# Patient Record
Sex: FEMALE | Race: WHITE | NOT HISPANIC OR LATINO | Employment: OTHER | URBAN - METROPOLITAN AREA
[De-identification: names, ages, dates, MRNs, and addresses within clinical notes are randomized per-mention and may not be internally consistent; named-entity substitution may affect disease eponyms.]

---

## 2023-06-27 ENCOUNTER — TRANSFERRED RECORDS (OUTPATIENT)
Dept: HEALTH INFORMATION MANAGEMENT | Facility: CLINIC | Age: 63
End: 2023-06-27
Payer: COMMERCIAL

## 2023-06-28 ENCOUNTER — TRANSFERRED RECORDS (OUTPATIENT)
Dept: HEALTH INFORMATION MANAGEMENT | Facility: CLINIC | Age: 63
End: 2023-06-28
Payer: COMMERCIAL

## 2023-06-28 ENCOUNTER — TRANSCRIBE ORDERS (OUTPATIENT)
Dept: OTHER | Age: 63
End: 2023-06-28

## 2023-06-28 DIAGNOSIS — C04.9 SQUAMOUS CELL CARCINOMA OF FLOOR OF MOUTH (H): Primary | ICD-10-CM

## 2023-06-29 DIAGNOSIS — C04.9 SCC (SQUAMOUS CELL CARCINOMA OF FLOOR OF MOUTH) (H): Primary | ICD-10-CM

## 2023-06-29 NOTE — TELEPHONE ENCOUNTER
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] referral from Ananda Jackson DDS recs in epic    RECORDS REQUESTED FROM:       Clinic name Comments Records Status Imaging Status   [facility]  Phone: [phone]   Fax: [phone]    6/27/23 dental chart note req 6/29/23    RECEIVED send to scan 6/30/23    Oral Pathology Lab  [facility]    [address] [place] [address]  Ph: [phone]  Fax: [phone] 6/2/2016 case: [id number] Sent to scan 6/30/23 6/29/23 7PM sent a fax for records - Amay   June 30, 2023 at 10:21 AM - received records from [facility] and send to scanning. E-mail copy to RN and placed copy in provider's rightfax folder. Maria Luisa

## 2023-06-30 ENCOUNTER — MEDICAL CORRESPONDENCE (OUTPATIENT)
Dept: HEALTH INFORMATION MANAGEMENT | Facility: CLINIC | Age: 63
End: 2023-06-30
Payer: COMMERCIAL

## 2023-07-03 ENCOUNTER — PRE VISIT (OUTPATIENT)
Dept: OTOLARYNGOLOGY | Facility: CLINIC | Age: 63
End: 2023-07-03

## 2023-07-03 ENCOUNTER — OFFICE VISIT (OUTPATIENT)
Dept: OTOLARYNGOLOGY | Facility: CLINIC | Age: 63
End: 2023-07-03
Payer: COMMERCIAL

## 2023-07-03 VITALS — BODY MASS INDEX: 19.97 KG/M2 | WEIGHT: 117 LBS | OXYGEN SATURATION: 100 % | HEART RATE: 115 BPM | HEIGHT: 64 IN

## 2023-07-03 DIAGNOSIS — C04.9 SCC (SQUAMOUS CELL CARCINOMA OF FLOOR OF MOUTH) (H): Primary | ICD-10-CM

## 2023-07-03 PROCEDURE — 40808 BIOPSY OF MOUTH LESION: CPT | Performed by: STUDENT IN AN ORGANIZED HEALTH CARE EDUCATION/TRAINING PROGRAM

## 2023-07-03 PROCEDURE — 31575 DIAGNOSTIC LARYNGOSCOPY: CPT | Mod: 51 | Performed by: STUDENT IN AN ORGANIZED HEALTH CARE EDUCATION/TRAINING PROGRAM

## 2023-07-03 PROCEDURE — 88305 TISSUE EXAM BY PATHOLOGIST: CPT | Mod: TC | Performed by: STUDENT IN AN ORGANIZED HEALTH CARE EDUCATION/TRAINING PROGRAM

## 2023-07-03 PROCEDURE — 88305 TISSUE EXAM BY PATHOLOGIST: CPT | Mod: 26 | Performed by: STUDENT IN AN ORGANIZED HEALTH CARE EDUCATION/TRAINING PROGRAM

## 2023-07-03 PROCEDURE — 99205 OFFICE O/P NEW HI 60 MIN: CPT | Mod: 25 | Performed by: STUDENT IN AN ORGANIZED HEALTH CARE EDUCATION/TRAINING PROGRAM

## 2023-07-03 RX ORDER — AMOXICILLIN 500 MG/1
CAPSULE ORAL
COMMUNITY
Start: 2023-06-26 | End: 2023-08-02

## 2023-07-03 ASSESSMENT — PATIENT HEALTH QUESTIONNAIRE - PHQ9: SUM OF ALL RESPONSES TO PHQ QUESTIONS 1-9: 6

## 2023-07-03 ASSESSMENT — PAIN SCALES - GENERAL: PAINLEVEL: WORST PAIN (10)

## 2023-07-03 NOTE — LETTER
7/3/2023       RE: Adrianna Pereira  [address] [address] [place] [address]     Dear Colleague,    Thank you for referring your patient, Adrianna Pereira, to the [facility] EAR NOSE AND THROAT CLINIC [place] at [facility]. Please see a copy of my visit note below.    July 3, 2023      Ananda Jackson MD, DDS  [facility]  [address], [address]  [place] [address]      Dear Dr. Jackson,    I had the pleasure of meeting Ms. Pereira today in clinic.    HISTORY OF PRESENT ILLNESS:  As you know, she is a pleasant 62-year-old woman referred for evaluation of a likely oral cancer.  She has previously undergone CO2 laser ablation for premalignant lesions in the oral cavity.  She more recently has noticed some firmness and swelling of the lower lip and chin.  She was seen in your office and she you had an appropriate concern for oral malignancy.  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    PAST MEDICAL HISTORY:  None.    PAST SURGICAL HISTORY:  None.    MEDICATIONS:  Amoxicillin.    ALLERGIES:  No known drug allergies.    SOCIAL HISTORY:  She is a current smoker and smokes approximately 15 cigarettes per day.  She does not drink alcohol now, but in the past was a heavy alcohol user and considers herself an alcoholic.  No drug use.    FAMILY HISTORY:  None.    REVIEW OF SYSTEMS:  A 10-point review of systems was performed and is negative except as noted in the HPI.    PHYSICAL EXAMINATION:  She has no palpable cervical lymphadenopathy.  She has firmness and a mass palpable in the chin that appears to be immediately deep to the skin.  The overlying skin is not involved over the mass.  On intraoral examination, she has an obvious ulcerative lesion involving the mandibular alveolus, floor of mouth and ventral tongue.  She has soft tissue fullness over it presumably related to the mental foramen and likely  perineural invasion of the tumor.  She has thin legs and a normal Alfredo's test on the left forearm.     PROCEDURE#1:  Fiberoptic laryngoscopy was performed.  The scope was advanced in the nasal cavity.  The nasopharynx was clear.  The base of tongue and vallecula was clear.  The vocal cords are mobile bilaterally without lesions.    PROCEDURE #2: Biopsy was performed of the oral lesion. 1% lidocaine with 1:100,000 epinephrine was used for local anesthesia.  A 4 mm punch was used to obtain two specimens, one from the mandibular alveolus and one from the ventral tongue from the contiguous mass.      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  This appears to involve her mandible, floor of mouth, ventral tongue and chin.  I anticipate that the resection will be segmental mandibulectomy, floor of mouth resection, anterior glossectomy and I feel that she will likely need a total lower lip/chin resection.    In terms of reconstruction, this could be reconstructed with a single osteocutaneous flap, potentially scapula, versus a double flap where we use the fibula to reconstruct the bone and some of the floor of mouth defect and a folded radial forearm flap to reconstruct the lower lip.  She does not have a palmaris tendon present so I would likely harvest fascia jenny or plantaris tendon to create a sling.  She understands that adjuvant radiation therapy will be needed.  She did not want to discuss the details of surgery today and she is quite overwhelmed.   We will follow up on her biopsy results.  She has a PET scan scheduled for next Thursday.  We will review the case at our Multidisciplinary Head and Neck Conference and I will plan to see her back on Monday July 17, 2023 for a more thorough discussion of the plan.  All of her questions were answered today.    Thank you for allowing me to participate in the care of this patient. If you have any further questions, please  do not hesitate to contact me.      Sincerely,      Darien Thorne M.D.      Head and Neck Surgical Oncology and Microvascular Reconstruction  Department of Otolaryngology - Head and Neck Surgery  [facility]       60 minutes spent on the date of the encounter in chart review, patient visit, review of tests, documentation and/or discussion with other providers about the issues documented above asides from time spent doing flexible laryngoscopy.

## 2023-07-03 NOTE — NURSING NOTE
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    Manjeet Salamanca LPN    "

## 2023-07-03 NOTE — PATIENT INSTRUCTIONS
You were seen in the ENT Clinic today by Dr. Thorne. If you have any questions or concerns after your appointment, please contact us (see below)      2.   Please return to clinic         How to Contact Us:  Send a [employer] message to your provider. Our team will respond to you via [employer]. Occasionally, we will need to call you to get further information.  For urgent matters (Monday-Friday), call the ENT Clinic: [phone] and speak with a call center team member - they will route your call appropriately.   If you'd like to speak directly with a nurse, please find our contact information below. We do our best to check voicemail frequently throughout the day, and will work to call you back within 1-2 days. For urgent matters, please use the general clinic phone numbers listed above.      La PRETTY RN  ENT RN Care Coordinator  Direct: [phone]

## 2023-07-03 NOTE — PROGRESS NOTES
July 3, 2023      Ananda Jackson MD, DDS  [facility]  [address], [address] [place], [place] [id number]      Dear Dr. Jackson,    I had the pleasure of meeting Ms. Pereira today in clinic.    HISTORY OF PRESENT ILLNESS:  As you know, she is a pleasant 62-year-old woman referred for evaluation of a likely oral cancer.  She has previously undergone CO2 laser ablation for premalignant lesions in the oral cavity.  She more recently has noticed some firmness and swelling of the lower lip and chin.  She was seen in your office and she you had an appropriate concern for oral malignancy.  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    PAST MEDICAL HISTORY:  None.    PAST SURGICAL HISTORY:  None.    MEDICATIONS:  Amoxicillin.    ALLERGIES:  No known drug allergies.    SOCIAL HISTORY:  She is a current smoker and smokes approximately 15 cigarettes per day.  She does not drink alcohol now, but in the past was a heavy alcohol user and considers herself an alcoholic.  No drug use.    FAMILY HISTORY:  None.    REVIEW OF SYSTEMS:  A 10-point review of systems was performed and is negative except as noted in the HPI.    PHYSICAL EXAMINATION:  She has no palpable cervical lymphadenopathy.  She has firmness and a mass palpable in the chin that appears to be immediately deep to the skin.  The overlying skin is not involved over the mass.  On intraoral examination, she has an obvious ulcerative lesion involving the mandibular alveolus, floor of mouth and ventral tongue.  She has soft tissue fullness over it presumably related to the mental foramen and likely perineural invasion of the tumor.  She has thin legs and a normal Alfredo's test on the left forearm.     PROCEDURE#1:  Fiberoptic laryngoscopy was performed.  The scope was advanced in the nasal cavity.  The nasopharynx was clear.  The base of tongue and vallecula was clear.  The vocal cords are mobile bilaterally  without lesions.    PROCEDURE #2: Biopsy was performed of the oral lesion. 1% lidocaine with 1:100,000 epinephrine was used for local anesthesia.  A 4 mm punch was used to obtain two specimens, one from the mandibular alveolus and one from the ventral tongue from the contiguous mass.      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  This appears to involve her mandible, floor of mouth, ventral tongue and chin.  I anticipate that the resection will be segmental mandibulectomy, floor of mouth resection, anterior glossectomy and I feel that she will likely need a total lower lip/chin resection.    In terms of reconstruction, this could be reconstructed with a single osteocutaneous flap, potentially scapula, versus a double flap where we use the fibula to reconstruct the bone and some of the floor of mouth defect and a folded radial forearm flap to reconstruct the lower lip.  She does not have a palmaris tendon present so I would likely harvest fascia jenny or plantaris tendon to create a sling.  She understands that adjuvant radiation therapy will be needed.  She did not want to discuss the details of surgery today and she is quite overwhelmed.   We will follow up on her biopsy results.  She has a PET scan scheduled for next Thursday.  We will review the case at our Multidisciplinary Head and Neck Conference and I will plan to see her back on Monday July 17, 2023 for a more thorough discussion of the plan.  All of her questions were answered today.    Thank you for allowing me to participate in the care of this patient. If you have any further questions, please do not hesitate to contact me.      Sincerely,      Darien Thorne M.D.      Head and Neck Surgical Oncology and Microvascular Reconstruction  Department of Otolaryngology - Head and Neck Surgery  [facility]       60 minutes spent on the date of the encounter in chart review, patient  visit, review of tests, documentation and/or discussion with other providers about the issues documented above asides from time spent doing flexible laryngoscopy.

## 2023-07-05 LAB
PATH REPORT.COMMENTS IMP SPEC: ABNORMAL
PATH REPORT.COMMENTS IMP SPEC: ABNORMAL
PATH REPORT.COMMENTS IMP SPEC: YES
PATH REPORT.FINAL DX SPEC: ABNORMAL
PATH REPORT.GROSS SPEC: ABNORMAL
PATH REPORT.MICROSCOPIC SPEC OTHER STN: ABNORMAL
PATH REPORT.RELEVANT HX SPEC: ABNORMAL
PHOTO IMAGE: ABNORMAL

## 2023-07-07 DIAGNOSIS — C04.9 SCC (SQUAMOUS CELL CARCINOMA OF FLOOR OF MOUTH) (H): Primary | ICD-10-CM

## 2023-07-13 ENCOUNTER — ANCILLARY ORDERS (OUTPATIENT)
Dept: OTOLARYNGOLOGY | Facility: CLINIC | Age: 63
End: 2023-07-13

## 2023-07-13 ENCOUNTER — HOSPITAL ENCOUNTER (OUTPATIENT)
Dept: PET IMAGING | Facility: CLINIC | Age: 63
Discharge: HOME | End: 2023-07-13
Attending: STUDENT IN AN ORGANIZED HEALTH CARE EDUCATION/TRAINING PROGRAM
Payer: COMMERCIAL

## 2023-07-13 DIAGNOSIS — C04.9 SCC (SQUAMOUS CELL CARCINOMA OF FLOOR OF MOUTH) (H): ICD-10-CM

## 2023-07-13 LAB
CREAT BLD-MCNC: 0.6 MG/DL (ref 0.5–1)
GFR SERPL CREATININE-BSD FRML MDRD: >60 ML/MIN/1.73M2

## 2023-07-13 PROCEDURE — 78815 PET IMAGE W/CT SKULL-THIGH: CPT | Mod: 26 | Performed by: RADIOLOGY

## 2023-07-13 PROCEDURE — 343N000001 HC RX 343: Performed by: STUDENT IN AN ORGANIZED HEALTH CARE EDUCATION/TRAINING PROGRAM

## 2023-07-13 PROCEDURE — A9552 F18 FDG: HCPCS | Performed by: STUDENT IN AN ORGANIZED HEALTH CARE EDUCATION/TRAINING PROGRAM

## 2023-07-13 PROCEDURE — 74177 CT ABD & PELVIS W/CONTRAST: CPT | Mod: 26 | Performed by: RADIOLOGY

## 2023-07-13 PROCEDURE — 78815 PET IMAGE W/CT SKULL-THIGH: CPT | Mod: PI

## 2023-07-13 PROCEDURE — 999N000248 HC STATISTIC IV INSERT WITH US BY RN

## 2023-07-13 PROCEDURE — 82565 ASSAY OF CREATININE: CPT

## 2023-07-13 PROCEDURE — 71260 CT THORAX DX C+: CPT | Mod: 26 | Performed by: RADIOLOGY

## 2023-07-13 PROCEDURE — 71260 CT THORAX DX C+: CPT

## 2023-07-13 PROCEDURE — 70491 CT SOFT TISSUE NECK W/DYE: CPT | Mod: 26 | Performed by: RADIOLOGY

## 2023-07-13 PROCEDURE — 250N000011 HC RX IP 250 OP 636: Performed by: STUDENT IN AN ORGANIZED HEALTH CARE EDUCATION/TRAINING PROGRAM

## 2023-07-13 PROCEDURE — 70491 CT SOFT TISSUE NECK W/DYE: CPT

## 2023-07-13 RX ORDER — IOPAMIDOL 755 MG/ML
10-135 INJECTION, SOLUTION INTRAVASCULAR ONCE
Status: COMPLETED | OUTPATIENT
Start: 2023-07-13 | End: 2023-07-13

## 2023-07-13 RX ADMIN — IOPAMIDOL 64 ML: 755 INJECTION, SOLUTION INTRAVENOUS at 14:20

## 2023-07-13 RX ADMIN — FLUDEOXYGLUCOSE F-18 10.01 MILLICURIE: 500 INJECTION, SOLUTION INTRAVENOUS at 12:51

## 2023-07-14 DIAGNOSIS — C04.9 SCC (SQUAMOUS CELL CARCINOMA OF FLOOR OF MOUTH) (H): Primary | ICD-10-CM

## 2023-07-17 ENCOUNTER — ANCILLARY PROCEDURE (OUTPATIENT)
Dept: CT IMAGING | Facility: CLINIC | Age: 63
End: 2023-07-17
Attending: STUDENT IN AN ORGANIZED HEALTH CARE EDUCATION/TRAINING PROGRAM
Payer: COMMERCIAL

## 2023-07-17 ENCOUNTER — PREP FOR PROCEDURE (OUTPATIENT)
Dept: OTOLARYNGOLOGY | Facility: CLINIC | Age: 63
End: 2023-07-17

## 2023-07-17 ENCOUNTER — OFFICE VISIT (OUTPATIENT)
Dept: OTOLARYNGOLOGY | Facility: CLINIC | Age: 63
End: 2023-07-17
Payer: COMMERCIAL

## 2023-07-17 ENCOUNTER — THERAPY VISIT (OUTPATIENT)
Dept: SPEECH THERAPY | Facility: CLINIC | Age: 63
End: 2023-07-17
Payer: COMMERCIAL

## 2023-07-17 ENCOUNTER — ANCILLARY ORDERS (OUTPATIENT)
Dept: OTOLARYNGOLOGY | Facility: CLINIC | Age: 63
End: 2023-07-17

## 2023-07-17 VITALS — WEIGHT: 117 LBS | BODY MASS INDEX: 19.97 KG/M2 | HEIGHT: 64 IN

## 2023-07-17 DIAGNOSIS — C04.9 SCC (SQUAMOUS CELL CARCINOMA OF FLOOR OF MOUTH) (H): Primary | ICD-10-CM

## 2023-07-17 DIAGNOSIS — C04.9 SQUAMOUS CELL CARCINOMA OF FLOOR OF MOUTH (H): Primary | ICD-10-CM

## 2023-07-17 DIAGNOSIS — C77.9 METASTATIC SQUAMOUS CELL CARCINOMA TO LYMPH NODE (H): ICD-10-CM

## 2023-07-17 DIAGNOSIS — R13.12 OROPHARYNGEAL DYSPHAGIA: ICD-10-CM

## 2023-07-17 DIAGNOSIS — C04.9 SCC (SQUAMOUS CELL CARCINOMA OF FLOOR OF MOUTH) (H): ICD-10-CM

## 2023-07-17 PROCEDURE — 70491 CT SOFT TISSUE NECK W/DYE: CPT | Mod: GC | Performed by: RADIOLOGY

## 2023-07-17 PROCEDURE — 99207 PR NO CHARGE LOS: CPT | Performed by: SPEECH-LANGUAGE PATHOLOGIST

## 2023-07-17 PROCEDURE — 73706 CT ANGIO LWR EXTR W/O&W/DYE: CPT | Mod: LT | Performed by: RADIOLOGY

## 2023-07-17 PROCEDURE — 99215 OFFICE O/P EST HI 40 MIN: CPT | Performed by: STUDENT IN AN ORGANIZED HEALTH CARE EDUCATION/TRAINING PROGRAM

## 2023-07-17 RX ORDER — IOPAMIDOL 755 MG/ML
135 INJECTION, SOLUTION INTRAVASCULAR ONCE
Status: COMPLETED | OUTPATIENT
Start: 2023-07-17 | End: 2023-07-17

## 2023-07-17 RX ORDER — DIPHENHYDRAMINE HCL 12.5 MG/5ML
12.5 LIQUID ORAL
COMMUNITY
Start: 2023-07-10 | End: 2023-08-02

## 2023-07-17 RX ADMIN — IOPAMIDOL 135 ML: 755 INJECTION, SOLUTION INTRAVASCULAR at 12:28

## 2023-07-17 ASSESSMENT — PAIN SCALES - GENERAL: PAINLEVEL: SEVERE PAIN (6)

## 2023-07-17 NOTE — DISCHARGE INSTRUCTIONS

## 2023-07-17 NOTE — LETTER
7/17/2023       RE: Adrianna Pereira  [address] [address] [place] [address]     Dear Colleague,    Thank you for referring your patient, Adrianna Pereira, to the [facility] EAR NOSE AND THROAT CLINIC [place] at [facility]. Please see a copy of my visit note below.    HISTORY OF PRESENT ILLNESS:  I met Ms. Pereira a couple of weeks ago.  She presented with a presumed oral cavity squamous cell carcinoma.  Her previous biopsies did not confirm this.  I redid her biopsies that confirmed squamous cell carcinoma.  We subsequently arranged for her to have a PET scan for staging.  She presents today for additional discussions regarding treatment and to review her imaging.    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  We previously discussed the anticipated surgical resection, which would be a segmental mandibulectomy, floor of mouth and anterior tongue resection and total lower lip and chin resection.  She was quite overwhelmed by this initially.  We also discussed reconstructive options.  Her PET scan shows no evidence of distant disease.    She is here today with her .  She understands the extent of the operation as well as her disease.    PHYSICAL EXAMINATION:  On exam, she has subcutaneous fullness of the chin and lower lip consistent with tumor infiltration.  There is tumor involving the mandibular alveolus with obvious exposed bone erosion.  There is tumor along the ventral tongue.  Both forearms are suitable for free tissue transfer.  She has thin thighs and legs bilaterally.  She has a tattoo on her right lateral calf.    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  I again discussed the recommended treatment, which would be upfront surgery and adjuvant radiation and potentially chemotherapy pending  pathology results.  We reviewed the planned operation, which would be a segmental mandibulectomy with resection of the floor of mouth, anteroventral tongue and lower lip and chin as well as bilateral neck dissection, tracheostomy and nasogastric feeding tube placement.  I anticipate she will need a gastrostomy tube and we can arrange this while she is inpatient.  We discussed the need for postoperative adjuvant radiation therapy and possible chemotherapy.      We spent some time discussing the reconstructive options.  I think that given the extent of the defect and complex structures involved, I think she would be best served with two free flap reconstruction.  I recommended proceeding with a fibula to reconstruct the bone defect and tongue/floor of mouth.  She has a tattoo on her right calf and therefore prefers the left fibula.  We will obtain a CT angiogram to assess the fibula as donor a site.  In regards to the lower lip and chin, I think that a forearm flap with palmaris tendon could be a nice option versus anterolateral thigh with fascia jenny or plantaris tendon if the defect is too big for a forearm.  She does not have a palmaris tendon on the left side and therefore, I recommended proceeding with a right forearm flap versus left ALT to reconstruct the lower lip and chin.  She understands the need for skin grafts at the fibula and forearm site as well as need for a volar splint on the forearm.      We reviewed the risks of the operation including but not limited to infection, bleeding, return trips to the operating room.  We discussed the cranial nerves that are at risk of injury.  We discussed the risks of free tissue transfer including 2-5% risk of flap loss.  She understands the anticipated postoperative course with a 3-day stay in the ICU and a 7 to 10-day total hospital stay.  She understands the period of n.p.o. postoperatively and the need for the tracheostomy postoperatively.  I have messaged our  colleague at [facility] Manjeet to assist in virtual surgical planning.  We will follow up on a CT angiogram of her lower extremities to make sure that she is a candidate for a fibula free flap.  We will begin the process of scheduling her for surgery.  She will be seen by preoperative anesthesia clinic and we will review her case at our Multidisciplinary Head and Neck Conference.      Darien Thorne M.D.      Head and Neck Surgical Oncology and Microvascular Reconstruction  Department of Otolaryngology - Head and Neck Surgery  [facility]          45 minutes spent on the date of the encounter in chart review, patient visit, review of tests, documentation and/or discussion with other providers about the issues documented above.           Again, thank you for allowing me to participate in the care of your patient.      Sincerely,    Darien Thorne MD

## 2023-07-17 NOTE — NURSING NOTE
Arm alert bracelet given to patient to wear on RIGHT arm now through surgery. Advised patient to not allow for any IV placement, needle sticks or lab draws from RIGHT arm in anticipation of RIGHT forearm free flap.       La Flores, RN, BSN

## 2023-07-17 NOTE — PATIENT INSTRUCTIONS
1. Please proceed with CT scan today.   2. Please call the ENT clinic with any questions,concerns, new or worsening symptoms.    -Clinic number is [phone]   - La's direct line (Dr. Thorne's nurse) [phone]  3. Please return on 8/2 for other scheduled appointments.   4. Surgery likely to be on 8/17

## 2023-07-17 NOTE — PROGRESS NOTES
HISTORY OF PRESENT ILLNESS:  I met Ms. Pereira a couple of weeks ago.  She presented with a presumed oral cavity squamous cell carcinoma.  Her previous biopsies did not confirm this.  I redid her biopsies that confirmed squamous cell carcinoma.  We subsequently arranged for her to have a PET scan for staging.  She presents today for additional discussions regarding treatment and to review her imaging.    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  We previously discussed the anticipated surgical resection, which would be a segmental mandibulectomy, floor of mouth and anterior tongue resection and total lower lip and chin resection.  She was quite overwhelmed by this initially.  We also discussed reconstructive options.  Her PET scan shows no evidence of distant disease.    She is here today with her .  She understands the extent of the operation as well as her disease.    PHYSICAL EXAMINATION:  On exam, she has subcutaneous fullness of the chin and lower lip consistent with tumor infiltration.  There is tumor involving the mandibular alveolus with obvious exposed bone erosion.  There is tumor along the ventral tongue.  Both forearms are suitable for free tissue transfer.  She has thin thighs and legs bilaterally.  She has a tattoo on her right lateral calf.    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  I again discussed the recommended treatment, which would be upfront surgery and adjuvant radiation and potentially chemotherapy pending pathology results.  We reviewed the planned operation, which would be a segmental mandibulectomy with resection of the floor of mouth, anteroventral tongue and lower lip and chin as well as bilateral neck dissection, tracheostomy and nasogastric feeding tube placement.  I anticipate she will need a gastrostomy  tube and we can arrange this while she is inpatient.  We discussed the need for postoperative adjuvant radiation therapy and possible chemotherapy.      We spent some time discussing the reconstructive options.  I think that given the extent of the defect and complex structures involved, I think she would be best served with two free flap reconstruction.  I recommended proceeding with a fibula to reconstruct the bone defect and tongue/floor of mouth.  She has a tattoo on her right calf and therefore prefers the left fibula.  We will obtain a CT angiogram to assess the fibula as donor a site.  In regards to the lower lip and chin, I think that a forearm flap with palmaris tendon could be a nice option versus anterolateral thigh with fascia jenny or plantaris tendon if the defect is too big for a forearm.  She does not have a palmaris tendon on the left side and therefore, I recommended proceeding with a right forearm flap versus left ALT to reconstruct the lower lip and chin.  She understands the need for skin grafts at the fibula and forearm site as well as need for a volar splint on the forearm.      We reviewed the risks of the operation including but not limited to infection, bleeding, return trips to the operating room.  We discussed the cranial nerves that are at risk of injury.  We discussed the risks of free tissue transfer including 2-5% risk of flap loss.  She understands the anticipated postoperative course with a 3-day stay in the ICU and a 7 to 10-day total hospital stay.  She understands the period of n.p.o. postoperatively and the need for the tracheostomy postoperatively.  I have messaged our colleague at [facility] to assist in virtual surgical planning.  We will follow up on a CT angiogram of her lower extremities to make sure that she is a candidate for a fibula free flap.  We will begin the process of scheduling her for surgery.  She will be seen by preoperative anesthesia clinic and we will review  her case at our Multidisciplinary Head and Neck Conference.      Darien Thorne M.D.      Head and Neck Surgical Oncology and Microvascular Reconstruction  Department of Otolaryngology - Head and Neck Surgery  [facility]          45 minutes spent on the date of the encounter in chart review, patient visit, review of tests, documentation and/or discussion with other providers about the issues documented above.

## 2023-07-17 NOTE — DISCHARGE INSTRUCTIONS

## 2023-07-17 NOTE — PROGRESS NOTES
"Adrianna Pereira was seen for allied health care provider visit for introduction of self and SLP services. Pt with new very involved oral cavity mass. Per MD, pt would likely need \"segmental mandibulectomy, floor of mouth resection, anterior glossectomy and I feel that she will likely need a total lower lip/chin resection.\" Per MD, pt would likely need post op XRT. Provided information on trajectory of care and benefits of SLP services after surgery. Speaking and swallowing are impaired d/t pain. Provided ideas of ways to increase caloric intake prior to surgery with liquid nutritional supplements and timing pain medication prior to PO intake. Formal swallow evaluation indicated after surgery once medically cleared for PO intake. All questions answered within scope of practice for pt and her spouse. Encouraged pt to reach out with any questions or concerns. Time spent with patient 10 minutes.       MAIDA Bailey MA (music), CCC-SLP   Speech Language Pathologist  [place] Trained Vocologist   [facility] [place]  Dept. of Otolaryngology  Department of Rehabilitation Services  [address] [place] [address]  Email: [email or web address]   Phone: [phone]  Pronouns: she/her/hers      "

## 2023-07-18 NOTE — TELEPHONE ENCOUNTER
FUTURE VISIT INFORMATION      SURGERY INFORMATION:    Date: 8/17/23    Location: [facility] or    Surgeon:  Tyshawn Dao MD Jethwa, Ashok Ratilal, MD    Anesthesia Type:  General    Procedure: lower lip resection segmental mandibulectomy GLOSSECTOMY, PARTIAL Bilateral modified radical neck dissection Tracheostomy placement, nasogastric tube placement Left fibula free flap Right forearm free flap, possible left patricia lateral thigh free flap Split thickness skin graft from left thigh  RECORDS REQUESTED FROM:       Pertinent Medical History: None

## 2023-07-19 ENCOUNTER — TELEPHONE (OUTPATIENT)
Dept: OTOLARYNGOLOGY | Facility: CLINIC | Age: 63
End: 2023-07-19
Payer: COMMERCIAL

## 2023-07-19 NOTE — TELEPHONE ENCOUNTER
Patient is scheduled for surgery with Dr. Dao.     Spoke with: Patient    Date of Surgery: 8/17/23    Location: [facility] OR     Pre op with Provider: BRENNAN     H&P: Scheduled with PAC on 8/17/23 at 1:30.     Additional imaging/appointments: BRENNAN    Surgery packet: Will mail surgery packet to patient. Did confirm with patient address on file works best.      Additional comments: Will schedule patient for a 2 week post-op appointment with Dr. Thorne.         Jazmyn Koch on 7/19/2023 at 11:32 AM

## 2023-07-19 NOTE — TUMOR CONFERENCE
Head & Neck Tumor Conference Note   7/21/2023    Status: New  Staff: Dr. Thorne     Tumor Site: Oral cavity  Tumor Pathology: SCC  Tumor Stage: T4aN2c  Tumor Treatment:   7/3/23 - clinic biopsy     Reason for Review: Review imaging, path, and POC     Brief History: This is a 62 year old female with a history of prior CO2 laser ablation for premalignant lesions in the oral cavity. She was referred for evaluation of a likely oral cancer. She recently noticed some firmness and swelling of the lower lip and chin. 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 Resection including segmental mandibulectomy, floor of mouth resection, anterior glossectomy and likely total lower lip/chin resection with free flap reconstruction was recommended.    Pertinent PMH: No past medical history on file.     Smoking Hx:   Social History     Tobacco Use     Smoking status: Every Day     Years: 40.00     Types: Cigarettes     Smokeless tobacco: Never     Tobacco comments:     5-7 cigarettes per day     Imaging:   CT-Neck 7/17  Impression:   1. Redemonstration of oral cavity mass centered at the central  mandible with involvement of the ventral tongue and chin subcutaneous  tissue.  2. Bilateral level 1B, 2, and 3 metastatic lymph nodes..   3. Limited imaging performed primarily for the purposes of  stereotactic localization.     CT-Neck 7/13  Impression: F18 FDG PET CT of the neck demonstrates:     4.4 x 3.7 x 3.2 cm anterior oral cavity mass invading into the  mandible representing primary squamous cell cancer. A separate FDG  avid nodule along the right mandibular buccal mucosa.     Bilateral level 1B, level 2 and L3 metastatic lymphadenopathy.     Please refer to the whole body PET CT performed as a separate report,  for the findings of the remainder of the body.    Pathology:   A. MANDIBLE, LEFT MANDIBULAR  ALVEOLUS, BIOPSY:  - INVASIVE KERATINIZING SQUAMOUS CELL CARCINOMA, MODERATELY DIFFERENTIATED    Tumor Board Recommendation:   Discussion: PET scan was discussed. There is tumor involving the mandibular alveolus, floor of mouth, with subcutaneous changes in the chin and neck, as well as bilateral kelin disease including perifacial nodes. There is erosion of a large area of the mandible. Surgical resection was reommended with segmental mandibulectomy, bilateral necks, total total lower lip and chin reconstruction involving a two flap approach.    Plan:   - surgical resection as described above    Yolette Crouch MD   Otolaryngology-Head & Neck Surgery PGY3  Please contact ENT on call with questions    Documentation / Disclaimer Cancer Tumor Board Note  Cancer tumor board recommendations do not override what is determined to be reasonable care and treatment, which is dependent on the circumstances of a patient's case; the patient's medical, social, and personal concerns; and the clinical judgment of the oncologist [physician].

## 2023-07-21 ENCOUNTER — APPOINTMENT (OUTPATIENT)
Dept: ONCOLOGY | Facility: CLINIC | Age: 63
End: 2023-07-21
Payer: COMMERCIAL

## 2023-07-21 NOTE — TUMOR CONFERENCE
Called patient to review recommendations from tumor conference. Plan for surgery on 8/17. Patient denied further questions or concerns regarding surgery. Patient will follow up as scheduled.

## 2023-08-01 LAB
ABO/RH(D): NORMAL
ANTIBODY SCREEN: NEGATIVE
SPECIMEN EXPIRATION DATE: NORMAL

## 2023-08-02 ENCOUNTER — PRE VISIT (OUTPATIENT)
Dept: SURGERY | Facility: CLINIC | Age: 63
End: 2023-08-02

## 2023-08-02 ENCOUNTER — LAB (OUTPATIENT)
Dept: LAB | Facility: CLINIC | Age: 63
End: 2023-08-02
Payer: COMMERCIAL

## 2023-08-02 ENCOUNTER — ANESTHESIA EVENT (OUTPATIENT)
Dept: SURGERY | Facility: CLINIC | Age: 63
End: 2023-08-02
Payer: COMMERCIAL

## 2023-08-02 ENCOUNTER — OFFICE VISIT (OUTPATIENT)
Dept: SURGERY | Facility: CLINIC | Age: 63
End: 2023-08-02
Payer: COMMERCIAL

## 2023-08-02 ENCOUNTER — OFFICE VISIT (OUTPATIENT)
Dept: OTOLARYNGOLOGY | Facility: CLINIC | Age: 63
End: 2023-08-02
Payer: COMMERCIAL

## 2023-08-02 VITALS
BODY MASS INDEX: 18.61 KG/M2 | OXYGEN SATURATION: 98 % | WEIGHT: 109 LBS | HEART RATE: 104 BPM | DIASTOLIC BLOOD PRESSURE: 87 MMHG | HEIGHT: 64 IN | TEMPERATURE: 98.2 F | SYSTOLIC BLOOD PRESSURE: 148 MMHG

## 2023-08-02 VITALS
SYSTOLIC BLOOD PRESSURE: 122 MMHG | WEIGHT: 109 LBS | HEIGHT: 64 IN | TEMPERATURE: 97.5 F | HEART RATE: 99 BPM | OXYGEN SATURATION: 98 % | DIASTOLIC BLOOD PRESSURE: 83 MMHG | BODY MASS INDEX: 18.61 KG/M2

## 2023-08-02 DIAGNOSIS — Z01.818 PRE-OP EVALUATION: Primary | ICD-10-CM

## 2023-08-02 DIAGNOSIS — C04.9 SCC (SQUAMOUS CELL CARCINOMA OF FLOOR OF MOUTH) (H): Primary | ICD-10-CM

## 2023-08-02 DIAGNOSIS — C04.9 SCC (SQUAMOUS CELL CARCINOMA OF FLOOR OF MOUTH) (H): ICD-10-CM

## 2023-08-02 DIAGNOSIS — C77.9 METASTATIC SQUAMOUS CELL CARCINOMA TO LYMPH NODE (H): ICD-10-CM

## 2023-08-02 DIAGNOSIS — R63.4 WEIGHT LOSS, UNINTENTIONAL: ICD-10-CM

## 2023-08-02 LAB
ALBUMIN SERPL BCG-MCNC: 3.7 G/DL (ref 3.5–5.2)
ALP SERPL-CCNC: 87 U/L (ref 35–104)
ALT SERPL W P-5'-P-CCNC: 10 U/L (ref 0–50)
ANION GAP SERPL CALCULATED.3IONS-SCNC: 16 MMOL/L (ref 7–15)
AST SERPL W P-5'-P-CCNC: 16 U/L (ref 0–45)
BILIRUB SERPL-MCNC: 0.5 MG/DL
BUN SERPL-MCNC: 7.2 MG/DL (ref 8–23)
CALCIUM SERPL-MCNC: 10.1 MG/DL (ref 8.8–10.2)
CHLORIDE SERPL-SCNC: 88 MMOL/L (ref 98–107)
CREAT SERPL-MCNC: 0.72 MG/DL (ref 0.51–0.95)
DEPRECATED HCO3 PLAS-SCNC: 27 MMOL/L (ref 22–29)
ERYTHROCYTE [DISTWIDTH] IN BLOOD BY AUTOMATED COUNT: 12.1 % (ref 10–15)
GFR SERPL CREATININE-BSD FRML MDRD: >90 ML/MIN/1.73M2
GLUCOSE SERPL-MCNC: 102 MG/DL (ref 70–99)
HCT VFR BLD AUTO: 37.3 % (ref 35–47)
HGB BLD-MCNC: 13.1 G/DL (ref 11.7–15.7)
MCH RBC QN AUTO: 32.2 PG (ref 26.5–33)
MCHC RBC AUTO-ENTMCNC: 35.1 G/DL (ref 31.5–36.5)
MCV RBC AUTO: 92 FL (ref 78–100)
PLATELET # BLD AUTO: 503 10E3/UL (ref 150–450)
POTASSIUM SERPL-SCNC: 3.2 MMOL/L (ref 3.4–5.3)
PROT SERPL-MCNC: 7.2 G/DL (ref 6.4–8.3)
RBC # BLD AUTO: 4.07 10E6/UL (ref 3.8–5.2)
SODIUM SERPL-SCNC: 131 MMOL/L (ref 136–145)
WBC # BLD AUTO: 12.1 10E3/UL (ref 4–11)

## 2023-08-02 PROCEDURE — 86901 BLOOD TYPING SEROLOGIC RH(D): CPT | Performed by: NURSE PRACTITIONER

## 2023-08-02 PROCEDURE — 36415 COLL VENOUS BLD VENIPUNCTURE: CPT | Performed by: PATHOLOGY

## 2023-08-02 PROCEDURE — 99204 OFFICE O/P NEW MOD 45 MIN: CPT | Performed by: NURSE PRACTITIONER

## 2023-08-02 PROCEDURE — 85027 COMPLETE CBC AUTOMATED: CPT | Performed by: PATHOLOGY

## 2023-08-02 PROCEDURE — 99213 OFFICE O/P EST LOW 20 MIN: CPT | Performed by: OTOLARYNGOLOGY

## 2023-08-02 PROCEDURE — 86850 RBC ANTIBODY SCREEN: CPT | Performed by: NURSE PRACTITIONER

## 2023-08-02 PROCEDURE — 80053 COMPREHEN METABOLIC PANEL: CPT | Performed by: PATHOLOGY

## 2023-08-02 PROCEDURE — 84134 ASSAY OF PREALBUMIN: CPT | Performed by: NURSE PRACTITIONER

## 2023-08-02 PROCEDURE — 99000 SPECIMEN HANDLING OFFICE-LAB: CPT | Performed by: PATHOLOGY

## 2023-08-02 RX ORDER — OXYCODONE HCL 5 MG/5 ML
5 SOLUTION, ORAL ORAL EVERY 6 HOURS PRN
Qty: 60 ML | Refills: 0 | Status: SHIPPED | OUTPATIENT
Start: 2023-08-02 | End: 2023-08-05

## 2023-08-02 RX ORDER — LIDOCAINE HYDROCHLORIDE 20 MG/ML
15 SOLUTION OROPHARYNGEAL PRN
Status: ON HOLD | COMMUNITY
Start: 2023-07-26 | End: 2023-09-01

## 2023-08-02 RX ORDER — IBUPROFEN 200 MG
200 TABLET ORAL EVERY 4 HOURS PRN
Status: ON HOLD | COMMUNITY
End: 2023-09-01

## 2023-08-02 ASSESSMENT — LIFESTYLE VARIABLES: TOBACCO_USE: 1

## 2023-08-02 ASSESSMENT — PAIN SCALES - GENERAL
PAINLEVEL: WORST PAIN (10)
PAINLEVEL: WORST PAIN (10)

## 2023-08-02 NOTE — H&P
Pre-Operative H & P     CC:  Preoperative exam to assess for increased cardiopulmonary risk while undergoing surgery and anesthesia.    Date of Encounter: 8/2/2023  Primary Care Physician:  No Ref-Primary, Physician     Reason for visit:   Encounter Diagnoses   Name Primary?    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) (H)        ELSA Pereira is a 62 year old female who presents for pre-operative H & P in preparation for  Procedure Information       Case: [id number] Date/Time: 08/17/23 0730    Procedures:       lower lip resection (Face)      segmental mandibulectomy (Jaw)      GLOSSECTOMY, PARTIAL (Mouth)      Bilateral modified radical neck dissection (Bilateral: Neck)      Tracheostomy placement, nasogastric tube placement (Neck)      Left fibula free flap (Left: Leg)      Right forearm free flap, possible left patricia lateral thigh free flap (Update)      Split thickness skin graft from left thigh (Left: Update)    Anesthesia type: General    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 The patient has elected to proceed with above surgical intervention.      The patient presents to the PAC today with her , Duane, in preparation for the above scheduled procedure with comorbid conditions including nicotine dependence and h/o alcohol use disorder.       History is obtained from the patient and chart review    Hx of abnormal bleeding or anti-platelet use: denies    Menstrual history: No LMP  recorded. Patient is postmenopausal.: post menopausal     Past Medical History  No past medical history on file.    Past Surgical History  No past surgical history on file.    Prior to Admission Medications  Current Outpatient Medications   Medication Sig Dispense Refill    ibuprofen (ADVIL/MOTRIN) 200 MG tablet Take 200 mg by mouth every 4 hours as needed for pain      lidocaine, viscous, (XYLOCAINE) 2 % solution Swish and spit 15 mLs in mouth as needed      amoxicillin (AMOXIL) 500 MG capsule take 1 capsule by mouth 3 times a day until gone (Patient not taking: Reported on 7/17/2023)      LIQUID ALLERGY RELIEF 12.5 MG/5ML liquid Take 12.5 mg by mouth (Patient not taking: Reported on 8/2/2023)         Allergies  No Known Allergies    Social History  Social History     Socioeconomic History    Marital status:      Spouse name: Not on file    Number of children: Not on file    Years of education: Not on file    Highest education level: Not on file   Occupational History    Not on file   Tobacco Use    Smoking status: Some Days     Years: 40.00     Types: Cigarettes     Passive exposure: Current    Smokeless tobacco: Never    Tobacco comments:     5-7 cigarettes per day   Substance and Sexual Activity    Alcohol use: Yes     Comment: 1 drink a day    Drug use: Not Currently     Types: Marijuana    Sexual activity: Not on file   Other Topics Concern    Not on file   Social History Narrative    Not on file     Social Determinants of Health     Financial Resource Strain: Not on file   Food Insecurity: Not on file   Transportation Needs: Not on file   Physical Activity: Not on file   Stress: Not on file   Social Connections: Not on file   Intimate Partner Violence: Not on file   Housing Stability: Not on file       Family History  No family history on file.    Review of Systems  The complete review of systems is negative other than noted in the HPI or here.   Anesthesia Evaluation   Pt has had prior anesthetic.  "Type: General.    No history of anesthetic complications       ROS/MED HX  ENT/Pulmonary:     (+)                tobacco use (Trying to quit smoking.), Current use, 1 packs/day, 40  Pack-Year Hx,                     Neurologic:  - neg neurologic ROS     Cardiovascular: Comment: Denies cardiac symptoms including chest pain, SOB, palpitations, syncope, HERCULES, orthopnea, or PND.      (-) taking anticoagulants/antiplatelets   METS/Exercise Tolerance: 4 - Raking leaves, gardening Comment: Able to up a flight of stairs many times throughout the day without symptoms.   Hematologic:  - neg hematologic  ROS     Musculoskeletal:       GI/Hepatic:       Renal/Genitourinary:  - neg Renal ROS     Endo: Comment: Reports ~28 pound weight loss in past 8 weeks.       Psychiatric/Substance Use:     (+)   alcohol abuse (Alcohol use disorder)   (-) psychiatric history and chronic opioid use history   Infectious Disease:  - neg infectious disease ROS     Malignancy:   (+) Malignancy, History of Other.Other CA SSC of oral cavity. Active status post.    Other: Comment: S/p tubal ligation 1982     - neg other ROS    (+)  , H/O Chronic Pain (oral pain),         /83 (BP Location: Left arm, Patient Position: Sitting, Cuff Size: Adult Regular)   Pulse 99   Temp 97.5  F (36.4  C) (Axillary)   Ht 1.626 m (5' 4\")   Wt 49.4 kg (109 lb)   SpO2 98%   Breastfeeding No   BMI 18.71 kg/m      Physical Exam   Constitutional: Awake, alert, cooperative, no apparent distress, and appears stated age.  Eyes: Pupils equal, round and reactive to light, extra ocular muscles intact, right eye with rightward drift, sclera clear, conjunctiva normal.  HENT: Normocephalic, oral pharynx moist mucus membranes with bottom central incisors missing, significant halitosis.  Limited mouth exam with limited mouth opeing 2/2 pain.    Respiratory: Clear to auscultation bilaterally, no crackles or wheezing.  Cardiovascular: Regular rate and rhythm, normal S1 and S2, " and no murmur noted.  Carotids +2, no bruits. No edema. Palpable pulses to radial  DP and PT arteries.   GI: Normal bowel sounds, soft, non-distended, non-tender, no masses palpated, no hepatosplenomegaly.    Lymph/Hematologic: No cervical lymphadenopathy and no supraclavicular lymphadenopathy.  Genitourinary:  deferred  Skin: Warm and dry.    Musculoskeletal: Full ROM of neck. There is no redness, warmth, or swelling of the joints. Gross motor strength is normal.    Neurologic: Awake, alert, oriented to name, place and time. Cranial nerves II-XII are grossly intact. Gait is normal.   Neuropsychiatric: Calm, cooperative. Normal affect.     Prior Labs/Diagnostic Studies   All labs and imaging personally reviewed    Latest Reference Range & Units 07/13/23 12:54   Creatinine POCT 0.5 - 1.0 mg/dL 0.6   GFR, ESTIMATED POCT >60 mL/min/1.73m2 >60       PATHOLOGY 7/3/2023  Final Diagnosis   A. MANDIBLE, LEFT MANDIBULAR ALVEOLUS, BIOPSY:  - INVASIVE KERATINIZING SQUAMOUS CELL CARCINOMA, MODERATELY DIFFERENTIATED          PROCEDURES  PET and CT on 7/13/2023 2:17 PM:                                                             IMPRESSION:      Please refer to neck PET CT for new diagnosis of squamous cell cancer  in the anterior oral cavity and bilateral cervical lymphadenopathy.     Regarding the rest of the body, there are no suspicious findings to  suggest distal metastasis.      Punctate few pulmonary nodules in the right middle lobe. Continued  attention on follow-up is recommended.        LUISANA LÓPEZ MD   The patient's records and results personally reviewed by this provider.     Outside records reviewed from: Care Everywhere    LAB/DIAGNOSTIC STUDIES TODAY:     Latest Reference Range & Units 08/02/23 14:54   Sodium 136 - 145 mmol/L 131 (L)   Potassium 3.4 - 5.3 mmol/L 3.2 (L)   Chloride 98 - 107 mmol/L 88 (L)   Carbon Dioxide (CO2) 22 - 29 mmol/L 27   Urea Nitrogen 8.0 - 23.0 mg/dL 7.2 (L)   Creatinine 0.51 - 0.95 mg/dL  0.72   GFR Estimate >60 mL/min/1.73m2 >90   Calcium 8.8 - 10.2 mg/dL 10.1   Anion Gap 7 - 15 mmol/L 16 (H)   Albumin 3.5 - 5.2 g/dL 3.7   Protein Total 6.4 - 8.3 g/dL 7.2   Alkaline Phosphatase 35 - 104 U/L 87   ALT 0 - 50 U/L 10   AST 0 - 45 U/L 16   Bilirubin Total <=1.2 mg/dL 0.5   Glucose 70 - 99 mg/dL 102 (H)   WBC 4.0 - 11.0 10e3/uL 12.1 (H)   Hemoglobin 11.7 - 15.7 g/dL 13.1   Hematocrit 35.0 - 47.0 % 37.3   Platelet Count 150 - 450 10e3/uL 503 (H)   RBC Count 3.80 - 5.20 10e6/uL 4.07   MCV 78 - 100 fL 92   MCH 26.5 - 33.0 pg 32.2   MCHC 31.5 - 36.5 g/dL 35.1   RDW 10.0 - 15.0 % 12.1   (L): Data is abnormally low  (H): Data is abnormally high    Assessment    Adrianna Pereira is a 62 year old female seen as a PAC referral for risk assessment and optimization for anesthesia.    Plan/Recommendations  Pt will be optimized for the proposed procedure.  See below for details on the assessment, risk, and preoperative recommendations    NEUROLOGY  - No history of TIA, CVA or seizure    -Post Op delirium risk factors:  No risk identified    ENT  - Squamous cell carcinoma of floor of mouth   ~ painful mouth with limited mouth opening   ~ reports bottom central teeth are starting to fall out    - Physical exam is concerning for complicated airway.  Mallampati: Unable to assess  TM: > 3    CARDIAC  - No history of CAD, Hypertension, and Afib    - METS (Metabolic Equivalents)  Patient performs 4 or more METS exercise without symptoms            Total Score: 0      - RCRI-Very low risk: Class 1 0.4% complication rate            Total Score: 0        PULMONARY  - JOANNE Low Risk            Total Score: 1    JOANNE: Over 50 ys old      - Denies asthma or inhaler use    - Tobacco History   ~ Cutting back on smoking ~3 cigarettes per day  ~ Encouraged smoking cessation      History   Smoking Status    Some Days    Years: 40.00    Types: Cigarettes   Smokeless Tobacco    Never       GI  - PONV Medium Risk  Total Score: 2           1 AN  "PONV: Pt is Female    1 AN PONV: Intended Post Op Opioids      - denies GERD    /RENAL  - Na+ 131, K+ 3.2 and Chloride 88   ~ Encouraged electrolyte replacement drink like Gatorade   ~ recheck DOS    - Baseline Creatinine  see above     ENDOCRINE    - BMI: Estimated body mass index is 18.71 kg/m  as calculated from the following:    Height as of this encounter: 1.626 m (5' 4\").    Weight as of this encounter: 49.4 kg (109 lb).  Healthy Weight (BMI 18.5-24.9)    - No history of Diabetes Mellitus    HEME  VTE Medium Risk 1.8%            Total Score: 6    VTE: Greater than 59 yrs old    VTE: Current cancer      - No history of abnormal bleeding or antiplatelet use.    PSYCH  - Likely alcohol use disorder   ~ cut back to one drink per day.     OTHER  - Difficult stick    Different anesthesia methods/types have been discussed with the patient, but they are aware that the final plan will be decided by the assigned anesthesia provider on the date of service.  The patient is optimized for their procedure. AVS with information on surgery time/arrival time, meds and NPO status given by nursing staff. No further diagnostic testing indicated.      On the day of service:     Prep time: 16 minutes  Visit time: 19 minutes  Documentation time: 17 minutes  ------------------------------------------  Total time: 52 minutes      ELISABETH Carbone CNP  Preoperative Assessment Center  [facility]  Clinic and Surgery Center  Phone: [phone]  Fax: [phone]    "

## 2023-08-02 NOTE — PATIENT INSTRUCTIONS
1. Continue with plan as scheduled for surgery.   2. Please call the ENT clinic with any questions,concerns, new or worsening symptoms.    -Clinic number is [phone]   - La's direct line (Dr. Dao's nurse) [phone]  3. A prescription was sent to pharmacy for oxycodone was sent to your pharmacy.

## 2023-08-02 NOTE — LETTER
8/2/2023       RE: Adrianna Pereira  [address] [address] [place] [id number]     Dear Colleague,    Thank you for referring your patient, Adrianna Pereira, to the [facility] [place] [facility]. Please see a copy of my visit note below.    I was asked to see Ms. Pereira by Dr. Thorne. she is a patient with what appears to be a primary tumor of the floor of mouth that has eroded through the anterior aspect of the mandible and into the subcutaneous tissue of the chin.  The plan is for her to receive a floor mouth resection, partial glossectomy, segmental Bakari like to me and resection of chin skin with bilateral neck dissections.  Dr. Harrington is planning to flap reconstruction with the fibula and a forearm flap.        I examined the patient as gingerly as possible as she is in significant pain, she is currently taking viscous lidocaine and ibuprofen as she would like to avoid narcotics.  The tumor is quite erosive into the mandible but the posterior half of the tongue appears to be uninvolved by palpation.  The neck suggest bilateral level 1B lymph nodes on examination.  There may also be obstructive enlargement of the submandibular glands.    I discussed the nature of the procedure, the risks, which include, but are not limited to, bleeding, infection, return to the operating room and possible flap loss.  She understands and she and her  are ready to proceed.  She would like to try an oxycodone prescription which I will prescribe for her.    Tyshawn Dao M.D.      Again, thank you for allowing me to participate in the care of your patient.      Sincerely,    Tyshawn Dao MD

## 2023-08-02 NOTE — NURSING NOTE
"Chief Complaint   Patient presents with    Consult     Surgical consult    .  Blood pressure (!) 148/87, pulse 104, temperature 98.2  F (36.8  C), height 1.626 m (5' 4\"), weight 49.4 kg (109 lb), SpO2 98 %, not currently breastfeeding.    Manjeet Salamanca LPN    "

## 2023-08-02 NOTE — PATIENT INSTRUCTIONS
Preparing for Your Surgery      Name:  Adrianna Pereira   MRN:  [id number]   :  1960   Today's Date:  2023       Arriving for surgery:  Surgery date:  2023  Arrival time:  5:30 am    Please come to:     Please come to:      [facility] [address]  [address] [place]  [address]      The [facility] Patient /Visitor Ramp is located at [address]. Patients and visitors who self-park will receive the reduced hospital parking rate. If the Patient /Visitor Ramp is full, please follow the signs to the  parking located at the main hospital entrance.     parking is available ( 24 hours/ 7 days a week)    Discounted parking pass options are available for patients and visitors. They can be purchased at the [employer] desk at the [facility] hospital entrance.    -    Stop at the security desk and they will direct surgery patients to the 3rd floor Surgery Waiting Room. [phone]     -  If you are in need of directions, wheelchair or escort please stop at the Information/security desk in the lobby.       What can I eat or drink?  -  You may eat and drink normally up to 8 hours prior to arrival time. (Until 23 at 11 pm)  -  You may have clear liquids until 2 hours prior to arrival time. (Until 3:30 am)    Examples of clear liquids:  Water  Clear broth  Juices (apple, white grape, white cranberry  and cider) without pulp  Noncarbonated, powder based beverages  (lemonade and Gus-Aid)  Sodas (Sprite, 7-Up, ginger ale and seltzer)  Coffee or tea (without milk or cream)  Gatorade    -  No Alcohol or cannabis products for at least 24 hours before surgery.     Which medicines can I take?    Hold Aspirin for 7 days before surgery.   Hold Multivitamins for 7 days before surgery.  Hold Supplements for 7 days before surgery.  Hold Ibuprofen (Advil, Motrin) for 1  day(s) before surgery--unless otherwise directed by  surgeon.  Hold Naproxen (Aleve) for 4 days before surgery.      -  PLEASE TAKE these medications the day of surgery:  Lidocaine swish and spit if needed       How do I prepare myself?  - Please take 2 showers (one the night prior to surgery and one the morning of surgery) using Scrubcare or Hibiclens soap.    Use this soap only from the neck to your toes.     Leave the soap on your skin for one minute--then rinse thoroughly.      You may use your own shampoo and conditioner. No other hair products.   - Please remove all jewelry and body piercings.  - No lotions, deodorants or fragrance.  - No makeup or fingernail polish.   - Bring your ID and insurance card.    -If you have a Deep Brain Stimulator, Spinal Cord Stimulator, or any Neuro Stimulator device---you must bring the remote control to the hospital.        Covid testing policy as of 12/06/2022  Your surgeon will notify and schedule you for a COVID test if one is needed before surgery--please direct any questions or COVID symptoms to your surgeon      Questions or Concerns:    - For any questions regarding the day of surgery or your hospital stay, please contact the Pre Admission Nursing Office at [phone].       - If you have health changes between today and your surgery, please call your surgeon.       - For questions after surgery, please call your surgeons office.           Current Visitor Guidelines    You may have 2 visitors in the pre op area.    Visiting hours: 8 a.m. to 8:30 p.m.    You may have four visitors during your inpatient hospital stay.    Patients confirmed or suspected to have symptoms of COVID 19 or flu:     No visitors allowed for adult patients.   Children (under age 18) can have 1 named visitor.     People who are sick or showing symptoms of COVID 19 or flu:    Are not allowed to visit patients--we can only make exceptions in special situations.       Please follow these guidelines for your visit:          Please maintain social  distance          Masking is optional--however at times you may be asked to wear a mask for the safety of yourself and others     Clean your hands with alcohol hand . Do this when you arrive at and leave the building and patient room,    And again after you touch your mask or anything in the room.     Go directly to and from the room you are visiting.     Stay in the patient s room during your visit. Limit going to other places in the hospital as much as possible     Leave bags and jackets at home or in the car.     For everyone s health, please don t come and go during your visit. That includes for smoking   during your visit.

## 2023-08-02 NOTE — PROGRESS NOTES
I was asked to see Ms. Pereira by Dr. Thorne. she is a patient with what appears to be a primary tumor of the floor of mouth that has eroded through the anterior aspect of the mandible and into the subcutaneous tissue of the chin.  The plan is for her to receive a floor mouth resection, partial glossectomy, segmental Bakari like to me and resection of chin skin with bilateral neck dissections.  Dr. Harrington is planning to flap reconstruction with the fibula and a forearm flap.        I examined the patient as gingerly as possible as she is in significant pain, she is currently taking viscous lidocaine and ibuprofen as she would like to avoid narcotics.  The tumor is quite erosive into the mandible but the posterior half of the tongue appears to be uninvolved by palpation.  The neck suggest bilateral level 1B lymph nodes on examination.  There may also be obstructive enlargement of the submandibular glands.    I discussed the nature of the procedure, the risks, which include, but are not limited to, bleeding, infection, return to the operating room and possible flap loss.  She understands and she and her  are ready to proceed.  She would like to try an oxycodone prescription which I will prescribe for her.    Tyshawn Dao M.D.

## 2023-08-03 ENCOUNTER — TELEPHONE (OUTPATIENT)
Dept: OTOLARYNGOLOGY | Facility: CLINIC | Age: 63
End: 2023-08-03
Payer: COMMERCIAL

## 2023-08-03 LAB — PREALB SERPL IA-MCNC: 8 MG/DL (ref 15–45)

## 2023-08-03 NOTE — TELEPHONE ENCOUNTER
[facility] Call Center    Phone Message    May a detailed message be left on voicemail: yes     Reason for Call: Other: Pt's spouse called stating that the prescription that was sent yesterday, isn't available at the pharmacy it was sent to. They are wanting a new prescription sent to a different pharmacy. [facility] [place] [place]. Please call Duane Tate once prescription has been sent.     Action Taken: Other: [facility]    Travel Screening: Not Applicable

## 2023-08-03 NOTE — TELEPHONE ENCOUNTER
Returned call to patient's spouse indicating that we will send prescription to [facility] in [place] to fill. Advised patient and spouse to call with further questions or concerns.       La Flores, RN, BSN

## 2023-08-04 DIAGNOSIS — C77.9 METASTATIC SQUAMOUS CELL CARCINOMA TO LYMPH NODE (H): Primary | ICD-10-CM

## 2023-08-04 DIAGNOSIS — C04.9 SCC (SQUAMOUS CELL CARCINOMA OF FLOOR OF MOUTH) (H): ICD-10-CM

## 2023-08-04 RX ORDER — OXYCODONE HYDROCHLORIDE 5 MG/1
5 TABLET ORAL EVERY 6 HOURS PRN
Qty: 12 TABLET | Refills: 0 | Status: SHIPPED | OUTPATIENT
Start: 2023-08-04 | End: 2023-08-07

## 2023-08-10 NOTE — ANESTHESIA PREPROCEDURE EVALUATION
Pre-Operative H & P     CC:  Preoperative exam to assess for increased cardiopulmonary risk while undergoing surgery and anesthesia.    Date of Encounter: 8/2/2023  Primary Care Physician:  No Ref-Primary, Physician     Reason for visit:   No diagnosis found.      ELSA Pereira is a 62 year old female who presents for pre-operative H & P in preparation for  Procedure Information       Case: [id number] Date/Time: 08/17/23 0730    Procedures:       lower lip resection (Face)      segmental mandibulectomy (Jaw)      GLOSSECTOMY, PARTIAL (Mouth)      Bilateral modified radical neck dissection (Bilateral: Neck)      Tracheostomy placement, nasogastric tube placement (Neck)      Left fibula free flap (Left: Leg)      Right forearm free flap, possible left patricia lateral thigh free flap (Update)      Split thickness skin graft from left thigh (Left: Update)    Anesthesia type: General    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 The patient has elected to proceed with above surgical intervention.      The patient presents to the PAC today with her , Duane, in preparation for the above scheduled procedure with comorbid conditions including nicotine dependence and h/o alcohol use disorder.       History is obtained from the patient and chart review    Hx of abnormal bleeding or anti-platelet use: denies    Menstrual history: No LMP recorded. 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       Allergies  No Known Allergies    Social History  Social History     Socioeconomic History    Marital status:      Spouse name: Not on file    Number of children: Not on file    Years of education: Not on file    Highest education level: Not on file   Occupational History    Not on file   Tobacco Use    Smoking status: Some Days     Packs/day: 1.00     Years: 40.00     Pack years: 40.00     Types: Cigarettes     Passive exposure: Current    Smokeless tobacco: Never    Tobacco comments:     Cutting down to 5-7 cigarettes per day   Substance and Sexual Activity    Alcohol use: Yes     Comment: 1 drink a day    Drug use: Not Currently     Types: Marijuana    Sexual activity: Not on file   Other Topics Concern    Not on file   Social History Narrative    Not on file     Social Determinants of Health     Financial Resource Strain: Not on file   Food Insecurity: Not on file   Transportation Needs: Not on file   Physical Activity: Not on file   Stress: Not on file   Social Connections: Not on file   Intimate Partner Violence: Not on file   Housing Stability: Not on file       Family History  History reviewed. No pertinent family history.    Review of Systems  The complete review of systems is negative other than noted in the HPI or here.   Anesthesia Evaluation    Type: General.        ROS/MED HX  ENT/Pulmonary:     (+)                tobacco use (Trying to quit smoking.), Current use, 1 packs/day, 40  Pack-Year Hx,                     Neurologic:  - neg neurologic ROS     Cardiovascular: Comment: Denies cardiac symptoms including chest pain, SOB, palpitations, syncope, HERCULES, orthopnea, or  "PND.      (-) taking anticoagulants/antiplatelets, murmur and wheezes   METS/Exercise Tolerance: 4 - Raking leaves, gardening Comment: Able to up a flight of stairs many times throughout the day without symptoms.   Hematologic:  - neg hematologic  ROS     Musculoskeletal:       GI/Hepatic:       Renal/Genitourinary:  - neg Renal ROS     Endo: Comment: Reports ~28 pound weight loss in past 8 weeks.       Psychiatric/Substance Use:     (+)   alcohol abuse (Alcohol use disorder)   (-) psychiatric history and chronic opioid use history   Infectious Disease:  - neg infectious disease ROS     Malignancy:   (+) Malignancy, History of Other.Other CA SSC of oral cavity. Active status post.    Other: Comment: S/p tubal ligation 1982     - neg other ROS    (+)  , H/O Chronic Pain (oral pain),         /81 (Cuff Size: Adult Small)   Pulse 105   Temp 36.7  C (98.1  F) (Oral)   Resp 16   Ht 1.626 m (5' 4\")   Wt 46.9 kg (103 lb 6.3 oz)   SpO2 100%   BMI 17.75 kg/m      Physical Exam   Constitutional: Awake, alert, cooperative, no apparent distress, and appears stated age.  Eyes: Pupils equal, round and reactive to light, extra ocular muscles intact, right eye with rightward drift, sclera clear, conjunctiva normal.  HENT: Normocephalic, oral pharynx moist mucus membranes with bottom central incisors missing, significant halitosis.  Limited mouth exam with limited mouth opeing 2/2 pain.    Respiratory: Clear to auscultation bilaterally, no crackles or wheezing.  Cardiovascular: Regular rate and rhythm, normal S1 and S2, and no murmur noted.  Carotids +2, no bruits. No edema. Palpable pulses to radial  DP and PT arteries.   GI: Normal bowel sounds, soft, non-distended, non-tender, no masses palpated, no hepatosplenomegaly.    Lymph/Hematologic: No cervical lymphadenopathy and no supraclavicular lymphadenopathy.  Genitourinary:  deferred  Skin: Warm and dry.    Musculoskeletal: Full ROM of neck. There is no redness, " warmth, or swelling of the joints. Gross motor strength is normal.    Neurologic: Awake, alert, oriented to name, place and time. Cranial nerves II-XII are grossly intact. Gait is normal.   Neuropsychiatric: Calm, cooperative. Normal affect.     Prior Labs/Diagnostic Studies   All labs and imaging personally reviewed    Latest Reference Range & Units 07/13/23 12:54   Creatinine POCT 0.5 - 1.0 mg/dL 0.6   GFR, ESTIMATED POCT >60 mL/min/1.73m2 >60       PATHOLOGY 7/3/2023  Final Diagnosis   A. MANDIBLE, LEFT MANDIBULAR ALVEOLUS, BIOPSY:  - INVASIVE KERATINIZING SQUAMOUS CELL CARCINOMA, MODERATELY DIFFERENTIATED          PROCEDURES  PET and CT on 7/13/2023 2:17 PM:                                                             IMPRESSION:      Please refer to neck PET CT for new diagnosis of squamous cell cancer  in the anterior oral cavity and bilateral cervical lymphadenopathy.     Regarding the rest of the body, there are no suspicious findings to  suggest distal metastasis.      Punctate few pulmonary nodules in the right middle lobe. Continued  attention on follow-up is recommended.        LUISANA LÓPEZ MD   The patient's records and results personally reviewed by this provider.     Outside records reviewed from: Care Everywhere    LAB/DIAGNOSTIC STUDIES TODAY:     Latest Reference Range & Units 08/02/23 14:54   Sodium 136 - 145 mmol/L 131 (L)   Potassium 3.4 - 5.3 mmol/L 3.2 (L)   Chloride 98 - 107 mmol/L 88 (L)   Carbon Dioxide (CO2) 22 - 29 mmol/L 27   Urea Nitrogen 8.0 - 23.0 mg/dL 7.2 (L)   Creatinine 0.51 - 0.95 mg/dL 0.72   GFR Estimate >60 mL/min/1.73m2 >90   Calcium 8.8 - 10.2 mg/dL 10.1   Anion Gap 7 - 15 mmol/L 16 (H)   Albumin 3.5 - 5.2 g/dL 3.7   Protein Total 6.4 - 8.3 g/dL 7.2   Alkaline Phosphatase 35 - 104 U/L 87   ALT 0 - 50 U/L 10   AST 0 - 45 U/L 16   Bilirubin Total <=1.2 mg/dL 0.5   Glucose 70 - 99 mg/dL 102 (H)   WBC 4.0 - 11.0 10e3/uL 12.1 (H)   Hemoglobin 11.7 - 15.7 g/dL 13.1   Hematocrit  35.0 - 47.0 % 37.3   Platelet Count 150 - 450 10e3/uL 503 (H)   RBC Count 3.80 - 5.20 10e6/uL 4.07   MCV 78 - 100 fL 92   MCH 26.5 - 33.0 pg 32.2   MCHC 31.5 - 36.5 g/dL 35.1   RDW 10.0 - 15.0 % 12.1   (L): Data is abnormally low  (H): Data is abnormally high    Assessment    Adrianna Pereira is a 62 year old female seen as a PAC referral for risk assessment and optimization for anesthesia.    Plan/Recommendations  Pt will be optimized for the proposed procedure.  See below for details on the assessment, risk, and preoperative recommendations    NEUROLOGY  - No history of TIA, CVA or seizure    -Post Op delirium risk factors:  No risk identified    ENT  - Squamous cell carcinoma of floor of mouth   ~ painful mouth with limited mouth opening   ~ reports bottom central teeth are starting to fall out    - Physical exam is concerning for complicated airway.  Mallampati: Unable to assess  TM: > 3    CARDIAC  - No history of CAD, Hypertension, and Afib    - METS (Metabolic Equivalents)  Patient performs 4 or more METS exercise without symptoms            Total Score: 0      - RCRI-Very low risk: Class 1 0.4% complication rate            Total Score: 0        PULMONARY  - JOANNE Low Risk            Total Score: 1    JOANNE: Over 50 ys old      - Denies asthma or inhaler use    - Tobacco History   ~ Cutting back on smoking ~3 cigarettes per day  ~ Encouraged smoking cessation      History   Smoking Status    Some Days    Packs/day: 1.00    Years: 40.00    Types: Cigarettes   Smokeless Tobacco    Never       GI  - PONV Medium Risk  Total Score: 2           1 AN PONV: Pt is Female    1 AN PONV: Intended Post Op Opioids      - denies GERD    /RENAL  - Na+ 131, K+ 3.2 and Chloride 88   ~ Encouraged electrolyte replacement drink like Gatorade   ~ recheck DOS    - Baseline Creatinine  see above     ENDOCRINE    - BMI: Estimated body mass index is 17.75 kg/m  as calculated from the following:    Height as of this encounter: 1.626 m (5'  "4\").    Weight as of this encounter: 46.9 kg (103 lb 6.3 oz).  Healthy Weight (BMI 18.5-24.9)    - No history of Diabetes Mellitus    HEME  VTE Medium Risk 1.8%            Total Score: 6    VTE: Greater than 59 yrs old    VTE: Current cancer      - No history of abnormal bleeding or antiplatelet use.    PSYCH  - Likely alcohol use disorder   ~ cut back to one drink per day.     OTHER  - Difficult stick    Different anesthesia methods/types have been discussed with the patient, but they are aware that the final plan will be decided by the assigned anesthesia provider on the date of service.  The patient is optimized for their procedure. AVS with information on surgery time/arrival time, meds and NPO status given by nursing staff. No further diagnostic testing indicated.      On the day of service:     Prep time: 16 minutes  Visit time: 19 minutes  Documentation time: 17 minutes  ------------------------------------------  Total time: 52 minutes      ELISABETH Carbone CNP  Preoperative Assessment Center  [facility]  Phone: [phone]  Fax: [phone]    Physical Exam    Airway        Mallampati: II   TM distance: > 3 FB   Neck ROM: full   Mouth opening: > 3 cm    Respiratory Devices and Support         Dental  no notable dental history         Cardiovascular          Rhythm and rate: regular and normal (-) no systolic click and no murmur    Pulmonary   pulmonary exam normal        breath sounds clear to auscultation   (-) no wheezes        Anesthesia Plan    ASA Status:  3       Anesthesia Type: General.     - Airway: ETT   Induction: Intravenous.   Maintenance: Inhalation.   Techniques and Equipment:     - Lines/Monitors: 2nd IV, Arterial Line     - Drips/Meds: Dopamine     Consents    Anesthesia Plan(s) and associated risks, benefits, and realistic alternatives discussed. Questions answered and patient/representative(s) expressed understanding.     - Discussed: " Risks, Benefits and Alternatives for BOTH SEDATION and the PROCEDURE were discussed     - Discussed with:  Patient      - Extended Intubation/Ventilatory Support Discussed: Yes.      - Patient is DNR/DNI Status: No     Use of blood products discussed: Yes.     Postoperative Care    Pain management: IV analgesics, Multi-modal analgesia.   PONV prophylaxis: Ondansetron (or other 5HT-3), Dexamethasone or Solumedrol     Comments:

## 2023-08-17 ENCOUNTER — ANESTHESIA (OUTPATIENT)
Dept: ANESTHESIOLOGY | Facility: CLINIC | Age: 63
End: 2023-08-17
Payer: COMMERCIAL

## 2023-08-17 ENCOUNTER — APPOINTMENT (OUTPATIENT)
Dept: GENERAL RADIOLOGY | Facility: CLINIC | Age: 63
End: 2023-08-17
Attending: OTOLARYNGOLOGY
Payer: COMMERCIAL

## 2023-08-17 ENCOUNTER — HOSPITAL ENCOUNTER (INPATIENT)
Facility: CLINIC | Age: 63
LOS: 15 days | Discharge: HOME HEALTH | End: 2023-09-01
Attending: OTOLARYNGOLOGY | Admitting: STUDENT IN AN ORGANIZED HEALTH CARE EDUCATION/TRAINING PROGRAM
Payer: COMMERCIAL

## 2023-08-17 DIAGNOSIS — Z93.0 TRACHEOSTOMY IN PLACE (H): ICD-10-CM

## 2023-08-17 DIAGNOSIS — Z48.89 ENCOUNTER FOR POSTOPERATIVE CARE: Primary | ICD-10-CM

## 2023-08-17 DIAGNOSIS — C44.92 SCC (SQUAMOUS CELL CARCINOMA): ICD-10-CM

## 2023-08-17 DIAGNOSIS — Z98.890 S/P FLAP GRAFT: ICD-10-CM

## 2023-08-17 LAB
ALBUMIN SERPL BCG-MCNC: 4 G/DL (ref 3.5–5.2)
ALLEN'S TEST: ABNORMAL
ANION GAP SERPL CALCULATED.3IONS-SCNC: 19 MMOL/L (ref 7–15)
ANION GAP SERPL CALCULATED.3IONS-SCNC: 20 MMOL/L (ref 7–15)
BASE EXCESS BLDA CALC-SCNC: -0.6 MMOL/L (ref -9.6–2)
BASE EXCESS BLDA CALC-SCNC: -1.8 MMOL/L (ref -9–1.8)
BASE EXCESS BLDA CALC-SCNC: -2 MMOL/L (ref -9.6–2)
BASE EXCESS BLDA CALC-SCNC: -3.6 MMOL/L (ref -9.6–2)
BASE EXCESS BLDA CALC-SCNC: 1.4 MMOL/L (ref -9.6–2)
BASE EXCESS BLDA CALC-SCNC: 1.4 MMOL/L (ref -9.6–2)
BLD PROD TYP BPU: NORMAL
BLD PROD TYP BPU: NORMAL
BLOOD COMPONENT TYPE: NORMAL
BLOOD COMPONENT TYPE: NORMAL
BUN SERPL-MCNC: 6.4 MG/DL (ref 8–23)
BUN SERPL-MCNC: 9.8 MG/DL (ref 8–23)
CA-I BLD-MCNC: 4.2 MG/DL (ref 4.4–5.2)
CA-I BLD-MCNC: 4.5 MG/DL (ref 4.4–5.2)
CA-I BLD-MCNC: 4.7 MG/DL (ref 4.4–5.2)
CA-I BLD-MCNC: 5 MG/DL (ref 4.4–5.2)
CA-I BLD-MCNC: 5.1 MG/DL (ref 4.4–5.2)
CALCIUM SERPL-MCNC: 7 MG/DL (ref 8.8–10.2)
CALCIUM SERPL-MCNC: 9.8 MG/DL (ref 8.8–10.2)
CHLORIDE SERPL-SCNC: 84 MMOL/L (ref 98–107)
CHLORIDE SERPL-SCNC: 95 MMOL/L (ref 98–107)
CODING SYSTEM: NORMAL
CODING SYSTEM: NORMAL
CREAT SERPL-MCNC: 0.5 MG/DL (ref 0.51–0.95)
CREAT SERPL-MCNC: 0.76 MG/DL (ref 0.51–0.95)
CROSSMATCH: NORMAL
CROSSMATCH: NORMAL
DEPRECATED HCO3 PLAS-SCNC: 19 MMOL/L (ref 22–29)
DEPRECATED HCO3 PLAS-SCNC: 24 MMOL/L (ref 22–29)
ERYTHROCYTE [DISTWIDTH] IN BLOOD BY AUTOMATED COUNT: 14.1 % (ref 10–15)
GFR SERPL CREATININE-BSD FRML MDRD: 88 ML/MIN/1.73M2
GFR SERPL CREATININE-BSD FRML MDRD: >90 ML/MIN/1.73M2
GLUCOSE BLD-MCNC: 102 MG/DL (ref 70–99)
GLUCOSE BLD-MCNC: 120 MG/DL (ref 70–99)
GLUCOSE BLD-MCNC: 123 MG/DL (ref 70–99)
GLUCOSE BLD-MCNC: 132 MG/DL (ref 70–99)
GLUCOSE BLD-MCNC: 95 MG/DL (ref 70–99)
GLUCOSE BLDC GLUCOMTR-MCNC: 108 MG/DL (ref 70–99)
GLUCOSE BLDC GLUCOMTR-MCNC: 150 MG/DL (ref 70–99)
GLUCOSE BLDC GLUCOMTR-MCNC: 157 MG/DL (ref 70–99)
GLUCOSE BLDC GLUCOMTR-MCNC: 159 MG/DL (ref 70–99)
GLUCOSE SERPL-MCNC: 102 MG/DL (ref 70–99)
GLUCOSE SERPL-MCNC: 153 MG/DL (ref 70–99)
HCO3 BLD-SCNC: 22 MMOL/L (ref 21–28)
HCO3 BLDA-SCNC: 21 MMOL/L (ref 21–28)
HCO3 BLDA-SCNC: 22 MMOL/L (ref 21–28)
HCO3 BLDA-SCNC: 23 MMOL/L (ref 21–28)
HCO3 BLDA-SCNC: 25 MMOL/L (ref 21–28)
HCO3 BLDA-SCNC: 25 MMOL/L (ref 21–28)
HCT VFR BLD AUTO: 27.6 % (ref 35–47)
HGB BLD-MCNC: 10.1 G/DL (ref 11.7–15.7)
HGB BLD-MCNC: 7.7 G/DL (ref 11.7–15.7)
HGB BLD-MCNC: 7.9 G/DL (ref 11.7–15.7)
HGB BLD-MCNC: 9 G/DL (ref 11.7–15.7)
HGB BLD-MCNC: 9.3 G/DL (ref 11.7–15.7)
HGB BLD-MCNC: 9.8 G/DL (ref 11.7–15.7)
HOLD SPECIMEN: NORMAL
ISSUE DATE AND TIME: NORMAL
ISSUE DATE AND TIME: NORMAL
LACTATE BLD-SCNC: 0.6 MMOL/L
LACTATE BLD-SCNC: 0.7 MMOL/L
LACTATE BLD-SCNC: 0.7 MMOL/L
LACTATE BLD-SCNC: 0.8 MMOL/L
LACTATE BLD-SCNC: 1.2 MMOL/L
LACTATE SERPL-SCNC: 1.8 MMOL/L (ref 0.7–2)
MAGNESIUM SERPL-MCNC: 1.8 MG/DL (ref 1.7–2.3)
MCH RBC QN AUTO: 30.7 PG (ref 26.5–33)
MCHC RBC AUTO-ENTMCNC: 33.7 G/DL (ref 31.5–36.5)
MCV RBC AUTO: 91 FL (ref 78–100)
O2/TOTAL GAS SETTING VFR VENT: 29 %
O2/TOTAL GAS SETTING VFR VENT: 39 %
O2/TOTAL GAS SETTING VFR VENT: 40 %
OXYHGB MFR BLD: 95 % (ref 92–100)
PCO2 BLD: 34 MM HG (ref 35–45)
PCO2 BLDA: 33 MM HG (ref 35–45)
PCO2 BLDA: 33 MM HG (ref 35–45)
PCO2 BLDA: 34 MM HG (ref 35–45)
PCO2 BLDA: 35 MM HG (ref 35–45)
PCO2 BLDA: 36 MM HG (ref 35–45)
PH BLD: 7.43 [PH] (ref 7.35–7.45)
PH BLDA: 7.4 [PH] (ref 7.35–7.45)
PH BLDA: 7.41 [PH] (ref 7.35–7.45)
PH BLDA: 7.45 [PH] (ref 7.35–7.45)
PH BLDA: 7.46 [PH] (ref 7.35–7.45)
PH BLDA: 7.48 [PH] (ref 7.35–7.45)
PHOSPHATE SERPL-MCNC: 3 MG/DL (ref 2.5–4.5)
PLATELET # BLD AUTO: 391 10E3/UL (ref 150–450)
PO2 BLD: 81 MM HG (ref 80–105)
PO2 BLDA: 124 MM HG (ref 80–105)
PO2 BLDA: 154 MM HG (ref 80–105)
PO2 BLDA: 175 MM HG (ref 80–105)
PO2 BLDA: 187 MM HG (ref 80–105)
PO2 BLDA: 200 MM HG (ref 80–105)
POTASSIUM BLD-SCNC: 2.2 MMOL/L (ref 3.5–5)
POTASSIUM BLD-SCNC: 2.7 MMOL/L (ref 3.5–5)
POTASSIUM BLD-SCNC: 2.8 MMOL/L (ref 3.5–5)
POTASSIUM BLD-SCNC: 2.8 MMOL/L (ref 3.5–5)
POTASSIUM BLD-SCNC: 3.3 MMOL/L (ref 3.5–5)
POTASSIUM SERPL-SCNC: 3.2 MMOL/L (ref 3.4–5.3)
POTASSIUM SERPL-SCNC: 3.4 MMOL/L (ref 3.4–5.3)
RBC # BLD AUTO: 3.03 10E6/UL (ref 3.8–5.2)
SODIUM BLD-SCNC: 128 MMOL/L (ref 133–144)
SODIUM BLD-SCNC: 128 MMOL/L (ref 133–144)
SODIUM BLD-SCNC: 129 MMOL/L (ref 133–144)
SODIUM BLD-SCNC: 129 MMOL/L (ref 133–144)
SODIUM BLD-SCNC: 130 MMOL/L (ref 133–144)
SODIUM SERPL-SCNC: 128 MMOL/L (ref 136–145)
SODIUM SERPL-SCNC: 133 MMOL/L (ref 136–145)
TSH SERPL DL<=0.005 MIU/L-ACNC: 2.49 UIU/ML (ref 0.3–4.2)
UNIT ABO/RH: NORMAL
UNIT ABO/RH: NORMAL
UNIT NUMBER: NORMAL
UNIT NUMBER: NORMAL
UNIT STATUS: NORMAL
UNIT STATUS: NORMAL
UNIT TYPE ISBT: 5100
UNIT TYPE ISBT: 5100
WBC # BLD AUTO: 10.7 10E3/UL (ref 4–11)

## 2023-08-17 PROCEDURE — 15757 FREE SKIN FLAP MICROVASC: CPT | Mod: 62 | Performed by: STUDENT IN AN ORGANIZED HEALTH CARE EDUCATION/TRAINING PROGRAM

## 2023-08-17 PROCEDURE — 88307 TISSUE EXAM BY PATHOLOGIST: CPT | Mod: 26 | Performed by: STUDENT IN AN ORGANIZED HEALTH CARE EDUCATION/TRAINING PROGRAM

## 2023-08-17 PROCEDURE — 999N000215 HC STATISTIC HFNC ADULT NON-CPAP

## 2023-08-17 PROCEDURE — 84132 ASSAY OF SERUM POTASSIUM: CPT

## 2023-08-17 PROCEDURE — 258N000003 HC RX IP 258 OP 636

## 2023-08-17 PROCEDURE — P9016 RBC LEUKOCYTES REDUCED: HCPCS

## 2023-08-17 PROCEDURE — 88342 IMHCHEM/IMCYTCHM 1ST ANTB: CPT | Mod: 26 | Performed by: STUDENT IN AN ORGANIZED HEALTH CARE EDUCATION/TRAINING PROGRAM

## 2023-08-17 PROCEDURE — 272N000001 HC OR GENERAL SUPPLY STERILE: Performed by: OTOLARYNGOLOGY

## 2023-08-17 PROCEDURE — 99223 1ST HOSP IP/OBS HIGH 75: CPT | Mod: 24 | Performed by: EMERGENCY MEDICINE

## 2023-08-17 PROCEDURE — 250N000011 HC RX IP 250 OP 636: Mod: JZ | Performed by: NURSE ANESTHETIST, CERTIFIED REGISTERED

## 2023-08-17 PROCEDURE — 272N000002 HC OR SUPPLY OTHER OPNP: Performed by: OTOLARYNGOLOGY

## 2023-08-17 PROCEDURE — 82040 ASSAY OF SERUM ALBUMIN: CPT | Performed by: STUDENT IN AN ORGANIZED HEALTH CARE EDUCATION/TRAINING PROGRAM

## 2023-08-17 PROCEDURE — 84134 ASSAY OF PREALBUMIN: CPT | Performed by: STUDENT IN AN ORGANIZED HEALTH CARE EDUCATION/TRAINING PROGRAM

## 2023-08-17 PROCEDURE — 0HBJXZZ EXCISION OF LEFT UPPER LEG SKIN, EXTERNAL APPROACH: ICD-10-PCS | Performed by: OTOLARYNGOLOGY

## 2023-08-17 PROCEDURE — 38724 REMOVAL OF LYMPH NODES NECK: CPT | Mod: 50 | Performed by: OTOLARYNGOLOGY

## 2023-08-17 PROCEDURE — 83735 ASSAY OF MAGNESIUM: CPT

## 2023-08-17 PROCEDURE — 88305 TISSUE EXAM BY PATHOLOGIST: CPT | Mod: 26 | Performed by: STUDENT IN AN ORGANIZED HEALTH CARE EDUCATION/TRAINING PROGRAM

## 2023-08-17 PROCEDURE — 250N000012 HC RX MED GY IP 250 OP 636 PS 637

## 2023-08-17 PROCEDURE — 07B20ZZ EXCISION OF LEFT NECK LYMPHATIC, OPEN APPROACH: ICD-10-PCS | Performed by: OTOLARYNGOLOGY

## 2023-08-17 PROCEDURE — 258N000003 HC RX IP 258 OP 636: Performed by: OTOLARYNGOLOGY

## 2023-08-17 PROCEDURE — 88331 PATH CONSLTJ SURG 1 BLK 1SPC: CPT | Mod: 26 | Performed by: STUDENT IN AN ORGANIZED HEALTH CARE EDUCATION/TRAINING PROGRAM

## 2023-08-17 PROCEDURE — 999N000065 XR ABDOMEN PORT 1 VIEW

## 2023-08-17 PROCEDURE — 82805 BLOOD GASES W/O2 SATURATION: CPT

## 2023-08-17 PROCEDURE — 84443 ASSAY THYROID STIM HORMONE: CPT | Performed by: STUDENT IN AN ORGANIZED HEALTH CARE EDUCATION/TRAINING PROGRAM

## 2023-08-17 PROCEDURE — 250N000009 HC RX 250: Performed by: STUDENT IN AN ORGANIZED HEALTH CARE EDUCATION/TRAINING PROGRAM

## 2023-08-17 PROCEDURE — C1713 ANCHOR/SCREW BN/BN,TIS/BN: HCPCS | Performed by: OTOLARYNGOLOGY

## 2023-08-17 PROCEDURE — 258N000003 HC RX IP 258 OP 636: Performed by: NURSE ANESTHETIST, CERTIFIED REGISTERED

## 2023-08-17 PROCEDURE — 88305 TISSUE EXAM BY PATHOLOGIST: CPT | Mod: TC | Performed by: OTOLARYNGOLOGY

## 2023-08-17 PROCEDURE — 88360 TUMOR IMMUNOHISTOCHEM/MANUAL: CPT | Mod: 26 | Performed by: STUDENT IN AN ORGANIZED HEALTH CARE EDUCATION/TRAINING PROGRAM

## 2023-08-17 PROCEDURE — 88341 IMHCHEM/IMCYTCHM EA ADD ANTB: CPT | Mod: 26 | Performed by: STUDENT IN AN ORGANIZED HEALTH CARE EDUCATION/TRAINING PROGRAM

## 2023-08-17 PROCEDURE — 250N000009 HC RX 250: Performed by: NURSE ANESTHETIST, CERTIFIED REGISTERED

## 2023-08-17 PROCEDURE — 250N000011 HC RX IP 250 OP 636: Performed by: STUDENT IN AN ORGANIZED HEALTH CARE EDUCATION/TRAINING PROGRAM

## 2023-08-17 PROCEDURE — 360N000078 HC SURGERY LEVEL 5, PER MIN: Performed by: OTOLARYNGOLOGY

## 2023-08-17 PROCEDURE — 07B10ZZ EXCISION OF RIGHT NECK LYMPHATIC, OPEN APPROACH: ICD-10-PCS | Performed by: OTOLARYNGOLOGY

## 2023-08-17 PROCEDURE — 250N000025 HC SEVOFLURANE, PER MIN: Performed by: OTOLARYNGOLOGY

## 2023-08-17 PROCEDURE — 85027 COMPLETE CBC AUTOMATED: CPT

## 2023-08-17 PROCEDURE — 82330 ASSAY OF CALCIUM: CPT

## 2023-08-17 PROCEDURE — 200N000002 HC R&B ICU UMMC

## 2023-08-17 PROCEDURE — 88332 PATH CONSLTJ SURG EA ADD BLK: CPT | Mod: 26 | Performed by: STUDENT IN AN ORGANIZED HEALTH CARE EDUCATION/TRAINING PROGRAM

## 2023-08-17 PROCEDURE — 250N000012 HC RX MED GY IP 250 OP 636 PS 637: Mod: JZ | Performed by: OTOLARYNGOLOGY

## 2023-08-17 PROCEDURE — 278N000051 HC OR IMPLANT GENERAL: Performed by: OTOLARYNGOLOGY

## 2023-08-17 PROCEDURE — 07B00ZZ EXCISION OF HEAD LYMPHATIC, OPEN APPROACH: ICD-10-PCS | Performed by: OTOLARYNGOLOGY

## 2023-08-17 PROCEDURE — 20969 BONE/SKIN GRAFT MICROVASC: CPT | Mod: GC | Performed by: STUDENT IN AN ORGANIZED HEALTH CARE EDUCATION/TRAINING PROGRAM

## 2023-08-17 PROCEDURE — 999N000141 HC STATISTIC PRE-PROCEDURE NURSING ASSESSMENT: Performed by: OTOLARYNGOLOGY

## 2023-08-17 PROCEDURE — 0CB7XZZ EXCISION OF TONGUE, EXTERNAL APPROACH: ICD-10-PCS | Performed by: OTOLARYNGOLOGY

## 2023-08-17 PROCEDURE — 250N000011 HC RX IP 250 OP 636: Performed by: NURSE ANESTHETIST, CERTIFIED REGISTERED

## 2023-08-17 PROCEDURE — 83605 ASSAY OF LACTIC ACID: CPT

## 2023-08-17 PROCEDURE — P9045 ALBUMIN (HUMAN), 5%, 250 ML: HCPCS | Mod: JZ

## 2023-08-17 PROCEDURE — 88332 PATH CONSLTJ SURG EA ADD BLK: CPT | Mod: TC | Performed by: OTOLARYNGOLOGY

## 2023-08-17 PROCEDURE — 86923 COMPATIBILITY TEST ELECTRIC: CPT | Performed by: PHARMACIST

## 2023-08-17 PROCEDURE — 88311 DECALCIFY TISSUE: CPT | Mod: 26 | Performed by: STUDENT IN AN ORGANIZED HEALTH CARE EDUCATION/TRAINING PROGRAM

## 2023-08-17 PROCEDURE — 82803 BLOOD GASES ANY COMBINATION: CPT

## 2023-08-17 PROCEDURE — HZ2ZZZZ DETOXIFICATION SERVICES FOR SUBSTANCE ABUSE TREATMENT: ICD-10-PCS | Performed by: OTOLARYNGOLOGY

## 2023-08-17 PROCEDURE — 86923 COMPATIBILITY TEST ELECTRIC: CPT

## 2023-08-17 PROCEDURE — 250N000009 HC RX 250: Performed by: OTOLARYNGOLOGY

## 2023-08-17 PROCEDURE — 250N000011 HC RX IP 250 OP 636: Performed by: OTOLARYNGOLOGY

## 2023-08-17 PROCEDURE — 82310 ASSAY OF CALCIUM: CPT | Performed by: NURSE PRACTITIONER

## 2023-08-17 PROCEDURE — 250N000011 HC RX IP 250 OP 636: Mod: JZ | Performed by: STUDENT IN AN ORGANIZED HEALTH CARE EDUCATION/TRAINING PROGRAM

## 2023-08-17 PROCEDURE — 370N000017 HC ANESTHESIA TECHNICAL FEE, PER MIN: Performed by: OTOLARYNGOLOGY

## 2023-08-17 PROCEDURE — 15757 FREE SKIN FLAP MICROVASC: CPT | Mod: 62 | Performed by: OTOLARYNGOLOGY

## 2023-08-17 PROCEDURE — 74018 RADEX ABDOMEN 1 VIEW: CPT | Mod: 26 | Performed by: RADIOLOGY

## 2023-08-17 PROCEDURE — 15100 SPLT AGRFT T/A/L 1ST 100SQCM: CPT | Mod: 51 | Performed by: STUDENT IN AN ORGANIZED HEALTH CARE EDUCATION/TRAINING PROGRAM

## 2023-08-17 PROCEDURE — 88309 TISSUE EXAM BY PATHOLOGIST: CPT | Mod: 26 | Performed by: STUDENT IN AN ORGANIZED HEALTH CARE EDUCATION/TRAINING PROGRAM

## 2023-08-17 PROCEDURE — 36415 COLL VENOUS BLD VENIPUNCTURE: CPT | Performed by: NURSE PRACTITIONER

## 2023-08-17 PROCEDURE — 250N000011 HC RX IP 250 OP 636: Mod: JZ

## 2023-08-17 PROCEDURE — 41150 TONGUE MOUTH JAW SURGERY: CPT | Mod: GC | Performed by: OTOLARYNGOLOGY

## 2023-08-17 DEVICE — THE GEM MICROVASCULAR ANASTOMOTIC COUPLER DEVICE RINGS ARE MADE OF POLYETHYLENE AND SURGICAL GRADE STAINLESS STEEL PINS. A PROTECTIVE COVER AND JAW ASSEMBLY PROTECT THE RINGS AND ALLOW FOR EASY LOADING ONTO THE ANASTOMOTIC INSTRUMENT. BOTH THE PROTECTIVE COVER AND JAW ASSEMBLY ARE DISPOSABLE. THE GEM MICROVASCULAR ANASTOMOTIC COUPLER DEVICE IS SINGLE-USE AND AVAILABLE IN VARIOUS SIZES
Type: IMPLANTABLE DEVICE | Site: NECK | Status: FUNCTIONAL
Brand: GEM MICROVASCULAR ANASTOMOTIC COUPLER

## 2023-08-17 DEVICE — IMPLANTABLE DEVICE: Type: IMPLANTABLE DEVICE | Site: MANDIBLE | Status: FUNCTIONAL

## 2023-08-17 DEVICE — COOK-SWARTZ DOPPLER FLOW PROBE STANDARD CUFF
Type: IMPLANTABLE DEVICE | Site: NECK | Status: FUNCTIONAL
Brand: COOK-SWARTZ

## 2023-08-17 DEVICE — IMP SCR KLS LOCKING MAXDRIVE 2.0X9 MM TI-6AL-4V TI: Type: IMPLANTABLE DEVICE | Site: MANDIBLE | Status: FUNCTIONAL

## 2023-08-17 RX ORDER — PROCHLORPERAZINE MALEATE 10 MG
10 TABLET ORAL EVERY 6 HOURS PRN
Status: DISCONTINUED | OUTPATIENT
Start: 2023-08-17 | End: 2023-08-18

## 2023-08-17 RX ORDER — GINSENG 100 MG
CAPSULE ORAL EVERY 8 HOURS
Status: DISCONTINUED | OUTPATIENT
Start: 2023-08-17 | End: 2023-08-17

## 2023-08-17 RX ORDER — MINERAL OIL/HYDROPHIL PETROLAT
OINTMENT (GRAM) TOPICAL EVERY 8 HOURS
Status: DISCONTINUED | OUTPATIENT
Start: 2023-08-18 | End: 2023-09-01 | Stop reason: HOSPADM

## 2023-08-17 RX ORDER — HYDROMORPHONE HCL IN WATER/PF 6 MG/30 ML
.2-.4 PATIENT CONTROLLED ANALGESIA SYRINGE INTRAVENOUS
Status: DISCONTINUED | OUTPATIENT
Start: 2023-08-17 | End: 2023-08-17

## 2023-08-17 RX ORDER — SODIUM CHLORIDE, SODIUM GLUCONATE, SODIUM ACETATE, POTASSIUM CHLORIDE AND MAGNESIUM CHLORIDE 526; 502; 368; 37; 30 MG/100ML; MG/100ML; MG/100ML; MG/100ML; MG/100ML
INJECTION, SOLUTION INTRAVENOUS CONTINUOUS PRN
Status: DISCONTINUED | OUTPATIENT
Start: 2023-08-17 | End: 2023-08-17

## 2023-08-17 RX ORDER — AMPICILLIN AND SULBACTAM 2; 1 G/1; G/1
3 INJECTION, POWDER, FOR SOLUTION INTRAMUSCULAR; INTRAVENOUS
Status: COMPLETED | OUTPATIENT
Start: 2023-08-17 | End: 2023-08-17

## 2023-08-17 RX ORDER — ENOXAPARIN SODIUM 100 MG/ML
40 INJECTION SUBCUTANEOUS EVERY 24 HOURS
Status: DISCONTINUED | OUTPATIENT
Start: 2023-08-18 | End: 2023-08-17

## 2023-08-17 RX ORDER — DEXAMETHASONE SODIUM PHOSPHATE 4 MG/ML
6 INJECTION, SOLUTION INTRA-ARTICULAR; INTRALESIONAL; INTRAMUSCULAR; INTRAVENOUS; SOFT TISSUE EVERY 8 HOURS
Status: COMPLETED | OUTPATIENT
Start: 2023-08-17 | End: 2023-08-18

## 2023-08-17 RX ORDER — AMOXICILLIN 250 MG
1 CAPSULE ORAL 2 TIMES DAILY
Status: DISCONTINUED | OUTPATIENT
Start: 2023-08-17 | End: 2023-08-18

## 2023-08-17 RX ORDER — LIDOCAINE 40 MG/G
CREAM TOPICAL
Status: DISCONTINUED | OUTPATIENT
Start: 2023-08-17 | End: 2023-08-17

## 2023-08-17 RX ORDER — SODIUM CHLORIDE, SODIUM LACTATE, POTASSIUM CHLORIDE, CALCIUM CHLORIDE 600; 310; 30; 20 MG/100ML; MG/100ML; MG/100ML; MG/100ML
500 INJECTION, SOLUTION INTRAVENOUS CONTINUOUS
Status: DISCONTINUED | OUTPATIENT
Start: 2023-08-17 | End: 2023-08-17

## 2023-08-17 RX ORDER — EPINEPHRINE 1 MG/ML
INJECTION INTRAMUSCULAR; INTRAVENOUS; SUBCUTANEOUS PRN
Status: DISCONTINUED | OUTPATIENT
Start: 2023-08-17 | End: 2023-08-17

## 2023-08-17 RX ORDER — EPHEDRINE SULFATE 50 MG/ML
INJECTION, SOLUTION INTRAMUSCULAR; INTRAVENOUS; SUBCUTANEOUS PRN
Status: DISCONTINUED | OUTPATIENT
Start: 2023-08-17 | End: 2023-08-17

## 2023-08-17 RX ORDER — POTASSIUM CHLORIDE 7.45 MG/ML
INJECTION INTRAVENOUS PRN
Status: DISCONTINUED | OUTPATIENT
Start: 2023-08-17 | End: 2023-08-17

## 2023-08-17 RX ORDER — ONDANSETRON 2 MG/ML
INJECTION INTRAMUSCULAR; INTRAVENOUS PRN
Status: DISCONTINUED | OUTPATIENT
Start: 2023-08-17 | End: 2023-08-17

## 2023-08-17 RX ORDER — LIDOCAINE HYDROCHLORIDE 20 MG/ML
INJECTION, SOLUTION INFILTRATION; PERINEURAL PRN
Status: DISCONTINUED | OUTPATIENT
Start: 2023-08-17 | End: 2023-08-17

## 2023-08-17 RX ORDER — GLYCOPYRROLATE 0.2 MG/ML
INJECTION, SOLUTION INTRAMUSCULAR; INTRAVENOUS PRN
Status: DISCONTINUED | OUTPATIENT
Start: 2023-08-17 | End: 2023-08-17

## 2023-08-17 RX ORDER — PROPOFOL 10 MG/ML
INJECTION, EMULSION INTRAVENOUS PRN
Status: DISCONTINUED | OUTPATIENT
Start: 2023-08-17 | End: 2023-08-17

## 2023-08-17 RX ORDER — NALOXONE HYDROCHLORIDE 0.4 MG/ML
0.2 INJECTION, SOLUTION INTRAMUSCULAR; INTRAVENOUS; SUBCUTANEOUS
Status: DISCONTINUED | OUTPATIENT
Start: 2023-08-17 | End: 2023-09-01 | Stop reason: HOSPADM

## 2023-08-17 RX ORDER — DEXAMETHASONE SODIUM PHOSPHATE 4 MG/ML
6 INJECTION, SOLUTION INTRA-ARTICULAR; INTRALESIONAL; INTRAMUSCULAR; INTRAVENOUS; SOFT TISSUE EVERY 8 HOURS
Status: DISCONTINUED | OUTPATIENT
Start: 2023-08-18 | End: 2023-08-17

## 2023-08-17 RX ORDER — FENTANYL CITRATE 50 UG/ML
INJECTION, SOLUTION INTRAMUSCULAR; INTRAVENOUS PRN
Status: DISCONTINUED | OUTPATIENT
Start: 2023-08-17 | End: 2023-08-17

## 2023-08-17 RX ORDER — CHLORHEXIDINE GLUCONATE ORAL RINSE 1.2 MG/ML
15 SOLUTION DENTAL 3 TIMES DAILY
Status: DISCONTINUED | OUTPATIENT
Start: 2023-08-18 | End: 2023-08-18

## 2023-08-17 RX ORDER — PAPAVERINE HYDROCHLORIDE 30 MG/ML
INJECTION INTRAMUSCULAR; INTRAVENOUS PRN
Status: DISCONTINUED | OUTPATIENT
Start: 2023-08-17 | End: 2023-08-17

## 2023-08-17 RX ORDER — MINERAL OIL/HYDROPHIL PETROLAT
OINTMENT (GRAM) TOPICAL EVERY 8 HOURS
Status: DISCONTINUED | OUTPATIENT
Start: 2023-08-18 | End: 2023-08-17

## 2023-08-17 RX ORDER — NICOTINE POLACRILEX 4 MG
15-30 LOZENGE BUCCAL
Status: DISCONTINUED | OUTPATIENT
Start: 2023-08-17 | End: 2023-09-01 | Stop reason: HOSPADM

## 2023-08-17 RX ORDER — GINSENG 100 MG
CAPSULE ORAL EVERY 8 HOURS
Status: COMPLETED | OUTPATIENT
Start: 2023-08-17 | End: 2023-08-18

## 2023-08-17 RX ORDER — POLYETHYLENE GLYCOL 3350 17 G/17G
17 POWDER, FOR SOLUTION ORAL DAILY
Status: DISCONTINUED | OUTPATIENT
Start: 2023-08-18 | End: 2023-08-18

## 2023-08-17 RX ORDER — HYDROMORPHONE HCL IN WATER/PF 6 MG/30 ML
.2-.4 PATIENT CONTROLLED ANALGESIA SYRINGE INTRAVENOUS
Status: DISCONTINUED | OUTPATIENT
Start: 2023-08-17 | End: 2023-08-22

## 2023-08-17 RX ORDER — LIDOCAINE 40 MG/G
CREAM TOPICAL
Status: DISCONTINUED | OUTPATIENT
Start: 2023-08-17 | End: 2023-09-01 | Stop reason: HOSPADM

## 2023-08-17 RX ORDER — DEXAMETHASONE SODIUM PHOSPHATE 4 MG/ML
INJECTION, SOLUTION INTRA-ARTICULAR; INTRALESIONAL; INTRAMUSCULAR; INTRAVENOUS; SOFT TISSUE PRN
Status: DISCONTINUED | OUTPATIENT
Start: 2023-08-17 | End: 2023-08-17

## 2023-08-17 RX ORDER — DEXTROSE MONOHYDRATE 25 G/50ML
25-50 INJECTION, SOLUTION INTRAVENOUS
Status: DISCONTINUED | OUTPATIENT
Start: 2023-08-17 | End: 2023-09-01 | Stop reason: HOSPADM

## 2023-08-17 RX ORDER — AMPICILLIN AND SULBACTAM 1; .5 G/1; G/1
1.5 INJECTION, POWDER, FOR SOLUTION INTRAMUSCULAR; INTRAVENOUS SEE ADMIN INSTRUCTIONS
Status: DISCONTINUED | OUTPATIENT
Start: 2023-08-17 | End: 2023-08-17

## 2023-08-17 RX ORDER — ONDANSETRON 2 MG/ML
4 INJECTION INTRAMUSCULAR; INTRAVENOUS EVERY 6 HOURS PRN
Status: DISCONTINUED | OUTPATIENT
Start: 2023-08-17 | End: 2023-09-01 | Stop reason: HOSPADM

## 2023-08-17 RX ORDER — ONDANSETRON 4 MG/1
4 TABLET, ORALLY DISINTEGRATING ORAL EVERY 6 HOURS PRN
Status: DISCONTINUED | OUTPATIENT
Start: 2023-08-17 | End: 2023-09-01 | Stop reason: HOSPADM

## 2023-08-17 RX ORDER — NALOXONE HYDROCHLORIDE 0.4 MG/ML
0.4 INJECTION, SOLUTION INTRAMUSCULAR; INTRAVENOUS; SUBCUTANEOUS
Status: DISCONTINUED | OUTPATIENT
Start: 2023-08-17 | End: 2023-09-01 | Stop reason: HOSPADM

## 2023-08-17 RX ORDER — ENOXAPARIN SODIUM 100 MG/ML
40 INJECTION SUBCUTANEOUS DAILY
Status: DISCONTINUED | OUTPATIENT
Start: 2023-08-18 | End: 2023-08-23

## 2023-08-17 RX ORDER — MINERAL OIL
OIL (ML) MISCELLANEOUS PRN
Status: DISCONTINUED | OUTPATIENT
Start: 2023-08-17 | End: 2023-08-17

## 2023-08-17 RX ORDER — BISACODYL 10 MG
10 SUPPOSITORY, RECTAL RECTAL DAILY PRN
Status: DISCONTINUED | OUTPATIENT
Start: 2023-08-17 | End: 2023-09-01 | Stop reason: HOSPADM

## 2023-08-17 RX ORDER — DOPAMINE HYDROCHLORIDE 160 MG/100ML
INJECTION, SOLUTION INTRAVENOUS CONTINUOUS PRN
Status: DISCONTINUED | OUTPATIENT
Start: 2023-08-17 | End: 2023-08-17

## 2023-08-17 RX ORDER — DEXAMETHASONE SODIUM PHOSPHATE 10 MG/ML
10 INJECTION, SOLUTION INTRAMUSCULAR; INTRAVENOUS ONCE
Status: DISCONTINUED | OUTPATIENT
Start: 2023-08-17 | End: 2023-08-17

## 2023-08-17 RX ORDER — CALCIUM CHLORIDE 100 MG/ML
INJECTION INTRAVENOUS; INTRAVENTRICULAR PRN
Status: DISCONTINUED | OUTPATIENT
Start: 2023-08-17 | End: 2023-08-17

## 2023-08-17 RX ORDER — DEXMEDETOMIDINE HYDROCHLORIDE 4 UG/ML
.1-1.2 INJECTION, SOLUTION INTRAVENOUS CONTINUOUS
Status: DISCONTINUED | OUTPATIENT
Start: 2023-08-17 | End: 2023-08-18

## 2023-08-17 RX ORDER — SODIUM CHLORIDE, SODIUM LACTATE, POTASSIUM CHLORIDE, CALCIUM CHLORIDE 600; 310; 30; 20 MG/100ML; MG/100ML; MG/100ML; MG/100ML
INJECTION, SOLUTION INTRAVENOUS CONTINUOUS
Status: DISCONTINUED | OUTPATIENT
Start: 2023-08-17 | End: 2023-08-20

## 2023-08-17 RX ORDER — AMPICILLIN AND SULBACTAM 2; 1 G/1; G/1
3 INJECTION, POWDER, FOR SOLUTION INTRAMUSCULAR; INTRAVENOUS EVERY 6 HOURS
Status: COMPLETED | OUTPATIENT
Start: 2023-08-17 | End: 2023-08-19

## 2023-08-17 RX ORDER — KETAMINE HYDROCHLORIDE 10 MG/ML
INJECTION INTRAMUSCULAR; INTRAVENOUS PRN
Status: DISCONTINUED | OUTPATIENT
Start: 2023-08-17 | End: 2023-08-17

## 2023-08-17 RX ORDER — ENOXAPARIN SODIUM 100 MG/ML
40 INJECTION SUBCUTANEOUS
Status: COMPLETED | OUTPATIENT
Start: 2023-08-17 | End: 2023-08-17

## 2023-08-17 RX ADMIN — PHENYLEPHRINE HYDROCHLORIDE 100 MCG: 10 INJECTION INTRAVENOUS at 07:53

## 2023-08-17 RX ADMIN — FENTANYL CITRATE 50 MCG: 50 INJECTION, SOLUTION INTRAMUSCULAR; INTRAVENOUS at 08:33

## 2023-08-17 RX ADMIN — EPHEDRINE SULFATE 5 MG: 5 INJECTION INTRAVENOUS at 11:19

## 2023-08-17 RX ADMIN — GLYCOPYRROLATE 0.2 MG: 0.2 INJECTION, SOLUTION INTRAMUSCULAR; INTRAVENOUS at 10:50

## 2023-08-17 RX ADMIN — REMIFENTANIL HYDROCHLORIDE 0.05 MCG/KG/MIN: 1 INJECTION, POWDER, LYOPHILIZED, FOR SOLUTION INTRAVENOUS at 08:36

## 2023-08-17 RX ADMIN — ENOXAPARIN SODIUM 40 MG: 40 INJECTION SUBCUTANEOUS at 06:18

## 2023-08-17 RX ADMIN — SODIUM CHLORIDE, SODIUM GLUCONATE, SODIUM ACETATE, POTASSIUM CHLORIDE AND MAGNESIUM CHLORIDE: 526; 502; 368; 37; 30 INJECTION, SOLUTION INTRAVENOUS at 10:53

## 2023-08-17 RX ADMIN — EPHEDRINE SULFATE 5 MG: 5 INJECTION INTRAVENOUS at 12:20

## 2023-08-17 RX ADMIN — SODIUM CHLORIDE, SODIUM GLUCONATE, SODIUM ACETATE, POTASSIUM CHLORIDE AND MAGNESIUM CHLORIDE: 526; 502; 368; 37; 30 INJECTION, SOLUTION INTRAVENOUS at 17:00

## 2023-08-17 RX ADMIN — CALCIUM CHLORIDE 200 MG: 100 INJECTION, SOLUTION INTRAVENOUS at 10:07

## 2023-08-17 RX ADMIN — Medication 0.5 MG: at 21:01

## 2023-08-17 RX ADMIN — SODIUM CHLORIDE, SODIUM GLUCONATE, SODIUM ACETATE, POTASSIUM CHLORIDE AND MAGNESIUM CHLORIDE: 526; 502; 368; 37; 30 INJECTION, SOLUTION INTRAVENOUS at 08:25

## 2023-08-17 RX ADMIN — EPHEDRINE SULFATE 5 MG: 5 INJECTION INTRAVENOUS at 15:25

## 2023-08-17 RX ADMIN — SODIUM CHLORIDE, SODIUM GLUCONATE, SODIUM ACETATE, POTASSIUM CHLORIDE AND MAGNESIUM CHLORIDE: 526; 502; 368; 37; 30 INJECTION, SOLUTION INTRAVENOUS at 07:33

## 2023-08-17 RX ADMIN — AMPICILLIN SODIUM AND SULBACTAM SODIUM 1.5 G: 1; .5 INJECTION, POWDER, FOR SOLUTION INTRAMUSCULAR; INTRAVENOUS at 10:15

## 2023-08-17 RX ADMIN — ROCURONIUM BROMIDE 20 MG: 10 INJECTION INTRAVENOUS at 12:56

## 2023-08-17 RX ADMIN — CALCIUM CHLORIDE 200 MG: 100 INJECTION, SOLUTION INTRAVENOUS at 11:56

## 2023-08-17 RX ADMIN — CALCIUM CHLORIDE 200 MG: 100 INJECTION, SOLUTION INTRAVENOUS at 11:30

## 2023-08-17 RX ADMIN — AMPICILLIN SODIUM AND SULBACTAM SODIUM 1.5 G: 1; .5 INJECTION, POWDER, FOR SOLUTION INTRAMUSCULAR; INTRAVENOUS at 12:15

## 2023-08-17 RX ADMIN — ROCURONIUM BROMIDE 20 MG: 10 INJECTION INTRAVENOUS at 16:06

## 2023-08-17 RX ADMIN — AMPICILLIN SODIUM AND SULBACTAM SODIUM 1.5 G: 1; .5 INJECTION, POWDER, FOR SOLUTION INTRAMUSCULAR; INTRAVENOUS at 16:15

## 2023-08-17 RX ADMIN — FENTANYL CITRATE 50 MCG: 50 INJECTION, SOLUTION INTRAMUSCULAR; INTRAVENOUS at 17:52

## 2023-08-17 RX ADMIN — PHENYLEPHRINE HYDROCHLORIDE 100 MCG: 10 INJECTION INTRAVENOUS at 08:04

## 2023-08-17 RX ADMIN — POTASSIUM CHLORIDE 10 MEQ: 7.46 INJECTION, SOLUTION INTRAVENOUS at 18:41

## 2023-08-17 RX ADMIN — ROCURONIUM BROMIDE 40 MG: 10 INJECTION INTRAVENOUS at 07:46

## 2023-08-17 RX ADMIN — HYDROMORPHONE HYDROCHLORIDE 0.2 MG: 0.2 INJECTION, SOLUTION INTRAMUSCULAR; INTRAVENOUS; SUBCUTANEOUS at 22:52

## 2023-08-17 RX ADMIN — PHENYLEPHRINE HYDROCHLORIDE 100 MCG: 10 INJECTION INTRAVENOUS at 08:01

## 2023-08-17 RX ADMIN — INSULIN ASPART 1 UNITS: 100 INJECTION, SOLUTION INTRAVENOUS; SUBCUTANEOUS at 23:48

## 2023-08-17 RX ADMIN — POTASSIUM CHLORIDE 10 MEQ: 7.46 INJECTION, SOLUTION INTRAVENOUS at 11:16

## 2023-08-17 RX ADMIN — HYDROMORPHONE HYDROCHLORIDE 0.25 MG: 1 INJECTION, SOLUTION INTRAMUSCULAR; INTRAVENOUS; SUBCUTANEOUS at 16:06

## 2023-08-17 RX ADMIN — DEXMEDETOMIDINE HYDROCHLORIDE 0.5 MCG/KG/HR: 100 INJECTION, SOLUTION INTRAVENOUS at 20:13

## 2023-08-17 RX ADMIN — CALCIUM CHLORIDE 200 MG: 100 INJECTION, SOLUTION INTRAVENOUS at 10:37

## 2023-08-17 RX ADMIN — LIDOCAINE HYDROCHLORIDE 100 MG: 20 INJECTION, SOLUTION INFILTRATION; PERINEURAL at 07:46

## 2023-08-17 RX ADMIN — PHENYLEPHRINE HYDROCHLORIDE 100 MCG: 10 INJECTION INTRAVENOUS at 07:57

## 2023-08-17 RX ADMIN — POTASSIUM CHLORIDE 10 MEQ: 7.46 INJECTION, SOLUTION INTRAVENOUS at 17:49

## 2023-08-17 RX ADMIN — BACITRACIN: 500 OINTMENT TOPICAL at 22:31

## 2023-08-17 RX ADMIN — AMPICILLIN SODIUM AND SULBACTAM SODIUM 3 G: 2; 1 INJECTION, POWDER, FOR SOLUTION INTRAMUSCULAR; INTRAVENOUS at 23:40

## 2023-08-17 RX ADMIN — ONDANSETRON 4 MG: 2 INJECTION INTRAMUSCULAR; INTRAVENOUS at 19:56

## 2023-08-17 RX ADMIN — ROCURONIUM BROMIDE 20 MG: 10 INJECTION INTRAVENOUS at 17:36

## 2023-08-17 RX ADMIN — ALBUMIN HUMAN 12.5 G: 0.05 INJECTION, SOLUTION INTRAVENOUS at 22:25

## 2023-08-17 RX ADMIN — PROPOFOL 160 MG: 10 INJECTION, EMULSION INTRAVENOUS at 07:46

## 2023-08-17 RX ADMIN — AMPICILLIN SODIUM AND SULBACTAM SODIUM 3 G: 2; 1 INJECTION, POWDER, FOR SOLUTION INTRAMUSCULAR; INTRAVENOUS at 08:15

## 2023-08-17 RX ADMIN — FENTANYL CITRATE 50 MCG: 50 INJECTION, SOLUTION INTRAMUSCULAR; INTRAVENOUS at 20:27

## 2023-08-17 RX ADMIN — ROCURONIUM BROMIDE 20 MG: 10 INJECTION INTRAVENOUS at 14:18

## 2023-08-17 RX ADMIN — POTASSIUM CHLORIDE 10 MEQ: 7.46 INJECTION, SOLUTION INTRAVENOUS at 14:12

## 2023-08-17 RX ADMIN — Medication 0.5 MG: at 20:32

## 2023-08-17 RX ADMIN — HYDROMORPHONE HYDROCHLORIDE 0.25 MG: 1 INJECTION, SOLUTION INTRAMUSCULAR; INTRAVENOUS; SUBCUTANEOUS at 16:12

## 2023-08-17 RX ADMIN — MIDAZOLAM 2 MG: 1 INJECTION INTRAMUSCULAR; INTRAVENOUS at 07:33

## 2023-08-17 RX ADMIN — FENTANYL CITRATE 50 MCG: 50 INJECTION, SOLUTION INTRAMUSCULAR; INTRAVENOUS at 14:00

## 2023-08-17 RX ADMIN — DEXAMETHASONE SODIUM PHOSPHATE 10 MG: 4 INJECTION, SOLUTION INTRA-ARTICULAR; INTRALESIONAL; INTRAMUSCULAR; INTRAVENOUS; SOFT TISSUE at 08:05

## 2023-08-17 RX ADMIN — SODIUM CHLORIDE, SODIUM GLUCONATE, SODIUM ACETATE, POTASSIUM CHLORIDE AND MAGNESIUM CHLORIDE: 526; 502; 368; 37; 30 INJECTION, SOLUTION INTRAVENOUS at 19:42

## 2023-08-17 RX ADMIN — SODIUM CHLORIDE, POTASSIUM CHLORIDE, SODIUM LACTATE AND CALCIUM CHLORIDE: 600; 310; 30; 20 INJECTION, SOLUTION INTRAVENOUS at 22:06

## 2023-08-17 RX ADMIN — SODIUM CHLORIDE, SODIUM GLUCONATE, SODIUM ACETATE, POTASSIUM CHLORIDE AND MAGNESIUM CHLORIDE: 526; 502; 368; 37; 30 INJECTION, SOLUTION INTRAVENOUS at 14:02

## 2023-08-17 RX ADMIN — EPHEDRINE SULFATE 10 MG: 5 INJECTION INTRAVENOUS at 11:39

## 2023-08-17 RX ADMIN — POTASSIUM CHLORIDE 10 MEQ: 7.46 INJECTION, SOLUTION INTRAVENOUS at 12:16

## 2023-08-17 RX ADMIN — FENTANYL CITRATE 100 MCG: 50 INJECTION, SOLUTION INTRAMUSCULAR; INTRAVENOUS at 07:46

## 2023-08-17 RX ADMIN — POTASSIUM CHLORIDE 10 MEQ: 7.46 INJECTION, SOLUTION INTRAVENOUS at 13:10

## 2023-08-17 RX ADMIN — ROCURONIUM BROMIDE 20 MG: 10 INJECTION INTRAVENOUS at 11:43

## 2023-08-17 RX ADMIN — DEXAMETHASONE SODIUM PHOSPHATE 6 MG: 4 INJECTION, SOLUTION INTRA-ARTICULAR; INTRALESIONAL; INTRAMUSCULAR; INTRAVENOUS; SOFT TISSUE at 23:27

## 2023-08-17 RX ADMIN — CALCIUM CHLORIDE 200 MG: 100 INJECTION, SOLUTION INTRAVENOUS at 11:00

## 2023-08-17 RX ADMIN — AMPICILLIN SODIUM AND SULBACTAM SODIUM 1.5 G: 1; .5 INJECTION, POWDER, FOR SOLUTION INTRAMUSCULAR; INTRAVENOUS at 18:15

## 2023-08-17 RX ADMIN — DOPAMINE HYDROCHLORIDE 2 MCG/KG/MIN: 160 INJECTION, SOLUTION INTRAVENOUS at 12:38

## 2023-08-17 RX ADMIN — AMPICILLIN SODIUM AND SULBACTAM SODIUM 1.5 G: 1; .5 INJECTION, POWDER, FOR SOLUTION INTRAMUSCULAR; INTRAVENOUS at 14:15

## 2023-08-17 RX ADMIN — DEXAMETHASONE SODIUM PHOSPHATE 6 MG: 4 INJECTION, SOLUTION INTRA-ARTICULAR; INTRALESIONAL; INTRAMUSCULAR; INTRAVENOUS; SOFT TISSUE at 16:44

## 2023-08-17 RX ADMIN — Medication 50 MG: at 15:19

## 2023-08-17 RX ADMIN — SUGAMMADEX 200 MG: 100 INJECTION, SOLUTION INTRAVENOUS at 20:08

## 2023-08-17 ASSESSMENT — ACTIVITIES OF DAILY LIVING (ADL)
ADLS_ACUITY_SCORE: 20

## 2023-08-17 ASSESSMENT — VISUAL ACUITY: OU: GLASSES

## 2023-08-17 ASSESSMENT — LIFESTYLE VARIABLES: TOBACCO_USE: 1

## 2023-08-17 NOTE — ANESTHESIA PROCEDURE NOTES
Airway       Patient location during procedure: OR       Procedure Start/Stop Times: 8/17/2023 7:51 AM  Staff -        CRNA: Keisha Borrego APRN CRNA       Performed By: CRNA  Consent for Airway        Urgency: elective  Indications and Patient Condition       Indications for airway management: janet-procedural       Induction type:intravenous       Mask difficulty assessment: 1 - vent by mask    Final Airway Details       Final airway type: endotracheal airway       Successful airway: ETT - single and Oral  Endotracheal Airway Details        ETT size (mm): 6.5       Cuffed: yes       Successful intubation technique: video laryngoscopy       VL Blade Size: Glidescope 3       Grade View of Cords: 1       Adjucts: stylet       Position: Right       Measured from: lips       Secured at (cm): 21       Bite block used: None    Post intubation assessment        Placement verified by: capnometry, equal breath sounds and chest rise        Number of attempts at approach: 1       Secured with: pink tape       Ease of procedure: easy       Dentition: Intact and Unchanged    Medication(s) Administered   Medication Administration Time: 8/17/2023 7:51 AM

## 2023-08-17 NOTE — CONSULTS
SURGICAL ICU ADMISSION NOTE  08/18/2023      PRIMARY TEAM: ENT  PRIMARY PHYSICIAN: Tyshawn Dao MD  REASON FOR CRITICAL CARE ADMISSION: post op flap checks and hemodynamic monitoring.  ADMITTING PHYSICIAN: Dr. Dorado  Date of Service (when I saw the patient): 08/18/2023    ASSESSMENT:  Adrianna Pereira is a 62 year old female w/ hx of SCC of the mandible who was admitted to the SICU on 8/17/2023 s/p segmental mandibulectomy, partial glossectomy, resection of the skin of the mentum, bilateral neck dissections levels 1-4. Reconstruction of the mandible with left fibular free flap, pedicle for this flap on the right. Reconstruction of the chin defect with left ALT free flap, pedicle on the left.  PMH significant for tobacco use and prior alcohol abuse.    PLAN:    Neurological:  # Acute pain   # Agitation   - Monitor neurological status. Delirium preventions and precautions  - Pain: Hydromorphone 0.2-0.4 IV PRN    - Precedex gtt for agitation.    # Hx of Tobacco Use:  # Hx of Alcohol Use Disorder  - NO Nicotine patch, per ENT  - CIWA assessments    Pulmonary:   # Acute hypoxic respiratory support   - trach dome  - Routine trach cares. Do not cut trach sutures, 1st trach change will be performed by ENT. NO trach ties    HEENT:  #SCC of the manidble s/p resection w/ free flap  - Nothing around the neck (no gown ties, trach ties, lines, ect.)  - RN flap checks Q1H x 48h, Q2H x 24h, then Q4H  - ENT flap checks Q4H  - Monitor implantable doppler signal. With each flap check, change the channel on the doppler box so that the pedicle on the left neck can be heard. Keep the doppler box changed to the channel connected to the right neck (fibula) most of the time.    - Clean incisions with 0.9% sodium chloride and apply bacitracin Q8H x 24h, then transition to Aquaphor  - Monitor and record DARWIN drain output Qshift  - Peridex oral rinses TID  - Saline oral rinses Q8H and PRN. Gentle suction with red joaquin catheter only  (no yankeur), do not cut red joaquin  - Decadron 6mg q8h x 3 doses     Cardiovascular:    - Monitor hemodynamic status..   - Goal MAP >60, goal SBP >90 (preferred >110)  - NO vasopressors for now, if worsening hemodynamic status, will discuss with ENT prior to ordering any pressors or fluid boluses  - hemodynamically stable  - Enoxaparin to start POD1    Gastroenterology/Nutrition:  #Nutrition  - Strict NPO, No Oral swabs  - NG placed intra-op, AXR for tube position prior to using, then OK for meds via NG   - Nutrition consult in AM. Anticipate starting tube feeding via nasogastric feeding tube POD1  - Nutrition consult. Plan to start feeding via nasogastric feeding tube POD1  - Bowel regimen: scheduled miralax and senna    Fluids/Electrolytes:   - LR @ 100 for IV fluid hydration  - ICU Electrolyte Replacement Protocols    Renal:  - Avila in place with adequate UOP. Discontinue avila POD1  - Monitor intake and output.    Endocrine:  # Steroid-induced hyperglycemia   # Stress hyperglycemia   - Sliding scale for glucose management. Goal to keep BG< 180 for optimal wound healing   - TSH, albumin, prealbumin POD1 per ENT  - Steroids: per ENT      ID:  - Perioperative antibiotics (Unasyn) x 48h post-op    Heme:     - Transfuse if hgb <7.0 or signs/symptoms of hypoperfusion. Monitor and trend.     MSK:  - NO IVS or LAB DRAWS from RIGHT ARM. ENT wants this arm safe as a backup flap if necessary.      # LEFT fibula free flap  - wound vac and walking boot placed in OR   - Please examine boot tightness on each examination to ensure no pressure injury to dorsal foot  - Elevate leg while in bed/chair  - 1st dressing change on POD5, remove wound vac  - Toe touch weight bearing POD1-4, then full weight bearing  - Boot to remain in place for 3 weeks post-op  - LEFT anterolateral thigh free flap: island dressing in place  - LEFT STSG, calcium alginate with tegaderm in place.   - please strip and record DARWIN drain output q4h     #  STSG donor site   - calcium alginate and tegaderm dressing in place; reinforce PRN    - please strip and record DARWIN drain output q4h     # Weakness and deconditioning of critical illness   - Physical and occupational therapy consult     General Cares/Prophylaxis:    DVT Prophylaxis: Enoxaparin (Lovenox) SQ and Pneumatic Compression Devices  GI Prophylaxis: Not indicated  Restraints: Restraints for medical healing needed: Yes    Lines/ tubes/ drains:  - Arterial line  - PIV x 2  - DARWIN x5  - NGT  - Golden  - Tracheostomy    Disposition:  - SICU x 72 hours for flap monitoring    Patient seen, findings and plan discussed with surgical ICU staff, Dr. Dorado.    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  She presented today and underwent the following procedure:  - Chin resection  -segmental mandibulectomy  -GLOSSECTOMY, PARTIAL  -Bilateral modified radical neck dissection  -Tracheostomy placement, nasogastric tube placement  -Left fibula free flap  -Split thickness skin graft from left thigh  -anterolateral thigh free flap    Afterwards, she was admitted to the SICU for Q1H flap checks.      REVIEW OF SYSTEMS: 10 point ROS neg other than the symptoms noted above in the HPI.    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)     Tobacco dependence syndrome        SURGICAL HISTORY:   Past Surgical History:   Procedure Laterality Date    ENT SURGERY      oral biopsy    GYN SURGERY      tubal ligation       SOCIAL HISTORY:   Social History     Tobacco Use    Smoking status: Some Days     Packs/day: 1.00     Years: 40.00     Pack years: 40.00     Types:  Cigarettes     Passive exposure: Current    Smokeless tobacco: Never    Tobacco comments:     Cutting down to 5-7 cigarettes per day   Substance Use Topics    Alcohol use: Yes     Comment: 1 drink a day    Drug use: Not Currently     Types: Marijuana       FAMILY HISTORY:   History reviewed. No pertinent family history.    ALLERGIES:   No Known Allergies    MEDICATIONS:  No current facility-administered medications on file prior to encounter.  No current outpatient medications on file prior to encounter.      PHYSICAL EXAMINATION:  Temp:  [96.8  F (36  C)-98.1  F (36.7  C)] 97.7  F (36.5  C)  Pulse:  [] 67  Resp:  [7-16] 15  BP: ()/(49-81) 84/54  MAP:  [54 mmHg-80 mmHg] 62 mmHg  Arterial Line BP: ()/(39-57) 96/44  SpO2:  [98 %-100 %] 100 %    Physical Exam:  Gen: Appears stated age, NAD, resting comfortably  HEENT: NC/AT, PERRL, EOMI, Sclera Anicteric, Hearing intact  Neuro: Awake, no focal deficits  CV: HD Stable, S1, S2, no m/r/g  Pulm: NWOB on Trach Domes, Lungs CTABL  ABD: Soft, NT, ND  MSK: MAEx4, warm, PPP  Skin: No obvious rashes, jaundice, erythema  Wounds: Dressings CDI. Flap sites pink and well perfused. Audible doppler signals.    LABS: Reviewed.   Arterial Blood Gases   Recent Labs   Lab 08/17/23 2153 08/17/23 1741 08/17/23  1508 08/17/23  1313   PH 7.43 7.40 7.41 7.45   PCO2 34* 33* 35 34*   PO2 81 154* 200* 187*   HCO3 22 21 22 23     Complete Blood Count   Recent Labs   Lab 08/17/23 2152 08/17/23  1741 08/17/23  1508 08/17/23  1313   WBC 10.7  --   --   --    HGB 9.3* 10.1* 9.8* 9.0*     --   --   --      Basic Metabolic Panel  Recent Labs   Lab 08/17/23  2346 08/17/23  2159 08/17/23  2152 08/17/23  2050 08/17/23  1741 08/17/23  1508 08/17/23  1313 08/17/23  1108 08/17/23  0629   NA  --   --  133*  --  130* 129* 128*   < > 128*   POTASSIUM  --   --  3.4  --  2.8* 3.3* 2.8*   < > 3.2*   CHLORIDE  --   --  95*  --   --   --   --   --  84*   CO2  --   --  19*  --   --   --    --   --  24   BUN  --   --  6.4*  --   --   --   --   --  9.8   CR  --   --  0.50*  --   --   --   --   --  0.76   * 157* 153* 150* 132* 120* 123*   < > 102*    < > = values in this interval not displayed.     Liver Function Tests  Recent Labs   Lab 08/17/23  0629   ALBUMIN 4.0     Pancreatic Enzymes  No lab results found in last 7 days.  Coagulation Profile  No lab results found in last 7 days.    IMAGING:  Recent Results (from the past 24 hour(s))   XR Abdomen Port 1 View    Impression    RESIDENT PRELIMINARY INTERPRETATION  IMPRESSION:   Feeding tube tip projects in the stomach. If postpyloric positioning  is desired, recommend advancement.

## 2023-08-17 NOTE — ANESTHESIA PROCEDURE NOTES
Arterial Line Procedure Note    Pre-Procedure   Staff -        Anesthesiologist:  Behrens, Christopher J, MD       Performed By: anesthesiologist       Location: OR       Pre-Anesthestic Checklist: patient identified, IV checked, site marked, risks and benefits discussed, informed consent, monitors and equipment checked, pre-op evaluation, at physician/surgeon's request and post-op pain management  Timeout:       Correct Patient: Yes        Correct Procedure: Yes        Correct Site: Yes        Correct Position: Yes   Line Placement:   This line was placed Post Induction  Procedure   Procedure: arterial line       Laterality: right       Insertion Site: radial.  Sterile Prep        All elements of maximal sterile barrier technique followed       Patient Prep/Sterile Barriers: hand hygiene, sterile gloves, hat, mask, draped, sterile gown, draped       Skin prep: Chloraprep  Insertion/Injection        Technique: Seldinger Technique        Catheter Type/Size: 20 gauge, 12 cm  Narrative         Secured by: suture       Tegaderm dressing used.       Arterial waveform: Yes

## 2023-08-18 ENCOUNTER — APPOINTMENT (OUTPATIENT)
Dept: PHYSICAL THERAPY | Facility: CLINIC | Age: 63
End: 2023-08-18
Attending: OTOLARYNGOLOGY
Payer: COMMERCIAL

## 2023-08-18 LAB
ANION GAP SERPL CALCULATED.3IONS-SCNC: 16 MMOL/L (ref 7–15)
BUN SERPL-MCNC: 5.8 MG/DL (ref 8–23)
CALCIUM SERPL-MCNC: 8 MG/DL (ref 8.8–10.2)
CHLORIDE SERPL-SCNC: 95 MMOL/L (ref 98–107)
CREAT SERPL-MCNC: 0.56 MG/DL (ref 0.51–0.95)
DEPRECATED HCO3 PLAS-SCNC: 22 MMOL/L (ref 22–29)
ERYTHROCYTE [DISTWIDTH] IN BLOOD BY AUTOMATED COUNT: 14.2 % (ref 10–15)
GFR SERPL CREATININE-BSD FRML MDRD: >90 ML/MIN/1.73M2
GLUCOSE BLDC GLUCOMTR-MCNC: 139 MG/DL (ref 70–99)
GLUCOSE BLDC GLUCOMTR-MCNC: 149 MG/DL (ref 70–99)
GLUCOSE BLDC GLUCOMTR-MCNC: 167 MG/DL (ref 70–99)
GLUCOSE BLDC GLUCOMTR-MCNC: 172 MG/DL (ref 70–99)
GLUCOSE BLDC GLUCOMTR-MCNC: 175 MG/DL (ref 70–99)
GLUCOSE SERPL-MCNC: 152 MG/DL (ref 70–99)
HCT VFR BLD AUTO: 22.7 % (ref 35–47)
HGB BLD-MCNC: 7.6 G/DL (ref 11.7–15.7)
HGB BLD-MCNC: 7.8 G/DL (ref 11.7–15.7)
MAGNESIUM SERPL-MCNC: 2.6 MG/DL (ref 1.7–2.3)
MCH RBC QN AUTO: 31.2 PG (ref 26.5–33)
MCHC RBC AUTO-ENTMCNC: 34.4 G/DL (ref 31.5–36.5)
MCV RBC AUTO: 91 FL (ref 78–100)
PHOSPHATE SERPL-MCNC: 2.4 MG/DL (ref 2.5–4.5)
PLATELET # BLD AUTO: 356 10E3/UL (ref 150–450)
POTASSIUM SERPL-SCNC: 3.4 MMOL/L (ref 3.4–5.3)
POTASSIUM SERPL-SCNC: 3.4 MMOL/L (ref 3.4–5.3)
POTASSIUM SERPL-SCNC: 3.5 MMOL/L (ref 3.4–5.3)
PREALB SERPL IA-MCNC: 9 MG/DL (ref 15–45)
RBC # BLD AUTO: 2.5 10E6/UL (ref 3.8–5.2)
SODIUM SERPL-SCNC: 133 MMOL/L (ref 136–145)
WBC # BLD AUTO: 9.9 10E3/UL (ref 4–11)

## 2023-08-18 PROCEDURE — 80048 BASIC METABOLIC PNL TOTAL CA: CPT

## 2023-08-18 PROCEDURE — 97116 GAIT TRAINING THERAPY: CPT | Mod: GP | Performed by: PHYSICAL THERAPIST

## 2023-08-18 PROCEDURE — 0NUV07Z SUPPLEMENT LEFT MANDIBLE WITH AUTOLOGOUS TISSUE SUBSTITUTE, OPEN APPROACH: ICD-10-PCS | Performed by: STUDENT IN AN ORGANIZED HEALTH CARE EDUCATION/TRAINING PROGRAM

## 2023-08-18 PROCEDURE — 999N000111 HC STATISTIC OT IP EVAL DEFER

## 2023-08-18 PROCEDURE — 250N000013 HC RX MED GY IP 250 OP 250 PS 637: Performed by: STUDENT IN AN ORGANIZED HEALTH CARE EDUCATION/TRAINING PROGRAM

## 2023-08-18 PROCEDURE — 85027 COMPLETE CBC AUTOMATED: CPT

## 2023-08-18 PROCEDURE — 999N000157 HC STATISTIC RCP TIME EA 10 MIN

## 2023-08-18 PROCEDURE — P9045 ALBUMIN (HUMAN), 5%, 250 ML: HCPCS | Mod: JZ

## 2023-08-18 PROCEDURE — 200N000002 HC R&B ICU UMMC

## 2023-08-18 PROCEDURE — 97161 PT EVAL LOW COMPLEX 20 MIN: CPT | Mod: GP | Performed by: PHYSICAL THERAPIST

## 2023-08-18 PROCEDURE — 258N000003 HC RX IP 258 OP 636: Performed by: PHARMACIST

## 2023-08-18 PROCEDURE — 250N000013 HC RX MED GY IP 250 OP 250 PS 637: Performed by: OTOLARYNGOLOGY

## 2023-08-18 PROCEDURE — 84100 ASSAY OF PHOSPHORUS: CPT

## 2023-08-18 PROCEDURE — 258N000003 HC RX IP 258 OP 636

## 2023-08-18 PROCEDURE — 250N000011 HC RX IP 250 OP 636: Performed by: OTOLARYNGOLOGY

## 2023-08-18 PROCEDURE — 85018 HEMOGLOBIN: CPT | Performed by: STUDENT IN AN ORGANIZED HEALTH CARE EDUCATION/TRAINING PROGRAM

## 2023-08-18 PROCEDURE — 0QBK0ZZ EXCISION OF LEFT FIBULA, OPEN APPROACH: ICD-10-PCS | Performed by: STUDENT IN AN ORGANIZED HEALTH CARE EDUCATION/TRAINING PROGRAM

## 2023-08-18 PROCEDURE — 83735 ASSAY OF MAGNESIUM: CPT

## 2023-08-18 PROCEDURE — 250N000011 HC RX IP 250 OP 636

## 2023-08-18 PROCEDURE — 250N000011 HC RX IP 250 OP 636: Mod: JZ | Performed by: STUDENT IN AN ORGANIZED HEALTH CARE EDUCATION/TRAINING PROGRAM

## 2023-08-18 PROCEDURE — 97530 THERAPEUTIC ACTIVITIES: CPT | Mod: GP | Performed by: PHYSICAL THERAPIST

## 2023-08-18 PROCEDURE — 84132 ASSAY OF SERUM POTASSIUM: CPT | Performed by: OTOLARYNGOLOGY

## 2023-08-18 PROCEDURE — 250N000013 HC RX MED GY IP 250 OP 250 PS 637: Performed by: PHARMACIST

## 2023-08-18 PROCEDURE — 0HRJX74 REPLACEMENT OF LEFT UPPER LEG SKIN WITH AUTOLOGOUS TISSUE SUBSTITUTE, PARTIAL THICKNESS, EXTERNAL APPROACH: ICD-10-PCS | Performed by: STUDENT IN AN ORGANIZED HEALTH CARE EDUCATION/TRAINING PROGRAM

## 2023-08-18 RX ORDER — MAGNESIUM SULFATE HEPTAHYDRATE 40 MG/ML
2 INJECTION, SOLUTION INTRAVENOUS ONCE
Status: COMPLETED | OUTPATIENT
Start: 2023-08-18 | End: 2023-08-18

## 2023-08-18 RX ORDER — GUAIFENESIN 600 MG/1
15 TABLET, EXTENDED RELEASE ORAL DAILY
Status: DISCONTINUED | OUTPATIENT
Start: 2023-08-18 | End: 2023-09-01 | Stop reason: HOSPADM

## 2023-08-18 RX ORDER — OXYCODONE HYDROCHLORIDE 5 MG/1
5 TABLET ORAL EVERY 4 HOURS PRN
Status: DISCONTINUED | OUTPATIENT
Start: 2023-08-18 | End: 2023-09-01 | Stop reason: HOSPADM

## 2023-08-18 RX ORDER — POTASSIUM CHLORIDE 1.5 G/1.58G
20 POWDER, FOR SOLUTION ORAL ONCE
Status: COMPLETED | OUTPATIENT
Start: 2023-08-18 | End: 2023-08-18

## 2023-08-18 RX ORDER — AMOXICILLIN 250 MG
1 CAPSULE ORAL 2 TIMES DAILY
Status: DISCONTINUED | OUTPATIENT
Start: 2023-08-18 | End: 2023-08-19

## 2023-08-18 RX ORDER — ACETAMINOPHEN 325 MG/1
975 TABLET ORAL 3 TIMES DAILY
Status: DISCONTINUED | OUTPATIENT
Start: 2023-08-18 | End: 2023-08-18

## 2023-08-18 RX ORDER — POLYETHYLENE GLYCOL 3350 17 G/17G
17 POWDER, FOR SOLUTION ORAL DAILY
Status: DISCONTINUED | OUTPATIENT
Start: 2023-08-19 | End: 2023-08-19

## 2023-08-18 RX ORDER — CHLORHEXIDINE GLUCONATE ORAL RINSE 1.2 MG/ML
15 SOLUTION DENTAL 3 TIMES DAILY
Status: DISCONTINUED | OUTPATIENT
Start: 2023-08-18 | End: 2023-09-01 | Stop reason: HOSPADM

## 2023-08-18 RX ORDER — PROCHLORPERAZINE MALEATE 10 MG
10 TABLET ORAL EVERY 6 HOURS PRN
Status: DISCONTINUED | OUTPATIENT
Start: 2023-08-18 | End: 2023-09-01 | Stop reason: HOSPADM

## 2023-08-18 RX ORDER — CALCIUM GLUCONATE 20 MG/ML
1 INJECTION, SOLUTION INTRAVENOUS ONCE
Status: COMPLETED | OUTPATIENT
Start: 2023-08-18 | End: 2023-08-18

## 2023-08-18 RX ORDER — DEXTROSE MONOHYDRATE 100 MG/ML
INJECTION, SOLUTION INTRAVENOUS CONTINUOUS PRN
Status: DISCONTINUED | OUTPATIENT
Start: 2023-08-18 | End: 2023-09-01 | Stop reason: HOSPADM

## 2023-08-18 RX ORDER — CHLORHEXIDINE GLUCONATE ORAL RINSE 1.2 MG/ML
15 SOLUTION DENTAL 3 TIMES DAILY
Status: DISCONTINUED | OUTPATIENT
Start: 2023-08-18 | End: 2023-08-18

## 2023-08-18 RX ORDER — ACETAMINOPHEN 325 MG/1
975 TABLET ORAL 3 TIMES DAILY
Status: DISCONTINUED | OUTPATIENT
Start: 2023-08-18 | End: 2023-09-01 | Stop reason: HOSPADM

## 2023-08-18 RX ADMIN — HYDROMORPHONE HYDROCHLORIDE 0.4 MG: 0.2 INJECTION, SOLUTION INTRAMUSCULAR; INTRAVENOUS; SUBCUTANEOUS at 05:14

## 2023-08-18 RX ADMIN — POTASSIUM & SODIUM PHOSPHATES POWDER PACK 280-160-250 MG 1 PACKET: 280-160-250 PACK at 11:22

## 2023-08-18 RX ADMIN — Medication 15 ML: at 08:57

## 2023-08-18 RX ADMIN — CHLORHEXIDINE GLUCONATE 15 ML: 1.2 SOLUTION ORAL at 13:31

## 2023-08-18 RX ADMIN — HYDROMORPHONE HYDROCHLORIDE 0.2 MG: 0.2 INJECTION, SOLUTION INTRAMUSCULAR; INTRAVENOUS; SUBCUTANEOUS at 03:53

## 2023-08-18 RX ADMIN — ACETAMINOPHEN 975 MG: 325 TABLET, FILM COATED ORAL at 20:30

## 2023-08-18 RX ADMIN — POTASSIUM & SODIUM PHOSPHATES POWDER PACK 280-160-250 MG 1 PACKET: 280-160-250 PACK at 13:31

## 2023-08-18 RX ADMIN — DEXAMETHASONE SODIUM PHOSPHATE 6 MG: 4 INJECTION, SOLUTION INTRA-ARTICULAR; INTRALESIONAL; INTRAMUSCULAR; INTRAVENOUS; SOFT TISSUE at 16:07

## 2023-08-18 RX ADMIN — POTASSIUM CHLORIDE 20 MEQ: 1.5 POWDER, FOR SOLUTION ORAL at 06:04

## 2023-08-18 RX ADMIN — INSULIN ASPART 1 UNITS: 100 INJECTION, SOLUTION INTRAVENOUS; SUBCUTANEOUS at 20:36

## 2023-08-18 RX ADMIN — HYDROMORPHONE HYDROCHLORIDE 0.4 MG: 0.2 INJECTION, SOLUTION INTRAMUSCULAR; INTRAVENOUS; SUBCUTANEOUS at 07:22

## 2023-08-18 RX ADMIN — HYDROMORPHONE HYDROCHLORIDE 0.2 MG: 0.2 INJECTION, SOLUTION INTRAMUSCULAR; INTRAVENOUS; SUBCUTANEOUS at 02:11

## 2023-08-18 RX ADMIN — CHLORHEXIDINE GLUCONATE 15 ML: 1.2 SOLUTION ORAL at 20:30

## 2023-08-18 RX ADMIN — AMPICILLIN SODIUM AND SULBACTAM SODIUM 3 G: 2; 1 INJECTION, POWDER, FOR SOLUTION INTRAMUSCULAR; INTRAVENOUS at 18:19

## 2023-08-18 RX ADMIN — ACETAMINOPHEN 975 MG: 325 TABLET, FILM COATED ORAL at 08:57

## 2023-08-18 RX ADMIN — AMPICILLIN SODIUM AND SULBACTAM SODIUM 3 G: 2; 1 INJECTION, POWDER, FOR SOLUTION INTRAMUSCULAR; INTRAVENOUS at 06:02

## 2023-08-18 RX ADMIN — MAGNESIUM SULFATE IN WATER 2 G: 40 INJECTION, SOLUTION INTRAVENOUS at 02:03

## 2023-08-18 RX ADMIN — INSULIN ASPART 1 UNITS: 100 INJECTION, SOLUTION INTRAVENOUS; SUBCUTANEOUS at 11:40

## 2023-08-18 RX ADMIN — DEXAMETHASONE SODIUM PHOSPHATE 6 MG: 4 INJECTION, SOLUTION INTRA-ARTICULAR; INTRALESIONAL; INTRAMUSCULAR; INTRAVENOUS; SOFT TISSUE at 08:58

## 2023-08-18 RX ADMIN — POTASSIUM CHLORIDE 20 MEQ: 1.5 POWDER, FOR SOLUTION ORAL at 13:31

## 2023-08-18 RX ADMIN — AMPICILLIN SODIUM AND SULBACTAM SODIUM 3 G: 2; 1 INJECTION, POWDER, FOR SOLUTION INTRAMUSCULAR; INTRAVENOUS at 11:40

## 2023-08-18 RX ADMIN — THIAMINE HCL TAB 100 MG 100 MG: 100 TAB at 08:57

## 2023-08-18 RX ADMIN — OXYCODONE HYDROCHLORIDE 5 MG: 5 TABLET ORAL at 13:31

## 2023-08-18 RX ADMIN — SODIUM CHLORIDE, POTASSIUM CHLORIDE, SODIUM LACTATE AND CALCIUM CHLORIDE: 600; 310; 30; 20 INJECTION, SOLUTION INTRAVENOUS at 07:28

## 2023-08-18 RX ADMIN — SODIUM CHLORIDE, POTASSIUM CHLORIDE, SODIUM LACTATE AND CALCIUM CHLORIDE: 600; 310; 30; 20 INJECTION, SOLUTION INTRAVENOUS at 17:39

## 2023-08-18 RX ADMIN — POTASSIUM & SODIUM PHOSPHATES POWDER PACK 280-160-250 MG 1 PACKET: 280-160-250 PACK at 06:04

## 2023-08-18 RX ADMIN — INSULIN ASPART 1 UNITS: 100 INJECTION, SOLUTION INTRAVENOUS; SUBCUTANEOUS at 16:07

## 2023-08-18 RX ADMIN — ACETAMINOPHEN 975 MG: 325 TABLET, FILM COATED ORAL at 13:31

## 2023-08-18 RX ADMIN — OXYCODONE HYDROCHLORIDE 5 MG: 5 TABLET ORAL at 09:33

## 2023-08-18 RX ADMIN — BACITRACIN: 500 OINTMENT TOPICAL at 06:04

## 2023-08-18 RX ADMIN — CALCIUM GLUCONATE 1 G: 20 INJECTION, SOLUTION INTRAVENOUS at 00:34

## 2023-08-18 RX ADMIN — ALBUMIN HUMAN 12.5 G: 0.05 INJECTION, SOLUTION INTRAVENOUS at 02:01

## 2023-08-18 RX ADMIN — INSULIN ASPART 1 UNITS: 100 INJECTION, SOLUTION INTRAVENOUS; SUBCUTANEOUS at 04:18

## 2023-08-18 RX ADMIN — POTASSIUM & SODIUM PHOSPHATES POWDER PACK 280-160-250 MG 1 PACKET: 280-160-250 PACK at 16:31

## 2023-08-18 RX ADMIN — SENNOSIDES AND DOCUSATE SODIUM 1 TABLET: 50; 8.6 TABLET ORAL at 20:31

## 2023-08-18 RX ADMIN — POTASSIUM CHLORIDE 20 MEQ: 1.5 POWDER, FOR SOLUTION ORAL at 00:36

## 2023-08-18 RX ADMIN — BACITRACIN: 500 OINTMENT TOPICAL at 13:33

## 2023-08-18 RX ADMIN — ENOXAPARIN SODIUM 40 MG: 40 INJECTION SUBCUTANEOUS at 08:58

## 2023-08-18 RX ADMIN — POTASSIUM & SODIUM PHOSPHATES POWDER PACK 280-160-250 MG 1 PACKET: 280-160-250 PACK at 20:31

## 2023-08-18 ASSESSMENT — ACTIVITIES OF DAILY LIVING (ADL)
ADLS_ACUITY_SCORE: 24
ADLS_ACUITY_SCORE: 29
ADLS_ACUITY_SCORE: 27
ADLS_ACUITY_SCORE: 29
ADLS_ACUITY_SCORE: 29
ADLS_ACUITY_SCORE: 27

## 2023-08-18 NOTE — PROGRESS NOTES
SURGICAL ICU ADMISSION NOTE  08/18/2023      PRIMARY TEAM: ENT  PRIMARY PHYSICIAN: Tyshawn Dao MD  REASON FOR CRITICAL CARE ADMISSION: post op flap checks and hemodynamic monitoring.  ADMITTING PHYSICIAN: Dr. Dorado  Date of Service (when I saw the patient): 08/18/2023    ASSESSMENT:  Adrianna Pereira is a 62 year old female w/ hx of SCC of the mandible who was admitted to the SICU on 8/17/2023 s/p segmental mandibulectomy, partial glossectomy, resection of the skin of the mentum, bilateral neck dissections levels 1-4. Reconstruction of the mandible with left fibular free flap, pedicle for this flap on the right. Reconstruction of the chin defect with left ALT free flap, pedicle on the left.  PMH significant for tobacco use and prior alcohol abuse.    PLAN:    Neurological:  # Acute pain   # Agitation   - Monitor neurological status. Delirium preventions and precautions  - Pain: Scheduled tylenol 975 mg TID, oxycodone 5 mg PRN, Hydromorphone 0.2-0.4 IV PRN    # Hx of Tobacco Use:  # Hx of Alcohol Use Disorder  - NO Nicotine patch, per ENT  - CIWA assessments    Pulmonary:   # Acute hypoxic respiratory support   - Trach doming appropriately on 35% FiO2 and 20 LPM.   - Routine trach cares. Do not cut trach sutures, 1st trach change will be performed by ENT. NO trach ties    HEENT:  #SCC of the manidble s/p resection w/ free flap  - Nothing around the neck (no gown ties, trach ties, lines, ect.)  - RN flap checks Q1H x 48h, Q2H x 24h, then Q4H  - ENT flap checks Q4H  - Monitor implantable doppler signal. With each flap check, change the channel on the doppler box so that the pedicle on the left neck can be heard. Keep the doppler box changed to the channel connected to the right neck (fibula) most of the time.    - Clean incisions with 0.9% sodium chloride and apply bacitracin Q8H x 24h, then transition to Aquaphor  - Monitor and record DARWIN drain output Qshift  - Peridex oral rinses TID  - Saline oral rinses Q8H  and PRN. Gentle suction with red joaquin catheter only (no yankeur), do not cut red joaquin  - Decadron 6mg q8h x 3 doses     Cardiovascular:    - Monitor hemodynamic status..   - Goal MAP >60, goal SBP >90 (preferred >110)  - NO vasopressors for now, if worsening hemodynamic status, will discuss with ENT prior to ordering any pressors or fluid boluses  - hemodynamically stable  - Enoxaparin to start POD1    Gastroenterology/Nutrition:  #Nutrition  - Strict NPO, No Oral swabs  - NG placed intra-op, AXR for tube position prior to using, then OK for meds via NG   - Nutrition consult in AM. Anticipate starting tube feeding via nasogastric feeding tube POD1  - Nutrition consult. Plan to start feeding via nasogastric feeding tube POD1  - Bowel regimen: scheduled miralax and senna    Fluids/Electrolytes:   - LR @ 100 for IV fluid hydration, will discontinue once TF at goal.   - ICU Electrolyte Replacement Protocols    Renal:  - Avila in place with adequate UOP. Discontinue avila POD1  - Monitor intake and output.    Endocrine:  # Steroid-induced hyperglycemia   # Stress hyperglycemia   - Sliding scale for glucose management. Goal to keep BG< 180 for optimal wound healing   - TSH, albumin, prealbumin POD1 per ENT  - Steroids: per ENT    ID:  - Perioperative antibiotics (Unasyn) x 48h post-op    Heme:     - Transfuse if hgb <7.0 or signs/symptoms of hypoperfusion. Monitor and trend.     MSK:  - NO IVS or LAB DRAWS from RIGHT ARM. ENT wants this arm safe as a backup flap if necessary.      # LEFT fibula free flap  - Wound vac and walking boot placed in OR   - Please examine boot tightness on each examination to ensure no pressure injury to dorsal foot  - Elevate leg while in bed/chair  - 1st dressing change on POD5, remove wound vac  - Toe touch weight bearing POD1-4, then full weight bearing  - Boot to remain in place for 3 weeks post-op  - LEFT anterolateral thigh free flap: island dressing in place  - LEFT STSG, calcium  alginate with tegaderm in place.   - Please strip and record DARWIN drain output q4h     # STSG donor site   - Calcium alginate and tegaderm dressing in place; reinforce PRN  - Please strip and record DARWIN drain output q4h     # Weakness and deconditioning of critical illness   - Physical and occupational therapy consult     General Cares/Prophylaxis:    DVT Prophylaxis: Enoxaparin (Lovenox) SQ and Pneumatic Compression Devices  GI Prophylaxis: Not indicated  Restraints: Restraints for medical healing needed: Yes    Lines/ tubes/ drains:  - Arterial line (will pull)  - PIV x 2  - DARWIN x5  - NGT  - Golden (will pull)  - Tracheostomy    Disposition:  - SICU x 72 hours for nursing care. ICU team will follow for 24 hours unless the patient's condition worsens.     Patient seen, findings and plan discussed with surgical ICU staff, Dr. Meyers.    Kuldip Carpenter MD   General Surgery Resident  - - - - - - - - - - - - - - - - - - - - - - - - - - - - - - - - - - - - - - - - - - - - - -   HISTORY PRESENTING ILLNESS:   Pt doing well this AM. She is able to communicate non-verbally and via writing. She feels like her pain is well controlled and is comfortable. ENT at bedside, briefly discussed care plan.     ALLERGIES:   No Known Allergies    MEDICATIONS:  No current facility-administered medications on file prior to encounter.  No current outpatient medications on file prior to encounter.      PHYSICAL EXAMINATION:  Temp:  [96.8  F (36  C)-98.4  F (36.9  C)] 98.3  F (36.8  C)  Pulse:  [60-89] 83  Resp:  [7-17] 17  BP: ()/(48-79) 91/50  MAP:  [54 mmHg-80 mmHg] 62 mmHg  Arterial Line BP: ()/(39-57) 100/42  FiO2 (%):  [40 %] 40 %  SpO2:  [95 %-100 %] 97 %    Physical Exam:  Gen: Appears stated age, NAD, resting comfortably  HEENT: Flaps visible externally, readily apparent post-operative edema. Their color is appropriate and there is an audible doppler signal. Intra-oral exam to be conducted by ENT. Please see their note for  details.   Neuro: Awake, no focal deficits  CV: HD Stable, normal rate on telemetry.   Pulm: No inc WOB on Trach Dome  ABD: Soft, NT, ND  MSK: MAEx4, warm, PPP  Skin: No obvious rashes, jaundice, erythema      LABS: Reviewed.   Arterial Blood Gases   Recent Labs   Lab 08/17/23 2153 08/17/23 1741 08/17/23  1508 08/17/23  1313   PH 7.43 7.40 7.41 7.45   PCO2 34* 33* 35 34*   PO2 81 154* 200* 187*   HCO3 22 21 22 23       Complete Blood Count   Recent Labs   Lab 08/18/23 0412 08/17/23 2152 08/17/23 1741 08/17/23  1508   WBC 9.9 10.7  --   --    HGB 7.8* 9.3* 10.1* 9.8*    391  --   --        Basic Metabolic Panel  Recent Labs   Lab 08/18/23 0417 08/18/23 0412 08/17/23  2346 08/17/23 2159 08/17/23 2152 08/17/23  2050 08/17/23 1741 08/17/23  1508 08/17/23  1108 08/17/23  0629   NA  --  133*  --   --  133*  --  130* 129*   < > 128*   POTASSIUM  --  3.4  3.4  --   --  3.4  --  2.8* 3.3*   < > 3.2*   CHLORIDE  --  95*  --   --  95*  --   --   --   --  84*   CO2  --  22  --   --  19*  --   --   --   --  24   BUN  --  5.8*  --   --  6.4*  --   --   --   --  9.8   CR  --  0.56  --   --  0.50*  --   --   --   --  0.76   * 152* 159* 157* 153*   < > 132* 120*   < > 102*    < > = values in this interval not displayed.       Liver Function Tests  Recent Labs   Lab 08/17/23  0629   ALBUMIN 4.0       Pancreatic Enzymes  No lab results found in last 7 days.  Coagulation Profile  No lab results found in last 7 days.    IMAGING:  Recent Results (from the past 24 hour(s))   XR Abdomen Port 1 View    Narrative    EXAMINATION:  XR ABDOMEN PORT 1 VIEW 8/17/2023 9:21 PM.    COMPARISON: PET CT 7/13/2023.    HISTORY:  feeding tube placement    FINDINGS:   Portable AP view of the chest. Feeding tube tip projects in the  stomach. Nonspecific bowel gas pattern. No pneumatosis or portal  venous gas in the visualized abdomen. Mild levocurvature of the lumbar  spine. Visualized lung bases are clear.      Impression     IMPRESSION:   Feeding tube tip projects in the stomach. If postpyloric positioning  is desired, recommend advancement.    I have personally reviewed the examination and initial interpretation  and I agree with the findings.    KALEE MCFARLANE MD         SYSTEM ID:  [id number]

## 2023-08-18 NOTE — PLAN OF CARE
Goal Outcome Evaluation:      Plan of Care Reviewed With: patient    Overall Patient Progress: no changeOverall Patient Progress: no change    Outcome Evaluation: Start EN support via NGT. See Nutrition Progress note for further detail.

## 2023-08-18 NOTE — BRIEF OP NOTE
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) (H) [C04.9]  Post-operative diagnosis Same as pre-operative diagnosis    Procedure: Procedure(s):  Chin resection  segmental mandibulectomy  GLOSSECTOMY, PARTIAL  Bilateral modified radical neck dissection  Tracheostomy placement, nasogastric tube placement  Left fibula free flap  Split thickness skin graft from left thigh  anterolateral thigh free flap  Surgeon: Surgeon(s) and Role:  Panel 1:     * Tyshawn Dao MD - Primary     * Leah Freeman MD - Resident - Assisting  Panel 2:     * Darien Thorne MD - Primary     * Michael De Leon MD - Fellow - Assisting  Anesthesia: General   Estimated Blood Loss: 300    Drains: Gaston-Coughlin  Specimens:   ID Type Source Tests Collected by Time Destination   1 : Left level 1b Tissue Other SURGICAL PATHOLOGY EXAM Tyshawn Dao MD 8/17/2023  9:19 AM    2 : Level 1A Tissue Other SURGICAL PATHOLOGY EXAM Tyshawn Dao MD 8/17/2023  9:21 AM    3 : right level 1B Tissue Other SURGICAL PATHOLOGY EXAM Tyshawn Dao MD 8/17/2023  9:34 AM    4 : Left mental nerve Tissue Other SURGICAL PATHOLOGY EXAM Tyshawn Dao MD 8/17/2023  9:51 AM    5 : Segmental mandibulectomy, anterior glossectomy Tissue Other SURGICAL PATHOLOGY EXAM Tyshawn Dao MD 8/17/2023 10:17 AM    6 : Right marrow margin Tissue Other SURGICAL PATHOLOGY EXAM Tyshawn Dao MD 8/17/2023 10:22 AM    7 : Left marrow margin Tissue Other SURGICAL PATHOLOGY EXAM Tyshawn Dao MD 8/17/2023 10:23 AM    8 : Right level 2A Tissue Other SURGICAL PATHOLOGY EXAM Tyshawn Dao MD 8/17/2023 11:00 AM    9 : Right level 3 Tissue Other SURGICAL PATHOLOGY EXAM Tyshawn Dao MD 8/17/2023 11:01 AM    10 : Right level 4 Tissue Other SURGICAL PATHOLOGY EXAM Tyshawn Dao MD 8/17/2023 11:01 AM    11 : Right level 2B  Tissue Other SURGICAL PATHOLOGY EXAM Tyshawn Dao MD 8/17/2023 11:06 AM    12 : Left level 2A Tissue Other SURGICAL PATHOLOGY EXAM Tyshawn Dao MD 8/17/2023 11:27 AM    13 : Left level 3 Tissue Other SURGICAL PATHOLOGY EXAM Tyshawn Dao MD 8/17/2023 11:28 AM    14 : Left level 4 Tissue Other SURGICAL PATHOLOGY EXAM Tyshawn Dao MD 8/17/2023 11:28 AM    15 : Left level 2B Tissue Other SURGICAL PATHOLOGY EXAM Tyshawn Dao MD 8/17/2023 11:36 AM      Findings:   Resection of entirety of chin, with some skin, lower lip remains in place. There is also resection of the anterior mandible and anterior tongue. S/p reconstruction with TWO free flaps. Right side pedicle for the fibula and skin paddle creating new tongue; connections to the facial artery, as well as EJ and end to side to the jug. Left is for the ALT (chin) to the external carotid terminal branch, as well as the external jugular vein and an end to side anastomosis to the jugular vein.   Complications: none .  Implants:   Implant Name Type Inv. Item Serial No.  Lot No. LRB No. Used Action   Mandible implant   [id number]  N/A N/A 1 Implanted   IMP SCR KLS LOCKING MAXDRIVE 2.0X7 MM TI-6AL-4V TI - SN/A Metallic Hardware/[employer] IMP SCR KLS LOCKING MAXDRIVE 2.0X7 MM TI-6AL-4V TI N/A KLS FIORELLA  N/A 2 Implanted   IMP SCR KLS LOCKING MAXDRIVE 2.0X9 MM TI-6AL-4V TI - SN/A Metallic Hardware/[employer] IMP SCR KLS LOCKING MAXDRIVE 2.0X9 MM TI-6AL-4V TI N/A KLS FIORELLA  N/A 7 Implanted   IMP SCR KLS LOCKING MAXDRIVE 2.0X11 MM TI-6AL-4V TI - SN/A Metallic Hardware/[employer] IMP SCR KLS LOCKING MAXDRIVE 2.0X11 MM TI-6AL-4V TI N/A KLS FIORELLA  N/A 2 Implanted   IMP SCR KLS LOCKING MAXDRIVE 2.0X7 MM TI-6AL-4V TI - SN/A Metallic Hardware/[place] IMP SCR KLS LOCKING MAXDRIVE 2.0X7 MM TI-6AL-4V TI N/A KLS FIORELLA  N/A 6 Implanted and Explanted   IMP SCR KLS LOCKING MAXDRIVE 2.0X11 MM TI-6AL-4V TI - SN/A Metallic Hardware/[employer] IMP SCR KLS  LOCKING MAXDRIVE 2.0X11 MM TI-6AL-4V TI N/A KLS FIORELLA  N/A 1 Implanted and Explanted   IMP SCR KLS LOCKING MAXDRIVE 2.3X9 MM TI-6AL-4V TI - [id number] Metallic Hardware/Tensed IMP SCR KLS LOCKING MAXDRIVE 2.3X9 MM TI-6AL-4V TI  KLS FIORELLA NA N/A 1 Implanted   IMP SCR KLS DRILL-FREE MAXDRIVE MINI 2.0X7MM TI-6AL-4V - [id number] Metallic Hardware/Tensed IMP SCR KLS DRILL-FREE MAXDRIVE MINI 2.0X7MM TI-6AL-4V  KLS FIORELLA  N/A 5 Implanted and Explanted   IMP DEVICE ANASTOMOTIC 2.5MM  RED [id number] - [id number] Other IMP DEVICE ANASTOMOTIC 2.5MM  RED [id number]  [employer] [id number] Right 1 Implanted   IMP PROBE DOPPLER FLOW STD CUFF [id number] - [id number] Other IMP PROBE DOPPLER FLOW STD CUFF [id number]  [facility] [employer] [id number] Right 1 Implanted   IMP DEVICE ANASTOMOTIC 3.5MM  PURPLE [id number] - [id number] Other IMP DEVICE ANASTOMOTIC 3.5MM  PURPLE [id number]  [employer] [id number] Left 1 Implanted   IMP PROBE DOPPLER FLOW STD CUFF [id number] - [id number] Other IMP PROBE DOPPLER FLOW STD CUFF [id number]  [facility] [employer] [id number] Left 1 Implanted       A/P: This is a 62 year old female with SCC of the ventral tongue, mandible, with invasion of the skin. Underwent segmental mandibulectomy, partial glossectomy, resection of the skin of the mentum, bilateral neck dissections levels 1-4. Reconstruction of the mandible with left fibular free flap, pedicle for this flap on the right. Reconstruction of the chin defect with left ALT free flap, pedicle on the left.     Neuro:  - Pain per SICU  - Patient should be awake/off sedation overnight POD0   - NO Nicotine patch  - Recommend CIWA monitoring given alcohol use history    HEENT:  - Nothing around the neck (no gown ties, trach ties, lines, ect.)  - HOB at 30 degrees, head/neck in midline position  - Monitor implantable doppler signal. With each flap check, change the channel on the doppler box so that the pedicle on the left neck can be  heard. Keep the doppler box changed to the channel connected to the right neck (fibula) most of the time.   - RN flap checks Q1H x 48h, Q2H x 24h, then Q4H  - Q4 hr flap checks per ENT resident for 24 hours, Q6 X 48 hours, then Q8 x 48 hours  - Clean incisions with 0.9% sodium chloride and apply bacitracin Q8H x 24h, then transition to Aquaphor  - Monitor and record DARWIN drain output Qshift  - Peridex TID  - Saline oral rinses Q8H and PRN. Gentle suction with red joaquin catheter only (no yankeur), do not cut red joaquin  - Decadron 6mg q8h x 3 doses    Respiratory:  - Wean to trach dome as tolerated  - Routine trach cares. Do not cut trach sutures, 1st trach change will be performed by ENT. NO trach ties    CV/heme:  - Keep MAP > 60, SBP > 90 (preferrably > 110)  - Please call ENT on-call before giving fluid bolus  - Lovenox POD#1 at 8:00 AM  - NO vasopressors  - Transfuse for Hgb < 7    FEN/GI:  - NG placed intra-op, AXR for tube position prior to using, then OK for meds via NG  - Strict NPO, no oral swabs  - Maintenance fliuds per SICU  - Electrolyte replacement protocol  - Nutrition consult. Plan to start feeding via nasogastric feeding tube POD1. Will start at 10ml trickle feeds, advance feeds by 10ml/hr q8hr.   - Bowel regimen: scheduled miralax and senna    :  - Avila in place with adequate UOP. Plan to discontinue avila POD1    Endo  - TSH, albumin, prealbumin POD1  - sliding scale insulin    ID:  - Perioperative antibiotics (Unasyn) x 48h post-op    MSK:  - NO IVS or LAB DRAWS from RIGHT ARM. We are keeping this arm safe as a backup flap if necessary.   - LEFT fibula free flap: wound vac and walking boot placed in OR  - Please examine boot tightness on each examination to ensure no pressure injury to dorsal foot  - Elevate leg while in bed/chair  - 1st dressing change on POD5, remove wound vac  - Toe touch weight bearing POD1-4, then full weight bearing  - Boot to remain in place for 3 weeks post-op  -  LEFT anterolateral thigh free flap: island dressing in place  - LEFT STSG, calcium alginate with tegaderm in place.   - please strip and record DARWIN drain output q4h     STSG donor site   - calcium alginate and tegaderm dressing in place; reinforce PRN    PPX:  - 40mg Lovenox daily (first dose given preoperatively)  - SCDs    Consults:  - PT/OT  - SLP for PMSV ONLY AFTER cuffless trach in place  - PLC for trach and tube feeding    Dispo: SICU x 72 hours for flap monitoring    -- Patient and above plan discussed with Dr. Thorne.

## 2023-08-18 NOTE — PROGRESS NOTES
ENT Free Flap Check      S: No flap concerns, getting settled in SICU. Did get 250cc albumin bolus x2 to reach MAP goals.      O: Vitals reviewed, MAPs 63-65 (currently 71)  Alert and engaged   Oral cavity with healthy appearing reconstructed tongue, very soft with some redundant skin, flap remains well-perfused, no evidence of venous congestion. Handheld and implantable doppler signals intact-- handheld is consistently lighter. Suture line intact.   Mental ALT free flap well-perfused with good capillary refill, no evidence of venous congestion. Handheld and implantable doppler signals intact. Incision c/d/I   Neck soft with expected post-operative swelling. No significant ballotable fluid collections. DARWIN drain x3 with sanginous output and holding suction. Incision c/d/I   6-0 cuffed shiley sutured in, on HTD  Left thigh donor site covered and without drainage, non-tender to touch   Left leg brace in place, wound vac and DARWIN holding suction.     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     - MAP goals >60  -stable flap     Christi Ramey MD  ENT Resident

## 2023-08-18 NOTE — PLAN OF CARE
Major Shift Events: Flap checks q1- unchanged. O2 weaned to TD 21%, 20L. Pt up in chair for approximately 2 hours. Art line and avila removed. TF initiated at 1200. K and Phos replaced, AM recheck ordered.    Plan: Continue to encourage activity, monitor flaps according to plan of care. Increase TF at 2000.   For vital signs and complete assessments, please see documentation flowsheets.      Problem: Plan of Care - These are the overarching goals to be used throughout the patient stay.    Goal: Plan of Care Review  Description: The Plan of Care Review/Shift note should be completed every shift.  The Outcome Evaluation is a brief statement about your assessment that the patient is improving, declining, or no change.  This information will be displayed automatically on your shift note.  8/18/2023 1541 by Jacqueline Weller, RN  Outcome: Progressing  Flowsheets (Taken 8/18/2023 1541)  Plan of Care Reviewed With: patient  Overall Patient Progress: improving

## 2023-08-18 NOTE — PLAN OF CARE
Occupational Therapy: Orders received. Chart reviewed and discussed with care team.? Occupational Therapy not indicated as pt up mobilizing well with pt and no overt ADL/cognitive concerns noted per PT. Defer discharge recommendations to PT.? Will complete orders.

## 2023-08-18 NOTE — PHARMACY-ADMISSION MEDICATION HISTORY
Pharmacist Admission Medication History    Admission medication history is complete. The information provided in this note is only as accurate as the sources available at the time of the update.    Medication reconciliation/reorder completed by provider prior to medication history? Yes    Information Source(s): Clinic records, Hospital records, and CareEverywhere/SureScripts via in-person    Pertinent Information: Of note patient had recently filled a prescription for PRN Oxycodone earlier this month but it was only for ~6 day supply - this is not a chronic medication for this patient as of now.      Changes made to PTA medication list:  Added: None  Deleted: None  Changed: None    Medication Affordability:       Allergies reviewed with patient and updates made in EHR: no    Medication History Completed By: Josse Romero PharmD, BCCCP 8/18/2023 7:53 AM    Prior to Admission medications    Medication Sig Last Dose Taking? Auth Provider Long Term End Date   ibuprofen (ADVIL/MOTRIN) 200 MG tablet Take 200 mg by mouth every 4 hours as needed for pain 8/16/2023 at 0230 Yes Reported, Patient     lidocaine, viscous, (XYLOCAINE) 2 % solution Swish and spit 15 mLs in mouth as needed 8/17/2023 at 0400 Yes Reported, Patient

## 2023-08-18 NOTE — PLAN OF CARE
Admitted/transferred from: Operating room  Reason for admission/transfer: Post surgery  2 RN skin assessment: completed by Jose Manuel GRAFF and Dimas MTZ  Result of skin assessment and interventions/actions:   Neck incision, flap on neck and flap on tongue. L leg incision + wound vac, 3 neck JPs, 2 LLE JPs. Blanchable redness on sacrum  Height, weight, drug calc weight: Done  Patient belongings (see Flowsheet)  MDRO education added to care plan N/A  ?

## 2023-08-18 NOTE — PROGRESS NOTES
"Otolaryngology Progress Note  August 18, 2023    SUBJECTIVE: No acute events overnight.  Maps slightly soft initially, received albumin bolus x2.  Since maps have been stable in the 60s to 70s.  Flaps stable with strong Dopplers with hand-held and implantable.  Neck soft and flat.  There is expected erythema and some swelling of the skin bridge below the flap sutured into the chin.  NG tube confirmed to be with good placement.  Remained on trach dome overnight without any issues.  Cuff deflated this morning.    OBJECTIVE:   BP (!) 115/98   Pulse 85   Temp 98.3  F (36.8  C) (Axillary)   Resp 12   Ht 1.626 m (5' 4\")   Wt 52.8 kg (116 lb 6.5 oz)   SpO2 97%   BMI 19.98 kg/m    General: Alert and engaged   HEENT: Oral cavity with healthy appearing reconstructed Harman tongue, very soft with some redundant skin, flap remains well-perfused with good cap refill, no evidence of venous congestion. Handheld and implantable doppler signals intact-- handheld is consistently lighter. Suture line intact.  Mental ALT free flap well-perfused with good capillary refill, no evidence of venous congestion. Handheld and implantable doppler signals intact.   Neck soft with expected post-operative swelling.  Stable erythema of the skin bridge below the flap of the mentum.  No ballotable fluid collections. DARWIN drain x3 with serosanginous output and holding suction. Incision c/d/I   Respiratory: 6-0 cuffed shiley sutured in, on HTD 35%  MSK: Left thigh donor site island dressings removed, suture line intact, some expected ecchymosis along the incision.  Left thigh soft without concerns for compartment syndrome.  Tegaderm over skin graft site replaced. Left leg boot in place, wound vac and DARWIN holding suction.  Good pulses over dorsal foot, good cap refill in toes.      DARWIN drain output(s): (last 24 hours)/(last shift)  Drain 1 L Neck 23/13 (36)  Drain 1 L Thigh 5/2 (7)  Drain 2 L Neck 18/31 (49)  Drain 2 L Leg 12/1 (13)  Drain 3 R Neck 5/9 " (14)    LABS:  ROUTINE IP LABS (Last four results)  BMP  Recent Labs   Lab 08/18/23  0925 08/18/23  0417 08/18/23  0412 08/17/23  2346 08/17/23 2159 08/17/23 2152 08/17/23 2050 08/17/23  1741 08/17/23  1508 08/17/23  1108 08/17/23  0629   NA  --   --  133*  --   --  133*  --  130* 129*   < > 128*   POTASSIUM  --   --  3.4  3.4  --   --  3.4  --  2.8* 3.3*   < > 3.2*   CHLORIDE  --   --  95*  --   --  95*  --   --   --   --  84*   RACHAEL  --   --  8.0*  --   --  7.0*  --   --   --   --  9.8   CO2  --   --  22  --   --  19*  --   --   --   --  24   BUN  --   --  5.8*  --   --  6.4*  --   --   --   --  9.8   CR  --   --  0.56  --   --  0.50*  --   --   --   --  0.76   * 149* 152* 159*   < > 153*   < > 132* 120*   < > 102*    < > = values in this interval not displayed.     CBC  Recent Labs   Lab 08/18/23 0412 08/17/23 2152 08/17/23 1741 08/17/23  1508   WBC 9.9 10.7  --   --    RBC 2.50* 3.03*  --   --    HGB 7.8* 9.3* 10.1* 9.8*   HCT 22.7* 27.6*  --   --    MCV 91 91  --   --    MCH 31.2 30.7  --   --    MCHC 34.4 33.7  --   --    RDW 14.2 14.1  --   --     391  --   --      INRNo lab results found in last 7 days.    A/P: This is a 62 year old female with SCC of the ventral tongue, mandible, with invasion of the skin. Underwent segmental mandibulectomy, partial glossectomy, resection of the skin of the mentum, bilateral neck dissections levels 1-4. Reconstruction of the mandible with left fibular free flap, pedicle for this flap on the right. Reconstruction of the chin defect with left ALT free flap, pedicle on the left.      Neuro:  - Pain per SICU, remains off of sedation.  - Scheduled Tylenol, as needed oxycodone and Dilaudid  - NO Nicotine patch  - Recommend CIWA monitoring given alcohol use history     HEENT:  - Nothing around the neck (no gown ties, trach ties, lines, ect.)  - HOB at 30 degrees, head/neck in midline position  - Monitor implantable doppler signal.  The channel on the right side  of the Doppler box corresponds to the right-sided pedicle.  Channel on the left side of the Doppler box corresponds to the left pedicle.  With each flap check, change the channel on the doppler box so that the pedicle on the left neck can be heard. Keep the doppler box changed to the channel connected to the right neck (fibula) most of the time.   - RN flap checks Q1H x 48h, Q2H x 24h, then Q4H  - Q4 hr flap checks per ENT resident for 24 hours, Q6 X 48 hours, then Q8 x 48 hours  - Clean incisions with 0.9% sodium chloride and apply bacitracin Q8H x 24h, then transition to Aquaphor  - Monitor and record DARWIN drain output Qshift  - Peridex TID  - Saline oral rinses Q8H and PRN. Gentle suction with red joaquin catheter only (no yankeur), do not cut red joaquin  - Decadron 6mg q8h x 3 doses     Respiratory:  - Wean to trach dome as tolerated  - Routine trach cares. Do not cut trach sutures, 1st trach change will be performed by ENT. NO trach ties     CV/heme:  - Keep MAP > 60, SBP > 90 (preferrably > 110)  - Please call ENT on-call before giving fluid bolus  - Lovenox POD#1 at 8:00 AM  - NO vasopressors  - Transfuse for Hgb < 7  - will plan for hemoglobin recheck today at noon given drop from post op hgb     FEN/GI:  - NG placed intra-op, AXR for tube position prior to using, then OK for meds via NG  - Strict NPO, no oral swabs  - Maintenance fliuds per SICU  - Electrolyte replacement protocol  - Nutrition consult.   - start feeding via nasogastric feeding tube POD1. Will start at 10ml trickle feeds, advance feeds by 10ml/hr q8hr.   - low threshold to hold tube feeds for any nausea  - Bowel regimen: scheduled miralax and senna     :  - discontinue avila   - Titrate down on IVF by 10ml for every 10ml/hr increase in tube feedings. When at continuous goal, discontinue IVF     Endo  - TSH 2.49  - Alb 4.0  - Prealbumin pending  - sliding scale insulin     ID:  - Perioperative antibiotics (Unasyn) x 48h post-op     MSK:  -  LIMB ALERT: NO IVS or LAB DRAWS from RIGHT ARM. We are keeping this arm safe as a backup flap if necessary.   - LEFT fibula free flap: wound vac and walking boot placed in OR  - Please examine boot tightness on each examination to ensure no pressure injury to dorsal foot  - Elevate leg while in bed/chair  - 1st dressing change on POD5, remove wound vac  - Toe touch weight bearing POD1-4, then full weight bearing  - Boot to remain in place for 3 weeks post-op  - LEFT anterolateral thigh free flap: island dressing in place  - LEFT STSG, calcium alginate with tegaderm in place.   - please strip and record DARWIN drain output q8h      STSG donor site   - calcium alginate and tegaderm dressing in place; reinforce PRN, ok to take down to air after POD5, apply aquaphor following.     PPX:  - 40mg Lovenox daily (first dose given preoperatively)  - SCD on right     Consults:  - PT/OT- keep neck neutral (no turning to either side) and toe touch weight bearing until POD5  - SLP for PMSV ONLY AFTER cuffless trach in place  - PLC for trach and tube feeding  - Nutrition     Dispo: SICU x 72 hours for flap monitoring     -- Patient and above plan discussed with Dr. Thorne.    Leah Freeman MD ENT PGY5

## 2023-08-18 NOTE — PROGRESS NOTES
08/18/23 0900   Appointment Info   Signing Clinician's Name / Credentials (PT) shahnaz gonsales, pt   Living Environment   People in Home spouse   Current Living Arrangements house   Home Accessibility stairs to enter home;stairs within home   Number of Stairs, Main Entrance 7   Stair Railings, Main Entrance railing on left side (ascending)   Number of Stairs, Within Home, Primary five   Stair Railings, Within Home, Primary railing on left side (ascending)   Transportation Anticipated car, drives self;family or friend will provide   Self-Care   Usual Activity Tolerance good   Current Activity Tolerance moderate   Regular Exercise No   Equipment Currently Used at Home none   Fall history within last six months no   Activity/Exercise/Self-Care Comment IND with functional mobility and ADLs   General Information   Onset of Illness/Injury or Date of Surgery 08/17/23   Referring Physician Leah Freeman MD   Patient/Family Therapy Goals Statement (PT) return home   Pertinent History of Current Problem (include personal factors and/or comorbidities that impact the POC) This is a 62 year old female with SCC of the ventral tongue, mandible, with invasion of the skin. Underwent segmental mandibulectomy, partial glossectomy, resection of the skin of the mentum, bilateral neck dissections levels 1-4. Reconstruction of the mandible with left fibular free flap, pedicle for this flap on the right. Reconstruction of the chin defect with left ALT free flap, pedicle on the left.   Existing Precautions/Restrictions other (see comments)  (keep neck in neutral position)   Weight-Bearing Status - LLE touch down weight-bearing;other (see comments)  (TTWB POD 1-4; WBAT POD 5+)   Cognition   Affect/Mental Status (Cognition) WFL   Orientation Status (Cognition) oriented x 4   Follows Commands (Cognition) WFL   Pain Assessment   Patient Currently in Pain Yes, see Vital Sign flowsheet   Posture    Posture Forward head position;Protracted  shoulders   Range of Motion (ROM)   ROM Comment B UE/LE grossly WFL   Strength (Manual Muscle Testing)   Strength Comments B UE/LE grossly 5/5   Bed Mobility   Comment, (Bed Mobility) supine> sit min assist with L LE   Transfers   Comment, (Transfers) sit > stand with FWW and min assist   Gait/Stairs (Locomotion)   Comment, (Gait/Stairs) a few steps in room with FWW and SBA   Balance   Balance Comments required UE support to maintain standing balance due to TTWB restriction   Sensory Examination   Sensory Perception patient reports no sensory changes   Coordination   Coordination no deficits were identified   Muscle Tone   Muscle Tone no deficits were identified   Clinical Impression   Criteria for Skilled Therapeutic Intervention Yes, treatment indicated   PT Diagnosis (PT) impaired functional mobility s/p neck dissection involving fibula free flap   Influenced by the following impairments pain, impaired balance, decreased activity tolerance   Functional limitations due to impairments impaired bed mobility, impaired transfers,impaired gait   Clinical Presentation (PT Evaluation Complexity) Stable/Uncomplicated   Clinical Presentation Rationale clinical judgement   Clinical Decision Making (Complexity) low complexity   Planned Therapy Interventions (PT) balance training;bed mobility training;gait training;stair training;transfer training;progressive activity/exercise   Risk & Benefits of therapy have been explained evaluation/treatment results reviewed;care plan/treatment goals reviewed   PT Total Evaluation Time   PT Eval, Low Complexity Minutes (60685) 8   Plan of Care Review   Plan of Care Reviewed With patient   Physical Therapy Goals   PT Frequency 6x/week   PT Predicted Duration/Target Date for Goal Attainment 08/28/23   PT Goals Bed Mobility;Transfers;Gait;Stairs   PT: Bed Mobility Independent;Supine to/from sit   PT: Transfers Independent;Sit to/from stand   PT: Gait Independent;150 feet   PT: Stairs  Modified independent;7 stairs;Rail on left   PT Discharge Planning   PT Plan bed > chair, short distance gait with FWW   PT Discharge Recommendation (DC Rec) home with assist   PT Rationale for DC Rec patient mobilizing well, limited primarily by TTWB restriction, anticipate she will be IND with functional mobility for household distances during expected length of acute stay   PT Brief overview of current status transferred OOB with min assist. took a few steps to transfer bed >chair with FWW and min assist.   Total Session Time   Total Session Time (sum of timed and untimed services) 8

## 2023-08-18 NOTE — PROGRESS NOTES
CLINICAL NUTRITION SERVICES - ASSESSMENT NOTE     Nutrition Prescription    RECOMMENDATIONS FOR MDs/PROVIDERS TO ORDER:  Monitor K+, Mg++, Phos with advancement to goal TF rate d/t refeeding risk and recommend continue RN-managed HIGH replacement protocol.    Malnutrition Status:    Severe malnutrition in the context of acute illness    Recommendations already ordered by Registered Dietitian (RD):  EN support via NGT:  Osmolite 1.5 Natanael (or equivalent) @ goal of  40ml/hr  (960ml/day) + 1 pkt ProSource TF20 daily provides: 1520 kcals (32 kcal/kg), 80 g PRO (1.7 g pro/kg), 731 ml free H20, 195 g CHO, and 0 g fiber daily.   - Initiate @ 10 mL/hr and advance by 10 mL q8hr as tolerated to goal rate.   - Do not start or advance unless K+ >/= 3, Mg++ >/=1.5, and phos >/=1.9  - 30 mL q4hr fluid flushes for tube patency. Additional fluids and/or adjustments per MD.   - Multivitamin/mineral (15 mL/day via FT) to help ensure micronutrient needs being met with suspected hypermetabolic demands and potential interruptions to TF infusions.  - Gastric access: HOB >30 degrees.     100 mg thiamine daily x 5 days to reduce risk of refeeding d/t baseline underweight, low phos    Future/Additional Recommendations:  Monitor tolerance and lytes with advancement to goal TF rate.    Once pt has tolerated goal continuous rate for at least 8 hours, may transition to bolus feeds.  Transition to bolus:   -Stop continuous TF for 1-2 hrs to let stomach empty prior to starting gravity feeding.    -Separate each bolus by 3-4 hrs. Do not give feedings at night while pt asleep (during the day only).  -Begin 1st gravity feeding @ 0.5 cans (~120 mL). If tolerates after approx 4 hours without GI complaints, rec adv each subsequent bolus by 0.5 cans (~120 mL) every 4 hours until reach goal regimen 1 can QID (total of 4 cans daily).    -Water Flushes: Flush with 60 mL of H20 before and after each feeding plus additional 100 mL H2O TID if TF to provide full  "hydration (TF + FWF = total of 1502 mL free water daily, 1 mL/kcal).   -->Goal Regimen provides: 1 can Osmolite 1.5 QID (or equivalent) + 1 pkt ProSource TF20 (or equivalent) provides: 948 ml, 1502 kcals (32 kcal/kg), 80 g protein (1.7 g pro/kg), 194 gm CHO, 722 ml free water, and 0 g fiber      REASON FOR ASSESSMENT  Adrianna Pereira is a/an 62 year old female assessed by the dietitian for Provider Order - Registered Dietitian to Assess and Order TF per Medical Nutrition Therapy Protocol    NUTRITION HISTORY  Pt not previously known to Clinical Nutrition.     No food allergies noted.    Met with pt at bedside. She is unable to speak d/t ENT surgery and tracheostomy. Pt didn't need to write any answers and was able to nod yes/no to questions. Pt endorses wt loss and not eating well 2/2 difficulty eating with oral cancer.    CURRENT NUTRITION ORDERS  Diet: NPO    LABS  Labs reviewed  -Na+ 133 (L)  -Phos 2.4 (L)   -BUN 5.8 (L - suggestive of recent poor protein intake)    MEDICATIONS  Medications reviewed  -Medium dose sliding scale insulin  -Neutra-Phos  -KCl  -LR - plan to wean with increase in TF rate per rounds    ANTHROPOMETRICS  Height: 162.6 cm (5' 4\")  Most Recent Weight: 52.8 kg (116 lb 6.5 oz)    IBW: 54.5 kg   BMI: 17.7 kg/m2; Underweight BMI <18.5  Weight History: ~12% wt loss over past ~1 month. Limited wt hx below. No data in Care Everywhere.  Wt Readings from Last 20 Encounters:   08/18/23 52.8 kg (116 lb 6.5 oz)   08/02/23 49.4 kg (109 lb)   08/02/23 49.4 kg (109 lb)   07/17/23 53.1 kg (117 lb)   07/03/23 53.1 kg (117 lb)   Wts this admit  08/18/23 0600 52.8 kg (116 lb 6.5 oz) Bed scale   08/17/23 0552 46.9 kg (103 lb 6.3 oz) Standing scale   Dosing Weight: 47 kg (actual, based on lowest wt this admit of 46.9 kg on 8/17)    ASSESSED NUTRITION NEEDS  Estimated Energy Needs: 3808-3623+ kcals/day (30-35+ kcals/kg)  Justification: Maintenance to repletion given underweight  Estimated Protein Needs: 56-71+ " grams protein/day (1.2 - 1.5+ grams of pro/kg)  Justification: Increased needs in setting of surgery, underweight  Estimated Fluid Needs: 1 mL/kcal   Justification: Maintenance or Per provider pending fluid status    PHYSICAL FINDINGS  See malnutrition section below.  -NGT (AXR)  -Tracheostomy - on trach dome  -Skin is thin, fragile    MALNUTRITION  % Intake: < 75% for > 7 days (moderate) - at minimum  % Weight Loss: > 5% in 1 month (severe)  Subcutaneous Fat Loss: Upper arm:  Moderate, Lower arm:  Moderate, and Thoracic/intercostal:  Moderate  Muscle Loss: Thoracic region (clavicle, acromium bone, deltoid, trapezius, pectoral):  Severe, Upper arm (bicep, tricep):  Severe, Lower arm  (forearm):  Severe, Dorsal hand:  Severe, Upper leg (quadricep, hamstring):  Moderate, Patellar region:  Mild, and Posterior calf:  Mild - better muscle bulk on BLE compared to upper body  Fluid Accumulation/Edema: None noted  Malnutrition Diagnosis: Severe malnutrition in the context of acute illness    NUTRITION DIAGNOSIS  Altered GI function related to NPO s/p ENT surgery, tracheostomy as evidenced by NGT placement and consult for EN support.      INTERVENTIONS  Implementation  -Nutrition Education: Provided education on RD role, role of NFPE. Discussed start of EN support. Pt appeared to be a bit overwhelmed at time of visit so deferred doing too much education at this time.    -Collaboration with other providers: Rounds. Per ENT, start TF @ 10 mL/hr and ok to advance 10 mL q8h.  -Enteral Nutrition - Initiate  -Feeding tube flush  -Multivitamin/mineral supplement therapy     Goals  Once TF at goal rate, total avg nutritional intake to meet a minimum of 30 kcal/kg and 1.2 g PRO/kg daily (per dosing wt 47 kg).     Monitoring/Evaluation  Progress toward goals will be monitored and evaluated per protocol.  Cait Sandoval, N[place], LD  Pager: [phone]

## 2023-08-18 NOTE — ANESTHESIA CARE TRANSFER NOTE
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    Anesthesia Type:   General     Note:    Oropharynx: spontaneously breathing  Level of Consciousness: awake and drowsy  Patient oxygen source: trach dome.    Independent Airway: airway patency satisfactory and stable  Dentition: dentition unchanged  Vital Signs Stable: post-procedure vital signs reviewed and stable  Report to RN Given: handoff report given  Patient transferred to: ICU    ICU Handoff: Call for PAUSE to initiate/utilize ICU HANDOFF, Identified Patient, Identified Responsible Provider, Reviewed the Pertinent Medical History, Discussed Surgical Course, Reviewed Intra-OP Anesthesia Management and Issues during Anesthesia, Set Expectations for Post Procedure Period and Allowed Opportunity for Questions and Acknowledgement of Understanding      Vitals:  Vitals Value Taken Time   BP 91/61 08/17/23 2138   Temp     Pulse 68 08/17/23 2143   Resp 5 08/17/23 2143   SpO2 100 % 08/17/23 2143   Vitals shown include unvalidated device data.    Electronically Signed By: ELISABETH Cancino CRNA  August 17, 2023  9:43 PM

## 2023-08-18 NOTE — PROGRESS NOTES
Major Shift Events:    Neuro: Intact besides generalized weakness. PRN dilaudid given for incisional leg pain.  CV: SR. MAP>60 maintained w/ albumin and decadron.  Pulm: Trach dome 40%, 20L. Trach suction x3.  GI/: NGT w/ meds. Golden in place w/ roughly 30 mL/hr out.  Flap: Implanted and external pulses x5 remain dopplerable. Jps x5 w/ small amt of serosang OP. Redness and edema present under flap. Flaps remain soft and warm.  Plan: Continue q1h flap checks.  For vital signs and complete assessments, please see documentation flowsheets.

## 2023-08-18 NOTE — OP NOTE
Procedure Date: 08/17/2023    SURGEON:  Darien Thorne MD    ASSISTANT:  Michael De Leon MD    PREOPERATIVE DIAGNOSES:    1.  T4 oral cancer.  2.  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  3.  A 8 x 8 cm defect of the oral cavity involving the anterior tongue, floor of mouth lower mucosal lip/chin and an 11 cm mandible defect  4.  A 10 x 6 cm defect involving the external lower lip and chin.    POSTOPERATIVE DIAGNOSES:    1.  T4 oral cancer.  2.  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  A 8 x 38 cm defect of the oral cavity involving the anterior tongue, floor of mouth, lower mucosal lip/chin, and an 11 cm mandible defect  4.  A 10 x 6 centimeter defect involving the external lower lip and chin.    PROCEDURES:    1.  [place] of a left fibula osteocutaneous free flap.  2.  Open reduction and internal fixation of the fibula to the mandible.  3.  Inset of a fibula skin paddle into an anterior glossectomy, floor of mouth, mucosa lower lip, and mandible defect with soft tissue defect measuring 8 x 8 cm and mandible defect measuring 11 cm.   4.  Microvascular anastomoses of the fibula flap vessels to the right facial artery, end-to-side to the right internal jugular vein, and right external jugular vein.  5.  Inset of a left anterolateral thigh free flap into a 10 x 6 cm defect involving the chin and external lip.  6.  Microvascular anastomoses of the anterolateral thigh vessels to the left external carotid, end-to-side on the left internal jugular vein and left external jugular vein.  7.  Nasogastric feeding tube placement.  8.  [place] of a split-thickness skin graft from the left thigh to close an 8 x 8 cm defect of the left lower leg  9.  Wound vacuum-assisted closure (VAC) placement to the left lower leg    ANESTHESIA:  General.    ESTIMATED BLOOD LOSS:  200 mL for my portion of the case.    SPECIMENS:  None.    COMPLICATIONS:  None.    INDICATIONS FOR PROCEDURE:  A 62-year-old  woman was seen by me for an advanced oral cancer.  The tumor encompassed the mandibular alveolus ventral tongue and floor of mouth.  It had significant submucosal extension of the chin and lower lip. We recommended proceeding with the above-listed procedures.  After full discussion of risks, benefits of the procedures, informed consent was obtained.    OPERATIVE FINDINGS:  There was an 8 x 8 cm defect involving the anterior tongue, floor of mouth, and mucosa lower lip as well as a 10 x 6 cm defect involving the chin and external lip.  Dr. Dao was able to preserve the red lower lip.  I elected to reconstruct the defect with 2 flaps.  I used a fibula osteocutaneous free flap to reconstruct the mandible and the intraoral defect.  We used an anterolateral thigh free flap to reconstruct the external defect.  The microvascular anastomosis from the fibula free flap was done end-to-end to the facial artery, end-to-side to the internal jugular vein, and end-to-end to the external jugular vein using a 2.5 mm .  The microvascular anastomoses of the anterolateral thigh flap was done end-to-end to the external carotid, end-to-side to the internal jugular vein and end-to-end to the external jugular vein using a 3.5 mm .     OPERATIVE COURSE:  When I entered the case, Dr. Doa was completing the ablative portion of the procedure. As described above, there was an 8 x 8 cm defect involving the anterior tongue, floor of mouth as well as an approximately 11 cm defect of the mandible.  There was a 10 x 6 cm defect of the chin and external lip.  I began by harvesting a left fibula free flap.  Standard markings were made.  The anterior incision was made and brought down through the fascia.  The skin paddle was elevated posteriorly and 3 perforators were identified.  The flap was centered over 2 of them.  The peroneus longus and brevis were elevated off the fibula.  The anterior intermuscular septum was opened.   The extensor hallucis longus and tibialis anterior were reflected anteriorly until the interosseous septum was identified.  The interosseous septum was opened.  The posterior skin incision was made and elevated from posterior to anterior.  The soleus was elevated off the flexor hallucis longus.  Next, a right angle was used to dissect around the fibula distally and proximally.  A reciprocating saw was used to make the osteotomies.  The fibula was distracted and the tibialis posterior was divided.  The distal pedicle was identified and ligated.  The pedicle was traced proximally until it joined the posterior tibial vessels.  The flap was harvested with a cuff of flexor hallucis longus.  The flap was harvested under tourniquet at 350 mmHg.  Once we were done with the flap harvest the tourniquet was let down at 70 minutes.  The flap perfused immediately and it appeared to have good vascular supply.  Attention was then turned to the neck where the vessels were prepared and hemostasis was obtained.  The pedicle was ligated and the flap was moved to the side table.  The P cutting guide was used to contour the fibula.  The proximal portion of the fibula was discarded.  Vessel prep was done on the side table.  The mandibular segments fit perfectly into the plate and we had excellent contour of the fibula and the reconstructed mandible.  Microvascular anastomosis was then done using the operating microscope.  As described above, the right facial artery was used end-to-end with 8-0 nylon.  One of the vena comitans was then anastomosed end-to-side to the right internal jugular vein and the 2nd anastomosis end-to-end to the right external jugular vein.  At this point, the fibula skin paddle was inset with 3-0 Vicryl suture.  Dr. Dao had kindly harvested the left anterolateral thigh flap and left it perfusing in the thigh. We then turned our attention to the left neck where the vessels were prepared for the ALT.  We went  to the thigh and the pedicle was ligated.  Vessel prep was done on the side table.  The ALT was brought to the neck and microvascular anastomosis was done to the left neck.  Microvascular anastomosis was done end-to-end to the left external carotid artery with 8-0 nylon suture.  One of the vena comitans was then anastomosed end-to-side to the left internal jugular vein with 8-0 nylon and the 2nd vena comitans was anastomosed to the left external jugular vein using a 3.5 mm .  The clamps were let down, and the flap perfused immediately.  Cook Dopplers were placed distal to the arterial anastomosis for both the flaps.  The wound was then copiously irrigated and hemostasis obtained.  The anterolateral thigh flap was inset by de-epithelializing the majority of the flap.  The cutaneous paddle was inset with 3-0 Vicryl and 4-0 nylon suture.  We then placed two 15 round DARWIN drains in the left neck and a single drain in the right neck.  The wound was then closed with 3-0 Vicryl for the platysmal layer and staples for the skin.  A nasogastric tube was placed and secured to the septum.  The left fibula was closed with 3-0 Vicryl for the deep layer and 4-0 nylon for the skin over a 10 flat DARWIN drain.  As described above a split-thickness skin graft was harvested from the left thigh with a dermatome.  This was inset into the lower leg defect with 4-0 chromic suture.  A wound VAC was placed over the left fibula donor site.  The left leg was wrapped with Kerlix, Ace wrap and a Cam boot.  Hemostasis was obtained in the ALT donor site.  A 3-0 Vicryl was used to close the deep layer, followed by 4-0 nylon for the skin.  The ALT was closed over a 19 round DARWIN drain.  This completed our procedure.  The patient tolerated the procedure well.  She was subsequently turned over to anesthesia, where she was awakened and transferred to the intensive care unit in stable condition.    Darien Thorne MD        D: 08/17/2023   T:  2023   MT: Tiffany    Name:     KANNAN DAVENPORT  MRN:      [id number]        Account:        [id number]   :      1960           Procedure Date: 2023     Document: [id number]

## 2023-08-19 ENCOUNTER — APPOINTMENT (OUTPATIENT)
Dept: PHYSICAL THERAPY | Facility: CLINIC | Age: 63
End: 2023-08-19
Attending: OTOLARYNGOLOGY
Payer: COMMERCIAL

## 2023-08-19 LAB
ANION GAP SERPL CALCULATED.3IONS-SCNC: 11 MMOL/L (ref 7–15)
BLD PROD TYP BPU: NORMAL
BLOOD COMPONENT TYPE: NORMAL
BUN SERPL-MCNC: 18.7 MG/DL (ref 8–23)
CALCIUM SERPL-MCNC: 8 MG/DL (ref 8.8–10.2)
CHLORIDE SERPL-SCNC: 101 MMOL/L (ref 98–107)
CODING SYSTEM: NORMAL
CREAT SERPL-MCNC: 0.79 MG/DL (ref 0.51–0.95)
CROSSMATCH: NORMAL
DEPRECATED HCO3 PLAS-SCNC: 25 MMOL/L (ref 22–29)
ERYTHROCYTE [DISTWIDTH] IN BLOOD BY AUTOMATED COUNT: 14.8 % (ref 10–15)
GFR SERPL CREATININE-BSD FRML MDRD: 84 ML/MIN/1.73M2
GLUCOSE BLDC GLUCOMTR-MCNC: 148 MG/DL (ref 70–99)
GLUCOSE BLDC GLUCOMTR-MCNC: 163 MG/DL (ref 70–99)
GLUCOSE BLDC GLUCOMTR-MCNC: 165 MG/DL (ref 70–99)
GLUCOSE BLDC GLUCOMTR-MCNC: 166 MG/DL (ref 70–99)
GLUCOSE BLDC GLUCOMTR-MCNC: 176 MG/DL (ref 70–99)
GLUCOSE BLDC GLUCOMTR-MCNC: 184 MG/DL (ref 70–99)
GLUCOSE BLDC GLUCOMTR-MCNC: 193 MG/DL (ref 70–99)
GLUCOSE SERPL-MCNC: 135 MG/DL (ref 70–99)
HCT VFR BLD AUTO: 19.7 % (ref 35–47)
HGB BLD-MCNC: 6.7 G/DL (ref 11.7–15.7)
HGB BLD-MCNC: 8.4 G/DL (ref 11.7–15.7)
ISSUE DATE AND TIME: NORMAL
MAGNESIUM SERPL-MCNC: 2.3 MG/DL (ref 1.7–2.3)
MCH RBC QN AUTO: 31.5 PG (ref 26.5–33)
MCHC RBC AUTO-ENTMCNC: 34 G/DL (ref 31.5–36.5)
MCV RBC AUTO: 93 FL (ref 78–100)
PHOSPHATE SERPL-MCNC: 2.6 MG/DL (ref 2.5–4.5)
PLATELET # BLD AUTO: 333 10E3/UL (ref 150–450)
POTASSIUM SERPL-SCNC: 3.2 MMOL/L (ref 3.4–5.3)
POTASSIUM SERPL-SCNC: 3.9 MMOL/L (ref 3.4–5.3)
RBC # BLD AUTO: 2.13 10E6/UL (ref 3.8–5.2)
SODIUM SERPL-SCNC: 137 MMOL/L (ref 136–145)
UNIT ABO/RH: NORMAL
UNIT NUMBER: NORMAL
UNIT STATUS: NORMAL
UNIT TYPE ISBT: 5100
WBC # BLD AUTO: 14.6 10E3/UL (ref 4–11)

## 2023-08-19 PROCEDURE — 258N000003 HC RX IP 258 OP 636: Performed by: OTOLARYNGOLOGY

## 2023-08-19 PROCEDURE — 250N000013 HC RX MED GY IP 250 OP 250 PS 637: Performed by: STUDENT IN AN ORGANIZED HEALTH CARE EDUCATION/TRAINING PROGRAM

## 2023-08-19 PROCEDURE — P9016 RBC LEUKOCYTES REDUCED: HCPCS | Performed by: PHARMACIST

## 2023-08-19 PROCEDURE — 250N000013 HC RX MED GY IP 250 OP 250 PS 637: Performed by: PHARMACIST

## 2023-08-19 PROCEDURE — 200N000002 HC R&B ICU UMMC

## 2023-08-19 PROCEDURE — 97530 THERAPEUTIC ACTIVITIES: CPT | Mod: GP | Performed by: PHYSICAL THERAPIST

## 2023-08-19 PROCEDURE — 258N000003 HC RX IP 258 OP 636: Performed by: STUDENT IN AN ORGANIZED HEALTH CARE EDUCATION/TRAINING PROGRAM

## 2023-08-19 PROCEDURE — 84132 ASSAY OF SERUM POTASSIUM: CPT | Performed by: OTOLARYNGOLOGY

## 2023-08-19 PROCEDURE — 36415 COLL VENOUS BLD VENIPUNCTURE: CPT

## 2023-08-19 PROCEDURE — 36415 COLL VENOUS BLD VENIPUNCTURE: CPT | Performed by: OTOLARYNGOLOGY

## 2023-08-19 PROCEDURE — 250N000013 HC RX MED GY IP 250 OP 250 PS 637: Performed by: OTOLARYNGOLOGY

## 2023-08-19 PROCEDURE — 80048 BASIC METABOLIC PNL TOTAL CA: CPT

## 2023-08-19 PROCEDURE — 250N000011 HC RX IP 250 OP 636: Performed by: STUDENT IN AN ORGANIZED HEALTH CARE EDUCATION/TRAINING PROGRAM

## 2023-08-19 PROCEDURE — 85027 COMPLETE CBC AUTOMATED: CPT

## 2023-08-19 PROCEDURE — 85018 HEMOGLOBIN: CPT | Performed by: OTOLARYNGOLOGY

## 2023-08-19 PROCEDURE — 84100 ASSAY OF PHOSPHORUS: CPT

## 2023-08-19 PROCEDURE — 83735 ASSAY OF MAGNESIUM: CPT

## 2023-08-19 PROCEDURE — 250N000009 HC RX 250: Performed by: OTOLARYNGOLOGY

## 2023-08-19 PROCEDURE — 97116 GAIT TRAINING THERAPY: CPT | Mod: GP | Performed by: PHYSICAL THERAPIST

## 2023-08-19 PROCEDURE — 999N000157 HC STATISTIC RCP TIME EA 10 MIN

## 2023-08-19 PROCEDURE — 999N000215 HC STATISTIC HFNC ADULT NON-CPAP

## 2023-08-19 PROCEDURE — 999N000248 HC STATISTIC IV INSERT WITH US BY RN

## 2023-08-19 RX ORDER — AMOXICILLIN 250 MG
2 CAPSULE ORAL 2 TIMES DAILY
Status: DISCONTINUED | OUTPATIENT
Start: 2023-08-19 | End: 2023-09-01 | Stop reason: HOSPADM

## 2023-08-19 RX ORDER — POTASSIUM CHLORIDE 1.5 G/1.58G
40 POWDER, FOR SOLUTION ORAL ONCE
Status: COMPLETED | OUTPATIENT
Start: 2023-08-19 | End: 2023-08-19

## 2023-08-19 RX ORDER — POLYETHYLENE GLYCOL 3350 17 G/17G
17 POWDER, FOR SOLUTION ORAL 2 TIMES DAILY
Status: DISCONTINUED | OUTPATIENT
Start: 2023-08-19 | End: 2023-09-01 | Stop reason: HOSPADM

## 2023-08-19 RX ADMIN — ACETAMINOPHEN 975 MG: 325 TABLET, FILM COATED ORAL at 08:54

## 2023-08-19 RX ADMIN — SENNOSIDES AND DOCUSATE SODIUM 2 TABLET: 50; 8.6 TABLET ORAL at 19:43

## 2023-08-19 RX ADMIN — WHITE PETROLATUM: 1.75 OINTMENT TOPICAL at 15:19

## 2023-08-19 RX ADMIN — AMPICILLIN SODIUM AND SULBACTAM SODIUM 3 G: 2; 1 INJECTION, POWDER, FOR SOLUTION INTRAMUSCULAR; INTRAVENOUS at 06:11

## 2023-08-19 RX ADMIN — POTASSIUM PHOSPHATE, MONOBASIC POTASSIUM PHOSPHATE, DIBASIC 9 MMOL: 224; 236 INJECTION, SOLUTION, CONCENTRATE INTRAVENOUS at 11:13

## 2023-08-19 RX ADMIN — SODIUM CHLORIDE, POTASSIUM CHLORIDE, SODIUM LACTATE AND CALCIUM CHLORIDE 500 ML: 600; 310; 30; 20 INJECTION, SOLUTION INTRAVENOUS at 03:08

## 2023-08-19 RX ADMIN — POLYETHYLENE GLYCOL 3350 17 G: 17 POWDER, FOR SOLUTION ORAL at 08:54

## 2023-08-19 RX ADMIN — AMPICILLIN SODIUM AND SULBACTAM SODIUM 3 G: 2; 1 INJECTION, POWDER, FOR SOLUTION INTRAMUSCULAR; INTRAVENOUS at 00:30

## 2023-08-19 RX ADMIN — INSULIN ASPART 1 UNITS: 100 INJECTION, SOLUTION INTRAVENOUS; SUBCUTANEOUS at 04:32

## 2023-08-19 RX ADMIN — AMPICILLIN SODIUM AND SULBACTAM SODIUM 3 G: 2; 1 INJECTION, POWDER, FOR SOLUTION INTRAMUSCULAR; INTRAVENOUS at 22:08

## 2023-08-19 RX ADMIN — CHLORHEXIDINE GLUCONATE 15 ML: 1.2 SOLUTION ORAL at 19:43

## 2023-08-19 RX ADMIN — BISACODYL 10 MG: 10 SUPPOSITORY RECTAL at 16:45

## 2023-08-19 RX ADMIN — ACETAMINOPHEN 975 MG: 325 TABLET, FILM COATED ORAL at 19:43

## 2023-08-19 RX ADMIN — SENNOSIDES AND DOCUSATE SODIUM 1 TABLET: 50; 8.6 TABLET ORAL at 08:54

## 2023-08-19 RX ADMIN — Medication 15 ML: at 08:54

## 2023-08-19 RX ADMIN — POTASSIUM & SODIUM PHOSPHATES POWDER PACK 280-160-250 MG 1 PACKET: 280-160-250 PACK at 00:30

## 2023-08-19 RX ADMIN — AMPICILLIN SODIUM AND SULBACTAM SODIUM 3 G: 2; 1 INJECTION, POWDER, FOR SOLUTION INTRAMUSCULAR; INTRAVENOUS at 13:27

## 2023-08-19 RX ADMIN — ACETAMINOPHEN 975 MG: 325 TABLET, FILM COATED ORAL at 15:19

## 2023-08-19 RX ADMIN — POTASSIUM CHLORIDE 40 MEQ: 1.5 POWDER, FOR SOLUTION ORAL at 11:06

## 2023-08-19 RX ADMIN — INSULIN ASPART 1 UNITS: 100 INJECTION, SOLUTION INTRAVENOUS; SUBCUTANEOUS at 15:26

## 2023-08-19 RX ADMIN — SODIUM PHOSPHATE, DIBASIC AND SODIUM PHOSPHATE, MONOBASIC 1 ENEMA: 7; 19 ENEMA RECTAL at 15:04

## 2023-08-19 RX ADMIN — INSULIN ASPART 1 UNITS: 100 INJECTION, SOLUTION INTRAVENOUS; SUBCUTANEOUS at 12:00

## 2023-08-19 RX ADMIN — WHITE PETROLATUM: 1.75 OINTMENT TOPICAL at 06:11

## 2023-08-19 RX ADMIN — INSULIN ASPART 2 UNITS: 100 INJECTION, SOLUTION INTRAVENOUS; SUBCUTANEOUS at 20:00

## 2023-08-19 RX ADMIN — THIAMINE HCL TAB 100 MG 100 MG: 100 TAB at 08:54

## 2023-08-19 RX ADMIN — OXYCODONE HYDROCHLORIDE 5 MG: 5 TABLET ORAL at 04:30

## 2023-08-19 RX ADMIN — CHLORHEXIDINE GLUCONATE 15 ML: 1.2 SOLUTION ORAL at 08:54

## 2023-08-19 RX ADMIN — OXYCODONE HYDROCHLORIDE 5 MG: 5 TABLET ORAL at 00:30

## 2023-08-19 RX ADMIN — WHITE PETROLATUM: 1.75 OINTMENT TOPICAL at 00:30

## 2023-08-19 RX ADMIN — ENOXAPARIN SODIUM 40 MG: 40 INJECTION SUBCUTANEOUS at 08:53

## 2023-08-19 RX ADMIN — WHITE PETROLATUM: 1.75 OINTMENT TOPICAL at 22:26

## 2023-08-19 RX ADMIN — POLYETHYLENE GLYCOL 3350 17 G: 17 POWDER, FOR SOLUTION ORAL at 19:43

## 2023-08-19 RX ADMIN — INSULIN ASPART 1 UNITS: 100 INJECTION, SOLUTION INTRAVENOUS; SUBCUTANEOUS at 00:29

## 2023-08-19 RX ADMIN — INSULIN ASPART 1 UNITS: 100 INJECTION, SOLUTION INTRAVENOUS; SUBCUTANEOUS at 08:52

## 2023-08-19 RX ADMIN — CHLORHEXIDINE GLUCONATE 15 ML: 1.2 SOLUTION ORAL at 15:19

## 2023-08-19 ASSESSMENT — ACTIVITIES OF DAILY LIVING (ADL)
DEPENDENT_IADLS:: INDEPENDENT
ADLS_ACUITY_SCORE: 29

## 2023-08-19 NOTE — PROGRESS NOTES
Care Management Initial Consult    General Information  Assessment completed with: Children, pt daughters Pamela and Vaishnavi  Type of CM/SW Visit: Initial Assessment    Primary Care Provider verified and updated as needed: No (per pt daughters, pt did not see doctors or have primary care provider.)   Readmission within the last 30 days: no previous admission in last 30 days      Reason for Consult: discharge planning  Advance Care Planning: Advance Care Planning Reviewed: no concerns identified          Communication Assessment  Patient's communication style: spoken language (English or Bilingual)    Hearing Difficulty or Deaf: no   Wear Glasses or Blind: yes    Cognitive  Cognitive/Neuro/Behavioral: WDL  Level of Consciousness: alert  Arousal Level: opens eyes spontaneously  Orientation: oriented x 4  Mood/Behavior: calm, cooperative  Best Language:  (KIKE)  Speech: trached    Living Environment:   People in home: spouse     Current living Arrangements: house (pt lives in split level home with )      Able to return to prior arrangements: yes       Family/Social Support:  Care provided by: self  Provides care for: no one  Marital Status:   , Children  [place]       Description of Support System: Supportive, Involved    Support Assessment: Adequate family and caregiver support, Adequate social supports    Current Resources:   Patient receiving home care services: No     Community Resources: None  Equipment currently used at home: none  Supplies currently used at home: None    Employment/Financial:  Employment Status: employed part-time        Financial Concerns: No concerns identified   Referral to Financial Worker: No       Does the patient's insurance plan have a 3 day qualifying hospital stay waiver?  No    Lifestyle & Psychosocial Needs:  Social Determinants of Health     Tobacco Use: High Risk (8/18/2023)    Patient History     Smoking Tobacco Use: Some Days     Smokeless Tobacco Use: Never      Passive Exposure: Current   Alcohol Use: Not on file   Financial Resource Strain: Not on file   Food Insecurity: Not on file   Transportation Needs: Not on file   Physical Activity: Not on file   Stress: Not on file   Social Connections: Not on file   Intimate Partner Violence: Not on file   Depression: At risk (7/3/2023)    PHQ-2     PHQ-2 Score: 4   Housing Stability: Not on file       Functional Status:  Prior to admission patient needed assistance:   Dependent ADLs:: Independent  Dependent IADLs:: Independent  Assesssment of Functional Status: Not at baseline with ADL Functioning    Mental Health Status:  Mental Health Status: No Current Concerns       Chemical Dependency Status:  Chemical Dependency Status: No Current Concerns             Values/Beliefs:  Spiritual, Cultural Beliefs, Spiritism Practices, Values that affect care: no               Additional Information:    Pt met with pt two daughters Pamela and Vaishnavi. Vaishnavi lives a few minutes from pt and Pamela lives more locally in [place], [place]. Daughters state that pt does not have primary care doctor or HCD. 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   SW explained the different avenues discharge can take and care management will follow and support through discharge to address any needs or concerns.   Initial assessment completed due to high risk readmission score. See details above. Care management will follow for any discharge needs.      SIDRA Landaverde, DELVIS  4A/4C/4E [facility]    Units: 4A, 4C, & 4E Pager: [phone]    Social Work and Care Management Department    Units: 6A & 6B  Pager #2: [phone]  Units: 7A &7B  Pager #4: [phone]  Units: 5A, 5B, & 5C  Pager #1: [phone]  Units: 6C & 6D  Pager #3: [phone]  Units: 7C & 7D  Pager #5: [phone]    Unit: [place] [facility]  Pager:  [phone] (page copies  to ED SW staff)    [facility] ([id number]) Saturday and Sunday  Units: 5 Ortho, 5 Med/Surg & WB ED  Pager #7: [phone]  Units: 6 Med/Surg, 8A, 10A ICU  Pager #8: [phone]      After hours (1630 - 0000) Saturday & Sunday; ([id number]) Mon-Fri; ([id number]) [facility] Recognized Holidays  Units: ALL  Pager: [phone]

## 2023-08-19 NOTE — PROGRESS NOTES
ENT Free Flap Check      S: Feeling pretty good, pain is mostly pressure localized around the neck. No flap concerns.     O: Vitals reviewed, MAPs above goal   Oral cavity flap exam stable. Handheld and implantable doppler signals intact. Suture line intact.   Mental ALT free flap exam also stable,  well-perfused and no evidence of venous congestion. Handheld and implantable doppler signals intact. Incision c/d/I   Neck soft with expected post-op supraincisional welling, some bruising above trach faceplate and laterally on left.. DARWIN drain x3 with s/s output and holding suction. Incision c/d/I   6-0 cuffed shiley sutured in, on HTD    A/P: Adrianna is a 63yo with ventral tongue and mandibular alveolus (w/ chin skin and lower lip involvement) who is s/p segmental mandibulectomy, partial glossectomy, mental skin resection, b/l ND (1-4) and reconstruction with left ALT and left fibular free flap on 8/17.     -stable flap     Christi Ramey MD  ENT Resident

## 2023-08-19 NOTE — PROGRESS NOTES
"Otolaryngology Progress Note  August 19, 2023    SUBJECTIVE: Low UOP overnight so received a 500cc bolus. Afebrile, MAPs 60s-80s, satting well on trach dome. Hgb this AM 6.7 so 1u pRBCs given slowly over 3hr. Flaps stable with strong handheld and implantable Dopplers. Complaining of some pain in dorsal foot and heel.    OBJECTIVE:   BP (!) 89/78 (BP Location: Right arm)   Pulse 96   Temp 100  F (37.8  C)   Resp 18   Ht 1.626 m (5' 4\")   Wt 55.3 kg (121 lb 14.6 oz)   SpO2 97%   BMI 20.93 kg/m    General: Alert and engaged   HEENT: Oral cavity with healthy appearing reconstructed Harman tongue, very soft with some redundant skin, flap remains well-perfused with good cap refill, no evidence of venous congestion. Handheld and implantable doppler signals intact. Suture line intact.  Mental ALT free flap well-perfused with good capillary refill, no evidence of venous congestion. Handheld and implantable doppler signals intact.   Neck soft with expected post-operative swelling, slightly increased from prior but still within expected range.  Stable erythema of the skin bridge below the flap of the mentum.  No ballotable fluid collections. DARWIN drain x3 with serosanginous output and holding suction. Incision c/d/I   Respiratory: 6-0 cuffed shiley sutured in, HTD.  MSK: Suture line intact, some expected ecchymosis along the incision but stable from prior. Left thigh soft without concerns for compartment syndrome.  Skin graft donor sight with expected drainage. Left leg boot in place, wound vac and DARWIN holding suction.  Good pulses over dorsal foot, good cap refill in toes. No pressure wound noted on dorsal foot or heel.    DARWIN drain output(s): (last 24 hours)/(last shift)  Drain 1 L Neck 7/7/3 (13)  Drain 1 L Thigh 12/5/4 (21)  Drain 2 L Neck 28/11/8 (47)  Drain 2 L Leg 6/18/9 (33)  Drain 3 R Neck 12/13/9 (34)    LABS:  ROUTINE IP LABS (Last four results)  BMP  Recent Labs   Lab 08/19/23  1159 08/19/23  0851 08/19/23  0641 " 08/19/23  0432 08/18/23  1140 08/18/23  1107 08/18/23  0417 08/18/23 0412 08/17/23 2159 08/17/23 2152 08/17/23 2050 08/17/23  1741 08/17/23 1108 08/17/23  0629   NA  --   --  137  --   --   --   --  133*  --  133*  --  130*   < > 128*   POTASSIUM  --   --  3.2*  --   --  3.5  --  3.4  3.4  --  3.4  --  2.8*   < > 3.2*   CHLORIDE  --   --  101  --   --   --   --  95*  --  95*  --   --   --  84*   RACHAEL  --   --  8.0*  --   --   --   --  8.0*  --  7.0*  --   --   --  9.8   CO2  --   --  25  --   --   --   --  22  --  19*  --   --   --  24   BUN  --   --  18.7  --   --   --   --  5.8*  --  6.4*  --   --   --  9.8   CR  --   --  0.79  --   --   --   --  0.56  --  0.50*  --   --   --  0.76   * 176* 135* 166*   < >  --    < > 152*   < > 153*   < > 132*   < > 102*    < > = values in this interval not displayed.     CBC  Recent Labs   Lab 08/19/23  0641 08/18/23 1107 08/18/23 0412 08/17/23 2152   WBC 14.6*  --  9.9 10.7   RBC 2.13*  --  2.50* 3.03*   HGB 6.7* 7.6* 7.8* 9.3*   HCT 19.7*  --  22.7* 27.6*   MCV 93  --  91 91   MCH 31.5  --  31.2 30.7   MCHC 34.0  --  34.4 33.7   RDW 14.8  --  14.2 14.1     --  356 391     INRNo lab results found in last 7 days.    A/P: This is a 62 year old female with SCC of the ventral tongue, mandible, with invasion of the skin. Underwent segmental mandibulectomy, partial glossectomy, resection of the skin of the mentum, bilateral neck dissections levels 1-4. Reconstruction of the mandible with left fibular free flap, pedicle for this flap on the right. Reconstruction of the chin defect with left ALT free flap, pedicle on the left.      Neuro:  - Pain control: scheduled Tylenol, PRN oxycodone and dilaudid  - NO Nicotine patch     HEENT:  - Nothing around the neck (no gown ties, trach ties, lines, ect.)  - HOB at 30 degrees, head/neck in midline position  - Monitor implantable doppler signal.  The channel on the right side of the Doppler box corresponds to the  right-sided pedicle.  Channel on the left side of the Doppler box corresponds to the left pedicle.  With each flap check, change the channel on the doppler box so that the pedicle on the left neck can be heard. Keep the doppler box changed to the channel connected to the right neck (fibula) most of the time.   - RN flap checks Q1H x 48h, Q2H x 24h, then Q4H  - Q6 X 48 hours (through 8/20 PM), then Q8 x 48 hours (through 8/20 PM), then q12.  - Clean incisions with 0.9% sodium chloride and apply bacitracin Q8H x 24h, then transition to Aquaphor  - Monitor and record DARWIN drain output Qshift  - Peridex TID  - Saline oral rinses Q8H and PRN. Gentle suction with red joaquin catheter only (no yankeur), do not cut red joaquin  - S/p decadron 6mg q8h x 3 doses     Respiratory:  - Wean to trach dome as tolerated  - Routine trach cares. Do not cut trach sutures, 1st trach change will be performed by ENT. NO trach ties     CV/heme:  - Keep MAP > 60, SBP > 90 (preferrably > 110)  - Please call ENT on-call before giving fluid bolus  - Lovenox POD#1 at 8:00 AM  - NO vasopressors  - Transfuse for Hgb < 7  - will plan for hemoglobin recheck today given drop from post op     FEN/GI:  - NG in place, Tfs advancing; anticipate reaching 8hrs at continuous goal of 40cc/hr around 8pm, hold Tfs at midnight for bolusing in AM.  - low threshold to hold tube feeds for any nausea  - mIVF @ 100LR, titrate down per order  - Strict NPO, no oral swabs  - Electrolyte replacement protocol  - Nutrition consult.   - Bowel regimen: scheduled miralax and senna     :  - low UOP, Cr wnl. Continue to monitor.  - Titrate down on IVF by 10ml for every 10ml/hr increase in tube feedings. When at continuous goal, discontinue IVF     Endo  - TSH 2.49  - Alb 4.0  - Prealbumin 9  - sliding scale insulin     ID:  - Perioperative antibiotics (Unasyn) x 48h post-op     MSK:  - LIMB ALERT: NO IVS or LAB DRAWS from RIGHT ARM. We are keeping this arm safe as a backup  flap if necessary.   - LEFT fibula free flap: wound vac and walking boot placed in OR  - Please examine boot tightness on each examination to ensure no pressure injury to dorsal foot  - Elevate leg while in bed/chair  - 1st dressing change on POD5, remove wound vac  - Toe touch weight bearing POD1-4, then full weight bearing  - Boot to remain in place for 3 weeks post-op  - LEFT anterolateral thigh free flap: island dressing in place  - LEFT STSG, calcium alginate with tegaderm in place.   - please strip and record DARWIN drain output q8h      STSG donor site   - calcium alginate and tegaderm dressing in place; reinforce PRN, ok to take down to air after POD5, apply aquaphor following.     PPX:  - 40mg Lovenox daily (first dose given preoperatively)  - SCD on right     Consults:  - PT/OT- keep neck neutral (no turning to either side) and toe touch weight bearing until POD5  - SLP for PMSV ONLY AFTER cuffless trach in place  - PLC for trach and tube feeding  - Nutrition     Dispo: SICU x 72 hours for flap monitoring     -- Patient and above plan discussed with Dr. Thorne.    Yolette Crouch MD   Otolaryngology-Head & Neck Surgery PGY3  Please contact ENT on call with questions

## 2023-08-19 NOTE — PLAN OF CARE
Major Shift Events:  A&Ox4. Follows simple commands. Able to make needs known. PRN oxycodone given for L leg pain. Afebrile. SR. Maintaining MAP goal > 60 without intervention. Trach dome 21%, 20LPM. Frequent trach suctioning. NG with TF @ 30ml/hr, 30q4h FWF. Sliding scale. ENT paged for low urine output. 500LR bolus given with little improvement, team aware. Flap checks q1h. Implanted x2, external x3. DARWIN x5. [place]V to L leg.     Plan: Continue to monitor, notify team for changes or concerns     For vital signs and complete assessments, please see documentation flowsheets.

## 2023-08-19 NOTE — PLAN OF CARE
Status  D/I: Patient on unit 4A Surgical/Neuro ICU   Neuro- alert, oriented, writes appropriately, makes needs known; no numbness or tingling present; flap WNL, x2 external manual dopplers, x1 internal manual doppler, x2 implanted dopplers-all WNL  Pain/Sedation- some pain in left foot, boot able to be off in bed, helping with some of the discomfort; tylenol scheduled  CV- sinus rhythm, no ectopy noted; BP stable; tmax 100  Pulm- trached, TD 20L 21%, moderate amount of secretions, patient able to cough up well  GI/- tube feeds at goal, to bed stopped tonight at [place] and transitioned to bolus feeds tomorrow morning, order in place; pt voiding little bits throughout the day though difficult to measure due to mixed with watery stool, PWR this evening 224, to be straight cathed if >300; multiple small liquid stools throughout the day, large hard impacted stool present, pt complaining the feeling of having to go but can't get it out, attempted to digitially stimulate and disimpact x3, fleets enema given, suppository given-all with no results, pink lady ordered, awaiting medication from pharmacy  Skin- KIKE left fibula incision due to ace wrap, left thigh incision sutured approximated, left thigh skin graft dressing changed this evening; neck incision/flap-pale but WNL, and tongue incision/flap-pale but WNL, oozy throughout, neck swelling and bruising, ENT assessed bedside throughout the day  Gtts- TKO + abx and electrolyte replacements   Labs- potassium and phos replaced, redraw due tomorrow morning; hemoglobin 6.8 this morning, 1 PRBC's given  Activity- up with 1 assist and walker, up to commode a few times, did not want to sit in chair today    PLAN- given enema when medication arrives, keep getting up and out of bed, q1hr flap checks to be done at 2100, then can go to q2hr  See flow sheets for further interventions and assessments.  P: Continue to monitor pt closely, Notify MD of changes/concerns.

## 2023-08-20 ENCOUNTER — APPOINTMENT (OUTPATIENT)
Dept: EDUCATION SERVICES | Facility: CLINIC | Age: 63
End: 2023-08-20
Attending: OTOLARYNGOLOGY
Payer: COMMERCIAL

## 2023-08-20 LAB
ALBUMIN UR-MCNC: 20 MG/DL
ANION GAP SERPL CALCULATED.3IONS-SCNC: 11 MMOL/L (ref 7–15)
APPEARANCE UR: CLEAR
BILIRUB UR QL STRIP: NEGATIVE
BUN SERPL-MCNC: 27.5 MG/DL (ref 8–23)
CALCIUM SERPL-MCNC: 8.1 MG/DL (ref 8.8–10.2)
CHLORIDE SERPL-SCNC: 110 MMOL/L (ref 98–107)
COLOR UR AUTO: YELLOW
CREAT SERPL-MCNC: 0.69 MG/DL (ref 0.51–0.95)
DEPRECATED HCO3 PLAS-SCNC: 25 MMOL/L (ref 22–29)
ERYTHROCYTE [DISTWIDTH] IN BLOOD BY AUTOMATED COUNT: 15.5 % (ref 10–15)
GFR SERPL CREATININE-BSD FRML MDRD: >90 ML/MIN/1.73M2
GLUCOSE BLDC GLUCOMTR-MCNC: 122 MG/DL (ref 70–99)
GLUCOSE BLDC GLUCOMTR-MCNC: 126 MG/DL (ref 70–99)
GLUCOSE BLDC GLUCOMTR-MCNC: 131 MG/DL (ref 70–99)
GLUCOSE BLDC GLUCOMTR-MCNC: 150 MG/DL (ref 70–99)
GLUCOSE BLDC GLUCOMTR-MCNC: 220 MG/DL (ref 70–99)
GLUCOSE SERPL-MCNC: 127 MG/DL (ref 70–99)
GLUCOSE UR STRIP-MCNC: NEGATIVE MG/DL
HCT VFR BLD AUTO: 23.4 % (ref 35–47)
HGB BLD-MCNC: 7.9 G/DL (ref 11.7–15.7)
HGB UR QL STRIP: NEGATIVE
KETONES UR STRIP-MCNC: NEGATIVE MG/DL
LEUKOCYTE ESTERASE UR QL STRIP: NEGATIVE
MAGNESIUM SERPL-MCNC: 2.2 MG/DL (ref 1.7–2.3)
MCH RBC QN AUTO: 31 PG (ref 26.5–33)
MCHC RBC AUTO-ENTMCNC: 33.8 G/DL (ref 31.5–36.5)
MCV RBC AUTO: 92 FL (ref 78–100)
NITRATE UR QL: NEGATIVE
PH UR STRIP: 7.5 [PH] (ref 5–7)
PHOSPHATE SERPL-MCNC: 3 MG/DL (ref 2.5–4.5)
PLATELET # BLD AUTO: 336 10E3/UL (ref 150–450)
POTASSIUM SERPL-SCNC: 3.4 MMOL/L (ref 3.4–5.3)
POTASSIUM SERPL-SCNC: 3.8 MMOL/L (ref 3.4–5.3)
RBC # BLD AUTO: 2.55 10E6/UL (ref 3.8–5.2)
RBC URINE: 2 /HPF
SODIUM SERPL-SCNC: 146 MMOL/L (ref 136–145)
SP GR UR STRIP: 1.02 (ref 1–1.03)
SQUAMOUS EPITHELIAL: <1 /HPF
UROBILINOGEN UR STRIP-MCNC: 8 MG/DL
WBC # BLD AUTO: 15.3 10E3/UL (ref 4–11)
WBC URINE: 5 /HPF

## 2023-08-20 PROCEDURE — 999N000128 HC STATISTIC PERIPHERAL IV START W/O US GUIDANCE

## 2023-08-20 PROCEDURE — 84100 ASSAY OF PHOSPHORUS: CPT

## 2023-08-20 PROCEDURE — 84132 ASSAY OF SERUM POTASSIUM: CPT | Performed by: OTOLARYNGOLOGY

## 2023-08-20 PROCEDURE — 83735 ASSAY OF MAGNESIUM: CPT

## 2023-08-20 PROCEDURE — 200N000002 HC R&B ICU UMMC

## 2023-08-20 PROCEDURE — 250N000013 HC RX MED GY IP 250 OP 250 PS 637: Performed by: PHARMACIST

## 2023-08-20 PROCEDURE — 85027 COMPLETE CBC AUTOMATED: CPT

## 2023-08-20 PROCEDURE — 82310 ASSAY OF CALCIUM: CPT

## 2023-08-20 PROCEDURE — 250N000013 HC RX MED GY IP 250 OP 250 PS 637: Performed by: OTOLARYNGOLOGY

## 2023-08-20 PROCEDURE — 36415 COLL VENOUS BLD VENIPUNCTURE: CPT | Performed by: OTOLARYNGOLOGY

## 2023-08-20 PROCEDURE — 999N000157 HC STATISTIC RCP TIME EA 10 MIN

## 2023-08-20 PROCEDURE — 81001 URINALYSIS AUTO W/SCOPE: CPT | Performed by: OTOLARYNGOLOGY

## 2023-08-20 PROCEDURE — 250N000011 HC RX IP 250 OP 636: Mod: JZ | Performed by: STUDENT IN AN ORGANIZED HEALTH CARE EDUCATION/TRAINING PROGRAM

## 2023-08-20 PROCEDURE — 250N000013 HC RX MED GY IP 250 OP 250 PS 637: Performed by: STUDENT IN AN ORGANIZED HEALTH CARE EDUCATION/TRAINING PROGRAM

## 2023-08-20 RX ORDER — POTASSIUM CHLORIDE 1.5 G/1.58G
40 POWDER, FOR SOLUTION ORAL ONCE
Status: COMPLETED | OUTPATIENT
Start: 2023-08-20 | End: 2023-08-20

## 2023-08-20 RX ADMIN — Medication 15 ML: at 08:12

## 2023-08-20 RX ADMIN — CHLORHEXIDINE GLUCONATE 15 ML: 1.2 SOLUTION ORAL at 13:35

## 2023-08-20 RX ADMIN — POTASSIUM CHLORIDE 40 MEQ: 1.5 POWDER, FOR SOLUTION ORAL at 13:35

## 2023-08-20 RX ADMIN — THIAMINE HCL TAB 100 MG 100 MG: 100 TAB at 08:12

## 2023-08-20 RX ADMIN — CHLORHEXIDINE GLUCONATE 15 ML: 1.2 SOLUTION ORAL at 08:12

## 2023-08-20 RX ADMIN — INSULIN ASPART 2 UNITS: 100 INJECTION, SOLUTION INTRAVENOUS; SUBCUTANEOUS at 15:47

## 2023-08-20 RX ADMIN — WHITE PETROLATUM: 1.75 OINTMENT TOPICAL at 07:00

## 2023-08-20 RX ADMIN — POLYETHYLENE GLYCOL 3350 17 G: 17 POWDER, FOR SOLUTION ORAL at 08:12

## 2023-08-20 RX ADMIN — SENNOSIDES AND DOCUSATE SODIUM 2 TABLET: 50; 8.6 TABLET ORAL at 08:12

## 2023-08-20 RX ADMIN — POLYETHYLENE GLYCOL 3350 17 G: 17 POWDER, FOR SOLUTION ORAL at 19:50

## 2023-08-20 RX ADMIN — CHLORHEXIDINE GLUCONATE 15 ML: 1.2 SOLUTION ORAL at 19:49

## 2023-08-20 RX ADMIN — ACETAMINOPHEN 975 MG: 325 TABLET, FILM COATED ORAL at 13:35

## 2023-08-20 RX ADMIN — INSULIN ASPART 1 UNITS: 100 INJECTION, SOLUTION INTRAVENOUS; SUBCUTANEOUS at 19:51

## 2023-08-20 RX ADMIN — WHITE PETROLATUM: 1.75 OINTMENT TOPICAL at 13:35

## 2023-08-20 RX ADMIN — SENNOSIDES AND DOCUSATE SODIUM 2 TABLET: 50; 8.6 TABLET ORAL at 19:50

## 2023-08-20 RX ADMIN — ACETAMINOPHEN 975 MG: 325 TABLET, FILM COATED ORAL at 08:12

## 2023-08-20 RX ADMIN — ACETAMINOPHEN 975 MG: 325 TABLET, FILM COATED ORAL at 19:50

## 2023-08-20 RX ADMIN — OXYCODONE HYDROCHLORIDE 5 MG: 5 TABLET ORAL at 15:42

## 2023-08-20 RX ADMIN — ENOXAPARIN SODIUM 40 MG: 40 INJECTION SUBCUTANEOUS at 08:11

## 2023-08-20 RX ADMIN — WHITE PETROLATUM: 1.75 OINTMENT TOPICAL at 22:01

## 2023-08-20 ASSESSMENT — ACTIVITIES OF DAILY LIVING (ADL)
ADLS_ACUITY_SCORE: 30

## 2023-08-20 NOTE — PLAN OF CARE
Goal Outcome Evaluation:      Plan of Care Reviewed With: patient    Overall Patient Progress: improvingOverall Patient Progress: improving       Major Shift Events: Neuro intact, denies pain. SR, afebrile. Trach dome w/ frequent suctioning. Tube feeds held at midnight, start bolus feeds this AM. Oliguria mixed w/ watery stools, bladder scanned 166 ml- no straight cath needed. Multiple watery stools, patient refused enema, wants to try and use commode today. Q2 flap checks, strong pulses w/ doppler, good cap refill. DARWIN drains w/ minimal drainage.  New PIV in R arm. TKO running 5 ml/hr.     Plan: Continue scheduled bowel meds, start bolus feeds today. Work with PT/OT for activity as tolerated.     For vital signs and complete assessments, please see documentation flowsheets.

## 2023-08-20 NOTE — PLAN OF CARE
Status  D/I: Patient on unit 4A Surgical/Neuro ICU   Neuro- alert, oriented, writes to make needs known, appropriate, pupils e/r, no numbness or tingling present; flap WNL, internal and manual dopplers WNL  Pain/Sedation- no complaints of pain, well controlled with scheduled tylenol  CV- sinus rhythm; BP stable; afebrile  Pulm- lungs clear, inline secretions improving, trached, 20L 21%  GI/- bolus tube feeds started this morning, 1 can QID-received 2.5 cans so far today, tolerating well; potassium packets seem to cause a slight upset stomach; voiding, difficult to measure due to  mixed with stool, straight cathed x1 to send UA; multiple liquid stools throughout the day, up to commode with 1 assist  Skin- flap WNL, neck incision stapled, with 1400 flap check noted a staple that became dislodged, small un approximated area-ENT paged, ENT bedside around 1800, a few sutures placed at open site of incision; generalized bruising  Gtts- SL'd, new IV placed on left arm; IV had been placed in the the right arm over night, specific orders in for no sticks or IV's in the right arm, limb alert bracelet on, old PIV removed   ID- UA sent  Labs- potassium replaced, recheck drawn by lab at 1845  Activity- up with 1-2 assist, steady/strong, needs most help with line management    PLAN-   See flow sheets for further interventions and assessments.  P: Continue to monitor pt closely, Notify MD of changes/concerns.

## 2023-08-20 NOTE — PROGRESS NOTES
Vascular Access Services Notes:    Pt was NOT available for PIV placement 2x. Pt was busy with the care team.        FRANKIE AvilezN, RN [facility]

## 2023-08-20 NOTE — PROGRESS NOTES
"Ent Free Flap Check  8/19/23    Subj/intvl: Flap stable. Patient had multiple mixed consistency bowel movements and is no longer feeling constipated. Denies nausea with continuous TF rate. HR 90s-100s, MAPs 60s-80s. Continues w/low grade fevers, overall fever curve w/an upward trend, leveling out around 99.    O: /62 (BP Location: Right arm, Cuff Size: Adult Small)   Pulse 104   Temp 99.8  F (37.7  C) (Axillary)   Resp 15   Ht 1.626 m (5' 4\")   Wt 55.3 kg (121 lb 14.6 oz)   SpO2 95%   BMI 20.93 kg/m    General: Alert and engaged   HEENT: Oral cavity with healthy appearing reconstructed Harman tongue, very soft with some redundant skin, flap remains well-perfused with good cap refill, no evidence of venous congestion. Handheld and implantable doppler signals intact. Suture line intact. ALT free flap skin paddle well-perfused with good capillary refill, no evidence of venous congestion. Handheld and implantable doppler signals intact.   Neck soft with expected post-operative swelling, slightly increased from prior but still within expected range.  Stable erythema of the skin bridge below the flap of the mentum.  No ballotable fluid collections. DARWIN drain x3 with serosanginous output and holding suction. Incision c/d/I.   Respiratory: 6-0 cuffed shiley sutured in, on HTD.  MSK: Suture line intact, some expected ecchymosis along the incision but stable from prior. Left thigh soft without concerns for compartment syndrome.  Skin graft donor sight with expected drainage. Left leg boot in place, wound vac and DARWIN holding suction.  Good pulses over dorsal foot, good cap refill in toes. No pressure wound noted on dorsal foot or heel.    A/P: Adrianna Pereira is a 61yo with ventral tongue and mandibular alveolus (w/ chin skin and lower lip involvement) who is s/p segmental mandibulectomy, partial glossectomy, mental skin resection, b/l ND (1-4) and reconstruction with left ALT and left fibular free flap on 8/17.     -- " continue current plan of care  -- monitor fever curve, HR, UOP  -- next flap check AM rounds  -- hold continuous tube feeds at midnight and start bolusing in AM (ordered)    Yolette Crouch MD   Otolaryngology-Head & Neck Surgery PGY3  Please contact ENT on call with questions

## 2023-08-20 NOTE — PROGRESS NOTES
"Ent Free Flap Check  8/20/23    Subj/intvl: Flap stable with good cap refill and strong implantable (2 channels) and handheld (3 locations) signal. Pain well controlled. Tolerating bolus tube feeds. UA negative.    O: /77   Pulse 89   Temp 99  F (37.2  C) (Axillary)   Resp 14   Ht 1.626 m (5' 4.02\")   Wt 52 kg (114 lb 10.2 oz)   SpO2 98%   BMI 19.67 kg/m    General: Alert and engaged, smiling and cooperative.   HEENT: Oral cavity with healthy appearing reconstructed Harman tongue, very soft with some redundant skin, flap remains well-perfused with good cap refill, no evidence of venous congestion. Handheld and implantable doppler signals intact. Suture line intact. ALT free flap skin paddle well-perfused with good capillary refill, no evidence of venous congestion. Handheld and implantable doppler signals intact.   Neck soft with expected post-operative swelling, slightly increased from prior but still within expected range.  Stable erythema of the skin bridge below the flap of the mentum.  No ballotable fluid collections. DARWIN drain x3 with serosanginous output and holding suction. Incision c/d/I.   Respiratory: 6-0 cuffed shiley sutured in, on HTD.  MSK: Suture line intact, some expected ecchymosis along the incision but stable from prior. Left thigh soft without concerns for compartment syndrome.  Skin graft donor sight with expected drainage. Left leg boot in place, wound vac and DARIWN holding suction.  Good pulses over dorsal foot, good cap refill in toes. No pressure wound noted on dorsal foot or heel.    A/P: Adrianna Pereira is a 61yo with ventral tongue and mandibular alveolus (w/ chin skin and lower lip involvement) who is s/p segmental mandibulectomy, partial glossectomy, mental skin resection, b/l ND (1-4) and reconstruction with left ALT and left fibular free flap on 8/17.     -- continue current plan of care  -- monitor fever curve, HR, UOP  -- continue advancing bolus tube feeds  -- next flap check " 6PM    Fred Merida MD   Department of Otolaryngology - Head and Neck Surgery, PGY 1  Please page ENT with questions

## 2023-08-20 NOTE — PROGRESS NOTES
"Otolaryngology Progress Note  August 20, 2023    SUBJECTIVE: Temp elevated to 100 yesterday, now down trending to 99. WBC up to 15.3. MAPs have been >60 without need for intervention. Flab is stable with strong handheld and implantable doppler. She continues to have difficulty urinating. Ordered UA this morning. Has been having loose BMs. Beginning bolus tube feeds today. Denies pain in leg/foot. Right neck DARWIN removed today. IV placed in right arm despite limb alert.    OBJECTIVE:   /77   Pulse 89   Temp 99  F (37.2  C) (Axillary)   Resp 14   Ht 1.626 m (5' 4.02\")   Wt 52 kg (114 lb 10.2 oz)   SpO2 98%   BMI 19.67 kg/m    General: Alert and engaged   HEENT: Oral cavity with healthy appearing reconstructed Harman tongue, very soft with some redundant skin, flap remains well-perfused with good cap refill, no evidence of venous congestion. Handheld and implantable doppler signals intact. Suture line intact.  Mental ALT free flap well-perfused with good capillary refill, no evidence of venous congestion. Handheld and implantable doppler signals strong on both sides.  Neck soft with expected post-operative swelling, stable.  Stable erythema of the skin bridge below the flap of the mentum.  No ballotable fluid collections. DARWIN drain x2 with serosanginous output and holding suction. Incision c/d/I   Respiratory: 6-0 cuffed shiley sutured in, HTD.  MSK: Suture line intact, some expected ecchymosis along the incision but stable from prior. Left thigh soft without concerns for compartment syndrome.  Skin graft donor sight with expected drainage. Left leg boot in place, wound vac and DARWIN holding suction.  Good pulses over dorsal foot, good cap refill in toes. No pressure wound noted on dorsal foot or heel.    DARWIN drain output(s): (last 24 hours)/(last shift)  Drain 1 L Neck 4/5/0 (9)  Drain 1 L Thigh 15/17/3 (35)  Drain 2 L Neck 7/18/1 (26)  Drain 2 L Leg 1/4/1 (6)  Drain 3 R Neck 6/12/4 (22) - removed , not holding " suction    LABS:  ROUTINE IP LABS (Last four results)  BMP  Recent Labs   Lab 08/20/23  1114 08/20/23  1112 08/20/23  0809 08/20/23  0342 08/19/23 1959 08/19/23  1611 08/19/23  0851 08/19/23  0641 08/18/23  1140 08/18/23  1107 08/18/23 0417 08/18/23 0412 08/17/23 2159 08/17/23 2152   NA  --  146*  --   --   --   --   --  137  --   --   --  133*  --  133*   POTASSIUM  --  3.4  --   --   --  3.9  --  3.2*  --  3.5  --  3.4  3.4  --  3.4   CHLORIDE  --  110*  --   --   --   --   --  101  --   --   --  95*  --  95*   RACHAEL  --  8.1*  --   --   --   --   --  8.0*  --   --   --  8.0*  --  7.0*   CO2  --  25  --   --   --   --   --  25  --   --   --  22  --  19*   BUN  --  27.5*  --   --   --   --   --  18.7  --   --   --  5.8*  --  6.4*   CR  --  0.69  --   --   --   --   --  0.79  --   --   --  0.56  --  0.50*   * 127* 122* 126*   < >  --    < > 135*   < >  --    < > 152*   < > 153*    < > = values in this interval not displayed.     CBC  Recent Labs   Lab 08/20/23  1112 08/19/23  1611 08/19/23  0641 08/18/23  1107 08/18/23 0412 08/17/23 2152   WBC 15.3*  --  14.6*  --  9.9 10.7   RBC 2.55*  --  2.13*  --  2.50* 3.03*   HGB 7.9* 8.4* 6.7* 7.6* 7.8* 9.3*   HCT 23.4*  --  19.7*  --  22.7* 27.6*   MCV 92  --  93  --  91 91   MCH 31.0  --  31.5  --  31.2 30.7   MCHC 33.8  --  34.0  --  34.4 33.7   RDW 15.5*  --  14.8  --  14.2 14.1     --  333  --  356 391     INRNo lab results found in last 7 days.    A/P: This is a 62 year old female with SCC of the ventral tongue, mandible, with invasion of the skin. Underwent segmental mandibulectomy, partial glossectomy, resection of the skin of the mentum, bilateral neck dissections levels 1-4. Reconstruction of the mandible with left fibular free flap, pedicle for this flap on the right. Reconstruction of the chin defect with left ALT free flap, pedicle on the left. She is doing well postoperatively.      Neuro:  - Pain control: scheduled Tylenol, PRN oxycodone and  dilaudid  - NO Nicotine patch     HEENT:  - Nothing around the neck (no gown ties, trach ties, lines, ect.)  - HOB at 30 degrees, head/neck in midline position  - Monitor implantable doppler signal.  The channel on the right side of the Doppler box corresponds to the right-sided pedicle.  Channel on the left side of the Doppler box corresponds to the left pedicle.  With each flap check, change the channel on the doppler box so that the pedicle on the left neck can be heard. Keep the doppler box changed to the channel connected to the right neck (fibula) most of the time.   - RN flap checks Q1H x 48h, Q2H x 24h, then Q4H  - Q6 X 48 hours (through 8/20 PM), then Q8 x 48 hours (through 8/20 PM), then q12.  - Clean incisions with 0.9% sodium chloride and apply bacitracin Q8H x 24h, then transition to Aquaphor  - Monitor and record DARWIN drain output Qshift  - Peridex TID  - Saline oral rinses Q8H and PRN. Gentle suction with red joaquin catheter only (no yankeur), do not cut red joaquin  - S/p decadron 6mg q8h x 3 doses     Respiratory:  - Wean to trach dome as tolerated  - Routine trach cares. Do not cut trach sutures, 1st trach change will be performed by ENT. NO trach ties     CV/heme:  - Keep MAP > 60, SBP > 90 (preferrably > 110)  - Please call ENT on-call before giving fluid bolus  - Lovenox   - NO vasopressors  - Transfuse for Hgb < 7     FEN/GI:  - NG in place  - Begin advancing bolus tube feeds today  - low threshold to hold tube feeds for any nausea  - Discontinue mIVF  - Strict NPO, no oral swabs  - Electrolyte replacement protocol  - Bowel regimen: scheduled miralax and senna     :  - low UOP, Cr wnl. Continue to monitor.   -  and 4 unmeasured     Endo  - TSH 2.49  - Alb 4.0  - Prealbumin 9  - sliding scale insulin     ID:  - Perioperative antibiotics (Unasyn) x 48h post-op     MSK:  - LIMB ALERT: NO IVS or LAB DRAWS from RIGHT ARM. We are keeping this arm safe as a backup flap if necessary.   - LEFT  fibula free flap: wound vac and walking boot placed in OR  - Please examine boot tightness on each examination to ensure no pressure injury to dorsal foot  - Elevate leg while in bed/chair  - 1st dressing change on POD5 (8/22), remove wound vac  - Toe touch weight bearing POD1-4, then full weight bearing  - Boot to remain in place for 3 weeks post-op  - LEFT anterolateral thigh free flap; island dressing removed  - LEFT STSG, calcium alginate with tegaderm in place, reinforce PRN, OK to take down to air on POD5, apply Aquaphor  - please strip and record DARWIN drain output q8h      PPX:  - 40mg Lovenox daily (first dose given preoperatively)  - SCD on right     Consults:  - PT/OT- keep neck neutral (no turning to either side) and toe touch weight bearing until POD5  - SLP for PMSV ONLY AFTER cuffless trach in place  - PLC for trach and tube feeding  - Nutrition     Dispo: SICU for flap monitoring     -- Patient and above plan discussed with Dr. Thorne.  Fred Merida MD   Department of Otolaryngology - Head and Neck Surgery, PGY 1  Please page ENT with questions

## 2023-08-20 NOTE — CONSULTS
Met with patient, her spouse Duane and 2 daughters at the bedside for NG tube and enteral feeding education. We discussed basic care, safety of the NG tube, verifying placement before using the tube, when to call the care team, when to call 911.     Demonstrated the process of giving meds through demo tube, flushing before and after with water. Duane and both daughters taught back on demo tube.     Also showed them how to give bolus feedings via syringe/plunger or gravity bag. Adrianna thinks syringe and plunger method will work best for them. Patient, spouse and daughters were very engaged in education and asked good questions. They demonstrated understanding through teach-back. They will be seen on Tuesday at 2pm for trach education and confirmed this day/time with Duane.    Literature given: NG/[place] Tube Home Care Instructions,  Tube Feeding at Home-[place] Method Using Syringe and Plunger, Tube Feedings at Home-[place] Method.

## 2023-08-20 NOTE — PROGRESS NOTES
"Ent Free Flap Check  8/19/23    Subj/intvl: Doing well since this AM. Recheck hgb 8.4 s/p 1u pRBCs. She has not had a bowel movement yet and per bedside RN there is impacted stool. Fleet enema not effective so pink lady ordered and still waiting on it to come from pharmacy. Otherwise, stable since last flap check. Has spiked a few low grade fevers to 100, continues on periop Unasyn. MAPs > 60, satting well on 21% HTD. Post void residuals increasing with 350cc UOP and 3 unmeasured outputs.    O: /63 (BP Location: Right arm)   Pulse 95   Temp 99.9  F (37.7  C) (Axillary)   Resp 15   Ht 1.626 m (5' 4\")   Wt 55.3 kg (121 lb 14.6 oz)   SpO2 98%   BMI 20.93 kg/m    General: Alert and engaged   HEENT: Oral cavity with healthy appearing reconstructed Harman tongue, very soft with some redundant skin, flap remains well-perfused with good cap refill, no evidence of venous congestion. Handheld and implantable doppler signals intact. Suture line intact. ALT free flap skin paddle well-perfused with good capillary refill, no evidence of venous congestion. Handheld and implantable doppler signals intact.   Neck soft with expected post-operative swelling, slightly increased from prior but still within expected range.  Stable erythema of the skin bridge below the flap of the mentum.  No ballotable fluid collections. DARWIN drain x3 with serosanginous output and holding suction. Incision c/d/I.   Respiratory: 6-0 cuffed shiley sutured in, on HTD.  MSK: Suture line intact, some expected ecchymosis along the incision but stable from prior. Left thigh soft without concerns for compartment syndrome.  Skin graft donor sight with expected drainage. Left leg boot in place, wound vac and DARWIN holding suction.  Good pulses over dorsal foot, good cap refill in toes. No pressure wound noted on dorsal foot or heel.    A/P: Adrianna Pereira is a 61yo with ventral tongue and mandibular alveolus (w/ chin skin and lower lip involvement) who is s/p " segmental mandibulectomy, partial glossectomy, mental skin resection, b/l ND (1-4) and reconstruction with left ALT and left fibular free flap on 8/17.     -- continue current plan of care  -- folded washcloth placed below trach to support  -- monitor fever curve, post void residuals and UOP, and bowel status  -- next flap check midnight    Yolette Crouch MD   Otolaryngology-Head & Neck Surgery PGY3  Please contact ENT on call with questions

## 2023-08-21 ENCOUNTER — PREP FOR PROCEDURE (OUTPATIENT)
Dept: OTOLARYNGOLOGY | Facility: CLINIC | Age: 63
End: 2023-08-21
Payer: COMMERCIAL

## 2023-08-21 ENCOUNTER — APPOINTMENT (OUTPATIENT)
Dept: PHYSICAL THERAPY | Facility: CLINIC | Age: 63
End: 2023-08-21
Attending: OTOLARYNGOLOGY
Payer: COMMERCIAL

## 2023-08-21 DIAGNOSIS — C04.9 SCC (SQUAMOUS CELL CARCINOMA OF FLOOR OF MOUTH) (H): Primary | ICD-10-CM

## 2023-08-21 LAB
ANION GAP SERPL CALCULATED.3IONS-SCNC: 10 MMOL/L (ref 7–15)
BUN SERPL-MCNC: 26.5 MG/DL (ref 8–23)
CALCIUM SERPL-MCNC: 8.2 MG/DL (ref 8.8–10.2)
CHLORIDE SERPL-SCNC: 112 MMOL/L (ref 98–107)
CREAT SERPL-MCNC: 0.58 MG/DL (ref 0.51–0.95)
DEPRECATED HCO3 PLAS-SCNC: 24 MMOL/L (ref 22–29)
ERYTHROCYTE [DISTWIDTH] IN BLOOD BY AUTOMATED COUNT: 15.4 % (ref 10–15)
GFR SERPL CREATININE-BSD FRML MDRD: >90 ML/MIN/1.73M2
GLUCOSE BLDC GLUCOMTR-MCNC: 107 MG/DL (ref 70–99)
GLUCOSE BLDC GLUCOMTR-MCNC: 142 MG/DL (ref 70–99)
GLUCOSE BLDC GLUCOMTR-MCNC: 176 MG/DL (ref 70–99)
GLUCOSE BLDC GLUCOMTR-MCNC: 72 MG/DL (ref 70–99)
GLUCOSE BLDC GLUCOMTR-MCNC: 78 MG/DL (ref 70–99)
GLUCOSE BLDC GLUCOMTR-MCNC: 99 MG/DL (ref 70–99)
GLUCOSE SERPL-MCNC: 96 MG/DL (ref 70–99)
HCT VFR BLD AUTO: 23.7 % (ref 35–47)
HGB BLD-MCNC: 7.8 G/DL (ref 11.7–15.7)
MAGNESIUM SERPL-MCNC: 2.1 MG/DL (ref 1.7–2.3)
MCH RBC QN AUTO: 30.8 PG (ref 26.5–33)
MCHC RBC AUTO-ENTMCNC: 32.9 G/DL (ref 31.5–36.5)
MCV RBC AUTO: 94 FL (ref 78–100)
PHOSPHATE SERPL-MCNC: 3 MG/DL (ref 2.5–4.5)
PLATELET # BLD AUTO: 369 10E3/UL (ref 150–450)
POTASSIUM SERPL-SCNC: 3.4 MMOL/L (ref 3.4–5.3)
POTASSIUM SERPL-SCNC: 4.4 MMOL/L (ref 3.4–5.3)
RBC # BLD AUTO: 2.53 10E6/UL (ref 3.8–5.2)
SODIUM SERPL-SCNC: 146 MMOL/L (ref 136–145)
WBC # BLD AUTO: 11.7 10E3/UL (ref 4–11)

## 2023-08-21 PROCEDURE — 250N000011 HC RX IP 250 OP 636: Mod: JZ | Performed by: STUDENT IN AN ORGANIZED HEALTH CARE EDUCATION/TRAINING PROGRAM

## 2023-08-21 PROCEDURE — 250N000013 HC RX MED GY IP 250 OP 250 PS 637

## 2023-08-21 PROCEDURE — 999N000157 HC STATISTIC RCP TIME EA 10 MIN

## 2023-08-21 PROCEDURE — 84100 ASSAY OF PHOSPHORUS: CPT

## 2023-08-21 PROCEDURE — 97110 THERAPEUTIC EXERCISES: CPT | Mod: GP

## 2023-08-21 PROCEDURE — 85027 COMPLETE CBC AUTOMATED: CPT

## 2023-08-21 PROCEDURE — 80048 BASIC METABOLIC PNL TOTAL CA: CPT

## 2023-08-21 PROCEDURE — 83735 ASSAY OF MAGNESIUM: CPT

## 2023-08-21 PROCEDURE — 999N000215 HC STATISTIC HFNC ADULT NON-CPAP

## 2023-08-21 PROCEDURE — 36415 COLL VENOUS BLD VENIPUNCTURE: CPT

## 2023-08-21 PROCEDURE — 97530 THERAPEUTIC ACTIVITIES: CPT | Mod: GP

## 2023-08-21 PROCEDURE — 250N000013 HC RX MED GY IP 250 OP 250 PS 637: Performed by: PHARMACIST

## 2023-08-21 PROCEDURE — 84132 ASSAY OF SERUM POTASSIUM: CPT | Performed by: OTOLARYNGOLOGY

## 2023-08-21 PROCEDURE — 250N000013 HC RX MED GY IP 250 OP 250 PS 637: Performed by: OTOLARYNGOLOGY

## 2023-08-21 PROCEDURE — 120N000002 HC R&B MED SURG/OB UMMC

## 2023-08-21 PROCEDURE — 36415 COLL VENOUS BLD VENIPUNCTURE: CPT | Performed by: OTOLARYNGOLOGY

## 2023-08-21 PROCEDURE — 250N000013 HC RX MED GY IP 250 OP 250 PS 637: Performed by: STUDENT IN AN ORGANIZED HEALTH CARE EDUCATION/TRAINING PROGRAM

## 2023-08-21 RX ORDER — POTASSIUM CHLORIDE 1.5 G/1.58G
40 POWDER, FOR SOLUTION ORAL ONCE
Status: COMPLETED | OUTPATIENT
Start: 2023-08-21 | End: 2023-08-21

## 2023-08-21 RX ADMIN — CHLORHEXIDINE GLUCONATE 15 ML: 1.2 SOLUTION ORAL at 09:05

## 2023-08-21 RX ADMIN — ENOXAPARIN SODIUM 40 MG: 40 INJECTION SUBCUTANEOUS at 09:11

## 2023-08-21 RX ADMIN — CHLORHEXIDINE GLUCONATE 15 ML: 1.2 SOLUTION ORAL at 20:07

## 2023-08-21 RX ADMIN — WHITE PETROLATUM: 1.75 OINTMENT TOPICAL at 23:10

## 2023-08-21 RX ADMIN — ACETAMINOPHEN 975 MG: 325 TABLET, FILM COATED ORAL at 20:04

## 2023-08-21 RX ADMIN — INSULIN ASPART 1 UNITS: 100 INJECTION, SOLUTION INTRAVENOUS; SUBCUTANEOUS at 11:56

## 2023-08-21 RX ADMIN — WHITE PETROLATUM: 1.75 OINTMENT TOPICAL at 05:00

## 2023-08-21 RX ADMIN — ACETAMINOPHEN 975 MG: 325 TABLET, FILM COATED ORAL at 09:05

## 2023-08-21 RX ADMIN — OXYCODONE HYDROCHLORIDE 5 MG: 5 TABLET ORAL at 05:05

## 2023-08-21 RX ADMIN — OXYCODONE HYDROCHLORIDE 5 MG: 5 TABLET ORAL at 20:13

## 2023-08-21 RX ADMIN — OXYCODONE HYDROCHLORIDE 5 MG: 5 TABLET ORAL at 16:17

## 2023-08-21 RX ADMIN — Medication 15 ML: at 09:05

## 2023-08-21 RX ADMIN — POTASSIUM CHLORIDE 40 MEQ: 1.5 POWDER, FOR SOLUTION ORAL at 11:46

## 2023-08-21 RX ADMIN — THIAMINE HCL TAB 100 MG 100 MG: 100 TAB at 09:05

## 2023-08-21 RX ADMIN — SENNOSIDES AND DOCUSATE SODIUM 2 TABLET: 50; 8.6 TABLET ORAL at 09:05

## 2023-08-21 RX ADMIN — POLYETHYLENE GLYCOL 3350 17 G: 17 POWDER, FOR SOLUTION ORAL at 09:05

## 2023-08-21 RX ADMIN — WHITE PETROLATUM: 1.75 OINTMENT TOPICAL at 16:27

## 2023-08-21 ASSESSMENT — ACTIVITIES OF DAILY LIVING (ADL)
ADLS_ACUITY_SCORE: 27
ADLS_ACUITY_SCORE: 28
ADLS_ACUITY_SCORE: 27
ADLS_ACUITY_SCORE: 28
ADLS_ACUITY_SCORE: 28
ADLS_ACUITY_SCORE: 30
ADLS_ACUITY_SCORE: 28
ADLS_ACUITY_SCORE: 27
ADLS_ACUITY_SCORE: 28
ADLS_ACUITY_SCORE: 27
ADLS_ACUITY_SCORE: 28
ADLS_ACUITY_SCORE: 28

## 2023-08-21 NOTE — PROGRESS NOTES
"Ent Free Flap Check - virtual  8/20/23    Subj/intvl: Flap stable with no issues, she has been afebrile and satting well on 21%    O: /77   Pulse 102   Temp 97  F (36.1  C) (Axillary)   Resp 16   Ht 1.626 m (5' 4.02\")   Wt 52 kg (114 lb 10.2 oz)   SpO2 100%   BMI 19.67 kg/m    General: resting in bed    HEENT: Oral cavity with healthy appearing tissue, very soft with some redundant skin, flap remains well-perfused with good cap refill, no evidence of venous congestion. Handheld and implantable doppler signals intact. ALT free flap skin paddle well-perfused with good capillary refill, no evidence of venous congestion. Handheld and implantable doppler signals intact. Swelling intraoral stable with stable ecchymosis.   Neck soft with expected post-operative swelling, stable appearing. DARWIN drain x3 with serosanginous output and holding suction. Incision c/d/I.   Respiratory: 6-0 cuffed shiley sutured in, on HTD.  MSK: Suture line intact, some expected ecchymosis along the incision but stable from prior. Left thigh soft without concerns for compartment syndrome.  Skin graft donor sight with expected drainage. Left leg boot in place, wound vac and DARWIN holding suction.      A/P: Adrianna Pereira is a 61yo with ventral tongue and mandibular alveolus (w/ chin skin and lower lip involvement) who is s/p segmental mandibulectomy, partial glossectomy, mental skin resection, b/l ND (1-4) and reconstruction with left ALT and left fibular free flap on 8/17.     -- continue current plan of care    Daja Amaya MD  Department of Otolaryngology - Head and Neck Surgery, PGY 4  Please page ENT with questions      "

## 2023-08-21 NOTE — PLAN OF CARE
Shift [id number]  Major Shift Events: Patient alert and oriented, communicates appropriately and makes needs known by writing. Denies any numbness or tingling this shift. Flap checks done  via doppler as ordered and within normal limits. Pain well managed with scheduled Tylenol and PRN Oxycodone. Vitals stable, Normal sinus rhythm. Patient remains afebrile. Tolerating trach dome,@ 20L 21%. Up to the commode with 1 assist. Had stool X1 and voiding adequately.     Plan:  Oncoming shift to continue monitoring labs, assess patient is tolerating Tube feeds and continue flap checks.

## 2023-08-21 NOTE — PROGRESS NOTES
"Otolaryngology Progress Note  August 21, 2023    Subjective/Interval: No acute events overnight. Neck incision with missing staples noted yesterday and reinforced with suture. Left anterior dehiscence intraorally. Stable duskiness along anterior tip. Will plan RTOR for exam and debridement with Dr. Thorne Thursday or Friday. Pain well controlled. Tolerating tube feeds. Continues to have low UOP and elevated Na, BUN. Having BMs since increased bowel regimen a few days ago. PLC for tube feeds completed yesterday.    OBJECTIVE:   BP (!) 137/99   Pulse 87   Temp 97.3  F (36.3  C) (Axillary)   Resp 16   Ht 1.626 m (5' 4.02\")   Wt 52 kg (114 lb 10.2 oz)   SpO2 95%   BMI 19.67 kg/m    General: Alert and engaged, smiling, writing on clipboard as able  HEENT: Oral cavity with healthy appearing reconstructed Harman tongue, very soft with some redundant skin. Flap has area of stable duskiness on left anterior portion as well as new dehiscence of left anterolateral incision line. Remaining incisions are intact. Lateral margins of skin paddle cooler to the touch with slower cap refill. Handheld and implantable doppler signals intact. Mental ALT free flap well-perfused with good capillary refill, no evidence of venous congestion. Handheld and implantable doppler signals strong on both sides.  Neck soft with expected post-operative swelling, stable.  Stable erythema of the skin bridge below the flap of the mentum.  No ballotable fluid collections. DARWIN drain x2 with serosanginous output and holding suction. Incision c/d/I with sutures reinforcing area with fallen staples.   Respiratory: 6-0 cuffed shiley sutured in, HTD.  MSK: Suture line intact, some expected ecchymosis along the incision but stable from prior. Left thigh soft without concerns for compartment syndrome.  Skin graft donor sight with expected drainage. Left leg out of boot this morning, wound vac and DARWIN holding suction.  Good pulses over dorsal foot, good cap " refill in toes. No pressure wound noted on dorsal foot or heel.    DARWIN drain output(s): (last 24 hours)/(last shift)  Drain 1 L Neck NR/13/3 (16)  Drain 1 L Thigh NR/44/14 (58)  Drain 2 L Neck NR/4/5 (9)  Drain 2 L Leg NR/7/5 (12)    LABS:  ROUTINE IP LABS (Last four results)  BMP  Recent Labs   Lab 08/21/23  0750 08/21/23  0454 08/20/23  1947 08/20/23  1849 08/20/23  1547 08/20/23  1114 08/20/23  1112 08/19/23  1959 08/19/23  1611 08/19/23  0851 08/19/23  0641 08/18/23  0417 08/18/23  0412   *  --   --   --   --   --  146*  --   --   --  137  --  133*   POTASSIUM 3.4  --   --  3.8  --   --  3.4  --  3.9  --  3.2*   < > 3.4  3.4   CHLORIDE 112*  --   --   --   --   --  110*  --   --   --  101  --  95*   RACHAEL 8.2*  --   --   --   --   --  8.1*  --   --   --  8.0*  --  8.0*   CO2 24  --   --   --   --   --  25  --   --   --  25  --  22   BUN 26.5*  --   --   --   --   --  27.5*  --   --   --  18.7  --  5.8*   CR 0.58  --   --   --   --   --  0.69  --   --   --  0.79  --  0.56   GLC 96 99 150*  --  220*   < > 127*   < >  --    < > 135*   < > 152*    < > = values in this interval not displayed.     CBC  Recent Labs   Lab 08/21/23 0750 08/20/23  1112 08/19/23  1611 08/19/23  0641 08/18/23  1107 08/18/23  0412   WBC 11.7* 15.3*  --  14.6*  --  9.9   RBC 2.53* 2.55*  --  2.13*  --  2.50*   HGB 7.8* 7.9* 8.4* 6.7*   < > 7.8*   HCT 23.7* 23.4*  --  19.7*  --  22.7*   MCV 94 92  --  93  --  91   MCH 30.8 31.0  --  31.5  --  31.2   MCHC 32.9 33.8  --  34.0  --  34.4   RDW 15.4* 15.5*  --  14.8  --  14.2    336  --  333  --  356    < > = values in this interval not displayed.     INRNo lab results found in last 7 days.    A/P: This is a 62 year old female with SCC of the ventral tongue, mandible, with invasion of the skin. Underwent segmental mandibulectomy, partial glossectomy, resection of the skin of the mentum, bilateral neck dissections levels 1-4. Reconstruction of the mandible with left fibular free flap,  pedicle for this flap on the right. Reconstruction of the chin defect with left ALT free flap, pedicle on the left. She is doing well postoperatively overall but has new intraoral dehiscence which will require return to OR for exam and debridement.    Interval changes:  - FWF increased to 100 ml TID and 60 ml before and after each feeding     Neuro:  - Pain control: scheduled Tylenol, PRN oxycodone and dilaudid  - NO Nicotine patch     HEENT:  - Nothing around the neck (no gown ties, trach ties, lines, ect.)  - HOB at 30 degrees, head/neck in midline position  - Monitor implantable doppler signal.  The channel on the right side of the Doppler box corresponds to the right-sided pedicle.  Channel on the left side of the Doppler box corresponds to the left pedicle.  With each flap check, change the channel on the doppler box so that the pedicle on the left neck can be heard. Keep the doppler box changed to the channel connected to the right neck (fibula) most of the time.   - RN flap checks Q4H  - Q8 x 48 hours (through 8/23), then q12.  - Clean incisions with 0.9% sodium chloride and apply Aquaphor Q8H  - Monitor and record DARWIN drain output Qshift  - Peridex TID  - Saline oral rinses Q8H and PRN. Gentle suction with red joaquin catheter only (no yankeur), do not cut red joaquin  - Decadron 6mg q8h x 3 doses (complete)  - Continue to monitor dehiscence, planning for OR Thursday or Friday     Respiratory:  - Wean to trach dome as tolerated  - Routine trach cares. Do not cut trach sutures, 1st trach change will be performed by ENT. NO trach ties     CV/heme:  - Keep MAP > 60, SBP > 90 (preferrably > 110)  - Please call ENT on-call before giving fluid bolus  - Lovenox   - NO vasopressors  - Transfuse for Hgb < 7     FEN/GI:  - NG in place  - Continue Bolus tube feeds  - low threshold to hold tube feeds for any nausea  - Discontinue mIVF  - Strict NPO, no oral swabs  - Electrolyte replacement protocol  - Bowel regimen:  scheduled miralax and senna     :  - low UOP, Cr wnl. Continue to monitor.   -  even after straight cath x1     Endo  - TSH 2.49  - Alb 4.0  - Prealbumin 9  - sliding scale insulin     ID:  - Unasyn completed     MSK:  - LIMB ALERT: NO IVS or LAB DRAWS from RIGHT ARM. We are keeping this arm safe as a backup flap if necessary.   - LEFT fibula free flap: wound vac and walking boot placed in OR  - Please examine boot tightness on each examination to ensure no pressure injury to dorsal foot  - Elevate leg while in bed/chair  - 1st dressing change on POD5 (8/22), remove wound vac  - Toe touch weight bearing POD1-4, then full weight bearing  - Boot to remain in place for 3 weeks post-op  - LEFT anterolateral thigh free flap; island dressing removed  - LEFT STSG, calcium alginate with tegaderm in place, reinforce PRN, OK to take down to air on POD5, apply Aquaphor  - please strip and record DARWIN drain output q8h      PPX:  - 40mg Lovenox daily   - SCD on right     Consults:  - PT/OT- keep neck neutral (no turning to either side) and toe touch weight bearing until POD5  - SLP for PMSV ONLY AFTER cuffless trach in place  - PLC for trach and tube feeding  - Nutrition     Dispo: ENT to 6A     -- Patient and above plan discussed with Dr. Thorne.    Fred Merida MD   Department of Otolaryngology - Head and Neck Surgery, PGY 1  Please page ENT with questions

## 2023-08-21 NOTE — PLAN OF CARE
Arrived from: 4E  Belongings/meds: phone, clothes ans trach supplies sent with pt.   2 RN Skin Assessment Completed by: Patricia NEWBY RN   Non-intact findings documented (yes/no/NA): Several surgical incisions/sites- see below. Scattered bruises to B forearms.    Status: POD 4 Glossectomy, mandibulectomy, radical neck dissection  Vitals: VSS, cont sat monitor in place  Neuros: No neuro checks ordered, but pt oriented x4, able to follow commands and make needs known, perrl.   IV: SL x1  Labs/Electrolytes: see results  Resp/trach: #6 Shiley. 21% HTD, sats WNL. Superficially suctioned x2, clears most secretions with good, strong cough.  Diet: NPO. TF bolus feeds QID via NG. Has completed 3/4 cans today. Last from [id number]. Tolerating well.  Bowel status: LBM this am/continent  : Voiding via BSC  Skin: Neck incision intact with sutures, no drainage noted and Aquaphor applied per orders. One DARWIN to neck removed this afternoon by ENT MD, 2nd drain remains to left neck.   LLE ace intact, good CMS to toes. CAM boot in place when OOB. DARWIN x1 to LLE with scant o/p.   Left thigh STSG intact with tegaderm and scant drainage underneath. DARWIN x1 to this area with scant pink output noted.   Pain: Oxycodone 5mg x1 this afternoon for discomfort in several areas. Reported good relief.  Activity: A1-2 to pivot to BSC d/t lines. TTWB to LLE   Social: No visitors today  Plan: Continue with POC, per notes possible OR Thursday or Friday. Pt will need one 1 can this rojelio before HS.    Updates this shift: ENT removed 1 DARWIN drain to left neck and assisted pt to find better positioning of HTD. Pt felt sutures were pulling forward. ENT found that a rolled/folded washcloth under HTD to support it kept this from happening.

## 2023-08-22 ENCOUNTER — APPOINTMENT (OUTPATIENT)
Dept: EDUCATION SERVICES | Facility: CLINIC | Age: 63
End: 2023-08-22
Attending: OTOLARYNGOLOGY
Payer: COMMERCIAL

## 2023-08-22 ENCOUNTER — APPOINTMENT (OUTPATIENT)
Dept: PHYSICAL THERAPY | Facility: CLINIC | Age: 63
End: 2023-08-22
Attending: OTOLARYNGOLOGY
Payer: COMMERCIAL

## 2023-08-22 LAB
ANION GAP SERPL CALCULATED.3IONS-SCNC: 8 MMOL/L (ref 7–15)
BUN SERPL-MCNC: 22.9 MG/DL (ref 8–23)
CALCIUM SERPL-MCNC: 8.2 MG/DL (ref 8.8–10.2)
CHLORIDE SERPL-SCNC: 114 MMOL/L (ref 98–107)
CREAT SERPL-MCNC: 0.45 MG/DL (ref 0.51–0.95)
DEPRECATED HCO3 PLAS-SCNC: 23 MMOL/L (ref 22–29)
ERYTHROCYTE [DISTWIDTH] IN BLOOD BY AUTOMATED COUNT: 15.8 % (ref 10–15)
GFR SERPL CREATININE-BSD FRML MDRD: >90 ML/MIN/1.73M2
GLUCOSE BLDC GLUCOMTR-MCNC: 126 MG/DL (ref 70–99)
GLUCOSE BLDC GLUCOMTR-MCNC: 85 MG/DL (ref 70–99)
GLUCOSE BLDC GLUCOMTR-MCNC: 93 MG/DL (ref 70–99)
GLUCOSE SERPL-MCNC: 94 MG/DL (ref 70–99)
HCT VFR BLD AUTO: 24.4 % (ref 35–47)
HGB BLD-MCNC: 7.8 G/DL (ref 11.7–15.7)
INR PPP: 1.06 (ref 0.85–1.15)
MAGNESIUM SERPL-MCNC: 2 MG/DL (ref 1.7–2.3)
MCH RBC QN AUTO: 30.6 PG (ref 26.5–33)
MCHC RBC AUTO-ENTMCNC: 32 G/DL (ref 31.5–36.5)
MCV RBC AUTO: 96 FL (ref 78–100)
PHOSPHATE SERPL-MCNC: 2.2 MG/DL (ref 2.5–4.5)
PLATELET # BLD AUTO: 357 10E3/UL (ref 150–450)
POTASSIUM SERPL-SCNC: 3.7 MMOL/L (ref 3.4–5.3)
RBC # BLD AUTO: 2.55 10E6/UL (ref 3.8–5.2)
SODIUM SERPL-SCNC: 145 MMOL/L (ref 136–145)
WBC # BLD AUTO: 10.2 10E3/UL (ref 4–11)

## 2023-08-22 PROCEDURE — 80048 BASIC METABOLIC PNL TOTAL CA: CPT

## 2023-08-22 PROCEDURE — 250N000011 HC RX IP 250 OP 636: Mod: JZ

## 2023-08-22 PROCEDURE — 999N000157 HC STATISTIC RCP TIME EA 10 MIN

## 2023-08-22 PROCEDURE — 36415 COLL VENOUS BLD VENIPUNCTURE: CPT

## 2023-08-22 PROCEDURE — 36415 COLL VENOUS BLD VENIPUNCTURE: CPT | Performed by: PHYSICIAN ASSISTANT

## 2023-08-22 PROCEDURE — 250N000013 HC RX MED GY IP 250 OP 250 PS 637

## 2023-08-22 PROCEDURE — 97530 THERAPEUTIC ACTIVITIES: CPT | Mod: GP

## 2023-08-22 PROCEDURE — 83735 ASSAY OF MAGNESIUM: CPT

## 2023-08-22 PROCEDURE — 97110 THERAPEUTIC EXERCISES: CPT | Mod: GP

## 2023-08-22 PROCEDURE — 85610 PROTHROMBIN TIME: CPT | Performed by: PHYSICIAN ASSISTANT

## 2023-08-22 PROCEDURE — 84100 ASSAY OF PHOSPHORUS: CPT

## 2023-08-22 PROCEDURE — 250N000013 HC RX MED GY IP 250 OP 250 PS 637: Performed by: OTOLARYNGOLOGY

## 2023-08-22 PROCEDURE — 85014 HEMATOCRIT: CPT

## 2023-08-22 PROCEDURE — 120N000002 HC R&B MED SURG/OB UMMC

## 2023-08-22 RX ORDER — MAGNESIUM OXIDE 400 MG/1
400 TABLET ORAL EVERY 4 HOURS
Status: COMPLETED | OUTPATIENT
Start: 2023-08-22 | End: 2023-08-22

## 2023-08-22 RX ORDER — POTASSIUM CHLORIDE 750 MG/1
20 TABLET, EXTENDED RELEASE ORAL ONCE
Status: COMPLETED | OUTPATIENT
Start: 2023-08-22 | End: 2023-08-22

## 2023-08-22 RX ADMIN — ENOXAPARIN SODIUM 40 MG: 40 INJECTION SUBCUTANEOUS at 08:55

## 2023-08-22 RX ADMIN — Medication 15 ML: at 08:55

## 2023-08-22 RX ADMIN — ACETAMINOPHEN 975 MG: 325 TABLET, FILM COATED ORAL at 20:40

## 2023-08-22 RX ADMIN — MAGNESIUM OXIDE TAB 400 MG (241.3 MG ELEMENTAL MG) 400 MG: 400 (241.3 MG) TAB at 16:12

## 2023-08-22 RX ADMIN — POTASSIUM & SODIUM PHOSPHATES POWDER PACK 280-160-250 MG 1 PACKET: 280-160-250 PACK at 16:12

## 2023-08-22 RX ADMIN — POTASSIUM CHLORIDE 20 MEQ: 750 TABLET, EXTENDED RELEASE ORAL at 12:37

## 2023-08-22 RX ADMIN — HYDROMORPHONE HYDROCHLORIDE 0.4 MG: 0.2 INJECTION, SOLUTION INTRAMUSCULAR; INTRAVENOUS; SUBCUTANEOUS at 05:42

## 2023-08-22 RX ADMIN — POTASSIUM & SODIUM PHOSPHATES POWDER PACK 280-160-250 MG 1 PACKET: 280-160-250 PACK at 20:40

## 2023-08-22 RX ADMIN — THIAMINE HCL TAB 100 MG 100 MG: 100 TAB at 08:55

## 2023-08-22 RX ADMIN — ACETAMINOPHEN 975 MG: 325 TABLET, FILM COATED ORAL at 08:54

## 2023-08-22 RX ADMIN — CHLORHEXIDINE GLUCONATE 15 ML: 1.2 SOLUTION ORAL at 20:40

## 2023-08-22 RX ADMIN — CHLORHEXIDINE GLUCONATE 15 ML: 1.2 SOLUTION ORAL at 08:55

## 2023-08-22 RX ADMIN — SENNOSIDES AND DOCUSATE SODIUM 2 TABLET: 50; 8.6 TABLET ORAL at 08:55

## 2023-08-22 RX ADMIN — MAGNESIUM OXIDE TAB 400 MG (241.3 MG ELEMENTAL MG) 400 MG: 400 (241.3 MG) TAB at 12:37

## 2023-08-22 RX ADMIN — WHITE PETROLATUM: 1.75 OINTMENT TOPICAL at 21:31

## 2023-08-22 RX ADMIN — POTASSIUM & SODIUM PHOSPHATES POWDER PACK 280-160-250 MG 1 PACKET: 280-160-250 PACK at 12:37

## 2023-08-22 ASSESSMENT — ACTIVITIES OF DAILY LIVING (ADL)
ADLS_ACUITY_SCORE: 26
ADLS_ACUITY_SCORE: 25
ADLS_ACUITY_SCORE: 27
ADLS_ACUITY_SCORE: 25
ADLS_ACUITY_SCORE: 25
ADLS_ACUITY_SCORE: 26
ADLS_ACUITY_SCORE: 25

## 2023-08-22 NOTE — PLAN OF CARE
Status: s/p 8/17 Glossectomy, mandibulectomy, radical neck dissection  Vitals: VSS, cont sat monitor in place  Neuros: No neuro checks ordered, but pt oriented x4, able to follow commands and make needs known, perrl.   IV: SL x1  Labs/Electrolytes: see results  Resp/trach: #6 Shiley cuffed with cuff down. 21% HTD, sats WNL. Superficially suctioned x4, clears most secretions with good, strong cough. Inner cannula changed q4h.  Diet: NPO. TF bolus feeds QID via NG. 4/4 cans yesterday.  Bowel status: LBM 8/21  : Voiding via BSC  Skin: Neck incision intact with sutures, scant drainage noted and Aquaphor applied per orders. One DARWIN to left neck.   LLE ace intact, good CMS to toes. CAM boot in place when OOB. DARWIN x1 to LLE with scant o/p.   Left thigh STSG dressing changed d/t large amount of drainage. DARWIN x1 to this area with scant pink output noted.   Pain: Reported 10/10 and 8/10 pain tonight. Gave oxy as it was availabe.  Activity: A1-2 to pivot to BSC d/t lines. TTWB to LLE   Social: Pt with a lot of anxiety per providers, would like staff to respond in person to call lights from pt's room.  Plan: Continue with POC, per notes possible OR Thursday or Friday. Wound vac taken down this AM.

## 2023-08-22 NOTE — PROGRESS NOTES
"Otolaryngology Progress Note  August 22, 2023    Subjective/Interval: No acute events overnight. Afebrile, hemodynamically stable.  Flap checks stable without change to dehiscence or perfusion. Tolerating goal bolus tube feeds, ensured free water flushes correct. Multiple loose bowel movements. Transferred to  yesterday.     OBJECTIVE:   /72 (BP Location: Left arm)   Pulse 79   Temp 97.7  F (36.5  C) (Axillary)   Resp 18   Ht 1.626 m (5' 4.02\")   Wt 52 kg (114 lb 10.2 oz)   SpO2 100%   BMI 19.67 kg/m    General: Alert and engaged, communicates and engages with exam, writing on clipboard as able  HEENT: Oral cavity with healthy appearing reconstructed Harman tongue, very soft with some redundant skin. There is a mottled appearing on the anterior neotongue that is stable to worsening with more blistering appearance. Small anterior dehiscence of fibula skin paddle where the demucosalized ALT flap can be seen underneath, also present on the left lateral portion of the flap. Remaining incisions are intact.  Cap refill remains good. Handheld and implantable doppler signals intact. Mental ALT free flap well-perfused with good capillary refill, no evidence of venous congestion. Handheld and implantable doppler signals strong on both sides.  Neck soft with expected post-operative swelling, stable. Stable to improved erythema and swelling of the skin bridge below the flap of the mentum.  No ballotable fluid collections. DARWIN drain x1 with serosanginous output and holding suction. Incision c/d/I with sutures reinforcing area with fallen staples.   Respiratory: 6-0 cuffed shiley sutured in, HTD. Trach is pulling inferiorly and sutures holding.  MSK: Suture line intact, some expected ecchymosis along the incision but stable from prior. Left thigh soft without concerns for compartment syndrome.  Skin graft donor site with expected drainage. Wound vac taken down, STSG intact with good take.     DARWIN drain output(s): (last " 24 hours)/(last shift)  L Thigh 11/30/29  L Neck 1/0/0 - Drain not to be removed  L Leg 1/0/3.5     LABS:  ROUTINE IP LABS (Last four results)  BMP  Recent Labs   Lab 08/22/23  0824 08/22/23  0805 08/22/23  0411 08/21/23  2333 08/21/23  1543 08/21/23  1344 08/21/23  0923 08/21/23  0750 08/20/23  1947 08/20/23  1849 08/20/23  1114 08/20/23  1112 08/19/23  0851 08/19/23  0641   NA  --  145  --   --   --   --   --  146*  --   --   --  146*  --  137   POTASSIUM  --  3.7  --   --   --  4.4  --  3.4  --  3.8  --  3.4   < > 3.2*   CHLORIDE  --  114*  --   --   --   --   --  112*  --   --   --  110*  --  101   RACHAEL  --  8.2*  --   --   --   --   --  8.2*  --   --   --  8.1*  --  8.0*   CO2  --  23  --   --   --   --   --  24  --   --   --  25  --  25   BUN  --  22.9  --   --   --   --   --  26.5*  --   --   --  27.5*  --  18.7   CR  --  0.45*  --   --   --   --   --  0.58  --   --   --  0.69  --  0.79   GLC 93 94 85 142*   < >  --    < > 96   < >  --    < > 127*   < > 135*    < > = values in this interval not displayed.       CBC  Recent Labs   Lab 08/22/23  0805 08/21/23  0750 08/20/23  1112 08/19/23  1611 08/19/23  0641   WBC 10.2 11.7* 15.3*  --  14.6*   RBC 2.55* 2.53* 2.55*  --  2.13*   HGB 7.8* 7.8* 7.9* 8.4* 6.7*   HCT 24.4* 23.7* 23.4*  --  19.7*   MCV 96 94 92  --  93   MCH 30.6 30.8 31.0  --  31.5   MCHC 32.0 32.9 33.8  --  34.0   RDW 15.8* 15.4* 15.5*  --  14.8    369 336  --  333       INRNo lab results found in last 7 days.    A/P: This is a 62 year old female with SCC of the ventral tongue, mandible, with invasion of the skin. Underwent segmental mandibulectomy, partial glossectomy, resection of the skin of the mentum, bilateral neck dissections levels 1-4. Reconstruction of the mandible with left fibular free flap, pedicle for this flap on the right. Reconstruction of the chin defect with left ALT free flap, pedicle on the left. She is doing well postoperatively overall but has new intraoral dehiscence  which will require return to OR for exam and debridement.    Interval changes:  - Given dehiscence, plan to potentially resuture/investigate in OR this Thursday 8/24/23  - Wound vac taken down, now performing wound cares with xeroform, kerlix, ace dressing  - consult to IR placed re: PEG, will follow up scheduling  - Plan to take leg drain tomorrow if stable low output    Neuro:  - Pain control: scheduled Tylenol, PRN oxycodone  - NO Nicotine patch     HEENT:  - Nothing around the neck (no gown ties, trach ties, lines, ect.)  - HOB at 30 degrees, head/neck in midline position  - Monitor implantable doppler signal.  The channel on the right side of the Doppler box corresponds to the right-sided pedicle.  Channel on the left side of the Doppler box corresponds to the left pedicle.  With each flap check, change the channel on the doppler box so that the pedicle on the left neck can be heard. Keep the doppler box changed to the channel connected to the right neck (fibula) most of the time.   - RN flap checks Q4H  - Q8 x 48 hours (through 8/23), then q12.  - Clean incisions with 0.9% sodium chloride and apply Aquaphor Q8H  - Monitor and record DARWIN drain output Qshift  - Peridex TID  - Saline oral rinses Q8H and PRN. Gentle suction with red joaquin catheter only (no yankeur), do not cut red joaquin  - Decadron 6mg q8h x 3 doses (complete)     Respiratory:  - Wean to trach dome as tolerated  - Routine trach cares. Do not cut trach sutures, 1st trach change will be performed by ENT. NO trach ties  -  Will plan to change trach following upcoming procedures      CV/heme:  - Keep MAP > 60, SBP > 90 (preferrably > 110)  - Please call ENT on-call before giving fluid bolus  - Lovenox   - NO vasopressors  - Transfuse for Hgb < 7     FEN/GI:  - NG in place: plan for PEG sometime this week pending IR availability  - Continue Bolus tube feeds  - low threshold to hold tube feeds for any nausea  - Discontinue mIVF  - Strict NPO, no  oral swabs  - Electrolyte replacement protocol  - Bowel regimen: scheduled miralax and senna     :  - low UOP, Cr wnl. Continue to monitor.     Endo  - TSH 2.49  - Alb 4.0  - Prealbumin 9  - sliding scale insulin     ID:  - Unasyn completed     MSK:  - LIMB ALERT: NO IVS or LAB DRAWS from RIGHT ARM. We are keeping this arm safe as a backup flap if necessary.   - LEFT fibula free flap: wound vac and walking boot placed in OR  - Please examine boot tightness on each examination to ensure no pressure injury to dorsal foot  - Elevate leg while in bed/chair  - 1st dressing change on POD5 (8/22), remove wound vac  - Toe touch weight bearing POD1-4, then full weight bearing  - Boot to remain in place for 3 weeks post-op  - LEFT anterolateral thigh free flap; island dressing removed  - LEFT STSG, calcium alginate with tegaderm in place, reinforce PRN, OK to take down to air on POD5, apply Aquaphor  - please strip and record DARWIN drain output q8h      PPX:  - 40mg Lovenox daily   - SCD on right     Consults:  - PT/OT- keep neck neutral (no turning to either side), ok for full weight bearing today  - SLP for PMSV ONLY AFTER cuffless trach in place  - PLC for trach and tube feeding, TF 8/20, trach 8/22  - Nutrition     Dispo: ENT to 6A     -- Patient and above plan discussed with Dr. Dao and Dr. Thorne.     Leah Freeman MD ENT PGY5      ADDENDUM:  Patient will need wound care supplies on discharge.    Left leg wound  Wound type: surgical  Dimensions: 8 x 8 x 0.5 cm  Wound base: split thickness skin graft  Drainage: scant serosanguinous  Dressing type: primary and secondary    Rakel Oliva PA-C  Otolaryngology-Head & Neck Surgery  Please contact ENT by dialing [phone] and entering job code [phone].

## 2023-08-22 NOTE — PROGRESS NOTES
"ENT Free Flap Check  August 22, 2023    S: No issues per nursing with the flap. Continues to breathe comfortably on minimal FiO2 settings; feels the discomfort/coughing from earlier has settled. Notes frustration with communication, particularly when pushing call button for assistance.    O: /63 (BP Location: Left arm)   Pulse 79   Temp 96.8  F (36  C) (Axillary)   Resp 17   Ht 1.626 m (5' 4.02\")   Wt 52 kg (114 lb 10.2 oz)   SpO2 100%   BMI 19.67 kg/m    General: Alert  HEENT: Oral cavity with flap, appropriate color with stable mottling and stable firmness along anteriormost aspect. Stable area of dehiscence anteriorly along left and along left lateral aspect of suture line. Remainder of suture lines intact. Neck incisions intact, Skin paddle with good color and capillary refill. Stable duskiness between suture lines along neck, capillary refill present. All three handheld doppler signals strong, two implantable doppler signals are strong. DARWIN drain x1 with serosanginous output and holding suction.   Respiratory: 6-0 cuffed shiley sutured in, protruding outward but this is stable for her. Cuff deflated.  MSK: Left thigh STSG donor site intact with tagaderm. ALT suture line intact, DARWIN holding suction. Left leg out of boot this, wound vac and DARWIN holding suction.  intact sensation along toes/dorsum, good cap refill in toes.    A/P: Adrianna Pereira is a 62 year old female with history of SCC oral cavity now s/p segmental mandibulectomy, partial glossectomy, resection of the skin of the mentum, bilateral neck dissections levels 1-4 and reconstruction of the mandible with left fibular free flap, pedicle for this flap on the right. Reconstruction of the chin defect with left ALT free flap, pedicle on the left on 8/17/23.    - discussed with 6A RNs and Charge RN that patient has difficult time with communication given tracheostomy and if call light has been turned on may be unable to verbalize request and would " require someone to check in. RN and Charge both verbalized their understanding and will continue to reinforce this in the AM huddle for all trach patients.  -- continue current plan of care      Rossy Linn MD  Otolaryngology Head and Neck Surgery Resident, PGY-4  Please page on call Otolaryngology resident with questions.

## 2023-08-22 NOTE — PROGRESS NOTES
Care Management Follow Up    Length of Stay (days): 5    Expected Discharge Date: 08/24/2023     Concerns to be Addressed: discharge planning     Patient plan of care discussed at interdisciplinary rounds: Yes    Anticipated Discharge Disposition:  Home  Anticipated Discharge Services:  Home care  Anticipated Discharge DME:  suction, humidity, trach supplies, enteral formula and supplies, wound care supplies    Patient/family educated on Medicare website which has current facility and service quality ratings:  Yes  Education Provided on the Discharge Plan: Yes   Patient/Family in Agreement with the Plan:  Yes    Referrals Placed by CM/SW:  Home care, DME  Private pay costs discussed: Not applicable    Additional Information:  Patient status reviewed, discussed in discharge rounds. Plan for patient to return to OR this week. Patient will eventually discharge home with new trach, enteral formula and wound care.    Met with patient and patient's  to discuss discharge plans. Patient does not currently have a PCP. She would like assistance in arranging a follow up appointment. Task sent via [facility] task.     Explained process of setting up suction, humidity, trach supplies, and wound care supplies. They do not have a preference for a DME provider and agree with referral being sent to [facility] ([facility] does not service [facility] for trach supplies). Call placed to [facility] [place] and orders faxed to [phone]. Per staff, order will be processed out of [place] and shipped from [place].      Explained process of arranging enteral formula and supplies. Patient agrees with referral being sent to [facility] to check benefits. Referral sent at this time.     Discussed home care RN, patient/family agree with referral being sent to [facility] and they do not have a preference on home care agency.  expressed concerns about patient being home alone during the day. He states he works about 15 minutes  away and will be accessible by phone if needed for any reason, as will one of their daughters. Discussed patient will be learning to complete cares independently and 24 hr assist may not be necessary. Provided patient family with list of private pay extended hour services if they feel they need more support in the home.     PCP appointment   requested via [facility] task on 8/22    [facility] hub - referral sent for home RN on 8/22    [facility] - orders and documentation for suction, humidity, trach supplies and wound care supplies sent on 8/22  Ph: [phone] or [phone]  Fax: [phone]    [facility] - referral sent to check benefits for home enteral formula and supplies  Ph: [phone]  Fax: [phone]    Marina Betancur, RN, BSN  6A RN Care Coordinator  Ph: [phone]   Pager: [phone]   Admission

## 2023-08-22 NOTE — CONSULTS
"    Interventional Radiology  [facility] Consult Service Note  08/22/23   10:37 AM    Consult Requested: IR G tube placement    Recommendations/Plan:    Patient is on IR schedule 8/23/23 for a IR Gastrostomy Tube Placement. No oral contrast needed  CBC fine, INR ordered and pending.    Orders entered for procedure, NPO status, and pre procedure IV antibiotics (Ancef).   Medications to be held include: AM dose of lovenox   Consent will be done prior to procedure.     Please contact the IR charge RN at [phone] for estimated time of procedure.     Case and imaging discussed with IR attending Dmitri Ramirez MD  Recommendations were reviewed with Leah DUGAN on same team. Hold AM lovenox, keep NGT in place, stop TF midnight, no oral contrast needed down the tube.      History of Present Illness:  Adrianna Pereira is a 62 year old English female with SCC of the ventral tongue, mandible with invasion of skin s/p segmental mandibulectomy, partial glossectomy, resection of the skin of the mentum, bilateral neck dissections with reconstruction requiring prolonged NPO and enteral feeds.  Has NG tube in place.      Pertinent Imaging Reviewed:         Expected date of discharge:  TBD    Vitals:   /72 (BP Location: Left arm)   Pulse 79   Temp 97.7  F (36.5  C) (Axillary)   Resp 18   Ht 1.626 m (5' 4.02\")   Wt 52 kg (114 lb 10.2 oz)   SpO2 100%   BMI 19.67 kg/m      Pertinent Labs:   Lab Results   Component Value Date    WBC 10.2 08/22/2023    WBC 11.7 (H) 08/21/2023    WBC 15.3 (H) 08/20/2023     Lab Results   Component Value Date    HGB 7.8 08/22/2023    HGB 7.8 08/21/2023    HGB 7.9 08/20/2023     Lab Results   Component Value Date     08/22/2023     08/21/2023     08/20/2023     No results found for: INR, PTT  Lab Results   Component Value Date    POTASSIUM 3.7 08/22/2023    POTASSIUM 2.8 (LL) 08/17/2023        COVID-19 Antibody Results, Testing for Immunity           No data to " display              COVID-19 PCR Results           No data to display                America Knight PA-C  Interventional Radiology  Pager: [phone]

## 2023-08-23 ENCOUNTER — ANESTHESIA EVENT (OUTPATIENT)
Dept: SURGERY | Facility: CLINIC | Age: 63
End: 2023-08-23
Payer: COMMERCIAL

## 2023-08-23 ENCOUNTER — HOME INFUSION (PRE-WILLOW HOME INFUSION) (OUTPATIENT)
Dept: PHARMACY | Facility: CLINIC | Age: 63
End: 2023-08-23
Payer: COMMERCIAL

## 2023-08-23 ENCOUNTER — APPOINTMENT (OUTPATIENT)
Dept: INTERVENTIONAL RADIOLOGY/VASCULAR | Facility: CLINIC | Age: 63
End: 2023-08-23
Attending: PHYSICIAN ASSISTANT
Payer: COMMERCIAL

## 2023-08-23 LAB
ANION GAP SERPL CALCULATED.3IONS-SCNC: 9 MMOL/L (ref 7–15)
BUN SERPL-MCNC: 20 MG/DL (ref 8–23)
CALCIUM SERPL-MCNC: 8.3 MG/DL (ref 8.8–10.2)
CHLORIDE SERPL-SCNC: 113 MMOL/L (ref 98–107)
CREAT SERPL-MCNC: 0.43 MG/DL (ref 0.51–0.95)
DEPRECATED HCO3 PLAS-SCNC: 22 MMOL/L (ref 22–29)
ERYTHROCYTE [DISTWIDTH] IN BLOOD BY AUTOMATED COUNT: 15.7 % (ref 10–15)
GFR SERPL CREATININE-BSD FRML MDRD: >90 ML/MIN/1.73M2
GLUCOSE BLDC GLUCOMTR-MCNC: 107 MG/DL (ref 70–99)
GLUCOSE BLDC GLUCOMTR-MCNC: 87 MG/DL (ref 70–99)
GLUCOSE BLDC GLUCOMTR-MCNC: 89 MG/DL (ref 70–99)
GLUCOSE SERPL-MCNC: 97 MG/DL (ref 70–99)
HCT VFR BLD AUTO: 26.2 % (ref 35–47)
HGB BLD-MCNC: 8.4 G/DL (ref 11.7–15.7)
MAGNESIUM SERPL-MCNC: 2 MG/DL (ref 1.7–2.3)
MCH RBC QN AUTO: 30.7 PG (ref 26.5–33)
MCHC RBC AUTO-ENTMCNC: 32.1 G/DL (ref 31.5–36.5)
MCV RBC AUTO: 96 FL (ref 78–100)
PHOSPHATE SERPL-MCNC: 2.5 MG/DL (ref 2.5–4.5)
PLATELET # BLD AUTO: 460 10E3/UL (ref 150–450)
POTASSIUM SERPL-SCNC: 3.5 MMOL/L (ref 3.4–5.3)
RBC # BLD AUTO: 2.74 10E6/UL (ref 3.8–5.2)
SODIUM SERPL-SCNC: 144 MMOL/L (ref 136–145)
WBC # BLD AUTO: 15.5 10E3/UL (ref 4–11)

## 2023-08-23 PROCEDURE — 80048 BASIC METABOLIC PNL TOTAL CA: CPT

## 2023-08-23 PROCEDURE — 250N000011 HC RX IP 250 OP 636: Performed by: RADIOLOGY

## 2023-08-23 PROCEDURE — 49440 PLACE GASTROSTOMY TUBE PERC: CPT | Mod: GC | Performed by: RADIOLOGY

## 2023-08-23 PROCEDURE — 250N000011 HC RX IP 250 OP 636: Mod: JZ | Performed by: PHYSICIAN ASSISTANT

## 2023-08-23 PROCEDURE — 258N000003 HC RX IP 258 OP 636: Performed by: STUDENT IN AN ORGANIZED HEALTH CARE EDUCATION/TRAINING PROGRAM

## 2023-08-23 PROCEDURE — 250N000013 HC RX MED GY IP 250 OP 250 PS 637

## 2023-08-23 PROCEDURE — 36415 COLL VENOUS BLD VENIPUNCTURE: CPT

## 2023-08-23 PROCEDURE — 99152 MOD SED SAME PHYS/QHP 5/>YRS: CPT | Mod: GC | Performed by: RADIOLOGY

## 2023-08-23 PROCEDURE — 120N000002 HC R&B MED SURG/OB UMMC

## 2023-08-23 PROCEDURE — 0DH63UZ INSERTION OF FEEDING DEVICE INTO STOMACH, PERCUTANEOUS APPROACH: ICD-10-PCS | Performed by: RADIOLOGY

## 2023-08-23 PROCEDURE — 49440 PLACE GASTROSTOMY TUBE PERC: CPT

## 2023-08-23 PROCEDURE — 250N000011 HC RX IP 250 OP 636: Performed by: STUDENT IN AN ORGANIZED HEALTH CARE EDUCATION/TRAINING PROGRAM

## 2023-08-23 PROCEDURE — 250N000009 HC RX 250: Performed by: STUDENT IN AN ORGANIZED HEALTH CARE EDUCATION/TRAINING PROGRAM

## 2023-08-23 PROCEDURE — 85027 COMPLETE CBC AUTOMATED: CPT

## 2023-08-23 PROCEDURE — C1769 GUIDE WIRE: HCPCS

## 2023-08-23 PROCEDURE — 83735 ASSAY OF MAGNESIUM: CPT

## 2023-08-23 PROCEDURE — 84100 ASSAY OF PHOSPHORUS: CPT

## 2023-08-23 PROCEDURE — 250N000013 HC RX MED GY IP 250 OP 250 PS 637: Performed by: OTOLARYNGOLOGY

## 2023-08-23 RX ORDER — NALOXONE HYDROCHLORIDE 0.4 MG/ML
0.4 INJECTION, SOLUTION INTRAMUSCULAR; INTRAVENOUS; SUBCUTANEOUS
Status: DISCONTINUED | OUTPATIENT
Start: 2023-08-23 | End: 2023-08-23

## 2023-08-23 RX ORDER — IODIXANOL 320 MG/ML
100 INJECTION, SOLUTION INTRAVASCULAR ONCE
Status: DISCONTINUED | OUTPATIENT
Start: 2023-08-23 | End: 2023-09-01 | Stop reason: HOSPADM

## 2023-08-23 RX ORDER — NALOXONE HYDROCHLORIDE 0.4 MG/ML
0.2 INJECTION, SOLUTION INTRAMUSCULAR; INTRAVENOUS; SUBCUTANEOUS
Status: DISCONTINUED | OUTPATIENT
Start: 2023-08-23 | End: 2023-08-23

## 2023-08-23 RX ORDER — MAGNESIUM OXIDE 400 MG/1
400 TABLET ORAL EVERY 4 HOURS
Status: COMPLETED | OUTPATIENT
Start: 2023-08-23 | End: 2023-08-23

## 2023-08-23 RX ORDER — POTASSIUM CHLORIDE 1.5 G/1.58G
20 POWDER, FOR SOLUTION ORAL ONCE
Status: COMPLETED | OUTPATIENT
Start: 2023-08-23 | End: 2023-08-23

## 2023-08-23 RX ORDER — ENOXAPARIN SODIUM 100 MG/ML
40 INJECTION SUBCUTANEOUS DAILY
Status: DISCONTINUED | OUTPATIENT
Start: 2023-08-24 | End: 2023-08-24

## 2023-08-23 RX ORDER — SODIUM CHLORIDE, SODIUM LACTATE, POTASSIUM CHLORIDE, CALCIUM CHLORIDE 600; 310; 30; 20 MG/100ML; MG/100ML; MG/100ML; MG/100ML
INJECTION, SOLUTION INTRAVENOUS CONTINUOUS
Status: DISCONTINUED | OUTPATIENT
Start: 2023-08-23 | End: 2023-08-25

## 2023-08-23 RX ORDER — CEFAZOLIN SODIUM 2 G/100ML
2 INJECTION, SOLUTION INTRAVENOUS
Status: COMPLETED | OUTPATIENT
Start: 2023-08-23 | End: 2023-08-23

## 2023-08-23 RX ORDER — FENTANYL CITRATE 50 UG/ML
25-50 INJECTION, SOLUTION INTRAMUSCULAR; INTRAVENOUS EVERY 5 MIN PRN
Status: DISCONTINUED | OUTPATIENT
Start: 2023-08-23 | End: 2023-08-23

## 2023-08-23 RX ORDER — FLUMAZENIL 0.1 MG/ML
0.2 INJECTION, SOLUTION INTRAVENOUS
Status: DISCONTINUED | OUTPATIENT
Start: 2023-08-23 | End: 2023-08-23

## 2023-08-23 RX ADMIN — ACETAMINOPHEN 975 MG: 325 TABLET, FILM COATED ORAL at 08:47

## 2023-08-23 RX ADMIN — MAGNESIUM OXIDE TAB 400 MG (241.3 MG ELEMENTAL MG) 400 MG: 400 (241.3 MG) TAB at 13:43

## 2023-08-23 RX ADMIN — MIDAZOLAM 1 MG: 1 INJECTION INTRAMUSCULAR; INTRAVENOUS at 15:13

## 2023-08-23 RX ADMIN — CEFAZOLIN SODIUM 2 G: 2 INJECTION, SOLUTION INTRAVENOUS at 14:47

## 2023-08-23 RX ADMIN — POTASSIUM CHLORIDE 20 MEQ: 1.5 POWDER, FOR SOLUTION ORAL at 10:44

## 2023-08-23 RX ADMIN — WHITE PETROLATUM: 1.75 OINTMENT TOPICAL at 22:17

## 2023-08-23 RX ADMIN — WHITE PETROLATUM: 1.75 OINTMENT TOPICAL at 05:45

## 2023-08-23 RX ADMIN — CHLORHEXIDINE GLUCONATE 15 ML: 1.2 SOLUTION ORAL at 13:43

## 2023-08-23 RX ADMIN — FENTANYL CITRATE 50 MCG: 50 INJECTION, SOLUTION INTRAMUSCULAR; INTRAVENOUS at 15:34

## 2023-08-23 RX ADMIN — POTASSIUM & SODIUM PHOSPHATES POWDER PACK 280-160-250 MG 1 PACKET: 280-160-250 PACK at 10:44

## 2023-08-23 RX ADMIN — MAGNESIUM OXIDE TAB 400 MG (241.3 MG ELEMENTAL MG) 400 MG: 400 (241.3 MG) TAB at 10:44

## 2023-08-23 RX ADMIN — MIDAZOLAM 1 MG: 1 INJECTION INTRAMUSCULAR; INTRAVENOUS at 15:34

## 2023-08-23 RX ADMIN — ACETAMINOPHEN 975 MG: 325 TABLET, FILM COATED ORAL at 20:02

## 2023-08-23 RX ADMIN — FENTANYL CITRATE 50 MCG: 50 INJECTION, SOLUTION INTRAMUSCULAR; INTRAVENOUS at 15:13

## 2023-08-23 RX ADMIN — Medication 1 MG: at 15:31

## 2023-08-23 RX ADMIN — SODIUM CHLORIDE, POTASSIUM CHLORIDE, SODIUM LACTATE AND CALCIUM CHLORIDE: 600; 310; 30; 20 INJECTION, SOLUTION INTRAVENOUS at 22:11

## 2023-08-23 RX ADMIN — LIDOCAINE HYDROCHLORIDE 15 ML: 10 INJECTION, SOLUTION EPIDURAL; INFILTRATION; INTRACAUDAL; PERINEURAL at 15:40

## 2023-08-23 RX ADMIN — POTASSIUM & SODIUM PHOSPHATES POWDER PACK 280-160-250 MG 1 PACKET: 280-160-250 PACK at 13:43

## 2023-08-23 RX ADMIN — CHLORHEXIDINE GLUCONATE 15 ML: 1.2 SOLUTION ORAL at 20:02

## 2023-08-23 RX ADMIN — WHITE PETROLATUM: 1.75 OINTMENT TOPICAL at 13:58

## 2023-08-23 RX ADMIN — OXYCODONE HYDROCHLORIDE 5 MG: 5 TABLET ORAL at 20:02

## 2023-08-23 RX ADMIN — ACETAMINOPHEN 975 MG: 325 TABLET, FILM COATED ORAL at 13:42

## 2023-08-23 RX ADMIN — CHLORHEXIDINE GLUCONATE 15 ML: 1.2 SOLUTION ORAL at 08:47

## 2023-08-23 RX ADMIN — Medication 15 ML: at 08:47

## 2023-08-23 ASSESSMENT — ACTIVITIES OF DAILY LIVING (ADL)
ADLS_ACUITY_SCORE: 27
ADLS_ACUITY_SCORE: 25

## 2023-08-23 ASSESSMENT — VISUAL ACUITY: OU: NORMAL ACUITY

## 2023-08-23 NOTE — PLAN OF CARE
Status: s/p 8/17 Glossectomy, mandibulectomy, radical neck dissection  Vitals: VSS, cont sat monitor in place  Neuros: No neuro checks ordered, but pt oriented x4, able to follow commands and make needs known, perrl.   IV: SL x1  Labs/Electrolytes: see results  Resp/trach: #6 Shiley cuffed with cuff down. 21% HTD, sats WNL. Superficially suctioned x4, clears most secretions with good, strong cough. Inner cannula changed q4h.  Diet: NPO for PEG placement in IR today.   Bowel status: LBM 8/21  : Voiding via BSC  Skin: Neck incision intact with sutures, scant drainage noted and Aquaphor applied per orders. One DARWIN to left neck.   LLE ace intact, good CMS to toes. Now FWB. DARWIN x1 to LLE with scant o/p.   Left thigh STSG dressing changed overnight again d/t large amount of drainage. DARWIN x1 to this area with scant pink output noted.   Pain: 0/10 pain tonight.  Activity: A1-2 to pivot to BSC d/t lines. FWB to LLE   Social: Pt with a lot of anxiety per providers, would like staff to respond in person to call lights from pt's room.  Plan: Continue with POC, per notes possible OR Thursday or Friday. PEG in IR today.

## 2023-08-23 NOTE — PROGRESS NOTES
Therapy: Enteral  Insurance[place]   Ded: $5800  Met: $5800    Co-Insurance: 65/35  Max Out of Pocket: $9100  Met: $7364    Misc: Pt's Enteral must be done via tube for coverage.    In reference to referral from [facility] on pt admitted 08/17/23 to check for Enteral coverage.    Please contact Intake with any questions, [phone] or In Basket pool, [facility] ([phone]).

## 2023-08-23 NOTE — PRE-PROCEDURE
GENERAL PRE-PROCEDURE:   Procedure:  G tube placement   Date/Time:  8/23/2023 2:49 PM    Written consent obtained?: Yes    Risks and benefits: Risks, benefits and alternatives were discussed    Consent given by:  Patient  Patient states understanding of procedure being performed: Yes    Patient's understanding of procedure matches consent: Yes    Procedure consent matches procedure scheduled: Yes    Expected level of sedation:  Moderate  Appropriately NPO:  Yes  ASA Class:  3  Mallampati  :  N/A- Alternate secured airway  Lungs:  Lungs clear with good breath sounds bilaterally  Heart:  Normal heart sounds and rate  History & Physical reviewed:  History and physical reviewed and no updates needed  Statement of review:  I have reviewed the lab findings, diagnostic data, medications, and the plan for sedation

## 2023-08-23 NOTE — ANESTHESIA POSTPROCEDURE EVALUATION
Patient: Adrianna Pereira    Procedure: Procedure(s):  Chin resection  segmental mandibulectomy  GLOSSECTOMY, PARTIAL  Bilateral modified radical neck dissection  Tracheostomy placement, nasogastric tube placement  Left fibula free flap  Split thickness skin graft from left thigh  anterolateral thigh free flap       Anesthesia Type:  General    Note:  Disposition: Admission   Postop Pain Control: Uneventful            Sign Out: Well controlled pain   PONV: No   Neuro/Psych: Uneventful            Sign Out: Acceptable/Baseline neuro status   Airway/Respiratory: Uneventful            Sign Out: AIRWAY IN SITU/Resp. Support   CV/Hemodynamics: Uneventful            Sign Out: Acceptable CV status   Other NRE: NONE   DID A NON-ROUTINE EVENT OCCUR? No           Last vitals:  Vitals:    08/22/23 1710 08/23/23 0007 08/23/23 0753   BP:  136/75 133/69   Pulse:  94 86   Resp:  17 16   Temp:  36.1  C (97  F) (!) 35.8  C (96.4  F)   SpO2: 100% 100% 100%       Electronically Signed By: Christopher J. Behrens, MD  August 23, 2023  12:16 PM

## 2023-08-23 NOTE — PROVIDER NOTIFICATION
The following page was sent to Dr. Linn with ENT at this time:    Flap appears more dusky/darker today, area of swelling/redness/warm to right neck increasing today as well.     Provider called RN to confirm receipt of page. RN to continue to closely monitor flaps via doppler, monitor swelling, and monitor for worsening dehiscence.

## 2023-08-23 NOTE — PLAN OF CARE
Goal Outcome Evaluation:      Plan of Care Reviewed With: patient    Overall Patient Progress: improving     Status: Pt s/p segmental mandibulectomy, partial glossectomy, bilateral neck dissections, reconstruction of the mandible with left fibular free flap, reconstruction of the chin defect with left ALT free flap, and trach placement on 8/17. S/p PEG placement 8/23  Vitals: VSS on 21% HTD  Neuros: AO4, writes to communicate. Numbness to lower face post surgery, RLE 4/5  IV: PIV SL  Labs/Electrolytes: Mag, phos, and potassium all replaced today   Resp/trach: #6 Shiley, cuff down. Suctioned Q3-4 hours. Inner cannula changed Q4. HTD while in bed for humidity but also tolerates the HME  Diet: NPO. TF held today for PEG placement. PEG intact, to drainage until 2000. NG removed this afternoon. Plan to give 1 can TF at 2000, goal of 4 cans/day.   Bowel status: BM 8/22   : Voiding spontaneously   Skin: Bilateral neck incisions with staples, DARWIN to L neck. L STSG changed this shift, DARWIN x 1. LLE with ace bandage, change this AM, DARWIN x 1 with no drainage. L thigh incision RANDALL with sutures. Tongue flap with spot check x 1, pale in color. Neck/chin flap with 2 spot neck and 2 continuous dopplers, pale in color, soft. Continuous doppler to R malfunction this afternoon, seems resolved with positioning.   Pain: Denies pain, scheduled tylenol given   Activity: Up assist of 1, pivot.   Plan: Plan for OR tomorrow, time not available. Continue to monitor and follow POC  Updates this shift: PEG placed this afternoon

## 2023-08-23 NOTE — PLAN OF CARE
"Status: POD 5 Glossectomy, mandibulectomy, radical neck dissection  Vitals: VSS, cont sat monitor in place  Neuros: No neuro checks ordered, but pt oriented x4, able to follow commands and make needs known, perrl.   IV: SL x1  Labs/Electrolytes: Electrolyte protocol, all values replaced today and will be re-checked in am. WNL to continue bolus feedings.  Resp/trach: #6 Shiley. 21% HTD, sats WNL. Tolerates HME also. Superficially suctioned x1 by RN and x1 by RT. Pt needs enc to cough and wipe secretions away. Clears most secretions with good, strong cough. Inner cannula changed approx Q 4 hours, last at 1800. Pt and family completed Trach PLC today. Pt given mirror to better participate in cares.  Diet: NPO. TF bolus feeds QID via NG. Has completed 3/4 cans today. Last at 1630. Tolerating well, no reports of \"sour stomach\" today.  Bowel status: LBM 8/21  : Voiding via BSC or BR  Skin: Neck incision intact with sutures, no drainage noted and Aquaphor applied per orders. One DARWIN to left neck in place.  LLE ace intact, good CMS to toes. CAM boot in place when OOB. DARWIN x1 to LLE with scant o/p. Wound vac removed this am by MD.  Left thigh STSG intact. DARWIN x1 to this area with scant pink output noted.   Pain: Tolerable without pain meds today  Activity: A1-2 to pivot to BSC d/t lines. TTWB to LLE   Social:  and daughter visited today to celebrate their anniversary.  Plan: Continue with POC, per notes possible OR Thursday or Friday. Pt will need one 1 can this rojelio before HS. I  R tomorrow for PEG.           "

## 2023-08-23 NOTE — PROGRESS NOTES
Patient Name: Adrianna Pereira  Medical Record Number: [id number]  Today's Date: 8/23/2023    Procedure: Gastrostomy tube placement   Proceduralist: Dr. Ramirez, Dr. Krause  Pathology present: NA    Procedure Start: 1532  Procedure end: 1547  Sedation medications administered: Fentanyl 100mcg and Versed 2mg   Other medications given: Ancef 2 grams.  Glucagon 1mg.    Report given to: Svetlana  : BRENNAN    Other Notes: Pt arrived to IR room 5 from 6A. Consent reviewed. Pt denies any questions or concerns regarding procedure. Pt positioned supine and monitored per protocol. Pt tolerated procedure without any noted complications. Pt transferred back to 6A.

## 2023-08-23 NOTE — PROGRESS NOTES
"Otolaryngology Progress Note  August 22, 2023    Subjective/Interval: Overnight with some increase in right neck swelling and redness as well as duskiness along skin paddle. Flap appeared stable to overnight resident. Afebrile, HDS. Tolerating bolus feeds yesterday, NPO at midnight and Lovenox held for PEG placement this AM.    OBJECTIVE:   /69   Pulse 86   Temp (!) 96.4  F (35.8  C) (Axillary)   Resp 16   Ht 1.626 m (5' 4.02\")   Wt 55.3 kg (122 lb)   SpO2 100%   BMI 20.93 kg/m    General: Alert and engaged, communicates and engages with exam, writing on clipboard as able  HEENT: Oral cavity with healthy appearing reconstructed Harman tongue, very soft with some redundant skin. There is a mottled/dusky area on the anterior fibula skin paddle extending toward the left. Incision is dehisced where the demucosalized ALT flap can be seen underneath anteriorly extending toward the left. Remaining incisions are intact.  Cap refill remains good. Handheld and implantable doppler signals intact. Mental ALT free flap well-perfused with good capillary refill, no evidence of venous congestion. Handheld and implantable doppler signals strong on both sides.  Neck soft with expected post-operative swelling, right neck with slight increase in edema and erythema, but no ballotable fullness or neck incision drainage. DARWIN drain x1 with serosanginous output and holding suction. Incision c/d/I with sutures reinforcing area with fallen staples.   Respiratory: 6-0 cuffed shiley sutured in, HTD.  MSK: Suture line intact, some expected ecchymosis along the incision but stable from prior. Left thigh soft without concerns for compartment syndrome.  Skin graft donor site with expected drainage. Lower leg dressing taken down, incision and skin graft noted to be intact and dressing re-placed.    DARWIN drain output(s): (last 24 hours)/(last shift)  Drain 1 L Thigh NR/70/14 (84)  Drain 2 L Neck NR/10/4 (14) - remain in place  Drain 2 L Leg " NR/20/3 (23)    LABS:  ROUTINE IP LABS (Last four results)  BMP  Recent Labs   Lab 08/23/23  0755 08/22/23  1241 08/22/23  0824 08/22/23  0805 08/21/23  1543 08/21/23  1344 08/21/23  0923 08/21/23  0750 08/20/23  1947 08/20/23  1849 08/20/23  1114 08/20/23  1112 08/19/23  0851 08/19/23  0641   NA  --   --   --  145  --   --   --  146*  --   --   --  146*  --  137   POTASSIUM  --   --   --  3.7  --  4.4  --  3.4  --  3.8  --  3.4   < > 3.2*   CHLORIDE  --   --   --  114*  --   --   --  112*  --   --   --  110*  --  101   RACHAEL  --   --   --  8.2*  --   --   --  8.2*  --   --   --  8.1*  --  8.0*   CO2  --   --   --  23  --   --   --  24  --   --   --  25  --  25   BUN  --   --   --  22.9  --   --   --  26.5*  --   --   --  27.5*  --  18.7   CR  --   --   --  0.45*  --   --   --  0.58  --   --   --  0.69  --  0.79   GLC 89 126* 93 94   < >  --    < > 96   < >  --    < > 127*   < > 135*    < > = values in this interval not displayed.     CBC  Recent Labs   Lab 08/23/23  0805 08/22/23  0805 08/21/23  0750 08/20/23  1112   WBC 15.5* 10.2 11.7* 15.3*   RBC 2.74* 2.55* 2.53* 2.55*   HGB 8.4* 7.8* 7.8* 7.9*   HCT 26.2* 24.4* 23.7* 23.4*   MCV 96 96 94 92   MCH 30.7 30.6 30.8 31.0   MCHC 32.1 32.0 32.9 33.8   RDW 15.7* 15.8* 15.4* 15.5*   * 357 369 336     INR  Recent Labs   Lab 08/22/23  1104   INR 1.06       A/P: This is a 62 year old female with SCC of the ventral tongue, mandible, with invasion of the skin. Underwent segmental mandibulectomy, partial glossectomy, resection of the skin of the mentum, bilateral neck dissections levels 1-4. Reconstruction of the mandible with left fibular free flap, pedicle for this flap on the right. Reconstruction of the chin defect with left ALT free flap, pedicle on the left. She is doing well postoperatively overall but has new intraoral dehiscence which will require return to OR for exam and debridement.    Interval changes:  - On OR schedule for Thursday for EUA  - PEG scheduled  for today, NPO at midnight and lovenox held  - Wound vac taken down, now performing wound cares with xeroform, kerlix, ace dressing  - consult to IR placed re: PEG, will follow up scheduling  - possible leg drain removal today pending PM output; please record DARWIN drain output q8h, and record 0 if no output    Neuro:  - Pain control: scheduled Tylenol, PRN oxycodone  - NO Nicotine patch     HEENT:  - Nothing around the neck (no gown ties, trach ties, lines, ect.)  - HOB at 30 degrees, head/neck in midline position  - Monitor implantable doppler signal.  The channel on the right side of the Doppler box corresponds to the right-sided pedicle.  Channel on the left side of the Doppler box corresponds to the left pedicle.  With each flap check, change the channel on the doppler box so that the pedicle on the left neck can be heard. Keep the doppler box changed to the channel connected to the right neck (fibula) most of the time.   - RN flap checks Q4H  - Q8 x 48 hours (through 8/23), then q12.  - Clean incisions with 0.9% sodium chloride and apply Aquaphor Q8H  - Monitor and record DARWIN drain output Qshift  - Peridex TID  - Saline oral rinses Q8H and PRN. Gentle suction with red joaquin catheter only (no yankeur), do not cut red joaquin  - Decadron 6mg q8h x 3 doses (complete)     Respiratory:  - Wean to trach dome as tolerated  - Routine trach cares. Do not cut trach sutures, 1st trach change will be performed by ENT. NO trach ties  -  Will plan to change trach following upcoming procedures      CV/heme:  - Keep MAP > 60, SBP > 90 (preferrably > 110)  - Please call ENT on-call before giving fluid bolus  - Lovenox   - NO vasopressors  - Transfuse for Hgb < 7     FEN/GI:  - NG in place: PEG today  - Continue Bolus tube feeds  - low threshold to hold tube feeds for any nausea  - Discontinue mIVF  - Strict NPO, no oral swabs  - Electrolyte replacement protocol  - Bowel regimen: scheduled miralax and senna     :  - low UOP,  Cr wnl. Continue to monitor.     Endo  - TSH 2.49  - Alb 4.0  - Prealbumin 9  - sliding scale insulin     ID:  - Unasyn completed     MSK:  - LIMB ALERT: NO IVS or LAB DRAWS from RIGHT ARM. We are keeping this arm safe as a backup flap if necessary.   - LEFT fibula free flap: wound vac and walking boot placed in OR  - Please examine boot tightness on each examination to ensure no pressure injury to dorsal foot  - Elevate leg while in bed/chair  - 1st dressing change on POD5 (8/22), remove wound vac  - Toe touch weight bearing POD1-4, then full weight bearing  - Boot to remain in place for 3 weeks post-op  - LEFT anterolateral thigh free flap; island dressing removed  - LEFT STSG, calcium alginate with tegaderm in place, reinforce PRN, OK to take down to air on POD5, apply Aquaphor  - please strip and record DARWIN drain output q8h      PPX:  - 40mg Lovenox daily   - SCD on right     Consults:  - PT/OT- keep neck neutral (no turning to either side), ok for full weight bearing  - SLP for PMSV ONLY AFTER cuffless trach in place  - PLC for trach and tube feeding, TF 8/20, trach 8/22  - Nutrition     Dispo: ENT to 6A     -- Patient and above plan discussed with Dr. Dao and Dr. Thorne.     Yolette Crouch MD   Otolaryngology-Head & Neck Surgery PGY3  Please contact ENT on call with questions

## 2023-08-23 NOTE — PROCEDURES
[facility]    Procedure: IR Procedure Note    Date/Time: 8/23/2023 3:51 PM    Performed by: Mikey Krause MD  Authorized by: Dmitri Ramirez MD      UNIVERSAL PROTOCOL   Site Marked: NA  Prior Images Obtained and Reviewed:  Yes  Required items: Required blood products, implants, devices and special equipment available    Patient identity confirmed:  Verbally with patient, arm band, provided demographic data and hospital-assigned identification number  Patient was reevaluated immediately before administering moderate or deep sedation or anesthesia  Confirmation Checklist:  Patient's identity using two indicators, relevant allergies, procedure was appropriate and matched the consent or emergent situation and correct equipment/implants were available  Time out: Immediately prior to the procedure a time out was called    Universal Protocol: the Joint Commission Universal Protocol was followed    Preparation: Patient was prepped and draped in usual sterile fashion       ANESTHESIA    Anesthesia: Local infiltration  Local Anesthetic:  Lidocaine 1% without epinephrine      SEDATION  Patient Sedated: Yes    Sedation:  Fentanyl and midazolam  Vital signs: Vital signs monitored during sedation    See dictated procedure note for full details.  Findings: G Tube placement. NPO for 4 hours.     Specimens: none    Complications: None    Condition: Stable    Plan: G Tube placement. NPO for 4 hours.       PROCEDURE  Describe Procedure: G Tube placement. NPO for 4 hours.   Patient Tolerance:  Patient tolerated the procedure well with no immediate complications  Length of time physician/provider present for 1:1 monitoring during sedation: 45

## 2023-08-24 ENCOUNTER — ANESTHESIA (OUTPATIENT)
Dept: SURGERY | Facility: CLINIC | Age: 63
End: 2023-08-24
Payer: COMMERCIAL

## 2023-08-24 LAB
ANION GAP SERPL CALCULATED.3IONS-SCNC: 9 MMOL/L (ref 7–15)
BUN SERPL-MCNC: 15.4 MG/DL (ref 8–23)
CALCIUM SERPL-MCNC: 8.1 MG/DL (ref 8.8–10.2)
CHLORIDE SERPL-SCNC: 111 MMOL/L (ref 98–107)
CREAT SERPL-MCNC: 0.4 MG/DL (ref 0.51–0.95)
DEPRECATED HCO3 PLAS-SCNC: 23 MMOL/L (ref 22–29)
ERYTHROCYTE [DISTWIDTH] IN BLOOD BY AUTOMATED COUNT: 16.1 % (ref 10–15)
GFR SERPL CREATININE-BSD FRML MDRD: >90 ML/MIN/1.73M2
GLUCOSE BLDC GLUCOMTR-MCNC: 102 MG/DL (ref 70–99)
GLUCOSE BLDC GLUCOMTR-MCNC: 107 MG/DL (ref 70–99)
GLUCOSE BLDC GLUCOMTR-MCNC: 121 MG/DL (ref 70–99)
GLUCOSE BLDC GLUCOMTR-MCNC: 76 MG/DL (ref 70–99)
GLUCOSE BLDC GLUCOMTR-MCNC: 86 MG/DL (ref 70–99)
GLUCOSE SERPL-MCNC: 113 MG/DL (ref 70–99)
HCT VFR BLD AUTO: 26.7 % (ref 35–47)
HGB BLD-MCNC: 8.4 G/DL (ref 11.7–15.7)
MAGNESIUM SERPL-MCNC: 2 MG/DL (ref 1.7–2.3)
MCH RBC QN AUTO: 31 PG (ref 26.5–33)
MCHC RBC AUTO-ENTMCNC: 31.5 G/DL (ref 31.5–36.5)
MCV RBC AUTO: 99 FL (ref 78–100)
PHOSPHATE SERPL-MCNC: 3 MG/DL (ref 2.5–4.5)
PLATELET # BLD AUTO: 598 10E3/UL (ref 150–450)
POTASSIUM SERPL-SCNC: 3.5 MMOL/L (ref 3.4–5.3)
RBC # BLD AUTO: 2.71 10E6/UL (ref 3.8–5.2)
SODIUM SERPL-SCNC: 143 MMOL/L (ref 136–145)
WBC # BLD AUTO: 13.8 10E3/UL (ref 4–11)

## 2023-08-24 PROCEDURE — 84100 ASSAY OF PHOSPHORUS: CPT

## 2023-08-24 PROCEDURE — 258N000003 HC RX IP 258 OP 636: Performed by: STUDENT IN AN ORGANIZED HEALTH CARE EDUCATION/TRAINING PROGRAM

## 2023-08-24 PROCEDURE — 250N000009 HC RX 250: Performed by: STUDENT IN AN ORGANIZED HEALTH CARE EDUCATION/TRAINING PROGRAM

## 2023-08-24 PROCEDURE — 83735 ASSAY OF MAGNESIUM: CPT

## 2023-08-24 PROCEDURE — 85027 COMPLETE CBC AUTOMATED: CPT

## 2023-08-24 PROCEDURE — 250N000013 HC RX MED GY IP 250 OP 250 PS 637

## 2023-08-24 PROCEDURE — 272N000001 HC OR GENERAL SUPPLY STERILE: Performed by: STUDENT IN AN ORGANIZED HEALTH CARE EDUCATION/TRAINING PROGRAM

## 2023-08-24 PROCEDURE — 80048 BASIC METABOLIC PNL TOTAL CA: CPT

## 2023-08-24 PROCEDURE — 250N000011 HC RX IP 250 OP 636: Performed by: STUDENT IN AN ORGANIZED HEALTH CARE EDUCATION/TRAINING PROGRAM

## 2023-08-24 PROCEDURE — 250N000011 HC RX IP 250 OP 636: Mod: JZ | Performed by: STUDENT IN AN ORGANIZED HEALTH CARE EDUCATION/TRAINING PROGRAM

## 2023-08-24 PROCEDURE — 360N000075 HC SURGERY LEVEL 2, PER MIN: Performed by: STUDENT IN AN ORGANIZED HEALTH CARE EDUCATION/TRAINING PROGRAM

## 2023-08-24 PROCEDURE — 999N000157 HC STATISTIC RCP TIME EA 10 MIN

## 2023-08-24 PROCEDURE — 120N000002 HC R&B MED SURG/OB UMMC

## 2023-08-24 PROCEDURE — 250N000013 HC RX MED GY IP 250 OP 250 PS 637: Performed by: OTOLARYNGOLOGY

## 2023-08-24 PROCEDURE — 370N000017 HC ANESTHESIA TECHNICAL FEE, PER MIN: Performed by: STUDENT IN AN ORGANIZED HEALTH CARE EDUCATION/TRAINING PROGRAM

## 2023-08-24 PROCEDURE — 999N000043 HC STATISTIC CTO2 CONT OXYGEN TECH TIME EA 90 MIN

## 2023-08-24 PROCEDURE — 250N000025 HC SEVOFLURANE, PER MIN: Performed by: STUDENT IN AN ORGANIZED HEALTH CARE EDUCATION/TRAINING PROGRAM

## 2023-08-24 PROCEDURE — 710N000010 HC RECOVERY PHASE 1, LEVEL 2, PER MIN: Performed by: STUDENT IN AN ORGANIZED HEALTH CARE EDUCATION/TRAINING PROGRAM

## 2023-08-24 PROCEDURE — 250N000011 HC RX IP 250 OP 636: Performed by: ANESTHESIOLOGY

## 2023-08-24 PROCEDURE — 999N000215 HC STATISTIC HFNC ADULT NON-CPAP

## 2023-08-24 PROCEDURE — 999N000141 HC STATISTIC PRE-PROCEDURE NURSING ASSESSMENT: Performed by: STUDENT IN AN ORGANIZED HEALTH CARE EDUCATION/TRAINING PROGRAM

## 2023-08-24 PROCEDURE — 12020 TX SUPFC WND DEHSN SMPL CLSR: CPT | Mod: 78 | Performed by: STUDENT IN AN ORGANIZED HEALTH CARE EDUCATION/TRAINING PROGRAM

## 2023-08-24 PROCEDURE — 36415 COLL VENOUS BLD VENIPUNCTURE: CPT

## 2023-08-24 RX ORDER — ENOXAPARIN SODIUM 100 MG/ML
40 INJECTION SUBCUTANEOUS
Status: DISCONTINUED | OUTPATIENT
Start: 2023-08-24 | End: 2023-09-01 | Stop reason: HOSPADM

## 2023-08-24 RX ORDER — SODIUM CHLORIDE, SODIUM LACTATE, POTASSIUM CHLORIDE, CALCIUM CHLORIDE 600; 310; 30; 20 MG/100ML; MG/100ML; MG/100ML; MG/100ML
INJECTION, SOLUTION INTRAVENOUS CONTINUOUS PRN
Status: DISCONTINUED | OUTPATIENT
Start: 2023-08-24 | End: 2023-08-24

## 2023-08-24 RX ORDER — EPHEDRINE SULFATE 50 MG/ML
INJECTION, SOLUTION INTRAMUSCULAR; INTRAVENOUS; SUBCUTANEOUS PRN
Status: DISCONTINUED | OUTPATIENT
Start: 2023-08-24 | End: 2023-08-24

## 2023-08-24 RX ORDER — HYDROMORPHONE HCL IN WATER/PF 6 MG/30 ML
0.4 PATIENT CONTROLLED ANALGESIA SYRINGE INTRAVENOUS EVERY 5 MIN PRN
Status: DISCONTINUED | OUTPATIENT
Start: 2023-08-24 | End: 2023-08-24 | Stop reason: HOSPADM

## 2023-08-24 RX ORDER — ONDANSETRON 4 MG/1
4 TABLET, ORALLY DISINTEGRATING ORAL EVERY 30 MIN PRN
Status: DISCONTINUED | OUTPATIENT
Start: 2023-08-24 | End: 2023-08-24 | Stop reason: HOSPADM

## 2023-08-24 RX ORDER — FENTANYL CITRATE 50 UG/ML
25 INJECTION, SOLUTION INTRAMUSCULAR; INTRAVENOUS EVERY 5 MIN PRN
Status: DISCONTINUED | OUTPATIENT
Start: 2023-08-24 | End: 2023-08-24 | Stop reason: HOSPADM

## 2023-08-24 RX ORDER — LIDOCAINE HYDROCHLORIDE 20 MG/ML
INJECTION, SOLUTION INFILTRATION; PERINEURAL PRN
Status: DISCONTINUED | OUTPATIENT
Start: 2023-08-24 | End: 2023-08-24

## 2023-08-24 RX ORDER — FENTANYL CITRATE 50 UG/ML
INJECTION, SOLUTION INTRAMUSCULAR; INTRAVENOUS PRN
Status: DISCONTINUED | OUTPATIENT
Start: 2023-08-24 | End: 2023-08-24

## 2023-08-24 RX ORDER — SODIUM CHLORIDE, SODIUM LACTATE, POTASSIUM CHLORIDE, CALCIUM CHLORIDE 600; 310; 30; 20 MG/100ML; MG/100ML; MG/100ML; MG/100ML
INJECTION, SOLUTION INTRAVENOUS CONTINUOUS
Status: DISCONTINUED | OUTPATIENT
Start: 2023-08-24 | End: 2023-08-24 | Stop reason: HOSPADM

## 2023-08-24 RX ORDER — ONDANSETRON 2 MG/ML
INJECTION INTRAMUSCULAR; INTRAVENOUS PRN
Status: DISCONTINUED | OUTPATIENT
Start: 2023-08-24 | End: 2023-08-24

## 2023-08-24 RX ORDER — DEXAMETHASONE SODIUM PHOSPHATE 4 MG/ML
10 INJECTION, SOLUTION INTRA-ARTICULAR; INTRALESIONAL; INTRAMUSCULAR; INTRAVENOUS; SOFT TISSUE ONCE
Status: DISCONTINUED | OUTPATIENT
Start: 2023-08-24 | End: 2023-08-24

## 2023-08-24 RX ORDER — HYDROMORPHONE HCL IN WATER/PF 6 MG/30 ML
0.2 PATIENT CONTROLLED ANALGESIA SYRINGE INTRAVENOUS EVERY 5 MIN PRN
Status: DISCONTINUED | OUTPATIENT
Start: 2023-08-24 | End: 2023-08-24 | Stop reason: HOSPADM

## 2023-08-24 RX ORDER — ENOXAPARIN SODIUM 100 MG/ML
40 INJECTION SUBCUTANEOUS DAILY
Status: DISCONTINUED | OUTPATIENT
Start: 2023-08-25 | End: 2023-08-24

## 2023-08-24 RX ORDER — FENTANYL CITRATE 50 UG/ML
50 INJECTION, SOLUTION INTRAMUSCULAR; INTRAVENOUS EVERY 5 MIN PRN
Status: DISCONTINUED | OUTPATIENT
Start: 2023-08-24 | End: 2023-08-24 | Stop reason: HOSPADM

## 2023-08-24 RX ORDER — ONDANSETRON 2 MG/ML
4 INJECTION INTRAMUSCULAR; INTRAVENOUS EVERY 30 MIN PRN
Status: DISCONTINUED | OUTPATIENT
Start: 2023-08-24 | End: 2023-08-24 | Stop reason: HOSPADM

## 2023-08-24 RX ADMIN — PHENYLEPHRINE HYDROCHLORIDE 200 MCG: 10 INJECTION INTRAVENOUS at 14:54

## 2023-08-24 RX ADMIN — Medication 15 ML: at 08:25

## 2023-08-24 RX ADMIN — CHLORHEXIDINE GLUCONATE 15 ML: 1.2 SOLUTION ORAL at 08:25

## 2023-08-24 RX ADMIN — MIDAZOLAM 2 MG: 1 INJECTION INTRAMUSCULAR; INTRAVENOUS at 14:37

## 2023-08-24 RX ADMIN — WHITE PETROLATUM: 1.75 OINTMENT TOPICAL at 06:51

## 2023-08-24 RX ADMIN — PHENYLEPHRINE HYDROCHLORIDE 100 MCG: 10 INJECTION INTRAVENOUS at 15:12

## 2023-08-24 RX ADMIN — ACETAMINOPHEN 975 MG: 325 TABLET, FILM COATED ORAL at 08:25

## 2023-08-24 RX ADMIN — ENOXAPARIN SODIUM 40 MG: 40 INJECTION SUBCUTANEOUS at 20:35

## 2023-08-24 RX ADMIN — OXYCODONE HYDROCHLORIDE 5 MG: 5 TABLET ORAL at 04:05

## 2023-08-24 RX ADMIN — PHENYLEPHRINE HYDROCHLORIDE 200 MCG: 10 INJECTION INTRAVENOUS at 15:03

## 2023-08-24 RX ADMIN — FENTANYL CITRATE 100 MCG: 50 INJECTION, SOLUTION INTRAMUSCULAR; INTRAVENOUS at 14:47

## 2023-08-24 RX ADMIN — WHITE PETROLATUM: 1.75 OINTMENT TOPICAL at 22:48

## 2023-08-24 RX ADMIN — SODIUM CHLORIDE, POTASSIUM CHLORIDE, SODIUM LACTATE AND CALCIUM CHLORIDE: 600; 310; 30; 20 INJECTION, SOLUTION INTRAVENOUS at 10:50

## 2023-08-24 RX ADMIN — FENTANYL CITRATE 25 MCG: 50 INJECTION, SOLUTION INTRAMUSCULAR; INTRAVENOUS at 16:47

## 2023-08-24 RX ADMIN — ONDANSETRON 4 MG: 2 INJECTION INTRAMUSCULAR; INTRAVENOUS at 15:40

## 2023-08-24 RX ADMIN — SENNOSIDES AND DOCUSATE SODIUM 2 TABLET: 50; 8.6 TABLET ORAL at 19:33

## 2023-08-24 RX ADMIN — EPHEDRINE SULFATE 5 MG: 5 INJECTION INTRAVENOUS at 15:12

## 2023-08-24 RX ADMIN — SUGAMMADEX 200 MG: 100 INJECTION, SOLUTION INTRAVENOUS at 16:00

## 2023-08-24 RX ADMIN — EPHEDRINE SULFATE 5 MG: 5 INJECTION INTRAVENOUS at 15:32

## 2023-08-24 RX ADMIN — LIDOCAINE HYDROCHLORIDE 70 MG: 20 INJECTION, SOLUTION INFILTRATION; PERINEURAL at 14:47

## 2023-08-24 RX ADMIN — CHLORHEXIDINE GLUCONATE 15 ML: 1.2 SOLUTION ORAL at 19:33

## 2023-08-24 RX ADMIN — SODIUM CHLORIDE, POTASSIUM CHLORIDE, SODIUM LACTATE AND CALCIUM CHLORIDE: 600; 310; 30; 20 INJECTION, SOLUTION INTRAVENOUS at 14:37

## 2023-08-24 RX ADMIN — ACETAMINOPHEN 975 MG: 325 TABLET, FILM COATED ORAL at 19:33

## 2023-08-24 RX ADMIN — Medication 50 MG: at 14:48

## 2023-08-24 RX ADMIN — FENTANYL CITRATE 25 MCG: 50 INJECTION, SOLUTION INTRAMUSCULAR; INTRAVENOUS at 16:54

## 2023-08-24 ASSESSMENT — ACTIVITIES OF DAILY LIVING (ADL)
ADLS_ACUITY_SCORE: 27
ADLS_ACUITY_SCORE: 27
ADLS_ACUITY_SCORE: 25
ADLS_ACUITY_SCORE: 27
ADLS_ACUITY_SCORE: 25
ADLS_ACUITY_SCORE: 27
ADLS_ACUITY_SCORE: 25
ADLS_ACUITY_SCORE: 27

## 2023-08-24 ASSESSMENT — LIFESTYLE VARIABLES: TOBACCO_USE: 1

## 2023-08-24 NOTE — PROGRESS NOTES
"Care Management Follow Up    Length of Stay (days): 7    Expected Discharge Date: 08/24/2023     Concerns to be Addressed:  discharge planning     Patient plan of care discussed at interdisciplinary rounds: Yes    Anticipated Discharge Disposition:  Home  Anticipated Discharge Services:  None  Anticipated Discharge DME:  suction, humidity, trach supplies, enteral formula and supplies, wound care supplies     Patient/family educated on Medicare website which has current facility and service quality ratings:  Yes  Education Provided on the Discharge Plan: Yes   Patient/Family in Agreement with the Plan:  Yes    Referrals Placed by CM/SW:  Home care, DME  Private pay costs discussed: list of private pay home care services given to pt/family    Additional Information:  Patient status reviewed, discussed in discharge rounds. Patient is returning to OR today but may be medically ready for discharge as early as tomorrow.     Call placed to [facility], spoke with [employer] services regarding orders sent on Tuesday. The rep states wound care supplies have already been delivered to the patient's home. RT is working on suction, humidity, trach orders. He transferred me to [facility] to discuss.     [facility] states they do not do new trach set ups on Friday or over the weekend, earliest they will be able to do the set up is Monday. Asked [facility] to look over orders and notes and makes sure everything looks good so there is no delay come Monday. [employer] agreed and will call RNCC back, also gave her direct number - ph: [phone]. Team updated.     Per [facility]: \"Pt Adrianna Pereira has coverage for Enteral through [place] plan. Pt s Enteral must be done via tube for coverage. They ve currently met their ded of $5800 (65/35 coverage) and has accumulated $7364/$9100 towards their out of pocket. Once met pt should be covered at 100%, Let me know if you have any questions. Thanks!\"    Met with patient to discuss discharge " planning. Patient voiced understanding regarding suction, humidity, trach set up. She will double check with her  that wound care supplied were delivered to their home. She states she would like to use [facility] for enteral formula and supplies, liaison updated.     RNCC will continue to follow.    PCP appointment Sept. 13th 2:20 pm   Dr. Sukumar Hendricks  [address] [facility] [id number] [phone]     [facility] - accepted for home RN   Ph: [phone]  Fax: [phone]    [facility] - suction, humidity, trach supplies and wound care supplies   Ph: [phone] or [phone]  Fax: [phone]     [facility] - home enteral formula and supplies  Ph: [phone]  Fax: [phone]    Marina Betancur, RN, BSN  6A RN Care Coordinator  Ph: [phone]   Pager: [phone]

## 2023-08-24 NOTE — ANESTHESIA PREPROCEDURE EVALUATION
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)     Tobacco dependence syndrome       Past Surgical History:   Procedure Laterality Date    DISSECTION RADICAL NECK MODIFIED Bilateral 2023    Procedure: Bilateral modified radical neck dissection;  Surgeon: Tyshawn Dao MD;  Location: [facility]    ENT SURGERY      oral biopsy    EXCISE LESION LIP N/A 2023    Procedure: Chin resection;  Surgeon: Tyshawn Dao MD;  Location: [facility]    GLOSSECTOMY PARTIAL N/A 2023    Procedure: GLOSSECTOMY, PARTIAL;  Surgeon: Tyshawn Dao MD;  Location: [facility]    GRAFT BONE FREE VASCULARIZED FROM LOWER EXTREMITY Left 2023    Procedure: Left fibula free flap;  Surgeon: Darien Thorne MD;  Location: [facility]    GRAFT FREE VASCULARIZED (LOCATION) Left 2023    Procedure: anterolateral thigh free flap;  Surgeon: Darien Thorne MD;  Location: [facility]    GRAFT SKIN SPLIT THICKNESS FROM EXTREMITY Left 2023    Procedure: Split thickness skin graft from left thigh;  Surgeon: Darien Thorne MD;  Location: [facility]    GYN SURGERY      tubal ligation    IR GASTROSTOMY TUBE PERCUTANEOUS PLCMNT  2023    MANDIBULECTOMY TOTAL N/A 2023    Procedure: segmental mandibulectomy;  Surgeon: Tyshawn Dao MD;  [facility]    TRACHEOSTOMY N/A 2023    Procedure: Tracheostomy placement, nasogastric tube placement;  Surgeon: Tyshawn Dao MD;  Location: [facility]      No Known Allergies   Social History     Tobacco Use    Smoking status: Some Days     Packs/day: 1.00     Years: 40.00     Pack years: 40.00     Types: Cigarettes     Passive exposure: Current    Smokeless tobacco: Never    Tobacco comments:     Cutting down to 5-7  cigarettes per day   Substance Use Topics    Alcohol use: Yes     Comment: 1 drink a day      Wt Readings from Last 1 Encounters:   08/23/23 55.3 kg (122 lb)        Anesthesia Evaluation   Pt has had prior anesthetic. Type: General.        ROS/MED HX  ENT/Pulmonary: Comment: Partial glossectomy, segmental mandibulectomy, modified radical neck dissection, tracheostomy, skin graft 8/17/23  DARWIN Left neck  Right neck erythema and edema  6.0 Cuffed Shiley sutured in place    (+)                tobacco use, Past use,  40  Pack-Year Hx,                     Neurologic:       Cardiovascular:       METS/Exercise Tolerance: >4 METS    Hematologic:  - neg hematologic  ROS     Musculoskeletal:  - neg musculoskeletal ROS     GI/Hepatic: Comment: PEG placement by IR 8/23/23  Nutrition: Tube Feeds - neg GI/hepatic ROS     Renal/Genitourinary:  - neg Renal ROS     Endo:  - neg endo ROS     Psychiatric/Substance Use:     (+)   alcohol abuse      Infectious Disease:  - neg infectious disease ROS     Malignancy: Comment: SCC Oral cavity      Other:            Physical Exam    Airway   unable to assess   Comment: Bilateral submandibular edema and erythema   DARWIN in place           Respiratory Devices and Support  Comment: 6.0 Cuffed Shiley Trach sutured in place  TRACH:  FiO2: 21; %     Dental    unable to assess        Cardiovascular   cardiovascular exam normal          Pulmonary   pulmonary exam normal                OUTSIDE LABS:  CBC:   Lab Results   Component Value Date    WBC 13.8 (H) 08/24/2023    WBC 15.5 (H) 08/23/2023    HGB 8.4 (L) 08/24/2023    HGB 8.4 (L) 08/23/2023    HCT 26.7 (L) 08/24/2023    HCT 26.2 (L) 08/23/2023     (H) 08/24/2023     (H) 08/23/2023     BMP:   Lab Results   Component Value Date     08/23/2023     08/22/2023    POTASSIUM 3.5 08/23/2023    POTASSIUM 3.7 08/22/2023    CHLORIDE 113 (H) 08/23/2023    CHLORIDE 114 (H) 08/22/2023    CO2 22 08/23/2023    CO2 23 08/22/2023    BUN 20.0  08/23/2023    BUN 22.9 08/22/2023    CR 0.43 (L) 08/23/2023    CR 0.45 (L) 08/22/2023    GLC 86 08/24/2023    GLC 76 08/24/2023     COAGS:   Lab Results   Component Value Date    INR 1.06 08/22/2023     POC: No results found for: BGM, HCG, HCGS  HEPATIC:   Lab Results   Component Value Date    ALBUMIN 4.0 08/17/2023    PROTTOTAL 7.2 08/02/2023    ALT 10 08/02/2023    AST 16 08/02/2023    ALKPHOS 87 08/02/2023    BILITOTAL 0.5 08/02/2023     OTHER:   Lab Results   Component Value Date    PH 7.43 08/17/2023    LACT 1.8 08/17/2023    RACHAEL 8.3 (L) 08/23/2023    PHOS 2.5 08/23/2023    MAG 2.0 08/23/2023    TSH 2.49 08/17/2023       Anesthesia Plan    ASA Status:  4    NPO Status:  NPO Appropriate    Anesthesia Type: General.     - Airway: Tracheostomy   Induction: Intravenous.   Maintenance: Balanced.        Consents    Anesthesia Plan(s) and associated risks, benefits, and realistic alternatives discussed. Questions answered and patient/representative(s) expressed understanding.     - Discussed:     - Discussed with:  Patient      - Extended Intubation/Ventilatory Support Discussed: Yes.      - Patient is DNR/DNI Status: No     Use of blood products discussed: No .     Postoperative Care    Pain management: Multi-modal analgesia.   PONV prophylaxis: Ondansetron (or other 5HT-3)     Comments:              PAC Discussion and Assessment    ASA Classification: 4    Anesthetic techniques and relevant risks discussed: GA  Invasive monitoring and risk discussed: Yes    Possibility and Risk of blood transfusion discussed: No  NPO instructions given: NPO after midnight  Additional anesthetic preparation and risks discussed: Potential difficult airway  Needs early admission to pre-op area: No                                             Breonna Trujillo MD

## 2023-08-24 NOTE — BRIEF OP NOTE
[facility]    Brief Operative Note    Pre-operative diagnosis: SCC (squamous cell carcinoma of floor of mouth) (H) [C04.9]  Post-operative diagnosis Same as pre-operative diagnosis    Procedure: Procedure(s):  EXAM UNDER ANESTHESIA, ORAL CAVITY, IRRIGATION AND DEBRIDEMENT, ORAL CAVITY, neck dissection  Surgeon: Surgeon(s) and Role:     * Darien Thorne MD - Primary     * Leah Freeman MD - Resident - Assisting  Anesthesia: General   Estimated Blood Loss: 10cc    Drains: Gaston-Coughlin x 2  Specimens: * No specimens in log *  Findings:   Dehisced neck incision with necrotic skin, debrided to bleeding tissue. Dehisced fibula flap skin with healthy appearing ALT flap deep to the dehiscence. .  Complications: None.  Implants: * No implants in log *      Plan:  - xeroform bolster sutured in place anteriorly, please make sure this remains flat against the underlying tissue  - doppler wires removed  - new 4-point sutures placed around trach faceplate  - handheld doppler sites stable  - lovenox to resume tomorrow AM  - slow advancement of tube feeds post-op; start with 1/2 can

## 2023-08-24 NOTE — ANESTHESIA CARE TRANSFER NOTE
Patient: Adrianna Pereira    Procedure: Procedure(s):  EXAM UNDER ANESTHESIA, ORAL CAVITY, IRRIGATION AND DEBRIDEMENT, ORAL CAVITY, neck dissection       Diagnosis: SCC (squamous cell carcinoma of floor of mouth) (H) [C04.9]  Diagnosis Additional Information: No value filed.    Anesthesia Type:   General     Note:    Oropharynx: oropharynx clear of all foreign objects and spontaneously breathing  Level of Consciousness: awake  Oxygen Supplementation: nasal cannula    Independent Airway: airway patency satisfactory and stable  Dentition: dentition unchanged  Vital Signs Stable: post-procedure vital signs reviewed and stable  Report to RN Given: handoff report given  Patient transferred to: PACU  Comments: Patient transferred to PACU in stable condition, breathing spontaneously on NC, VSS.  Report given to RN.  Handoff Report: Identifed the Patient, Identified the Reponsible Provider, Reviewed the pertinent medical history, Discussed the surgical course, Reviewed Intra-OP anesthesia mangement and issues during anesthesia, Set expectations for post-procedure period and Allowed opportunity for questions and acknowledgement of understanding      Vitals:  Vitals Value Taken Time   /68 08/24/23 1615   Temp     Pulse 99 08/24/23 1617   Resp     SpO2 93 % 08/24/23 1617   Vitals shown include unvalidated device data.    Electronically Signed By: ELISABETH Camejo CRNA  August 24, 2023  4:18 PM

## 2023-08-24 NOTE — PLAN OF CARE
Status:  Pt s/p segmental mandibulectomy, partial glossectomy, bilateral neck dissections, reconstruction of the mandible with left fibular free flap, reconstruction of the chin defect with left ALT free flap, and trach placement on 8/17. S/p PEG placement 8/23   Vitals: VSS on 21% HTD  Neuros: A&Ox4, writes to communicate. Numbness to lower face post surgery, RLE 4/5   IV: PIV infusing LR @ 75ml/hr  Labs/Electrolytes: Redraws in AM  Resp/trach: LS coarse  Diet: NPO ex meds for possible surgery. Receives bolus TF via PEG, Goal 1 can QID. Received 1 can yesterday.  Bowel status: LBM 8/23, smear.   : Voiding spontaneously in BSC  Skin:  Bilateral neck incisions with staples, DARWIN to L neck. L STSG changed this shift, DARWIN x 1. LLE with ace bandage, change this AM, DARWIN x 1 with no drainage. L thigh incision RANDALL with sutures. Tongue flap with spot check x 1, pale in color. Neck/chin flap with 2 spot neck and 2 continuous dopplers, pale in color, soft. Continuous doppler to R malfunction this afternoon, seems resolved with positioning.    Pain: Abdominal pain managed effectively with PRN oxycodone and scheduled tylenol  Activity: A1, pivot to BSC  Plan: OR today at 1:45PM. Continue to monitor and follow POC     Updates this shift: LLE dressing changed by ENT and LLE x1 DARWIN removed.

## 2023-08-24 NOTE — CONSULTS
Patient and family had feeding tube education from [facility] on 8/20 (see consult note). Patient and spouse decline needing review education. [facility] consult discontinued.

## 2023-08-24 NOTE — DISCHARGE INSTRUCTIONS
PCP appointment Sept. 13th 2:20 pm   Dr. Sukumar Hendricks  [address] [address] [phone]     [facility] - home RN   Ph: [phone]  Fax: [phone]    [facility] - suction, humidity, trach supplies and wound care supplies   Ph: [phone] or [phone]  Fax: [phone]     [facility] - home enteral formula and supplies  Ph: [phone]  Fax: [phone]

## 2023-08-24 NOTE — PROGRESS NOTES
"Otolaryngology Progress Note  August 24, 2023    Subjective/Interval: No acute events overnight. PEG placed yesterday and tolerated 1 can tube feeds afterwards. NPO at midnight and lovenox held for planned OR take back today for progressing flap breakdown. Afebrile, HDS. Pain controlled.     OBJECTIVE:   /76 (BP Location: Left arm)   Pulse 93   Temp 97.9  F (36.6  C) (Axillary)   Resp 16   Ht 1.626 m (5' 4.02\")   Wt 55.3 kg (122 lb)   SpO2 100%   BMI 20.93 kg/m    General: Alert and engaged, communicates and engages with exam, writing on clipboard as able  HEENT: Oral cavity with healthy appearing reconstructed Harman tongue, very soft with some redundant skin. There is a mottled/dusky area on the anterior fibula skin paddle extending toward the left. Incision is dehisced where the demucosalized ALT flap can be seen underneath anteriorly extending toward the left. This is slightly increased from prior. Remaining incisions are intact.  Cap refill remains good. Handheld and implantable doppler signals intact. Right implantable doppler with more static. Mental ALT free flap well-perfused with good capillary refill, no evidence of venous congestion. Handheld and implantable doppler signals strong on both sides.  Neck soft with expected post-operative swelling, right neck with slight increase in edema and erythema, but no ballotable fullness or neck incision drainage. DARWIN drain x1 with serosanginous output and holding suction. Incision c/d/I with sutures reinforcing area with fallen staples.   Respiratory: 6-0 cuffed shiley sutured in, HTD.  MSK: Suture line intact, some expected ecchymosis along the incision but stable from prior. Left thigh soft without concerns for compartment syndrome.  Skin graft donor site with expected drainage. Lower leg dressing taken down, incision and skin graft noted to be intact and dressing re-placed.    DARWIN drain output(s): (last 24 hours)/(last shift)  Drain 1 L Thigh 17/NR/14 " (31)  Drain 2 L Neck 7/NR/2 (9) - DO NOT REMOVE  Drain 2 L Leg 0/NR/2.5 (2.5)    LABS:  ROUTINE IP LABS (Last four results)  BMP  Recent Labs   Lab 08/24/23  0828 08/24/23  0358 08/24/23  0112 08/23/23  1620 08/23/23  1148 08/23/23  0805 08/22/23  0824 08/22/23  0805 08/21/23  1543 08/21/23  1344 08/21/23  0923 08/21/23  0750 08/20/23  1114 08/20/23  1112   NA  --   --   --   --   --  144  --  145  --   --   --  146*  --  146*   POTASSIUM  --   --   --   --   --  3.5  --  3.7  --  4.4  --  3.4   < > 3.4   CHLORIDE  --   --   --   --   --  113*  --  114*  --   --   --  112*  --  110*   RACHAEL  --   --   --   --   --  8.3*  --  8.2*  --   --   --  8.2*  --  8.1*   CO2  --   --   --   --   --  22  --  23  --   --   --  24  --  25   BUN  --   --   --   --   --  20.0  --  22.9  --   --   --  26.5*  --  27.5*   CR  --   --   --   --   --  0.43*  --  0.45*  --   --   --  0.58  --  0.69   GLC 86 76 107* 107*   < > 97   < > 94   < >  --    < > 96   < > 127*    < > = values in this interval not displayed.       CBC  Recent Labs   Lab 08/24/23  0858 08/23/23  0805 08/22/23  0805 08/21/23  0750   WBC 13.8* 15.5* 10.2 11.7*   RBC 2.71* 2.74* 2.55* 2.53*   HGB 8.4* 8.4* 7.8* 7.8*   HCT 26.7* 26.2* 24.4* 23.7*   MCV 99 96 96 94   MCH 31.0 30.7 30.6 30.8   MCHC 31.5 32.1 32.0 32.9   RDW 16.1* 15.7* 15.8* 15.4*   * 460* 357 369       INR  Recent Labs   Lab 08/22/23  1104   INR 1.06         A/P: This is a 62 year old female with SCC of the ventral tongue, mandible, with invasion of the skin. Underwent segmental mandibulectomy, partial glossectomy, resection of the skin of the mentum, bilateral neck dissections levels 1-4. Reconstruction of the mandible with left fibular free flap, pedicle for this flap on the right. Reconstruction of the chin defect with left ALT free flap, pedicle on the left. She is doing well postoperatively overall but has progressing intraoral dehiscence which will require return to OR for exam and  debridement.    Interval changes:  - OR today  - Currently NPO and lovenox held    Neuro:  - Pain control: scheduled Tylenol, PRN oxycodone  - NO Nicotine patch     HEENT:  - Nothing around the neck (no gown ties, trach ties, lines, ect.)  - HOB at 30 degrees, head/neck in midline position  - Monitor implantable doppler signal.  The channel on the right side of the Doppler box corresponds to the right-sided pedicle.  Channel on the left side of the Doppler box corresponds to the left pedicle.  With each flap check, change the channel on the doppler box so that the pedicle on the left neck can be heard. Keep the doppler box changed to the channel connected to the right neck (fibula) most of the time.   - RN flap checks Q4H  - Q12H flap checks by ENT  - Clean incisions with 0.9% sodium chloride and apply Aquaphor Q8H  - Monitor and record DARWIN drain output Qshift  - Peridex TID  - Saline oral rinses Q8H and PRN. Gentle suction with red joaquin catheter only (no yankeur), do not cut red joaquin  - Decadron 6mg q8h x 3 doses (complete)     Respiratory:  - Wean to trach dome as tolerated  - Routine trach cares. Do not cut trach sutures, 1st trach change will be performed by ENT. NO trach ties  -  Will plan to change trach following upcoming procedures      CV/heme:  - Keep MAP > 60, SBP > 90 (preferrably > 110)  - Lovenox   - NO vasopressors  - Transfuse for Hgb < 7     FEN/GI:  - PEG 8/23  - Holding tube feeds for OR  - Advance Bolus tube feeds after OR  - low threshold to hold tube feeds for any nausea  - Strict NPO, no oral swabs  - Electrolyte replacement protocol  - Bowel regimen: scheduled miralax and senna     :  - low UOP, Cr wnl. Continue to monitor.     Endo  - TSH 2.49  - Alb 4.0  - Prealbumin 9  - sliding scale insulin     ID:  - Unasyn (complete)     MSK:  - LIMB ALERT: NO IVS or LAB DRAWS from RIGHT ARM. We are keeping this arm safe as a backup flap if necessary.   - LEFT fibula free flap: wound vac and  walking boot placed in OR  - Please examine boot tightness on each examination to ensure no pressure injury to dorsal foot  - OK for boot off when in bed  - Elevate leg while in bed/chair  - Boot to remain in place for 3 weeks post-op  - Ok for full weight bearing  - LEFT anterolateral thigh free flap; island dressing removed  - LEFT STSG, calcium alginate with tegaderm in place, reinforce PRN, OK to take down to air on POD5, apply Aquaphor  - please strip and record DARWIN drain output q8h      PPX:  - 40mg Lovenox daily   - SCD on right     Consults:  - PT/OT- keep neck neutral (no turning to either side), ok for full weight bearing  - SLP for PMSV ONLY AFTER cuffless trach in place  - PLC for trach and tube feeding, TF 8/20, trach 8/22 (complete)  - Nutrition     Dispo: ENT to 6A     -- Patient and above plan discussed with Dr. Dao and Dr. Thorne.     Fred Merida MD   Department of Otolaryngology - Head and Neck Surgery, PGY 1  Please page ENT with questions

## 2023-08-24 NOTE — OP NOTE
Procedure Date: 08/24/2023    SURGEON:  Darien Thorne MD    ASSISTANTS:  1.  Leah Freeman MD  2.  Yolette Crouch MD    PREOPERATIVE DIAGNOSES:  1.  Advanced oral cancer.  2.  Metastatic squamous cell carcinoma to the neck.  3.  Malnutrition.  4.  Wound dehiscence in the neck and oral cavity.    POSTOPERATIVE DIAGNOSES:  1.  Advanced oral cancer.  2.  Metastatic squamous cell carcinoma to the neck.  3.  Malnutrition.  4.  Wound dehiscence in the neck and oral cavity.    PROCEDURES:  1.  Incision and debridement of anterior neck skin.  2.  Incision and debridement of oral cavity.    ANESTHESIA:  General.    ESTIMATED BLOOD LOSS:  10 mL.    SPECIMENS:  None.    COMPLICATIONS:  None.    OPERATIVE COURSE:  The patient was brought to the operating room today for debridement of the oral cavity and neck.  She had some dehiscence in the oral cavity, as well as some skin necrosis of her native skin in the neck.  We recommended proceeding with the above-listed procedure.  After full discussion of risks, benefits of the procedure, informed consent was obtained.    The patient was brought to the operating room.  General anesthesia was induced through a preexisting tracheostomy tube.  The oral cavity and neck were prepped in the standard fashion.  We began by sharply incising the necrotic tissue in the anterior neck to bleeding tissue.  A 3-0 nylon was used to approximate the native skin of the ALT flap.  A 3-0 Vicryl was used for the deep layer for the skin and 3-0 nylon for the superficial layer.  We then turned our attention to the oral cavity, which was copiously irrigated.  There was a small strip of the distal anterior aspect of the fibula skin paddle that did not survive.  This was debrided to bleeding tissue.  This was then sutured with 3-0 Vicryl to the buccal mucosa and lower lip.  There was a small area that could not close primarily, and a Xeroform bolster was placed over this and secured with 3-0 nylon  suture.  The patient tolerated the procedure well.  She was subsequently turned over to anesthesia, where she was awakened and transferred to the PACU in stable condition.    Darien Thorne MD        D: 2023   T: 2023   MT: saúl    Name:     KANNAN DAVENPORT  MRN:      [id number]        Account:        [id number]   :      1960           Procedure Date: 2023     Document: [id number]

## 2023-08-24 NOTE — ANESTHESIA POSTPROCEDURE EVALUATION
Patient: Adrianna Pereira    Procedure: Procedure(s):  EXAM UNDER ANESTHESIA, ORAL CAVITY, IRRIGATION AND DEBRIDEMENT, ORAL CAVITY, neck dissection       Anesthesia Type:  General    Note:  Disposition: Inpatient   Postop Pain Control: Uneventful            Sign Out: Well controlled pain   PONV: No   Neuro/Psych: Uneventful            Sign Out: Acceptable/Baseline neuro status   Airway/Respiratory:             Sign Out: Acceptable/Baseline resp. status   CV/Hemodynamics:             Sign Out: Acceptable CV status   Other NRE: NONE   DID A NON-ROUTINE EVENT OCCUR? No           Last vitals:  Vitals Value Taken Time   /69 08/24/23 1700   Temp 36.5  C (97.7  F) 08/24/23 1700   Pulse 90 08/24/23 1719   Resp 12 08/24/23 1700   SpO2 95 % 08/24/23 1722   Vitals shown include unvalidated device data.    Electronically Signed By: Leonel Courtney MD  August 24, 2023  6:01 PM

## 2023-08-24 NOTE — TUMOR CONFERENCE
Head & Neck Tumor Conference Note   8/25/2023    Status: Established  Staff: Dr. Thorne     Tumor Site: Oral cavity  Tumor Pathology: SCC  Tumor Stage: [id number]  Tumor Treatment:   7/3/23 - clinic biopsy  8/17/23 - segmental mandibulectomy, floor of mouth resection, anterior glossectomy with ALT and fibula free flap reconstruction (red lip was spared)  8/24/23 - return to OR for debridement of multiple areas of skin necrosis, and resuturing of neck incision and intraoral flap     Reason for Review: Review path, and POC     Brief History: This is a 62 year old female with a history of prior CO2 laser ablation for premalignant lesions in the oral cavity. She was referred for evaluation of a likely oral cancer. She recently noticed some firmness and swelling of the lower lip and chin. 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 She underwent segmental mandibulectomy, floor of mouth resection, anterior glossectomy and chin skin resection with free flap reconstruction (ALT and fibula free flap). Today we are reviewing her pathology.     PAST MEDICAL HISTORY:  None.     PAST SURGICAL HISTORY:  None.     MEDICATIONS:  Amoxicillin.     ALLERGIES:  No known drug allergies.     SOCIAL HISTORY:  She is a current smoker and smokes approximately 15 cigarettes per day.  She does not drink alcohol now, but in the past was a heavy alcohol user and considers herself an alcoholic.  No drug use.     FAMILY HISTORY:  None.     Imaging:   Not reviewed.    Pathology:   A. LYMPH NODE, LEFT LEVEL 1B:  - METASTATIC SQUAMOUS CELL CARCINOMA TO THREE OUT OF FOUR LYMPH NODES (3/4)  - Tumor size: at least 1.5 cm  - No definite extranodal extension is identified  - Benign salivary gland tissue with ductal ectasia  - Partial involvement by low-grade B-cell lymphoproliferative disorder (See comment)     B. LYMPH NODE,  LEVEL 1A:  - Three lymph nodes, negative for carcinoma (0/3)  - Suspicious for involvement by low-grade lymphoproliferative disorder     C. LYMPH NODE, RIGHT LEVEL 1B:  - METASTATIC SQUAMOUS CELL CARCINOMA TO ONE OUT OF ONE LYMPH NODE (1/1)  - Tumor size: at least 1.5 cm  - Extranodal extension is identified (?2mm)  - Benign salivary gland tissue with ductal ectasia  - No definitive morphologic evidence of a lymphoproliferative disorder     D. LEFT MENTAL NERVE:  - Negative for carcinoma     E. ORAL CAVITY, SEGMENTAL MANDIBULECTOMY AND ANTERIOR GLOSSECTOMY:  - ULCERATED KERATINIZING SQUAMOUS CELL CARCINOMA, MODERATELY DIFFERENTIATED  - Tumor involves floor of mouth, tongue and cortical bone  - Tumor size:  4.1 cm DOI: 29 mm  - No definitive for lymphovascular invasion   - Perineural invasion is present, small nerves, focal  - Left anterolateral soft tissue resection margin (1-3 o'clock) is positive for carcinoma  - All other margins are negative for high grade dysplasia/carcinoma  - AJCC Pathologic Stage: pT4a, N3b     F. RIGHT MARROW MARGIN:  - Negative for carcinoma  - No morphologic evidence of a lymphoproliferative disorder     G. LEFT MARROW MARGIN:  - Bone marrow involved by low-grade B-cell lymphoproliferative disorder (See comment)     H. LYMPH NODE, RIGHT LEVEL 2A:  - METASTATIC SQUAMOUS CELL CARCINOMA TO FIVE OUT OF TWELVE LYMPH NODE (5/12)  - Tumor size: 0.8 cm in greatest dimension  - Extranodal extension is identified (> 2mm)  - Perineural invasion is present  - Benign salivary gland tissue with ductal ectasia  - Suspicious for involvement by low-grade lymphoproliferative disorder     I. LYMPH NODE, RIGHT LEVEL 3:  - Five lymph nodes, negative for carcinoma (0/5)  - Suspicious for involvement by low-grade lymphoproliferative disorder     J. LYMPH NODE, RIGHT LEVEL 4:  - Twelve lymph nodes, negative for carcinoma (0/12)  - Thyroid tissue present in one lymph node (See comment)  - Partial involvement by  low-grade B-cell lymphoproliferative disorder (See comment)     K. LYMPH NODE, RIGHT LEVEL 2B:  - Four lymph nodes, negative for carcinoma (0/4)  - Suspicious for involvement by low-grade lymphoproliferative disorder     L. LYMPH NODE, LEFT LEVEL 2A:  - METASTATIC SQUAMOUS CELL CARCINOMA TO FIVE OUT OF TWELVE LYMPH NODE (2/14)  - Tumor size: 4 cm (matted lymph nodes)  - Extranodal extension is identified (> 2mm)  - Suspicious for involvement by low-grade lymphoproliferative disorder     M. LYMPH NODE, LEFT LEVEL 3:  - Seventeen lymph nodes, negative for carcinoma (0/17)  - No definitive morphologic evidence of a lymphoproliferative disorder     N. LYMPH NODE,LEFT LEVEL 4:  - Sixteen lymph nodes, negative for carcinoma (0/16)  - No definitive morphologic evidence of a lymphoproliferative disorder     O. LYMPH NODE, LEFT LEVEL 2B:  - Ten lymph nodes, negative for carcinoma (0/10)  - Suspicious for involvement by low-grade lymphoproliferative disorder    Tumor Board Recommendation:   Discussion: Pathology was discussed. The main specimen involves the tongue and cortical bone, size 4.1cm with DOI 29mm, no LVI, PNI is present. The left anterolateral soft tissue margin was positive (skin and mucosa surrounding was negative). Largest lymph node was 1.5cm w/STEFFEN. On bone marrow margin, there is high cellularity but it is not carcinomatous in appearance. Sample was sent to heme/path which demonstrated a low-grade b-cell neoplasm suggestive of CLL vs B-cell lymphoma which is also present in the lymph node pathology.    Re-resection of the positive margin was discussed. This would require full resection of the lower lip which would have significant clinical implications, so plan instead for adjuvant chemoradiation was discussed.    Plan:   - postop chemorads  - waiting on CT sim until healed    Yolette Crouch MD   Otolaryngology-Head & Neck Surgery PGY3  Please contact ENT on call with questions    Documentation /  Disclaimer Cancer Tumor Board Note  Cancer tumor board recommendations do not override what is determined to be reasonable care and treatment, which is dependent on the circumstances of a patient's case; the patient's medical, social, and personal concerns; and the clinical judgment of the oncologist [physician].

## 2023-08-24 NOTE — PLAN OF CARE
Status: Pt s/p segmental mandibulectomy, partial glossectomy, bilateral neck dissections, reconstruction of the mandible with left fibular free flap, reconstruction of the chin defect with left ALT free flap, and trach placement on 8/17. S/p PEG placement 8/23    Vitals: VSS on RA or 21% HTD   Neuros: Intact, writes to communicate. R facial numbness r/t surgery.   IV: infusing LR @ 75  Resp/trach: #6 shiley cuff down, sutured in place w/ NO trach ties. Inner cannula changed q4hrs, superficial suction q4hrs. Pt using HME or 21% HTD  Diet: NPO ex meds   Bowel status: BS+ BM 8/23.   : Voids via bedside commode   Skin: Bilateral neck incisions with staples, DARWIN to L neck. L STSG  RANDALL & DARWIN x 1. LLE with ace bandage, change this AM. L thigh incision RANDALL with sutures. Tongue flap with spot check x 1, pale in color. Neck/chin flap with 2 spot neck and 2 continuous dopplers, pale in color, soft. Continuous doppler to R needs some neck repositioning to hear.   Pain: denies pain, scheduled tylenol given   Activity: A1-2 pivot for line management, WBAT on LLE with boot   Social:  and daughter at bedside, supportive   Plan: Continue with POC   Updates this shift: Went to OR for irrigation & debridement at 1245pm

## 2023-08-25 ENCOUNTER — PREP FOR PROCEDURE (OUTPATIENT)
Dept: OTOLARYNGOLOGY | Facility: CLINIC | Age: 63
End: 2023-08-25

## 2023-08-25 ENCOUNTER — APPOINTMENT (OUTPATIENT)
Dept: SPEECH THERAPY | Facility: CLINIC | Age: 63
End: 2023-08-25
Attending: OTOLARYNGOLOGY
Payer: COMMERCIAL

## 2023-08-25 ENCOUNTER — TUMOR CONFERENCE (OUTPATIENT)
Dept: ONCOLOGY | Facility: CLINIC | Age: 63
End: 2023-08-25
Payer: COMMERCIAL

## 2023-08-25 ENCOUNTER — APPOINTMENT (OUTPATIENT)
Dept: PHYSICAL THERAPY | Facility: CLINIC | Age: 63
End: 2023-08-25
Attending: OTOLARYNGOLOGY
Payer: COMMERCIAL

## 2023-08-25 DIAGNOSIS — C06.9 SQUAMOUS CELL CARCINOMA OF ORAL CAVITY (H): Primary | ICD-10-CM

## 2023-08-25 DIAGNOSIS — C04.9 SCC (SQUAMOUS CELL CARCINOMA OF FLOOR OF MOUTH) (H): Primary | ICD-10-CM

## 2023-08-25 LAB
ANION GAP SERPL CALCULATED.3IONS-SCNC: 7 MMOL/L (ref 7–15)
BASOPHILS # BLD AUTO: 0 10E3/UL (ref 0–0.2)
BASOPHILS NFR BLD AUTO: 0 %
BUN SERPL-MCNC: 15.7 MG/DL (ref 8–23)
CALCIUM SERPL-MCNC: 8.1 MG/DL (ref 8.8–10.2)
CHLORIDE SERPL-SCNC: 110 MMOL/L (ref 98–107)
CREAT SERPL-MCNC: 0.47 MG/DL (ref 0.51–0.95)
DEPRECATED HCO3 PLAS-SCNC: 25 MMOL/L (ref 22–29)
EOSINOPHIL # BLD AUTO: 0.1 10E3/UL (ref 0–0.7)
EOSINOPHIL NFR BLD AUTO: 1 %
ERYTHROCYTE [DISTWIDTH] IN BLOOD BY AUTOMATED COUNT: 16.2 % (ref 10–15)
GFR SERPL CREATININE-BSD FRML MDRD: >90 ML/MIN/1.73M2
GLUCOSE BLDC GLUCOMTR-MCNC: 100 MG/DL (ref 70–99)
GLUCOSE BLDC GLUCOMTR-MCNC: 112 MG/DL (ref 70–99)
GLUCOSE BLDC GLUCOMTR-MCNC: 191 MG/DL (ref 70–99)
GLUCOSE BLDC GLUCOMTR-MCNC: 89 MG/DL (ref 70–99)
GLUCOSE BLDC GLUCOMTR-MCNC: 95 MG/DL (ref 70–99)
GLUCOSE SERPL-MCNC: 80 MG/DL (ref 70–99)
HCT VFR BLD AUTO: 24.1 % (ref 35–47)
HGB BLD-MCNC: 7.8 G/DL (ref 11.7–15.7)
IMM GRANULOCYTES # BLD: 0.1 10E3/UL
IMM GRANULOCYTES NFR BLD: 1 %
LYMPHOCYTES # BLD AUTO: 2.1 10E3/UL (ref 0.8–5.3)
LYMPHOCYTES NFR BLD AUTO: 14 %
MAGNESIUM SERPL-MCNC: 2.1 MG/DL (ref 1.7–2.3)
MCH RBC QN AUTO: 31.2 PG (ref 26.5–33)
MCHC RBC AUTO-ENTMCNC: 32.4 G/DL (ref 31.5–36.5)
MCV RBC AUTO: 96 FL (ref 78–100)
MONOCYTES # BLD AUTO: 1.4 10E3/UL (ref 0–1.3)
MONOCYTES NFR BLD AUTO: 9 %
NEUTROPHILS # BLD AUTO: 11.3 10E3/UL (ref 1.6–8.3)
NEUTROPHILS NFR BLD AUTO: 75 %
NRBC # BLD AUTO: 0 10E3/UL
NRBC BLD AUTO-RTO: 0 /100
PHOSPHATE SERPL-MCNC: 1.6 MG/DL (ref 2.5–4.5)
PHOSPHATE SERPL-MCNC: 2.6 MG/DL (ref 2.5–4.5)
PLATELET # BLD AUTO: 682 10E3/UL (ref 150–450)
POTASSIUM SERPL-SCNC: 3.6 MMOL/L (ref 3.4–5.3)
RBC # BLD AUTO: 2.5 10E6/UL (ref 3.8–5.2)
SODIUM SERPL-SCNC: 142 MMOL/L (ref 136–145)
WBC # BLD AUTO: 15.1 10E3/UL (ref 4–11)

## 2023-08-25 PROCEDURE — 250N000013 HC RX MED GY IP 250 OP 250 PS 637: Performed by: OTOLARYNGOLOGY

## 2023-08-25 PROCEDURE — 84100 ASSAY OF PHOSPHORUS: CPT | Performed by: OTOLARYNGOLOGY

## 2023-08-25 PROCEDURE — 92507 TX SP LANG VOICE COMM INDIV: CPT | Mod: GN

## 2023-08-25 PROCEDURE — 97116 GAIT TRAINING THERAPY: CPT | Mod: GP

## 2023-08-25 PROCEDURE — 250N000009 HC RX 250

## 2023-08-25 PROCEDURE — 92597 ORAL SPEECH DEVICE EVAL: CPT | Mod: GN

## 2023-08-25 PROCEDURE — 36415 COLL VENOUS BLD VENIPUNCTURE: CPT | Performed by: OTOLARYNGOLOGY

## 2023-08-25 PROCEDURE — 97530 THERAPEUTIC ACTIVITIES: CPT | Mod: GP

## 2023-08-25 PROCEDURE — 120N000002 HC R&B MED SURG/OB UMMC

## 2023-08-25 PROCEDURE — 82374 ASSAY BLOOD CARBON DIOXIDE: CPT | Performed by: OTOLARYNGOLOGY

## 2023-08-25 PROCEDURE — 85025 COMPLETE CBC W/AUTO DIFF WBC: CPT | Performed by: PHYSICIAN ASSISTANT

## 2023-08-25 PROCEDURE — 250N000011 HC RX IP 250 OP 636: Mod: JZ | Performed by: STUDENT IN AN ORGANIZED HEALTH CARE EDUCATION/TRAINING PROGRAM

## 2023-08-25 PROCEDURE — 999N000157 HC STATISTIC RCP TIME EA 10 MIN

## 2023-08-25 PROCEDURE — 83735 ASSAY OF MAGNESIUM: CPT | Performed by: OTOLARYNGOLOGY

## 2023-08-25 PROCEDURE — 82310 ASSAY OF CALCIUM: CPT | Performed by: OTOLARYNGOLOGY

## 2023-08-25 RX ORDER — LIDOCAINE HYDROCHLORIDE AND EPINEPHRINE 10; 10 MG/ML; UG/ML
1 INJECTION, SOLUTION INFILTRATION; PERINEURAL ONCE
Status: COMPLETED | OUTPATIENT
Start: 2023-08-25 | End: 2023-08-25

## 2023-08-25 RX ORDER — POTASSIUM CHLORIDE 1.5 G/1.58G
20 POWDER, FOR SOLUTION ORAL ONCE
Status: COMPLETED | OUTPATIENT
Start: 2023-08-25 | End: 2023-08-25

## 2023-08-25 RX ORDER — LIDOCAINE HYDROCHLORIDE AND EPINEPHRINE 10; 10 MG/ML; UG/ML
5 INJECTION, SOLUTION INFILTRATION; PERINEURAL ONCE
Status: DISCONTINUED | OUTPATIENT
Start: 2023-08-25 | End: 2023-09-01 | Stop reason: HOSPADM

## 2023-08-25 RX ADMIN — OXYCODONE HYDROCHLORIDE 5 MG: 5 TABLET ORAL at 00:14

## 2023-08-25 RX ADMIN — OXYCODONE HYDROCHLORIDE 5 MG: 5 TABLET ORAL at 04:52

## 2023-08-25 RX ADMIN — POTASSIUM & SODIUM PHOSPHATES POWDER PACK 280-160-250 MG 2 PACKET: 280-160-250 PACK at 19:47

## 2023-08-25 RX ADMIN — WHITE PETROLATUM: 1.75 OINTMENT TOPICAL at 13:50

## 2023-08-25 RX ADMIN — OXYCODONE HYDROCHLORIDE 5 MG: 5 TABLET ORAL at 13:50

## 2023-08-25 RX ADMIN — ACETAMINOPHEN 975 MG: 325 TABLET, FILM COATED ORAL at 13:50

## 2023-08-25 RX ADMIN — ACETAMINOPHEN 975 MG: 325 TABLET, FILM COATED ORAL at 19:46

## 2023-08-25 RX ADMIN — POTASSIUM & SODIUM PHOSPHATES POWDER PACK 280-160-250 MG 2 PACKET: 280-160-250 PACK at 11:14

## 2023-08-25 RX ADMIN — ACETAMINOPHEN 975 MG: 325 TABLET, FILM COATED ORAL at 08:56

## 2023-08-25 RX ADMIN — ENOXAPARIN SODIUM 40 MG: 40 INJECTION SUBCUTANEOUS at 19:47

## 2023-08-25 RX ADMIN — CHLORHEXIDINE GLUCONATE 15 ML: 1.2 SOLUTION ORAL at 19:47

## 2023-08-25 RX ADMIN — POTASSIUM CHLORIDE 20 MEQ: 1.5 POWDER, FOR SOLUTION ORAL at 11:14

## 2023-08-25 RX ADMIN — POTASSIUM & SODIUM PHOSPHATES POWDER PACK 280-160-250 MG 2 PACKET: 280-160-250 PACK at 16:20

## 2023-08-25 RX ADMIN — POLYETHYLENE GLYCOL 3350 17 G: 17 POWDER, FOR SOLUTION ORAL at 19:47

## 2023-08-25 RX ADMIN — OXYCODONE HYDROCHLORIDE 5 MG: 5 TABLET ORAL at 19:47

## 2023-08-25 RX ADMIN — CHLORHEXIDINE GLUCONATE 15 ML: 1.2 SOLUTION ORAL at 13:51

## 2023-08-25 RX ADMIN — OXYCODONE HYDROCHLORIDE 5 MG: 5 TABLET ORAL at 08:56

## 2023-08-25 RX ADMIN — LIDOCAINE HYDROCHLORIDE AND EPINEPHRINE 1 ML: 10; 10 INJECTION, SOLUTION INFILTRATION; PERINEURAL at 12:59

## 2023-08-25 RX ADMIN — Medication 15 ML: at 08:56

## 2023-08-25 RX ADMIN — POLYETHYLENE GLYCOL 3350 17 G: 17 POWDER, FOR SOLUTION ORAL at 08:56

## 2023-08-25 RX ADMIN — SENNOSIDES AND DOCUSATE SODIUM 2 TABLET: 50; 8.6 TABLET ORAL at 19:47

## 2023-08-25 RX ADMIN — CHLORHEXIDINE GLUCONATE 15 ML: 1.2 SOLUTION ORAL at 08:57

## 2023-08-25 RX ADMIN — WHITE PETROLATUM: 1.75 OINTMENT TOPICAL at 21:30

## 2023-08-25 RX ADMIN — WHITE PETROLATUM: 1.75 OINTMENT TOPICAL at 05:45

## 2023-08-25 ASSESSMENT — ACTIVITIES OF DAILY LIVING (ADL)
ADLS_ACUITY_SCORE: 25
ADLS_ACUITY_SCORE: 27
ADLS_ACUITY_SCORE: 23
ADLS_ACUITY_SCORE: 25
ADLS_ACUITY_SCORE: 23
ADLS_ACUITY_SCORE: 25
ADLS_ACUITY_SCORE: 23
ADLS_ACUITY_SCORE: 23
ADLS_ACUITY_SCORE: 27
ADLS_ACUITY_SCORE: 25

## 2023-08-25 NOTE — PROGRESS NOTES
Otolaryngology Trach Change Note  August 25, 2023    Type of trach removed: 6-0 cuffed Shiley  Type of trach placed: 6-0 uncuffed Shiley    Procedure note: Patient was suctioned via trach and secretions were removed.  Patient was appropriately positioned flat and supine. Trach ties/sutures were removed while holding trach in place. Patient was then hyperoxygenated via trach dome. Trach was removed without difficulty. Stoma appeared patent without obstruction or secretions. New trach was inserted with obturator without difficulty or resistance. Obturator was removed and inner cannula locked in place. Correct positioning of trach was confirmed by easily passing a suction through the trach and obvious air movement was noted with good oxygen saturations. Trach ties/sutures were placed and secured. Patient tolerated the trach change well and without complication.     Continue routine trach cares as directed.   Future trach changes will be performed as needed.     Yolette Crouch MD   Otolaryngology-Head & Neck Surgery PGY3  Please contact ENT on call with questions

## 2023-08-25 NOTE — PLAN OF CARE
Arrived from: PACU @ 0073   Belongings/meds: remained in room, pt returned a/f procedure  2 RN Skin Assessment Completed by: Mayela CID and Alexandra NEWBY    Non-intact findings documented (yes/no/NA): Bilateral neck incisions with staples, DARWIN to L neck. L STSG  RANDALL & DARWIN x 1. LLE with ace bandage, change this AM. L thigh incision RANDALL with sutures. Tongue flap with spot check x 1, pale in color. Neck/chin flap with 2 spot neck, pale in color, soft.    Status: Pt s/p segmental mandibulectomy, partial glossectomy, bilateral neck dissections, reconstruction of the mandible with left fibular free flap, reconstruction of the chin defect with left ALT free flap, and trach placement on 8/17. S/p PEG placement 8/23. IND debridement of oral cavity and neck 8/24  Vitals: VSS on RA or 21% HTD  Neuros: intact, writes to communicate. R facial numbness r/t surgery; BUE 5, BLE 3  IV: SL  Labs/Electrolytes: BG q4hr  Resp/trach: LS course  Diet: NPO ex meds thru PEG, 1.5 cans TF  Bowel status: BS+, LBM 8/23, senna given  : void spont  Skin: see above; bilat neck flap and tongue doppler checks q4hr  Pain: managed w/ shed tylenol  Activity: A 1-2 pivot for line manage, WBAT on LLEw/ boot  Social:  at bedside  Plan: Continue POC  Updates this shift: TF restarted per MD okay, pt tolerated well, during procedure today continuous dopplers removed, now only spot checks q4 hrs at bilat neck flap sites and tongue site per MD order

## 2023-08-25 NOTE — PROGRESS NOTES
"CLINICAL NUTRITION SERVICES - REASSESSMENT NOTE     Nutrition Prescription    RECOMMENDATIONS FOR MDs/PROVIDERS TO ORDER:   None at this time.     Malnutrition Status:   Patient does not meet two of the established criteria necessary for diagnosing malnutrition but is at risk for malnutrition    Recommendations already ordered by Registered Dietitian (RD):   No changes to tube feeding at this time. Will continue to monitor weight trends, but difficult to assess due to use of bed scale for majority of weights.     Future/Additional Recommendations:   Intake/Weight trends.      EVALUATION OF THE PROGRESS TOWARD GOALS   Diet: NPO  Nutrition Support: -->Goal Regimen provides: 1 can Osmolite 1.5 QID (or equivalent) + 1 pkt ProSource TF20 (or equivalent) provides: 948 ml, 1502 kcals (32 kcal/kg), 80 g protein (1.7 g pro/kg), 194 gm CHO, 722 ml free water, and 0 g fiber    Intake:     8/25 8/24 8/23 8/22 8/21 8/20 8/19 8/18   Formula 711 mL 120 mL 237 mL 948 mL 948 mL 600 mL 800 mL 160 mL   8/18 - Tube feedings started  8/21 - Transitioned to bolus tube feedings.   8/23 - PEG placed      NEW FINDINGS   Visited with Adrianna in room. Pt shook her head \"no\" when asked if she had any questions or concerns regarding tube feedings.     Skin: Multiple surgical incisions noted. Den score 17 with nutrition marked as adequate.     LABS   Labs Reviewed   08/24/23 08:58 08/25/23 10:05   Sodium 143 142   Potassium 3.5 3.6   Creatinine 0.40 (L) 0.47 (L)   GFR Estimate >90 >90   Calcium 8.1 (L) 8.1 (L)   Magnesium 2.0 2.1   Phosphorus 3.0 1.6 (L)   WBC 13.8 (H) 15.1 (H)   Hemoglobin 8.4 (L) 7.8 (L)   MCV 99 96      08/24/23 08:58 08/24/23 18:12 08/24/23 22:49 08/25/23 00:08 08/25/23 04:20 08/25/23 10:05 08/25/23 11:47 08/25/23 15:31   Glucose 113 (H)     80     Glucose by meter  POCT  102 (H) 121 (H) 191 (H) 112 (H)  100 (H) 95     MEDICATIONS   Medications Reviewed  - MVI with minerals  - Miralax  - Potassium Chloride  - Prosource " TF20  - Senokot  - Oxycodone PRN    ANTHROPOMETRICS   Weight Trends: Weight appears to be up from admission.     Weight trends this admission:   08/23/23 55.3 kg (122 lb) Standing scale   08/20/23 -- Bed scale   08/19/23 52 kg (114 lb 10.2 oz) Bed scale   08/19/23 55.3 kg (121 lb 14.6 oz) Bed scale   08/18/23 52.8 kg (116 lb 6.5 oz) Bed scale   08/17/23 46.9 kg (103 lb 6.3 oz) Standing scale     Wt Readings from Last Encounters:   08/23/23 55.3 kg (122 lb)   08/02/23 49.4 kg (109 lb)   08/02/23 49.4 kg (109 lb)   07/17/23 53.1 kg (117 lb)   07/03/23 53.1 kg (117 lb)     MALNUTRITION   % Intake: Decreased intake does not meet criteria  % Weight Loss: None noted  Subcutaneous Fat Loss: Upper arm: Moderate, Lower arm: Moderate  Muscle Loss: Thoracic region (clavicle, acromium bone, deltoid, trapezius, pectoral): Severe, Upper arm (bicep, tricep): Severe, Lower arm (forearm): Severe, Dorsal hand: Severe, Upper leg (quadricep, hamstring): Moderate, Patellar region: Mild, and Posterior calf: Mild   Fluid Accumulation/Edema: None noted  Malnutrition Diagnosis: Patient does not meet two of the established criteria necessary for diagnosing malnutrition but is at risk for malnutrition    Previous Goals   Once TF at goal rate, total avg nutritional intake to meet a minimum of 30 kcal/kg and 1.2 g PRO/kg daily (per dosing wt 47 kg).  Evaluation: Not met    Previous Nutrition Diagnosis   Altered GI function related to NPO s/p ENT surgery, tracheostomy as evidenced by NGT placement and consult for EN support.    Evaluation: No change    CURRENT NUTRITION DIAGNOSIS   Altered GI function related to NPO s/p ENT surgery, tracheostomy as evidenced by NGT placement and consult for EN support.      INTERVENTIONS   Implementation   No changes to tube feeding at this time. Will continue to monitor weight trends, but difficult to assess due to use of bed scale for majority of weights.     Goals   Total avg nutritional intake to meet a  minimum of 30 kcal/kg and 1.2 g PRO/kg daily (per dosing wt 47 kg).    Monitoring/Evaluation   Progress toward goals will be monitored and evaluated per protocol.    Juliana Holman RD, LD   5B/6A pager [phone]  Weekend pager [phone]

## 2023-08-25 NOTE — TUMOR CONFERENCE
Dr. Thorne spoke with patient regarding surgical pathology results and recommendation for post op chemo/rads. Patient and spouse are on board with plan to proceed and would like treatment in [place].     Orders placed and we will arrange consults pending discharge from hospital.       La Flores, RN, BSN

## 2023-08-25 NOTE — PLAN OF CARE
Status: Pt s/p segmental mandibulectomy, partial glossectomy, bilateral neck dissections, reconstruction of the mandible with left fibular free flap, reconstruction of the chin defect with left ALT free flap, and trach placement on 8/17. S/p PEG placement 8/23  Vitals: VSS on RA- HME or 21% HTD   Neuros: Intact, writes to communicate. R facial numbness r/t surgery.   IV: saline locked   Resp/trach: #6 shiley cuffless, changed today by ENT, sutured in place w/ NO trach ties. Inner cannula cleansed & superficial suction q4hrs. Pt using HME or 21% HTD  Diet: 2/4 bolus completed this shift, 100 FWF TID.  Bowel status: BS+ BM 8/23.   : Voids via bedside commode   Skin: Bilateral neck incisions with staples, DARWIN to L neck. L STSG  RANDALL & DARWIN x 1. LLE with ace bandage, change this AM. L thigh incision RANDALL with sutures. Tongue flap with spot check x 1, pale in color. Neck/chin flap with 2 spot neck.   Pain: pain managed with oxycodone and tylenol   Activity: A1 gb, walker. WBAT on LLE w/ boot. Intermittently refuses boot.   Plan: Continue with POC

## 2023-08-25 NOTE — PLAN OF CARE
Status: Pt s/p segmental mandibulectomy, partial glossectomy, bilateral neck dissections, reconstruction of the mandible with left fibular free flap, reconstruction of the chin defect with left ALT free flap, and trach placement on 8/17. S/p PEG placement 8/23. IND debridement of oral cavity and neck 8/24  Vitals: VSS on RA w/ HME place  Neuros: intact, writes to communicate. R facial numbness r/t surgery; BUE 5, BLE 3-4  IV: PIV SL  Labs/Electrolytes: BG q4hr  Resp/trach: LS coarse. #6 shiley cuffdown. IC changed x2, suctioned x1. Good, productive cough with thick creamy secretions.  Diet: NPO ex meds thru PEG. Goal 4 cans/day. First can started.  Bowel status: BS+, LBM 8/23  : voiding spont in BSC overnight  Skin: bilat neck flap and tongue doppler checks q4hr, PEG, L thigh STSG RANDALL and L thigh incision RANDALL w/x1JP, x1JP to L neck.  LLE with ace bandage, change this AM.   Pain: PRN oxycodone x2 w/relief.  Activity: A 1-2 pivot for line manage, WBAT on LLEw/ boot  Plan: Continue POC

## 2023-08-25 NOTE — PROGRESS NOTES
"Otolaryngology Progress Note  August 25, 2023    Subjective/Interval: No acute events overnight. Taken back to OR yesterday with necrotic tissue removed and incisions repaired. Xeroform bolster placed. Afebrile, HDS. Tolerating tube feeds. Voiding. Implantable doppler removed.    OBJECTIVE:   /57 (BP Location: Left arm)   Pulse 80   Temp 97.4  F (36.3  C) (Axillary)   Resp 18   Ht 1.626 m (5' 4.02\")   Wt 55.3 kg (122 lb)   SpO2 100%   BMI 20.93 kg/m    General: Alert and engaged, communicates and engages with exam, writing on clipboard as able  HEENT: Oral cavity with well perfused Harman tongue with area of mottled skin resected and xeroform bolster covering de-epithelized ALT sutured in place.  Mental ALT free flap well-perfused with good capillary refill, no evidence of venous congestion. Incision lines over trach with some breakdown. Repaired today. Probe doppler signal strong.  Neck soft with no ballotable fullness or neck incision drainage. DARWIN drain x1 with serous output and holding suction. Incision c/d/I with sutures reinforcing area with fallen staples.   Respiratory: 6-0 cuffless shiley sutured in, HTD.  MSK: Suture line intact, stable from prior. Left thigh soft without concerns for compartment syndrome.  Skin graft donor site with expected drainage. Lower leg dressing taken down, incision and skin graft noted to be intact and dressing re-placed.    DARWIN drain output(s): (last 24 hours)/(last shift)  Drain 2 L Neck 4/NR/15 (19) - DO NOT REMOVE  Drain 2 L Leg 4.5/NR/25 (29.5)    LABS:  ROUTINE IP LABS (Last four results)  BMP  Recent Labs   Lab 08/25/23  1147 08/25/23  1005 08/25/23  0420 08/25/23  0008 08/24/23  1812 08/24/23  0858 08/23/23  1148 08/23/23  0805 08/22/23  0824 08/22/23  0805   NA  --  142  --   --   --  143  --  144  --  145   POTASSIUM  --  3.6  --   --   --  3.5  --  3.5  --  3.7   CHLORIDE  --  110*  --   --   --  111*  --  113*  --  114*   RACHAEL  --  8.1*  --   --   --  8.1*  --  " 8.3*  --  8.2*   CO2  --  25  --   --   --  23  --  22  --  23   BUN  --  15.7  --   --   --  15.4  --  20.0  --  22.9   CR  --  0.47*  --   --   --  0.40*  --  0.43*  --  0.45*   * 80 112* 191*   < > 113*   < > 97   < > 94    < > = values in this interval not displayed.       CBC  Recent Labs   Lab 08/25/23  1005 08/24/23  0858 08/23/23  0805 08/22/23  0805   WBC 15.1* 13.8* 15.5* 10.2   RBC 2.50* 2.71* 2.74* 2.55*   HGB 7.8* 8.4* 8.4* 7.8*   HCT 24.1* 26.7* 26.2* 24.4*   MCV 96 99 96 96   MCH 31.2 31.0 30.7 30.6   MCHC 32.4 31.5 32.1 32.0   RDW 16.2* 16.1* 15.7* 15.8*   * 598* 460* 357       INR  Recent Labs   Lab 08/22/23  1104   INR 1.06         A/P: This is a 62 year old female with SCC of the ventral tongue, mandible, with invasion of the skin. Underwent segmental mandibulectomy, partial glossectomy, resection of the skin of the mentum, bilateral neck dissections levels 1-4. Reconstruction of the mandible with left fibular free flap, pedicle for this flap on the right. Reconstruction of the chin defect with left ALT free flap, pedicle on the left. She has struggled with continued breakdown postoperatively. Now s/p take back OR 8/24 with repair.    Interval changes:  - Trach changed to cuffless, sutured in  - SLP evaluation  - Bolus feeds  - Fluids discontinued    Neuro:  - Pain control: scheduled Tylenol, PRN oxycodone  - NO Nicotine patch     HEENT:  - Nothing around the neck (no gown ties, trach ties, lines, ect.)  - HOB at 30 degrees, head/neck in midline position  - Monitor implantable doppler signal.  The channel on the right side of the Doppler box corresponds to the right-sided pedicle.  Channel on the left side of the Doppler box corresponds to the left pedicle.  With each flap check, change the channel on the doppler box so that the pedicle on the left neck can be heard. Keep the doppler box changed to the channel connected to the right neck (fibula) most of the time.   - RN flap checks  Q4H  - Q12H flap checks by ENT  - Clean incisions with 0.9% sodium chloride and apply Aquaphor Q8H  - Monitor and record DARWIN drain output Qshift  - Peridex TID  - Saline oral rinses Q8H and PRN. Gentle suction with red joaquin catheter only (no yankeur), do not cut red joaquin  - Decadron 6mg q8h x 3 doses (complete)     Respiratory:  - Wean to trach dome as tolerated  - Routine trach cares but no trach ties  Trach Tube Instructions:   1) Contact ENT on-call with any questions or concerns   3) Perform regular suctioning   4) RN should change disposable inner canulas every shift and/or clean it with a brush   5) Keep trach tube obturator taped to wall behind the head of the bed   6) Keep extra unopened 6-0 Shiley and 4-0 Shiley at bedside at all times   7) Minimize the amount of air in the cuff needed to adequately apply positive pressure ventilate. If positive pressure ventilation is not need then the cuff should be down.      CV/heme:  - Keep MAP > 60, SBP > 90 (preferrably > 110)  - Lovenox   - NO vasopressors  - Transfuse for Hgb < 7     FEN/GI:  - PEG 8/23  - Goal Bolus tube feeds  - low threshold to hold tube feeds for any nausea  - Strict NPO, no oral swabs  - Electrolyte replacement protocol  - Bowel regimen: scheduled miralax and senna     :  - Low UOP, Cr wnl. Continue to monitor.     Endo  - TSH 2.49  - Alb 4.0  - Prealbumin 9     ID:  - Unasyn (complete)     MSK:  - LIMB ALERT: NO IVS or LAB DRAWS from RIGHT ARM. We are keeping this arm safe as a backup flap if necessary.   - LEFT fibula free flap: wound vac and walking boot placed in OR  - Please examine boot tightness on each examination to ensure no pressure injury to dorsal foot  - OK for boot off when in bed  - Elevate leg while in bed/chair  - Boot to remain in place for 3 weeks post-op  - Ok for full weight bearing  - LEFT anterolateral thigh free flap; island dressing removed  - LEFT STSG, calcium alginate with tegaderm in place, reinforce PRN,  OK to take down to air on POD5, apply Aquaphor  - please strip and record DARWIN drain output q8h      PPX:  - 40mg Lovenox daily   - SCD on right     Consults:  - PT/OT- keep neck neutral (no turning to either side), ok for full weight bearing  - SLP for PMSV ONLY AFTER cuffless trach in place  - PLC for trach and tube feeding, TF 8/20, trach 8/22 (complete)  - Nutrition     Dispo: ENT, 6A     -- Patient and above plan discussed with Dr. Dao and Dr. Thorne.     Fred Merida MD   Department of Otolaryngology - Head and Neck Surgery, PGY 1  Please page ENT with questions

## 2023-08-25 NOTE — PROGRESS NOTES
08/25/23 8968   Appointment Info   Signing Clinician's Name / Credentials (SLP) Mojgan Myles MA CCC-SLP   General Information   Onset of Illness/Injury or Date of Surgery 08/17/23   Referring Physician Fred Merida MD   Patient/Family Therapy Goal Statement (SLP) Pt is frustrated she cannot communicate well   Pertinent History of Current Problem Pt is a 62 year old female with SCC of the ventral tongue, mandible, with invasion of the skin. Underwent segmental mandibulectomy, partial glossectomy, resection of the skin of the mentum, bilateral neck dissections levels 1-4. Reconstruction of the mandible with left fibular free flap, pedicle for this flap on the right. Reconstruction of the chin defect with left ALT free flap, pedicle on the left. She has struggled with continued breakdown postoperatively. Now s/p take back OR 8/24 with repair. SLP ordered for PMSV, eval completed.   General Observations Alert, eager   Type of Evaluation   Type of Evaluation Artificial Airway (Speaking Valve)   Tracheostomy Assessment (Speaking Valve)   Cuff, Tracheostomy Tube cuffless   Date of Tracheostomy 08/17/23   Tube Size, Tracheostomy 6   Participation Ability (Speaking Valve) attempts to communicate, mouthing words;awake/alert   Type, Tracheostomy Tube Shiley   Respiratory Status (Speaking Valve)   Oxygen Supply   (room air)   Speaking Valve Trials (Speaking Valve)   Outcome of Trial (Speaking Valve) tolerance is good   Voice Production (Speaking Valve Trial) voicing achieved   Breath Support (Speaking Valve Trial) exhales through mouth   Recommendations (Speaking Valve Trials) speaking valve use recommended   Cough Production (Speaking Valve Trial) good   Secretions During Valve Use (Speaking Valve Trial) secretions managed well during valve use   Airflow/Phonation Air flow around trach adequate with finger occlusion   Speaking Valve placed on tracheostomy tube   Oxygen saturation with PMSV placement 100 %   Total  amount of time with PMSV placement: 20 minutes   Respiratory Rate with PMSV placement   (WNL)   General Therapy Interventions   Planned Therapy Interventions Communication   Communication Speaking valve instruction   Clinical Impression   Criteria for Skilled Therapeutic Interventions Met (SLP Eval) Yes, treatment indicated   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 Pt presents with aphonia 2/2 tracheostomy. Pt with Shiley 6 cuffless trach, on room air with HME in place. Pt IND doffed HME and placed PMSV. Pt immediately able to voice, VSS. Pt tolerated valve for duration of session. Recommend PMSV use as tolerated. Remove if pt is sleeping or SOB. SLP will follow.     SLP Total Evaluation Time   Eval: use and/or fitting of voice prosthetic device to supp speech (not aug. comm) Minutes (80675) 15   SLP Discharge Recommendation home with outpatient speech therapy  (in ENT clinic)   SLP Rationale for DC Rec new communication and swallow needs s/p trach   SLP Brief overview of current status  Recommend PMSV use as tolerated. Remove if pt is sleeping or SOB. SLP will follow.   Total Session Time   Total Session Time (sum of timed and untimed services) 20

## 2023-08-26 LAB
ANION GAP SERPL CALCULATED.3IONS-SCNC: 7 MMOL/L (ref 7–15)
BUN SERPL-MCNC: 16.8 MG/DL (ref 8–23)
CALCIUM SERPL-MCNC: 7.9 MG/DL (ref 8.8–10.2)
CHLORIDE SERPL-SCNC: 112 MMOL/L (ref 98–107)
CREAT SERPL-MCNC: 0.42 MG/DL (ref 0.51–0.95)
DEPRECATED HCO3 PLAS-SCNC: 25 MMOL/L (ref 22–29)
ERYTHROCYTE [DISTWIDTH] IN BLOOD BY AUTOMATED COUNT: 16.3 % (ref 10–15)
GFR SERPL CREATININE-BSD FRML MDRD: >90 ML/MIN/1.73M2
GLUCOSE BLDC GLUCOMTR-MCNC: 130 MG/DL (ref 70–99)
GLUCOSE BLDC GLUCOMTR-MCNC: 74 MG/DL (ref 70–99)
GLUCOSE BLDC GLUCOMTR-MCNC: 77 MG/DL (ref 70–99)
GLUCOSE BLDC GLUCOMTR-MCNC: 90 MG/DL (ref 70–99)
GLUCOSE BLDC GLUCOMTR-MCNC: 92 MG/DL (ref 70–99)
GLUCOSE BLDC GLUCOMTR-MCNC: 95 MG/DL (ref 70–99)
GLUCOSE SERPL-MCNC: 94 MG/DL (ref 70–99)
HCT VFR BLD AUTO: 22.9 % (ref 35–47)
HGB BLD-MCNC: 7.3 G/DL (ref 11.7–15.7)
MAGNESIUM SERPL-MCNC: 2 MG/DL (ref 1.7–2.3)
MCH RBC QN AUTO: 30.9 PG (ref 26.5–33)
MCHC RBC AUTO-ENTMCNC: 31.9 G/DL (ref 31.5–36.5)
MCV RBC AUTO: 97 FL (ref 78–100)
PHOSPHATE SERPL-MCNC: 2.7 MG/DL (ref 2.5–4.5)
PLATELET # BLD AUTO: 710 10E3/UL (ref 150–450)
POTASSIUM SERPL-SCNC: 4.3 MMOL/L (ref 3.4–5.3)
RBC # BLD AUTO: 2.36 10E6/UL (ref 3.8–5.2)
SODIUM SERPL-SCNC: 144 MMOL/L (ref 136–145)
WBC # BLD AUTO: 13.6 10E3/UL (ref 4–11)

## 2023-08-26 PROCEDURE — 250N000013 HC RX MED GY IP 250 OP 250 PS 637: Performed by: OTOLARYNGOLOGY

## 2023-08-26 PROCEDURE — 85027 COMPLETE CBC AUTOMATED: CPT | Performed by: OTOLARYNGOLOGY

## 2023-08-26 PROCEDURE — 80048 BASIC METABOLIC PNL TOTAL CA: CPT | Performed by: OTOLARYNGOLOGY

## 2023-08-26 PROCEDURE — 250N000011 HC RX IP 250 OP 636: Mod: JZ | Performed by: STUDENT IN AN ORGANIZED HEALTH CARE EDUCATION/TRAINING PROGRAM

## 2023-08-26 PROCEDURE — 84100 ASSAY OF PHOSPHORUS: CPT | Performed by: OTOLARYNGOLOGY

## 2023-08-26 PROCEDURE — 36415 COLL VENOUS BLD VENIPUNCTURE: CPT | Performed by: OTOLARYNGOLOGY

## 2023-08-26 PROCEDURE — 120N000002 HC R&B MED SURG/OB UMMC

## 2023-08-26 PROCEDURE — 83735 ASSAY OF MAGNESIUM: CPT | Performed by: OTOLARYNGOLOGY

## 2023-08-26 PROCEDURE — 250N000013 HC RX MED GY IP 250 OP 250 PS 637: Performed by: STUDENT IN AN ORGANIZED HEALTH CARE EDUCATION/TRAINING PROGRAM

## 2023-08-26 RX ORDER — MAGNESIUM OXIDE 400 MG/1
400 TABLET ORAL EVERY 4 HOURS
Status: COMPLETED | OUTPATIENT
Start: 2023-08-26 | End: 2023-08-26

## 2023-08-26 RX ADMIN — WHITE PETROLATUM: 1.75 OINTMENT TOPICAL at 13:14

## 2023-08-26 RX ADMIN — OXYCODONE HYDROCHLORIDE 5 MG: 5 TABLET ORAL at 20:36

## 2023-08-26 RX ADMIN — ENOXAPARIN SODIUM 40 MG: 40 INJECTION SUBCUTANEOUS at 20:43

## 2023-08-26 RX ADMIN — WHITE PETROLATUM: 1.75 OINTMENT TOPICAL at 21:00

## 2023-08-26 RX ADMIN — Medication 15 ML: at 08:10

## 2023-08-26 RX ADMIN — ACETAMINOPHEN 975 MG: 325 TABLET, FILM COATED ORAL at 13:13

## 2023-08-26 RX ADMIN — OXYCODONE HYDROCHLORIDE 5 MG: 5 TABLET ORAL at 00:41

## 2023-08-26 RX ADMIN — POTASSIUM & SODIUM PHOSPHATES POWDER PACK 280-160-250 MG 1 PACKET: 280-160-250 PACK at 08:09

## 2023-08-26 RX ADMIN — POTASSIUM & SODIUM PHOSPHATES POWDER PACK 280-160-250 MG 1 PACKET: 280-160-250 PACK at 13:13

## 2023-08-26 RX ADMIN — CHLORHEXIDINE GLUCONATE 15 ML: 1.2 SOLUTION ORAL at 08:10

## 2023-08-26 RX ADMIN — CHLORHEXIDINE GLUCONATE 15 ML: 1.2 SOLUTION ORAL at 13:13

## 2023-08-26 RX ADMIN — ACETAMINOPHEN 975 MG: 325 TABLET, FILM COATED ORAL at 08:10

## 2023-08-26 RX ADMIN — ACETAMINOPHEN 975 MG: 325 TABLET, FILM COATED ORAL at 20:36

## 2023-08-26 RX ADMIN — CHLORHEXIDINE GLUCONATE 15 ML: 1.2 SOLUTION ORAL at 20:47

## 2023-08-26 RX ADMIN — WHITE PETROLATUM: 1.75 OINTMENT TOPICAL at 06:29

## 2023-08-26 RX ADMIN — MAGNESIUM OXIDE TAB 400 MG (241.3 MG ELEMENTAL MG) 400 MG: 400 (241.3 MG) TAB at 08:09

## 2023-08-26 RX ADMIN — MAGNESIUM OXIDE TAB 400 MG (241.3 MG ELEMENTAL MG) 400 MG: 400 (241.3 MG) TAB at 13:13

## 2023-08-26 ASSESSMENT — ACTIVITIES OF DAILY LIVING (ADL)
ADLS_ACUITY_SCORE: 27
ADLS_ACUITY_SCORE: 25
ADLS_ACUITY_SCORE: 27
ADLS_ACUITY_SCORE: 25
ADLS_ACUITY_SCORE: 27
ADLS_ACUITY_SCORE: 25
ADLS_ACUITY_SCORE: 27
ADLS_ACUITY_SCORE: 25
ADLS_ACUITY_SCORE: 25

## 2023-08-26 NOTE — PLAN OF CARE
Status: S/p mandibulectomy, partial glossectomy, bilateral neck dissections, reconstruction of the mandible with left fibular free flap, reconstruction of the chin defect with left ALT free flap, trach 8/17. S/p PEG 8/23. S/p I&D of neck and oral cavity 8/24   Vitals: VSS. Continuous pulse ox in place   Neuros: A&Ox4. Numbness to facial/neck incisions. Writes to communicate.  IV: PIV SL  Labs/Electrolytes: phos redraw WNL  Resp/trach: #6 shiley cuffless sutured into place. Strong/productive cough, suctioned x2. Inner cannula cleansed q4h. HTD on overnight.  Diet: NPO. Bolus TF with a goal of 4/4 cans via PEG  Bowel status: medium loose BM this shift  : voiding spontaneously   Skin: Bilateral neck incisions closed with staples/sutures RANDALL. DARWIN x1 to L neck - not holding suction, per MD to apply aquaphor around site to attempt to maintain suction and team will assess this AM. Intraoral flap with Xeroform bolster, q4 spot check + Neck/chin flap site x2, q4 doppler site spot checks. L thigh STSG site RANDALL, incision closed with sutures, DARWIN x1. LLE skin graft with ace bandage CDI, dressing changed 8/25.  Pain: managing neck pain with scheduled tylenol and PRN oxycodone  Activity: A1/GB/Walker to BSC. WBAT as tolerated to LLE.  Plan: continue with current poc      Goal Outcome Evaluation:      Plan of Care Reviewed With: patient    Overall Patient Progress: improving    Outcome Evaluation: Patient able to rest overnight. Managing neck pain with oral medications.

## 2023-08-26 NOTE — PLAN OF CARE
[id number]    Status: Pt s/p segmental mandibulectomy, partial glossectomy, bilateral neck dissections, reconstruction of the mandible with left fibular free flap, reconstruction of the chin defect with left ALT free flap, and trach placement on 8/17. S/p PEG placement 8/23  Vitals: VSS on RA- HME or 21% HTD   Neuros: Intact, writes to communicate. R facial numbness r/t surgery.   IV: saline locked   Resp/trach: #6 shiley cuffless, sutured in place w/ NO trach ties. Inner cannula cleansed & superficial suction q4hrs. Pt using HME or 21% HTD  Diet: 2/4 bolus completed this shift, 100 FWF TID. Offered dinner time bolus x2, pt refused due to feeling full.   Bowel status: BS+ BM 8/26.   : Voids without difficulty.   Skin: Bilateral neck incisions with staples, L STSG  RANDALL & DARWIN x 1. LLE with ace bandage, changed this AM. L thigh incision RANDALL with sutures. Tongue flap with spot check x 1, pale in color. Neck/chin flap with 2 spot neck. BLE +2 edema.   Pain: pain managed with oxycodone and tylenol   Activity: A1 gb, walker. WBAT on LLE w/ boot.   Plan: Continue with POC.   Updates this shift: Trach suture came loose, ENT came to bedside and re-sutured.

## 2023-08-26 NOTE — PROVIDER NOTIFICATION
"Paged ENT     \"[phone] Pereira: looks like one of the sutures on the face plate came apart. plate and trach still in place, no issues with that- can u come take a look at bedside? thanks, ramo [phone]\"  "

## 2023-08-26 NOTE — PLAN OF CARE
Status: S/p mandibulectomy, partial glossectomy, bilateral neck dissections, reconstruction of the mandible with left fibular free flap, reconstruction of the chin defect with left ALT free flap, trach 8/17. S/p PEG 8/23. S/p I&D of neck and oral cavity 8/24  Vitals: VSS on RA  Neuros: A&Ox4, numbness to facial/neck incisions. Writes to communicate  IV: PIV SL  Labs/Electrolytes: K redraw in AM, phos redraw in process  Resp/trach: #6 shiley cuffless sutured into place, inner cannula cleansed q4. Good/productive cough, suctioned x1. HME in place most of the shift, HTD on at HS  Diet: NPO. Received 4/4 cans of TF via PEG, 60 mL FWF before/after each can   Bowel status: LBM 8/23, took senna/miralax   : Voiding via BSC  Skin: Bilateral neck incisions closed with staples/sutures RANDALL, DARWIN x1 to L side - not holding suction, Per MD, apply Aquaphor around insertion site.  Intraoral flap with Xeroform bolster, q4 spot check +. Neck/chin flap site, 2 q4 doppler site spot checks +. L thigh STSG site RANDALL, incision closed with sutures, DARWIN x1. LLE skin graft with ace bandage CDI, changed this AM.   Pain: Neck and oral pain controlled prn oxycodone, scheduled tylenol  Activity: Up with 1 to BSC, WBAT as tolerated to LLE. Encourage boot   Plan/updates: Small scab like/open area on tailbone upon assessment, on charge list to notify MD.

## 2023-08-26 NOTE — PROGRESS NOTES
"Otolaryngology Progress Note  August 26, 2023    Subjective/Interval: No acute events overnight. Trach changed yesterday to cuffless with no concerns. Flap stable, no issues. Left neck DARWIN drain stopped holding suction overnight. Xeroform bolster intact intraorally, tolerating bolus TF at goal. No implantable doppler. Has been up walking regularly.     OBJECTIVE:   /59 (BP Location: Right arm)   Pulse (!) 127   Temp 97.7  F (36.5  C) (Axillary)   Resp 20   Ht 1.626 m (5' 4.02\")   Wt 55.3 kg (122 lb)   SpO2 97%   BMI 20.93 kg/m    General: Alert and engaged, communicates and engages with exam, writing on clipboard as able  HEENT: Oral cavity with well perfused Harman tongue with area of mottled skin resected and xeroform bolster covering de-epithelized ALT sutured in place.  Mental ALT free flap well-perfused with good capillary refill, no evidence of venous congestion. Incision lines over trach with some breakdown. DARWIN x 1 not holding suction. There is continuity with neck DARWIN drain and incisional dehiscence above trach. DARWIN can hold suction when those areas are occluded. Drain removed at bedside per staff. Probe doppler signal strong.  Neck soft with no ballotable fullness or neck incision drainage.  Incision c/d/I with sutures and staples reinforcing area.  Respiratory: 6-0 cuffless shiley sutured in, HTD.  MSK: Suture line intact, stable from prior. Left thigh soft without concerns for compartment syndrome.  Skin graft donor site with expected drainage. Lower leg dressing taken down, incision and skin graft noted to be intact and dressing re-placed. DARWIN drain with serosanguineous output.    DARWIN drain output(s): (last 24 hours)/(last shift)  Drain 2 L Neck - REMOVED  Drain 2 L Leg 13/20/5 (38)    LABS:  ROUTINE IP LABS (Last four results)  BMP  Recent Labs   Lab 08/26/23  0844 08/26/23  0626 08/26/23  0455 08/26/23  0028 08/25/23  1147 08/25/23  1005 08/24/23  1812 08/24/23  0858 08/23/23  1148 08/23/23  0805 "   NA  --  144  --   --   --  142  --  143  --  144   POTASSIUM  --  4.3  --   --   --  3.6  --  3.5  --  3.5   CHLORIDE  --  112*  --   --   --  110*  --  111*  --  113*   RACHAEL  --  7.9*  --   --   --  8.1*  --  8.1*  --  8.3*   CO2  --  25  --   --   --  25  --  23  --  22   BUN  --  16.8  --   --   --  15.7  --  15.4  --  20.0   CR  --  0.42*  --   --   --  0.47*  --  0.40*  --  0.43*   GLC 95 94 74 77   < > 80   < > 113*   < > 97    < > = values in this interval not displayed.       CBC  Recent Labs   Lab 08/26/23  0626 08/25/23  1005 08/24/23  0858 08/23/23  0805   WBC 13.6* 15.1* 13.8* 15.5*   RBC 2.36* 2.50* 2.71* 2.74*   HGB 7.3* 7.8* 8.4* 8.4*   HCT 22.9* 24.1* 26.7* 26.2*   MCV 97 96 99 96   MCH 30.9 31.2 31.0 30.7   MCHC 31.9 32.4 31.5 32.1   RDW 16.3* 16.2* 16.1* 15.7*   * 682* 598* 460*       INR  Recent Labs   Lab 08/22/23  1104   INR 1.06         A/P: This is a 62 year old female with SCC of the ventral tongue, mandible, with invasion of the skin. Underwent segmental mandibulectomy, partial glossectomy, resection of the skin of the mentum, bilateral neck dissections levels 1-4. Reconstruction of the mandible with left fibular free flap, pedicle for this flap on the right. Reconstruction of the chin defect with left ALT free flap, pedicle on the left. She has struggled with continued breakdown postoperatively. Now s/p take back OR 8/24 with repair.    Interval changes:  - Changed to cuffless trach. PMSV as tolerated per SLP.  - Okay for HMEs overnight  - Neck drain removed per staff  - q12H flap checks  - No implantable doppler in place, flap checks with handheld  - Tolerating bolus TF    Neuro:  - Pain control: scheduled Tylenol, PRN oxycodone  - NO Nicotine patch     HEENT:  - Nothing around the neck (no gown ties, trach ties, lines, ect.)  - HOB at 30 degrees, head/neck in midline position  - Monitor handheld doppler signal.  The ALT flap signal is on the chin/neck paddle, and the fibula flap  is intraoral just posterior to xeroform bolster  - Neck drain removed per staff  - RN flap checks Q4H  - Q12H flap checks by ENT  - Clean incisions with 0.9% sodium chloride and apply Aquaphor Q8H  - Monitor and record DARWIN drain output Qshift  - Peridex TID  - Saline oral rinses Q8H and PRN. Gentle suction with red joaquin catheter only (no yankeur), do not cut red joaquin  - Decadron 6mg q8h x 3 doses (complete)     Respiratory:  - Wean to trach dome as tolerated  - Routine trach cares but no trach ties  Trach Tube Instructions:   1) Contact ENT on-call with any questions or concerns   2) Perform regular suctioning   4) RN should change disposable inner canulas every shift and/or clean it with a brush   5) Keep trach tube obturator taped to wall behind the head of the bed   6) Keep extra unopened 6-0 Shiley and 4-0 Shiley at bedside at all times   7) Minimize the amount of air in the cuff needed to adequately apply positive pressure ventilate. If positive pressure ventilation is not need then the cuff should be down.      CV/heme:  - Keep MAP > 60, SBP > 90 (preferrably > 110)  - Lovenox   - NO vasopressors  - Transfuse for Hgb < 7     FEN/GI:  - PEG 8/23  - Goal Bolus tube feeds  - low threshold to hold tube feeds for any nausea  - Strict NPO, no oral swabs  - Electrolyte replacement protocol  - Bowel regimen: scheduled miralax and senna     :  - Low UOP, Cr wnl. Continue to monitor.     Endo  - TSH 2.49  - Alb 4.0  - Prealbumin 9     ID:  - Unasyn (complete)     MSK:  - LIMB ALERT: NO IVS or LAB DRAWS from RIGHT ARM. We are keeping this arm safe as a backup flap if necessary.   - LEFT fibula free flap: wound vac and walking boot placed in OR  - Please examine boot tightness on each examination to ensure no pressure injury to dorsal foot  - OK for boot off when in bed  - Elevate leg while in bed/chair  - Boot to remain in place for 3 weeks post-op  - Ok for full weight bearing  - LEFT anterolateral thigh free  flap; island dressing removed  - LEFT STSG, calcium alginate with tegaderm in place, reinforce PRN, OK to take down to air on POD5, apply Aquaphor  - please strip and record DARWIN drain output q8h      PPX:  - 40mg Lovenox daily   - SCD on right     Consults:  - PT/OT- keep neck neutral (no turning to either side), ok for full weight bearing  - SLP for PMSV ONLY AFTER cuffless trach in place  - PLC for trach and tube feeding, TF 8/20, trach 8/22 (complete)  - Nutrition     Dispo: ENT, 6A     -- Patient and above plan discussed with Dr. Dao and Dr. Thorne.     Sana Stallworth MD  Otolaryngology-Head & Neck Surgery PGY-2  Please contact ENT with questions by dialing [phone] and entering job code [phone] when prompted.

## 2023-08-26 NOTE — PROGRESS NOTES
Dr. Cook paged @ [phone]        Adrianna hayden DARWIN no longer holding suction, looks to be lose at insertion site (easily sliding in and out). Also leaking at site. Can we keep HME on overnight? MARGARET thanks Kae [phone]

## 2023-08-26 NOTE — PROGRESS NOTES
Brief ENT Note     The superior stitch on the left faceplate had fallen out earlier today and was replaced without issue. All 4 sutures remain intact.       Christi aRmey MD   ENT Resident

## 2023-08-27 ENCOUNTER — APPOINTMENT (OUTPATIENT)
Dept: SPEECH THERAPY | Facility: CLINIC | Age: 63
End: 2023-08-27
Attending: OTOLARYNGOLOGY
Payer: COMMERCIAL

## 2023-08-27 ENCOUNTER — APPOINTMENT (OUTPATIENT)
Dept: PHYSICAL THERAPY | Facility: CLINIC | Age: 63
End: 2023-08-27
Attending: OTOLARYNGOLOGY
Payer: COMMERCIAL

## 2023-08-27 LAB
ANION GAP SERPL CALCULATED.3IONS-SCNC: 9 MMOL/L (ref 7–15)
BUN SERPL-MCNC: 13.5 MG/DL (ref 8–23)
CALCIUM SERPL-MCNC: 8.1 MG/DL (ref 8.8–10.2)
CHLORIDE SERPL-SCNC: 111 MMOL/L (ref 98–107)
CREAT SERPL-MCNC: 0.38 MG/DL (ref 0.51–0.95)
DEPRECATED HCO3 PLAS-SCNC: 21 MMOL/L (ref 22–29)
ERYTHROCYTE [DISTWIDTH] IN BLOOD BY AUTOMATED COUNT: 16.5 % (ref 10–15)
GFR SERPL CREATININE-BSD FRML MDRD: >90 ML/MIN/1.73M2
GLUCOSE BLDC GLUCOMTR-MCNC: 122 MG/DL (ref 70–99)
GLUCOSE BLDC GLUCOMTR-MCNC: 67 MG/DL (ref 70–99)
GLUCOSE BLDC GLUCOMTR-MCNC: 81 MG/DL (ref 70–99)
GLUCOSE BLDC GLUCOMTR-MCNC: 83 MG/DL (ref 70–99)
GLUCOSE BLDC GLUCOMTR-MCNC: 86 MG/DL (ref 70–99)
GLUCOSE BLDC GLUCOMTR-MCNC: 90 MG/DL (ref 70–99)
GLUCOSE SERPL-MCNC: 104 MG/DL (ref 70–99)
HCT VFR BLD AUTO: 22.9 % (ref 35–47)
HGB BLD-MCNC: 7.1 G/DL (ref 11.7–15.7)
MAGNESIUM SERPL-MCNC: 2 MG/DL (ref 1.7–2.3)
MCH RBC QN AUTO: 30.3 PG (ref 26.5–33)
MCHC RBC AUTO-ENTMCNC: 31 G/DL (ref 31.5–36.5)
MCV RBC AUTO: 98 FL (ref 78–100)
PHOSPHATE SERPL-MCNC: 2.3 MG/DL (ref 2.5–4.5)
PLATELET # BLD AUTO: 818 10E3/UL (ref 150–450)
POTASSIUM SERPL-SCNC: 3.8 MMOL/L (ref 3.4–5.3)
RBC # BLD AUTO: 2.34 10E6/UL (ref 3.8–5.2)
SODIUM SERPL-SCNC: 141 MMOL/L (ref 136–145)
WBC # BLD AUTO: 11.9 10E3/UL (ref 4–11)

## 2023-08-27 PROCEDURE — 120N000002 HC R&B MED SURG/OB UMMC

## 2023-08-27 PROCEDURE — 85027 COMPLETE CBC AUTOMATED: CPT | Performed by: OTOLARYNGOLOGY

## 2023-08-27 PROCEDURE — 97530 THERAPEUTIC ACTIVITIES: CPT | Mod: GP

## 2023-08-27 PROCEDURE — 250N000013 HC RX MED GY IP 250 OP 250 PS 637: Performed by: OTOLARYNGOLOGY

## 2023-08-27 PROCEDURE — 250N000011 HC RX IP 250 OP 636: Mod: JZ | Performed by: STUDENT IN AN ORGANIZED HEALTH CARE EDUCATION/TRAINING PROGRAM

## 2023-08-27 PROCEDURE — 36415 COLL VENOUS BLD VENIPUNCTURE: CPT | Performed by: OTOLARYNGOLOGY

## 2023-08-27 PROCEDURE — 97116 GAIT TRAINING THERAPY: CPT | Mod: GP

## 2023-08-27 PROCEDURE — 80048 BASIC METABOLIC PNL TOTAL CA: CPT | Performed by: OTOLARYNGOLOGY

## 2023-08-27 PROCEDURE — 84100 ASSAY OF PHOSPHORUS: CPT | Performed by: OTOLARYNGOLOGY

## 2023-08-27 PROCEDURE — 258N000001 HC RX 258: Performed by: OTOLARYNGOLOGY

## 2023-08-27 PROCEDURE — 83735 ASSAY OF MAGNESIUM: CPT | Performed by: OTOLARYNGOLOGY

## 2023-08-27 PROCEDURE — 92507 TX SP LANG VOICE COMM INDIV: CPT | Mod: GN

## 2023-08-27 RX ORDER — MAGNESIUM OXIDE 400 MG/1
400 TABLET ORAL EVERY 4 HOURS
Status: COMPLETED | OUTPATIENT
Start: 2023-08-27 | End: 2023-08-27

## 2023-08-27 RX ORDER — POTASSIUM CHLORIDE 1.5 G/1.58G
20 POWDER, FOR SOLUTION ORAL ONCE
Status: COMPLETED | OUTPATIENT
Start: 2023-08-27 | End: 2023-08-27

## 2023-08-27 RX ADMIN — CHLORHEXIDINE GLUCONATE 15 ML: 1.2 SOLUTION ORAL at 21:10

## 2023-08-27 RX ADMIN — MAGNESIUM OXIDE TAB 400 MG (241.3 MG ELEMENTAL MG) 400 MG: 400 (241.3 MG) TAB at 13:47

## 2023-08-27 RX ADMIN — CHLORHEXIDINE GLUCONATE 15 ML: 1.2 SOLUTION ORAL at 13:48

## 2023-08-27 RX ADMIN — POTASSIUM & SODIUM PHOSPHATES POWDER PACK 280-160-250 MG 1 PACKET: 280-160-250 PACK at 16:24

## 2023-08-27 RX ADMIN — Medication 15 ML: at 07:40

## 2023-08-27 RX ADMIN — WHITE PETROLATUM: 1.75 OINTMENT TOPICAL at 13:48

## 2023-08-27 RX ADMIN — ACETAMINOPHEN 975 MG: 325 TABLET, FILM COATED ORAL at 20:59

## 2023-08-27 RX ADMIN — CHLORHEXIDINE GLUCONATE 15 ML: 1.2 SOLUTION ORAL at 07:40

## 2023-08-27 RX ADMIN — ENOXAPARIN SODIUM 40 MG: 40 INJECTION SUBCUTANEOUS at 21:11

## 2023-08-27 RX ADMIN — ACETAMINOPHEN 975 MG: 325 TABLET, FILM COATED ORAL at 13:47

## 2023-08-27 RX ADMIN — POTASSIUM & SODIUM PHOSPHATES POWDER PACK 280-160-250 MG 1 PACKET: 280-160-250 PACK at 20:59

## 2023-08-27 RX ADMIN — MAGNESIUM OXIDE TAB 400 MG (241.3 MG ELEMENTAL MG) 400 MG: 400 (241.3 MG) TAB at 16:24

## 2023-08-27 RX ADMIN — POTASSIUM CHLORIDE 20 MEQ: 1.5 POWDER, FOR SOLUTION ORAL at 13:47

## 2023-08-27 RX ADMIN — WHITE PETROLATUM: 1.75 OINTMENT TOPICAL at 21:21

## 2023-08-27 RX ADMIN — OXYCODONE HYDROCHLORIDE 5 MG: 5 TABLET ORAL at 09:57

## 2023-08-27 RX ADMIN — OXYCODONE HYDROCHLORIDE 5 MG: 5 TABLET ORAL at 20:59

## 2023-08-27 RX ADMIN — WHITE PETROLATUM: 1.75 OINTMENT TOPICAL at 05:49

## 2023-08-27 RX ADMIN — ACETAMINOPHEN 975 MG: 325 TABLET, FILM COATED ORAL at 07:40

## 2023-08-27 RX ADMIN — POTASSIUM & SODIUM PHOSPHATES POWDER PACK 280-160-250 MG 1 PACKET: 280-160-250 PACK at 13:47

## 2023-08-27 RX ADMIN — DEXTROSE MONOHYDRATE 25 ML: 25 INJECTION, SOLUTION INTRAVENOUS at 16:43

## 2023-08-27 ASSESSMENT — ACTIVITIES OF DAILY LIVING (ADL)
ADLS_ACUITY_SCORE: 25
ADLS_ACUITY_SCORE: 25
ADLS_ACUITY_SCORE: 27
ADLS_ACUITY_SCORE: 27
ADLS_ACUITY_SCORE: 25
ADLS_ACUITY_SCORE: 27
ADLS_ACUITY_SCORE: 27
ADLS_ACUITY_SCORE: 25
ADLS_ACUITY_SCORE: 27
ADLS_ACUITY_SCORE: 25

## 2023-08-27 NOTE — PLAN OF CARE
Status: Pt s/p segmental mandibulectomy, partial glossectomy, bilateral neck dissection reconstruction of the mandible with left fibular free flap, reconstruction of the chin defect with left ALT free flap, and trach placement 8/17. S/p PEG placement 8/23   Vitals: VSS on RA (HME)   Neuros: Writes to communicate, able to make needs known.   IV: PIV SL   Labs/Electrolytes: Mag, phos + potassium replaced. BG checks Q4hrs   Resp/trach: #6 shiley cuffless. Cleaned inner cannula x2, suctioned x2. Good, productive cough. HME in place all AM, no SOB.   Diet: NPO. PEG tube; goal 4 cans TF per day. 60 mL flushes before/after. 2/4 done.   Bowel status: LBM 8/26   : Voiding spont   Skin: Trach sutured, bilateral neck incisions. L STSG, L donor site, L leg incision x2. DARWIN x2 - yellow/serous output.   Pain: Pain managed with scheduled tylenol + prn oxycodone x1   Activity: Up SBA, GB + walker   Social: No visitors this shift  Plan: Continue with POC  Updates this shift: Worked with PT; goal to do two more walks today

## 2023-08-27 NOTE — PROGRESS NOTES
"Otolaryngology Progress Note  August 27, 2023    Subjective/Interval: No acute events overnight. Flap stable without issues. Trach suture dislodged yesterday and was replaced at bedside without issues. Refused some TF boluses yesterday due to full feeling, attributes this to Miralax. Xeroform bolster intact intraorally. No implantable doppler. Has been up walking regularly.     OBJECTIVE:   /46   Pulse 94   Temp 97.6  F (36.4  C) (Axillary)   Resp 17   Ht 1.626 m (5' 4.02\")   Wt 55.3 kg (122 lb)   SpO2 100%   BMI 20.93 kg/m    General: Alert and engaged, communicates and engages with exam, writing on clipboard as able  HEENT: Oral cavity with well perfused Harman tongue with area of mottled skin resected and xeroform bolster covering de-epithelized FFF sutured in place. Slightly less intraoral edema compared to previous. Mental ALT free flap well-perfused with good capillary refill, no evidence of venous congestion. Incision lines over trach with some stable breakdown. Drain removed. Probe doppler signal strong.  Neck soft with no ballotable fullness or neck incision drainage.  Incision c/d/I with sutures and staples reinforcing area.  Respiratory: 6-0 cuffless shiley sutured in, HTD.  MSK: Suture line intact, stable from prior. Left thigh soft without concerns for compartment syndrome.  Skin graft donor site with expected drainage. Lower leg dressing taken down, incision and skin graft noted to be intact and dressing re-placed. DARWIN drain with serous output.    DARWIN drain output(s): (last 24 hours)/(last shift)  Drain 2 L Neck - REMOVED  Drain 2 L Leg 28/28/NR (56)    LABS:  ROUTINE IP LABS (Last four results)  BMP  Recent Labs   Lab 08/27/23  0819 08/27/23  0420 08/27/23  0024 08/26/23  1959 08/26/23  0844 08/26/23  0626 08/25/23  1147 08/25/23  1005 08/24/23  1812 08/24/23  0858     --   --   --   --  144  --  142  --  143   POTASSIUM 3.8  --   --   --   --  4.3  --  3.6  --  3.5   CHLORIDE 111*  --   " --   --   --  112*  --  110*  --  111*   RACHAEL 8.1*  --   --   --   --  7.9*  --  8.1*  --  8.1*   CO2 21*  --   --   --   --  25  --  25  --  23   BUN 13.5  --   --   --   --  16.8  --  15.7  --  15.4   CR 0.38*  --   --   --   --  0.42*  --  0.47*  --  0.40*   * 81 83 92   < > 94   < > 80   < > 113*    < > = values in this interval not displayed.       CBC  Recent Labs   Lab 08/27/23  0819 08/26/23  0626 08/25/23  1005 08/24/23  0858   WBC 11.9* 13.6* 15.1* 13.8*   RBC 2.34* 2.36* 2.50* 2.71*   HGB 7.1* 7.3* 7.8* 8.4*   HCT 22.9* 22.9* 24.1* 26.7*   MCV 98 97 96 99   MCH 30.3 30.9 31.2 31.0   MCHC 31.0* 31.9 32.4 31.5   RDW 16.5* 16.3* 16.2* 16.1*   * 710* 682* 598*       INR  Recent Labs   Lab 08/22/23  1104   INR 1.06         A/P: This is a 62 year old female with SCC of the ventral tongue, mandible, with invasion of the skin. Underwent segmental mandibulectomy, partial glossectomy, resection of the skin of the mentum, bilateral neck dissections levels 1-4. Reconstruction of the mandible with left fibular free flap, pedicle for this flap on the right. Reconstruction of the chin defect with left ALT free flap, pedicle on the left. She has struggled with continued breakdown postoperatively. Now s/p take back OR 8/24 with repair.    Interval changes:  - Continue to encourage full bolus TF  - Flaps stable, neck drain removed  - q12H flap checks with handheld dopplers, no implantable    Neuro:  - Pain control: scheduled Tylenol, PRN oxycodone  - NO Nicotine patch     HEENT:  - Nothing around the neck (no gown ties, trach ties, lines, ect.)  - HOB at 30 degrees, head/neck in midline position  - Monitor handheld doppler signal.  The ALT flap signal is on the chin/neck paddle, and the fibula flap is intraoral just posterior to xeroform bolster  - Neck drain removed per staff  - RN flap checks Q4H  - Q12H flap checks by ENT  - Clean incisions with 0.9% sodium chloride and apply Aquaphor Q8H  - Monitor and  record DARWIN drain output Qshift  - Peridex TID  - Saline oral rinses Q8H and PRN. Gentle suction with red joaquin catheter only (no yankeur), do not cut red joaquin  - Decadron 6mg q8h x 3 doses (complete)     Respiratory:  - Wean to trach dome as tolerated  - Routine trach cares but no trach ties  Trach Tube Instructions:   1) Contact ENT on-call with any questions or concerns   2) Perform regular suctioning   4) RN should change disposable inner canulas every shift and/or clean it with a brush   5) Keep trach tube obturator taped to wall behind the head of the bed   6) Keep extra unopened 6-0 Shiley and 4-0 Shiley at bedside at all times   7) Minimize the amount of air in the cuff needed to adequately apply positive pressure ventilate. If positive pressure ventilation is not need then the cuff should be down.      CV/heme:  - Keep MAP > 60, SBP > 90 (preferrably > 110)  - Lovenox   - NO vasopressors  - Transfuse for Hgb < 7     FEN/GI:  - PEG 8/23  - Goal Bolus tube feeds, continue to encourage and space if patient refuses due to fullness  - Low threshold to hold tube feeds for any nausea  - Strict NPO, no oral swabs  - Electrolyte replacement protocol  - Bowel regimen: scheduled miralax and senna     :  - UOP stable, Cr wnl. Continue to monitor.     Endo  - TSH 2.49  - Alb 4.0  - Prealbumin 9     ID:  - Unasyn (complete)     MSK:  - LIMB ALERT: NO IVS or LAB DRAWS from RIGHT ARM. We are keeping this arm safe as a backup flap if necessary.   - LEFT fibula free flap: wound vac and walking boot placed in OR  - Please examine boot tightness on each examination to ensure no pressure injury to dorsal foot  - OK for boot off when in bed  - Elevate leg while in bed/chair  - Boot to remain in place for 3 weeks post-op  - Ok for full weight bearing  - LEFT anterolateral thigh free flap; island dressing removed  - LEFT STSG, calcium alginate with tegaderm in place, reinforce PRN, OK to take down to air on POD5, apply  Aquaphor  - please strip and record DARWIN drain output q8h      PPX:  - 40mg Lovenox daily   - SCD on right     Consults:  - PT/OT- keep neck neutral (no turning to either side), ok for full weight bearing  - SLP for PMSV ONLY AFTER cuffless trach in place  - PLC for trach and tube feeding, TF 8/20, trach 8/22 (complete)  - Nutrition     Dispo: ENT, 6A     -- Patient and above plan discussed with Dr. Dao and Dr. Thorne.     Sana Stallworth MD  Otolaryngology-Head & Neck Surgery PGY-2  Please contact ENT with questions by dialing [phone] and entering job code [phone] when prompted.      Coding Query Addendum:  Severe Protein-Calorie Malnutrition now resolved   Per nutrition,   Malnutrition Status:   Patient does not meet two of the established criteria necessary for diagnosing malnutrition but is at risk for malnutrition

## 2023-08-27 NOTE — PLAN OF CARE
Status: Pt s/p segmental mandibulectomy, partial glossectomy, bilateral neck dissection reconstruction of the mandible with left fibular free flap, reconstruction of the chin defect with left ALT free flap, and trach placement 8/17. S/p PEG placement 8/23  Vitals: VSS on RA-HME or 21% HTD  Neuros: A&O Intact, writes to communicate, R facial numbness r/t surgery  Resp/trach: #6 Shiley cuffless, sutured in place w/ NO trach ties. Good cough, suctioned x2 Ovn. HME on OVN  Diet: NPO. Bolus TF with a goal of 4/4 cans via PEG  Bowel status: No bm this shift  : Voiding spontaneously   Skin: Bilateral neck incisions closed with norah/sutures RANDALL. L STSG RANDALL & DARWIN x1. LLE with ace bandage. CDI. L thigh incision RANDALL with sutures. Tongue flap w/ spot check x1 pale in color. Neck/chin flap with 2 spot neck. BLE +2 Edema.  Pain: Pain managed with oxy & Tylenol  Activity: A1 GB walker. WBAT on LLE w/ boot  Plan: Continue with POC.

## 2023-08-28 LAB
ANION GAP SERPL CALCULATED.3IONS-SCNC: 8 MMOL/L (ref 7–15)
BUN SERPL-MCNC: 11.8 MG/DL (ref 8–23)
CALCIUM SERPL-MCNC: 8 MG/DL (ref 8.8–10.2)
CHLORIDE SERPL-SCNC: 108 MMOL/L (ref 98–107)
CREAT SERPL-MCNC: 0.41 MG/DL (ref 0.51–0.95)
DEPRECATED HCO3 PLAS-SCNC: 22 MMOL/L (ref 22–29)
ERYTHROCYTE [DISTWIDTH] IN BLOOD BY AUTOMATED COUNT: 16.6 % (ref 10–15)
GFR SERPL CREATININE-BSD FRML MDRD: >90 ML/MIN/1.73M2
GLUCOSE BLDC GLUCOMTR-MCNC: 104 MG/DL (ref 70–99)
GLUCOSE BLDC GLUCOMTR-MCNC: 78 MG/DL (ref 70–99)
GLUCOSE BLDC GLUCOMTR-MCNC: 83 MG/DL (ref 70–99)
GLUCOSE BLDC GLUCOMTR-MCNC: 89 MG/DL (ref 70–99)
GLUCOSE SERPL-MCNC: 105 MG/DL (ref 70–99)
HCT VFR BLD AUTO: 24 % (ref 35–47)
HGB BLD-MCNC: 7.5 G/DL (ref 11.7–15.7)
MAGNESIUM SERPL-MCNC: 2.2 MG/DL (ref 1.7–2.3)
MCH RBC QN AUTO: 31.1 PG (ref 26.5–33)
MCHC RBC AUTO-ENTMCNC: 31.3 G/DL (ref 31.5–36.5)
MCV RBC AUTO: 100 FL (ref 78–100)
PHOSPHATE SERPL-MCNC: 2.9 MG/DL (ref 2.5–4.5)
PLATELET # BLD AUTO: 895 10E3/UL (ref 150–450)
POTASSIUM SERPL-SCNC: 4.2 MMOL/L (ref 3.4–5.3)
RBC # BLD AUTO: 2.41 10E6/UL (ref 3.8–5.2)
SODIUM SERPL-SCNC: 138 MMOL/L (ref 136–145)
WBC # BLD AUTO: 12.8 10E3/UL (ref 4–11)

## 2023-08-28 PROCEDURE — 83735 ASSAY OF MAGNESIUM: CPT | Performed by: OTOLARYNGOLOGY

## 2023-08-28 PROCEDURE — 250N000013 HC RX MED GY IP 250 OP 250 PS 637: Performed by: OTOLARYNGOLOGY

## 2023-08-28 PROCEDURE — 999N000157 HC STATISTIC RCP TIME EA 10 MIN

## 2023-08-28 PROCEDURE — 85014 HEMATOCRIT: CPT | Performed by: OTOLARYNGOLOGY

## 2023-08-28 PROCEDURE — 36415 COLL VENOUS BLD VENIPUNCTURE: CPT | Performed by: OTOLARYNGOLOGY

## 2023-08-28 PROCEDURE — 120N000002 HC R&B MED SURG/OB UMMC

## 2023-08-28 PROCEDURE — 80048 BASIC METABOLIC PNL TOTAL CA: CPT | Performed by: OTOLARYNGOLOGY

## 2023-08-28 PROCEDURE — 84100 ASSAY OF PHOSPHORUS: CPT | Performed by: OTOLARYNGOLOGY

## 2023-08-28 PROCEDURE — 250N000011 HC RX IP 250 OP 636: Mod: JZ | Performed by: STUDENT IN AN ORGANIZED HEALTH CARE EDUCATION/TRAINING PROGRAM

## 2023-08-28 RX ADMIN — CHLORHEXIDINE GLUCONATE 15 ML: 1.2 SOLUTION ORAL at 08:20

## 2023-08-28 RX ADMIN — ENOXAPARIN SODIUM 40 MG: 40 INJECTION SUBCUTANEOUS at 19:48

## 2023-08-28 RX ADMIN — Medication 15 ML: at 08:20

## 2023-08-28 RX ADMIN — WHITE PETROLATUM: 1.75 OINTMENT TOPICAL at 04:22

## 2023-08-28 RX ADMIN — WHITE PETROLATUM: 1.75 OINTMENT TOPICAL at 16:21

## 2023-08-28 RX ADMIN — ACETAMINOPHEN 975 MG: 325 TABLET, FILM COATED ORAL at 19:48

## 2023-08-28 RX ADMIN — WHITE PETROLATUM: 1.75 OINTMENT TOPICAL at 23:41

## 2023-08-28 RX ADMIN — ACETAMINOPHEN 975 MG: 325 TABLET, FILM COATED ORAL at 08:21

## 2023-08-28 RX ADMIN — WHITE PETROLATUM: 1.75 OINTMENT TOPICAL at 08:39

## 2023-08-28 RX ADMIN — CHLORHEXIDINE GLUCONATE 15 ML: 1.2 SOLUTION ORAL at 14:51

## 2023-08-28 RX ADMIN — CHLORHEXIDINE GLUCONATE 15 ML: 1.2 SOLUTION ORAL at 19:48

## 2023-08-28 RX ADMIN — ACETAMINOPHEN 975 MG: 325 TABLET, FILM COATED ORAL at 14:51

## 2023-08-28 ASSESSMENT — ACTIVITIES OF DAILY LIVING (ADL)
ADLS_ACUITY_SCORE: 27

## 2023-08-28 NOTE — PROVIDER NOTIFICATION
"Sana Stallworth on 1757 paged the following message:  \"6A Adrianna Pereira - It looks like the sutures on her neck are opening up and the bedside RN mentioned that the incision looks more open than it was yesterday night, can you come assess?  Thanks  Todd Kent, [phone]\"  "

## 2023-08-28 NOTE — PROGRESS NOTES
"Otolaryngology Progress Note  August 28, 2023    Subjective/Interval: No acute events overnight. Afebrile, HDS. Unfortunately, she continues to have progressing and worsening dehiscence along the incisions superior and inferior to the skin bridge externally. Intra-oral flap is stable without breakdown. She is on the schedule for OR evaluation tomorrow with an NPO order at midnight. Lovenox will continue. Refused 2/4 cans of tube feeds due to feelings of fullness.     OBJECTIVE:   BP 98/83   Pulse 95   Temp 98  F (36.7  C) (Axillary)   Resp 16   Ht 1.626 m (5' 4.02\")   Wt 55.3 kg (122 lb)   SpO2 100%   BMI 20.93 kg/m    General: Alert and engaged, communicates and engages with exam, writing on clipboard as able, more discouraged today  HEENT: Oral cavity with well perfused Harman tongue with area of mottled skin resected and xeroform bolster covering de-epithelized FFF sutured in place. This is stable. Mental ALT free flap well-perfused with good capillary refill, no evidence of venous congestion. Incision lines superior and inferior to native skin bridge with worsening breakdown. External probe doppler signals strong.  Neck soft with no ballotable fullness or neck incision drainage. There is no expressible drainage or purulence.  Respiratory: 6-0 cuffless shiley sutured in, HTD.  MSK: Suture line intact, stable from prior. Left thigh soft without concerns for compartment syndrome.  Skin graft donor site with expected drainage. Lower leg dressing taken down, incision and skin graft noted to be intact with some small areas of de-epithelization inferiorly. DARWIN drain with serous output.    DARWIN drain output(s): (last 24 hours)/(last shift)  Drain Thigh 28/15/30 (73)    LABS:  ROUTINE IP LABS (Last four results)  BMP  Recent Labs   Lab 08/28/23  0845 08/28/23  0802 08/28/23  0411 08/28/23  0012 08/27/23  1225 08/27/23  0819 08/26/23  0844 08/26/23  0626 08/25/23  1147 08/25/23  1005     --   --   --   --  141  --  " 144  --  142   POTASSIUM 4.2  --   --   --   --  3.8  --  4.3  --  3.6   CHLORIDE 108*  --   --   --   --  111*  --  112*  --  110*   RACHAEL 8.0*  --   --   --   --  8.1*  --  7.9*  --  8.1*   CO2 22  --   --   --   --  21*  --  25  --  25   BUN 11.8  --   --   --   --  13.5  --  16.8  --  15.7   CR 0.41*  --   --   --   --  0.38*  --  0.42*  --  0.47*   * 83 78 89   < > 104*   < > 94   < > 80    < > = values in this interval not displayed.       CBC  Recent Labs   Lab 08/28/23  0845 08/27/23  0819 08/26/23  0626 08/25/23  1005   WBC 12.8* 11.9* 13.6* 15.1*   RBC 2.41* 2.34* 2.36* 2.50*   HGB 7.5* 7.1* 7.3* 7.8*   HCT 24.0* 22.9* 22.9* 24.1*    98 97 96   MCH 31.1 30.3 30.9 31.2   MCHC 31.3* 31.0* 31.9 32.4   RDW 16.6* 16.5* 16.3* 16.2*   * 818* 710* 682*       INR  Recent Labs   Lab 08/22/23  1104   INR 1.06         A/P: This is a 62 year old female with SCC of the ventral tongue, mandible, with invasion of the skin. Underwent segmental mandibulectomy, partial glossectomy, resection of the skin of the mentum, bilateral neck dissections levels 1-4. Reconstruction of the mandible with left fibular free flap, pedicle for this flap on the right. Reconstruction of the chin defect with left ALT free flap, pedicle on the left. She has struggled with continued breakdown postoperatively. Now s/p take back OR 8/24 with repair. Unfortunately, she continues to have external wound breakdown and will need to return to the OR for evaluation.    Interval changes:  - Progressive external wound breakdown  - NPO at midnight tonight  - Lovenox to conitnue  - Refusing tube feeds due to fullness, will touch base with nutrition about converting to lower volume regimen    Neuro:  - Pain control: scheduled Tylenol, PRN oxycodone  - NO Nicotine patch     HEENT:  - Nothing around the neck (no gown ties, trach ties, lines, ect.)  - HOB at 30 degrees, head/neck in midline position  - Monitor handheld doppler signal.  The ALT  flap signal is on the chin/neck paddle, and the fibula flap is intraoral just posterior to xeroform bolster  - Neck drain removed per staff  - RN flap checks Q4H  - Q12H flap checks by ENT  - Clean incisions with 0.9% sodium chloride and apply Aquaphor Q8H  - Peridex TID  - Saline oral rinses Q8H and PRN. Gentle suction with red joaquin catheter only (no yankeur), do not cut red joaquin  - Decadron 6mg q8h x 3 doses (complete)     Respiratory:  - Continue on humidified trach dome  - Routine trach cares but no trach ties  Trach Tube Instructions:   1) Contact ENT on-call with any questions or concerns   2) Perform regular suctioning   4) RN should change disposable inner canulas every shift and/or clean it with a brush   5) Keep trach tube obturator taped to wall behind the head of the bed   6) Keep extra unopened 6-0 Shiley and 4-0 Shiley at bedside at all times   7) Minimize the amount of air in the cuff needed to adequately apply positive pressure ventilate. If positive pressure ventilation is not need then the cuff should be down.      CV/heme:  - Keep MAP > 60, SBP > 90 (preferrably > 110)  - Lovenox   - NO vasopressors  - Transfuse for Hgb < 7     FEN/GI:  - PEG 8/23  - Goal Bolus tube feeds, continue to encourage and space if patient refuses due to fullness - will reach out to nutrition to convert to lower volume regimen  - Low threshold to hold tube feeds for any nausea  - Strict NPO, no oral swabs  - Electrolyte replacement protocol  - Bowel regimen: scheduled miralax and senna     :  - UOP stable, Cr wnl. Continue to monitor.     Endo  - TSH 2.49  - Alb 4.0  - Prealbumin 9     ID:  - Unasyn (complete)     MSK:  - LIMB ALERT: NO IVS or LAB DRAWS from RIGHT ARM. We are keeping this arm safe as a backup flap if necessary.   - LEFT fibula free flap: wound vac and walking boot placed in OR  - Please examine boot tightness on each examination to ensure no pressure injury to dorsal foot  - OK for boot off when  in bed  - Elevate leg while in bed/chair  - Boot to remain in place for 3 weeks post-op  - Ok for full weight bearing  - LEFT anterolateral thigh free flap; island dressing removed  - LEFT STSG, Ok to leave open to air, apply Aquaphor  - please strip and record DARWIN drain output q8h      PPX:  - 40mg Lovenox daily   - SCD on right     Consults:  - PT/OT- keep neck neutral (no turning to either side), ok for full weight bearing  - SLP: PMSV use as tolerated  - PLC for trach and tube feeding, TF 8/20, trach 8/22 (complete)  - Nutrition     Dispo: ENT, 6A     -- Patient and above plan discussed with Dr. Dao and Dr. Thorne.     Fred Merida MD   Department of Otolaryngology - Head and Neck Surgery, PGY 1  Please page ENT with questions    Coding Query Addendum:  Severe Protein-Calorie Malnutrition now resolved   Per nutrition,   Malnutrition Status:   Patient does not meet two of the established criteria necessary for diagnosing malnutrition but is at risk for malnutrition

## 2023-08-28 NOTE — PLAN OF CARE
Status: Pt s/p segmental mandibulectomy, partial glossectomy, bilateral neck dissection reconstruction of the mandible with left fibular free flap, reconstruction of the chin defect with left ALT free flap, and trach placement 8/17. S/p PEG placement 8/23.   Vitals: VSS on RA  Neuros: A&Ox4, intact, writes to communicate, numbness surrounding neck incisions.   IV: PIV SL   Labs/Electrolytes: WNL  Resp/trach: #6 Shiley cuffless, sutured in place with no trach ties. Strong productive cough, suctioned x2, inner cannula cleaned x1, HME on.   Diet: NPO. Bolus TF with a goal of 4/4 cans, 2/4 completed this shift via PEG tube. Pt NPO at midnight for surgery tomorrow.   Bowel status: LBM 8/28, loose, held bowel meds this AM.   : Voiding spontaneously.   Skin: Bilateral neck incisions closed with staples and sutures, Aquaphor applied, RANDALL. Small area of dehiscence on L side of neck incision, team aware and spoke to pt this shift. L STSG and L thigh incision, Aquaphor applied, RANDALL. Tongue flap with spot checks x1, present. Neck/chin flap with spot checks x2, present. BLE +3 edema. LLE wrapped with ace bandage with small amount of serosanguinous drainage.   Pain: Neck incision pain managed with tylenol q4hr PRN.   Activity: Ax1 with GB and walker. L CAM boot on when OOB. Walked halls x1, up in chair this shift.   Plan/Updates this shift: Pt NPO at midnight for surgery tomorrow to repair neck incision. Pt completed trach cares themselves successfully this shift as well as suctioning x2 this shift. Continue to monitor and follow POC.

## 2023-08-28 NOTE — PROGRESS NOTES
Care Management Follow Up    Length of Stay (days): 11    Expected Discharge Date: 08/31/2023     Concerns to be Addressed:  discharge planning     Patient plan of care discussed at interdisciplinary rounds: Yes    Anticipated Discharge Disposition:  Home  Anticipated Discharge Services:  None  Anticipated Discharge DME:  suction, humidity, trach supplies, enteral formula and supplies, wound care supplies      Patient/family educated on Medicare website which has current facility and service quality ratings:  Yes  Education Provided on the Discharge Plan:  Yes  Patient/Family in Agreement with the Plan:  Yes    Referrals Placed by CM/SW:  Home care, DME  Private pay costs discussed: list of private pay home care services given to pt/family if they feel they need it    Additional Information:  Patient status reviewed, discussed in discharge rounds. Per primary team, patient will be here a couple more days.     Call placed to [facility] to provide an updated. Spoke with Jacqueline ([phone] ext [phone]) who states they should have everything they need for patient's discharge. Jacqueline will get back to RNCC today if needing anything else and states she will be our point of contact for the order.    Updated [facility] liaison, Carmel, of patient's discharge plans. Carmel states supplies will need to be delivered to the hospital on day of discharge. We will need discharge orders early allowing [facility] to process the order and deliver supplies by 1100 so patient can make it home for [facility] to do initial trach set up.    [facility] updated on discharge plans.     Met with patient to discuss discharge plan and follow up on wound care supplies. Previously [facility] said supplies were sent out, Adrianna was checking with her . Today Adrianna confirmed that wound care supplies have been delivered to their home.     PCP appointment Sept. 13th 2:20 pm   Dr. Sukumar Hendricks  [address] [place] [id number]    [phone]      [facility] - accepted for home RN   Ph: [phone]  Fax: [phone]     [facility] - suction, humidity, trach supplies and wound care supplies   Ph: [phone] or [phone]  Fax: [phone]     [facility] - home enteral formula and supplies  Ph: [phone]  Fax: [phone]    Marina Betancur RN, BSN  6A RN Care Coordinator  Ph: [phone]   Pager: [phone]

## 2023-08-28 NOTE — PROGRESS NOTES
CLINICAL NUTRITION SERVICES - BRIEF NOTE     Nutrition Prescription    Recommendations already ordered by Registered Dietitian (RD):  Implementation  Formula: TwoCal HN + 1 pkt Prosource TF20  Volume: 3 cartons/day (711 mL, 500 mL free water)  Provisions: 1502 kcal (32 kcal/kg), 80 g protein (1.7 g/kg), 155 g CHO, 3.5 g fiber  Additional Free Water: 120 mL 1x/day.    - If pt refuses 3rd can of TwoCal HN, recommend administering an additional pkt of Prosource TF20.    Future/Additional Recommendations:  Monitor need for additional fiber modular.      REASON FOR ASSESSMENT  Adrianna Pereira is a/an 62 year old female assessed by the dietitian for:  - Received page regarding pt not tolerating tube feeding formula. Pt experiencing felling of fullness and provider concerned about pt not meeting nutrition/protein needs without meeting tube feeding goal.     Per Chart review, RN note from 8/27 states 2 of 4 cans given.     Intake record in chart:   Goal (4 cans): 948 mL/day   8/28 8/27 8/26 8/25 8/24 8/23 8/22 8/21   Formula (mL)  [phone] 948   *Pt in OR 8/24 likely contributing to not meeting tube feeding goal of 4 cans for 8/24 + 8/23.     Findings  Visited with patient to discuss tube feeding plan and assess if there are any additional changes that need to be made. Pt stated agreement with transitioning to TwoCal HN to help her meet her nutrition needs. Pt stated (via handwriting on notebook) that she has experienced some urgency with bowel movements and was inquiring if TwoCal would help with that. Discussed that there is a little bit of fiber in this tube feeding, but that we can also add more if the bowel movement urgency does not improve. Pt also stated concern about timing of tube feeding and was wanting to having all tube feedings done by 7pm due to difficulty with getting to the bathroom in time at night. Spoke with ENT provider to give update and was informed that pt going to OR tomorrow so Tube  feedings will restart after pt returns.     Reviewed:   Labs - Electrolytes WNL.   Meds     ReASSESSED NUTRITION NEEDS:  IBW: 54.5 kg   Current wt: 55.3 kg (122 lbs) - Standing scale weight 08/23/23  Dosing Weight: 47 kg (actual, based on lowest wt this admit of 46.9 kg on 8/17)     Estimated Energy Needs: 1217-5091 kcals/day (30-35+ kcals/kg)   Justification: Repletion + Post-op  Estimated Protein Needs: 70-95 grams protein/day (1.5 - 2.0 grams of pro/kg)   Justification: Repletion + Post-op (with concerns for healing status)  Estimated Fluid Needs: 1 mL/kcal   Justification: Maintenance or Per provider pending fluid status     INTERVENTIONS  Implementation  Formula: TwoCal HN + 1 pkt Prosource TF20  Volume: 3 cartons/day (711 mL, 500 mL free water)  Provisions: 1502 kcal (32 kcal/kg), 80 g protein (1.7 g/kg), 155 g CHO, 3.5 g fiber  Additional Free Water: 120 mL 1x/day.     - If pt refuses 3rd can of TwoCal HN, recommend administering an additional pkt of Prosource TF20.     Follow up/Monitoring  Will continue to follow up per protocol.     Juliana Holman RD, LD   5B/6A pager [phone]  Weekend pager [phone]

## 2023-08-28 NOTE — PLAN OF CARE
Status: S/p mandibulectomy, partial glossectomy, bilateral neck dissections, reconstruction of the mandible with left fibular free flap, reconstruction of the chin defect with left ALT free flap, trach 8/17. S/p PEG 8/23. S/p I&D of neck and oral cavity 8/24  Vitals: VSS on RA  Neuros: A&Ox4, writes to communicate  IV: PIV SL  Resp/trach: #6 shiley cuffless sutured into place, inner cannula cleansed @ 2100. Good/productive cough, suctioned x1. HME in place   Diet: NPO. Received 2/4 cans of TF today  Bowel status: Loose Bms 8/26  : Voiding   Skin: Bilateral neck incisions closed with staples/sutures RANDALL.  Intraoral flap with Xeroform bolster, q4 spot check +. Neck/chin flap site, 2 q4 doppler site spot checks +. L thigh STSG site RANDALL, incision closed with sutures, DARWIN x1. LLE skin graft with ace bandage CDI  Pain: Neck and oral pain controlled prn oxycodone, scheduled tylenol  Activity: Up with 1 with LLE boot  Plan/updates: Small hole on L side of neck incision, MD notified

## 2023-08-28 NOTE — PROGRESS NOTES
ENT Brief Note  8/28/2023    Paged by RN to evaluate neck incision as there was concern this was opening up. On evaluation, the known incisional dehiscence above trach stoma was persistent with slight progression toward left side. This involves entire region above faceplate, approximately 6-7 cm. The area of concern per RN is a known area of dehiscence but appears to be hanging slightly more open than before and is still tethered by intact sutures.There is some white-yellow fibrinous drainage from incision. There is also an area of superficial dehiscence along the right inferior ALT skin flap incision, about 5 mm. Suctioned trach to reveal white-tan secretions similar to previous. Both flaps are stable with strong dopplers and good capillary refill. Neck is nontender without discrete tenderness or fluctuance.    - Continue current course, will re-evaluate on AM rounds and discuss with Dr. Mati Stallworth MD  Otolaryngology-Head & Neck Surgery PGY-2  Please contact ENT with questions by dialing [phone] and entering job code [phone] when prompted.

## 2023-08-28 NOTE — PROGRESS NOTES
Hematology note    Paged to discussed worsening thrombocytosis.     is 62 year old female with SCC of the ventral tongue, mandible, with invasion of the skin. Underwent surgical procedure with ENT team 8/17/2023 with ongoing complications with would healing.    Platelet count was 391K on admission, 460K on 8/23, continue to rise and today is 895K.    The pattern and timeline is consistent with reactive thrombocytosis.  Recommend to continue to monitor CBC. Patient might be at a higher risk for thrombosis and bleeding (from acquired vWD) with platelet above 1M, would recommend to monitor closely for clinical bleeding and clotting. Otherwise, this condition requires no specific management from a hematology standpoint. Anticipate improvement in 2-3 weeks once pro-inflammatory state is resolved.    Plan of care was discussed with attending physician Dr. Schroeder.    Ray Storey MD  Hematology/Medical Oncology/BMT (PGY-5)  P: [phone]

## 2023-08-28 NOTE — PLAN OF CARE
Status: Pt s/p segmental mandibulectomy, partial glossectomy, bilateral neck dissection reconstruction of the mandible with left fibular free flap, reconstruction of the chin defect with left ALT free flap, and trach placement 8/17. S/p PEG placement 8/23  Vitals: VSS on RA-HME or 21% HTD  Neuros: A&O Intact, writes to communicate, R facial numbness r/t surgery  Resp/trach: #6 Shiley cuffless, sutured in place w/ NO trach ties. Good cough, suctioned x2 Ovn. HME on OVN. Cleaned inner cannula x2  Diet: NPO. Bolus TF with a goal of 4/4 cans via PEG  Bowel status: LBM 8/26 No bm this shift  : Voiding spontaneously   Skin: Bilateral neck incisions closed with norah/sutures RANDALL. L STSG RANDALL & DARWIN x1. LLE with ace bandage. CDI. L thigh incision RANDALL with sutures. Tongue flap w/ spot check x1 pale in color. Neck/chin flap with 2 spot neck. BLE +2 Edema.  Pain: Pain managed with oxy & Tylenol  Activity: A1 GB walker. WBAT on LLE w/ boot  Plan: Continue with POC

## 2023-08-29 ENCOUNTER — ANESTHESIA EVENT (OUTPATIENT)
Dept: SURGERY | Facility: CLINIC | Age: 63
End: 2023-08-29
Payer: COMMERCIAL

## 2023-08-29 ENCOUNTER — ANESTHESIA (OUTPATIENT)
Dept: SURGERY | Facility: CLINIC | Age: 63
End: 2023-08-29
Payer: COMMERCIAL

## 2023-08-29 LAB
ABO/RH(D): NORMAL
ANION GAP SERPL CALCULATED.3IONS-SCNC: 7 MMOL/L (ref 7–15)
ANTIBODY SCREEN: NEGATIVE
BASOPHILS # BLD AUTO: 0 10E3/UL (ref 0–0.2)
BASOPHILS NFR BLD AUTO: 0 %
BUN SERPL-MCNC: 14.3 MG/DL (ref 8–23)
CALCIUM SERPL-MCNC: 7.9 MG/DL (ref 8.8–10.2)
CHLORIDE SERPL-SCNC: 108 MMOL/L (ref 98–107)
CREAT SERPL-MCNC: 0.39 MG/DL (ref 0.51–0.95)
DEPRECATED HCO3 PLAS-SCNC: 21 MMOL/L (ref 22–29)
EOSINOPHIL # BLD AUTO: 0.2 10E3/UL (ref 0–0.7)
EOSINOPHIL NFR BLD AUTO: 2 %
ERYTHROCYTE [DISTWIDTH] IN BLOOD BY AUTOMATED COUNT: 16.8 % (ref 10–15)
GFR SERPL CREATININE-BSD FRML MDRD: >90 ML/MIN/1.73M2
GLUCOSE BLDC GLUCOMTR-MCNC: 79 MG/DL (ref 70–99)
GLUCOSE BLDC GLUCOMTR-MCNC: 80 MG/DL (ref 70–99)
GLUCOSE SERPL-MCNC: 87 MG/DL (ref 70–99)
HCT VFR BLD AUTO: 22.6 % (ref 35–47)
HGB BLD-MCNC: 7.2 G/DL (ref 11.7–15.7)
HGB BLD-MCNC: 7.5 G/DL (ref 11.7–15.7)
IMM GRANULOCYTES # BLD: 0.1 10E3/UL
IMM GRANULOCYTES NFR BLD: 1 %
LYMPHOCYTES # BLD AUTO: 1.8 10E3/UL (ref 0.8–5.3)
LYMPHOCYTES NFR BLD AUTO: 15 %
MAGNESIUM SERPL-MCNC: 2 MG/DL (ref 1.7–2.3)
MCH RBC QN AUTO: 30.5 PG (ref 26.5–33)
MCHC RBC AUTO-ENTMCNC: 31.9 G/DL (ref 31.5–36.5)
MCV RBC AUTO: 96 FL (ref 78–100)
MONOCYTES # BLD AUTO: 0.9 10E3/UL (ref 0–1.3)
MONOCYTES NFR BLD AUTO: 8 %
NEUTROPHILS # BLD AUTO: 9.2 10E3/UL (ref 1.6–8.3)
NEUTROPHILS NFR BLD AUTO: 74 %
NRBC # BLD AUTO: 0 10E3/UL
NRBC BLD AUTO-RTO: 0 /100
PHOSPHATE SERPL-MCNC: 2.6 MG/DL (ref 2.5–4.5)
PLATELET # BLD AUTO: 911 10E3/UL (ref 150–450)
POTASSIUM SERPL-SCNC: 4 MMOL/L (ref 3.4–5.3)
RBC # BLD AUTO: 2.36 10E6/UL (ref 3.8–5.2)
SODIUM SERPL-SCNC: 136 MMOL/L (ref 136–145)
SPECIMEN EXPIRATION DATE: NORMAL
WBC # BLD AUTO: 12.2 10E3/UL (ref 4–11)

## 2023-08-29 PROCEDURE — 120N000002 HC R&B MED SURG/OB UMMC

## 2023-08-29 PROCEDURE — 258N000003 HC RX IP 258 OP 636: Performed by: NURSE ANESTHETIST, CERTIFIED REGISTERED

## 2023-08-29 PROCEDURE — 999N000141 HC STATISTIC PRE-PROCEDURE NURSING ASSESSMENT: Performed by: STUDENT IN AN ORGANIZED HEALTH CARE EDUCATION/TRAINING PROGRAM

## 2023-08-29 PROCEDURE — 250N000011 HC RX IP 250 OP 636: Performed by: NURSE ANESTHETIST, CERTIFIED REGISTERED

## 2023-08-29 PROCEDURE — 250N000009 HC RX 250: Performed by: NURSE ANESTHETIST, CERTIFIED REGISTERED

## 2023-08-29 PROCEDURE — 85018 HEMOGLOBIN: CPT | Performed by: PHYSICIAN ASSISTANT

## 2023-08-29 PROCEDURE — 0B21XEZ CHANGE ENDOTRACHEAL AIRWAY IN TRACHEA, EXTERNAL APPROACH: ICD-10-PCS | Performed by: STUDENT IN AN ORGANIZED HEALTH CARE EDUCATION/TRAINING PROGRAM

## 2023-08-29 PROCEDURE — 86850 RBC ANTIBODY SCREEN: CPT | Performed by: ANESTHESIOLOGY

## 2023-08-29 PROCEDURE — 11046 DBRDMT MUSC&/FSCA EA ADDL: CPT | Mod: 78 | Performed by: STUDENT IN AN ORGANIZED HEALTH CARE EDUCATION/TRAINING PROGRAM

## 2023-08-29 PROCEDURE — 85025 COMPLETE CBC W/AUTO DIFF WBC: CPT

## 2023-08-29 PROCEDURE — 710N000009 HC RECOVERY PHASE 1, LEVEL 1, PER MIN: Performed by: STUDENT IN AN ORGANIZED HEALTH CARE EDUCATION/TRAINING PROGRAM

## 2023-08-29 PROCEDURE — 36415 COLL VENOUS BLD VENIPUNCTURE: CPT | Performed by: PHYSICIAN ASSISTANT

## 2023-08-29 PROCEDURE — 36415 COLL VENOUS BLD VENIPUNCTURE: CPT | Performed by: OTOLARYNGOLOGY

## 2023-08-29 PROCEDURE — 272N000001 HC OR GENERAL SUPPLY STERILE: Performed by: STUDENT IN AN ORGANIZED HEALTH CARE EDUCATION/TRAINING PROGRAM

## 2023-08-29 PROCEDURE — 250N000013 HC RX MED GY IP 250 OP 250 PS 637: Performed by: OTOLARYNGOLOGY

## 2023-08-29 PROCEDURE — 83735 ASSAY OF MAGNESIUM: CPT | Performed by: OTOLARYNGOLOGY

## 2023-08-29 PROCEDURE — 84100 ASSAY OF PHOSPHORUS: CPT | Performed by: OTOLARYNGOLOGY

## 2023-08-29 PROCEDURE — 80048 BASIC METABOLIC PNL TOTAL CA: CPT | Performed by: OTOLARYNGOLOGY

## 2023-08-29 PROCEDURE — 0JB50ZZ EXCISION OF LEFT NECK SUBCUTANEOUS TISSUE AND FASCIA, OPEN APPROACH: ICD-10-PCS | Performed by: STUDENT IN AN ORGANIZED HEALTH CARE EDUCATION/TRAINING PROGRAM

## 2023-08-29 PROCEDURE — 250N000025 HC SEVOFLURANE, PER MIN: Performed by: STUDENT IN AN ORGANIZED HEALTH CARE EDUCATION/TRAINING PROGRAM

## 2023-08-29 PROCEDURE — 250N000011 HC RX IP 250 OP 636: Mod: JZ | Performed by: STUDENT IN AN ORGANIZED HEALTH CARE EDUCATION/TRAINING PROGRAM

## 2023-08-29 PROCEDURE — 86901 BLOOD TYPING SEROLOGIC RH(D): CPT | Performed by: ANESTHESIOLOGY

## 2023-08-29 PROCEDURE — 11043 DBRDMT MUSC&/FSCA 1ST 20/<: CPT | Mod: 78 | Performed by: STUDENT IN AN ORGANIZED HEALTH CARE EDUCATION/TRAINING PROGRAM

## 2023-08-29 PROCEDURE — 0HD1XZZ EXTRACTION OF FACE SKIN, EXTERNAL APPROACH: ICD-10-PCS | Performed by: STUDENT IN AN ORGANIZED HEALTH CARE EDUCATION/TRAINING PROGRAM

## 2023-08-29 PROCEDURE — 999N000157 HC STATISTIC RCP TIME EA 10 MIN

## 2023-08-29 PROCEDURE — 370N000017 HC ANESTHESIA TECHNICAL FEE, PER MIN: Performed by: STUDENT IN AN ORGANIZED HEALTH CARE EDUCATION/TRAINING PROGRAM

## 2023-08-29 PROCEDURE — 360N000075 HC SURGERY LEVEL 2, PER MIN: Performed by: STUDENT IN AN ORGANIZED HEALTH CARE EDUCATION/TRAINING PROGRAM

## 2023-08-29 PROCEDURE — 250N000013 HC RX MED GY IP 250 OP 250 PS 637: Performed by: STUDENT IN AN ORGANIZED HEALTH CARE EDUCATION/TRAINING PROGRAM

## 2023-08-29 PROCEDURE — 0HD4XZZ EXTRACTION OF NECK SKIN, EXTERNAL APPROACH: ICD-10-PCS | Performed by: STUDENT IN AN ORGANIZED HEALTH CARE EDUCATION/TRAINING PROGRAM

## 2023-08-29 RX ORDER — HYDROMORPHONE HCL IN WATER/PF 6 MG/30 ML
0.2 PATIENT CONTROLLED ANALGESIA SYRINGE INTRAVENOUS EVERY 5 MIN PRN
Status: DISCONTINUED | OUTPATIENT
Start: 2023-08-29 | End: 2023-08-29 | Stop reason: HOSPADM

## 2023-08-29 RX ORDER — MAGNESIUM HYDROXIDE 1200 MG/15ML
LIQUID ORAL
Status: DISCONTINUED
Start: 2023-08-29 | End: 2023-08-29 | Stop reason: HOSPADM

## 2023-08-29 RX ORDER — PROPOFOL 10 MG/ML
INJECTION, EMULSION INTRAVENOUS PRN
Status: DISCONTINUED | OUTPATIENT
Start: 2023-08-29 | End: 2023-08-29

## 2023-08-29 RX ORDER — SODIUM CHLORIDE, SODIUM LACTATE, POTASSIUM CHLORIDE, CALCIUM CHLORIDE 600; 310; 30; 20 MG/100ML; MG/100ML; MG/100ML; MG/100ML
INJECTION, SOLUTION INTRAVENOUS CONTINUOUS PRN
Status: DISCONTINUED | OUTPATIENT
Start: 2023-08-29 | End: 2023-08-29

## 2023-08-29 RX ORDER — FENTANYL CITRATE 50 UG/ML
25 INJECTION, SOLUTION INTRAMUSCULAR; INTRAVENOUS EVERY 5 MIN PRN
Status: DISCONTINUED | OUTPATIENT
Start: 2023-08-29 | End: 2023-08-29 | Stop reason: HOSPADM

## 2023-08-29 RX ORDER — DEXAMETHASONE SODIUM PHOSPHATE 4 MG/ML
INJECTION, SOLUTION INTRA-ARTICULAR; INTRALESIONAL; INTRAMUSCULAR; INTRAVENOUS; SOFT TISSUE PRN
Status: DISCONTINUED | OUTPATIENT
Start: 2023-08-29 | End: 2023-08-29

## 2023-08-29 RX ORDER — ONDANSETRON 2 MG/ML
INJECTION INTRAMUSCULAR; INTRAVENOUS PRN
Status: DISCONTINUED | OUTPATIENT
Start: 2023-08-29 | End: 2023-08-29

## 2023-08-29 RX ORDER — SODIUM CHLORIDE, SODIUM LACTATE, POTASSIUM CHLORIDE, CALCIUM CHLORIDE 600; 310; 30; 20 MG/100ML; MG/100ML; MG/100ML; MG/100ML
INJECTION, SOLUTION INTRAVENOUS CONTINUOUS
Status: DISCONTINUED | OUTPATIENT
Start: 2023-08-29 | End: 2023-08-29 | Stop reason: HOSPADM

## 2023-08-29 RX ORDER — HYDROMORPHONE HCL IN WATER/PF 6 MG/30 ML
0.4 PATIENT CONTROLLED ANALGESIA SYRINGE INTRAVENOUS EVERY 5 MIN PRN
Status: DISCONTINUED | OUTPATIENT
Start: 2023-08-29 | End: 2023-08-29 | Stop reason: HOSPADM

## 2023-08-29 RX ORDER — LIDOCAINE HYDROCHLORIDE 20 MG/ML
INJECTION, SOLUTION INFILTRATION; PERINEURAL PRN
Status: DISCONTINUED | OUTPATIENT
Start: 2023-08-29 | End: 2023-08-29

## 2023-08-29 RX ORDER — MAGNESIUM OXIDE 400 MG/1
400 TABLET ORAL EVERY 4 HOURS
Status: DISPENSED | OUTPATIENT
Start: 2023-08-29 | End: 2023-08-29

## 2023-08-29 RX ORDER — FENTANYL CITRATE 50 UG/ML
50 INJECTION, SOLUTION INTRAMUSCULAR; INTRAVENOUS EVERY 5 MIN PRN
Status: DISCONTINUED | OUTPATIENT
Start: 2023-08-29 | End: 2023-08-29 | Stop reason: HOSPADM

## 2023-08-29 RX ORDER — ONDANSETRON 2 MG/ML
4 INJECTION INTRAMUSCULAR; INTRAVENOUS EVERY 30 MIN PRN
Status: DISCONTINUED | OUTPATIENT
Start: 2023-08-29 | End: 2023-08-29 | Stop reason: HOSPADM

## 2023-08-29 RX ORDER — FENTANYL CITRATE 50 UG/ML
INJECTION, SOLUTION INTRAMUSCULAR; INTRAVENOUS PRN
Status: DISCONTINUED | OUTPATIENT
Start: 2023-08-29 | End: 2023-08-29

## 2023-08-29 RX ORDER — ONDANSETRON 4 MG/1
4 TABLET, ORALLY DISINTEGRATING ORAL EVERY 30 MIN PRN
Status: DISCONTINUED | OUTPATIENT
Start: 2023-08-29 | End: 2023-08-29 | Stop reason: HOSPADM

## 2023-08-29 RX ADMIN — ENOXAPARIN SODIUM 40 MG: 40 INJECTION SUBCUTANEOUS at 20:24

## 2023-08-29 RX ADMIN — WHITE PETROLATUM: 1.75 OINTMENT TOPICAL at 15:51

## 2023-08-29 RX ADMIN — FENTANYL CITRATE 100 MCG: 50 INJECTION, SOLUTION INTRAMUSCULAR; INTRAVENOUS at 10:51

## 2023-08-29 RX ADMIN — WHITE PETROLATUM: 1.75 OINTMENT TOPICAL at 23:51

## 2023-08-29 RX ADMIN — SODIUM CHLORIDE, POTASSIUM CHLORIDE, SODIUM LACTATE AND CALCIUM CHLORIDE: 600; 310; 30; 20 INJECTION, SOLUTION INTRAVENOUS at 10:29

## 2023-08-29 RX ADMIN — POTASSIUM & SODIUM PHOSPHATES POWDER PACK 280-160-250 MG 1 PACKET: 280-160-250 PACK at 08:22

## 2023-08-29 RX ADMIN — ONDANSETRON 4 MG: 2 INJECTION INTRAMUSCULAR; INTRAVENOUS at 11:32

## 2023-08-29 RX ADMIN — PROPOFOL 70 MG: 10 INJECTION, EMULSION INTRAVENOUS at 10:51

## 2023-08-29 RX ADMIN — PHENYLEPHRINE HYDROCHLORIDE 100 MCG: 10 INJECTION INTRAVENOUS at 11:06

## 2023-08-29 RX ADMIN — CHLORHEXIDINE GLUCONATE 15 ML: 1.2 SOLUTION ORAL at 07:57

## 2023-08-29 RX ADMIN — PHENYLEPHRINE HYDROCHLORIDE 100 MCG: 10 INJECTION INTRAVENOUS at 11:29

## 2023-08-29 RX ADMIN — PHENYLEPHRINE HYDROCHLORIDE 100 MCG: 10 INJECTION INTRAVENOUS at 11:22

## 2023-08-29 RX ADMIN — PHENYLEPHRINE HYDROCHLORIDE 100 MCG: 10 INJECTION INTRAVENOUS at 11:14

## 2023-08-29 RX ADMIN — ACETAMINOPHEN 975 MG: 325 TABLET, FILM COATED ORAL at 20:23

## 2023-08-29 RX ADMIN — ACETAMINOPHEN 975 MG: 325 TABLET, FILM COATED ORAL at 07:55

## 2023-08-29 RX ADMIN — Medication 15 ML: at 07:55

## 2023-08-29 RX ADMIN — SUGAMMADEX 200 MG: 100 INJECTION, SOLUTION INTRAVENOUS at 11:42

## 2023-08-29 RX ADMIN — WHITE PETROLATUM: 1.75 OINTMENT TOPICAL at 07:54

## 2023-08-29 RX ADMIN — Medication 50 MG: at 10:52

## 2023-08-29 RX ADMIN — LIDOCAINE HYDROCHLORIDE 100 MG: 20 INJECTION, SOLUTION INFILTRATION; PERINEURAL at 10:51

## 2023-08-29 RX ADMIN — MIDAZOLAM 2 MG: 1 INJECTION INTRAMUSCULAR; INTRAVENOUS at 10:35

## 2023-08-29 RX ADMIN — PHENYLEPHRINE HYDROCHLORIDE 100 MCG: 10 INJECTION INTRAVENOUS at 11:37

## 2023-08-29 RX ADMIN — PROPOFOL 20 MG: 10 INJECTION, EMULSION INTRAVENOUS at 11:49

## 2023-08-29 RX ADMIN — PROPOFOL 20 MG: 10 INJECTION, EMULSION INTRAVENOUS at 11:46

## 2023-08-29 RX ADMIN — CHLORHEXIDINE GLUCONATE 15 ML: 1.2 SOLUTION ORAL at 20:24

## 2023-08-29 RX ADMIN — MAGNESIUM OXIDE TAB 400 MG (241.3 MG ELEMENTAL MG) 400 MG: 400 (241.3 MG) TAB at 08:22

## 2023-08-29 RX ADMIN — CHLORHEXIDINE GLUCONATE 15 ML: 1.2 SOLUTION ORAL at 14:20

## 2023-08-29 RX ADMIN — ACETAMINOPHEN 975 MG: 325 TABLET, FILM COATED ORAL at 14:20

## 2023-08-29 RX ADMIN — DEXAMETHASONE SODIUM PHOSPHATE 4 MG: 4 INJECTION, SOLUTION INTRA-ARTICULAR; INTRALESIONAL; INTRAMUSCULAR; INTRAVENOUS; SOFT TISSUE at 11:19

## 2023-08-29 ASSESSMENT — ACTIVITIES OF DAILY LIVING (ADL)
ADLS_ACUITY_SCORE: 24
ADLS_ACUITY_SCORE: 25
ADLS_ACUITY_SCORE: 24
ADLS_ACUITY_SCORE: 25
ADLS_ACUITY_SCORE: 24

## 2023-08-29 NOTE — PLAN OF CARE
Status: Pt s/p segmental mandibulectomy, partial glossectomy, bilateral neck dissection reconstruction of the mandible with left fibular free flap, reconstruction of the chin defect with left ALT free flap, and trach placement 8/17. S/p PEG placement 8/23.    Vitals: VSS on RA   Neuros: A&O x4, writing to communicate.  IV: PIV SL   Resp/trach: #6 Shiley cuffless, sutured in place with no trach ties. Strong productive cough, suctioned x2, inner cannula cleaned x1, HME on. Encouraging independence with trach cares.   Diet: NPO. Bolus TF via PEG 3 cans per day. 2/3 cans complete today, pt feeling too full for last can. Additional prosource given.   Bowel status: LBM 8/28   : Voiding spontaneously   Skin: Bilateral neck incision, L thigh incision and STSG RANDALL. Tongue flap and neck/chin flap with spot checks +. Area of dehiscence to L of trach. Thigh DARWIN to bulb suction with yellow output. LLE dressing with serosanguinous drainage.    Pain: Managed with scheduled tylenol   Activity: SBA walker, CAM boot on LLE when OOB.   Social: No visitors this shift   Plan: Procedure in AM to repair neck incision

## 2023-08-29 NOTE — ANESTHESIA PREPROCEDURE EVALUATION
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)     Tobacco dependence syndrome       Past Surgical History:   Procedure Laterality Date    DISSECTION RADICAL NECK MODIFIED Bilateral 2023    Procedure: Bilateral modified radical neck dissection;  Surgeon: Tyshawn Dao MD;  Location: [facility]    ENT SURGERY      oral biopsy    EXCISE LESION LIP N/A 2023    Procedure: Chin resection;  Surgeon: Tyshawn Dao MD;  Location: [facility]    GLOSSECTOMY PARTIAL N/A 2023    Procedure: GLOSSECTOMY, PARTIAL;  Surgeon: Tyshawn Dao MD;  Location: [facility]    GRAFT BONE FREE VASCULARIZED FROM LOWER EXTREMITY Left 2023    Procedure: Left fibula free flap;  Surgeon: Darien Thorne MD;  Location: [facility]    GRAFT FREE VASCULARIZED (LOCATION) Left 2023    Procedure: anterolateral thigh free flap;  Surgeon: Darien Thorne MD;  Location: [facility]    GRAFT SKIN SPLIT THICKNESS FROM EXTREMITY Left 2023    Procedure: Split thickness skin graft from left thigh;  Surgeon: Darien Thorne MD;  Location: [facility]    GYN SURGERY      tubal ligation    IR GASTROSTOMY TUBE PERCUTANEOUS PLCMNT  2023    IRRIGATION AND DEBRIDEMENT ORAL, COMBINED N/A 2023    Procedure: EXAM UNDER ANESTHESIA, ORAL CAVITY, IRRIGATION AND DEBRIDEMENT, ORAL CAVITY, neck dissection;  Surgeon: Darien Thorne MD;  Location: [facility]    MANDIBULECTOMY TOTAL N/A 2023    Procedure: segmental mandibulectomy;  Surgeon: Tyshawn Dao MD;  Location: [facility]    TRACHEOSTOMY N/A 2023    Procedure: Tracheostomy placement, nasogastric tube placement;  Surgeon: Tyshawn Dao MD;  Location: [facility]      No Known Allergies   Social  History     Tobacco Use    Smoking status: Some Days     Packs/day: 1.00     Years: 40.00     Pack years: 40.00     Types: Cigarettes     Passive exposure: Current    Smokeless tobacco: Never    Tobacco comments:     Cutting down to 5-7 cigarettes per day   Substance Use Topics    Alcohol use: Yes     Comment: 1 drink a day      Wt Readings from Last 1 Encounters:   08/28/23 55.4 kg (122 lb 3.2 oz)        Anesthesia Evaluation   Pt has had prior anesthetic.         ROS/MED HX  ENT/Pulmonary: Comment: SCC with large neck dissection,  current wound dehiscence, has trach in situ      Neurologic:  - neg neurologic ROS     Cardiovascular:  - neg cardiovascular ROS     METS/Exercise Tolerance:     Hematologic: Comments: Recent tranfusion perioperatively. T&C today.    (+)       history of blood transfusion,         Musculoskeletal:  - neg musculoskeletal ROS     GI/Hepatic:  - neg GI/hepatic ROS     Renal/Genitourinary:  - neg Renal ROS     Endo:  - neg endo ROS     Psychiatric/Substance Use:       Infectious Disease:  - neg infectious disease ROS     Malignancy: Comment: SCC      Other:            Physical Exam    Airway   unable to assess   Comment: Trach in situ  Huge wound dehiscense         Respiratory Devices and Support    TRACH:      Dental    unable to assess        Cardiovascular   cardiovascular exam normal       Rhythm and rate: regular and normal     Pulmonary   pulmonary exam normal                OUTSIDE LABS:  CBC:   Lab Results   Component Value Date    WBC 12.2 (H) 08/29/2023    WBC 12.8 (H) 08/28/2023    HGB 7.2 (L) 08/29/2023    HGB 7.5 (L) 08/28/2023    HCT 22.6 (L) 08/29/2023    HCT 24.0 (L) 08/28/2023     (H) 08/29/2023     (H) 08/28/2023     BMP:   Lab Results   Component Value Date     08/29/2023     08/28/2023    POTASSIUM 4.0 08/29/2023    POTASSIUM 4.2 08/28/2023    CHLORIDE 108 (H) 08/29/2023    CHLORIDE 108 (H) 08/28/2023    CO2 21 (L) 08/29/2023    CO2 22 08/28/2023     BUN 14.3 08/29/2023    BUN 11.8 08/28/2023    CR 0.39 (L) 08/29/2023    CR 0.41 (L) 08/28/2023    GLC 80 08/29/2023    GLC 87 08/29/2023     COAGS:   Lab Results   Component Value Date    INR 1.06 08/22/2023     POC: No results found for: BGM, HCG, HCGS  HEPATIC:   Lab Results   Component Value Date    ALBUMIN 4.0 08/17/2023    PROTTOTAL 7.2 08/02/2023    ALT 10 08/02/2023    AST 16 08/02/2023    ALKPHOS 87 08/02/2023    BILITOTAL 0.5 08/02/2023     OTHER:   Lab Results   Component Value Date    PH 7.43 08/17/2023    LACT 1.8 08/17/2023    RACHAEL 7.9 (L) 08/29/2023    PHOS 2.6 08/29/2023    MAG 2.0 08/29/2023    TSH 2.49 08/17/2023       Anesthesia Plan    ASA Status:  3    NPO Status:  NPO Appropriate    Anesthesia Type: General (ETT through trach site).     - Airway: ETT      Maintenance: Inhalation.   Techniques and Equipment:     - Lines/Monitors: 2nd IV     Consents    Anesthesia Plan(s) and associated risks, benefits, and realistic alternatives discussed. Questions answered and patient/representative(s) expressed understanding.     - Discussed: Risks, Benefits and Alternatives for BOTH SEDATION and the PROCEDURE were discussed     - Discussed with:  Patient      - Extended Intubation/Ventilatory Support Discussed: Yes.      - Patient is DNR/DNI Status: Yes     Use of blood products discussed: Yes.     - Discussed with: Patient.     - Consented: consented to blood products            Reason for refusal: other.     Postoperative Care    Pain management: IV analgesics, Multi-modal analgesia.   PONV prophylaxis: Ondansetron (or other 5HT-3)     Comments:                Charan Varela MD

## 2023-08-29 NOTE — PROGRESS NOTES
"Otolaryngology Progress Note  August 29, 2023    Subjective/Interval: No acute events overnight. Afebrile, HDS. Continued neck dehiscence; going to OR today for debridement. Intra-oral flap is stable without breakdown. Flaps overall stable in appearance.     OBJECTIVE:   /65 (BP Location: Left arm)   Pulse 84   Temp 97.7  F (36.5  C) (Axillary)   Resp 16   Ht 1.626 m (5' 4.02\")   Wt 55.4 kg (122 lb 3.2 oz)   SpO2 100%   BMI 20.97 kg/m    General: Alert and engaged, communicates and engages with exam, writing on clipboard as able, understandably frustrated with length of stay.  HEENT: Oral cavity with well perfused munira-tongue; xeroform bolster covering de-epithelized ALT anteriorly, this is sutured in place. Mental ALT free flap well-perfused with good capillary refill, no evidence of venous congestion. Incision lines superior and inferior to native skin bridge with worsening breakdown. External probe doppler signals strong.  Neck soft with no ballotable fullness or neck incision drainage. There is no expressible drainage or purulence.  Respiratory: 6-0 cuffless shiley sutured in, HTD.  MSK: Suture line intact, stable from prior. Left thigh soft without concerns for compartment syndrome.  Skin graft donor site with expected drainage. Lower leg dressing taken down, incision and skin graft noted to be intact with some small areas of de-epithelization inferiorly. DARWIN drain with serous output.    DARWIN drain output(s): (last 24 hours)/(last shift)  Drain Thigh 10/30/15 (55)    LABS:  ROUTINE IP LABS (Last four results)  BMP  Recent Labs   Lab 08/29/23  0805 08/29/23  0707 08/29/23  0452 08/28/23  2255 08/28/23  1140 08/28/23  0845 08/27/23  1225 08/27/23  0819 08/26/23  0844 08/26/23  0626   NA  --  136  --   --   --  138  --  141  --  144   POTASSIUM  --  4.0  --   --   --  4.2  --  3.8  --  4.3   CHLORIDE  --  108*  --   --   --  108*  --  111*  --  112*   RACHAEL  --  7.9*  --   --   --  8.0*  --  8.1*  --  7.9* "   CO2  --  21*  --   --   --  22  --  21*  --  25   BUN  --  14.3  --   --   --  11.8  --  13.5  --  16.8   CR  --  0.39*  --   --   --  0.41*  --  0.38*  --  0.42*   GLC 80 87 79 89   < > 105*   < > 104*   < > 94    < > = values in this interval not displayed.       CBC  Recent Labs   Lab 08/29/23  0707 08/28/23  0845 08/27/23  0819 08/26/23  0626   WBC 12.2* 12.8* 11.9* 13.6*   RBC 2.36* 2.41* 2.34* 2.36*   HGB 7.2* 7.5* 7.1* 7.3*   HCT 22.6* 24.0* 22.9* 22.9*   MCV 96 100 98 97   MCH 30.5 31.1 30.3 30.9   MCHC 31.9 31.3* 31.0* 31.9   RDW 16.8* 16.6* 16.5* 16.3*   * 895* 818* 710*       INR  Recent Labs   Lab 08/22/23  1104   INR 1.06         A/P: This is a 62 year old female with SCC of the ventral tongue, mandible, with invasion of the skin. Underwent segmental mandibulectomy, partial glossectomy, resection of the skin of the mentum, bilateral neck dissections levels 1-4. Reconstruction of the mandible with left fibular free flap, pedicle for this flap on the right. Reconstruction of the chin defect with left ALT free flap, pedicle on the left. She has struggled with continued breakdown postoperatively. Now s/p take back OR 8/24 with repair. Unfortunately, she continues to have external wound breakdown and will need to return to the OR for evaluation.    Interval changes:  - OR today for revision of neck incision w/ debridement.   - When back on floor; trial of TwoCal as a means to improve nutrition and avoid fullness.     Neuro:  - Pain control: scheduled Tylenol, PRN oxycodone  - NO Nicotine patch     HEENT:  - Nothing around the neck (no gown ties, trach ties, lines, ect.)  - HOB at 30 degrees, head/neck in midline position  - Monitor handheld doppler signal.  The ALT flap signal is on the chin/neck paddle along two sutures laterally.  - RN flap checks Q4H  - Q12H flap checks by ENT  - Clean incisions with 0.9% sodium chloride and apply Aquaphor Q8H  - Peridex TID; gentle suction with red joaquin  catheter only (no yankeur), do not cut red jemal     Respiratory:  - Continue on humidified trach dome  - Routine trach cares but no trach ties  Trach Tube Instructions:   1) Contact ENT on-call with any questions or concerns   2) Perform regular suctioning   4) RN should change disposable inner canulas every shift and/or clean it with a brush   5) Keep trach tube obturator taped to wall behind the head of the bed   6) Keep extra unopened 6-0 Shiley and 4-0 Shiley at bedside at all times   7) Minimize the amount of air in the cuff needed to adequately apply positive pressure ventilate. If positive pressure ventilation is not need then the cuff should be down.      CV/heme:  - Keep MAP > 60, SBP > 90 (preferrably > 110)  - Lovenox   - NO vasopressors  - Transfuse for Hgb < 7     FEN/GI:  - PEG 8/23  - Goal Bolus tube feeds, trial TwoCal per nutrition recs.   - Low threshold to hold tube feeds for any nausea  - Strict NPO, no oral swabs  - Electrolyte replacement protocol  - Bowel regimen: scheduled miralax and senna     :  - UOP stable, Cr wnl. Continue to monitor.     Endo  - TSH 2.49  - Alb 4.0  - Prealbumin 9     ID:  - Unasyn (complete)     MSK:  - LIMB ALERT: NO IVS or LAB DRAWS from RIGHT ARM. We are keeping this arm safe as a backup flap if necessary.   - LEFT fibula free flap: wound vac and walking boot placed in OR  - Please examine boot tightness on each examination to ensure no pressure injury to dorsal foot  - OK for boot off when in bed  - Elevate leg while in bed/chair  - Boot to remain in place for 3 weeks post-op  - Ok for full weight bearing  - LEFT anterolateral thigh free flap; island dressing removed  - LEFT STSG, Ok to leave open to air, apply Aquaphor  - please strip and record DARWIN drain output q8h      PPX:  - 40mg Lovenox daily   - SCD on right     Consults:  - PT/OT- keep neck neutral (no turning to either side), ok for full weight bearing  - SLP: PMSV use as tolerated  - PLC for trach  and tube feeding, TF 8/20, trach 8/22 (complete)  - Nutrition     Dispo: ENT, 6A     -- Patient and above plan discussed with Dr. Dao and Dr. Thorne.     Leah Freeman MD   Department of Otolaryngology - Head and Neck Surgery, PGY 5  Please page ENT with questions

## 2023-08-29 NOTE — BRIEF OP NOTE
[facility]    Brief Operative Note    Pre-operative diagnosis: SCC (squamous cell carcinoma of floor of mouth) (H) [C04.9]  Post-operative diagnosis Same as pre-operative diagnosis    Procedure: Procedure(s):  EXCISIONAL DEBRIDEMENT NECK, IRRIGATION OF ORAL CAVITY  Surgeon: Surgeon(s) and Role:     * Darien Thorne MD - Primary     * Leah Freeman MD - Resident - Assisting  Anesthesia: General   Estimated Blood Loss: 5 mL from 8/29/2023 10:29 AM to 8/29/2023 12:02 PM      Drains: None  Specimens: * No specimens in log *  Findings:   Necrotic tissue along skin bridge removed. Further tissue debrided. Some sutures replaced along ALT. Open wound superior to trach packed lightly with wet to dry kerlix dressing .  Complications: None.  Implants: * No implants in log *      PLAN:   - Cuffless trach replaced. 6 sutures around trach given pulling previously  - Sutures on leg removed in OR.   - ENT to perform BID dressing changes to neck: lightly packed wet to dry 3in kerlix.   - Please keep sterile saline, a graduated cylinder, and 3 in kerlix stocked at the bedside.   - Ok to restart tube feedings.

## 2023-08-29 NOTE — PLAN OF CARE
Goal Outcome Evaluation:      Plan of Care Reviewed With: patient    Overall Patient Progress: improvingOverall Patient Progress: improving    Outcome Evaluation: patient was able to rest over night. denies pain this shift. CHG scrub performed @ 530am to prep for this morning surgery.    Status: Pt s/p segmental mandibulectomy, partial glossectomy, bilateral neck dissection reconstruction of the mandible with left fibular free flap, reconstruction of the chin defect with left ALT free flap, and trach placement 8/17. S/p PEG placement 8/23.    Vitals: VSS on RA HME is in place  Neuros: A&O x4, writing to communicate   IV: PIV SL   Labs/Electrolytes: WNL BS every q4hrs. No corrections needed  Resp/trach: #6 Shiley cuffless, sutured in place with no trach ties. Strong productive cough, suctioned x2,  inner cannula cleaned x2, HME on. Encouraging independence with trach cares   Diet: NPO except meds. since midnight for a procedure this morning.  Bowel status: LBM 8/29  : Voiding spontaneously   Skin: Bilateral neck incision, L thigh incision and STSG RANDALL. Tongue flap and neck/chin flap with spot checks +. Area of dehiscence to L of trach. Thigh DARWIN to bulb suction with yellow output. LLE dressing with serosanguinous drainage.PEG site is cleaned, dry and intact.      Pain: denies pain this shift  Activity: SBA with walker, CAM boot on LLE when out of bed.  Social: No visitors this shift  Plan/ Updates this shift: Patient is on NPO since midnight. Trach care performed twice this shift. CHG scrub prep done @ 0530 for procedure this morning for an IND of oral cavity @ 1015. Patient currently up in chair. No concerns this shift. Continue with POC.

## 2023-08-29 NOTE — ANESTHESIA POSTPROCEDURE EVALUATION
Patient: Adrianna Pereira    Procedure: Procedure(s):  EXCISIONAL DEBRIDEMENT NECK, IRRIGATION OF ORAL CAVITY       Anesthesia Type:  General    Note:  Disposition: Inpatient   Postop Pain Control: Uneventful            Sign Out: Well controlled pain   PONV: No   Neuro/Psych: Uneventful            Sign Out: Acceptable/Baseline neuro status   Airway/Respiratory: Uneventful            Sign Out: AIRWAY IN SITU/Resp. Support   CV/Hemodynamics: Uneventful            Sign Out: Acceptable CV status   Other NRE: NONE   DID A NON-ROUTINE EVENT OCCUR? No           Last vitals:  Vitals Value Taken Time   /75 08/29/23 1315   Temp 36.7  C (98  F) 08/29/23 1300   Pulse 93 08/29/23 1325   Resp 18 08/29/23 1315   SpO2 100 % 08/29/23 1329   Vitals shown include unvalidated device data.    Electronically Signed By: Charan Varela MD  August 29, 2023  1:31 PM

## 2023-08-29 NOTE — PLAN OF CARE
"Status: Pt s/p segmental mandibulectomy, partial glossectomy, bilateral neck dissection reconstruction of the mandible with left fibular free flap, reconstruction of the chin defect with left ALT free flap, and trach placement 8/17. S/p PEG placement 8/23. S/p I+D cufand trach exchange 8/29  Vitals: VSS on RA, HME in place  Neuros: intact, writes to communicate  IV: PIV SL  Labs/Electrolytes: Hgb 7.5 @ 1300, redraw in AM  Resp/trach: LS course, no SOB; #6 cuffless shiley sutured in place. Strong cough, suctioned 1 this shift. Inner cannula changed x2. Secretions thick, white/yellow   Diet: NPO, TF bolus TID, 1 can give this shift; 60 ml FWF before and after  Bowel status: BS+, LBM 8/29  : void spont  Skin: Bilateral neck incision with sutures. Open dehiscence area with BID packing performed by ENT (supplies placed at bedside). Trach sutures in place. L thigh STSG and incision RANDALL, sutures removed in OR today. LLE fibular flap dressing changed today, CDI. Tongue and chin spot checks WNL, pale pink, soft, and warm. Thigh DARWIN with yellow output. PEG site CDI   Pain: pain managed w/ sched tylenol  Activity: SBA GB, walker, CAM boot on LLE for weight bearing  Social:  at bedside and participative w/ cares  Plan: Encourage independence w/ cares; potentially pull DARWIN to L thigh tomorrow  Updates this shift: pt accidentally pulled out neck site packing, OK per ENT for writer to replace packing w/ NS soaked kerlix    2040 \"nonurgent pt [id number] Pereira was wiping away drool and accidentally pulled out packing from neck site, do you want me to replace or you? Thanks Mayela 6a *[id number]\"  "

## 2023-08-30 ENCOUNTER — APPOINTMENT (OUTPATIENT)
Dept: SPEECH THERAPY | Facility: CLINIC | Age: 63
End: 2023-08-30
Attending: OTOLARYNGOLOGY
Payer: COMMERCIAL

## 2023-08-30 LAB
ANION GAP SERPL CALCULATED.3IONS-SCNC: 7 MMOL/L (ref 7–15)
BUN SERPL-MCNC: 10.9 MG/DL (ref 8–23)
CALCIUM SERPL-MCNC: 8.1 MG/DL (ref 8.8–10.2)
CHLORIDE SERPL-SCNC: 107 MMOL/L (ref 98–107)
CREAT SERPL-MCNC: 0.38 MG/DL (ref 0.51–0.95)
DEPRECATED HCO3 PLAS-SCNC: 21 MMOL/L (ref 22–29)
ERYTHROCYTE [DISTWIDTH] IN BLOOD BY AUTOMATED COUNT: 16.9 % (ref 10–15)
GFR SERPL CREATININE-BSD FRML MDRD: >90 ML/MIN/1.73M2
GLUCOSE BLDC GLUCOMTR-MCNC: 72 MG/DL (ref 70–99)
GLUCOSE SERPL-MCNC: 78 MG/DL (ref 70–99)
HCT VFR BLD AUTO: 22 % (ref 35–47)
HGB BLD-MCNC: 7.2 G/DL (ref 11.7–15.7)
MAGNESIUM SERPL-MCNC: 2 MG/DL (ref 1.7–2.3)
MCH RBC QN AUTO: 30.9 PG (ref 26.5–33)
MCHC RBC AUTO-ENTMCNC: 32.7 G/DL (ref 31.5–36.5)
MCV RBC AUTO: 94 FL (ref 78–100)
PHOSPHATE SERPL-MCNC: 2.1 MG/DL (ref 2.5–4.5)
PLATELET # BLD AUTO: 918 10E3/UL (ref 150–450)
POTASSIUM SERPL-SCNC: 4 MMOL/L (ref 3.4–5.3)
RBC # BLD AUTO: 2.33 10E6/UL (ref 3.8–5.2)
SODIUM SERPL-SCNC: 135 MMOL/L (ref 136–145)
WBC # BLD AUTO: 12 10E3/UL (ref 4–11)

## 2023-08-30 PROCEDURE — 83735 ASSAY OF MAGNESIUM: CPT | Performed by: STUDENT IN AN ORGANIZED HEALTH CARE EDUCATION/TRAINING PROGRAM

## 2023-08-30 PROCEDURE — 84100 ASSAY OF PHOSPHORUS: CPT | Performed by: STUDENT IN AN ORGANIZED HEALTH CARE EDUCATION/TRAINING PROGRAM

## 2023-08-30 PROCEDURE — 250N000011 HC RX IP 250 OP 636: Mod: JZ | Performed by: STUDENT IN AN ORGANIZED HEALTH CARE EDUCATION/TRAINING PROGRAM

## 2023-08-30 PROCEDURE — 80048 BASIC METABOLIC PNL TOTAL CA: CPT | Performed by: STUDENT IN AN ORGANIZED HEALTH CARE EDUCATION/TRAINING PROGRAM

## 2023-08-30 PROCEDURE — 120N000002 HC R&B MED SURG/OB UMMC

## 2023-08-30 PROCEDURE — 36415 COLL VENOUS BLD VENIPUNCTURE: CPT | Performed by: STUDENT IN AN ORGANIZED HEALTH CARE EDUCATION/TRAINING PROGRAM

## 2023-08-30 PROCEDURE — 92507 TX SP LANG VOICE COMM INDIV: CPT | Mod: GN

## 2023-08-30 PROCEDURE — 250N000013 HC RX MED GY IP 250 OP 250 PS 637: Performed by: STUDENT IN AN ORGANIZED HEALTH CARE EDUCATION/TRAINING PROGRAM

## 2023-08-30 PROCEDURE — 85014 HEMATOCRIT: CPT | Performed by: STUDENT IN AN ORGANIZED HEALTH CARE EDUCATION/TRAINING PROGRAM

## 2023-08-30 PROCEDURE — 250N000013 HC RX MED GY IP 250 OP 250 PS 637: Performed by: OTOLARYNGOLOGY

## 2023-08-30 RX ORDER — MAGNESIUM OXIDE 400 MG/1
400 TABLET ORAL EVERY 4 HOURS
Status: COMPLETED | OUTPATIENT
Start: 2023-08-30 | End: 2023-08-30

## 2023-08-30 RX ADMIN — MAGNESIUM OXIDE TAB 400 MG (241.3 MG ELEMENTAL MG) 400 MG: 400 (241.3 MG) TAB at 09:22

## 2023-08-30 RX ADMIN — ACETAMINOPHEN 975 MG: 325 TABLET, FILM COATED ORAL at 19:57

## 2023-08-30 RX ADMIN — CHLORHEXIDINE GLUCONATE 15 ML: 1.2 SOLUTION ORAL at 08:07

## 2023-08-30 RX ADMIN — POTASSIUM & SODIUM PHOSPHATES POWDER PACK 280-160-250 MG 1 PACKET: 280-160-250 PACK at 13:51

## 2023-08-30 RX ADMIN — POLYETHYLENE GLYCOL 3350 17 G: 17 POWDER, FOR SOLUTION ORAL at 08:08

## 2023-08-30 RX ADMIN — MAGNESIUM OXIDE TAB 400 MG (241.3 MG ELEMENTAL MG) 400 MG: 400 (241.3 MG) TAB at 13:50

## 2023-08-30 RX ADMIN — WHITE PETROLATUM: 1.75 OINTMENT TOPICAL at 23:40

## 2023-08-30 RX ADMIN — Medication 15 ML: at 08:07

## 2023-08-30 RX ADMIN — WHITE PETROLATUM: 1.75 OINTMENT TOPICAL at 15:53

## 2023-08-30 RX ADMIN — ENOXAPARIN SODIUM 40 MG: 40 INJECTION SUBCUTANEOUS at 19:57

## 2023-08-30 RX ADMIN — CHLORHEXIDINE GLUCONATE 15 ML: 1.2 SOLUTION ORAL at 19:57

## 2023-08-30 RX ADMIN — CHLORHEXIDINE GLUCONATE 15 ML: 1.2 SOLUTION ORAL at 13:51

## 2023-08-30 RX ADMIN — ACETAMINOPHEN 975 MG: 325 TABLET, FILM COATED ORAL at 13:50

## 2023-08-30 RX ADMIN — ACETAMINOPHEN 975 MG: 325 TABLET, FILM COATED ORAL at 08:07

## 2023-08-30 RX ADMIN — POTASSIUM & SODIUM PHOSPHATES POWDER PACK 280-160-250 MG 1 PACKET: 280-160-250 PACK at 09:22

## 2023-08-30 RX ADMIN — WHITE PETROLATUM: 1.75 OINTMENT TOPICAL at 08:09

## 2023-08-30 RX ADMIN — POTASSIUM & SODIUM PHOSPHATES POWDER PACK 280-160-250 MG 1 PACKET: 280-160-250 PACK at 19:09

## 2023-08-30 ASSESSMENT — ACTIVITIES OF DAILY LIVING (ADL)
ADLS_ACUITY_SCORE: 25

## 2023-08-30 NOTE — OP NOTE
Head and Neck Surgery  8/29/23    Diagnosis:   [id number] SCC oral cavity  Malnutrition  Anterior neck wound    Postoperative diagnosis:  Same    Procedure:  Excisional debridement of anterior neck down to strap musculature measuring 6 x 6 cm    Oral cavity exam    Anesthesia:  General    Estimated blood loss:  5 ml    Specimens:  None    Complications:  None    Operative Indications:  62F who is almost 2 weeks post op from an oral resection, neck dissections, and reconstruction with ALT and fibula free flap. She has had wound complications in her oral cavity and anterior neck. Her anterior neck skin has been necrosing. She comes back to the OR for oral exam and debridement of neck    Operating findings:  Oral cavity healing. New xeroform bolster placed    Anterior native neck skin necrosed. Debrided to healthy tissue. Wet to dry dressings initiated    Operative course:  Patient was brought to the OR and placed under general anesthesia through her tracheostomy tube. This was then exchanged for a wire bound ETT. Neck was prepped and draped. We began in the neck. Excisional debridement was performed through skin, subcutaneous tissue, fascia down to strap muscles. Saline soaked kerlix was placed in the wound.    Attention was then turned to the oral cavity. The xeroform was removed. The oral cavity was irrigated. There were no new dehiscences in the oral cavity. A new xeroform was placed.     Patient tolerated the procedure well. The ett was exchanged for a 6 cuffless shiley.     She was awakened and transferred to the recovery room in stable condition.    Darien Thorne MD

## 2023-08-30 NOTE — PLAN OF CARE
Goal Outcome Evaluation:      Plan of Care Reviewed With: patient    Overall Patient Progress: improvingOverall Patient Progress: improving    Outcome Evaluation: pt had was calm, and cooperative throughput shift. OK per ENT to replace packing w/ NS soaked kerlix if packing falls off.    Status: Pt s/p segmental mandibulectomy, partial glossectomy, bilateral neck dissection reconstruction of the mandible with left fibular free flap, reconstruction of the chin defect with left ALT free flap, and trach placement 8/17. S/p PEG placement 8/23. S/p I+D cufand trach exchange 8/29  Vitals: SS on RA, HME in place  Neuros:  intact, writes to communicate   IV:  PIV SL   Labs/Electrolytes: Hgb 7.5. redraw in AM   Resp/trach: LS course, no SOB; #6 cuffless shiley sutured in place. Strong cough, suctioned [id number] independence w/ cares; potentially pull DARWIN to L thigh today. Inner cannula changed x2. Secretions thick, white/yellow   Diet:NPO, TF bolus TID, Nothing given this shift   Bowel status: LBM 8/30. Small amount of form stool.  :  voiding  spontaneously   Skin: Bilateral neck incision with sutures. Open dehiscence area with BID packing performed by ENT (supplies placed at bedside). Trach sutures in place. L thigh STSG and incision RANDALL, sutures removed in OR today. LLE fibular flap dressing changed today, CDI. Tongue and chin spot checks WNL, pale pink, soft, and warm. Thigh DARWIN with green output. PEG site CDI    Pain: pain managed w/ sched tylenol  Activity: SBA GB, walker, CAM boot on LLE for weight bearing   Social:  involved in cares. Patient verbalized this shift that her daughter who`s in nursing school would like to learn how to care for her trach  Plan/ Updates this shift: Encourage independence w/ cares; potentially pull DARWIN to L thigh today. pt had was calm and cooperative throughput shift. use her call light whenever she wanted to use the restroom and communicated her needs to RN. , OK per ENT to  replace packing w/ NS soaked kerlix if packing falls off. No new packing done this shift. Packing adjusted only.

## 2023-08-30 NOTE — PROGRESS NOTES
"Otolaryngology Progress Note  August 30, 2023    Subjective/Interval: OR yesterday for debridement along skin bridge, packed wet to dry, well tolerated.  Removed LLE sutures this AM. Afebrile, VSS.  Intra-oral flap is stable without breakdown. Flaps overall stable in appearance.     OBJECTIVE:   /63 (BP Location: Left arm)   Pulse 88   Temp 97.4  F (36.3  C) (Axillary)   Resp 16   Ht 1.626 m (5' 4.02\")   Wt 55.4 kg (122 lb 3.2 oz)   SpO2 98%   BMI 20.97 kg/m    General: Alert and engaged, communicates and engages with exam, writing on clipboard as able.  HEENT: Oral cavity with well perfused munira-tongue; xeroform bolster covering de-epithelized ALT anteriorly, this is sutured in place. Mental ALT free flap well-perfused with good capillary refill, no evidence of venous congestion. External probe doppler signals strong.   Neck soft with no ballotable fullness. There is now an open defect in the midline of the incision packed with wet kerlex. Underneath there is fibrinous granulation tissue but no bleeding or purulence.  Respiratory: 6-0 cuffless shiley sutured in, HTD.  MSK: Sutures removed and wound well approximated, stable from prior. Left thigh soft without concerns for compartment syndrome.  Skin graft donor site with minimal drainage. Lower leg dressing taken down, incision and skin graft noted to be intact with some small areas of de-epithelization inferiorly. DARWIN drain with serous output.    DARWIN drain output(s): (last 24 hours)/(last shift)  Drain Thigh 11/15/11 (37)    LABS: Mg 2.0 (2.2) Phos 2.6 (2.9)  ROUTINE IP LABS (Last four results)  BMP  Recent Labs   Lab 08/29/23  0805 08/29/23  0707 08/29/23  0452 08/28/23  2255 08/28/23  1140 08/28/23  0845 08/27/23  1225 08/27/23  0819 08/26/23  0844 08/26/23  0626   NA  --  136  --   --   --  138  --  141  --  144   POTASSIUM  --  4.0  --   --   --  4.2  --  3.8  --  4.3   CHLORIDE  --  108*  --   --   --  108*  --  111*  --  112*   RACHAEL  --  7.9*  --   " --   --  8.0*  --  8.1*  --  7.9*   CO2  --  21*  --   --   --  22  --  21*  --  25   BUN  --  14.3  --   --   --  11.8  --  13.5  --  16.8   CR  --  0.39*  --   --   --  0.41*  --  0.38*  --  0.42*   GLC 80 87 79 89   < > 105*   < > 104*   < > 94    < > = values in this interval not displayed.     CBC  Recent Labs   Lab 08/30/23  0659 08/29/23  1322 08/29/23  0707 08/28/23  0845 08/27/23  0819   WBC 12.0*  --  12.2* 12.8* 11.9*   RBC 2.33*  --  2.36* 2.41* 2.34*   HGB 7.2* 7.5* 7.2* 7.5* 7.1*   HCT 22.0*  --  22.6* 24.0* 22.9*   MCV 94  --  96 100 98   MCH 30.9  --  30.5 31.1 30.3   MCHC 32.7  --  31.9 31.3* 31.0*   RDW 16.9*  --  16.8* 16.6* 16.5*   *  --  911* 895* 818*     INR  No lab results found in last 7 days.      A/P: This is a 62 year old female with SCC of the ventral tongue, mandible, with invasion of the skin. Underwent segmental mandibulectomy, partial glossectomy, resection of the skin of the mentum, bilateral neck dissections levels 1-4. Reconstruction of the mandible with left fibular free flap, pedicle for this flap on the right. Reconstruction of the chin defect with left ALT free flap, pedicle on the left. She has struggled with continued breakdown postoperatively. Now s/p take back OR 8/24 with repair, with persistent external wound breakdown and return to the OR for debridement 8/30. Patient appears to be healing well at this time.     Interval changes:  - s/p OR yesterday for revision of neck incision w/ debridement.   - Continuing to monitor platelets, 918 today    Neuro:  - Pain control: scheduled Tylenol, PRN oxycodone  - NO Nicotine patch     HEENT:  - Nothing around the neck (no gown ties, trach ties, lines, ect.)  - HOB at 30 degrees, head/neck in midline position  - Monitor handheld doppler signal.  The ALT flap signal is on the chin/neck paddle along two sutures laterally.  - RN flap checks Q4H  - Q12H flap checks by ENT  - Clean incisions with 0.9% sodium chloride and apply  Aquaphor Q8H  - Peridex TID; gentle suction with red joaquin catheter only (no yankeur), do not cut red joaquin     Respiratory:  - Continue on humidified trach dome  - Routine trach cares but no trach ties  Trach Tube Instructions:   1) Contact ENT on-call with any questions or concerns   2) Perform regular suctioning   4) RN should change disposable inner canulas every shift and/or clean it with a brush   5) Keep trach tube obturator taped to wall behind the head of the bed   6) Keep extra unopened 6-0 Shiley and 4-0 Shiley at bedside at all times   7) Minimize the amount of air in the cuff needed to adequately apply positive pressure ventilate. If positive pressure ventilation is not need then the cuff should be down.      CV/heme:  - Keep MAP > 60, SBP > 90 (preferrably > 110)  - Lovenox   - NO vasopressors  - Transfuse for Hgb < 7  - monitor PLTs, 918 today   - Will discuss outpatient follow up with hematology     FEN/GI:  - PEG 8/23  - Goal bolus tube feeds  - Low threshold to hold tube feeds for any nausea  - Strict NPO, no oral swabs  - Electrolyte replacement protocol  - Bowel regimen: scheduled miralax and senna     :  - UOP stable, Cr wnl. Continue to monitor.     Endo  - TSH 2.49  - Alb 4.0  - Prealbumin 9     ID:  - Unasyn (complete)     MSK:  - LIMB ALERT: NO IVS or LAB DRAWS from RIGHT ARM. We are keeping this arm safe as a backup flap if necessary.   - LEFT fibula free flap: wound vac and walking boot placed in OR  - Please examine boot tightness on each examination to ensure no pressure injury to dorsal foot  - OK for boot off when in bed  - Elevate leg while in bed/chair  - Boot to remain in place for 3 weeks post-op  - Ok for full weight bearing  - LEFT anterolateral thigh free flap; island dressing removed  - LEFT STSG, Ok to leave open to air, apply Aquaphor  - please strip and record DARWIN drain output q8h   - sutures taken down this AM, redressed      PPX:  - 40mg Lovenox daily   - SCD on  right     Consults:  - PT/OT- keep neck neutral (no turning to either side), ok for full weight bearing  - SLP: PMSV use as tolerated  - PLC for trach and tube feeding, TF 8/20, trach 8/22 (complete)  - Nutrition     Dispo: ENT, 6A     -- Patient and above plan discussed with Dr. Leah Freeman and Dr. Fred Merida and staff Dr. Dao and Dr. Thorne.    Diana Cronin, MS-4  Visiting Student, [facility]  Department of Otolaryngology    The documentation recorded by the above mentioned medical student acting as scribe accurately reflects the services I personally performed and the decisions made by me.    Fred Merida MD   Department of Otolaryngology - Head and Neck Surgery, PGY 1  Please page ENT with questions

## 2023-08-30 NOTE — PLAN OF CARE
Status: Pt s/p segmental mandibulectomy, partial glossectomy, bilateral neck dissection reconstruction of the mandible with left fibular free flap, reconstruction of the chin defect with left ALT free flap, and trach placement 8/17. S/p PEG placement 8/23. S/p I+D and trach exchange 8/29  Vitals: VSS on RA, HME in place  Neuros: Intact, writes to communicate   IV: PIV SL  Labs/Electrolytes: Replaced mag and phos today. Hgb 7.2  Resp/trach: Diminished lungs. #6 cuffless shiley sutured in place. Strong cough, no suctioning needed this shift. Inner cannula changed x2. Secretions thick, white/yellow  Diet: Bolus TF, 2/3 can done today. 60 ml flushes before and after. Patient was independent with supervision on this today  Bowel status: No BM today  : Voiding  Skin: Bilateral neck incision with sutures. Open dehiscence area with BID packing performed by ENT (supplies at bedside). Trach sutured in place. L thigh STSG and incision RANDALL.  LLE fibular flap dressing changed today, CDI. Tongue and chin spot checks WNL, pale pink, soft, and warm. Thigh DARWIN with yellow output. PEG site CDI  Pain: Pain managed with tylenol  Activity: SBA and walker. CAM boot LLE   Social:  visiting today  Plan: Encourage independence with cares. Doing all TF herself, taught how to clean incisions and is willing to do it next time it is due. Continue with POC

## 2023-08-30 NOTE — PLAN OF CARE
Status: Pt s/p segmental mandibulectomy, partial glossectomy, bilateral neck dissection reconstruction of the mandible with left fibular free flap, reconstruction of the chin defect with left ALT free flap, and trach placement 8/17. S/p PEG placement 8/23. S/p I+D and trach exchange 8/29  Vitals: VSS on RA, HME in place  Neuros: intact, writes to communicate; BUE 5/5, RLE 4/5, LLE 3/5  IV: PIV SL  Labs/Electrolytes: Hgb 7.2 today, redraw in AM  Resp/trach: LS course, no SOB; #6 cuffless shiley sutured in place. Strong cough, suctioned x1 this shift. Inner cannula changed x2. Secretions thick, white/yellow   Diet: NPO, TF bolus TID, 1 can give this shift for 3/3 today; 60 ml FWF before and after  Bowel status: BS+, BM this shift  : void spont  Skin: Bilateral neck incision with sutures. Open dehiscence area with BID packing performed by ENT (supplies placed at bedside). Trach sutures in place. L thigh STSG and incision RANDALL, sutures removed in OR today. LLE fibular flap dressing changed today, CDI. Tongue and chin spot checks WNL, pale pink, soft, and warm. Thigh DARWIN with yellow output. PEG site CDI   Pain: pain managed w/ sched tylenol  Activity: SBA GB, walker, CAM boot on LLE for weight bearing  Plan: Encourage independence w/ cares  Updates this shift: pt completed PEG, incision site, and trach care w/ supervision and minimal assitance

## 2023-08-31 ENCOUNTER — APPOINTMENT (OUTPATIENT)
Dept: PHYSICAL THERAPY | Facility: CLINIC | Age: 63
End: 2023-08-31
Attending: OTOLARYNGOLOGY
Payer: COMMERCIAL

## 2023-08-31 LAB
ANION GAP SERPL CALCULATED.3IONS-SCNC: 9 MMOL/L (ref 7–15)
BASOPHILS # BLD AUTO: 0 10E3/UL (ref 0–0.2)
BASOPHILS # BLD AUTO: 0 10E3/UL (ref 0–0.2)
BASOPHILS NFR BLD AUTO: 0 %
BASOPHILS NFR BLD AUTO: 0 %
BUN SERPL-MCNC: 11.1 MG/DL (ref 8–23)
CALCIUM SERPL-MCNC: 8.4 MG/DL (ref 8.8–10.2)
CHLORIDE SERPL-SCNC: 107 MMOL/L (ref 98–107)
CREAT SERPL-MCNC: 0.4 MG/DL (ref 0.51–0.95)
DEPRECATED HCO3 PLAS-SCNC: 21 MMOL/L (ref 22–29)
EOSINOPHIL # BLD AUTO: 0.2 10E3/UL (ref 0–0.7)
EOSINOPHIL # BLD AUTO: 0.2 10E3/UL (ref 0–0.7)
EOSINOPHIL NFR BLD AUTO: 2 %
EOSINOPHIL NFR BLD AUTO: 2 %
ERYTHROCYTE [DISTWIDTH] IN BLOOD BY AUTOMATED COUNT: 17.5 % (ref 10–15)
ERYTHROCYTE [DISTWIDTH] IN BLOOD BY AUTOMATED COUNT: 17.6 % (ref 10–15)
FERRITIN SERPL-MCNC: 1044 NG/ML (ref 11–328)
FOLATE SERPL-MCNC: 3.2 NG/ML (ref 4.6–34.8)
GFR SERPL CREATININE-BSD FRML MDRD: >90 ML/MIN/1.73M2
GLUCOSE SERPL-MCNC: 84 MG/DL (ref 70–99)
HCT VFR BLD AUTO: 22 % (ref 35–47)
HCT VFR BLD AUTO: 23.5 % (ref 35–47)
HGB BLD-MCNC: 7.1 G/DL (ref 11.7–15.7)
HGB BLD-MCNC: 7.4 G/DL (ref 11.7–15.7)
HGB BLD-MCNC: 7.6 G/DL (ref 11.7–15.7)
IMM GRANULOCYTES # BLD: 0.1 10E3/UL
IMM GRANULOCYTES # BLD: 0.1 10E3/UL
IMM GRANULOCYTES NFR BLD: 1 %
IMM GRANULOCYTES NFR BLD: 1 %
IRON BINDING CAPACITY (ROCHE): 129 UG/DL (ref 240–430)
IRON SATN MFR SERPL: 12 % (ref 15–46)
IRON SERPL-MCNC: 16 UG/DL (ref 37–145)
LYMPHOCYTES # BLD AUTO: 2 10E3/UL (ref 0.8–5.3)
LYMPHOCYTES # BLD AUTO: 2.2 10E3/UL (ref 0.8–5.3)
LYMPHOCYTES NFR BLD AUTO: 19 %
LYMPHOCYTES NFR BLD AUTO: 20 %
MAGNESIUM SERPL-MCNC: 2 MG/DL (ref 1.7–2.3)
MCH RBC QN AUTO: 30.3 PG (ref 26.5–33)
MCH RBC QN AUTO: 30.9 PG (ref 26.5–33)
MCHC RBC AUTO-ENTMCNC: 32.3 G/DL (ref 31.5–36.5)
MCHC RBC AUTO-ENTMCNC: 32.3 G/DL (ref 31.5–36.5)
MCV RBC AUTO: 94 FL (ref 78–100)
MCV RBC AUTO: 96 FL (ref 78–100)
MONOCYTES # BLD AUTO: 0.9 10E3/UL (ref 0–1.3)
MONOCYTES # BLD AUTO: 1.3 10E3/UL (ref 0–1.3)
MONOCYTES NFR BLD AUTO: 11 %
MONOCYTES NFR BLD AUTO: 9 %
NEUTROPHILS # BLD AUTO: 7.4 10E3/UL (ref 1.6–8.3)
NEUTROPHILS # BLD AUTO: 7.4 10E3/UL (ref 1.6–8.3)
NEUTROPHILS NFR BLD AUTO: 66 %
NEUTROPHILS NFR BLD AUTO: 69 %
NRBC # BLD AUTO: 0 10E3/UL
NRBC # BLD AUTO: 0 10E3/UL
NRBC BLD AUTO-RTO: 0 /100
NRBC BLD AUTO-RTO: 0 /100
PHOSPHATE SERPL-MCNC: 2.7 MG/DL (ref 2.5–4.5)
PLATELET # BLD AUTO: 889 10E3/UL (ref 150–450)
PLATELET # BLD AUTO: 892 10E3/UL (ref 150–450)
POTASSIUM SERPL-SCNC: 4.2 MMOL/L (ref 3.4–5.3)
RBC # BLD AUTO: 2.34 10E6/UL (ref 3.8–5.2)
RBC # BLD AUTO: 2.46 10E6/UL (ref 3.8–5.2)
RETICS # AUTO: 0.07 10E6/UL (ref 0.03–0.1)
RETICS/RBC NFR AUTO: 3 % (ref 0.5–2)
SODIUM SERPL-SCNC: 137 MMOL/L (ref 136–145)
VIT B12 SERPL-MCNC: 1278 PG/ML (ref 232–1245)
WBC # BLD AUTO: 10 10E3/UL (ref 4–11)
WBC # BLD AUTO: 10.6 10E3/UL (ref 4–11)
WBC # BLD AUTO: 11.3 10E3/UL (ref 4–11)

## 2023-08-31 PROCEDURE — 85048 AUTOMATED LEUKOCYTE COUNT: CPT | Performed by: STUDENT IN AN ORGANIZED HEALTH CARE EDUCATION/TRAINING PROGRAM

## 2023-08-31 PROCEDURE — 88188 FLOWCYTOMETRY/READ 9-15: CPT | Mod: GC | Performed by: STUDENT IN AN ORGANIZED HEALTH CARE EDUCATION/TRAINING PROGRAM

## 2023-08-31 PROCEDURE — 85027 COMPLETE CBC AUTOMATED: CPT | Performed by: STUDENT IN AN ORGANIZED HEALTH CARE EDUCATION/TRAINING PROGRAM

## 2023-08-31 PROCEDURE — 36415 COLL VENOUS BLD VENIPUNCTURE: CPT | Performed by: STUDENT IN AN ORGANIZED HEALTH CARE EDUCATION/TRAINING PROGRAM

## 2023-08-31 PROCEDURE — 84100 ASSAY OF PHOSPHORUS: CPT | Performed by: STUDENT IN AN ORGANIZED HEALTH CARE EDUCATION/TRAINING PROGRAM

## 2023-08-31 PROCEDURE — 36415 COLL VENOUS BLD VENIPUNCTURE: CPT

## 2023-08-31 PROCEDURE — 88184 FLOWCYTOMETRY/ TC 1 MARKER: CPT | Performed by: STUDENT IN AN ORGANIZED HEALTH CARE EDUCATION/TRAINING PROGRAM

## 2023-08-31 PROCEDURE — 85018 HEMOGLOBIN: CPT | Performed by: STUDENT IN AN ORGANIZED HEALTH CARE EDUCATION/TRAINING PROGRAM

## 2023-08-31 PROCEDURE — 82374 ASSAY BLOOD CARBON DIOXIDE: CPT | Performed by: STUDENT IN AN ORGANIZED HEALTH CARE EDUCATION/TRAINING PROGRAM

## 2023-08-31 PROCEDURE — 85060 BLOOD SMEAR INTERPRETATION: CPT | Performed by: PATHOLOGY

## 2023-08-31 PROCEDURE — 120N000002 HC R&B MED SURG/OB UMMC

## 2023-08-31 PROCEDURE — 250N000013 HC RX MED GY IP 250 OP 250 PS 637: Performed by: OTOLARYNGOLOGY

## 2023-08-31 PROCEDURE — 82746 ASSAY OF FOLIC ACID SERUM: CPT

## 2023-08-31 PROCEDURE — 36416 COLLJ CAPILLARY BLOOD SPEC: CPT

## 2023-08-31 PROCEDURE — 97116 GAIT TRAINING THERAPY: CPT | Mod: GP

## 2023-08-31 PROCEDURE — 250N000013 HC RX MED GY IP 250 OP 250 PS 637: Performed by: STUDENT IN AN ORGANIZED HEALTH CARE EDUCATION/TRAINING PROGRAM

## 2023-08-31 PROCEDURE — 83735 ASSAY OF MAGNESIUM: CPT | Performed by: STUDENT IN AN ORGANIZED HEALTH CARE EDUCATION/TRAINING PROGRAM

## 2023-08-31 PROCEDURE — 82728 ASSAY OF FERRITIN: CPT

## 2023-08-31 PROCEDURE — 85018 HEMOGLOBIN: CPT

## 2023-08-31 PROCEDURE — 85045 AUTOMATED RETICULOCYTE COUNT: CPT | Performed by: STUDENT IN AN ORGANIZED HEALTH CARE EDUCATION/TRAINING PROGRAM

## 2023-08-31 PROCEDURE — 999N000157 HC STATISTIC RCP TIME EA 10 MIN

## 2023-08-31 PROCEDURE — 250N000011 HC RX IP 250 OP 636: Mod: JZ | Performed by: STUDENT IN AN ORGANIZED HEALTH CARE EDUCATION/TRAINING PROGRAM

## 2023-08-31 PROCEDURE — 82607 VITAMIN B-12: CPT

## 2023-08-31 PROCEDURE — 83550 IRON BINDING TEST: CPT

## 2023-08-31 RX ORDER — MAGNESIUM OXIDE 400 MG/1
400 TABLET ORAL EVERY 4 HOURS
Status: COMPLETED | OUTPATIENT
Start: 2023-08-31 | End: 2023-08-31

## 2023-08-31 RX ADMIN — WHITE PETROLATUM: 1.75 OINTMENT TOPICAL at 16:35

## 2023-08-31 RX ADMIN — ACETAMINOPHEN 975 MG: 325 TABLET, FILM COATED ORAL at 08:05

## 2023-08-31 RX ADMIN — ACETAMINOPHEN 975 MG: 325 TABLET, FILM COATED ORAL at 13:39

## 2023-08-31 RX ADMIN — CHLORHEXIDINE GLUCONATE 15 ML: 1.2 SOLUTION ORAL at 08:05

## 2023-08-31 RX ADMIN — CHLORHEXIDINE GLUCONATE 15 ML: 1.2 SOLUTION ORAL at 19:49

## 2023-08-31 RX ADMIN — POTASSIUM & SODIUM PHOSPHATES POWDER PACK 280-160-250 MG 1 PACKET: 280-160-250 PACK at 13:39

## 2023-08-31 RX ADMIN — Medication 15 ML: at 08:05

## 2023-08-31 RX ADMIN — MAGNESIUM OXIDE TAB 400 MG (241.3 MG ELEMENTAL MG) 400 MG: 400 (241.3 MG) TAB at 13:40

## 2023-08-31 RX ADMIN — POTASSIUM & SODIUM PHOSPHATES POWDER PACK 280-160-250 MG 1 PACKET: 280-160-250 PACK at 16:34

## 2023-08-31 RX ADMIN — ACETAMINOPHEN 975 MG: 325 TABLET, FILM COATED ORAL at 19:49

## 2023-08-31 RX ADMIN — CHLORHEXIDINE GLUCONATE 15 ML: 1.2 SOLUTION ORAL at 13:39

## 2023-08-31 RX ADMIN — POLYETHYLENE GLYCOL 3350 17 G: 17 POWDER, FOR SOLUTION ORAL at 08:05

## 2023-08-31 RX ADMIN — MAGNESIUM OXIDE TAB 400 MG (241.3 MG ELEMENTAL MG) 400 MG: 400 (241.3 MG) TAB at 16:34

## 2023-08-31 RX ADMIN — WHITE PETROLATUM: 1.75 OINTMENT TOPICAL at 08:06

## 2023-08-31 RX ADMIN — ENOXAPARIN SODIUM 40 MG: 40 INJECTION SUBCUTANEOUS at 19:49

## 2023-08-31 ASSESSMENT — ACTIVITIES OF DAILY LIVING (ADL)
ADLS_ACUITY_SCORE: 24
ADLS_ACUITY_SCORE: 22
ADLS_ACUITY_SCORE: 24
ADLS_ACUITY_SCORE: 25
ADLS_ACUITY_SCORE: 22
ADLS_ACUITY_SCORE: 24
ADLS_ACUITY_SCORE: 22
ADLS_ACUITY_SCORE: 24

## 2023-08-31 NOTE — PLAN OF CARE
Status: Pt s/p segmental mandibulectomy, partial glossectomy, bilateral neck dissection reconstruction of the mandible with left fibular free flap, reconstruction of the chin defect with left ALT free flap, and trach placement 8/17. S/p PEG placement 8/23. S/p I+D and trach exchange 8/29   Vitals: VSS on RA.  HME in place all night.    Neuros: Intact.  Writes to communicate.  BUE 5/5.  RLE 4/5.  LLE 3/5  IV: PIV SL  Resp/trach: #6 cuffless shiley sutured in place.  Strong cough, suctioned x1 this shift.  Inner cannula changed x2 this shift.  Secretions are thick, white/yellow.  LS course  Diet: NPO.  TF bolus, 1 can TID.  FWF 60mL before and after  each bolus.  Bowel status: LBM 8/30, BS+  : voiding spont.  Wears pull-up brief.  Ambulates to bathroom. Steady on feet  Skin: Bilateral neck incision with sutures.  Open dehiscence area with BID packing performed by ENT (supplies at bedside).  Trach sutures in place.  L thigh STSG and incision RANDALL.  LLE fibular flap dressing CDI.  Tongue and chin spot checks WNL, pale pink, soft and warm.  Thigh DARWIN with neon green output.  PEG site CDI.    Pain: pain managed with scheduled tylenol  Activity: SBA/GB, walker, CAM boot on LLE for weight bearing.  Social: encourage independence with cares.   Plan: encourage independence with cares.  Patient changed her own inner cannula by herself.  Patient independently gave herself water bolus, and changed HME.    Updates this shift: continue to encourage independece with PEG, incision site, track cares, all with supervision and minimal assisance.      0635: 1 can (bolus) started.

## 2023-08-31 NOTE — PROVIDER NOTIFICATION
Paged ENT     3752 hayden - pt had a fall, no injury. lowered herself to the floor when she was trying to pick something up that she couldn't reach. denies pain, vitally stable. thanks! Zoe

## 2023-08-31 NOTE — PROGRESS NOTES
"Otolaryngology Progress Note  August 31, 2023    Subjective/Interval: No acute events overnight.     OBJECTIVE:   BP (!) 140/69 (BP Location: Left arm)   Pulse 63   Temp 97.6  F (36.4  C) (Axillary)   Resp 16   Ht 1.626 m (5' 4.02\")   Wt 55.4 kg (122 lb 3.2 oz)   SpO2 98%   BMI 20.97 kg/m    General: Alert and engaged, communicates and engages with exam, writing on clipboard as able.  HEENT: Oral cavity with well perfused munira-tongue; xeroform bolster covering de-epithelized ALT anteriorly, this is sutured in place. Mental ALT free flap well-perfused with good capillary refill, no evidence of venous congestion. External probe doppler signals strong.   Neck soft with no ballotable fullness. There is now an open defect in the midline of the incision packed with wet kerlex. Underneath there is fibrinous granulation tissue but no bleeding or purulence. Sutures x 2 on the left side of the neck wound had pulled through the skin and were removed.   Respiratory: 6-0 cuffless shiley sutured in, HTD.  MSK: Sutures removed and wound well approximated, stable from prior. Left thigh soft without concerns for compartment syndrome.  Skin graft donor site with minimal drainage. Lower leg dressing taken down, incision and skin graft noted to be intact with some small areas of de-epithelization inferiorly. DARWIN drain with serous output.    DARWIN drain output(s): (last 24 hours)/(last shift)  Drain Thigh 9/12/10 (37)    LABS: Mg 2.0 (2.0) Phos 2.7 (2.1)  ROUTINE IP LABS (Last four results)  BMP  Recent Labs   Lab 08/31/23  0536 08/30/23  0746 08/30/23  0659 08/29/23  0805 08/29/23  0707 08/28/23  1140 08/28/23  0845     --  135*  --  136  --  138   POTASSIUM 4.2  --  4.0  --  4.0  --  4.2   CHLORIDE 107  --  107  --  108*  --  108*   RACHAEL 8.4*  --  8.1*  --  7.9*  --  8.0*   CO2 21*  --  21*  --  21*  --  22   BUN 11.1  --  10.9  --  14.3  --  11.8   CR 0.40*  --  0.38*  --  0.39*  --  0.41*   GLC 84 72 78 80 87   < > 105*    < " > = values in this interval not displayed.     CBC  Recent Labs   Lab 08/31/23  0536 08/30/23  0659 08/29/23  1322 08/29/23  0707 08/28/23  0845   WBC 10.0 12.0*  --  12.2* 12.8*   RBC 2.34* 2.33*  --  2.36* 2.41*   HGB 7.1* 7.2* 7.5* 7.2* 7.5*   HCT 22.0* 22.0*  --  22.6* 24.0*   MCV 94 94  --  96 100   MCH 30.3 30.9  --  30.5 31.1   MCHC 32.3 32.7  --  31.9 31.3*   RDW 17.6* 16.9*  --  16.8* 16.6*   * 918*  --  911* 895*     INR  No lab results found in last 7 days.      A/P: This is a 62 year old female with SCC of the ventral tongue, mandible, with invasion of the skin. Underwent segmental mandibulectomy, partial glossectomy, resection of the skin of the mentum, bilateral neck dissections levels 1-4. Reconstruction of the mandible with left fibular free flap, pedicle for this flap on the right. Reconstruction of the chin defect with left ALT free flap, pedicle on the left. She has struggled with continued breakdown postoperatively. Now s/p take back OR 8/24 with repair, with persistent external wound breakdown and return to the OR for debridement 8/30. Patient appears to be healing well at this time.     Interval changes:  - Neck wound stable, granulating in. No signs of infection.     Neuro:  - Pain control: scheduled Tylenol, PRN oxycodone  - NO Nicotine patch     HEENT:  - Nothing around the neck (no gown ties, trach ties, lines, ect.)  - HOB at 30 degrees, head/neck in midline position  - Monitor handheld doppler signal.  The ALT flap signal is on the chin/neck paddle along two sutures laterally.  - RN flap checks Q4H  - Q12H flap checks by ENT  - Clean incisions with 0.9% sodium chloride and apply Aquaphor Q8H  - Peridex TID; gentle suction with red joaquin catheter only (no yankeur), do not cut red joaquin     Respiratory:  - Continue on humidified trach dome  - Routine trach cares but no trach ties  Trach Tube Instructions:   1) Contact ENT on-call with any questions or concerns   2) Perform  regular suctioning   4) RN should change disposable inner canulas every shift and/or clean it with a brush   5) Keep trach tube obturator taped to wall behind the head of the bed   6) Keep extra unopened 6-0 Shiley and 4-0 Shiley at bedside at all times   7) Minimize the amount of air in the cuff needed to adequately apply positive pressure ventilate. If positive pressure ventilation is not need then the cuff should be down.      CV/heme:  - Keep MAP > 60, SBP > 90 (preferrably > 110)  - Lovenox   - NO vasopressors  - Transfuse for Hgb < 7  - monitor PLTs, 892 today   - Will discuss outpatient follow up with hematology     FEN/GI:  - PEG 8/23  - Goal bolus tube feeds  - Low threshold to hold tube feeds for any nausea  - Strict NPO, no oral swabs  - Electrolyte replacement protocol  - Bowel regimen: scheduled miralax and senna     :  - UOP stable, Cr wnl. Continue to monitor.     Endo  - TSH 2.49  - Alb 4.0  - Prealbumin 9     ID:  - Unasyn (complete)     MSK:  - LIMB ALERT: NO IVS or LAB DRAWS from RIGHT ARM. We are keeping this arm safe as a backup flap if necessary.   - LEFT fibula free flap:   - Daily dressing changes by nursing  - Aquaphor on incision lines, xeroform over skin graft, telfa over incision, wrap with kerlex and ACE bandage  - Please examine boot tightness on each examination to ensure no pressure injury to dorsal foot  - OK for boot off when in bed  - Elevate leg while in bed/chair  - Boot to remain in place for 3 weeks post-op  - Ok for full weight bearing  - LEFT anterolateral thigh free flap; island dressing removed  - LEFT STSG, Ok to leave open to air, apply Aquaphor  - please strip and record DARWIN drain output q8h      PPX:  - 40mg Lovenox daily   - SCD on right     Consults:  - PT/OT- keep neck neutral (no turning to either side), ok for full weight bearing  - SLP: PMSV use as tolerated  - PLC for trach and tube feeding, TF 8/20, trach 8/22 (complete)  - Nutrition     Dispo: ENT, 6A.  Anticipate possible discharge early  next week pending wound healing and patient ability to perform wound care independently      -- Patient and above plan discussed with Dr. Leah Freeman and Dr. Fred Merida and staff Dr. Dao and Dr. Thorne.      DME documentation  Wound care supplies needed: Kerlix, 0.9% sodium chloride, 4x4 gauze, tape  Wound type: surgical  Wound location: anterior neck  Wound dimensions: 8 x 3 x 2 cm   Wound base: muscle  Wound drainage: scant serosanguinous  Dressing type: primary     Rakel Oliva PA-C  Otolaryngology-Head & Neck Surgery  Please contact ENT by dialing [phone] and entering job code [phone].

## 2023-08-31 NOTE — PLAN OF CARE
Status: Pt s/p segmental mandibulectomy, partial glossectomy, bilateral neck dissection reconstruction of the mandible with left fibular free flap, reconstruction of the chin defect with left ALT free flap, and trach placement 8/17. S/p PEG placement 8/23. S/p I+D and trach exchange 8/29   Vitals: VSS on RA.  HME in place today  Neuros: Intact.  Writes to communicate.  BUE 5/5.  RLE 4/5.  LLE 3/5  IV: PIV SL  Resp/trach: #6 cuffless shiley sutured in place.  Strong cough, suctioned x1 this shift.  Inner cannula changed x2 this shift.  Secretions are thick, white/yellow.  LS course  Diet: NPO.  TF bolus, 1 can TID.  FWF 60mL before and after  each bolus. Has had 2 bolus  Bowel status: LBM 8/30, BS+, declined senna this shift, only wanted miralax as stool has been loose.  : voiding spont.  Wears pull-up brief.  Skin: Bilateral neck incision with sutures.  Open dehiscence area with BID packing performed by ENT (supplies at bedside).  Trach sutures in place.  L thigh STSG and incision RANDALL.  LLE fibular flap dressing changed by writer, now done by nursing.  Tongue and chin spot checks WNL, pale pink, soft and warm.  Thigh DARWIN with neon green output.  PEG site CDI.   Pain: pain managed with scheduled tylenol  Activity: SBA/GB, walker, CAM boot on LLE for weight bearing.  Social: encourage independence with cares.   Plan: encourage independence with cares.  Patient changed her own inner cannula by herself.  Patient independently gave herself water bolus, and changed HME.    Updates this shift: continue to encourage independece with PEG, incision site, track cares, all with supervision and minimal assisance.

## 2023-08-31 NOTE — PROGRESS NOTES
Care Management Follow Up    Length of Stay (days): 14    Expected Discharge Date: 09/01/2023     Concerns to be Addressed:  discharge planning     Patient plan of care discussed at interdisciplinary rounds: Yes     Anticipated Discharge Disposition:  Home  Anticipated Discharge Services:  None  Anticipated Discharge DME:  suction, humidity, trach supplies, enteral formula and supplies, wound care supplies       Patient/family educated on Medicare website which has current facility and service quality ratings:  Yes  Education Provided on the Discharge Plan:  Yes  Patient/Family in Agreement with the Plan:  Yes     Referrals Placed by CM/SW:  Home care, DME  Private pay costs discussed: list of private pay home care services given to pt/family if they feel they need it    Additional Information:  Patient status reviewed, discussed in discharge rounds. Per primary team, patient will be here through the weekend. Patient has new wound packing to her neck, will need additional DME supplies.     DME orders and progress note faxed to [facility]. Call placed to [facility] (ph: [phone]), spoke with Ashley (ext [phone]) who states orders have not been processed yet but to check back tomorrow as she will be out the rest of the day.    RNCC will continue to follow.    1545 Addendum:  Informed this afternoon by ENT resident that patient may be medically ready for discharge tomorrow if supplies are able to be arranged.    Call placed to [facility], spoke with Mary CUADRA who states she needs to check with Carol who would be doing the set up and will get back to RNCC.     Mary transferred RNCC to [facility] (ext. [phone]) to discuss wound care orders. Christal states they received the orders, she will process them and have them shipped. She states they should arrive by Saturday but absolute latest would be Tuesday due to the holiday. Christal requests that we send 5 days worth of wound care supplies home with the patient just in  case.     1616 Addendum:  Spoke with Delfina at [facility] (ext [phone]) who is the RT manager. She states they will make delivery work tomorrow. Will touch base tomorrow morning to confirm patient's discharge.     Met with patient to discuss discharge, she agrees with plan.    Updated bedside RN who will get wound supplies together to be sent home for discharge.    PCP appointment Sept. 13th 2:20 pm   Dr. Sukumar Hendricks  [address] [place] [id number] [phone]      [facility] - accepted for home RN   Ph: [phone]  Fax: [phone]     [facility] - suction, humidity, trach supplies and wound care supplies   Ph: [phone] or [phone]  Fax: [phone]     [facility] - home enteral formula and supplies  Ph: [phone]  Fax: [phone]    Marina Betancur, RN, BSN  6A RN Care Coordinator  Ph: [phone]   Pager: [phone]

## 2023-09-01 VITALS
HEART RATE: 99 BPM | TEMPERATURE: 97.7 F | BODY MASS INDEX: 20.86 KG/M2 | DIASTOLIC BLOOD PRESSURE: 53 MMHG | HEIGHT: 64 IN | RESPIRATION RATE: 18 BRPM | SYSTOLIC BLOOD PRESSURE: 106 MMHG | WEIGHT: 122.2 LBS | OXYGEN SATURATION: 99 %

## 2023-09-01 LAB
ANION GAP SERPL CALCULATED.3IONS-SCNC: 10 MMOL/L (ref 7–15)
BUN SERPL-MCNC: 10.2 MG/DL (ref 8–23)
CALCIUM SERPL-MCNC: 8.2 MG/DL (ref 8.8–10.2)
CHLORIDE SERPL-SCNC: 106 MMOL/L (ref 98–107)
CREAT SERPL-MCNC: 0.35 MG/DL (ref 0.51–0.95)
DEPRECATED HCO3 PLAS-SCNC: 22 MMOL/L (ref 22–29)
ERYTHROCYTE [DISTWIDTH] IN BLOOD BY AUTOMATED COUNT: 17.9 % (ref 10–15)
GFR SERPL CREATININE-BSD FRML MDRD: >90 ML/MIN/1.73M2
GLUCOSE SERPL-MCNC: 84 MG/DL (ref 70–99)
HCT VFR BLD AUTO: 25.1 % (ref 35–47)
HGB BLD-MCNC: 7.9 G/DL (ref 11.7–15.7)
MAGNESIUM SERPL-MCNC: 2.1 MG/DL (ref 1.7–2.3)
MCH RBC QN AUTO: 30.5 PG (ref 26.5–33)
MCHC RBC AUTO-ENTMCNC: 31.5 G/DL (ref 31.5–36.5)
MCV RBC AUTO: 97 FL (ref 78–100)
PATH REPORT.COMMENTS IMP SPEC: ABNORMAL
PATH REPORT.COMMENTS IMP SPEC: NORMAL
PATH REPORT.COMMENTS IMP SPEC: NORMAL
PATH REPORT.COMMENTS IMP SPEC: YES
PATH REPORT.FINAL DX SPEC: ABNORMAL
PATH REPORT.FINAL DX SPEC: NORMAL
PATH REPORT.MICROSCOPIC SPEC OTHER STN: ABNORMAL
PATH REPORT.MICROSCOPIC SPEC OTHER STN: NORMAL
PATH REPORT.MICROSCOPIC SPEC OTHER STN: NORMAL
PATH REPORT.RELEVANT HX SPEC: ABNORMAL
PATH REPORT.RELEVANT HX SPEC: NORMAL
PHOSPHATE SERPL-MCNC: 3.3 MG/DL (ref 2.5–4.5)
PLATELET # BLD AUTO: 912 10E3/UL (ref 150–450)
POTASSIUM SERPL-SCNC: 4.2 MMOL/L (ref 3.4–5.3)
RBC # BLD AUTO: 2.59 10E6/UL (ref 3.8–5.2)
SODIUM SERPL-SCNC: 138 MMOL/L (ref 136–145)
WBC # BLD AUTO: 9.9 10E3/UL (ref 4–11)

## 2023-09-01 PROCEDURE — 250N000013 HC RX MED GY IP 250 OP 250 PS 637: Performed by: STUDENT IN AN ORGANIZED HEALTH CARE EDUCATION/TRAINING PROGRAM

## 2023-09-01 PROCEDURE — 84100 ASSAY OF PHOSPHORUS: CPT | Performed by: STUDENT IN AN ORGANIZED HEALTH CARE EDUCATION/TRAINING PROGRAM

## 2023-09-01 PROCEDURE — 82310 ASSAY OF CALCIUM: CPT | Performed by: STUDENT IN AN ORGANIZED HEALTH CARE EDUCATION/TRAINING PROGRAM

## 2023-09-01 PROCEDURE — 85014 HEMATOCRIT: CPT | Performed by: STUDENT IN AN ORGANIZED HEALTH CARE EDUCATION/TRAINING PROGRAM

## 2023-09-01 PROCEDURE — 36415 COLL VENOUS BLD VENIPUNCTURE: CPT | Performed by: STUDENT IN AN ORGANIZED HEALTH CARE EDUCATION/TRAINING PROGRAM

## 2023-09-01 PROCEDURE — 83735 ASSAY OF MAGNESIUM: CPT | Performed by: STUDENT IN AN ORGANIZED HEALTH CARE EDUCATION/TRAINING PROGRAM

## 2023-09-01 RX ORDER — MINERAL OIL/HYDROPHIL PETROLAT
OINTMENT (GRAM) TOPICAL EVERY 8 HOURS
Qty: 198 G | Refills: 3 | Status: SHIPPED | OUTPATIENT
Start: 2023-09-01

## 2023-09-01 RX ORDER — POLYETHYLENE GLYCOL 3350 17 G/17G
17 POWDER, FOR SOLUTION ORAL 2 TIMES DAILY PRN
Qty: 20 EACH | Refills: 0 | Status: SHIPPED | OUTPATIENT
Start: 2023-09-01 | End: 2024-04-12

## 2023-09-01 RX ORDER — MAGNESIUM HYDROXIDE 1200 MG/15ML
LIQUID ORAL
Status: DISCONTINUED
Start: 2023-09-01 | End: 2023-09-01 | Stop reason: HOSPADM

## 2023-09-01 RX ORDER — ACETAMINOPHEN 325 MG/1
975 TABLET ORAL 3 TIMES DAILY
Qty: 270 TABLET | Refills: 0 | Status: SHIPPED | OUTPATIENT
Start: 2023-09-01 | End: 2023-10-01

## 2023-09-01 RX ORDER — CHLORHEXIDINE GLUCONATE ORAL RINSE 1.2 MG/ML
15 SOLUTION DENTAL 3 TIMES DAILY
Qty: 473 ML | Refills: 1 | Status: SHIPPED | OUTPATIENT
Start: 2023-09-01 | End: 2023-10-23

## 2023-09-01 RX ORDER — AMOXICILLIN 250 MG
2 CAPSULE ORAL 2 TIMES DAILY PRN
Qty: 30 TABLET | Refills: 0 | Status: SHIPPED | OUTPATIENT
Start: 2023-09-01 | End: 2024-04-12

## 2023-09-01 RX ORDER — ONDANSETRON 4 MG/1
4 TABLET, ORALLY DISINTEGRATING ORAL EVERY 6 HOURS PRN
Qty: 30 TABLET | Refills: 1 | Status: SHIPPED | OUTPATIENT
Start: 2023-09-01 | End: 2024-01-05

## 2023-09-01 RX ADMIN — ACETAMINOPHEN 975 MG: 325 TABLET, FILM COATED ORAL at 07:54

## 2023-09-01 RX ADMIN — WHITE PETROLATUM: 1.75 OINTMENT TOPICAL at 07:54

## 2023-09-01 RX ADMIN — WHITE PETROLATUM: 1.75 OINTMENT TOPICAL at 00:15

## 2023-09-01 RX ADMIN — CHLORHEXIDINE GLUCONATE 15 ML: 1.2 SOLUTION ORAL at 07:54

## 2023-09-01 RX ADMIN — Medication 15 ML: at 07:54

## 2023-09-01 ASSESSMENT — ACTIVITIES OF DAILY LIVING (ADL)
ADLS_ACUITY_SCORE: 24
ADLS_ACUITY_SCORE: 27
ADLS_ACUITY_SCORE: 24
ADLS_ACUITY_SCORE: 24

## 2023-09-01 NOTE — DISCHARGE SUMMARY
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) (H) [C04.9]  Encounter for postoperative care [Z48.89]  Discharge Diagnosis: Same    Procedures:  Date: 8/17/2023  Procedure(s): Chin resection, segmental mandibulectomy, partial glossectomy, Bilateral modified radical neck dissection, Tracheostomy placement, nasogastric tube placement, Left fibula free flap, Split thickness skin graft from left thigh, anterolateral thigh free flap    Date: 8/24/2023   Procedure(s):   EXAM UNDER ANESTHESIA, ORAL CAVITY, IRRIGATION AND DEBRIDEMENT, ORAL CAVITY, neck dissection     Date: 8/29/2023  Procedure(s):  EXCISIONAL DEBRIDEMENT NECK, IRRIGATION OF ORAL CAVITY    Pathology:   - Oral cavity, segmental mandibulectomy, and anterior glossectomy was positive for ulcerated keratinizing squamous cell carcinoma stage [id number].   - Right level 2a lymph node was positive for metastatic squamous cell carcinoma in 5/12 specimens.   - Lymph node and bone marrow were also positive for low-grade B-cell neoplasm, likely CLL.     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 PET scan 7/13 demonstrated 4.4 x 3.7 x 3.2 cm anterior oral cavity mass but did not demonstrate distant disease. It was recommended that she undergo operative intervention and the patient consented to the above procedure after detailed explanation of the risks and benefits of said procedure. The intention of post operative radiation vs chemo-radiation was discussed.    Hospital Course: The patient was admitted to the hospital on 8/17/2023 and underwent the above mentioned procedure that day. She tolerated the procedure without any intra- or janet-operative complications. Please see the  operative report for full details of the procedure. The patient was admitted for post-operative monitoring. Her MAPs were initially soft and she required albumin infusion. She ahd low hemoglobin and required 1u pRBCs. She remained hemodynamically stable thereafter although with persistently low hemoglobin. Her flap was initially stable but by POD 4 she had intraoral flap breakdown with dehiscence of the incision lines which ultimately required return to OR for exam and debridement of anterior tongue reconstruction and xerofrom dressing was placed. She subsequently had external breakdown of her incisions and required a second return to the OR during which an anterior bridge of necrotic skin was removed and the wound was left open to granulate in with wet to dry dressing changes. Immediately post-op, she tolerated trach dome with cuff down and eventually the trach was changed to a 6-0 cuffless. Speech was consulted and cleared her for independent speaking valve use. Nutrition was consulted POD 1 for enteral feeding recommendations. They deemed her to have severe malnutrition and she began tube feeds through NG tube placed intra-op. She eventually received a PEG tube. She had difficulty tolerating a 4 can tube feed regimen and was converted to TwoCal 3 cans/day which she was tolerating by discharge. Physical therapy and occupational therapy were consulted POD 1 for evaluation and management throughout her hospital course. She progressed well and ultimately was ambulating with walker and she was recommended to discharge to home with assist. Pathology demonstrated [id number] metastatic squamous cell carcinoma with extranodal extension and the need for post operative chemoradiation was discussed with the patient. She also had an incidental finding of low-grade B-cell neoplasm, suspicious for CLL. Hematology was consulted for recommendations regarding reactive thrombocytosis with platelets to 900s, persistent anemia and above  pathology finding, and recommended outpatient follow up with malignant hematology for which an outpatient referral was placed. She received teaching with [facility] for trach care and tube feedings. By discharge, she and her  were comfortable with her cares. Her trach and tube feeding supplies were delivered to her home. At discharge, the patient's pain was well controlled, the patient was voiding on her own, and was ambulating and tolerated bolus tube feeds through PEG tube.    Discharge Exam:  Vitals:    08/31/23 1500 08/31/23 2300 08/31/23 2341 09/01/23 0722   BP: 122/66 126/65  106/53   BP Location:  Left arm  Left arm   Pulse: 88 79 86 99   Resp: 16 18  18   Temp: 97.4  F (36.3  C) 97.6  F (36.4  C)  97.7  F (36.5  C)   TempSrc: Oral Axillary  Axillary   SpO2: 100% 99% 99% 99%   Weight:       Height:         General: Alert and engaged, communicates and engages with exam, writing on clipboard as able.  HEENT: Oral cavity with well perfused munira-tongue; xeroform bolster covering de-epithelized ALT anteriorly, this is sutured in place. Mental ALT free flap well-perfused with good capillary refill, no evidence of venous congestion. External probe doppler signals strong.   Neck soft with no ballotable fullness. There is now an open defect in the midline of the incision packed with wet kerlex. Underneath there is fibrinous granulation tissue but no bleeding or purulence. Sutures x 2 on the left side of the neck wound had pulled through the skin and were removed.   Respiratory: 6-0 cuffless shiley sutured in, HTD, breathing non-labored on room air, no stridor, no accessory muscle use.   MSK: Sutures removed and wound well approximated, stable from prior. Left thigh soft without concerns for compartment syndrome.  Skin graft donor site with minimal drainage. Lower leg dressing taken down, incision and skin graft noted to be intact with some small areas of de-epithelization inferiorly. DARWIN drain with  serous output.    Discharge Medications:     Medication List        Started      acetaminophen 325 MG tablet  Commonly known as: TYLENOL  975 mg, Oral or Feeding Tube, 3 TIMES DAILY  Notes to patient: If you're NOT in pain you don't have to take this medication.      chlorhexidine 0.12 % solution  Commonly known as: PERIDEX  15 mLs, Mouth/Throat, 3 TIMES DAILY     mineral oil-hydrophilic petrolatum external ointment  Topical, EVERY 8 HOURS     ondansetron 4 MG ODT tab  Commonly known as: ZOFRAN ODT  4 mg, Oral, EVERY 6 HOURS PRN     polyethylene glycol 17 g packet  Commonly known as: MIRALAX  17 g, Oral or Feeding Tube, 2 TIMES DAILY PRN     senna-docusate 8.6-50 MG tablet  Commonly known as: SENOKOT-S/PERICOLACE  2 tablets, Oral or Feeding Tube, 2 TIMES DAILY PRN            Discontinued      ibuprofen 200 MG tablet  Commonly known as: ADVIL/MOTRIN     lidocaine (viscous) 2 % solution  Commonly known as: XYLOCAINE              Discharge Procedure Orders   Home Care Referral   Referral Priority: Routine: Next available opening Referral Type: Home Health Therapies & Aides   Number of Visits Requested: 1     Home Infusion Referral   Referral Priority: Routine: Next available opening Referral Type: Consultation   Number of Visits Requested: 1     Adult Oncology/Hematology  Referral   Standing Status: Future   Referral Priority: Routine: Next available opening Referral Type: Consultation   Requested Specialty: Otolaryngology   Number of Visits Requested: 1     Primary Care - Care Coordination Referral   Standing Status: Future   Referral Priority: Routine: Next available opening Referral Type: Care Coordination   Number of Visits Requested: 1     Reason for your hospital stay   Order Comments: Postoperative care.     Activity   Order Comments: Your activity upon discharge: activity as tolerated     Order Specific Question Answer Comments   Is discharge order? Yes      Reason for your hospital stay   Order  "Comments: Postoperative care     Activity   Order Comments: - Please ambulate as per recommendations by your physical therapist  - Please be sure to wear your boot on the left leg while weight bearing.  - Partial weight bearing days 14-21. Resume full weight bearing post-op days 21+ with assist of cane as needed.     Order Specific Question Answer Comments   Is discharge order? Yes      Follow Up and recommended labs and tests   Order Comments: Follow up in ENT clinic with Dr. Thorne on 9/11 at 11:00AM. Please call the clinic with questions/concerns: [phone].    Otolaryngology/ENT Clinic:  [facility]  [address] [place] [address]     When to contact your care team   Order Comments: Please notify your doctor if you experience wound breakdown, sustained bleeding from the wound site, or increasing redness, swelling, and/or purulent malorodorous discharge from the wound site which may indicate infection. If you notice increased secretions from your mouth or notice wound breakdown, please let us know. If you feel it is acute, or experience sudden changes in breathing, chest pain, or excessive sleepiness/somnolence please return to the emergency department or call 911. If you have questions or concerns during the day please call ENT clinic and [phone]. If at night you can call [facility] at [phone] and ask for the \"ENT resident on call\".     Discharge Instructions     Wound care and dressings   Order Comments: Instructions to care for your wound at home:     Mouth:  - Please rinse your mouth (swish and spit) with Peridex three times per day.    Neck:  -You have an open wound on your neck.   - It is loosely packed with kerlex gauze that is soaked in saline.   - Please change this twice per day.  - Cut a 6 piece of gauze and soak with saline. Take one end and gently push it into the left side of the wound and slowly pack the gauze " over the entire wound bed. Please be sure to cover the entire wound bed. It is helpful to use the soft end of a cue tip for this process.  - Keep incisions clean and dry. When you notice yellow crusting around incisions or sutures, please use a gauze or cue tip to gently remove it. You can use saline to gently rinse the incisions and dry off afterward.  - Apply Aquaphor ointment to incisions three times daily to keep moist.   - Can shower from the neck down. Please do not get the wound in your neck or your trach wet.    Trach:  Trach Tube Instructions: See tracheostomy care handout from patient learning center class for detailed trach care instructions. Remove and clean inner cannula at least 2-3 times per day and as needed for secretions. You may wear your speaking valve as tolerated during the day. If you feel short of breath remove the valve immediately. Do not sleep with valve in place. Wear humidity via trach dome while sleeping and as much as possible during the day to keep secretions thin. Suction as needed for secretions if unable to clear with cough alone. Make sure to keep your extra trach tube and obturator in a safe place that is easily accessible in case trach tube becomes dislodged.       Fibula (Leg):  Daily dressing changes to left lower extremity: Remove boot and old dressing. Gently clean incisions with saline. Apply Aquaphor ointment to linear incision and around incision edges of skin graft site. Cover skin graft with xeroform gauze. Cover linear incision with Telfa non-stick dressing. Wrap lower leg with kerlix gauze roll and cover with ACE wrap. Replace boot. CAM walking boot should be worn at all times (even while in bed) unless changing dressing. Once full weight bearing (post-op day 21+) you may remove the boot and only wear as needed for comfort.     Miscellaneous DME Order   Order Comments: Equipment being ordered: Nasogastric bolus tube feeding supplies  Formula: Osmolite 1.5 (or  equivalent formula), 4 cans per day  Gravity feeding bags  60 mL syringes    DME Documentation:   Describe the reason for need to support medical necessity: Squamous cell carcinoma of tongue and mandible     I, the undersigned, certify that the above prescribed supplies are medically necessary for this patient and is both reasonable and necessary in reference to accepted standards of medical and necessary in reference to accepted standards of medical practice in the treatment of this patient's condition and is not prescribed as a convenience.     Order Specific Question Answer Comments   [facility]    Start Date: 8/21/2023    DME Item Needed: Enteral tube feeding supplies    Length of Need: >90 days      Suction Machine Order   Order Comments: Equipment being ordered:   Portable suction machine   14 Moroccan suction catheters  12 Moroccan red joaquin catheters    Suction Documentation  I attest that I have seen and evaluated Adrianna Pereira. She has a chronic diagnosis of Z93.0 - Tracheostomy status requiring use of a suction machine. Face to face addressing dysphagia and inability to clear/mobilize secretions.     I, the undersigned, certify that the above prescribed supplies are medically necessary for this patient and is both reasonable and necessary in reference to accepted standards of medical and necessary in reference to accepted standards of medical practice in the treatment of this patient's condition and is not prescribed as a convenience.     Order Specific Question Answer Comments   DME Provider[facility]    Secretion Type: Trach    Supplies: Yankauer Suction Tubes 3/ week ()    Supplies: Disposible Canisters 2/ month ()    Supplies: 6' Suction Tubing with Filter Kit ()    Supplies: Sterile Saline Vials 3, 5, or 10ml 300/month ()    Supplies: Suction Catheter Kit 90/month ()    Supplies: Sterile Water 500 ml 48 bottles/month ()    Length of Need:  Lifetime    The face to face evaluation was performed on: 8/22/2023      Tracheostomy/Laryngectomy Supplies   Order Comments: Equipment being ordered: Tracheostomy supplies    0.9% sodium chloride 1000 mL bottles  Box split 4x4 gauze sponges  Trach kits with brushes  Velcro trach ties  3 cc 0.9% sodium chloride lavages for trach suctioning  Large gloves  Cool mist humidity via trach dome  6-0 Shiley disposable inner cannulas  Trach HMEs    Trach/Supplies Documentation:   Patient has an open surgical tracheostomy.  The patient requires the ordered tracheostomy supplies to provide appropriate routine tracheostomy care.     I, the undersigned, certify that the above prescribed supplies are medically necessary for this patient and is both reasonable and necessary in reference to accepted standards of medical and necessary in reference to accepted standards of medical practice in the treatment of this patient's condition and is not prescribed as a convenience.     Order Specific Question Answer Comments   DME Provider: [facility]    Trach/Laryn: Trach/Laryn Tube Non-Cuffed 5 per month ()    Trach/Laryn: Inner Cannula 62 per month ()    Non-Cuffed Tube Type: 6 cuffless shiley    Non-Cuffed Tube Size: 6 cuffless shiley    Inner Cannula Tube Type: 6 cuffless shiley    Inner Cannula Tube Size: 6 cuffless shiley    Supplies: Tracheostomy Care Kit (31 per month) (/)    Supplies: Tracheostomy Collar Vincent (31 per month) ()    Supplies: Trach Mask/Dome    Supplies: Lubricant 4 ounces per month ()    Supplies: Manual Resuscitation Bag    Supplies: Trach Valve/Speaking Valve    Supplies: 4X4 Drain Sponge ()    Supplies: Sterile Saline 10 ml 300/month ()    Humidity Needed (hover for more info): Yes    Humidity Type: Cool Humidity with Air Compressor    Humidity Type: Heated Humidity with Air Compressor    Humidity Type: Heat Moisture Exchange (HME)    Does patient need  "supplemental O2? No    Length of Need: Lifetime    The face to face evaluation was performed on: 8/22/2023      Miscellaneous DME Order   Order Comments: Equipment being ordered: Wound care supplies, 1 each daily x 30 days  Xeroform occlusive gauze 5\" x 9\"  Telfa non-adherent pad 8\" x 3\"  Kerlix bandage roll 4-1/2\" x 4-1/8 yd  ACE wrap, 4 inch (6 total)    Diagnosis: squamous cell carcinoma of the floor of mouth; s/p flap    I, the undersigned, certify that the above prescribed supplies are medically necessary for this patient and is both reasonable and necessary in reference to accepted standards of medical and necessary in reference to accepted standards of medical practice in the treatment of this patient's condition and is not prescribed as a convenience.     Order Specific Question Answer Comments   GORAN Provider: [facility]    Start Date: 8/22/2023    DME Item Needed: wound care supplies    Length of Need: 30 days      Miscellaneous DME Order   Order Comments: Equipment being ordered: Wound care supplies, 2 each daily x 30 days  Kerlix bandage roll 4-1/2\" x 4-1/8 yd  4x4 gauze  Primapore tape  0.9% sodium chloride bottles    Describe the reason for need to support medical necessity: Surgical wound    I, the undersigned, certify that the above prescribed supplies are medically necessary for this patient and is both reasonable and necessary in reference to accepted standards of medical and necessary in reference to accepted standards of medical practice in the treatment of this patient's condition and is not prescribed as a convenience.     Order Specific Question Answer Comments   GORAN Provider: [facility]    Start Date: 8/31/2023    DME Item Needed: wound care supplies    Length of Need: 30 days      Diet   Order Comments: Follow this diet upon discharge: Orders Placed This Encounter      Adult Formula Bolus Feeding: TwoCal HN; Route: Nasogastric tube; 3 times daily; Volume per Bolus: 1; Can(s)/ " Carton(s); Additional free water (mL): 60 mL before and after each feeding; Do not advance tube feeding rate unless K+ is = or > 3.0, Mg++...     Order Specific Question Answer Comments   Is discharge order? Yes      Diet   Order Comments: Nothing to eat or drink by mouth. Bolus tube feeding via PEG tube. Formula: TwoCal HN, 3 cans per day. Separate feedings into 1 cans 3 times daily, separate feedings by 3-4 hours. Flush tube with 60 mL water before and after each bolus feeding. Flush with 30 ml water before and after medication administration.    Please take nothing by mouth.     Order Specific Question Answer Comments   Is discharge order? Yes      Dispo: To Home with assist in good condition. All of the patient's questions/concerns have been addressed at this time.     Sukumar Lin, MS4     I reviewed the above note written by the medical student and made edits where appropriate.    Fred Merida MD   Department of Otolaryngology - Head and Neck Surgery, PGY 1  Please page ENT with questions

## 2023-09-01 NOTE — PLAN OF CARE
Status: Pt s/p segmental mandibulectomy, partial glossectomy, bilateral neck dissection reconstruction of the mandible with left fibular free flap, reconstruction of the chin defect with left ALT free flap, and trach placement 8/17. S/p PEG placement 8/23. S/p I+D and trach exchange 8/29    Vitals: VSS on RA.  HME in place today   Neuros: AO x4, all other neuros intact, writes to communicate. BUE 5/5.  RLE 4/5.  LLE 3/5   IV: PIV SL  Labs/Electrolytes: WNL  Resp/trach: #6 cuffless shiley sutured in place. Strong cough to remove superficial output, inner cannula and HME changed-pt changed independently, did not required suctioning this rojelio  Diet: NPO. TF bolus feeds TID via NG. Has completed 3/3 cans today, tolerating bolus feeds well, able to administer FWF herself  Bowel status: LBM 8/30, BS+, declined bowel meds since   : Voiding spontaneously  Skin: Bilateral neck incision with sutures.  Open dehiscence area with BID packing performed by ENT (supplies at bedside).  Trach sutures in place.  L thigh STSG and incision RANDALL.  LLE fibular flap dressing changed by writer, now done by nursing.  Tongue and chin spot checks WNL, pale pink, soft and warm.  Thigh DARWIN with neon green output.  PEG site CDI.   Pain: Pain manages with scheduled tylenol  Activity: SBA/GB, walker, CAM boot on LLE for weight bearing.   Social:  visited this evening, participated in trach packing dressing change teaching.   Plan: Discharge tomorrow morning, 5 days of supplies have been packed for pt in the AM and is with other supplies on windowsill   Updates this shift: ENT team when over dressing changes for packed trach dressing twice this evening, once with . Pt was able to do the second dressing change herself.      Please call nutrition in the AM to order TF cans to bring home at discharge. Will need 5 days worth of supplies.

## 2023-09-01 NOTE — TELEPHONE ENCOUNTER
RECORDS STATUS - ALL OTHER DIAGNOSIS      RECORDS RECEIVED FROM: Epic   DATE RECEIVED:    NOTES STATUS DETAILS   OFFICE NOTE from referring provider Epic 8/2/23: Dr. Tyshawn Dao   DISCHARGE SUMMARY from hospital [facility] 8/17/23: [facility]   OPERATIVE REPORT [facility] 8/17/23: Excisional debridement of anterior neck down to strap musculature   7/3/23: Mandible Bx   MEDICATION LIST [facility]    LABS     PATHOLOGY REPORTS Report in Epic 8/17/23: [id number]   7/3/23: [id number]      ANYTHING RELATED TO DIAGNOSIS Epic    IMAGING (NEED IMAGES & REPORT)     PET PACS 7/13/23: PET Eyes to Thighs

## 2023-09-01 NOTE — PROGRESS NOTES
Care Management Discharge Note    Discharge Date: 09/01/2023     Discharge Disposition:  Home  Discharge Services:  Home Care  Discharge DME:  suction, humidity, trach supplies, enteral formula and supplies, wound care supplies       Discharge Transportation: car, drives self, family or friend will provide    Private pay costs discussed: Not applicable    Education Provided on the Discharge Plan:  Yes  Persons Notified of Discharge Plans: Patient, family, provider, nurse  Patient/Family in Agreement with the Plan:  Yes    Handoff Referral Completed: Yes    Additional Information:  Patient status reviewed, discussed in discharge rounds. Patient is discharging today.    Call placed to [facility] to inform them of patient's discharge. Spoke with Delfina (ph: [phone], ext [phone]) who states they will plan to meet patient at home shortly after she arrives. Gave patient/patient's  Delfina's phone number to call as they leave the hospital to better estimate delivery time.     Updated [facility] liaison, [place], of discharge. They will deliver formula and supplies to the hospital prior to patient discharging, ideally before 11 am.     Call placed to [facility] to inform of discharge, discharge orders faxed.    Met with patient and her  to discuss discharge plan, they are in agreement and have no further questions or concerns.     Updated bedside nurse who will send 5 days of neck wound dressings home with patient and is aware we are waiting for [facility] delivery prior to discharge.     Handoff complete.     PCP appointment Sept. 13th 2:20 pm   Dr. Sukumar Hendricks  [address] [facility] [address] [phone]      [facility] - accepted for home RN   Ph: [phone]  Fax: [phone]     [facility] - suction, humidity, trach supplies and wound care supplies   Ph: [phone] or [phone]  Fax: [phone]     [facility] - home enteral formula and  supplies  Ph: [phone]  Fax: [phone]    Marina Betancur RN, BSN  6A RN Care Coordinator  Ph: [phone]   Pager: [phone]

## 2023-09-01 NOTE — PLAN OF CARE
Status: Pt s/p segmental mandibulectomy, partial glossectomy, bilateral neck dissection reconstruction of the mandible with left fibular free flap, reconstruction of the chin defect with left ALT free flap, and trach placement 8/17. S/p PEG placement 8/23. S/p I+D and trach exchange 8/29   Vitals: VSS on RA.  HME in place all night.    Neuros: Intact.  Writes to communicate.  BUE 5/5.  RLE 4/5.  LLE 3/5  IV: PIV SL  Resp/trach: #6 cuffless shiley sutured in place.  Strong cough, no suction this shift.  Inner cannula changed x1 this shift, per patient.  Secretions are thick, white/yellow.  LS course  Diet: NPO.  TF bolus, 1 can TID.  FWF 60mL before and after each bolus.  Bowel status: LBM 8/30, BS+  : voiding spont.  Wears pull-up brief.  Ambulates to bathroom. Steady on feet  Skin: Bilateral neck incision with sutures.  Open dehiscence area with BID packing performed by ENT (supplies at bedside).  Trach sutures in place.  L thigh STSG and incision RANDALL.  LLE fibular flap dressing CDI.  Tongue and chin spot checks WNL, pale pink, soft and warm.  Thigh DARWIN with neon green output.  PEG site CDI.    Pain: pain managed with scheduled tylenol  Activity: SBA/GB, walker, CAM boot on LLE for weight bearing.  Social: encourage independence with cares.   Plan: encourage independence with cares.  Patient changed her own inner cannula by herself.  Patient independently gave herself water bolus, and changed HME.    Updates this shift: continue to encourage independece with PEG, incision site, track cares, all with supervision and minimal assisance.      Bag of supplies (for discharge home) ready on windowsill.  Still needs 15 cans of TF food, put into her home supplies bag.  That will be enough to last her for 5 days.    0635:  TF (1 can) started

## 2023-09-01 NOTE — PLAN OF CARE
Speech Language Therapy Discharge Summary    Reason for therapy discharge:    Discharged to home with outpatient therapy.    Progress towards therapy goal(s). See goals on Care Plan in [facility] electronic health record for goal details.  Goals partially met.  Barriers to achieving goals:   discharge from facility.    Therapy recommendation(s):    Continued therapy is recommended.  Rationale/Recommendations:  Recommend PMSV use as tolerated. Remove if pt is sleeping or SOB. Recommend ongoing SLP services at discharge.

## 2023-09-01 NOTE — PLAN OF CARE
RN went over discharge education with pt and her , all questions answered. Pt was packed up with enough supplies to provide her for the next 5 days per ENT/CC. All IVs removed, trach was cleaned and Aquaphor applied, pt received x1 can of TF. No PRN pain meds, PEG site WNL. Pt's discharge was delayed d/t late drop off for extra TF supplies. Pt left approx @ 1145 one TF supplies delivered, she left for home with family.

## 2023-09-01 NOTE — PROGRESS NOTES
Discharge date: 09/01/23 (SOC)  Discharge therapies to be provided by [facility]: Enteral  Formula: Two-Natanael  Rate/Dose: 1 can tid bolus  Provider to manage enteral at home: Dr. Sukumar Hendricks  Estimated length of need: 90 days or more  Education completed: Pt and pt's  received [facility] teaching  Delivery/supplies: First delivery to the hospital. Afterwards to pt's home. Supplies: 2X2, 60ml syringes, medication syringes, med cups, adult scale, flexi trak, y-connectors, formula.   Agency: [facility]  SNV: NA  Notes: Met with pt and pt's  at bedside. Went over discharge orders for enteral feedings. Both pt and her  stated they feel comfortable with being independent with administering tube feeds. Went over water flushes 60ml before and after feedings and every 4 hours.  Informed them that this writer was informed by [facility] that Two Natanael is not available right now due to back order. Nutren will be the formula delivered and is similar to Two Natanael. They both understood.  Went over the process of how to unblock feeding tube, symptoms of when to contact provider/[facility], spoke about admission folder that will be in with her supplies.  Asked for them to look through that folder. And there are things in the folder that are there to refer to as needed.  Informed them that supplies are to be delivered to the hospital around 1100 today.  This writer will return when supplies do come and go through to make sure everything is there. Encouraged them to contact [facility] with any concerns or questions.    Thank you for the referral.    Carmel Torres LPN  Enteral Nurse Liaison  [facility]  [address] [place] [address]  [phone]

## 2023-09-02 NOTE — PLAN OF CARE
Physical Therapy Discharge Summary    Reason for therapy discharge:    Discharged to home.    Progress towards therapy goal(s). See goals on Care Plan in [facility] electronic health record for goal details.  Goals partially met.  Barriers to achieving goals:   discharge from facility.    Therapy recommendation(s):    Continue home exercise program.

## 2023-09-05 ENCOUNTER — PATIENT OUTREACH (OUTPATIENT)
Dept: CARE COORDINATION | Facility: CLINIC | Age: 63
End: 2023-09-05
Payer: COMMERCIAL

## 2023-09-05 ENCOUNTER — TELEPHONE (OUTPATIENT)
Dept: OTOLARYNGOLOGY | Facility: CLINIC | Age: 63
End: 2023-09-05
Payer: COMMERCIAL

## 2023-09-05 NOTE — TELEPHONE ENCOUNTER
[facility] Call Center    Phone Message    May a detailed message be left on voicemail: yes     Reason for Call: Other: Per pt's  pt's patch has come off and they want to know if this is okay. Please call to discuss. Thank you     Action Taken: Message routed to:  Other: ENT    Travel Screening: Not Applicable

## 2023-09-05 NOTE — PROGRESS NOTES
Clinic Care Coordination Contact  [facility]/Voicemail       Clinical Data: Care Coordinator Outreach  Outreach attempted x 1.  Unable to leave message as no voicemail was set up.  Plan: Care Coordinator will try to reach patient again in 1-2 business days.    Bertha Pollock RN Care Coordination   [facility]  Email: [email or web address]  Phone: [phone]

## 2023-09-05 NOTE — LETTER
[facility] CARE COORDINATION  No address on file    September 6, 2023    Adrianna Pereira  [address] [address] [place] [id number]      Dear Adrianna,        I am a  clinic care coordinator who works with Physician Delfina Tillman-Carlota with the [facility]. I have been trying to reach you recently to introduce Clinic Care Coordination. Below is a description of clinic care coordination and how I can further assist you.       The clinic care coordination team is made up of a registered nurse, , financial resource worker and community health worker who understand the health care system. The goal of clinic care coordination is to help you manage your health and improve access to the health care system. Our team works alongside your provider to assist you in determining your health and social needs. We can help you obtain health care and community resources, providing you with necessary information and education. We can work with you through any barriers and develop a care plan that helps coordinate and strengthen the communication between you and your care team.  Our services are voluntary and are offered without charge to you personally.    Please feel free to contact me with any questions or concerns regarding care coordination and what we can offer.      We are focused on providing you with the highest-quality healthcare experience possible.    Sincerely,     Bertha Pollock RN Care Coordination   [facility]-  [place], [place], [place]  Phone: [phone]

## 2023-09-05 NOTE — TELEPHONE ENCOUNTER
Returned call to patient's spouse who indicated that patient's oral bolster fell out this weekend.Patient and spouse decline any concerns or issues after bolster fell out.    Writer reviewed with Dr Thorne who was okay with bolster coming out. Advised patient and spouse to call with any further questions or concerns.       La Flores, RN, BSN

## 2023-09-06 ENCOUNTER — PATIENT OUTREACH (OUTPATIENT)
Dept: OTOLARYNGOLOGY | Facility: CLINIC | Age: 63
End: 2023-09-06
Payer: COMMERCIAL

## 2023-09-06 NOTE — PROGRESS NOTES
Clinic Care Coordination Contact  [facility]/Voicemail       Clinical Data: Care Coordinator Outreach  Outreach attempted x 2.  No voicemail set up.  Plan: Care Coordinator will send care coordination introduction letter with care coordinator contact information and explanation of care coordination services via mail. Care Coordinator will do no further outreaches at this time.    Bertha Pollock, RN Care Coordination   [facility]  Email: [email or web address]  Phone: [phone]

## 2023-09-06 NOTE — TELEPHONE ENCOUNTER
Called and spoke with patient and spouse regarding follow-up appointment tomorrow 9/7 at 1:00pm with Dr. Thorne. Patient and spouse agreeable.     La Flores, RN, BSN

## 2023-09-07 ENCOUNTER — OFFICE VISIT (OUTPATIENT)
Dept: OTOLARYNGOLOGY | Facility: CLINIC | Age: 63
End: 2023-09-07
Payer: COMMERCIAL

## 2023-09-07 VITALS
HEIGHT: 64 IN | WEIGHT: 109 LBS | HEART RATE: 112 BPM | OXYGEN SATURATION: 99 % | TEMPERATURE: 98.7 F | DIASTOLIC BLOOD PRESSURE: 67 MMHG | BODY MASS INDEX: 18.61 KG/M2 | SYSTOLIC BLOOD PRESSURE: 121 MMHG

## 2023-09-07 DIAGNOSIS — C77.9 METASTATIC SQUAMOUS CELL CARCINOMA TO LYMPH NODE (H): Primary | ICD-10-CM

## 2023-09-07 DIAGNOSIS — C04.9 SCC (SQUAMOUS CELL CARCINOMA OF FLOOR OF MOUTH) (H): ICD-10-CM

## 2023-09-07 PROCEDURE — 99024 POSTOP FOLLOW-UP VISIT: CPT | Performed by: STUDENT IN AN ORGANIZED HEALTH CARE EDUCATION/TRAINING PROGRAM

## 2023-09-07 ASSESSMENT — PAIN SCALES - GENERAL: PAINLEVEL: NO PAIN (0)

## 2023-09-07 NOTE — PROGRESS NOTES
Head and Neck Surgery  9/7/23    Adrianna comes in today for a wound check. She has been doing well at home. No concerns today    On exam, the intraoral flap is well perfused. ALT anteriorly is granulating. ALT in the chin is healing well. No dehiscence or signs of fistula. Neck incision intact. Staples removed    Neck wound healing nicely with healthy tissue present.     Left ALT and fibula donor site healing well.    A/P    She is making good healing progress. She will continue wet to dry dressing changes to the anterior neck    Id like to see her back in one month.    Darien Thorne MD

## 2023-09-07 NOTE — LETTER
9/7/2023       RE: Adrianna Pereira  [address] [address] [place] [id number]     Dear Colleague,    Thank you for referring your patient, Adrianna Pereira, to the [facility] [place] [facility]. Please see a copy of my visit note below.    Head and Neck Surgery  9/7/23    Adrianna comes in today for a wound check. She has been doing well at home. No concerns today    On exam, the intraoral flap is well perfused. ALT anteriorly is granulating. ALT in the chin is healing well. No dehiscence or signs of fistula. Neck incision intact. Staples removed    Neck wound healing nicely with healthy tissue present.     Left ALT and fibula donor site healing well.    A/P    She is making good healing progress. She will continue wet to dry dressing changes to the anterior neck    Id like to see her back in one month.    Darien Thorne MD        Again, thank you for allowing me to participate in the care of your patient.      Sincerely,    Darien Thorne MD

## 2023-09-11 NOTE — OP NOTE
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 Therefore, the above operation was recommended.  The indications, alternatives, risks, and benefits were discussed and all questions were answered.  The patient consented to the above procedure.    COMPLICATIONS: None     FINDINGS: Ulcerative tumor of the midline floor of mouth and ventral tongue, with extension through to the skin of the chin. Oral tongue resected, base of tongue left in place. The bony resection extended from angle to angle of the mandible. Bilateral neck dissections, levels 1-4.  Lower lip was uninvolved and left in place.      DESCRIPTION OF PROCEDURE: After informed consent was obtained, the patient was brought back to the main operating room and placed in a supine position.  General anesthesia was induced and the patient was orotracheally intubated.  The bed was turned 180 degrees away from anesthesia.  A Golden and arterial line were placed.  The planned incision was marked along the skin of the chin, as well as an apron incision along the neck. The cricoid cartilage was palpated and a incision was marked for the tracheostomy.  The planned incisions were injected with 1:100,000 epinephrine.  The patient was prepped and draped in normal sterile fashion including the left leg for fibula flap and left ALT flap.  A timeout was performed.     A 15 blade was used to make a horizontal incision through the skin just below the cricoid cartilage.  The incision  was extended down to the subcutaneous fat.  Blunt dissection was performed to the strap muscles.  The midline raphae of the strap muscles was identified and divided.  The strap muscles were retracted laterally.  The cricoid cartilage was palpated.  Blunt dissection was performed along the anterior tracheal wall with a tonsil, lifting the thyroid gland off the anterior tracheal wall.  The thyroid gland isthmus was divided with the monopolar cautery, taking care to achieve hemostasis.  Once the thyroid gland was divided, the lobes were retracted laterally.  Hemostasis was achieved with the bipolar cautery.  The anterior tracheal wall was inspected and the cricoid cartilage was palpated.  The intercartilaginous space between the first and second tracheal rings was identified.  The cuff was deflated on the endotracheal tube and a 15 blade was used to make an incision at the intercartilaginous space between the first and second rings.  A Evelia flap was created with heavy curved scissors.  A 3-0 Vicryl was used to secure the inferiorly based Evelia flap to the skin.  The endotracheal tube was withdrawn and a wire reinforced endotracheal tube was placed through the tracheostomy.  End-tidal CO2 was verified.  The tube was secured with a 2-0 silk suture to the chest.     A 15 blade was then used to make the neck apron incision. The platysma was divided with the monopolar cautery.  Subplatysmal flaps were raised superiorly to the mandible and inferiorly to the clavicle.  The greater auricular nerve and external jugular vein were identified and preserved. Care was taken not to connect the subplatysmal dissection with the stoma. We began on the left; blunt dissection was performed to identify the marginal mandibular nerve. This was dissected proximally and distally.  Once it was in view of the inferior border of the submandibular gland was identified.  A fascial flap was raised sharply with tenotomy scissors up towards the  mandible to help protect the nerve.  The inferior border of the submandibular gland was further dissected until the digastric muscle was identified.  This was traced in the anteriorly towards the mandible.  The fibrofatty contents of level IB were released from the anterior junction of the anterior belly of the digastric and the inferior border of the mandible.  The specimen was released working from medial to lateral along the inferior border of the mandible.  The mylohyoid muscle was identified and retracted superiorly.  The submandibular ganglion was divided.  The specimen continued to be released working laterally.  The facial vein and artery were divided near the mandible. The specimen was then released posteriorly along the posterior belly of the digastric.  The specimen remained pedicled off the facial artery which was clipped at the submandibular gland.  The specimen was handed off to nursing for permanent pathology.  An Allis clamp was used to grasp the fibrofatty contents of level IA.  The monopolar cautery was used to release the specimen from the anterior belly of the digastric muscles bilaterally. It was released from the mylohyoid deeply and working down towards the hyoid bone.  Once the specimen was completely released it was handed off to nursing for permanent pathology. We then proceeded to complete dissection of the right 1B kelin packet in an identical fashion to the left side. We then proceeded to release the inferior border of the mandible bilaterally from the surrounding tissue and musculature.     At this point attention was turned to the oral cavity.  The blue cheek retractor and the side biter were placed and the oral cavity was inspected.  The patient had tumor that was extending through the floor of mouth, eroding the ventral tongue.  The monopolar cautery was used to rebeca the planned mucosal incision circumferentially around the tumor, and then deepened through the musculature of the  tongue. The mucosal cuts along the floor of mouth were carried out and connected to our previous incisions in the neck. We then used a #15 blade to incise the skin of the mentum which was involved with tumor. We then planned our bony cuts using prefabricated drill guides which articulated with the angles of the mandible. The guides were placed and screws were used to secure it into place. Using the oscillating saw, the cuts were created through the drill guide bilaterally, freeing the entirety of the tumor as it was attached to the mandibular symphysis and floor of mouth, and our resection included the entirety of the oral tongue, as well as the associated skin of the chin that was also involved with tumor. The mass was dropped en bloc into the neck, and the remaining connections along the base of tongue were divided with the monopolar cautery. The specimen was then passed off the field, and margins were obtained from the specimen itself once oriented in pathology.     Once the specimen had been removed the pre-fabricated recon bar was applied to the remaining angles of the mandible. Guide was used to create our existing screw holes, and the bar was aligned appropriately and secured into place with locking screws. Attention was then turned to the remainder of the neck dissection.  A monopolar cautery was used to release the anterior border of the SCM on the left.  The fascia was released working along the medial border of the muscle.  This was carried out medially towards the floor of the neck.  The spinal accessory nerve was identified and traced superiorly towards its junction with the internal jugular vein. The digastric muscle defined the superior border of the dissection. The monopolar cautery was used to release the specimen of level II and III off the cervical rootlets along the floor.  The omohyoid was identified inferiorly and defined the inferior border of the neck. The specimen was released off the  cervical rootlets working from lateral to medial towards the carotid sheath.  The specimen was released off the lateral border of the carotid artery and then the internal jugular vein working from superior to inferior using a 15 blade.  At this point the medial border of our dissection was defined.  The monopolar cautery was used to release the fibrofatty contents along the border of the omohyoid medially.  The specimen was released off the medial floor of the neck working back towards the internal jugular vein.  The specimen was dissected out from around the facial vein, in order to maintain adequate vessel length for use in the reconstruction.  The specimen was then released off the anterior border of the internal jugular vein connecting the lateral medial portions of our dissection.  The specimen was divided into its respective levels and handed off to nursing for permanent pathology.  The spinal accessory nerve was retracted inferiorly and an Allis clamp was used to grasp the contents of level IIB.  The nilsa scissors were used to release the contents of level IIB from the SCM laterally, the digastric superiorly, and the floor of the neck.  Once the specimen was completely released it was handed off to nursing for permanent pathology. We then turned our attention to the right neck; an identical procedure was then carried out on this side, levels 2-4 were dissected and passed off for permanent pathology. The neck was thoroughly irrigated with saline.  Hemostasis was achieved with bipolar cautery.     To assist Dr. Thorne, I harvested a left ALT flap of 10x6cm in size. I identified the mid point between the ASIS and the patella and designed a skin paddle around dopplered perforating vessels.  Anterior incision was made and the rectus muscle was identified and perforators were seen.  I identified the septum between the vastus lateralis and rectus femoris as well as the pedicle. I then islanded the flap and isolated  it on the pedicle.  I left the flap perfusing for use by Dr. Thorne.        At this point the patient was handed over to Dr. Thorne's team for reconstruction.  The patient tolerated the ablative portion of the procedure well with no immediate complications.     Dr. Dao was present and participated in the entire procedure.    Leah Freeman MD

## 2023-09-13 ENCOUNTER — OFFICE VISIT (OUTPATIENT)
Dept: FAMILY MEDICINE | Facility: CLINIC | Age: 63
End: 2023-09-13
Payer: COMMERCIAL

## 2023-09-13 VITALS
TEMPERATURE: 97.7 F | OXYGEN SATURATION: 98 % | BODY MASS INDEX: 18.27 KG/M2 | DIASTOLIC BLOOD PRESSURE: 64 MMHG | RESPIRATION RATE: 18 BRPM | HEIGHT: 64 IN | WEIGHT: 107 LBS | SYSTOLIC BLOOD PRESSURE: 106 MMHG | HEART RATE: 116 BPM

## 2023-09-13 DIAGNOSIS — Z93.1 S/P PERCUTANEOUS ENDOSCOPIC GASTROSTOMY (PEG) TUBE PLACEMENT (H): ICD-10-CM

## 2023-09-13 DIAGNOSIS — C06.9 SQUAMOUS CELL CARCINOMA OF MOUTH (H): ICD-10-CM

## 2023-09-13 DIAGNOSIS — D75.839 THROMBOCYTOSIS: ICD-10-CM

## 2023-09-13 DIAGNOSIS — Z93.0 TRACHEOSTOMY IN PLACE (H): ICD-10-CM

## 2023-09-13 DIAGNOSIS — D64.9 POSTOPERATIVE ANEMIA: ICD-10-CM

## 2023-09-13 DIAGNOSIS — Z76.89 ENCOUNTER TO ESTABLISH CARE: Primary | ICD-10-CM

## 2023-09-13 DIAGNOSIS — R73.09 ELEVATED GLUCOSE: ICD-10-CM

## 2023-09-13 LAB
ANION GAP SERPL CALCULATED.3IONS-SCNC: 14 MMOL/L (ref 7–15)
BASOPHILS # BLD AUTO: 0.1 10E3/UL (ref 0–0.2)
BASOPHILS NFR BLD AUTO: 0 %
BUN SERPL-MCNC: 19.2 MG/DL (ref 8–23)
CALCIUM SERPL-MCNC: 9.6 MG/DL (ref 8.8–10.2)
CHLORIDE SERPL-SCNC: 101 MMOL/L (ref 98–107)
CREAT SERPL-MCNC: 0.55 MG/DL (ref 0.51–0.95)
DEPRECATED HCO3 PLAS-SCNC: 23 MMOL/L (ref 22–29)
EGFRCR SERPLBLD CKD-EPI 2021: >90 ML/MIN/1.73M2
EOSINOPHIL # BLD AUTO: 1.1 10E3/UL (ref 0–0.7)
EOSINOPHIL NFR BLD AUTO: 10 %
ERYTHROCYTE [DISTWIDTH] IN BLOOD BY AUTOMATED COUNT: 17.8 % (ref 10–15)
GLUCOSE SERPL-MCNC: 143 MG/DL (ref 70–99)
HCT VFR BLD AUTO: 30.5 % (ref 35–47)
HGB BLD-MCNC: 9.9 G/DL (ref 11.7–15.7)
IMM GRANULOCYTES # BLD: 0.1 10E3/UL
IMM GRANULOCYTES NFR BLD: 1 %
LYMPHOCYTES # BLD AUTO: 2.3 10E3/UL (ref 0.8–5.3)
LYMPHOCYTES NFR BLD AUTO: 20 %
MAGNESIUM SERPL-MCNC: 1.9 MG/DL (ref 1.7–2.3)
MCH RBC QN AUTO: 31.3 PG (ref 26.5–33)
MCHC RBC AUTO-ENTMCNC: 32.5 G/DL (ref 31.5–36.5)
MCV RBC AUTO: 97 FL (ref 78–100)
MONOCYTES # BLD AUTO: 1.3 10E3/UL (ref 0–1.3)
MONOCYTES NFR BLD AUTO: 12 %
NEUTROPHILS # BLD AUTO: 6.7 10E3/UL (ref 1.6–8.3)
NEUTROPHILS NFR BLD AUTO: 57 %
NRBC # BLD AUTO: 0 10E3/UL
NRBC BLD AUTO-RTO: 0 /100
PLATELET # BLD AUTO: 618 10E3/UL (ref 150–450)
POTASSIUM SERPL-SCNC: 4.1 MMOL/L (ref 3.4–5.3)
RBC # BLD AUTO: 3.16 10E6/UL (ref 3.8–5.2)
SODIUM SERPL-SCNC: 138 MMOL/L (ref 136–145)
WBC # BLD AUTO: 11.6 10E3/UL (ref 4–11)

## 2023-09-13 PROCEDURE — 99495 TRANSJ CARE MGMT MOD F2F 14D: CPT | Performed by: STUDENT IN AN ORGANIZED HEALTH CARE EDUCATION/TRAINING PROGRAM

## 2023-09-13 PROCEDURE — 80048 BASIC METABOLIC PNL TOTAL CA: CPT | Performed by: STUDENT IN AN ORGANIZED HEALTH CARE EDUCATION/TRAINING PROGRAM

## 2023-09-13 PROCEDURE — 36415 COLL VENOUS BLD VENIPUNCTURE: CPT | Performed by: STUDENT IN AN ORGANIZED HEALTH CARE EDUCATION/TRAINING PROGRAM

## 2023-09-13 PROCEDURE — 85025 COMPLETE CBC W/AUTO DIFF WBC: CPT | Performed by: STUDENT IN AN ORGANIZED HEALTH CARE EDUCATION/TRAINING PROGRAM

## 2023-09-13 PROCEDURE — 83735 ASSAY OF MAGNESIUM: CPT | Performed by: STUDENT IN AN ORGANIZED HEALTH CARE EDUCATION/TRAINING PROGRAM

## 2023-09-13 ASSESSMENT — PAIN SCALES - GENERAL: PAINLEVEL: NO PAIN (0)

## 2023-09-13 NOTE — LETTER
September 20, 2023      Adrianna Pereira  [address] [address] [place] [id number]        Dear ,    We are writing to inform you of your test results.    Hemoglobin and platelets improving since surgery.  Glucose is elevated.       Resulted Orders   Magnesium   Result Value Ref Range    Magnesium 1.9 1.7 - 2.3 mg/dL   Basic metabolic panel  (Ca, Cl, CO2, Creat, Gluc, K, Na, BUN)   Result Value Ref Range    Sodium 138 136 - 145 mmol/L    Potassium 4.1 3.4 - 5.3 mmol/L    Chloride 101 98 - 107 mmol/L    Carbon Dioxide (CO2) 23 22 - 29 mmol/L    Anion Gap 14 7 - 15 mmol/L    Urea Nitrogen 19.2 8.0 - 23.0 mg/dL    Creatinine 0.55 0.51 - 0.95 mg/dL    Calcium 9.6 8.8 - 10.2 mg/dL    Glucose 143 (H) 70 - 99 mg/dL    GFR Estimate >90 >60 mL/min/1.73m2   CBC with platelets and differential   Result Value Ref Range    WBC Count 11.6 (H) 4.0 - 11.0 10e3/uL    RBC Count 3.16 (L) 3.80 - 5.20 10e6/uL    Hemoglobin 9.9 (L) 11.7 - 15.7 g/dL    Hematocrit 30.5 (L) 35.0 - 47.0 %    MCV 97 78 - 100 fL    MCH 31.3 26.5 - 33.0 pg    MCHC 32.5 31.5 - 36.5 g/dL    RDW 17.8 (H) 10.0 - 15.0 %    Platelet Count 618 (H) 150 - 450 10e3/uL    % Neutrophils 57 %    % Lymphocytes 20 %    % Monocytes 12 %    % Eosinophils 10 %    % Basophils 0 %    % Immature Granulocytes 1 %    NRBCs per 100 WBC 0 <1 /100    Absolute Neutrophils 6.7 1.6 - 8.3 10e3/uL    Absolute Lymphocytes 2.3 0.8 - 5.3 10e3/uL    Absolute Monocytes 1.3 0.0 - 1.3 10e3/uL    Absolute Eosinophils 1.1 (H) 0.0 - 0.7 10e3/uL    Absolute Basophils 0.1 0.0 - 0.2 10e3/uL    Absolute Immature Granulocytes 0.1 <=0.4 10e3/uL    Absolute NRBCs 0.0 10e3/uL       If you have any questions or concerns, please call the clinic at the number listed above.       Sincerely,      Sukumar Hendricks MD

## 2023-09-13 NOTE — PROGRESS NOTES
Assessment & Plan     Encounter to establish care  Patient needing to establish care with primary care provider and has generally been healthy prior to recent diagnosis.  Other history reviewed.  - CBC with platelets and differential  - Magnesium  - Basic metabolic panel  (Ca, Cl, CO2, Creat, Gluc, K, Na, BUN)  - CBC with platelets and differential  - Magnesium  - Basic metabolic panel  (Ca, Cl, CO2, Creat, Gluc, K, Na, BUN)    Squamous cell carcinoma of mouth (H)  SP partial glossectomy, segmental mandibulectomy, radical neck and chin dissection with split-thickness skin graft.  Patient following with ENT postoperatively.  Has had good healing without significant discharge now.  They have supplies for packing and wound dressing at home.  No surrounding erythema or evidence of infection at this time.  Home care following.  Upcoming follow-up with radiation oncology as well as oncology and ENT.  Patient in good spirits despite diagnosis and significant surgery as well as upcoming treatment.  Feels like she has good support at home and does not have further questions for me today.  Repeat labs as noted above.  Graft site appears to be healing well now.  Pain well controlled.    Postoperative anemia  Thrombocytosis  CBC done today with improvement of hemoglobin and decreased platelet count.    Tracheostomy in place (H)  Trach in place and doing well with this now.  Has had some difficulty getting used to it.  They have supplies at home.    S/P percutaneous endoscopic gastrostomy (PEG) tube placement (H)  At risk of malnutrition  Patient tolerating tube feeds at this time and does not have discomfort at tube site.  Has been communicating with dietitian.       MED REC REQUIRED  Post Medication Reconciliation Status:  Discharge medications reconciled and changed, see notes/orders      Sukumar Hendricks MD  [facility]    Valdez Rodas is a 62 year old, presenting for the following [facility]  "issues:  Hospital F/U        9/13/2023     2:04 PM   Additional Questions   Roomed by Ana SLATER   Accompanied by -Duane       [facility] Follow-up Visit:    Hospital/Nursing Home/IP Rehab Facility: [facility]  Date of Admission: 8/17/23  Date of Discharge: 9/1/23  Reason(s) for Admission:  SCC (squamous cell carcinoma of floor of mouth)  and postoperative care     Was your hospitalization related to COVID-19? No   Problems taking medications regularly:  None  Medication changes since discharge: None  Problems adhering to non-medication therapy:  None    Summary of hospitalization:  [facility] discharge summary reviewed  Diagnostic Tests/Treatments reviewed.  Follow up needed: ENT, radiation oncology, oncology, dietition   Other Healthcare Providers Involved in Patient s Care:         Homecare and Specialist appointment - ENT, rad onc, oncology  Update since discharge: stable.           Plan of care communicated with patient and family               Review of Systems   Constitutional, HEENT, cardiovascular, pulmonary, GI, , musculoskeletal, neuro, skin, endocrine and psych systems are negative, except as otherwise noted.      Objective    /64   Pulse 116   Temp 97.7  F (36.5  C) (Tympanic)   Resp 18   Ht 1.626 m (5' 4\")   Wt 48.5 kg (107 lb)   SpO2 98%   BMI 18.37 kg/m    Body mass index is 18.37 kg/m .  Physical Exam   GENERAL: healthy, alert and no distress  EYES: Eyes grossly normal to inspection, PERRL and conjunctivae and sclerae normal  HENT: Post surgical changes  NECK: Well healing flap with packing, no surrounding erythema or discharge. Tracheostomy in place  RESP: lungs clear to auscultation - no rales, rhonchi or wheezes  CV: regular rate and rhythm, normal S1 S2, no murmur, click or rub, no peripheral edema and peripheral pulses strong  ABDOMEN: soft, nontender, no hepatosplenomegaly, no masses and bowel sounds " normal, tube in place  MS: no gross musculoskeletal defects noted, no edema  SKIN: no suspicious lesions or rashes  NEURO: mentation intact and speech normal  PSYCH: mentation appears normal, affect normal/bright

## 2023-09-14 ENCOUNTER — PRE VISIT (OUTPATIENT)
Dept: ONCOLOGY | Facility: CLINIC | Age: 63
End: 2023-09-14
Payer: COMMERCIAL

## 2023-09-19 ENCOUNTER — TELEPHONE (OUTPATIENT)
Dept: FAMILY MEDICINE | Facility: CLINIC | Age: 63
End: 2023-09-19
Payer: COMMERCIAL

## 2023-09-19 NOTE — TELEPHONE ENCOUNTER
Reason for Call:  Form, our goal is to have forms completed with 72 hours, however, some forms may require a visit or additional information.    Type of letter, form or note:  Home Health Certification    Who is the form from?: Home care    Where did the form come from: form was faxed in    What clinic location was the form placed at?: [facility]    Where the form was placed: Given to MAINE Mazariegos    What number is listed as a contact on the form?: [phone]       Additional comments: Accurate    Call taken on 9/19/2023 at 12:35 PM by Maya Hernandez

## 2023-09-20 NOTE — PROGRESS NOTES
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  I would propose administering cisplatin 40 mg/m2 once weekly.  She understands and agrees.  General surgery consult for placement of implanted port for vascular access including supportive care as necessary  3.   Priority audiology consultation for baseline hearing screening and continued monitoring since she is at high risk for cisplatin induced ototoxicity  4.   We will add prealbumin to all of her weekly treatment labs to monitor her nutritional status  5.   Discussed in general terms natural history, management and prognosis of her CLL/SLL.  She does not require treatment now, it is not curable but can be managed for many years and sometimes for several decades.    Time with patient including review,  documentation, history, examination, coordination of care and counseling was 50 minutes.    History of present illness:  In July, 2023 the patient presented to Dr. Darien Thorne for history of previous CO2 laser ablation for premalignant oral cavity lesions and more recent swelling of the lower lip and chin.    7/3/2023 left mandibular alveolus biopsy revealed an invasive keratinizing moderately differential squamous cell carcinoma.  7/13/2023 PET/dedicated CT revealed a 4.4 x 3.7 x 3.2 cm anterior oral cavity mass invading into the mandible with a separate gluco-avid nodule on the right mandibular buccal mucosa and bilateral level 1B, level 2 and L3 metastatic lymphadenopathy without evidence of metastatic disease.    On 8/17/2023, she underwent segmental mandibulectomy, floor of mouth resection, anterior glossectomy, left fibular free flap, skin grafting, and tracheostomy revealing a pT4a, N3b tumor with positive left anterior lateral soft tissue margin, 11/98+ lymph nodes including STEFFEN and several bilateral lymph nodes consistent with involvement by low-grade B-cell neoplasm suggestive of CLL/SLL.    On 8/25/2023, head neck tumor conference recommended consideration of postoperative chemoradiation.  Postoperative course has been complicated by 8/24/2023 return to the operating room for debridement of multiple areas of skin necrosis and resuturing of neck incision and intraoral flap.    Past medical history:  Remarkable for past 1/2 ppd tobacco use (until 8/2023), former alcohol abuse for which she considers herself an alcoholic although she does not use alcohol now.    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      Medications:  Current Outpatient Medications   Medication    acetaminophen (TYLENOL) 325 MG  "tablet    chlorhexidine (PERIDEX) 0.12 % solution    mineral oil-hydrophilic petrolatum (AQUAPHOR) external ointment    ondansetron (ZOFRAN ODT) 4 MG ODT tab    polyethylene glycol (MIRALAX) 17 g packet    senna-docusate (SENOKOT-S/PERICOLACE) 8.6-50 MG tablet     No current facility-administered medications for this visit.       Allergies:  No Known Allergies    Review of systems:  Except as noted in the note above, the patient denies headaches, diplopia, hearing loss or dizziness; dyspnea, cough, hemoptysis, pleurisy; chest pain/pressure, palpitations, lightheadedness; anorexia, nausea, vomiting, abdominal pain, diarrhea, constipation, melena or rectal bleeding; dysuria, frequency,  blood in the urine; vaginal bleeding or discharge; fever, chills, sweats, hot flashes; tingling, numbness, loss of balance; insomnia, depression, anxiety.    Physical examination:  BP 99/57 (BP Location: Right arm, Patient Position: Sitting, Cuff Size: Adult Small)   Pulse 104   Temp 97.6  F (36.4  C) (Tympanic)   Resp 16   Ht 1.613 m (5' 3.5\")   Wt 47.6 kg (105 lb)   SpO2 98%   BMI 18.31 kg/m      The patient is alert and oriented. Adenopathy is absent in the neck, axilla, groin and supraclavicular fossa; Lungs are clear to auscultation and percussion without rales, wheezes or rubs; heart rhythm is regular, heart sounds are without murmurs or gallops; the abdomen is soft and flat without hepatosplenomegaly, masses, ascites or tenderness.; extremities and skin reveal no unusual skin lesions, joint swelling or edema;      Rai Castillo MD            "

## 2023-09-20 NOTE — TELEPHONE ENCOUNTER
MEDICAL RECORDS REQUEST   Radiation Oncology  [address] [place] [address]  Fax: [phone]          FUTURE VISIT INFORMATION                                                   Adrianna Pereira, : 1960 scheduled for future visit at [facility] Radiation Oncology    RECORDS REQUESTED FOR VISIT                                                     HEAD & NECK     OFFICE NOTE from medical oncologist Epic (Scheduled to see Dr. Rai Castillo on 23)    OFFICE NOTE from ENT [facility] 23: Dr. Darien Thorne  23: Dr. Tyshawn Dao   OPERATIVE/BIOPSY REPORTS [facility] 23: Excisional debridement of anterior neck down to strap musculature   7/3/23: Mandible Bx   MEDICATION LIST [facility]    LABS     PATHOLOGY REPORTS Report in Epic 23: [id number]   7/3/23: [id number]     ANYTHING RELATED TO DIAGNOSIS Epic    IMAGING (NEED IMAGES & REPORT)     IR PACS 23: IR Gastrostomy Tube   PET PACS 23: PET Eyes to Thighs

## 2023-09-20 NOTE — TELEPHONE ENCOUNTER
Medication reconciliation completed by RN. Form and chart forwarded to PCP for signatures.  Cait Henriquez RN

## 2023-09-22 ENCOUNTER — TELEPHONE (OUTPATIENT)
Dept: OTOLARYNGOLOGY | Facility: CLINIC | Age: 63
End: 2023-09-22
Payer: COMMERCIAL

## 2023-09-22 DIAGNOSIS — Z53.9 DIAGNOSIS NOT YET DEFINED: Primary | ICD-10-CM

## 2023-09-22 PROCEDURE — G0180 MD CERTIFICATION HHA PATIENT: HCPCS | Performed by: STUDENT IN AN ORGANIZED HEALTH CARE EDUCATION/TRAINING PROGRAM

## 2023-09-22 NOTE — TELEPHONE ENCOUNTER
[facility] Call Center    Phone Message    May a detailed message be left on voicemail: yes     Reason for Call: Other: Per pt's home health care nurse the pt's lower left leg is 45% fluffed. Please call  to discuss. Thank you     Action Taken: Message routed to:  [facility] ENT    Travel Screening: Not Applicable

## 2023-09-22 NOTE — TELEPHONE ENCOUNTER
Returned call to patient's spouse who indicates that over the last few days patient has felt a little more pain at jaw site and he noticed some swelling this morning. Spouse denies, redness at skin, worsening swelling, worsening pain, and fever.  Discussed that we can have patient return to clinic for exam if they would like. Spouse indicates that the home care nurse is coming out this afternoon and he will call me if he feels like things are not improving and they would like to return to clinic.     Discussed with spouse that if patient worsens over  the weekend and they are concerned they should proceed to ER. Advised that we are also happy to add patient to clinic with Dr. Thorne on Monday if needed. Spouse verbalized understanding.       La Flores, RN, BSN

## 2023-09-22 NOTE — TELEPHONE ENCOUNTER
Health Call Center    Phone Message    May a detailed message be left on voicemail: yes     Reason for Call: Symptoms or Concerns     If patient has red-flag symptoms, warm transfer to triage line    Current symptom or concern: Swelling, mild pain and heat at the site of sx    Symptoms have been present for:  4 day(s)    Has patient previously been seen for this? Yes    By : Dr. Thorne    Date: 09/07/23     Are there any new or worsening symptoms? Yes: There is now swelling with mild pain and the site is starting the get warm. Please call to discuss. Thank you    Action Taken: Message routed to:  [facility] ENT    Travel Screening: Not Applicable

## 2023-09-25 NOTE — TELEPHONE ENCOUNTER
Returned call to patient's spouse who indicates that home care nurse was worried about some sloughing from the free flap site. He sent the following picture for review:        Reviewed with patient's spouse that the surgical site is healing well. Advised patient that she should shower and let the warm soapy water run down over site. Indicated that this will help with some of the skin sloughing at site. Advised patient and spouse to call with further concerns. She will follow-up with Dr. Thorne as scheduled in October and call with further concerns.       La Flores, RN, BSN

## 2023-09-28 ENCOUNTER — OFFICE VISIT (OUTPATIENT)
Dept: RADIATION THERAPY | Facility: OUTPATIENT CENTER | Age: 63
End: 2023-09-28
Attending: OTOLARYNGOLOGY
Payer: COMMERCIAL

## 2023-09-28 ENCOUNTER — ONCOLOGY VISIT (OUTPATIENT)
Dept: ONCOLOGY | Facility: CLINIC | Age: 63
End: 2023-09-28
Attending: OTOLARYNGOLOGY
Payer: COMMERCIAL

## 2023-09-28 ENCOUNTER — PRE VISIT (OUTPATIENT)
Dept: RADIATION THERAPY | Facility: OUTPATIENT CENTER | Age: 63
End: 2023-09-28

## 2023-09-28 VITALS
WEIGHT: 105 LBS | SYSTOLIC BLOOD PRESSURE: 99 MMHG | HEIGHT: 64 IN | DIASTOLIC BLOOD PRESSURE: 57 MMHG | HEART RATE: 104 BPM | OXYGEN SATURATION: 98 % | TEMPERATURE: 97.6 F | BODY MASS INDEX: 17.93 KG/M2 | RESPIRATION RATE: 16 BRPM

## 2023-09-28 VITALS
WEIGHT: 106 LBS | RESPIRATION RATE: 18 BRPM | SYSTOLIC BLOOD PRESSURE: 95 MMHG | DIASTOLIC BLOOD PRESSURE: 62 MMHG | BODY MASS INDEX: 18.19 KG/M2 | OXYGEN SATURATION: 98 % | HEART RATE: 92 BPM

## 2023-09-28 DIAGNOSIS — C06.9 SQUAMOUS CELL CARCINOMA OF ORAL CAVITY (H): ICD-10-CM

## 2023-09-28 DIAGNOSIS — C06.9 SQUAMOUS CELL CARCINOMA OF ORAL CAVITY (H): Primary | ICD-10-CM

## 2023-09-28 PROCEDURE — 99213 OFFICE O/P EST LOW 20 MIN: CPT | Performed by: INTERNAL MEDICINE

## 2023-09-28 PROCEDURE — 99204 OFFICE O/P NEW MOD 45 MIN: CPT | Performed by: INTERNAL MEDICINE

## 2023-09-28 RX ORDER — EPINEPHRINE 1 MG/ML
0.3 INJECTION, SOLUTION, CONCENTRATE INTRAVENOUS EVERY 5 MIN PRN
Status: CANCELLED | OUTPATIENT
Start: 2023-10-18

## 2023-09-28 RX ORDER — LORAZEPAM 2 MG/ML
0.5 INJECTION INTRAMUSCULAR EVERY 4 HOURS PRN
Status: CANCELLED | OUTPATIENT
Start: 2023-11-15

## 2023-09-28 RX ORDER — ALBUTEROL SULFATE 0.83 MG/ML
2.5 SOLUTION RESPIRATORY (INHALATION)
Status: CANCELLED | OUTPATIENT
Start: 2023-11-15

## 2023-09-28 RX ORDER — MEPERIDINE HYDROCHLORIDE 25 MG/ML
25 INJECTION INTRAMUSCULAR; INTRAVENOUS; SUBCUTANEOUS EVERY 30 MIN PRN
Status: CANCELLED | OUTPATIENT
Start: 2023-10-18

## 2023-09-28 RX ORDER — ALBUTEROL SULFATE 0.83 MG/ML
2.5 SOLUTION RESPIRATORY (INHALATION)
Status: CANCELLED | OUTPATIENT
Start: 2023-11-01

## 2023-09-28 RX ORDER — HEPARIN SODIUM (PORCINE) LOCK FLUSH IV SOLN 100 UNIT/ML 100 UNIT/ML
5 SOLUTION INTRAVENOUS
Status: CANCELLED | OUTPATIENT
Start: 2023-10-18

## 2023-09-28 RX ORDER — METHYLPREDNISOLONE SODIUM SUCCINATE 125 MG/2ML
125 INJECTION, POWDER, LYOPHILIZED, FOR SOLUTION INTRAMUSCULAR; INTRAVENOUS
Status: CANCELLED
Start: 2023-11-15

## 2023-09-28 RX ORDER — ALBUTEROL SULFATE 90 UG/1
1-2 AEROSOL, METERED RESPIRATORY (INHALATION)
Status: CANCELLED
Start: 2023-11-08

## 2023-09-28 RX ORDER — METHYLPREDNISOLONE SODIUM SUCCINATE 125 MG/2ML
125 INJECTION, POWDER, LYOPHILIZED, FOR SOLUTION INTRAMUSCULAR; INTRAVENOUS
Status: CANCELLED
Start: 2023-11-08

## 2023-09-28 RX ORDER — HEPARIN SODIUM,PORCINE 10 UNIT/ML
5-20 VIAL (ML) INTRAVENOUS DAILY PRN
Status: CANCELLED | OUTPATIENT
Start: 2023-11-22

## 2023-09-28 RX ORDER — HEPARIN SODIUM,PORCINE 10 UNIT/ML
5-20 VIAL (ML) INTRAVENOUS DAILY PRN
Status: CANCELLED | OUTPATIENT
Start: 2023-11-15

## 2023-09-28 RX ORDER — DIPHENHYDRAMINE HYDROCHLORIDE 50 MG/ML
50 INJECTION INTRAMUSCULAR; INTRAVENOUS
Status: CANCELLED
Start: 2023-10-25

## 2023-09-28 RX ORDER — METHYLPREDNISOLONE SODIUM SUCCINATE 125 MG/2ML
125 INJECTION, POWDER, LYOPHILIZED, FOR SOLUTION INTRAMUSCULAR; INTRAVENOUS
Status: CANCELLED
Start: 2023-11-22

## 2023-09-28 RX ORDER — METHYLPREDNISOLONE SODIUM SUCCINATE 125 MG/2ML
125 INJECTION, POWDER, LYOPHILIZED, FOR SOLUTION INTRAMUSCULAR; INTRAVENOUS
Status: CANCELLED
Start: 2023-10-25

## 2023-09-28 RX ORDER — HEPARIN SODIUM (PORCINE) LOCK FLUSH IV SOLN 100 UNIT/ML 100 UNIT/ML
5 SOLUTION INTRAVENOUS
Status: CANCELLED | OUTPATIENT
Start: 2023-11-01

## 2023-09-28 RX ORDER — MEPERIDINE HYDROCHLORIDE 25 MG/ML
25 INJECTION INTRAMUSCULAR; INTRAVENOUS; SUBCUTANEOUS EVERY 30 MIN PRN
Status: CANCELLED | OUTPATIENT
Start: 2023-10-25

## 2023-09-28 RX ORDER — METHYLPREDNISOLONE SODIUM SUCCINATE 125 MG/2ML
125 INJECTION, POWDER, LYOPHILIZED, FOR SOLUTION INTRAMUSCULAR; INTRAVENOUS
Status: CANCELLED
Start: 2023-11-01

## 2023-09-28 RX ORDER — HEPARIN SODIUM (PORCINE) LOCK FLUSH IV SOLN 100 UNIT/ML 100 UNIT/ML
5 SOLUTION INTRAVENOUS
Status: CANCELLED | OUTPATIENT
Start: 2023-10-25

## 2023-09-28 RX ORDER — LORAZEPAM 2 MG/ML
0.5 INJECTION INTRAMUSCULAR EVERY 4 HOURS PRN
Status: CANCELLED | OUTPATIENT
Start: 2023-10-25

## 2023-09-28 RX ORDER — ALBUTEROL SULFATE 0.83 MG/ML
2.5 SOLUTION RESPIRATORY (INHALATION)
Status: CANCELLED | OUTPATIENT
Start: 2023-10-25

## 2023-09-28 RX ORDER — HEPARIN SODIUM (PORCINE) LOCK FLUSH IV SOLN 100 UNIT/ML 100 UNIT/ML
5 SOLUTION INTRAVENOUS
Status: CANCELLED | OUTPATIENT
Start: 2023-11-22

## 2023-09-28 RX ORDER — LORAZEPAM 2 MG/ML
0.5 INJECTION INTRAMUSCULAR EVERY 4 HOURS PRN
Status: CANCELLED | OUTPATIENT
Start: 2023-10-18

## 2023-09-28 RX ORDER — HEPARIN SODIUM,PORCINE 10 UNIT/ML
5-20 VIAL (ML) INTRAVENOUS DAILY PRN
Status: CANCELLED | OUTPATIENT
Start: 2023-11-08

## 2023-09-28 RX ORDER — HEPARIN SODIUM (PORCINE) LOCK FLUSH IV SOLN 100 UNIT/ML 100 UNIT/ML
5 SOLUTION INTRAVENOUS
Status: CANCELLED | OUTPATIENT
Start: 2023-11-08

## 2023-09-28 RX ORDER — EPINEPHRINE 1 MG/ML
0.3 INJECTION, SOLUTION, CONCENTRATE INTRAVENOUS EVERY 5 MIN PRN
Status: CANCELLED | OUTPATIENT
Start: 2023-10-25

## 2023-09-28 RX ORDER — LORAZEPAM 2 MG/ML
0.5 INJECTION INTRAMUSCULAR EVERY 4 HOURS PRN
Status: CANCELLED | OUTPATIENT
Start: 2023-11-22

## 2023-09-28 RX ORDER — DIPHENHYDRAMINE HYDROCHLORIDE 50 MG/ML
50 INJECTION INTRAMUSCULAR; INTRAVENOUS
Status: CANCELLED
Start: 2023-11-01

## 2023-09-28 RX ORDER — ALBUTEROL SULFATE 0.83 MG/ML
2.5 SOLUTION RESPIRATORY (INHALATION)
Status: CANCELLED | OUTPATIENT
Start: 2023-10-18

## 2023-09-28 RX ORDER — METHYLPREDNISOLONE SODIUM SUCCINATE 125 MG/2ML
125 INJECTION, POWDER, LYOPHILIZED, FOR SOLUTION INTRAMUSCULAR; INTRAVENOUS
Status: CANCELLED
Start: 2023-10-18

## 2023-09-28 RX ORDER — EPINEPHRINE 1 MG/ML
0.3 INJECTION, SOLUTION, CONCENTRATE INTRAVENOUS EVERY 5 MIN PRN
Status: CANCELLED | OUTPATIENT
Start: 2023-11-01

## 2023-09-28 RX ORDER — LORAZEPAM 2 MG/ML
0.5 INJECTION INTRAMUSCULAR EVERY 4 HOURS PRN
Status: CANCELLED | OUTPATIENT
Start: 2023-11-01

## 2023-09-28 RX ORDER — ALBUTEROL SULFATE 90 UG/1
1-2 AEROSOL, METERED RESPIRATORY (INHALATION)
Status: CANCELLED
Start: 2023-11-01

## 2023-09-28 RX ORDER — EPINEPHRINE 1 MG/ML
0.3 INJECTION, SOLUTION, CONCENTRATE INTRAVENOUS EVERY 5 MIN PRN
Status: CANCELLED | OUTPATIENT
Start: 2023-11-15

## 2023-09-28 RX ORDER — HEPARIN SODIUM (PORCINE) LOCK FLUSH IV SOLN 100 UNIT/ML 100 UNIT/ML
5 SOLUTION INTRAVENOUS
Status: CANCELLED | OUTPATIENT
Start: 2023-11-15

## 2023-09-28 RX ORDER — HEPARIN SODIUM,PORCINE 10 UNIT/ML
5-20 VIAL (ML) INTRAVENOUS DAILY PRN
Status: CANCELLED | OUTPATIENT
Start: 2023-10-18

## 2023-09-28 RX ORDER — HEPARIN SODIUM,PORCINE 10 UNIT/ML
5-20 VIAL (ML) INTRAVENOUS DAILY PRN
Status: CANCELLED | OUTPATIENT
Start: 2023-10-25

## 2023-09-28 RX ORDER — HEPARIN SODIUM,PORCINE 10 UNIT/ML
5-20 VIAL (ML) INTRAVENOUS DAILY PRN
Status: CANCELLED | OUTPATIENT
Start: 2023-11-01

## 2023-09-28 RX ORDER — PALONOSETRON 0.05 MG/ML
0.25 INJECTION, SOLUTION INTRAVENOUS ONCE
Status: CANCELLED | OUTPATIENT
Start: 2023-11-08

## 2023-09-28 RX ORDER — ALBUTEROL SULFATE 0.83 MG/ML
2.5 SOLUTION RESPIRATORY (INHALATION)
Status: CANCELLED | OUTPATIENT
Start: 2023-11-08

## 2023-09-28 RX ORDER — MEPERIDINE HYDROCHLORIDE 25 MG/ML
25 INJECTION INTRAMUSCULAR; INTRAVENOUS; SUBCUTANEOUS EVERY 30 MIN PRN
Status: CANCELLED | OUTPATIENT
Start: 2023-11-01

## 2023-09-28 RX ORDER — LORAZEPAM 2 MG/ML
0.5 INJECTION INTRAMUSCULAR EVERY 4 HOURS PRN
Status: CANCELLED | OUTPATIENT
Start: 2023-11-08

## 2023-09-28 RX ORDER — PALONOSETRON 0.05 MG/ML
0.25 INJECTION, SOLUTION INTRAVENOUS ONCE
Status: CANCELLED | OUTPATIENT
Start: 2023-10-18

## 2023-09-28 RX ORDER — MEPERIDINE HYDROCHLORIDE 25 MG/ML
25 INJECTION INTRAMUSCULAR; INTRAVENOUS; SUBCUTANEOUS EVERY 30 MIN PRN
Status: CANCELLED | OUTPATIENT
Start: 2023-11-15

## 2023-09-28 RX ORDER — PALONOSETRON 0.05 MG/ML
0.25 INJECTION, SOLUTION INTRAVENOUS ONCE
Status: CANCELLED | OUTPATIENT
Start: 2023-11-22

## 2023-09-28 RX ORDER — ALBUTEROL SULFATE 0.83 MG/ML
2.5 SOLUTION RESPIRATORY (INHALATION)
Status: CANCELLED | OUTPATIENT
Start: 2023-11-22

## 2023-09-28 RX ORDER — ALBUTEROL SULFATE 90 UG/1
1-2 AEROSOL, METERED RESPIRATORY (INHALATION)
Status: CANCELLED
Start: 2023-10-25

## 2023-09-28 RX ORDER — MEPERIDINE HYDROCHLORIDE 25 MG/ML
25 INJECTION INTRAMUSCULAR; INTRAVENOUS; SUBCUTANEOUS EVERY 30 MIN PRN
Status: CANCELLED | OUTPATIENT
Start: 2023-11-08

## 2023-09-28 RX ORDER — DIPHENHYDRAMINE HYDROCHLORIDE 50 MG/ML
50 INJECTION INTRAMUSCULAR; INTRAVENOUS
Status: CANCELLED
Start: 2023-11-08

## 2023-09-28 RX ORDER — DIPHENHYDRAMINE HYDROCHLORIDE 50 MG/ML
50 INJECTION INTRAMUSCULAR; INTRAVENOUS
Status: CANCELLED
Start: 2023-10-18

## 2023-09-28 RX ORDER — DIPHENHYDRAMINE HYDROCHLORIDE 50 MG/ML
50 INJECTION INTRAMUSCULAR; INTRAVENOUS
Status: CANCELLED
Start: 2023-11-15

## 2023-09-28 RX ORDER — PALONOSETRON 0.05 MG/ML
0.25 INJECTION, SOLUTION INTRAVENOUS ONCE
Status: CANCELLED | OUTPATIENT
Start: 2023-11-01

## 2023-09-28 RX ORDER — MEPERIDINE HYDROCHLORIDE 25 MG/ML
25 INJECTION INTRAMUSCULAR; INTRAVENOUS; SUBCUTANEOUS EVERY 30 MIN PRN
Status: CANCELLED | OUTPATIENT
Start: 2023-11-22

## 2023-09-28 RX ORDER — EPINEPHRINE 1 MG/ML
0.3 INJECTION, SOLUTION, CONCENTRATE INTRAVENOUS EVERY 5 MIN PRN
Status: CANCELLED | OUTPATIENT
Start: 2023-11-08

## 2023-09-28 RX ORDER — ALBUTEROL SULFATE 90 UG/1
1-2 AEROSOL, METERED RESPIRATORY (INHALATION)
Status: CANCELLED
Start: 2023-10-18

## 2023-09-28 RX ORDER — PALONOSETRON 0.05 MG/ML
0.25 INJECTION, SOLUTION INTRAVENOUS ONCE
Status: CANCELLED | OUTPATIENT
Start: 2023-10-25

## 2023-09-28 RX ORDER — EPINEPHRINE 1 MG/ML
0.3 INJECTION, SOLUTION, CONCENTRATE INTRAVENOUS EVERY 5 MIN PRN
Status: CANCELLED | OUTPATIENT
Start: 2023-11-22

## 2023-09-28 RX ORDER — PALONOSETRON 0.05 MG/ML
0.25 INJECTION, SOLUTION INTRAVENOUS ONCE
Status: CANCELLED | OUTPATIENT
Start: 2023-11-15

## 2023-09-28 RX ORDER — ALBUTEROL SULFATE 90 UG/1
1-2 AEROSOL, METERED RESPIRATORY (INHALATION)
Status: CANCELLED
Start: 2023-11-22

## 2023-09-28 RX ORDER — ALBUTEROL SULFATE 90 UG/1
1-2 AEROSOL, METERED RESPIRATORY (INHALATION)
Status: CANCELLED
Start: 2023-11-15

## 2023-09-28 RX ORDER — DIPHENHYDRAMINE HYDROCHLORIDE 50 MG/ML
50 INJECTION INTRAMUSCULAR; INTRAVENOUS
Status: CANCELLED
Start: 2023-11-22

## 2023-09-28 ASSESSMENT — PAIN SCALES - GENERAL: PAINLEVEL: NO PAIN (0)

## 2023-09-28 NOTE — PROGRESS NOTES
"Oncology Rooming Note    September 28, 2023 1:54 PM   Adrianna Pereira is a 63 year old female who presents for:    Chief Complaint   Patient presents with    Oncology Clinic Visit     Squamous cell carcinoma of oral cavity - New consult     Initial Vitals: BP 99/57 (BP Location: Right arm, Patient Position: Sitting, Cuff Size: Adult Small)   Pulse 104   Temp 97.6  F (36.4  C) (Tympanic)   Resp 16   Ht 1.613 m (5' 3.5\")   Wt 47.6 kg (105 lb)   SpO2 98%   BMI 18.31 kg/m   Estimated body mass index is 18.31 kg/m  as calculated from the following:    Height as of this encounter: 1.613 m (5' 3.5\").    Weight as of this encounter: 47.6 kg (105 lb). Body surface area is 1.46 meters squared.  No Pain (0) Comment: Data Unavailable   No LMP recorded. Patient is postmenopausal.  Allergies reviewed: Yes  Medications reviewed: Yes    Medications: Medication refills not needed today.  Pharmacy name entered into EPIC:    [id number] - [place], [place] - [address] [place] [address] PHARMACY - [place], [place] - [facility] [address]    Clinical concerns:  New consult      Tanya Philippe CMA              "

## 2023-09-28 NOTE — PATIENT INSTRUCTIONS
General surgery referral for placement of implanted port  Audiology referral for hearing assessment and monitoring during treatment for chemotherapy-induced hearing loss  Follow-up Dr. Castillo during first week of chemotherapy/radiation

## 2023-09-28 NOTE — LETTER
2023         RE: Adrianna Pereira  [id number] [address] [place] [id number]        Dear Colleague,    Thank you for referring your patient, Adrianna Pereira, to the [facility]. Please see a copy of my visit note below.       Department of Radiation Oncology  Radiation Therapy Center  [facility]  [address], [address]  [place] [address]  [phone]       Consultation Note    Name: Adrianna Pereira MRN: [id number]   : 1960   Date of Service: 2023  Referring: Dr. Dao and Dr. 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  Final pathology demonstrated squamous cell carcinoma moderately differentiated, 4.1 cm in size originating from  floor mouth/ anterior tongue,  depth of invasion 29 mm, no LVSI, positive PNI, positive margin (left anterior lateral).  11 of 98 lymph nodes positive (4 cm largest deposit, positive extracapsular disease present), pathologic T4N3b.  Evaluate potential role for adjuvant radiation therapy.    History of Present Illness   Ms. Pereira is a 63 year old female  with diagnosis of oral cavity cancer.    The patient has a history of prior CO2 laser ablation for premalignant lesions in the oral cavity.  More recently the patient was noted to have firmness and swelling in the lower lip/chin region.  Biopsy was performed and demonstrated premalignant epithelial dysplasia.  The patient was seen by Dr. Thorne on 7/3/2023.  Physical exam demonstrated an ulcerative lesion involving the mandibular alveolus, floor mouth, and ventral tongue.  Biopsy of left mandibular alveolus demonstrated squamous cell carcinoma, moderately differentiated.   PET scan obtained on 2023 demonstrated a 4.4 cm anterior oral cavity mass with invasion into the mandible.  There were bilateral noted lymphadenopathy located in  levels 1B,  2 and 3.  On 8/17/2023 the patient underwent segmental mandibulectomy, anterior glossectomy, and bilateral neck dissection with fibular free flap reconstruction by Dr. Dao and Dr. Thorne.  Intraoperative findings demonstrated an ulcerative   tumor of the midline floor of mouth and ventral tongue with extension to the skin of the chin. Final pathology demonstrated Final pathology demonstrated squamous cell carcinoma moderately differentiated, 4.1 cm in size originating from  floor mouth/ anterior tongue,  depth of invasion 29 mm, no LVSI, positive PNI, positive margin (left anterior lateral).  11 of 98 lymph nodes positive (4 cm largest deposit, positive extracapsular disease present), pathologic T4N3b.  Postoperative course was complicated by multiple areas of skin necrosis for debridement including on 8/24/2023 and debridement of the anterior neck on 8/29/2023.   The patient's case was discussed at head neck tumor board where reresection for positive margin was discussed.  However this would require full resection of the lower lip and recommendation was to proceed with adjuvant chemoradiation therapy.  Patient presents today discuss next  steps in management.    The patient is doing okay. She noted healing in anterior neck wound. Continues to pack. Nutrition all through PEG. Has tracheostomy in place. Tylenol for pain.     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)     Tobacco dependence syndrome        Past Surgical History:   Past Surgical History:   Procedure Laterality Date    DISSECTION RADICAL NECK MODIFIED Bilateral 8/17/2023    Procedure: Bilateral modified radical neck dissection;  Surgeon: Tyshawn Dao MD;  Location: [facility] OR    ENT SURGERY      oral biopsy    EXCISE LESION LIP N/A 8/17/2023    Procedure: Chin resection;  Surgeon: Tyshawn Dao MD;  Location: [facility] OR    GLOSSECTOMY  PARTIAL N/A 8/17/2023    Procedure: GLOSSECTOMY, PARTIAL;  Surgeon: Tyshawn Dao MD;  Location: [facility]    GRAFT BONE FREE VASCULARIZED FROM LOWER EXTREMITY Left 8/17/2023    Procedure: Left fibula free flap;  Surgeon: Darien Thorne MD;  Location: [facility]    GRAFT FREE VASCULARIZED (LOCATION) Left 8/17/2023    Procedure: anterolateral thigh free flap;  Surgeon: Darien Thorne MD;  Location: [facility]    GRAFT SKIN SPLIT THICKNESS FROM EXTREMITY Left 8/17/2023    Procedure: Split thickness skin graft from left thigh;  Surgeon: Darien Thorne MD;  Location: [facility]    GYN SURGERY      tubal ligation    IR GASTROSTOMY TUBE PERCUTANEOUS PLCMNT  8/23/2023    IRRIGATION AND DEBRIDEMENT ORAL, COMBINED N/A 8/24/2023    Procedure: EXAM UNDER ANESTHESIA, ORAL CAVITY, IRRIGATION AND DEBRIDEMENT, ORAL CAVITY, neck dissection;  Surgeon: Darien Thorne MD;  Location: [facility]    IRRIGATION AND DEBRIDEMENT ORAL, COMBINED N/A 8/29/2023    Procedure: EXCISIONAL DEBRIDEMENT NECK, IRRIGATION OF ORAL CAVITY;  Surgeon: Darien Thorne MD;  Location: [facility]    MANDIBULECTOMY TOTAL N/A 8/17/2023    Procedure: segmental mandibulectomy;  Surgeon: Tyshawn Dao MD;  Location: [facility]    TRACHEOSTOMY N/A 8/17/2023    Procedure: Tracheostomy placement, nasogastric tube placement;  Surgeon: Tyshawn Dao MD;  Location: [facility]       Chemotherapy History:  None    Radiation History:  None    Pregnant: Not Applicable  Implanted Cardiac Devices: No    Medications:  Current Outpatient Medications   Medication    acetaminophen (TYLENOL) 325 MG tablet    chlorhexidine (PERIDEX) 0.12 % solution    mineral oil-hydrophilic petrolatum (AQUAPHOR) external ointment    ondansetron (ZOFRAN ODT) 4 MG ODT tab    polyethylene glycol (MIRALAX) 17 g packet    senna-docusate (SENOKOT-S/PERICOLACE) 8.6-50 MG tablet     No current facility-administered medications for this visit.         Allergies:   No Known  Allergies        Family History:  No family history on file.    Review of Systems   A 10-point review of systems was performed. Pertinent findings are noted in the HPI.    Physical Exam   ECOG Status: 1    Vitals:  There were no vitals taken for this visit.    Gen: Alert, in NAD  HEENT: S/p segmental mandibulectomy and partial glossectomy with reconstruction   Neck: + s/p neck dissection bilaterally, anterior neck wound open and packing in place  Pulm: +S/p trach placement  CV: Extremities are warm and well-perfused, no cyanosis, no pedal edema  Abdominal: S/p PEG  Musculoskeletal:   Skin: Normal color and turgor  Neuro: A/Ox3, CN II-XII intact, normal gait    Imaging/Path/Labs   Imagin23  PET/CT    Findings: Limited imaging for stereotactic localization. There is a  4.0 x 4.9 x 3.3 cm heterogeneously, but predominantly peripherally  enhancing oral cavity mass centered at the central/midline mandible  with associated invasion and with extension/involvement of the ventral  tongue and chin subcutaneous tissue. Tumor approaches the skin. There  also appears to be a separate nodular enhancing component involving  the right buccal tissues on series 3 image 115. There are multiple  enlarged and potentially partially necrotic lymph nodes bilaterally in  the level 1B, 2 and 3 stations.     The mucosal space demonstrates no evident abnormality in the  nasopharynx, hypopharynx or the glottis. The major salivary glands  appear unremarkable. The thyroid gland appears normal.     Mild stress atherosclerosis at the carotid bulbs without significant  stenosis.     No overt spinal canal stenosis. There are moderate to severe neural  foraminal stenosis at C5-6 and C6-7. The visualized paranasal sinuses  are clear. Hypoplastic right maxillary sinus. Small mastoid effusions.     Mild centrilobular and paraseptal emphysematous changes.                                                                       Impression:   1.  Redemonstration of oral cavity mass centered at the central  mandible with involvement of the ventral tongue and chin subcutaneous  tissue.  2. Bilateral level 1B, 2, and 3 metastatic lymph nodes..   3. Limited imaging performed primarily for the purposes of  stereotactic localization.         Path:  8/17/23  A. LYMPH NODE, LEFT LEVEL 1B:  - METASTATIC SQUAMOUS CELL CARCINOMA TO THREE OUT OF FOUR LYMPH NODES (3/4)  - Tumor size: at least 1.5 cm  - No definite extranodal extension is identified  - Benign salivary gland tissue with ductal ectasia  - Partial involvement by low-grade B-cell lymphoproliferative disorder (See comment)     B. LYMPH NODE, LEVEL 1A:  - Three lymph nodes, negative for carcinoma (0/3)  - Suspicious for involvement by low-grade lymphoproliferative disorder     C. LYMPH NODE, RIGHT LEVEL 1B:  - METASTATIC SQUAMOUS CELL CARCINOMA TO ONE OUT OF ONE LYMPH NODE (1/1)  - Tumor size: at least 1.5 cm  - Extranodal extension is identified (?2mm)  - Benign salivary gland tissue with ductal ectasia  - No definitive morphologic evidence of a lymphoproliferative disorder     D. LEFT MENTAL NERVE:  - Negative for carcinoma     E. ORAL CAVITY, SEGMENTAL MANDIBULECTOMY AND ANTERIOR GLOSSECTOMY:  - ULCERATED KERATINIZING SQUAMOUS CELL CARCINOMA, MODERATELY DIFFERENTIATED  - Tumor involves floor of mouth, tongue and cortical bone  - Tumor size:  4.1 cm DOI: 29 mm  - No definitive for lymphovascular invasion   - Perineural invasion is present, small nerves, focal  - Left anterolateral soft tissue resection margin (1-3 o'clock) is positive for carcinoma  - All other margins are negative for high grade dysplasia/carcinoma  - AJCC Pathologic Stage: pT4a, N3b     F. RIGHT MARROW MARGIN:  - Negative for carcinoma  - No morphologic evidence of a lymphoproliferative disorder     G. LEFT MARROW MARGIN:  - Bone marrow involved by low-grade B-cell lymphoproliferative disorder (See comment)     H. LYMPH NODE, RIGHT LEVEL  2A:  - METASTATIC SQUAMOUS CELL CARCINOMA TO FIVE OUT OF TWELVE LYMPH NODE (5/12)  - Tumor size: 0.8 cm in greatest dimension  - Extranodal extension is identified (> 2mm)  - Perineural invasion is present  - Benign salivary gland tissue with ductal ectasia  - Suspicious for involvement by low-grade lymphoproliferative disorder     I. LYMPH NODE, RIGHT LEVEL 3:  - Five lymph nodes, negative for carcinoma (0/5)  - Suspicious for involvement by low-grade lymphoproliferative disorder     J. LYMPH NODE, RIGHT LEVEL 4:  - Twelve lymph nodes, negative for carcinoma (0/12)  - Thyroid tissue present in one lymph node (See comment)  - Partial involvement by low-grade B-cell lymphoproliferative disorder (See comment)     K. LYMPH NODE, RIGHT LEVEL 2B:  - Four lymph nodes, negative for carcinoma (0/4)  - Suspicious for involvement by low-grade lymphoproliferative disorder     L. LYMPH NODE, LEFT LEVEL 2A:  - METASTATIC SQUAMOUS CELL CARCINOMA TO FIVE OUT OF TWELVE LYMPH NODE (2/14)  - Tumor size: 4 cm (matted lymph nodes)  - Extranodal extension is identified (> 2mm)  - Suspicious for involvement by low-grade lymphoproliferative disorder     M. LYMPH NODE, LEFT LEVEL 3:  - Seventeen lymph nodes, negative for carcinoma (0/17)  - No definitive morphologic evidence of a lymphoproliferative disorder     N. LYMPH NODE,LEFT LEVEL 4:  - Sixteen lymph nodes, negative for carcinoma (0/16)  - No definitive morphologic evidence of a lymphoproliferative disorder     O. LYMPH NODE, LEFT LEVEL 2B:  - Ten lymph nodes, negative for carcinoma (0/10)  - Suspicious for involvement by low-grade lymphoproliferative disorder   Electronically signed by Autumn Morfin MD on 8/25/2023 at  7:50 AM         Comments:   This is an appended report. These results have been appended to a previously preliminary verified report.      Comment   [facility]   Part J. There are bland appearing subcapsular thyroid follicles involving one lymph node. Although  no papillary structures or nuclear features of papillary thyroid carcinoma are identified, metastatic thyroid carcinoma cannot be entirely excluded. Clinical and imaging correlation is highly recommended.     Many of the lymph nodes in this biopsy, along with the bone marrow present in part G, are involved by a low-grade CD5-positive B cell lymphoproliferative disorder. The involved lymph nodes are fairly small, only partially involved, and proliferation centers are not readily identified - thus, the differential diagnosis for this process includes both small lymphocytic lymphoma/chronic lymphocytic leukemia (SLL/CLL) and tissue-based monoclonal B-cell lymphocytosis (MBL). Mantle cell lymphoma is excluded based on negativity for cyclin D1 and SOX11 by immunohistochemistry, and there is no morphologic evidence of large cell transformation in these specimens.      Further evaluation is recommended, to include: (1) CBC with white blood cell differential; (2) peripheral blood flow cytometry; and (3) correlation with staging, including imaging.        SPECIMEN   Procedure  Glossectomy: Anterior     Mandibulectomy: Segmental   TUMOR   Tumor Focality  Unifocal   Tumor Site  Oral cavity   Tumor Subsite  Anterior two-thirds of tongue     Floor of mouth   Tumor Laterality  Midline   Tumor Size  Greatest Dimension (Centimeters): 4.1 cm   Histologic Type  Squamous cell carcinoma, conventional   Histologic Grade  G2, moderately differentiated   Tumor Depth of Invasion (DOI)  29 mm   Lymphovascular Invasion  Not identified   Perineural Invasion  Present   Extent of Perineural Invasion  focal   Worst Pattern of Invasion (WPOI)  WPOI 1-4   MARGINS   Specimen Margin Status for Invasive Tumor  Invasive tumor present at specimen margin   Specimen Margin(s) Involved by Invasive Tumor  Left anterolateral   Specimen Margin Status for Noninvasive Tumor  All specimen margins negative for high-grade dysplasia / in situ disease   Distance  from Noninvasive Tumor to Closest Specimen Margin  2 mm   Closest Specimen Margin(s) to Noninvasive Tumor  left lateral   REGIONAL LYMPH NODES   Regional Lymph Node Status  Tumor present in regional lymph node(s)   Number of Lymph Nodes with Tumor  11   Laterality of Lymph Node(s) with Tumor  Bilateral   Size of Largest Shameka Metastatic Deposit  4 cm   Extranodal Extension (STEFFEN)  Present   Distance of STEFFEN from Lymph Node Capsule  Greater than 2 mm (macroscopic STEFFEN)   Number of Lymph Nodes Examined  98   PATHOLOGIC STAGE CLASSIFICATION (pTNM, AJCC 8th Edition)   Reporting of pT, pN, and (when applicable) pM categories is based on information available to the pathologist at the time the report is issued. As per the AJCC (Chapter 1, 8th Ed.) it is the managing physician s responsibility to establish the final pathologic stage based upon all pertinent information, including but potentially not limited to this pathology report.        pT Category  pT4a   pN Category  pN3b   .       Assessment    Ms. 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  Final pathology demonstrated squamous cell carcinoma moderately differentiated, 4.1 cm in size originating from  floor mouth/ anterior tongue,  depth of invasion 29 mm, no LVSI, positive PNI, positive margin (left anterior lateral).  11 of 98 lymph nodes positive (4 cm largest deposit, positive extracapsular disease present), pathologic T4N3b.  Evaluate potential role for adjuvant radiation therapy.    Plan     1.  I discussed the natural history of oral cavity cancers.     2.  I discussed with the patient that she has several intermediate and high risk features for local recurrence, and as such agree with the recommendation to proceed with adjuvant chemo-radiation therapy.      3. We recommend a total dose of 63-66Gy in 33 fractions  with concurrent chemotherapy.      4. The patient will meet with medical oncology team (Dr. Castillo) to discuss systemic therapy plans.     5. The risks from radiation therapy including, but not limited to skin desquamation, fatigue, dry mouth, change in taste, mouth ulcers, hypothyroidism, painful and difficulty swallowing, dental caries, swelling, brachial plexopathy, carotid artery damage, spinal cord damage, bone and soft tissue necrosis, voice change, vision loss, hearing loss, and secondary malignancies were explained to the patient. The patient has acknowledged the risks and consented to therapy.     6. The patient's postoperative course has been complicated by wound complications  and she is status post debridement x2.  Current neck wound remains open and is undergoing packing.  We will tentatively schedule her for CT simulation in the next 1 to 2 weeks to allow for more wound healing to occur.  We will also await clearance from ENT team for proceeding with radiation.      Hernán Egan MD  Department of Radiation Oncology  [facility]

## 2023-09-28 NOTE — PROGRESS NOTES
Department of Radiation Oncology  Radiation Therapy Center  [facility]  [address], [address]  [place] [address]  [phone]       Consultation Note    Name: Adrianna Pereira MRN: [id number]   : 1960   Date of Service: 2023  Referring: Dr. Dao and Dr. 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  Final pathology demonstrated squamous cell carcinoma moderately differentiated, 4.1 cm in size originating from  floor mouth/ anterior tongue,  depth of invasion 29 mm, no LVSI, positive PNI, positive margin (left anterior lateral).  11 of 98 lymph nodes positive (4 cm largest deposit, positive extracapsular disease present), pathologic T4N3b.  Evaluate potential role for adjuvant radiation therapy.    History of Present Illness   Ms. Pereira is a 63 year old female  with diagnosis of oral cavity cancer.    The patient has a history of prior CO2 laser ablation for premalignant lesions in the oral cavity.  More recently the patient was noted to have firmness and swelling in the lower lip/chin region.  Biopsy was performed and demonstrated premalignant epithelial dysplasia.  The patient was seen by Dr. Thorne on 7/3/2023.  Physical exam demonstrated an ulcerative lesion involving the mandibular alveolus, floor mouth, and ventral tongue.  Biopsy of left mandibular alveolus demonstrated squamous cell carcinoma, moderately differentiated.   PET scan obtained on 2023 demonstrated a 4.4 cm anterior oral cavity mass with invasion into the mandible.  There were bilateral noted lymphadenopathy located in levels 1B,  2 and 3.  On 2023 the patient underwent segmental mandibulectomy, anterior glossectomy, and bilateral neck dissection with fibular free flap reconstruction by Dr. Dao and Dr. Thorne.  Intraoperative findings  demonstrated an ulcerative   tumor of the midline floor of mouth and ventral tongue with extension to the skin of the chin. Final pathology demonstrated Final pathology demonstrated squamous cell carcinoma moderately differentiated, 4.1 cm in size originating from  floor mouth/ anterior tongue,  depth of invasion 29 mm, no LVSI, positive PNI, positive margin (left anterior lateral).  11 of 98 lymph nodes positive (4 cm largest deposit, positive extracapsular disease present), pathologic T4N3b.  Postoperative course was complicated by multiple areas of skin necrosis for debridement including on 8/24/2023 and debridement of the anterior neck on 8/29/2023.   The patient's case was discussed at head neck tumor board where reresection for positive margin was discussed.  However this would require full resection of the lower lip and recommendation was to proceed with adjuvant chemoradiation therapy.  Patient presents today discuss next  steps in management.    The patient is doing okay. She noted healing in anterior neck wound. Continues to pack. Nutrition all through PEG. Has tracheostomy in place. Tylenol for pain.     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)     Tobacco dependence syndrome        Past Surgical History:   Past Surgical History:   Procedure Laterality Date    DISSECTION RADICAL NECK MODIFIED Bilateral 8/17/2023    Procedure: Bilateral modified radical neck dissection;  Surgeon: Tyshawn Dao MD;  Location: [facility] OR    ENT SURGERY      oral biopsy    EXCISE LESION LIP N/A 8/17/2023    Procedure: Chin resection;  Surgeon: Tyshawn Dao MD;  Location: [facility]    GLOSSECTOMY PARTIAL N/A 8/17/2023    Procedure: GLOSSECTOMY, PARTIAL;  Surgeon: Tyshawn Dao MD;  Location: [facility]    GRAFT BONE FREE VASCULARIZED FROM LOWER EXTREMITY Left 8/17/2023    Procedure: Left fibula free flap;  Surgeon: Mati  Darien Simons MD;  Location: [facility]    GRAFT FREE VASCULARIZED (LOCATION) Left 8/17/2023    Procedure: anterolateral thigh free flap;  Surgeon: Darien Thorne MD;  Location: [facility]    GRAFT SKIN SPLIT THICKNESS FROM EXTREMITY Left 8/17/2023    Procedure: Split thickness skin graft from left thigh;  Surgeon: Darien Thorne MD;  Location: [facility]    GYN SURGERY      tubal ligation    IR GASTROSTOMY TUBE PERCUTANEOUS PLCMNT  8/23/2023    IRRIGATION AND DEBRIDEMENT ORAL, COMBINED N/A 8/24/2023    Procedure: EXAM UNDER ANESTHESIA, ORAL CAVITY, IRRIGATION AND DEBRIDEMENT, ORAL CAVITY, neck dissection;  Surgeon: Darien Thorne MD;  Location: [facility]    IRRIGATION AND DEBRIDEMENT ORAL, COMBINED N/A 8/29/2023    Procedure: EXCISIONAL DEBRIDEMENT NECK, IRRIGATION OF ORAL CAVITY;  Surgeon: Darien Thorne MD;  Location: [facility]    MANDIBULECTOMY TOTAL N/A 8/17/2023    Procedure: segmental mandibulectomy;  Surgeon: Tyshawn Dao MD;  Location: [facility]    TRACHEOSTOMY N/A 8/17/2023    Procedure: Tracheostomy placement, nasogastric tube placement;  Surgeon: Tyshawn Dao MD;  Location: [facility]       Chemotherapy History:  None    Radiation History:  None    Pregnant: Not Applicable  Implanted Cardiac Devices: No    Medications:  Current Outpatient Medications   Medication    acetaminophen (TYLENOL) 325 MG tablet    chlorhexidine (PERIDEX) 0.12 % solution    mineral oil-hydrophilic petrolatum (AQUAPHOR) external ointment    ondansetron (ZOFRAN ODT) 4 MG ODT tab    polyethylene glycol (MIRALAX) 17 g packet    senna-docusate (SENOKOT-S/PERICOLACE) 8.6-50 MG tablet     No current facility-administered medications for this visit.         Allergies:   No Known Allergies        Family History:  No family history on file.    Review of Systems   A 10-point review of systems was performed. Pertinent findings are noted in the HPI.    Physical Exam   ECOG Status: 1    Vitals:  There were no vitals taken for  this visit.    Gen: Alert, in NAD  HEENT: S/p segmental mandibulectomy and partial glossectomy with reconstruction   Neck: + s/p neck dissection bilaterally, anterior neck wound open and packing in place  Pulm: +S/p trach placement  CV: Extremities are warm and well-perfused, no cyanosis, no pedal edema  Abdominal: S/p PEG  Musculoskeletal:   Skin: Normal color and turgor  Neuro: A/Ox3, CN II-XII intact, normal gait    Imaging/Path/Labs   Imagin23  PET/CT    Findings: Limited imaging for stereotactic localization. There is a  4.0 x 4.9 x 3.3 cm heterogeneously, but predominantly peripherally  enhancing oral cavity mass centered at the central/midline mandible  with associated invasion and with extension/involvement of the ventral  tongue and chin subcutaneous tissue. Tumor approaches the skin. There  also appears to be a separate nodular enhancing component involving  the right buccal tissues on series 3 image 115. There are multiple  enlarged and potentially partially necrotic lymph nodes bilaterally in  the level 1B, 2 and 3 stations.     The mucosal space demonstrates no evident abnormality in the  nasopharynx, hypopharynx or the glottis. The major salivary glands  appear unremarkable. The thyroid gland appears normal.     Mild stress atherosclerosis at the carotid bulbs without significant  stenosis.     No overt spinal canal stenosis. There are moderate to severe neural  foraminal stenosis at C5-6 and C6-7. The visualized paranasal sinuses  are clear. Hypoplastic right maxillary sinus. Small mastoid effusions.     Mild centrilobular and paraseptal emphysematous changes.                                                                       Impression:   1. Redemonstration of oral cavity mass centered at the central  mandible with involvement of the ventral tongue and chin subcutaneous  tissue.  2. Bilateral level 1B, 2, and 3 metastatic lymph nodes..   3. Limited imaging performed primarily for the  purposes of  stereotactic localization.         Path:  8/17/23  A. LYMPH NODE, LEFT LEVEL 1B:  - METASTATIC SQUAMOUS CELL CARCINOMA TO THREE OUT OF FOUR LYMPH NODES (3/4)  - Tumor size: at least 1.5 cm  - No definite extranodal extension is identified  - Benign salivary gland tissue with ductal ectasia  - Partial involvement by low-grade B-cell lymphoproliferative disorder (See comment)     B. LYMPH NODE, LEVEL 1A:  - Three lymph nodes, negative for carcinoma (0/3)  - Suspicious for involvement by low-grade lymphoproliferative disorder     C. LYMPH NODE, RIGHT LEVEL 1B:  - METASTATIC SQUAMOUS CELL CARCINOMA TO ONE OUT OF ONE LYMPH NODE (1/1)  - Tumor size: at least 1.5 cm  - Extranodal extension is identified (?2mm)  - Benign salivary gland tissue with ductal ectasia  - No definitive morphologic evidence of a lymphoproliferative disorder     D. LEFT MENTAL NERVE:  - Negative for carcinoma     E. ORAL CAVITY, SEGMENTAL MANDIBULECTOMY AND ANTERIOR GLOSSECTOMY:  - ULCERATED KERATINIZING SQUAMOUS CELL CARCINOMA, MODERATELY DIFFERENTIATED  - Tumor involves floor of mouth, tongue and cortical bone  - Tumor size:  4.1 cm DOI: 29 mm  - No definitive for lymphovascular invasion   - Perineural invasion is present, small nerves, focal  - Left anterolateral soft tissue resection margin (1-3 o'clock) is positive for carcinoma  - All other margins are negative for high grade dysplasia/carcinoma  - AJCC Pathologic Stage: pT4a, N3b     F. RIGHT MARROW MARGIN:  - Negative for carcinoma  - No morphologic evidence of a lymphoproliferative disorder     G. LEFT MARROW MARGIN:  - Bone marrow involved by low-grade B-cell lymphoproliferative disorder (See comment)     H. LYMPH NODE, RIGHT LEVEL 2A:  - METASTATIC SQUAMOUS CELL CARCINOMA TO FIVE OUT OF TWELVE LYMPH NODE (5/12)  - Tumor size: 0.8 cm in greatest dimension  - Extranodal extension is identified (> 2mm)  - Perineural invasion is present  - Benign salivary gland tissue with ductal  ectasia  - Suspicious for involvement by low-grade lymphoproliferative disorder     I. LYMPH NODE, RIGHT LEVEL 3:  - Five lymph nodes, negative for carcinoma (0/5)  - Suspicious for involvement by low-grade lymphoproliferative disorder     J. LYMPH NODE, RIGHT LEVEL 4:  - Twelve lymph nodes, negative for carcinoma (0/12)  - Thyroid tissue present in one lymph node (See comment)  - Partial involvement by low-grade B-cell lymphoproliferative disorder (See comment)     K. LYMPH NODE, RIGHT LEVEL 2B:  - Four lymph nodes, negative for carcinoma (0/4)  - Suspicious for involvement by low-grade lymphoproliferative disorder     L. LYMPH NODE, LEFT LEVEL 2A:  - METASTATIC SQUAMOUS CELL CARCINOMA TO FIVE OUT OF TWELVE LYMPH NODE (2/14)  - Tumor size: 4 cm (matted lymph nodes)  - Extranodal extension is identified (> 2mm)  - Suspicious for involvement by low-grade lymphoproliferative disorder     M. LYMPH NODE, LEFT LEVEL 3:  - Seventeen lymph nodes, negative for carcinoma (0/17)  - No definitive morphologic evidence of a lymphoproliferative disorder     N. LYMPH NODE,LEFT LEVEL 4:  - Sixteen lymph nodes, negative for carcinoma (0/16)  - No definitive morphologic evidence of a lymphoproliferative disorder     O. LYMPH NODE, LEFT LEVEL 2B:  - Ten lymph nodes, negative for carcinoma (0/10)  - Suspicious for involvement by low-grade lymphoproliferative disorder   Electronically signed by Autumn Morfin MD on 8/25/2023 at  7:50 AM         Comments:   This is an appended report. These results have been appended to a previously preliminary verified report.      Comment   [facility]   Part J. There are bland appearing subcapsular thyroid follicles involving one lymph node. Although no papillary structures or nuclear features of papillary thyroid carcinoma are identified, metastatic thyroid carcinoma cannot be entirely excluded. Clinical and imaging correlation is highly recommended.     Many of the lymph nodes in this biopsy,  along with the bone marrow present in part G, are involved by a low-grade CD5-positive B cell lymphoproliferative disorder. The involved lymph nodes are fairly small, only partially involved, and proliferation centers are not readily identified - thus, the differential diagnosis for this process includes both small lymphocytic lymphoma/chronic lymphocytic leukemia (SLL/CLL) and tissue-based monoclonal B-cell lymphocytosis (MBL). Mantle cell lymphoma is excluded based on negativity for cyclin D1 and SOX11 by immunohistochemistry, and there is no morphologic evidence of large cell transformation in these specimens.      Further evaluation is recommended, to include: (1) CBC with white blood cell differential; (2) peripheral blood flow cytometry; and (3) correlation with staging, including imaging.        SPECIMEN   Procedure  Glossectomy: Anterior     Mandibulectomy: Segmental   TUMOR   Tumor Focality  Unifocal   Tumor Site  Oral cavity   Tumor Subsite  Anterior two-thirds of tongue     Floor of mouth   Tumor Laterality  Midline   Tumor Size  Greatest Dimension (Centimeters): 4.1 cm   Histologic Type  Squamous cell carcinoma, conventional   Histologic Grade  G2, moderately differentiated   Tumor Depth of Invasion (DOI)  29 mm   Lymphovascular Invasion  Not identified   Perineural Invasion  Present   Extent of Perineural Invasion  focal   Worst Pattern of Invasion (WPOI)  WPOI 1-4   MARGINS   Specimen Margin Status for Invasive Tumor  Invasive tumor present at specimen margin   Specimen Margin(s) Involved by Invasive Tumor  Left anterolateral   Specimen Margin Status for Noninvasive Tumor  All specimen margins negative for high-grade dysplasia / in situ disease   Distance from Noninvasive Tumor to Closest Specimen Margin  2 mm   Closest Specimen Margin(s) to Noninvasive Tumor  left lateral   REGIONAL LYMPH NODES   Regional Lymph Node Status  Tumor present in regional lymph node(s)   Number of Lymph Nodes with Tumor  11    Laterality of Lymph Node(s) with Tumor  Bilateral   Size of Largest Shameka Metastatic Deposit  4 cm   Extranodal Extension (STEFFEN)  Present   Distance of STEFFEN from Lymph Node Capsule  Greater than 2 mm (macroscopic STEFFEN)   Number of Lymph Nodes Examined  98   PATHOLOGIC STAGE CLASSIFICATION (pTNM, AJCC 8th Edition)   Reporting of pT, pN, and (when applicable) pM categories is based on information available to the pathologist at the time the report is issued. As per the AJCC (Chapter 1, 8th Ed.) it is the managing physician s responsibility to establish the final pathologic stage based upon all pertinent information, including but potentially not limited to this pathology report.        pT Category  pT4a   pN Category  pN3b   .       Assessment    Ms. 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  Final pathology demonstrated squamous cell carcinoma moderately differentiated, 4.1 cm in size originating from  floor mouth/ anterior tongue,  depth of invasion 29 mm, no LVSI, positive PNI, positive margin (left anterior lateral).  11 of 98 lymph nodes positive (4 cm largest deposit, positive extracapsular disease present), pathologic T4N3b.  Evaluate potential role for adjuvant radiation therapy.    Plan     1.  I discussed the natural history of oral cavity cancers.     2.  I discussed with the patient that she has several intermediate and high risk features for local recurrence, and as such agree with the recommendation to proceed with adjuvant chemo-radiation therapy.      3. We recommend a total dose of 63-66Gy in 33 fractions with concurrent chemotherapy.      4. The patient will meet with medical oncology team (Dr. Castillo) to discuss systemic therapy plans.     5. The risks from radiation therapy including, but not limited to skin desquamation, fatigue, dry mouth, change in  taste, mouth ulcers, hypothyroidism, painful and difficulty swallowing, dental caries, swelling, brachial plexopathy, carotid artery damage, spinal cord damage, bone and soft tissue necrosis, voice change, vision loss, hearing loss, and secondary malignancies were explained to the patient. The patient has acknowledged the risks and consented to therapy.     6. The patient's postoperative course has been complicated by wound complications  and she is status post debridement x2.  Current neck wound remains open and is undergoing packing.  We will tentatively schedule her for CT simulation in the next 1 to 2 weeks to allow for more wound healing to occur.  We will also await clearance from ENT team for proceeding with radiation.      Hernán Egan MD  Department of Radiation Oncology  [facility]

## 2023-09-28 NOTE — NURSING NOTE
REASON FOR APPOINTMENT   Type of Cancer: SCC of oral cavity  Location: jaw/oral cavity  Date of Symptom Onset: felt firmness in lower lip and chin, saw oral & maxillofaical surgeon in June 2023 was then referred to ENT at [facility] 7/2023    TREATMENT TO-DATE FOR THIS CANCER  Surgery ? Dr. Thorne 8/17/23,  debriedment 8/29/23   Chemotherapy ? Will meet with medical oncology today Dr. Castillo   Other Treatments for this Cancer ? Discussion for radiation, waiting for wound healing    PERSONAL HISTORY OF CANCER   Previous Cancer ? no   Prior Radiation ? no   Prior Chemotherapy ? no   Prior Hormonal Therapy ? no     RECENT IMAGING STUDIES  See epic for PET report    REFERRALS NEEDED  Offered SW    VITALS  BP 95/62   Pulse 92   Resp 18   Wt 48.1 kg (106 lb)   SpO2 98%   BMI 18.19 kg/m      PACEMAKER/IMPLANTED CARDIAC DEVICE no    PAIN  Denies  - not pain but stiff and achey at tiimes. Tylenol prn - takes about 2 times per day.  Still have some numbness s/p surgery    PSYCHOSOCIAL  Marital Status:   Patient lives in [place]  with  Duane.  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 Yes    REVIEW OF SYSTEMS  Skin: surgical areas on jaw/chin - packing wound, trach, nothing by mouth due to surgery recommendations - wound still healing, L leg graft area healing - no pain  Eyes: glasses  Ears/Nose/Throat: no ear pain; surgical site in oral cavity/chin healing, stiff/tight, trach  Respiratory: No shortness of breath, dyspnea on exertion, cough, or hemoptysis. Wears Trach Dome at night  Cardiovascular: negative  Gastrointestinal: negative  Genitourinary: negative  Musculoskeletal: negative  Neurologic: negative  Psychiatric: stress/anxiety about upcoming radiation/chemo  Hematologic/Lymphatic/Immunologic: weight loss, PEG tube nutrition only, nothing by mouth  Endocrine: negative    WOMEN ONLY  Any chance you may be  "pregnant: No    Radiation Oncology Patient Teaching    Current Concern: patient is still healing from surgery - here to learn about next step of radiation   asks \" what are treatments like? What are side effects?\"    Person involved with teaching: Patient and  Duane  Patient asked Questions: Yes  Patient was cooperative: Yes  Patient was receptive (willing to accept information given): Yes    Education Assessment  Comprehension ability: Medium  Knowledge level: Low  Factors affecting teaching: stress/anxiety, wound healing, adjusting to physical changes after surgery, adjusting to Peg tube.    Education Materials Given  Radiation Therapy and You  Radiation Therapy for Head and Neck  Mouth Care for Radiation Therapy    Educational Topics Discussed  Side effects, Medications, Activity, Nutrition, Adjustment to illness, and When to call MD/RN    Response To Teaching  More review necessary      Do you have an advanced directive or living will? No  Are you DNR/DNI? No      "

## 2023-09-28 NOTE — LETTER
9/28/2023         RE: Adrianna Pereira  [address] [address] [place] [id number]        Dear Colleague,    Thank you for referring your patient, Adrianna Pereira, to the [facility]. Please see a copy of my visit note below.    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  I would propose administering cisplatin 40 mg/m2 once weekly.  She understands and agrees.  General surgery consult for placement of implanted port for vascular access including supportive care as necessary  3.   Priority audiology consultation for baseline hearing screening and continued monitoring since she is at high risk for cisplatin induced ototoxicity  4.   We will add prealbumin to all of her weekly treatment labs to monitor her nutritional status  5.   Discussed in  general terms natural history, management and prognosis of her CLL/SLL.  She does not require treatment now, it is not curable but can be managed for many years and sometimes for several decades.    Time with patient including review, documentation, history, examination, coordination of care and counseling was 50 minutes.    History of present illness:  In July, 2023 the patient presented to Dr. Darien Thorne for history of previous CO2 laser ablation for premalignant oral cavity lesions and more recent swelling of the lower lip and chin.    7/3/2023 left mandibular alveolus biopsy revealed an invasive keratinizing moderately differential squamous cell carcinoma.  7/13/2023 PET/dedicated CT revealed a 4.4 x 3.7 x 3.2 cm anterior oral cavity mass invading into the mandible with a separate gluco-avid nodule on the right mandibular buccal mucosa and bilateral level 1B, level 2 and L3 metastatic lymphadenopathy without evidence of metastatic disease.    On 8/17/2023, she underwent segmental mandibulectomy, floor of mouth resection, anterior glossectomy, left fibular free flap, skin grafting, and tracheostomy revealing a pT4a, N3b tumor with positive left anterior lateral soft tissue margin, 11/98+ lymph nodes including STEFFEN and several bilateral lymph nodes consistent with involvement by low-grade B-cell neoplasm suggestive of CLL/SLL.    On 8/25/2023, head neck tumor conference recommended consideration of postoperative chemoradiation.  Postoperative course has been complicated by 8/24/2023 return to the operating room for debridement of multiple areas of skin necrosis and resuturing of neck incision and intraoral flap.    Past medical history:  Remarkable for past 1/2 ppd tobacco use (until 8/2023), former alcohol abuse for which she considers herself an alcoholic although she does not use alcohol now.    Past Medical History:   Diagnosis Date     At risk for malnutrition      Squamous cell carcinoma of floor of mouth  "(H)      Tobacco dependence syndrome          Family history:  She is originally from [place], is not a  and has 2 daughters.      Medications:  Current Outpatient Medications   Medication     acetaminophen (TYLENOL) 325 MG tablet     chlorhexidine (PERIDEX) 0.12 % solution     mineral oil-hydrophilic petrolatum (AQUAPHOR) external ointment     ondansetron (ZOFRAN ODT) 4 MG ODT tab     polyethylene glycol (MIRALAX) 17 g packet     senna-docusate (SENOKOT-S/PERICOLACE) 8.6-50 MG tablet     No current facility-administered medications for this visit.       Allergies:  No Known Allergies    Review of systems:  Except as noted in the note above, the patient denies headaches, diplopia, hearing loss or dizziness; dyspnea, cough, hemoptysis, pleurisy; chest pain/pressure, palpitations, lightheadedness; anorexia, nausea, vomiting, abdominal pain, diarrhea, constipation, melena or rectal bleeding; dysuria, frequency,  blood in the urine; vaginal bleeding or discharge; fever, chills, sweats, hot flashes; tingling, numbness, loss of balance; insomnia, depression, anxiety.    Physical examination:  BP 99/57 (BP Location: Right arm, Patient Position: Sitting, Cuff Size: Adult Small)   Pulse 104   Temp 97.6  F (36.4  C) (Tympanic)   Resp 16   Ht 1.613 m (5' 3.5\")   Wt 47.6 kg (105 lb)   SpO2 98%   BMI 18.31 kg/m      The patient is alert and oriented. Adenopathy is absent in the neck, axilla, groin and supraclavicular fossa; Lungs are clear to auscultation and percussion without rales, wheezes or rubs; heart rhythm is regular, heart sounds are without murmurs or gallops; the abdomen is soft and flat without hepatosplenomegaly, masses, ascites or tenderness.; extremities and skin reveal no unusual skin lesions, joint swelling or edema;      Rai Castillo MD              Oncology Rooming Note    September 28, 2023 1:54 PM   Adrianna Pereira is a 63 year old female who presents for:    Chief Complaint   Patient " "presents with     Oncology Clinic Visit     Squamous cell carcinoma of oral cavity - New consult     Initial Vitals: BP 99/57 (BP Location: Right arm, Patient Position: Sitting, Cuff Size: Adult Small)   Pulse 104   Temp 97.6  F (36.4  C) (Tympanic)   Resp 16   Ht 1.613 m (5' 3.5\")   Wt 47.6 kg (105 lb)   SpO2 98%   BMI 18.31 kg/m   Estimated body mass index is 18.31 kg/m  as calculated from the following:    Height as of this encounter: 1.613 m (5' 3.5\").    Weight as of this encounter: 47.6 kg (105 lb). Body surface area is 1.46 meters squared.  No Pain (0) Comment: Data Unavailable   No LMP recorded. Patient is postmenopausal.  Allergies reviewed: Yes  Medications reviewed: Yes    Medications: Medication refills not needed today.  Pharmacy name entered into EPIC:    [id number] [place], [place] - [address] [place] [address] PHARMACY - [place], [place] - [facility] [address]    Clinical concerns:  New consult      Tanya Philippe CMA                Again, thank you for allowing me to participate in the care of your patient.        Sincerely,        Rai Castillo MD  "

## 2023-10-03 ENCOUNTER — TRANSFERRED RECORDS (OUTPATIENT)
Dept: HEALTH INFORMATION MANAGEMENT | Facility: CLINIC | Age: 63
End: 2023-10-03

## 2023-10-03 ENCOUNTER — OFFICE VISIT (OUTPATIENT)
Dept: AUDIOLOGY | Facility: CLINIC | Age: 63
End: 2023-10-03
Attending: INTERNAL MEDICINE
Payer: COMMERCIAL

## 2023-10-03 DIAGNOSIS — H90.3 SENSORINEURAL HEARING LOSS (SNHL) OF BOTH EARS: Primary | ICD-10-CM

## 2023-10-03 DIAGNOSIS — C06.9 SQUAMOUS CELL CARCINOMA OF ORAL CAVITY (H): ICD-10-CM

## 2023-10-03 PROCEDURE — 92567 TYMPANOMETRY: CPT | Performed by: AUDIOLOGIST

## 2023-10-03 PROCEDURE — 92557 COMPREHENSIVE HEARING TEST: CPT | Performed by: AUDIOLOGIST

## 2023-10-03 PROCEDURE — 92588 EVOKED AUDITORY TST COMPLETE: CPT | Performed by: AUDIOLOGIST

## 2023-10-03 NOTE — PROGRESS NOTES
AUDIOLOGY REPORT    SUBJECTIVE:  Adrianna Pereira is a 63 year old female who was seen in the Audiology Clinic at the [facility] for baseline audiologic evaluation, prior to beginning treatment with cisplatin for oral cancer referred by Rai Castillo M.D. The patient denies otalgia, aural fullness, otorrhea, tinnitus, and dizziness.  They were accompanied today by their .    OBJECTIVE:    Fall Risk Screen:  1. Have you fallen two or more times in the past year? No  2. Have you fallen and had an injury in the past year? No      Otoscopic exam indicates partial obstruction with cerumen bilaterally     Pure Tone Thresholds assessed using conventional audiometry with good  reliability from 250-8000 Hz bilaterally using insert earphones and circumaural headphones     RIGHT:  normal sloping to mild sensorineural hearing loss    LEFT:    normal sloping to moderate sensorineural hearing loss    Tympanogram:    RIGHT: normal eardrum mobility    LEFT:   normal eardrum mobility    Reflexes (reported by stimulus ear):  Could not seal      Speech Reception Threshold:    RIGHT: 10 dB HL    LEFT:   15 dB HL  Word Recognition Score:     RIGHT: 100% at 50 dB HL using NU-6 recorded word list.    LEFT:   100% at 50 dB HL using NU-6 recorded word list.      ASSESSMENT:     ICD-10-CM    1. Sensorineural hearing loss (SNHL) of both ears  H90.3 Cmprhn Audiometry Thrld Eval & Speech Recog (76131)     Tympanometry (impedance - testing) (96860)     Otoacoustic Emissions - Comprehensive   (58596)      2. 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)  C06.9 Adult Audiology  Referral          Today s results were discussed with the patient in detail.     PLAN:  Patient was counseled regarding hearing loss and impact on communication. It is recommended that the patient's hearing be monitored throughout treatment per the referring provider or sooner if new concerns arise.  Please call this clinic with  questions regarding these results or recommendations.        Edna Jc.  Doctor of Audiology  [place] License # [id number]

## 2023-10-04 ENCOUNTER — OFFICE VISIT (OUTPATIENT)
Dept: SURGERY | Facility: CLINIC | Age: 63
End: 2023-10-04
Payer: COMMERCIAL

## 2023-10-04 VITALS
TEMPERATURE: 96.8 F | HEART RATE: 92 BPM | BODY MASS INDEX: 17.92 KG/M2 | SYSTOLIC BLOOD PRESSURE: 97 MMHG | DIASTOLIC BLOOD PRESSURE: 65 MMHG | WEIGHT: 104.94 LBS | HEIGHT: 64 IN

## 2023-10-04 VITALS
HEART RATE: 92 BPM | SYSTOLIC BLOOD PRESSURE: 97 MMHG | HEIGHT: 64 IN | DIASTOLIC BLOOD PRESSURE: 65 MMHG | WEIGHT: 104.94 LBS | TEMPERATURE: 96.8 F | BODY MASS INDEX: 17.92 KG/M2

## 2023-10-04 DIAGNOSIS — C06.9 SQUAMOUS CELL CARCINOMA OF ORAL CAVITY (H): ICD-10-CM

## 2023-10-04 DIAGNOSIS — Z93.0 TRACHEOSTOMY TUBE PRESENT (H): ICD-10-CM

## 2023-10-04 DIAGNOSIS — C06.9 SQUAMOUS CELL CARCINOMA OF ORAL CAVITY (H): Primary | ICD-10-CM

## 2023-10-04 PROCEDURE — 99204 OFFICE O/P NEW MOD 45 MIN: CPT | Performed by: STUDENT IN AN ORGANIZED HEALTH CARE EDUCATION/TRAINING PROGRAM

## 2023-10-04 NOTE — NURSING NOTE
"Initial BP 97/65 (BP Location: Right arm, Patient Position: Sitting, Cuff Size: Adult Regular)   Pulse 92   Temp 96.8  F (36  C) (Tympanic)   Ht 1.613 m (5' 3.5\")   Wt 47.6 kg (104 lb 15 oz)   BMI 18.30 kg/m   Estimated body mass index is 18.3 kg/m  as calculated from the following:    Height as of this encounter: 1.613 m (5' 3.5\").    Weight as of this encounter: 47.6 kg (104 lb 15 oz). .  Patricia Aguilar MA    "

## 2023-10-04 NOTE — LETTER
10/4/2023         RE: Adrianna Pereira  [address] [address] [place] [id number]        Dear Colleague,    Thank you for referring your patient, Adrianna Pereira, to the [facility]. Please see a copy of my visit note below.    I will not be able to fit the pt in until 10/26.  One of my partner will see her today as he has more availability and is able to get her in before the 10/18 date.    Thus, this will be a no charge visit.     [facility] Anne-Marie, DO              Again, thank you for allowing me to participate in the care of your patient.        Sincerely,        AngeloKevenh MD Anne-Marie

## 2023-10-04 NOTE — PROGRESS NOTES
Surgical Consultation/History and Physical  [facility] Surgery    Adrianna is seen in consultation for port placement, at the request of Sukumar Hendricks MD.    Chief Complaint:  need for chemotherapy    HPI:  Adrianna Pereira presents in consultation today for evaluation of infusion port placement. She has a history of oral squamous cell carcinoma with recent mandibulectomy and tracheostomy in August of this year.  She now presents to discuss port placement in preparation for chemotherapy. The patient is right hand dominant, has never had central venous access, pneumothorax, lung surgery, history of dialysis or renal failure, and denies repetitive movements with the upper extremity (such as pitching, chopping wood, bowling, or hunting).      Patient Active Problem List   Diagnosis    Encounter for postoperative care    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)       Past Medical History:   Diagnosis Date    At risk for malnutrition     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)     Tobacco dependence syndrome        Past Surgical History:   Procedure Laterality Date    DISSECTION RADICAL NECK MODIFIED Bilateral 8/17/2023    Procedure: Bilateral modified radical neck dissection;  Surgeon: Tyshawn Dao MD;  [facility]    ENT SURGERY      oral biopsy    EXCISE LESION LIP N/A 8/17/2023    Procedure: Chin resection;  Surgeon: Tyshawn Dao MD;  [facility]    GLOSSECTOMY PARTIAL N/A 8/17/2023    Procedure: GLOSSECTOMY, PARTIAL;  Surgeon: Tyshawn Dao MD;  [facility]    GRAFT BONE FREE VASCULARIZED FROM LOWER EXTREMITY Left 8/17/2023    Procedure: Left fibula free flap;  Surgeon: Darien Thorne MD;  [facility]    GRAFT FREE VASCULARIZED (LOCATION) Left 8/17/2023    Procedure: anterolateral thigh free flap;  Surgeon: Darien Thorne MD;  [facility]    GRAFT SKIN SPLIT THICKNESS FROM EXTREMITY Left 8/17/2023    Procedure: Split thickness skin graft  from left thigh;  Surgeon: Darien Thorne MD;  [facility]    GYN SURGERY      tubal ligation    IR GASTROSTOMY TUBE PERCUTANEOUS PLCMNT  8/23/2023    IRRIGATION AND DEBRIDEMENT ORAL, COMBINED N/A 8/24/2023    Procedure: EXAM UNDER ANESTHESIA, ORAL CAVITY, IRRIGATION AND DEBRIDEMENT, ORAL CAVITY, neck dissection;  Surgeon: Darien Thorne MD;  [facility]    IRRIGATION AND DEBRIDEMENT ORAL, COMBINED N/A 8/29/2023    Procedure: EXCISIONAL DEBRIDEMENT NECK, IRRIGATION OF ORAL CAVITY;  Surgeon: Darien Thorne MD;  [facility]    MANDIBULECTOMY TOTAL N/A 8/17/2023    Procedure: segmental mandibulectomy;  Surgeon: Tyshawn Dao MD;  [facility]    TRACHEOSTOMY N/A 8/17/2023    Procedure: Tracheostomy placement, nasogastric tube placement;  Surgeon: Tyshawn Dao MD;  [facility]       No family history on file.    Social History     Tobacco Use    Smoking status: Former     Packs/day: 1.00     Years: 40.00     Pack years: 40.00     Types: Cigarettes     Passive exposure: Current    Smokeless tobacco: Never    Tobacco comments:     Cutting down to 5-7 cigarettes per day   Substance Use Topics    Alcohol use: Yes     Comment: 1 drink a day        History   Drug Use Unknown       Current Outpatient Medications   Medication Sig Dispense Refill    chlorhexidine (PERIDEX) 0.12 % solution Take 15 mLs by mouth 3 times daily 473 mL 1    mineral oil-hydrophilic petrolatum (AQUAPHOR) external ointment Apply topically every 8 hours 198 g 3    ondansetron (ZOFRAN ODT) 4 MG ODT tab Take 1 tablet (4 mg) by mouth every 6 hours as needed for nausea or vomiting 30 tablet 1    polyethylene glycol (MIRALAX) 17 g packet 17 g by Oral or Feeding Tube route 2 times daily as needed for constipation 20 each 0    senna-docusate (SENOKOT-S/PERICOLACE) 8.6-50 MG tablet 2 tablets by Oral or Feeding Tube route 2 times daily as needed for constipation 30 tablet 0       No Known Allergies    Review  "of Systems:   10 point ROS negative other than what was listed in HPI    Physical Exam:  BP 97/65 (BP Location: Right arm, Patient Position: Sitting, Cuff Size: Adult Regular)   Pulse 92   Temp 96.8  F (36  C) (Tympanic)   Ht 1.613 m (5' 3.5\")   Wt 47.6 kg (104 lb 15 oz)   BMI 18.30 kg/m      Constitutional- No acute distress, frail  Eyes: Anicteric, no injection.    ENT: Status post mandibulectomy, surgical incisions healing well  Neck - supple, no LAD.  Tracheostomy present in midline  Respiratory-nonlabored breathing on room air   cardiovascular - Extremities warm and well perfused  Neuro - No focal neuro deficits, Alert and oriented x 3  Psych: Appropriate mood and affect  Musculoskeletal: Normal gait, symmetric strength.  FROM upper and lower extremities.  Skin: Warm, Dry    LABS:     Lab Results   Component Value Date    WBC 11.6 (H) 09/13/2023    HGB 9.9 (L) 09/13/2023    HCT 30.5 (L) 09/13/2023    MCV 97 09/13/2023     (H) 09/13/2023      Lab Results   Component Value Date    INR 1.06 08/22/2023          INFORMED CONSENT:     A discussion of the indications, risks, benefits and alternatives to surgery was held with the patient, and the risks discussed include beeding, infection, thrombosis, stenosis, port malfunction or malposition, air embolism, pneumothorax, hemothorax, or cardiac arrythmia . The patient understood the information given, questions addressed, and she wishes to proceed. She understands the need for maintenance of the infusion port at least once a month while in place.    ASSESSMENT AND PLAN:     Ms. Adrianna Pereira is in need of an infusion port for chemotherapy, and is being expedited to surgery to facilitate care. She understood the information given, and wishes to proceed accordingly. Will plan for right vs left internal jugular or subclavian port placement at patient's convenience    Sheldon Lim MD on 10/4/2023 at 11:35 AM          "

## 2023-10-04 NOTE — PROGRESS NOTES
I will not be able to fit the pt in until 10/26.  One of my partner will see her today as he has more availability and is able to get her in before the 10/18 date.    Thus, this will be a no charge visit.     [facility] Alexander Campuso, DO

## 2023-10-04 NOTE — LETTER
10/4/2023         RE: Adrianna Pereira  [address] [address] [place] [id number]        Dear Colleague,    Thank you for referring your patient, Adrianna Pereira, to the [facility]. Please see a copy of my visit note below.    Surgical Consultation/History and Physical  [facility] Surgery    Adrianna is seen in consultation for port placement, at the request of Sukumar Hendricks MD.    Chief Complaint:  need for chemotherapy    HPI:  Adrianna Pereira presents in consultation today for evaluation of infusion port placement. She has a history of oral squamous cell carcinoma with recent mandibulectomy and tracheostomy in August of this year.  She now presents to discuss port placement in preparation for chemotherapy. The patient is right hand dominant, has never had central venous access, pneumothorax, lung surgery, history of dialysis or renal failure, and denies repetitive movements with the upper extremity (such as pitching, chopping wood, bowling, or hunting).      Patient Active Problem List   Diagnosis     Encounter for postoperative care     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)       Past Medical History:   Diagnosis Date     At risk for malnutrition      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)      Tobacco dependence syndrome        Past Surgical History:   Procedure Laterality Date     DISSECTION RADICAL NECK MODIFIED Bilateral 8/17/2023    Procedure: Bilateral modified radical neck dissection;  Surgeon: Tyshawn Dao MD;  [facility]     ENT SURGERY      oral biopsy     EXCISE LESION LIP N/A 8/17/2023    Procedure: Chin resection;  Surgeon: Tyshawn Dao MD;  [facility]     GLOSSECTOMY PARTIAL N/A 8/17/2023    Procedure: GLOSSECTOMY, PARTIAL;  Surgeon: Tyshawn Dao MD;  [facility]     GRAFT BONE FREE VASCULARIZED FROM LOWER EXTREMITY Left 8/17/2023    Procedure: Left fibula free flap;  Surgeon: Darien Thorne MD;  [facility]     GRAFT  FREE VASCULARIZED (LOCATION) Left 8/17/2023    Procedure: anterolateral thigh free flap;  Surgeon: Darien Thorne MD;  [facility]     GRAFT SKIN SPLIT THICKNESS FROM EXTREMITY Left 8/17/2023    Procedure: Split thickness skin graft from left thigh;  Surgeon: Darien Thorne MD;  [facility]     GYN SURGERY      tubal ligation     IR GASTROSTOMY TUBE PERCUTANEOUS PLCMNT  8/23/2023     IRRIGATION AND DEBRIDEMENT ORAL, COMBINED N/A 8/24/2023    Procedure: EXAM UNDER ANESTHESIA, ORAL CAVITY, IRRIGATION AND DEBRIDEMENT, ORAL CAVITY, neck dissection;  Surgeon: Darien Thorne MD;  [facility]     IRRIGATION AND DEBRIDEMENT ORAL, COMBINED N/A 8/29/2023    Procedure: EXCISIONAL DEBRIDEMENT NECK, IRRIGATION OF ORAL CAVITY;  Surgeon: Darien Thorne MD;  [facility]     MANDIBULECTOMY TOTAL N/A 8/17/2023    Procedure: segmental mandibulectomy;  Surgeon: Tyshawn Dao MD;  [facility]     TRACHEOSTOMY N/A 8/17/2023    Procedure: Tracheostomy placement, nasogastric tube placement;  Surgeon: Tyshawn Dao MD;  [facility]       No family history on file.    Social History     Tobacco Use     Smoking status: Former     Packs/day: 1.00     Years: 40.00     Pack years: 40.00     Types: Cigarettes     Passive exposure: Current     Smokeless tobacco: Never     Tobacco comments:     Cutting down to 5-7 cigarettes per day   Substance Use Topics     Alcohol use: Yes     Comment: 1 drink a day        History   Drug Use Unknown       Current Outpatient Medications   Medication Sig Dispense Refill     chlorhexidine (PERIDEX) 0.12 % solution Take 15 mLs by mouth 3 times daily 473 mL 1     mineral oil-hydrophilic petrolatum (AQUAPHOR) external ointment Apply topically every 8 hours 198 g 3     ondansetron (ZOFRAN ODT) 4 MG ODT tab Take 1 tablet (4 mg) by mouth every 6 hours as needed for nausea or vomiting 30 tablet 1     polyethylene glycol (MIRALAX) 17 g packet 17 g by Oral  "or Feeding Tube route 2 times daily as needed for constipation 20 each 0     senna-docusate (SENOKOT-S/PERICOLACE) 8.6-50 MG tablet 2 tablets by Oral or Feeding Tube route 2 times daily as needed for constipation 30 tablet 0       No Known Allergies    Review of Systems:   10 point ROS negative other than what was listed in HPI    Physical Exam:  BP 97/65 (BP Location: Right arm, Patient Position: Sitting, Cuff Size: Adult Regular)   Pulse 92   Temp 96.8  F (36  C) (Tympanic)   Ht 1.613 m (5' 3.5\")   Wt 47.6 kg (104 lb 15 oz)   BMI 18.30 kg/m      Constitutional- No acute distress, frail  Eyes: Anicteric, no injection.    ENT: Status post mandibulectomy, surgical incisions healing well  Neck - supple, no LAD.  Tracheostomy present in midline  Respiratory-nonlabored breathing on room air   cardiovascular - Extremities warm and well perfused  Neuro - No focal neuro deficits, Alert and oriented x 3  Psych: Appropriate mood and affect  Musculoskeletal: Normal gait, symmetric strength.  FROM upper and lower extremities.  Skin: Warm, Dry    LABS:     Lab Results   Component Value Date    WBC 11.6 (H) 09/13/2023    HGB 9.9 (L) 09/13/2023    HCT 30.5 (L) 09/13/2023    MCV 97 09/13/2023     (H) 09/13/2023      Lab Results   Component Value Date    INR 1.06 08/22/2023          INFORMED CONSENT:     A discussion of the indications, risks, benefits and alternatives to surgery was held with the patient, and the risks discussed include beeding, infection, thrombosis, stenosis, port malfunction or malposition, air embolism, pneumothorax, hemothorax, or cardiac arrythmia . The patient understood the information given, questions addressed, and she wishes to proceed. She understands the need for maintenance of the infusion port at least once a month while in place.    ASSESSMENT AND PLAN:     Ms. Adrianna Pereira is in need of an infusion port for chemotherapy, and is being expedited to surgery to facilitate care. She " understood the information given, and wishes to proceed accordingly. Will plan for right vs left internal jugular or subclavian port placement at patient's convenience    Sheldon Lim MD on 10/4/2023 at 11:35 AM            Again, thank you for allowing me to participate in the care of your patient.        Sincerely,        Sheldon Lmi MD

## 2023-10-12 ENCOUNTER — OFFICE VISIT (OUTPATIENT)
Dept: RADIATION THERAPY | Facility: OUTPATIENT CENTER | Age: 63
End: 2023-10-12
Payer: COMMERCIAL

## 2023-10-12 VITALS
DIASTOLIC BLOOD PRESSURE: 67 MMHG | OXYGEN SATURATION: 97 % | WEIGHT: 108 LBS | BODY MASS INDEX: 18.83 KG/M2 | SYSTOLIC BLOOD PRESSURE: 104 MMHG | HEART RATE: 93 BPM | RESPIRATION RATE: 18 BRPM

## 2023-10-12 DIAGNOSIS — C06.9 SQUAMOUS CELL CARCINOMA OF ORAL CAVITY (H): Primary | ICD-10-CM

## 2023-10-12 RX ORDER — AMOXICILLIN AND CLAVULANATE POTASSIUM 400; 57 MG/5ML; MG/5ML
875 POWDER, FOR SUSPENSION ORAL 2 TIMES DAILY
Qty: 153.16 ML | Refills: 0 | Status: SHIPPED | OUTPATIENT
Start: 2023-10-12 | End: 2023-10-19

## 2023-10-12 NOTE — PROGRESS NOTES
The patient presents for CT simulation.     Wound has continue to heal. Still open, but improved since prior visit. Less packing is required and less drainage. Will see Dr. Thorne next week on 10/16/23.     The patient has already met with Dr. Castillo of medical oncology team who discussed concurrent cisplatin weekly with radiation therapy.    We re-discussed side effects in great detail with the patient.  Consent obtained.      Plan:  Tentatively we will plan to proceed with CT simulation today with plan to begin next week.  Patient should enable some more wound healing prior to beginning radiation therapy. We discussed concern for possibly wound healing issues but also need to start RT given time interval from initial surgery.  Patient will also meet with Dr. Thorne next week before we start adjuvant RT and get his opinion as well.     Hernán Egan M.D.  Department of Radiation Oncology  [facility]

## 2023-10-13 ENCOUNTER — TELEPHONE (OUTPATIENT)
Dept: SURGERY | Facility: CLINIC | Age: 63
End: 2023-10-13
Payer: COMMERCIAL

## 2023-10-13 NOTE — TELEPHONE ENCOUNTER
Type of surgery: INSERTION, VASCULAR ACCESS PORT   Location of surgery: [place] OR  Date and time of surgery: 10/18  Surgeon: Raphael   Pre-Op Appt Date: 10/16  Post-Op Appt Date: none needed    Packet sent out: Yes  Pre-cert/Authorization completed:  No  Date:

## 2023-10-16 ENCOUNTER — OFFICE VISIT (OUTPATIENT)
Dept: FAMILY MEDICINE | Facility: OTHER | Age: 63
End: 2023-10-16
Payer: COMMERCIAL

## 2023-10-16 ENCOUNTER — OFFICE VISIT (OUTPATIENT)
Dept: OTOLARYNGOLOGY | Facility: CLINIC | Age: 63
End: 2023-10-16
Payer: COMMERCIAL

## 2023-10-16 ENCOUNTER — THERAPY VISIT (OUTPATIENT)
Dept: SPEECH THERAPY | Facility: CLINIC | Age: 63
End: 2023-10-16
Payer: COMMERCIAL

## 2023-10-16 ENCOUNTER — ANESTHESIA EVENT (OUTPATIENT)
Dept: SURGERY | Facility: CLINIC | Age: 63
End: 2023-10-16
Payer: COMMERCIAL

## 2023-10-16 VITALS
WEIGHT: 108 LBS | BODY MASS INDEX: 18.44 KG/M2 | HEART RATE: 80 BPM | DIASTOLIC BLOOD PRESSURE: 54 MMHG | SYSTOLIC BLOOD PRESSURE: 100 MMHG | OXYGEN SATURATION: 99 % | HEIGHT: 64 IN

## 2023-10-16 VITALS
OXYGEN SATURATION: 99 % | DIASTOLIC BLOOD PRESSURE: 52 MMHG | HEART RATE: 79 BPM | WEIGHT: 108 LBS | RESPIRATION RATE: 18 BRPM | TEMPERATURE: 97.5 F | SYSTOLIC BLOOD PRESSURE: 88 MMHG | BODY MASS INDEX: 18.44 KG/M2 | HEIGHT: 64 IN

## 2023-10-16 DIAGNOSIS — C04.9 SCC (SQUAMOUS CELL CARCINOMA OF FLOOR OF MOUTH) (H): ICD-10-CM

## 2023-10-16 DIAGNOSIS — Z93.0 TRACHEOSTOMY DEPENDENT (H): ICD-10-CM

## 2023-10-16 DIAGNOSIS — Z12.31 VISIT FOR SCREENING MAMMOGRAM: ICD-10-CM

## 2023-10-16 DIAGNOSIS — R13.12 OROPHARYNGEAL DYSPHAGIA: ICD-10-CM

## 2023-10-16 DIAGNOSIS — Z12.11 SCREEN FOR COLON CANCER: ICD-10-CM

## 2023-10-16 DIAGNOSIS — C06.9 SQUAMOUS CELL CARCINOMA OF ORAL CAVITY (H): ICD-10-CM

## 2023-10-16 DIAGNOSIS — Z93.1 S/P PERCUTANEOUS ENDOSCOPIC GASTROSTOMY (PEG) TUBE PLACEMENT (H): ICD-10-CM

## 2023-10-16 DIAGNOSIS — Z11.4 SCREENING FOR HIV (HUMAN IMMUNODEFICIENCY VIRUS): ICD-10-CM

## 2023-10-16 DIAGNOSIS — Z11.59 NEED FOR HEPATITIS C SCREENING TEST: ICD-10-CM

## 2023-10-16 DIAGNOSIS — C04.9 SQUAMOUS CELL CARCINOMA OF FLOOR OF MOUTH (H): Primary | ICD-10-CM

## 2023-10-16 DIAGNOSIS — C77.9 METASTATIC SQUAMOUS CELL CARCINOMA TO LYMPH NODE (H): Primary | ICD-10-CM

## 2023-10-16 DIAGNOSIS — Z01.818 PREOP GENERAL PHYSICAL EXAM: Primary | ICD-10-CM

## 2023-10-16 DIAGNOSIS — Z12.4 CERVICAL CANCER SCREENING: ICD-10-CM

## 2023-10-16 PROBLEM — E78.5 HYPERLIPIDEMIA LDL GOAL <130: Status: ACTIVE | Noted: 2023-10-16

## 2023-10-16 PROBLEM — R63.6 UNDERWEIGHT: Status: ACTIVE | Noted: 2023-10-16

## 2023-10-16 LAB
ALBUMIN SERPL BCG-MCNC: 3.7 G/DL (ref 3.5–5.2)
ALP SERPL-CCNC: 71 U/L (ref 35–104)
ALT SERPL W P-5'-P-CCNC: 11 U/L (ref 0–50)
ANION GAP SERPL CALCULATED.3IONS-SCNC: 13 MMOL/L (ref 7–15)
AST SERPL W P-5'-P-CCNC: 19 U/L (ref 0–45)
BASO+EOS+MONOS # BLD AUTO: ABNORMAL 10*3/UL
BASO+EOS+MONOS NFR BLD AUTO: ABNORMAL %
BASOPHILS # BLD AUTO: 0.1 10E3/UL (ref 0–0.2)
BASOPHILS NFR BLD AUTO: 1 %
BILIRUB SERPL-MCNC: <0.2 MG/DL
BUN SERPL-MCNC: 22.4 MG/DL (ref 8–23)
CALCIUM SERPL-MCNC: 9.6 MG/DL (ref 8.8–10.2)
CHLORIDE SERPL-SCNC: 102 MMOL/L (ref 98–107)
CREAT SERPL-MCNC: 0.53 MG/DL (ref 0.51–0.95)
DEPRECATED HCO3 PLAS-SCNC: 25 MMOL/L (ref 22–29)
EGFRCR SERPLBLD CKD-EPI 2021: >90 ML/MIN/1.73M2
EOSINOPHIL # BLD AUTO: 0.4 10E3/UL (ref 0–0.7)
EOSINOPHIL NFR BLD AUTO: 4 %
ERYTHROCYTE [DISTWIDTH] IN BLOOD BY AUTOMATED COUNT: 15.3 % (ref 10–15)
GLUCOSE SERPL-MCNC: 89 MG/DL (ref 70–99)
HCT VFR BLD AUTO: 32.3 % (ref 35–47)
HGB BLD-MCNC: 10.2 G/DL (ref 11.7–15.7)
IMM GRANULOCYTES # BLD: 0 10E3/UL
IMM GRANULOCYTES NFR BLD: 0 %
LYMPHOCYTES # BLD AUTO: 3 10E3/UL (ref 0.8–5.3)
LYMPHOCYTES NFR BLD AUTO: 32 %
MCH RBC QN AUTO: 31.7 PG (ref 26.5–33)
MCHC RBC AUTO-ENTMCNC: 31.6 G/DL (ref 31.5–36.5)
MCV RBC AUTO: 100 FL (ref 78–100)
MONOCYTES # BLD AUTO: 0.8 10E3/UL (ref 0–1.3)
MONOCYTES NFR BLD AUTO: 8 %
NEUTROPHILS # BLD AUTO: 5.3 10E3/UL (ref 1.6–8.3)
NEUTROPHILS NFR BLD AUTO: 56 %
PLATELET # BLD AUTO: 448 10E3/UL (ref 150–450)
POTASSIUM SERPL-SCNC: 4.7 MMOL/L (ref 3.4–5.3)
PROT SERPL-MCNC: 7.1 G/DL (ref 6.4–8.3)
RBC # BLD AUTO: 3.22 10E6/UL (ref 3.8–5.2)
SODIUM SERPL-SCNC: 140 MMOL/L (ref 135–145)
WBC # BLD AUTO: 9.5 10E3/UL (ref 4–11)

## 2023-10-16 PROCEDURE — 92610 EVALUATE SWALLOWING FUNCTION: CPT | Mod: GN | Performed by: SPEECH-LANGUAGE PATHOLOGIST

## 2023-10-16 PROCEDURE — 99024 POSTOP FOLLOW-UP VISIT: CPT | Performed by: STUDENT IN AN ORGANIZED HEALTH CARE EDUCATION/TRAINING PROGRAM

## 2023-10-16 PROCEDURE — 80053 COMPREHEN METABOLIC PANEL: CPT | Performed by: PHYSICIAN ASSISTANT

## 2023-10-16 PROCEDURE — 99214 OFFICE O/P EST MOD 30 MIN: CPT | Performed by: PHYSICIAN ASSISTANT

## 2023-10-16 PROCEDURE — 92597 ORAL SPEECH DEVICE EVAL: CPT | Mod: GN | Performed by: SPEECH-LANGUAGE PATHOLOGIST

## 2023-10-16 PROCEDURE — 85025 COMPLETE CBC W/AUTO DIFF WBC: CPT | Performed by: PHYSICIAN ASSISTANT

## 2023-10-16 PROCEDURE — 36415 COLL VENOUS BLD VENIPUNCTURE: CPT | Performed by: PHYSICIAN ASSISTANT

## 2023-10-16 ASSESSMENT — LIFESTYLE VARIABLES: TOBACCO_USE: 1

## 2023-10-16 ASSESSMENT — PAIN SCALES - GENERAL
PAINLEVEL: MILD PAIN (3)
PAINLEVEL: NO PAIN (0)

## 2023-10-16 NOTE — PROGRESS NOTES
SPEECH LANGUAGE PATHOLOGY EVALUATION    See electronic medical record for Abuse and Falls Screening details.    Subjective      Presenting condition or subjective complaint:  Pt cleared to initiate capping trials and po trials  Date of onset: 08/17/23    Relevant medical history:     Dates & types of surgery:  8/17/23: chin resection, segmental mandibulectomy, partial glossectomy, bilateral modified radical neck dissection, tracheostomy placement, nasogastric tube placement, left fibula free flap, split thickens skin graft from left thigh, anterolateral thigh free flap.     Prior diagnostic imaging/testing results:       Prior therapy history for the same diagnosis, illness or injury:    No previous SLP services    Living Environment  Social support:   lives with spouse    Patient goals for therapy:  Pt wants to get her tracheostomy tube out and begin eating/drinking again.    Pain assessment: Pain denied     Objective     SWALLOW EVALUTION  Dysphagia history: Pt denies trouble swallowing prior to surgery. She has been NPO since surgery. She would like to initiate swallowing.   Current Diet/Method of Nutritional Intake: NPO, nasogastric tube (NG)        CLINICAL SWALLOW EVALUATION  Oral Motor Function: Dentition: adequate upper dentition, missing many teeth due to surgical reconstruction   Secretion management: difficulty swallowing secretions, anterior loss due to surgical changes and decreased ability to close mouth  Mucosal quality: adequate  Mandibular function: range of motion impaired  Oral labial function: impaired retraction, impaired pursing, impaired seal, impaired strength on left, impaired strength on right, impaired sensation on left, impaired sensation on right  Lingual function: impaired protrusion, impaired retraction, impaired anterior elevation, impaired left lateral movement, impaired right lateral movement, impaired strength on left, impaired strength on right  Velar function: intact   Buccal  function: impaired  Laryngeal function: cough, throat clear, volitional swallow     Level of assist required for feeding: no assistance needed   Textures Trialed:   Clinical Swallow Eval: Thin Liquids  Mode of presentation:  syringe    Volume presented: 1 and 1.5 mL presentations x6  Preparatory Phase: anterior loss of bolus, impaired labial seal, poor bolus control  Oral Phase: impaired AP movement, premature pharyngeal entry  Pharyngeal phase of swallow: impaired, repeated swallows   Strategies trialed during procedure: none  Diagnostic statement: No overt clinical s/sx of aspiration/penetration noted on small trials of thin liquid. Pt demonstrates decreased oral manipulation of bolus. She has anterior loss of liquid intermittently if she tips her head forward at all     ESOPHAGEAL PHASE OF SWALLOW  no observed or reported concerns related to esophageal function       ADDITIONAL EVAL COMPLETED TODAY : yes; rationale: tracheostomy capping also indicated per MD. Training on safety provided    Type of trach: jessy Sosa  Size of trach: 6  Oxygen Supply: room air   Tracheal suctioning needs: none  Air movement around trach: adequate upon finger occlusion   Trach cap: placed without difficulty   Voicing observations: Pt demonstrated adequate voicing throughout evaluation.   Total amount of time with Trach cap: 20 minutes    SWALLOW ASSESSMENT CLINICAL IMPRESSIONS AND RATIONALE  Diet Consistency Recommendations: thin liquids (level 0)    Recommended Feeding/Eating Techniques: small bolus size via syringe  Medication Administration Recommendations: continue via PEG  Instrumental Assessment Recommendations: none       Assessment & Plan   CLINICAL IMPRESSIONS   Medical Diagnosis: dysphonia, dysphagia, SCC floor of mouth, tracheostomy    Treatment Diagnosis: moderate pharyngeal and severe oral stage dysphagia, dysphonia   Impression/Assessment: Pt is a 63 year old female with dysphagia and dysphonia following surgery  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 The following significant findings have been identified: impaired swallowing and impaired voicing, characterized by severe oral dysphagia and moderate pharyngeal dysphagia characterized by decreased bolus transit and decreased ROM and strength of lips, jaw, tongue and cheeks. She demonstrates multiple swallows on small bolus sizes of liquid. Identified deficits interfere with their ability to communicate within the home or community and consume an oral diet as compared to previous level of function.    PLAN OF CARE  Treatment Interventions: Swallowing dysfunction and/or oral function for feeding, Communication    Prognosis to achieve stated therapy goals is good   Rehab potential is impacted by: comorbidities, current level of function, planned radiation treatment    Long Term Goals   SLP Goal 1  Goal Identifier: po  Goal Description: Pt will tolerate clear liquids by mouth without aspiration sequella across 3 months time.  Rationale: To maximize safety, ease and/or independence of oral intake  Target Date: 01/13/24  SLP Goal 2  Goal Identifier: PO  Goal Description: Pt will tolerate smooth purees by spoon clearing oral and pharyngeal residue with safe swallow strategies.  Rationale: To maximize safety, ease and/or independence of oral intake  Target Date: 01/13/24  SLP Goal 3  Goal Identifier: exercises  Goal Description: Pt will improve ROM and strength of BOT, jaw and pharynx by completing 10 repetitions of jaw stretch, effortful swallow, mendelsohn and supraglottic swallow 3 times daily with minimal written or verbal cues.  Rationale: To maximize safety, ease and/or independence of oral intake  Target Date: 01/13/24  SLP Goal 4  Goal Identifier: Tracheostomy capping  Goal Description: Pt will state safety precautions for tracheostomy capping with min cues during single therapy session.  Rationale: To maximize functional communication within the home or community  Target Date:  01/13/24      Frequency of Treatment: 2-4x/month  Duration of Treatment: 6 months     Recommended Referrals to Other Professionals: dietician during treatment  Education Assessment:   Learner/Method: Patient;Significant Other;No Barriers to Learning    Risks and benefits of evaluation/treatment have been explained.   Patient/Family/caregiver agrees with Plan of Care.     Evaluation Time:    SLP Eval: oral/pharyngeal swallow function, clinical minutes (31751): 10  Eval: use and/or fitting of voice prosthetic device to supp speech (not aug. comm) Minutes (33436): 10    Signing Clinician: RADHA Obregon

## 2023-10-16 NOTE — ANESTHESIA PREPROCEDURE EVALUATION
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)     Tobacco dependence syndrome       Past Surgical History:   Procedure Laterality Date    DISSECTION RADICAL NECK MODIFIED Bilateral 2023    Procedure: Bilateral modified radical neck dissection;  Surgeon: Tyshawn Dao MD;  Location: [facility]    ENT SURGERY      oral biopsy    EXCISE LESION LIP N/A 2023    Procedure: Chin resection;  Surgeon: Tyshawn Dao MD;  [facility]    GLOSSECTOMY PARTIAL N/A 2023    Procedure: GLOSSECTOMY, PARTIAL;  Surgeon: Tyshawn Dao MD;  [facility]    GRAFT BONE FREE VASCULARIZED FROM LOWER EXTREMITY Left 2023    Procedure: Left fibula free flap;  Surgeon: Darien Thorne MD;  [facility]    GRAFT FREE VASCULARIZED (LOCATION) Left 2023    Procedure: anterolateral thigh free flap;  Surgeon: Darien Thorne MD;  [facility]    GRAFT SKIN SPLIT THICKNESS FROM EXTREMITY Left 2023    Procedure: Split thickness skin graft from left thigh;  Surgeon: Darien Thorne MD;  [facility]    GYN SURGERY      tubal ligation    IR GASTROSTOMY TUBE PERCUTANEOUS PLCMNT  2023    IRRIGATION AND DEBRIDEMENT ORAL, COMBINED N/A 2023    Procedure: EXAM UNDER ANESTHESIA, ORAL CAVITY, IRRIGATION AND DEBRIDEMENT, ORAL CAVITY, neck dissection;  Surgeon: Darien Thorne MD;  [facility]    IRRIGATION AND DEBRIDEMENT ORAL, COMBINED N/A 2023    Procedure: EXCISIONAL DEBRIDEMENT NECK, IRRIGATION OF ORAL CAVITY;  Surgeon: Darien Thorne MD;  [facility]    MANDIBULECTOMY TOTAL N/A 2023    Procedure: segmental mandibulectomy;  Surgeon: Tyshawn Dao MD;  [facility]    TRACHEOSTOMY N/A 2023     Procedure: Tracheostomy placement, nasogastric tube placement;  Surgeon: Tyshawn Dao MD;  [facility]      No Known Allergies   Social History     Tobacco Use    Smoking status: Former     Packs/day: 1.00     Years: 40.00     Additional pack years: 0.00     Total pack years: 40.00     Types: Cigarettes     Passive exposure: Current    Smokeless tobacco: Never    Tobacco comments:     Cutting down to 5-7 cigarettes per day   Substance Use Topics    Alcohol use: Yes     Comment: 1 drink a day      Wt Readings from Last 1 Encounters:   10/12/23 49 kg (108 lb)        Anesthesia Evaluation   Pt has had prior anesthetic. Type: General, MAC and Regional.        ROS/MED HX  ENT/Pulmonary: Comment: Hx trach      (+)                tobacco use, Past use,                      Neurologic:       Cardiovascular:       METS/Exercise Tolerance:     Hematologic:       Musculoskeletal: Comment: Hx radical neck dissection  Hx mandibulectomy    (+)  arthritis,             GI/Hepatic:       Renal/Genitourinary:       Endo:       Psychiatric/Substance Use:       Infectious Disease:       Malignancy:   (+) Malignancy, History of Other.Other CA Oral cavity Active status post Surgery.    Other:          Physical Exam    Airway        Mallampati: II   TM distance: > 3 FB   Neck ROM: full   Mouth opening: > 3 cm    Respiratory Devices and Support         Dental           Cardiovascular   cardiovascular exam normal          Pulmonary                   OUTSIDE LABS:  CBC:   Lab Results   Component Value Date    WBC 11.6 (H) 09/13/2023    WBC 9.9 09/01/2023    HGB 9.9 (L) 09/13/2023    HGB 7.9 (L) 09/01/2023    HCT 30.5 (L) 09/13/2023    HCT 25.1 (L) 09/01/2023     (H) 09/13/2023     (H) 09/01/2023     BMP:   Lab Results   Component Value Date     09/13/2023     09/01/2023    POTASSIUM 4.1 09/13/2023    POTASSIUM 4.2 09/01/2023    CHLORIDE 101 09/13/2023    CHLORIDE 106 09/01/2023    CO2 23 09/13/2023    CO2  "22 09/01/2023    BUN 19.2 09/13/2023    BUN 10.2 09/01/2023    CR 0.55 09/13/2023    CR 0.35 (L) 09/01/2023     (H) 09/13/2023    GLC 84 09/01/2023     COAGS:   Lab Results   Component Value Date    INR 1.06 08/22/2023     POC: No results found for: \"BGM\", \"HCG\", \"HCGS\"  HEPATIC:   Lab Results   Component Value Date    ALBUMIN 4.0 08/17/2023    PROTTOTAL 7.2 08/02/2023    ALT 10 08/02/2023    AST 16 08/02/2023    ALKPHOS 87 08/02/2023    BILITOTAL 0.5 08/02/2023     OTHER:   Lab Results   Component Value Date    PH 7.43 08/17/2023    LACT 1.8 08/17/2023    RACHAEL 9.6 09/13/2023    PHOS 3.3 09/01/2023    MAG 1.9 09/13/2023    TSH 2.49 08/17/2023       Anesthesia Plan    ASA Status:  3       Anesthesia Type: General.     - Airway: Tracheostomy   Induction: Propofol.           Consents    Anesthesia Plan(s) and associated risks, benefits, and realistic alternatives discussed. Questions answered and patient/representative(s) expressed understanding.     - Discussed: Risks, Benefits and Alternatives for BOTH SEDATION and the PROCEDURE were discussed     - Discussed with:  Patient            Postoperative Care    Pain management: IV analgesics, Oral pain medications.   PONV prophylaxis: Ondansetron (or other 5HT-3), Dexamethasone or Solumedrol     Comments:                Melony Cardenas, APRN CRNA  "

## 2023-10-16 NOTE — H&P (VIEW-ONLY)
[facility]  [address]  [place] [address]  Phone: [phone]  Primary Provider: Sukumar Hendricks  Pre-op Performing Provider: RAMON HERNANDEZ    PREOPERATIVE EVALUATION:  Today's date: 10/16/2023    Adrianna is a 63 year old female who presents for a preoperative evaluation.    Surgical Information:  Surgery/Procedure: INSERTION, VASCULAR ACCESS PORT   Surgery Location: [facility]  Surgeon: Dr. Sheldon Lim  Surgery Date: 10/18/23  Time of Surgery: 8:30am  Where patient plans to recover: At home with family  Fax number for surgical facility: Note does not need to be faxed, will be available electronically in Epic.    Assessment & Plan     The proposed surgical procedure is considered LOW risk.    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  We do note that labs are pending.  Follow-up based on results.  Recently is being treated for a suspected dental abscess with Augmentin and has been on antibiotics for 4 days.  She feels fine and no new concerns in this arena.  We do note that she has some hypotension present today but states that this is fairly typical for her.  - CBC with platelets and differential; Future  - Comprehensive metabolic panel (BMP + Alb, Alk Phos, ALT, AST, Total. Bili, TP); Future  - CBC with platelets and differential  - Comprehensive metabolic panel (BMP + Alb, Alk Phos, ALT, AST, Total. Bili, TP)    Visit for screening mammogram  - MA SCREENING DIGITAL BILAT - Future  (s+30); Future    Screen for colon cancer  - Colonoscopy Screening  Referral; Future    Screening for HIV (human immunodeficiency virus)  Need for hepatitis C screening test  Cervical cancer screening  Declined screening at this point time  consider self low risk and the fact that she has treatment for oral cancer in place and that takes priority.              - No identified additional risk factors other than previously addressed    Antiplatelet or Anticoagulation Medication Instructions:   - Patient is on no antiplatelet or anticoagulation medications.    Additional Medication Instructions:  Patient is to take all scheduled medications on the day of surgery after surgery is completed.    RECOMMENDATION:  APPROVAL GIVEN to proceed with proposed procedure, without further diagnostic evaluation.    Subjective       HPI related to upcoming procedure: Port placement needed for chemotherapy regimen        10/16/2023     8:54 AM   Preop Questions   1. Have you ever had a heart attack or stroke? No   2. Have you ever had surgery on your heart or blood vessels, such as a stent placement, a coronary artery bypass, or surgery on an artery in your head, neck, heart, or legs? No   3. Do you have chest pain with activity? No   4. Do you have a history of  heart failure? No   5. Do you currently have a cold, bronchitis or symptoms of other infection? No   6. Do you have a cough, shortness of breath, or wheezing? No   7. Do you or anyone in your family have previous history of blood clots? No   8. Do you or does anyone in your family have a serious bleeding problem such as prolonged bleeding following surgeries or cuts? No   9. Have you ever had problems with anemia or been told to take iron pills? No   10. Have you had any abnormal blood loss such as black, tarry or bloody stools, or abnormal vaginal bleeding? No   11. Have you ever had a blood transfusion? UNKNOWN -    12. Are you willing to have a blood transfusion if it is medically needed before, during, or after your surgery? Yes   13. Have you or any of your relatives ever had problems with anesthesia? UNKNOWN -    14. Do you have sleep apnea, excessive snoring or daytime drowsiness? No   15. Do you have any  artifical heart valves or other implanted medical devices like a pacemaker, defibrillator, or continuous glucose monitor? No   16. Do you have artificial joints? No   17. Are you allergic to latex? No       Health Care Directive:  Patient does not have a Health Care Directive or Living Will:     Preoperative Review of :   reviewed - no record of controlled substances prescribed.    Review of Systems  CONSTITUTIONAL: NEGATIVE for fever, chills, change in weight  INTEGUMENTARY/SKIN: NEGATIVE for worrisome rashes, moles or lesions  EYES: NEGATIVE for vision changes or irritation  ENT/MOUTH: NEGATIVE for ear, mouth and throat problems  RESP: NEGATIVE for significant cough or SOB  CV: NEGATIVE for chest pain, palpitations or peripheral edema  GI: NEGATIVE for nausea, abdominal pain, heartburn, or change in bowel habits  : NEGATIVE for frequency, dysuria, or hematuria  MUSCULOSKELETAL: NEGATIVE for significant arthralgias or myalgia  NEURO: NEGATIVE for weakness, dizziness or paresthesias  ENDOCRINE: NEGATIVE for temperature intolerance, skin/hair changes  HEME: NEGATIVE for bleeding problems  PSYCHIATRIC: NEGATIVE for changes in mood or affect    Patient Active Problem List    Diagnosis Date Noted    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) 09/28/2023     Priority: Medium    Encounter for postoperative care 08/17/2023     Priority: Medium      Past Medical History:   Diagnosis Date    At risk for malnutrition     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)     Tobacco dependence syndrome      Past Surgical History:   Procedure Laterality Date    DISSECTION RADICAL NECK MODIFIED Bilateral 8/17/2023    Procedure: Bilateral modified radical neck dissection;  Surgeon: Tyshawn Dao MD;  [facility]    ENT SURGERY      oral biopsy    EXCISE LESION LIP N/A 8/17/2023    Procedure: Chin resection;  Surgeon: Tyshawn Dao MD;  [facility]    GLOSSECTOMY PARTIAL N/A 8/17/2023    Procedure:  GLOSSECTOMY, PARTIAL;  Surgeon: Tyshawn Dao MD;  [facility]    GRAFT BONE FREE VASCULARIZED FROM LOWER EXTREMITY Left 8/17/2023    Procedure: Left fibula free flap;  Surgeon: Darien Thorne MD;  [facility]    GRAFT FREE VASCULARIZED (LOCATION) Left 8/17/2023    Procedure: anterolateral thigh free flap;  Surgeon: Darien Thorne MD;  [facility]    GRAFT SKIN SPLIT THICKNESS FROM EXTREMITY Left 8/17/2023    Procedure: Split thickness skin graft from left thigh;  Surgeon: Darien Thorne MD;  [facility]    GYN SURGERY      tubal ligation    IR GASTROSTOMY TUBE PERCUTANEOUS PLCMNT  8/23/2023    IRRIGATION AND DEBRIDEMENT ORAL, COMBINED N/A 8/24/2023    Procedure: EXAM UNDER ANESTHESIA, ORAL CAVITY, IRRIGATION AND DEBRIDEMENT, ORAL CAVITY, neck dissection;  Surgeon: Darien Thorne MD;  [facility]    IRRIGATION AND DEBRIDEMENT ORAL, COMBINED N/A 8/29/2023    Procedure: EXCISIONAL DEBRIDEMENT NECK, IRRIGATION OF ORAL CAVITY;  Surgeon: Darien Thorne MD;  [facility]    MANDIBULECTOMY TOTAL N/A 8/17/2023    Procedure: segmental mandibulectomy;  Surgeon: Tyshawn Dao MD;  [facility]    TRACHEOSTOMY N/A 8/17/2023    Procedure: Tracheostomy placement, nasogastric tube placement;  Surgeon: Tyshawn Dao MD;  [facility]     Current Outpatient Medications   Medication Sig Dispense Refill    amoxicillin-clavulanate (AUGMENTIN) 400-57 MG/5ML suspension Take 10.94 mLs (875 mg) by mouth 2 times daily for 7 days 153.16 mL 0    chlorhexidine (PERIDEX) 0.12 % solution Take 15 mLs by mouth 3 times daily 473 mL 1    mineral oil-hydrophilic petrolatum (AQUAPHOR) external ointment Apply topically every 8 hours 198 g 3    ondansetron (ZOFRAN ODT) 4 MG ODT tab Take 1 tablet (4 mg) by mouth every 6 hours as needed for nausea or vomiting 30 tablet 1    polyethylene glycol (MIRALAX) 17 g packet 17 g by Oral or Feeding Tube route 2 times daily as  needed for constipation 20 each 0    senna-docusate (SENOKOT-S/PERICOLACE) 8.6-50 MG tablet 2 tablets by Oral or Feeding Tube route 2 times daily as needed for constipation 30 tablet 0       No Known Allergies     Social History     Tobacco Use    Smoking status: Former     Packs/day: 1.00     Years: 40.00     Additional pack years: 0.00     Total pack years: 40.00     Types: Cigarettes     Passive exposure: Current    Smokeless tobacco: Never    Tobacco comments:     Cutting down to 5-7 cigarettes per day   Substance Use Topics    Alcohol use: Yes     Comment: 1 drink a day     No family history on file.  History   Drug Use Unknown         Objective     There were no vitals taken for this visit.    Physical Exam    GENERAL APPEARANCE: healthy, alert and no distress     EYES: EOMI, PERRL     HENT: ear canals and TM's normal and nose without ulcers or lesions, oral cavity consistent with her overall diagnosis.  No infectious process that I can see based on my limited ability to examine this area today.     NECK: no adenopathy, difficult to assess due to tracheostomy but I do not see any other asymmetry     RESP: lungs clear to auscultation - no rales, rhonchi or wheezes     CV: regular rates and rhythm, normal S1 S2, no S3 or S4 and no murmur, click or rub     ABDOMEN:  soft, nontender, no HSM or masses and bowel sounds normal     MS: extremities normal- no gross deformities noted, no evidence of inflammation in joints, FROM in all extremities.     SKIN: no suspicious lesions or rashes     NEURO: Normal strength and tone, sensory exam grossly normal, mentation intact and speech normal     PSYCH: mentation appears normal. and affect normal/bright     LYMPHATICS: No cervical adenopathy    Recent Labs   Lab Test 09/13/23  1514 09/01/23  0615 08/23/23  0805 08/22/23  1104   HGB 9.9* 7.9*   < >  --    * 912*   < >  --    INR  --   --   --  1.06    138   < >  --    POTASSIUM 4.1 4.2   < >  --    CR 0.55 0.35*    < >  --     < > = values in this interval not displayed.        Diagnostics:  Labs pending at this time.  Results will be reviewed when available.   No EKG required for low risk surgery (cataract, skin procedure, breast biopsy, etc).    Revised Cardiac Risk Index (RCRI):  The patient has the following serious cardiovascular risks for perioperative complications:   - No serious cardiac risks = 0 points     RCRI Interpretation: 1 point: Class II (low risk - 0.9% complication rate)         Signed Electronically by: Ariel Campbell PA-C  Copy of this evaluation report is provided to requesting physician.

## 2023-10-16 NOTE — PROGRESS NOTES
[facility]  [address]  [facility] [address]  Phone: [phone]  Primary Provider: Sukumar Hendricks  Pre-op Performing Provider: RAMON HERNANDEZ    PREOPERATIVE EVALUATION:  Today's date: 10/16/2023    Adrianna is a 63 year old female who presents for a preoperative evaluation.    Surgical Information:  Surgery/Procedure: INSERTION, VASCULAR ACCESS PORT   Surgery Location: [facility]  Surgeon: Dr. Sheldon Lim  Surgery Date: 10/18/23  Time of Surgery: 8:30am  Where patient plans to recover: At home with family  Fax number for surgical facility: Note does not need to be faxed, will be available electronically in Epic.    Assessment & Plan     The proposed surgical procedure is considered LOW risk.    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  We do note that labs are pending.  Follow-up based on results.  Recently is being treated for a suspected dental abscess with Augmentin and has been on antibiotics for 4 days.  She feels fine and no new concerns in this arena.  We do note that she has some hypotension present today but states that this is fairly typical for her.  - CBC with platelets and differential; Future  - Comprehensive metabolic panel (BMP + Alb, Alk Phos, ALT, AST, Total. Bili, TP); Future  - CBC with platelets and differential  - Comprehensive metabolic panel (BMP + Alb, Alk Phos, ALT, AST, Total. Bili, TP)    Visit for screening mammogram  - MA SCREENING DIGITAL BILAT - Future  (s+30); Future    Screen for colon cancer  - Colonoscopy Screening  Referral; Future    Screening for HIV (human immunodeficiency virus)  Need for hepatitis C screening test  Cervical cancer screening  Declined screening at this point time  consider self low risk and the fact that she has treatment for oral cancer in place and that takes priority.              - No identified additional risk factors other than previously addressed    Antiplatelet or Anticoagulation Medication Instructions:   - Patient is on no antiplatelet or anticoagulation medications.    Additional Medication Instructions:  Patient is to take all scheduled medications on the day of surgery after surgery is completed.    RECOMMENDATION:  APPROVAL GIVEN to proceed with proposed procedure, without further diagnostic evaluation.    Subjective       HPI related to upcoming procedure: Port placement needed for chemotherapy regimen        10/16/2023     8:54 AM   Preop Questions   1. Have you ever had a heart attack or stroke? No   2. Have you ever had surgery on your heart or blood vessels, such as a stent placement, a coronary artery bypass, or surgery on an artery in your head, neck, heart, or legs? No   3. Do you have chest pain with activity? No   4. Do you have a history of  heart failure? No   5. Do you currently have a cold, bronchitis or symptoms of other infection? No   6. Do you have a cough, shortness of breath, or wheezing? No   7. Do you or anyone in your family have previous history of blood clots? No   8. Do you or does anyone in your family have a serious bleeding problem such as prolonged bleeding following surgeries or cuts? No   9. Have you ever had problems with anemia or been told to take iron pills? No   10. Have you had any abnormal blood loss such as black, tarry or bloody stools, or abnormal vaginal bleeding? No   11. Have you ever had a blood transfusion? UNKNOWN -    12. Are you willing to have a blood transfusion if it is medically needed before, during, or after your surgery? Yes   13. Have you or any of your relatives ever had problems with anesthesia? UNKNOWN -    14. Do you have sleep apnea, excessive snoring or daytime drowsiness? No   15. Do you have any  artifical heart valves or other implanted medical devices like a pacemaker, defibrillator, or continuous glucose monitor? No   16. Do you have artificial joints? No   17. Are you allergic to latex? No       Health Care Directive:  Patient does not have a Health Care Directive or Living Will:     Preoperative Review of :   reviewed - no record of controlled substances prescribed.    Review of Systems  CONSTITUTIONAL: NEGATIVE for fever, chills, change in weight  INTEGUMENTARY/SKIN: NEGATIVE for worrisome rashes, moles or lesions  EYES: NEGATIVE for vision changes or irritation  ENT/MOUTH: NEGATIVE for ear, mouth and throat problems  RESP: NEGATIVE for significant cough or SOB  CV: NEGATIVE for chest pain, palpitations or peripheral edema  GI: NEGATIVE for nausea, abdominal pain, heartburn, or change in bowel habits  : NEGATIVE for frequency, dysuria, or hematuria  MUSCULOSKELETAL: NEGATIVE for significant arthralgias or myalgia  NEURO: NEGATIVE for weakness, dizziness or paresthesias  ENDOCRINE: NEGATIVE for temperature intolerance, skin/hair changes  HEME: NEGATIVE for bleeding problems  PSYCHIATRIC: NEGATIVE for changes in mood or affect    Patient Active Problem List    Diagnosis Date Noted    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) 09/28/2023     Priority: Medium    Encounter for postoperative care 08/17/2023     Priority: Medium      Past Medical History:   Diagnosis Date    At risk for malnutrition     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)     Tobacco dependence syndrome      Past Surgical History:   Procedure Laterality Date    DISSECTION RADICAL NECK MODIFIED Bilateral 8/17/2023    Procedure: Bilateral modified radical neck dissection;  Surgeon: Tyshawn Dao MD;  Location: [facility]    ENT SURGERY      oral biopsy    EXCISE LESION LIP N/A 8/17/2023    Procedure: Chin resection;  Surgeon: Tyshawn Dao MD;  [facility]    GLOSSECTOMY PARTIAL N/A 8/17/2023    Procedure:  GLOSSECTOMY, PARTIAL;  Surgeon: Tyshawn Dao MD;  [facility]    GRAFT BONE FREE VASCULARIZED FROM LOWER EXTREMITY Left 8/17/2023    Procedure: Left fibula free flap;  Surgeon: Darien Thorne MD;  [facility]    GRAFT FREE VASCULARIZED (LOCATION) Left 8/17/2023    Procedure: anterolateral thigh free flap;  Surgeon: Darien Thorne MD;  [facility]    GRAFT SKIN SPLIT THICKNESS FROM EXTREMITY Left 8/17/2023    Procedure: Split thickness skin graft from left thigh;  Surgeon: Darien Thorne MD;  Location: [facility]    GYN SURGERY      tubal ligation    IR GASTROSTOMY TUBE PERCUTANEOUS PLCMNT  8/23/2023    IRRIGATION AND DEBRIDEMENT ORAL, COMBINED N/A 8/24/2023    Procedure: EXAM UNDER ANESTHESIA, ORAL CAVITY, IRRIGATION AND DEBRIDEMENT, ORAL CAVITY, neck dissection;  Surgeon: Darien Thorne MD;  [facility]    IRRIGATION AND DEBRIDEMENT ORAL, COMBINED N/A 8/29/2023    Procedure: EXCISIONAL DEBRIDEMENT NECK, IRRIGATION OF ORAL CAVITY;  Surgeon: Darien Thorne MD;  [facility]    MANDIBULECTOMY TOTAL N/A 8/17/2023    Procedure: segmental mandibulectomy;  Surgeon: Tyshawn Dao MD;  [facility]    TRACHEOSTOMY N/A 8/17/2023    Procedure: Tracheostomy placement, nasogastric tube placement;  Surgeon: Tyshawn Dao MD;  Location: [facility]     Current Outpatient Medications   Medication Sig Dispense Refill    amoxicillin-clavulanate (AUGMENTIN) 400-57 MG/5ML suspension Take 10.94 mLs (875 mg) by mouth 2 times daily for 7 days 153.16 mL 0    chlorhexidine (PERIDEX) 0.12 % solution Take 15 mLs by mouth 3 times daily 473 mL 1    mineral oil-hydrophilic petrolatum (AQUAPHOR) external ointment Apply topically every 8 hours 198 g 3    ondansetron (ZOFRAN ODT) 4 MG ODT tab Take 1 tablet (4 mg) by mouth every 6 hours as needed for nausea or vomiting 30 tablet 1    polyethylene glycol (MIRALAX) 17 g packet 17 g by Oral or Feeding Tube route 2 times daily as  needed for constipation 20 each 0    senna-docusate (SENOKOT-S/PERICOLACE) 8.6-50 MG tablet 2 tablets by Oral or Feeding Tube route 2 times daily as needed for constipation 30 tablet 0       No Known Allergies     Social History     Tobacco Use    Smoking status: Former     Packs/day: 1.00     Years: 40.00     Additional pack years: 0.00     Total pack years: 40.00     Types: Cigarettes     Passive exposure: Current    Smokeless tobacco: Never    Tobacco comments:     Cutting down to 5-7 cigarettes per day   Substance Use Topics    Alcohol use: Yes     Comment: 1 drink a day     No family history on file.  History   Drug Use Unknown         Objective     There were no vitals taken for this visit.    Physical Exam    GENERAL APPEARANCE: healthy, alert and no distress     EYES: EOMI, PERRL     HENT: ear canals and TM's normal and nose without ulcers or lesions, oral cavity consistent with her overall diagnosis.  No infectious process that I can see based on my limited ability to examine this area today.     NECK: no adenopathy, difficult to assess due to tracheostomy but I do not see any other asymmetry     RESP: lungs clear to auscultation - no rales, rhonchi or wheezes     CV: regular rates and rhythm, normal S1 S2, no S3 or S4 and no murmur, click or rub     ABDOMEN:  soft, nontender, no HSM or masses and bowel sounds normal     MS: extremities normal- no gross deformities noted, no evidence of inflammation in joints, FROM in all extremities.     SKIN: no suspicious lesions or rashes     NEURO: Normal strength and tone, sensory exam grossly normal, mentation intact and speech normal     PSYCH: mentation appears normal. and affect normal/bright     LYMPHATICS: No cervical adenopathy    Recent Labs   Lab Test 09/13/23  1514 09/01/23  0615 08/23/23  0805 08/22/23  1104   HGB 9.9* 7.9*   < >  --    * 912*   < >  --    INR  --   --   --  1.06    138   < >  --    POTASSIUM 4.1 4.2   < >  --    CR 0.55 0.35*    < >  --     < > = values in this interval not displayed.        Diagnostics:  Labs pending at this time.  Results will be reviewed when available.   No EKG required for low risk surgery (cataract, skin procedure, breast biopsy, etc).    Revised Cardiac Risk Index (RCRI):  The patient has the following serious cardiovascular risks for perioperative complications:   - No serious cardiac risks = 0 points     RCRI Interpretation: 1 point: Class II (low risk - 0.9% complication rate)         Signed Electronically by: Ariel Campbell PA-C  Copy of this evaluation report is provided to requesting physician.

## 2023-10-16 NOTE — PATIENT INSTRUCTIONS
Continue capping your trach during the daytime when you are awake, as tolerated     If you notice the following symptoms, please take off the trach cap:  -shortness of breath  -dizzy  -lightheaded  -cannot catch your breath        Please let us know if you have any questions/concerns.     Ok to initiate 1-2 mL of water trials via syringe. Sit upright when you do this. You may make a mess initially but getting started is important.

## 2023-10-16 NOTE — PATIENT INSTRUCTIONS
"You were seen in the clinic today by Dr. Thorne. If you have any questions or concerns after your appointment, please call the clinic at [phone]. Press \"1\" for scheduling, press \"3\" for nurse advice.    2.   The following has been recommended for you based upon your appointment today:   -Call us regarding how trach capping is going to discuss removal of trach    3.   Plan to return to the clinic 6 weeks after completion of radiation therapy for follow up with Dr. Feliberto DAVIDN, RN  [facility]  Department of Otolaryngology  [phone]   "

## 2023-10-16 NOTE — LETTER
10/16/2023       RE: Adrianna Pereira  [address] [address] [place] [id number]     Dear Colleague,    Thank you for referring your patient, Adrianna Pereira, to the [facility] [place] at [facility]. Please see a copy of my visit note below.    Head and Neck Surgery  10/16/23    Adrianna returns today for follow up. She is scheduled to start her adjuvant treatment this week.    She has no complaints today    Physical Examination:  Alert, NAD  Area of skin breakdown in the neck is almost entirely healed.   Her fibula skin paddle intraorally is well healed. There is a small area of granulation anteriorly with about 5 mm of exposed plate.   The anterior neck/chin alt is well healed.   No concerning lesions on lip or in the mouth  Left fibula healing well with a small area of exposed tendon    A/P:  Continuing to heal well. There is a small area of exposed plate anteriorly. She does not have redundancy of the fibula skin paddle or ALT to use either of them to cover the plate at this time. She needs to get started with her adjuvant therapy asap. We can deal with the exposed plate after treatment if needed. She will continue wound care to the fibula donor site.     She will continue her oncologic follow up with Dr Dao    I will see her as needed in the future.      Darien Thorne MD    25 minutes spent on the date of the encounter in chart review, patient visit, review of tests, documentation and/or discussion with other providers about the issues documented above.       Again, thank you for allowing me to participate in the care of your patient.      Sincerely,    Darien Thorne MD

## 2023-10-18 ENCOUNTER — APPOINTMENT (OUTPATIENT)
Dept: GENERAL RADIOLOGY | Facility: CLINIC | Age: 63
End: 2023-10-18
Attending: STUDENT IN AN ORGANIZED HEALTH CARE EDUCATION/TRAINING PROGRAM
Payer: COMMERCIAL

## 2023-10-18 ENCOUNTER — HOSPITAL ENCOUNTER (OUTPATIENT)
Facility: CLINIC | Age: 63
Discharge: HOME | End: 2023-10-18
Attending: STUDENT IN AN ORGANIZED HEALTH CARE EDUCATION/TRAINING PROGRAM | Admitting: STUDENT IN AN ORGANIZED HEALTH CARE EDUCATION/TRAINING PROGRAM
Payer: COMMERCIAL

## 2023-10-18 ENCOUNTER — ANESTHESIA (OUTPATIENT)
Dept: SURGERY | Facility: CLINIC | Age: 63
End: 2023-10-18
Payer: COMMERCIAL

## 2023-10-18 VITALS
BODY MASS INDEX: 18.44 KG/M2 | RESPIRATION RATE: 16 BRPM | DIASTOLIC BLOOD PRESSURE: 63 MMHG | OXYGEN SATURATION: 100 % | SYSTOLIC BLOOD PRESSURE: 136 MMHG | HEART RATE: 63 BPM | TEMPERATURE: 97.6 F | WEIGHT: 108 LBS | HEIGHT: 64 IN

## 2023-10-18 DIAGNOSIS — I89.0 LYMPHEDEMA: ICD-10-CM

## 2023-10-18 DIAGNOSIS — C06.9 SQUAMOUS CELL CARCINOMA OF ORAL CAVITY (H): Primary | ICD-10-CM

## 2023-10-18 PROCEDURE — 999N000065 XR CHEST PORT 1 VIEW

## 2023-10-18 PROCEDURE — 710N000012 HC RECOVERY PHASE 2, PER MINUTE: Performed by: STUDENT IN AN ORGANIZED HEALTH CARE EDUCATION/TRAINING PROGRAM

## 2023-10-18 PROCEDURE — 271N000001 HC OR GENERAL SUPPLY NON-STERILE: Performed by: STUDENT IN AN ORGANIZED HEALTH CARE EDUCATION/TRAINING PROGRAM

## 2023-10-18 PROCEDURE — 272N000001 HC OR GENERAL SUPPLY STERILE: Performed by: STUDENT IN AN ORGANIZED HEALTH CARE EDUCATION/TRAINING PROGRAM

## 2023-10-18 PROCEDURE — 250N000011 HC RX IP 250 OP 636: Mod: JZ | Performed by: NURSE ANESTHETIST, CERTIFIED REGISTERED

## 2023-10-18 PROCEDURE — 370N000017 HC ANESTHESIA TECHNICAL FEE, PER MIN: Performed by: STUDENT IN AN ORGANIZED HEALTH CARE EDUCATION/TRAINING PROGRAM

## 2023-10-18 PROCEDURE — 999N000180 XR SURGERY CARM FLUORO LESS THAN 5 MIN: Mod: TC

## 2023-10-18 PROCEDURE — 258N000003 HC RX IP 258 OP 636: Performed by: NURSE ANESTHETIST, CERTIFIED REGISTERED

## 2023-10-18 PROCEDURE — 77001 FLUOROGUIDE FOR VEIN DEVICE: CPT | Mod: 26 | Performed by: STUDENT IN AN ORGANIZED HEALTH CARE EDUCATION/TRAINING PROGRAM

## 2023-10-18 PROCEDURE — 250N000009 HC RX 250: Performed by: STUDENT IN AN ORGANIZED HEALTH CARE EDUCATION/TRAINING PROGRAM

## 2023-10-18 PROCEDURE — 250N000011 HC RX IP 250 OP 636: Performed by: STUDENT IN AN ORGANIZED HEALTH CARE EDUCATION/TRAINING PROGRAM

## 2023-10-18 PROCEDURE — C1788 PORT, INDWELLING, IMP: HCPCS | Performed by: STUDENT IN AN ORGANIZED HEALTH CARE EDUCATION/TRAINING PROGRAM

## 2023-10-18 PROCEDURE — 250N000009 HC RX 250: Performed by: NURSE ANESTHETIST, CERTIFIED REGISTERED

## 2023-10-18 PROCEDURE — 360N000082 HC SURGERY LEVEL 2 W/ FLUORO, PER MIN: Performed by: STUDENT IN AN ORGANIZED HEALTH CARE EDUCATION/TRAINING PROGRAM

## 2023-10-18 PROCEDURE — 999N000141 HC STATISTIC PRE-PROCEDURE NURSING ASSESSMENT: Performed by: STUDENT IN AN ORGANIZED HEALTH CARE EDUCATION/TRAINING PROGRAM

## 2023-10-18 PROCEDURE — 36561 INSERT TUNNELED CV CATH: CPT | Performed by: STUDENT IN AN ORGANIZED HEALTH CARE EDUCATION/TRAINING PROGRAM

## 2023-10-18 DEVICE — POWERPORT M.R.I. IMPLANTABLE PORT WITH ATTACHABLE 8F CHRONOFLEX OPEN-ENDED SINGLE-LUMEN VENOUS CATHETER INTERMEDIATE KIT  (WITH SUTURE PLUGS)
Type: IMPLANTABLE DEVICE | Site: NECK | Status: FUNCTIONAL
Brand: POWERPORT M.R.I., CHRONOFLEX

## 2023-10-18 RX ORDER — LIDOCAINE 40 MG/G
CREAM TOPICAL
Status: DISCONTINUED | OUTPATIENT
Start: 2023-10-18 | End: 2023-10-18 | Stop reason: HOSPADM

## 2023-10-18 RX ORDER — ACETAMINOPHEN 325 MG/1
650 TABLET ORAL EVERY 4 HOURS PRN
Qty: 50 TABLET | Refills: 0 | Status: SHIPPED | OUTPATIENT
Start: 2023-10-18

## 2023-10-18 RX ORDER — ONDANSETRON 8 MG/1
8 TABLET, FILM COATED ORAL EVERY 8 HOURS PRN
Qty: 30 TABLET | Refills: 2 | Status: SHIPPED | OUTPATIENT
Start: 2023-10-18 | End: 2024-01-05

## 2023-10-18 RX ORDER — OXYCODONE HYDROCHLORIDE 5 MG/1
5 TABLET ORAL
Status: DISCONTINUED | OUTPATIENT
Start: 2023-10-18 | End: 2023-10-18 | Stop reason: HOSPADM

## 2023-10-18 RX ORDER — ONDANSETRON 2 MG/ML
4 INJECTION INTRAMUSCULAR; INTRAVENOUS EVERY 30 MIN PRN
Status: DISCONTINUED | OUTPATIENT
Start: 2023-10-18 | End: 2023-10-18 | Stop reason: HOSPADM

## 2023-10-18 RX ORDER — GLYCOPYRROLATE 0.2 MG/ML
INJECTION, SOLUTION INTRAMUSCULAR; INTRAVENOUS PRN
Status: DISCONTINUED | OUTPATIENT
Start: 2023-10-18 | End: 2023-10-18

## 2023-10-18 RX ORDER — CEFAZOLIN SODIUM/WATER 2 G/20 ML
2 SYRINGE (ML) INTRAVENOUS
Status: COMPLETED | OUTPATIENT
Start: 2023-10-18 | End: 2023-10-18

## 2023-10-18 RX ORDER — LIDOCAINE HYDROCHLORIDE 20 MG/ML
INJECTION, SOLUTION INFILTRATION; PERINEURAL PRN
Status: DISCONTINUED | OUTPATIENT
Start: 2023-10-18 | End: 2023-10-18

## 2023-10-18 RX ORDER — DEXAMETHASONE 4 MG/1
8 TABLET ORAL DAILY
Qty: 12 TABLET | Refills: 2 | Status: SHIPPED | OUTPATIENT
Start: 2023-10-18 | End: 2023-11-14

## 2023-10-18 RX ORDER — BUPIVACAINE HYDROCHLORIDE 5 MG/ML
INJECTION, SOLUTION PERINEURAL PRN
Status: DISCONTINUED | OUTPATIENT
Start: 2023-10-18 | End: 2023-10-18 | Stop reason: HOSPADM

## 2023-10-18 RX ORDER — ACETAMINOPHEN 325 MG/1
975 TABLET ORAL ONCE
Status: DISCONTINUED | OUTPATIENT
Start: 2023-10-18 | End: 2023-10-18

## 2023-10-18 RX ORDER — SODIUM CHLORIDE, SODIUM LACTATE, POTASSIUM CHLORIDE, CALCIUM CHLORIDE 600; 310; 30; 20 MG/100ML; MG/100ML; MG/100ML; MG/100ML
INJECTION, SOLUTION INTRAVENOUS CONTINUOUS
Status: DISCONTINUED | OUTPATIENT
Start: 2023-10-18 | End: 2023-10-18 | Stop reason: HOSPADM

## 2023-10-18 RX ORDER — CEFAZOLIN SODIUM/WATER 2 G/20 ML
2 SYRINGE (ML) INTRAVENOUS SEE ADMIN INSTRUCTIONS
Status: DISCONTINUED | OUTPATIENT
Start: 2023-10-18 | End: 2023-10-18 | Stop reason: HOSPADM

## 2023-10-18 RX ORDER — PROCHLORPERAZINE MALEATE 10 MG
10 TABLET ORAL EVERY 6 HOURS PRN
Qty: 30 TABLET | Refills: 2 | Status: SHIPPED | OUTPATIENT
Start: 2023-10-18 | End: 2023-10-18

## 2023-10-18 RX ORDER — LIDOCAINE HYDROCHLORIDE AND EPINEPHRINE 10; 10 MG/ML; UG/ML
INJECTION, SOLUTION INFILTRATION; PERINEURAL PRN
Status: DISCONTINUED | OUTPATIENT
Start: 2023-10-18 | End: 2023-10-18 | Stop reason: HOSPADM

## 2023-10-18 RX ORDER — IBUPROFEN 600 MG/1
600 TABLET, FILM COATED ORAL EVERY 6 HOURS PRN
Qty: 90 TABLET | Refills: 0 | Status: SHIPPED | OUTPATIENT
Start: 2023-10-18 | End: 2024-02-04

## 2023-10-18 RX ORDER — ONDANSETRON 2 MG/ML
INJECTION INTRAMUSCULAR; INTRAVENOUS PRN
Status: DISCONTINUED | OUTPATIENT
Start: 2023-10-18 | End: 2023-10-18

## 2023-10-18 RX ORDER — OXYCODONE HYDROCHLORIDE 5 MG/1
10 TABLET ORAL
Status: DISCONTINUED | OUTPATIENT
Start: 2023-10-18 | End: 2023-10-18 | Stop reason: HOSPADM

## 2023-10-18 RX ORDER — ONDANSETRON 4 MG/1
4 TABLET, ORALLY DISINTEGRATING ORAL EVERY 30 MIN PRN
Status: DISCONTINUED | OUTPATIENT
Start: 2023-10-18 | End: 2023-10-18 | Stop reason: HOSPADM

## 2023-10-18 RX ORDER — GABAPENTIN 300 MG/1
300 CAPSULE ORAL
Status: COMPLETED | OUTPATIENT
Start: 2023-10-18 | End: 2023-10-18

## 2023-10-18 RX ORDER — PROCHLORPERAZINE MALEATE 10 MG
10 TABLET ORAL EVERY 6 HOURS PRN
Qty: 30 TABLET | Refills: 2 | Status: SHIPPED | OUTPATIENT
Start: 2023-10-18 | End: 2024-01-05

## 2023-10-18 RX ORDER — PROPOFOL 10 MG/ML
INJECTION, EMULSION INTRAVENOUS CONTINUOUS PRN
Status: DISCONTINUED | OUTPATIENT
Start: 2023-10-18 | End: 2023-10-18

## 2023-10-18 RX ORDER — FENTANYL CITRATE 50 UG/ML
INJECTION, SOLUTION INTRAMUSCULAR; INTRAVENOUS PRN
Status: DISCONTINUED | OUTPATIENT
Start: 2023-10-18 | End: 2023-10-18

## 2023-10-18 RX ORDER — ACETAMINOPHEN 325 MG/1
650 TABLET ORAL
Status: DISCONTINUED | OUTPATIENT
Start: 2023-10-18 | End: 2023-10-18 | Stop reason: HOSPADM

## 2023-10-18 RX ORDER — ACETAMINOPHEN 325 MG/1
975 TABLET ORAL ONCE
Status: COMPLETED | OUTPATIENT
Start: 2023-10-18 | End: 2023-10-18

## 2023-10-18 RX ADMIN — Medication 2 G: at 09:38

## 2023-10-18 RX ADMIN — LIDOCAINE HYDROCHLORIDE 0.1 ML: 10 INJECTION, SOLUTION EPIDURAL; INFILTRATION; INTRACAUDAL; PERINEURAL at 09:23

## 2023-10-18 RX ADMIN — FENTANYL CITRATE 25 MCG: 50 INJECTION INTRAMUSCULAR; INTRAVENOUS at 10:47

## 2023-10-18 RX ADMIN — SODIUM CHLORIDE, POTASSIUM CHLORIDE, SODIUM LACTATE AND CALCIUM CHLORIDE: 600; 310; 30; 20 INJECTION, SOLUTION INTRAVENOUS at 09:23

## 2023-10-18 RX ADMIN — LIDOCAINE HYDROCHLORIDE 100 MG: 20 INJECTION, SOLUTION INFILTRATION; PERINEURAL at 09:40

## 2023-10-18 RX ADMIN — FENTANYL CITRATE 25 MCG: 50 INJECTION INTRAMUSCULAR; INTRAVENOUS at 10:11

## 2023-10-18 RX ADMIN — PROPOFOL 100 MCG/KG/MIN: 10 INJECTION, EMULSION INTRAVENOUS at 09:40

## 2023-10-18 RX ADMIN — ONDANSETRON 4 MG: 2 INJECTION INTRAMUSCULAR; INTRAVENOUS at 10:44

## 2023-10-18 RX ADMIN — SODIUM CHLORIDE, POTASSIUM CHLORIDE, SODIUM LACTATE AND CALCIUM CHLORIDE: 600; 310; 30; 20 INJECTION, SOLUTION INTRAVENOUS at 09:38

## 2023-10-18 RX ADMIN — GLYCOPYRROLATE 0.2 MG: 0.2 INJECTION, SOLUTION INTRAMUSCULAR; INTRAVENOUS at 09:43

## 2023-10-18 RX ADMIN — MIDAZOLAM 1 MG: 1 INJECTION INTRAMUSCULAR; INTRAVENOUS at 09:36

## 2023-10-18 ASSESSMENT — ACTIVITIES OF DAILY LIVING (ADL)
ADLS_ACUITY_SCORE: 35
ADLS_ACUITY_SCORE: 35

## 2023-10-18 NOTE — PROGRESS NOTES
Head and Neck Surgery  10/16/23    Adrianna returns today for follow up. She is scheduled to start her adjuvant treatment this week.    She has no complaints today    Physical Examination:  Alert, NAD  Area of skin breakdown in the neck is almost entirely healed.   Her fibula skin paddle intraorally is well healed. There is a small area of granulation anteriorly with about 5 mm of exposed plate.   The anterior neck/chin alt is well healed.   No concerning lesions on lip or in the mouth  Left fibula healing well with a small area of exposed tendon    A/P:  Continuing to heal well. There is a small area of exposed plate anteriorly. She does not have redundancy of the fibula skin paddle or ALT to use either of them to cover the plate at this time. She needs to get started with her adjuvant therapy asap. We can deal with the exposed plate after treatment if needed. She will continue wound care to the fibula donor site.     She will continue her oncologic follow up with Dr Dao    I will see her as needed in the future.      Darien Thorne MD    25 minutes spent on the date of the encounter in chart review, patient visit, review of tests, documentation and/or discussion with other providers about the issues documented above.

## 2023-10-18 NOTE — OP NOTE
Procedure Date: October 18, 2023    PREOPERATIVE DIAGNOSIS: Squamous cell carcinoma of oral cavity  POSTOPERATIVE DIAGNOSIS: Same  PROCEDURE:  1. Placement of right Internal Jugular Infusion port (8 Samoan Bard Power-Port)  2. Ultrasound and Fluoroscopic guidance and confirmation of port placement.    ATTENDING SURGEON and Assistants:   Surgeon(s):  Sheldon Lim MD Ashley Witek, PA - Assistance was necessary for retraction and exposure during the procedure    ESTIMATED BLOOD LOSS: 5 mL    INDICATIONS:Ms. Adrianna Pereira is a 63 year old female who presents with a history of squamous cell carcinoma of oral cavity. The patient is in need of an infusion port for chemotherapy access, and a discussion was held with the patient regarding indications, risks, benefits, and alternatives (including, but not limited to, risks of bleeding, infection, pneumothorax, need for revision, thrombosis, stenosis, port malposition/malfunction, cardiac arrhythmia, and the rare risk of gas embolism). She understood the information given, questions were addressed, and she wishes to proceed.     DESCRIPTION OF PROCEDURE: The patient was brought to the operating room and placed supine on the operating room table. Sedative agents were administered by the anesthesia service. The bilateral chest and neck were then prepped and draped in the usual sterile fashion. The patient was placed in Trendelenburg position. The ultrasound probe was outfitted with a sterile sheath, and the right internal jugular vein accessed after a single pass of the introducer needle. A guidewire was advanced under direct fluoroscopic guidance, and the trajectory was confirmed toward the superior vena cava/heart. Additional local anesthesia was used, and a cutdown was created two finger breadths below the clavicle at the ipsilateral chest wall. The incision was deepened to create an appropriate pocket to allow the port reservoir to comfortably reside. The catheter was  tunneled from the pocket to the neck incision using the tunneling device. An introducer sheath was then passed over the guidewire without difficulty, under direct fluoroscopic guidance, and the dilator and guidewire removed. The infusion port catheter was then passed until the tip the catheter was in the region of the cavo-atrial junction via fluoroscopy (to a depth of 15 cm). The sheath was torn away in the standard fashion. The catheter was then manicured to an appropriate length, secured to the infusion port and the locking collar affixed. The port was easily flushed with a heparin solution, after assuring ease of aspiration. The infusion port itself was then transfixed to the tissue in the base of the pocket using interrupted 2-0 prolene suture. The final catheter position was again assessed under fluoroscopy and appeared satisfactory, with no abnormal trajectory or kinks noted, and with the tip of the catheter again noted at the cavo-atrial junction. Hemostasis was assured. An instrument count was conducted, and included laparotomy pads, needles and sponges, and was found to be correct. The neck wound was closed using an interrupted 4-0 monocryl suture. The subcutaneous tissue on the chest wound was closed with interrupted 3-0 Vicryl. Skin edges were re-approximated using 4-0 Monocryl. Both wounds were dressed with dermabond.    The patient tolerated the procedure well, was allowed to recover and was seated upright for a post-procedure chest x-ray. She was then transferred to the recovery room in stable condition.     Sheldon Lim MD on 10/18/2023 at 10:48 AM

## 2023-10-18 NOTE — ANESTHESIA CARE TRANSFER NOTE
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    Anesthesia Type:   General     Note:    Oropharynx: oropharynx clear of all foreign objects  Level of Consciousness: awake  Oxygen Supplementation: room air    Independent Airway: airway patency satisfactory and stable  Dentition: dentition unchanged  Vital Signs Stable: post-procedure vital signs reviewed and stable  Report to RN Given: handoff report given  Patient transferred to: Phase II  Comments: New Shiley 6.5 mm inner canula inserted at end of case  Handoff Report: Identifed the Patient, Identified the Reponsible Provider, Reviewed the pertinent medical history, Discussed the surgical course, Reviewed Intra-OP anesthesia mangement and issues during anesthesia, Set expectations for post-procedure period and Allowed opportunity for questions and acknowledgement of understanding      Vitals:  Vitals Value Taken Time   /50 10/18/23 1100   Temp 36.2  C (97.2  F) 10/18/23 1100   Pulse 86 10/18/23 1100   Resp 16 10/18/23 1100   SpO2 100 % 10/18/23 1106   Vitals shown include unfiled device data.    Electronically Signed By: ELISABETH Yip CRNA  October 18, 2023  11:06 AM

## 2023-10-18 NOTE — PROGRESS NOTES
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  Stressed importance of calling us in event of unexplained signs or symptoms, fever, increased tinnitus or hearing loss; stressed importance of good hydration  Follow-up with me in 1 week    Time with patient including review, documentation, history, examination, coordination of care and counseling was 20 minutes.    History of present illness:  In July, 2023 the patient presented to Dr. Darien Thorne for history of previous CO2 laser ablation for premalignant oral cavity lesions and more recent swelling of the lower lip and chin.    7/3/2023 left mandibular alveolus biopsy revealed an invasive keratinizing moderately differential squamous cell carcinoma.  7/13/2023 PET/dedicated CT revealed a 4.4 x 3.7 x 3.2 cm anterior oral cavity mass invading into the mandible with a  separate gluco-avid nodule on the right mandibular buccal mucosa and bilateral level 1B, level 2 and L3 metastatic lymphadenopathy without evidence of metastatic disease.    On 8/17/2023, she underwent segmental mandibulectomy, floor of mouth resection, anterior glossectomy, left fibular free flap, skin grafting, and tracheostomy revealing a pT4a, N3b tumor with positive left anterior lateral soft tissue margin, 11/98+ lymph nodes including STEFFEN and several bilateral lymph nodes consistent with involvement by low-grade B-cell neoplasm suggestive of CLL/SLL.    On 8/25/2023, head neck tumor conference recommended consideration of postoperative chemoradiation.  Postoperative course has been complicated by 8/24/2023 return to the operating room for debridement of multiple areas of skin necrosis and resuturing of neck incision and intraoral flap.    On 10/3/2023, the patient was seen by audiology with left>right sensorineural hearing loss.  On 10/18/2023, an implanted port was placed by Raphael Goodman.    Past medical history:  Remarkable for past 1/2 ppd tobacco use (until 8/2023), former alcohol abuse for which she considers herself an alcoholic although she does not use alcohol now.    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    Medications:  Current Outpatient Medications   Medication    acetaminophen (TYLENOL) 325 MG tablet    chlorhexidine (PERIDEX) 0.12 % solution    dexAMETHasone (DECADRON) 4 MG tablet    mineral oil-hydrophilic petrolatum (AQUAPHOR) external ointment    ondansetron (ZOFRAN) 8 MG tablet    polyethylene glycol (MIRALAX) 17 g packet    prochlorperazine (COMPAZINE) 10 MG tablet    ibuprofen (ADVIL/MOTRIN) 600 MG tablet    ondansetron  "(ZOFRAN ODT) 4 MG ODT tab    senna-docusate (SENOKOT-S/PERICOLACE) 8.6-50 MG tablet     No current facility-administered medications for this visit.       Allergies:  No Known Allergies    Physical examination:  /47   Pulse 72   Temp 97  F (36.1  C)   Ht 1.613 m (5' 3.5\")   Wt 48.4 kg (106 lb 11.2 oz)   SpO2 98%   BMI 18.60 kg/m      The patient is alert and oriented.    Rai Castillo MD          "

## 2023-10-18 NOTE — ANESTHESIA POSTPROCEDURE EVALUATION
Patient: Adrianna Pereira    Procedure: Procedure(s):  INSERTION, VASCULAR ACCESS PORT, Right Internal Jugular       Anesthesia Type:  General    Note:  Disposition: Outpatient   Postop Pain Control: Uneventful            Sign Out: Well controlled pain   PONV: No   Neuro/Psych: Uneventful            Sign Out: Acceptable/Baseline neuro status   Airway/Respiratory: Uneventful            Sign Out: Acceptable/Baseline resp. status   CV/Hemodynamics: Uneventful            Sign Out: Acceptable CV status; No obvious hypovolemia; No obvious fluid overload   Other NRE: NONE   DID A NON-ROUTINE EVENT OCCUR? No           Last vitals:  Vitals Value Taken Time   /60 10/18/23 1130   Temp 36.2  C (97.2  F) 10/18/23 1100   Pulse 77 10/18/23 1130   Resp 16 10/18/23 1130   SpO2 100 % 10/18/23 1142   Vitals shown include unfiled device data.    Electronically Signed By: ELISABETH Yip CRNA  October 18, 2023  11:42 AM

## 2023-10-18 NOTE — DISCHARGE INSTRUCTIONS
Same Day Surgery Discharge Instructions  Special Precautions After Surgery - Adult    It is not unusual to feel lightheaded or faint, up to 24 hours after surgery or while taking pain medication.  If you have these symptoms; sit for a few minutes before standing and have someone assist you when getting up.  You should rest and relax for the next 24 hours and must have someone stay with you for at least 24 hours after your discharge.  DO NOT DRIVE any vehicle or operate mechanical equipment for 24 hours following the end of your surgery.  DO NOT DRIVE while taking narcotic pain medications that have been prescribed by your physician.  If you had a limb operated on, you must be able to use it fully to drive.  DO NOT drink alcoholic beverages for 24 hours following surgery or while taking prescription pain medication.  Drink clear liquids (apple juice, ginger ale, broth, 7-Up, etc.).  Progress to your regular diet as you feel able.  Any questions call your physician and do not make important decisions for 24 hours.    __________________________________________________________________________________________________________________________________  IMPORTANT NUMBERS:    [facility] [place]  [phone], [phone]  Pharmacy:  [phone]  Same Day Surgery:  [phone], Monday - Friday until 8:30 p.m.  Urgent Care:  [phone]  Emergency Room:  [phone]      [facility]:  [phone]                                                                             [facility]:  [phone] option 1  [facility]:  [phone]     [facility]:  [phone]   [facility]:  [phone]   Home Medical Equipment: [phone]  [facility]:  [phone]

## 2023-10-19 ENCOUNTER — PATIENT OUTREACH (OUTPATIENT)
Dept: ONCOLOGY | Facility: CLINIC | Age: 63
End: 2023-10-19
Payer: COMMERCIAL

## 2023-10-19 ENCOUNTER — APPOINTMENT (OUTPATIENT)
Dept: RADIATION THERAPY | Facility: OUTPATIENT CENTER | Age: 63
End: 2023-10-19
Payer: COMMERCIAL

## 2023-10-19 ASSESSMENT — ACTIVITIES OF DAILY LIVING (ADL): DEPENDENT_IADLS:: INDEPENDENT

## 2023-10-19 NOTE — PROGRESS NOTES
[facility]: Cancer Care Plan of Care Education Note                                    Discussion with Patient:                                                      Chemotherapy teach completed today with patient and her . Side effects and nausea medications reviewed with patient. All questions answered. No concerns noted at this time.      Antiemetics: Dexamethasone, Zofran and compazine        Assessment:                                                      Assessment completed with:: Patient;Spouse or significant other    Plan of Care Education   Yearly learning assessment completed?: Yes (see Education tab)  Diagnosis:: Head and Neck Cancer  Does patient understand diagnosis?: Yes  Does patient understand treatment plan/regimen?: Yes  Preparing for treatment:: Reviewed treatment preparation information with patient (vascular access, day of chemo, visitor policy, what to bring, etc.)  Side effect education:: Diarrhea/Constipation;Mylosuppression;Nausea/Vomiting;DVT/PE;Endocrine effects;Neuropathy;Fatigue;Hair loss;Skin changes;Immune-mediated effects;Infection;Lab value monitoring (anemia, neutropenia, thrombocytopenia);Mouth sores  Safety/self care at home reviewed with patient:: No  Coping - concerns/fears reviewed with patient:: Yes  Plan of Care:: Lab appointment;Imaging;MD follow-up appointment;Treatment schedule;ZACK follow-up appointment  When to call provider:: Bleeding;Increased shortness of breath;New/worsening pain;Shaking chills;Temperature >100.4F;Uncontrolled diarrhea/constipation;Uncontrolled nausea/vomiting    Evaluation of Learning  Patient Education Provided: Yes  Readiness:: Acceptance  Method:: Booklet/Handout;Explanation  Response:: Verbalizes understanding    No assessment indicated    Intervention/Education provided during outreach:                                                       Calendar printed for patient with treatment dates and times after chemotherapy teach  completed with patient.     Patient to follow up as scheduled at next appt    Signature:  Maya Mao RN

## 2023-10-20 ENCOUNTER — LAB (OUTPATIENT)
Dept: INFUSION THERAPY | Facility: CLINIC | Age: 63
End: 2023-10-20
Attending: INTERNAL MEDICINE
Payer: COMMERCIAL

## 2023-10-20 ENCOUNTER — ONCOLOGY VISIT (OUTPATIENT)
Dept: ONCOLOGY | Facility: CLINIC | Age: 63
End: 2023-10-20
Attending: INTERNAL MEDICINE
Payer: COMMERCIAL

## 2023-10-20 ENCOUNTER — PATIENT OUTREACH (OUTPATIENT)
Dept: ONCOLOGY | Facility: CLINIC | Age: 63
End: 2023-10-20

## 2023-10-20 ENCOUNTER — APPOINTMENT (OUTPATIENT)
Dept: RADIATION THERAPY | Facility: OUTPATIENT CENTER | Age: 63
End: 2023-10-20
Payer: COMMERCIAL

## 2023-10-20 VITALS
WEIGHT: 106.7 LBS | OXYGEN SATURATION: 98 % | HEIGHT: 64 IN | TEMPERATURE: 97 F | DIASTOLIC BLOOD PRESSURE: 47 MMHG | SYSTOLIC BLOOD PRESSURE: 113 MMHG | BODY MASS INDEX: 18.22 KG/M2 | HEART RATE: 72 BPM

## 2023-10-20 VITALS — DIASTOLIC BLOOD PRESSURE: 77 MMHG | SYSTOLIC BLOOD PRESSURE: 121 MMHG | HEART RATE: 67 BPM

## 2023-10-20 DIAGNOSIS — C06.9 SQUAMOUS CELL CARCINOMA OF ORAL CAVITY (H): Primary | ICD-10-CM

## 2023-10-20 LAB
ALBUMIN SERPL BCG-MCNC: 3.8 G/DL (ref 3.5–5.2)
ALP SERPL-CCNC: 72 U/L (ref 35–104)
ALT SERPL W P-5'-P-CCNC: 9 U/L (ref 0–50)
ANION GAP SERPL CALCULATED.3IONS-SCNC: 13 MMOL/L (ref 7–15)
AST SERPL W P-5'-P-CCNC: 16 U/L (ref 0–45)
BASO+EOS+MONOS # BLD AUTO: ABNORMAL 10*3/UL
BASO+EOS+MONOS NFR BLD AUTO: ABNORMAL %
BASOPHILS # BLD AUTO: 0.1 10E3/UL (ref 0–0.2)
BASOPHILS NFR BLD AUTO: 1 %
BILIRUB SERPL-MCNC: <0.2 MG/DL
BUN SERPL-MCNC: 18 MG/DL (ref 8–23)
CALCIUM SERPL-MCNC: 9.6 MG/DL (ref 8.8–10.2)
CHLORIDE SERPL-SCNC: 104 MMOL/L (ref 98–107)
CREAT SERPL-MCNC: 0.43 MG/DL (ref 0.51–0.95)
DEPRECATED HCO3 PLAS-SCNC: 22 MMOL/L (ref 22–29)
EGFRCR SERPLBLD CKD-EPI 2021: >90 ML/MIN/1.73M2
EOSINOPHIL # BLD AUTO: 0.3 10E3/UL (ref 0–0.7)
EOSINOPHIL NFR BLD AUTO: 3 %
ERYTHROCYTE [DISTWIDTH] IN BLOOD BY AUTOMATED COUNT: 15.2 % (ref 10–15)
GLUCOSE SERPL-MCNC: 83 MG/DL (ref 70–99)
HCT VFR BLD AUTO: 29.8 % (ref 35–47)
HGB BLD-MCNC: 9.6 G/DL (ref 11.7–15.7)
IMM GRANULOCYTES # BLD: 0 10E3/UL
IMM GRANULOCYTES NFR BLD: 0 %
LYMPHOCYTES # BLD AUTO: 3 10E3/UL (ref 0.8–5.3)
LYMPHOCYTES NFR BLD AUTO: 32 %
MAGNESIUM SERPL-MCNC: 2.2 MG/DL (ref 1.7–2.3)
MCH RBC QN AUTO: 32 PG (ref 26.5–33)
MCHC RBC AUTO-ENTMCNC: 32.2 G/DL (ref 31.5–36.5)
MCV RBC AUTO: 99 FL (ref 78–100)
MONOCYTES # BLD AUTO: 0.7 10E3/UL (ref 0–1.3)
MONOCYTES NFR BLD AUTO: 7 %
NEUTROPHILS # BLD AUTO: 5.4 10E3/UL (ref 1.6–8.3)
NEUTROPHILS NFR BLD AUTO: 57 %
NRBC # BLD AUTO: 0 10E3/UL
NRBC BLD AUTO-RTO: 0 /100
PLATELET # BLD AUTO: 389 10E3/UL (ref 150–450)
POTASSIUM SERPL-SCNC: 4.4 MMOL/L (ref 3.4–5.3)
PROT SERPL-MCNC: 6.9 G/DL (ref 6.4–8.3)
RBC # BLD AUTO: 3 10E6/UL (ref 3.8–5.2)
SODIUM SERPL-SCNC: 139 MMOL/L (ref 135–145)
WBC # BLD AUTO: 9.5 10E3/UL (ref 4–11)

## 2023-10-20 PROCEDURE — 84134 ASSAY OF PREALBUMIN: CPT | Performed by: INTERNAL MEDICINE

## 2023-10-20 PROCEDURE — 96367 TX/PROPH/DG ADDL SEQ IV INF: CPT

## 2023-10-20 PROCEDURE — 83735 ASSAY OF MAGNESIUM: CPT | Performed by: INTERNAL MEDICINE

## 2023-10-20 PROCEDURE — 96375 TX/PRO/DX INJ NEW DRUG ADDON: CPT

## 2023-10-20 PROCEDURE — 99214 OFFICE O/P EST MOD 30 MIN: CPT | Performed by: INTERNAL MEDICINE

## 2023-10-20 PROCEDURE — 99212 OFFICE O/P EST SF 10 MIN: CPT | Mod: 25 | Performed by: INTERNAL MEDICINE

## 2023-10-20 PROCEDURE — 80053 COMPREHEN METABOLIC PANEL: CPT | Performed by: INTERNAL MEDICINE

## 2023-10-20 PROCEDURE — 85025 COMPLETE CBC W/AUTO DIFF WBC: CPT | Performed by: INTERNAL MEDICINE

## 2023-10-20 PROCEDURE — 96413 CHEMO IV INFUSION 1 HR: CPT

## 2023-10-20 PROCEDURE — 36591 DRAW BLOOD OFF VENOUS DEVICE: CPT | Performed by: INTERNAL MEDICINE

## 2023-10-20 PROCEDURE — 250N000011 HC RX IP 250 OP 636: Performed by: INTERNAL MEDICINE

## 2023-10-20 PROCEDURE — 258N000003 HC RX IP 258 OP 636: Performed by: INTERNAL MEDICINE

## 2023-10-20 RX ORDER — HEPARIN SODIUM (PORCINE) LOCK FLUSH IV SOLN 100 UNIT/ML 100 UNIT/ML
5 SOLUTION INTRAVENOUS
Status: DISCONTINUED | OUTPATIENT
Start: 2023-10-20 | End: 2023-10-20 | Stop reason: HOSPADM

## 2023-10-20 RX ORDER — PALONOSETRON 0.05 MG/ML
0.25 INJECTION, SOLUTION INTRAVENOUS ONCE
Status: COMPLETED | OUTPATIENT
Start: 2023-10-20 | End: 2023-10-20

## 2023-10-20 RX ADMIN — PALONOSETRON 0.25 MG: 0.05 INJECTION, SOLUTION INTRAVENOUS at 13:15

## 2023-10-20 RX ADMIN — FAMOTIDINE 20 MG: 10 INJECTION INTRAVENOUS at 13:14

## 2023-10-20 RX ADMIN — DEXAMETHASONE SODIUM PHOSPHATE: 10 INJECTION, SOLUTION INTRAMUSCULAR; INTRAVENOUS at 13:17

## 2023-10-20 RX ADMIN — Medication 5 ML: at 14:53

## 2023-10-20 RX ADMIN — SODIUM CHLORIDE 1000 ML: 9 INJECTION, SOLUTION INTRAVENOUS at 12:22

## 2023-10-20 RX ADMIN — CISPLATIN 58 MG: 1 INJECTION, SOLUTION INTRAVENOUS at 13:50

## 2023-10-20 ASSESSMENT — PAIN SCALES - GENERAL: PAINLEVEL: NO PAIN (0)

## 2023-10-20 NOTE — PATIENT INSTRUCTIONS
Lisy Rodas,    Please take the the following medications:    Dexamethasone 4 mg - Take 2 tablets by mouth for 3 days.   Start the day after chemo on days 2,3,4  The again on day 9,10,11.  Take with food.     Ondansetron 8 mg - Take 1 tablet by mouth as needed every 8 hours for nausea/ vomiting.     Prochlorperazine 10 mg - Take 1 tablet by mouth every 6 hours as needed for nausea/vomiting.

## 2023-10-20 NOTE — PROGRESS NOTES
"Oncology Rooming Note    October 20, 2023 11:31 AM   Adrianna Pereira is a 63 year old female who presents for:    No chief complaint on file.    Initial Vitals: /47   Pulse 72   Temp 97  F (36.1  C)   Ht 1.613 m (5' 3.5\")   Wt 48.4 kg (106 lb 11.2 oz)   SpO2 98%   BMI 18.60 kg/m   Estimated body mass index is 18.6 kg/m  as calculated from the following:    Height as of this encounter: 1.613 m (5' 3.5\").    Weight as of this encounter: 48.4 kg (106 lb 11.2 oz). Body surface area is 1.47 meters squared.  No Pain (0) Comment: Data Unavailable   No LMP recorded. Patient is postmenopausal.  Allergies reviewed: Yes  Medications reviewed: Yes    Medications: Medication refills not needed today.  Pharmacy name entered into EPIC:    [id number] [place], [place] - [address] [place] [address] PHARMACY - [place], [place] - [facility] [address]    Clinical concerns: Patient here prior to starting 1st chemotherapy cycle. No concerns noted at this time.   Dr. Castillo was notified.      Maya Mao RN            "

## 2023-10-20 NOTE — PROGRESS NOTES
Infusion Nursing Note:  Adrianna Pereira presents today for Cisplatin C1D1.    Patient seen by provider today: Yes: Dr. Castillo   present during visit today: Not Applicable.    Note: Oriented patient and  to infusion center. Gave handout pertaining to Cisplatin.   Answered all questions.     Intravenous Access:  Implanted Port.    Treatment Conditions:  Lab Results   Component Value Date    HGB 9.6 (L) 10/20/2023    WBC 9.5 10/20/2023    ANEUTAUTO 5.4 10/20/2023     10/20/2023        Lab Results   Component Value Date     10/20/2023    POTASSIUM 4.4 10/20/2023    MAG 2.2 10/20/2023    CR 0.43 (L) 10/20/2023    RACHAEL 9.6 10/20/2023    BILITOTAL <0.2 10/20/2023    ALBUMIN 3.8 10/20/2023    ALT 9 10/20/2023    AST 16 10/20/2023       Results reviewed, labs MET treatment parameters, ok to proceed with treatment.    Post Infusion Assessment:  Patient tolerated infusion without incident.  Blood return noted pre and post infusion.  Site patent and intact, free from redness, edema or discomfort.  No evidence of extravasations.  Access discontinued per protocol.     Discharge Plan:   Patient discharged in stable condition accompanied by: self.  Departure Mode: Ambulatory.    Reynaldo Ibrahim RN

## 2023-10-20 NOTE — LETTER
10/20/2023         RE: Adrianna Pereira  [address] [address] [place] [id number]        Dear Colleague,    Thank you for referring your patient, Adrianna Pereira, to the [facility]. Please see a copy of my visit note below.    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  Stressed importance of calling us in event of unexplained signs or symptoms, fever, increased tinnitus or hearing loss; stressed importance of good hydration  Follow-up with me in 1 week    Time with patient including review, documentation, history, examination, coordination of care and counseling was 20 minutes.    History of present illness:  In July, 2023 the patient presented to Dr. Darien Thorne for history of previous CO2 laser ablation for premalignant oral cavity lesions and more recent swelling of the lower lip and  chin.    7/3/2023 left mandibular alveolus biopsy revealed an invasive keratinizing moderately differential squamous cell carcinoma.  7/13/2023 PET/dedicated CT revealed a 4.4 x 3.7 x 3.2 cm anterior oral cavity mass invading into the mandible with a separate gluco-avid nodule on the right mandibular buccal mucosa and bilateral level 1B, level 2 and L3 metastatic lymphadenopathy without evidence of metastatic disease.    On 8/17/2023, she underwent segmental mandibulectomy, floor of mouth resection, anterior glossectomy, left fibular free flap, skin grafting, and tracheostomy revealing a pT4a, N3b tumor with positive left anterior lateral soft tissue margin, 11/98+ lymph nodes including STEFFEN and several bilateral lymph nodes consistent with involvement by low-grade B-cell neoplasm suggestive of CLL/SLL.    On 8/25/2023, head neck tumor conference recommended consideration of postoperative chemoradiation.  Postoperative course has been complicated by 8/24/2023 return to the operating room for debridement of multiple areas of skin necrosis and resuturing of neck incision and intraoral flap.    On 10/3/2023, the patient was seen by audiology with left>right sensorineural hearing loss.  On 10/18/2023, an implanted port was placed by Raphael Goodman.    Past medical history:  Remarkable for past 1/2 ppd tobacco use (until 8/2023), former alcohol abuse for which she considers herself an alcoholic although she does not use alcohol now.    Past Medical History:   Diagnosis Date     At risk for malnutrition      Hyperlipidemia LDL goal <130 10/16/2023     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)      Tobacco dependence syndrome      Underweight 10/16/2023     Family history:  She is originally from [place], is not a  and has 2 daughters.    Medications:  Current Outpatient Medications   Medication     acetaminophen (TYLENOL) 325 MG tablet     chlorhexidine (PERIDEX) 0.12 % solution     dexAMETHasone (DECADRON) 4  "MG tablet     mineral oil-hydrophilic petrolatum (AQUAPHOR) external ointment     ondansetron (ZOFRAN) 8 MG tablet     polyethylene glycol (MIRALAX) 17 g packet     prochlorperazine (COMPAZINE) 10 MG tablet     ibuprofen (ADVIL/MOTRIN) 600 MG tablet     ondansetron (ZOFRAN ODT) 4 MG ODT tab     senna-docusate (SENOKOT-S/PERICOLACE) 8.6-50 MG tablet     No current facility-administered medications for this visit.       Allergies:  No Known Allergies    Physical examination:  /47   Pulse 72   Temp 97  F (36.1  C)   Ht 1.613 m (5' 3.5\")   Wt 48.4 kg (106 lb 11.2 oz)   SpO2 98%   BMI 18.60 kg/m      The patient is alert and oriented.    Rai Castillo MD            Oncology Rooming Note    October 20, 2023 11:31 AM   Adrianna Pereira is a 63 year old female who presents for:    No chief complaint on file.    Initial Vitals: /47   Pulse 72   Temp 97  F (36.1  C)   Ht 1.613 m (5' 3.5\")   Wt 48.4 kg (106 lb 11.2 oz)   SpO2 98%   BMI 18.60 kg/m   Estimated body mass index is 18.6 kg/m  as calculated from the following:    Height as of this encounter: 1.613 m (5' 3.5\").    Weight as of this encounter: 48.4 kg (106 lb 11.2 oz). Body surface area is 1.47 meters squared.  No Pain (0) Comment: Data Unavailable   No LMP recorded. Patient is postmenopausal.  Allergies reviewed: Yes  Medications reviewed: Yes    Medications: Medication refills not needed today.  Pharmacy name entered into EPIC:    [id number] - [place], [place] - [address] [place] [address] PHARMACY - [place], [place] - [address] [place] [address] [facility]    Clinical concerns: Patient here prior to starting 1st chemotherapy cycle. No concerns noted at this time.   Dr. Castillo was notified.      Maya Mao RN              Again, thank you for allowing me to participate in the care of your patient.        Sincerely,        Rai Castillo MD  "

## 2023-10-23 ENCOUNTER — APPOINTMENT (OUTPATIENT)
Dept: RADIATION THERAPY | Facility: OUTPATIENT CENTER | Age: 63
End: 2023-10-23
Payer: COMMERCIAL

## 2023-10-23 DIAGNOSIS — Z48.89 ENCOUNTER FOR POSTOPERATIVE CARE: ICD-10-CM

## 2023-10-23 LAB — PREALB SERPL IA-MCNC: 18 MG/DL (ref 15–45)

## 2023-10-23 RX ORDER — CHLORHEXIDINE GLUCONATE ORAL RINSE 1.2 MG/ML
15 SOLUTION DENTAL 3 TIMES DAILY
Qty: 473 ML | Refills: 1 | Status: SHIPPED | OUTPATIENT
Start: 2023-10-23 | End: 2023-12-05

## 2023-10-23 NOTE — PROGRESS NOTES
Phone call received from patients  requesting a refill of peridex solution on behalf of patient.  Last refill: 9/1/23  # 473ml with 1 refills at [facility] discharge pharm.  Last office visit:  10/20/23  Next office visit:  10/27/23    Carol Kumar RN

## 2023-10-24 ENCOUNTER — APPOINTMENT (OUTPATIENT)
Dept: RADIATION THERAPY | Facility: OUTPATIENT CENTER | Age: 63
End: 2023-10-24
Payer: COMMERCIAL

## 2023-10-24 ENCOUNTER — VIRTUAL VISIT (OUTPATIENT)
Dept: SPEECH THERAPY | Facility: CLINIC | Age: 63
End: 2023-10-24
Payer: COMMERCIAL

## 2023-10-24 DIAGNOSIS — C04.9 SQUAMOUS CELL CARCINOMA OF FLOOR OF MOUTH (H): Primary | ICD-10-CM

## 2023-10-24 DIAGNOSIS — C06.9 SQUAMOUS CELL CARCINOMA OF ORAL CAVITY (H): Primary | ICD-10-CM

## 2023-10-24 DIAGNOSIS — R13.12 OROPHARYNGEAL DYSPHAGIA: ICD-10-CM

## 2023-10-24 PROCEDURE — 92526 ORAL FUNCTION THERAPY: CPT | Mod: GN | Performed by: SPEECH-LANGUAGE PATHOLOGIST

## 2023-10-25 ENCOUNTER — APPOINTMENT (OUTPATIENT)
Dept: RADIATION THERAPY | Facility: OUTPATIENT CENTER | Age: 63
End: 2023-10-25
Payer: COMMERCIAL

## 2023-10-25 ENCOUNTER — OFFICE VISIT (OUTPATIENT)
Dept: RADIATION THERAPY | Facility: OUTPATIENT CENTER | Age: 63
End: 2023-10-25
Payer: COMMERCIAL

## 2023-10-25 VITALS
BODY MASS INDEX: 19.01 KG/M2 | DIASTOLIC BLOOD PRESSURE: 61 MMHG | HEART RATE: 72 BPM | WEIGHT: 109 LBS | SYSTOLIC BLOOD PRESSURE: 105 MMHG

## 2023-10-25 DIAGNOSIS — G89.3 CANCER RELATED PAIN: Primary | ICD-10-CM

## 2023-10-25 RX ORDER — OXYCODONE HCL 5 MG/5 ML
5 SOLUTION, ORAL ORAL EVERY 6 HOURS PRN
Qty: 300 ML | Refills: 0 | Status: SHIPPED | OUTPATIENT
Start: 2023-10-25 | End: 2023-12-15

## 2023-10-25 NOTE — PROGRESS NOTES
[facility]  SPECIALIZING IN BREAKTHROUGHS  Radiation Oncology    On Treatment Visit Note      Adrianna Pereira      Date: 10/25/2023   MRN: [id number]   : 1960  Diagnosis: SCC oral cavity      Reason for Visit:  On Radiation Treatment Visit     Treatment Summary to Date  Treatment Site: head/neck Current Dose: 1000/6600 cGy Fractions:       Chemotherapy  Chemo concurrent with radx?: Yes  Oncologist: Dr. Castillo  Drug Name/Frequency 1: weekly cisplat    Subjective:    Doing okay.  No acute complaints.  Having mild headache at times.  Nutrition through tube.   Denies significant pain.   No xerostomia.      Nursing ROS:   Nutrition Alteration  Diet Type: Patient's Preference  Nutrition Note: all nutrition via peg, doing 3-4 cartons per day, goal is 4 1/2 per day  Skin  Skin Reaction: 0 - No changes     ENT and Mouth Exam  ENT/Mouth Note: only doing oral rinses bid/Rx  Cardiovascular  Respiratory effort: 1 - Normal - without distress  Cardio/Resp Note: trach capped, being removed tomorrow  Gastrointestinal  GI Note: some consipation, will try mom, also using miralax        Pain Assessment  Pain Note: no oral pain, but having headaches, tylenol not helpful      Objective:   /61   Pulse 72   Wt 49.4 kg (109 lb)   BMI 19.01 kg/m    NAD  HEENT: S/p segmental mandibulectomy and partial glossectomy with reconstruction   Neck: + s/p neck dissection bilaterally, anterior neck wound open and packing in place  Pulm: +S/p trach placement  Abdominal: S/p PEG    Labs:  CBC RESULTS:   Recent Labs   Lab Test 10/20/23  1114   WBC 9.5   RBC 3.00*   HGB 9.6*   HCT 29.8*   MCV 99   MCH 32.0   MCHC 32.2   RDW 15.2*        ELECTROLYTES:  Recent Labs   Lab Test 10/20/23  1114      POTASSIUM 4.4   CHLORIDE 104   RACHAEL 9.6   CO2 22   BUN 18.0   CR 0.43*   GLC 83       Assessment:  Ms. Pereira is a 63 year old female with a diagnosis of squamous cell carcinoma of the oral cavity status post segmental mandibulectomy,  anterior glossectomy,  lymph node dissection and reconstruction.  Final pathology demonstrated squamous cell carcinoma moderately differentiated, 4.1 cm in size originating from  floor mouth/ anterior tongue,  depth of invasion 29 mm, no LVSI, positive PNI, positive margin (left anterior lateral).  11 of 98 lymph nodes positive (4 cm largest deposit, positive extracapsular disease present), pathologic T4N3b.   She is undergoing adjuvant chemoradiation therapy.     Tolerating radiation therapy well.  All questions and concerns addressed.    Plan:   Continue current therapy.        Mosaiq chart and setup information reviewed  Ports checked    Medication Review  Med list reviewed with patient?: Yes           Hernán Egan MD

## 2023-10-25 NOTE — LETTER
10/25/2023         RE: Adrianna Pereira  [address] [address] [place] [id number]        Dear Colleague,    Thank you for referring your patient, Adrianna Pereira, to the  [facility]. Please see a copy of my visit note below.    [facility]  SPECIALIZING IN BREAKTHROUGHS  Radiation Oncology    On Treatment Visit Note      Adrianna Pereira      Date: 10/25/2023   MRN: [id number]   : 1960  Diagnosis: SCC oral cavity      Reason for Visit:  On Radiation Treatment Visit     Treatment Summary to Date  Treatment Site: head/neck Current Dose: 1000/6600 cGy Fractions:       Chemotherapy  Chemo concurrent with radx?: Yes  Oncologist: Dr. Castillo  Drug Name/Frequency 1: weekly cisplat    Subjective:    Doing okay.  No acute complaints.  Having mild headache at times.  Nutrition through tube.   Denies significant pain.   No xerostomia.      Nursing ROS:   Nutrition Alteration  Diet Type: Patient's Preference  Nutrition Note: all nutrition via peg, doing 3-4 cartons per day, goal is 4 1/2 per day  Skin  Skin Reaction: 0 - No changes     ENT and Mouth Exam  ENT/Mouth Note: only doing oral rinses bid/Rx  Cardiovascular  Respiratory effort: 1 - Normal - without distress  Cardio/Resp Note: trach capped, being removed tomorrow  Gastrointestinal  GI Note: some consipation, will try mom, also using miralax        Pain Assessment  Pain Note: no oral pain, but having headaches, tylenol not helpful      Objective:   /61   Pulse 72   Wt 49.4 kg (109 lb)   BMI 19.01 kg/m    NAD  HEENT: S/p segmental mandibulectomy and partial glossectomy with reconstruction   Neck: + s/p neck dissection bilaterally, anterior neck wound open and packing in place  Pulm: +S/p trach placement  Abdominal: S/p PEG    Labs:  CBC RESULTS:   Recent Labs   Lab Test 10/20/23  1114   WBC 9.5   RBC 3.00*   HGB 9.6*   HCT 29.8*   MCV 99   MCH 32.0   MCHC 32.2   RDW 15.2*        ELECTROLYTES:  Recent Labs   Lab Test 10/20/23  1114       POTASSIUM 4.4   CHLORIDE 104   RACHAEL 9.6   CO2 22   BUN 18.0   CR 0.43*   GLC 83       Assessment:  Ms. 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  Final pathology demonstrated squamous cell carcinoma moderately differentiated, 4.1 cm in size originating from  floor mouth/ anterior tongue,  depth of invasion 29 mm, no LVSI, positive PNI, positive margin (left anterior lateral).  11 of 98 lymph nodes positive (4 cm largest deposit, positive extracapsular disease present), pathologic T4N3b.   She is undergoing adjuvant chemoradiation therapy.     Tolerating radiation therapy well.  All questions and concerns addressed.    Plan:   Continue current therapy.        Mosaiq chart and setup information reviewed  Ports checked    Medication Review  Med list reviewed with patient?: Yes           Hernán Egan MD

## 2023-10-26 ENCOUNTER — ALLIED HEALTH/NURSE VISIT (OUTPATIENT)
Dept: OTOLARYNGOLOGY | Facility: CLINIC | Age: 63
End: 2023-10-26
Payer: COMMERCIAL

## 2023-10-26 ENCOUNTER — APPOINTMENT (OUTPATIENT)
Dept: RADIATION THERAPY | Facility: OUTPATIENT CENTER | Age: 63
End: 2023-10-26
Payer: COMMERCIAL

## 2023-10-26 DIAGNOSIS — C04.9 SCC (SQUAMOUS CELL CARCINOMA OF FLOOR OF MOUTH) (H): ICD-10-CM

## 2023-10-26 DIAGNOSIS — C77.9 METASTATIC SQUAMOUS CELL CARCINOMA TO LYMPH NODE (H): Primary | ICD-10-CM

## 2023-10-26 PROCEDURE — 99207 PR NO CHARGE NURSE ONLY: CPT

## 2023-10-26 NOTE — PROGRESS NOTES
[facility] Hematology and Oncology Outpatient Progress Note    Patient: Adrianna Pereira  MRN: [id number]  Date of Service: Oct 27, 2023          Reason for Visit    Floor of mouth cancer    Primary Oncologist: Dr. 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   In week 2.   So far, tolerating this very well.    Labwork: hgb 9.2 (stable), rest CBC WNL. Creatinine and lytes WNL.     Plan:  -Continue weekly labs and cisplatin through weeks of RT, pending tolerance. RT completion date planned for 12/5  -See Marina/Dr. Castillo weekly through treatment for toxicity mgmt.    Baseline sensorineural hearing loss L>R  This is stable so far on cisplatin. Monitor for any progression.    Nutrition/hydration  Dependant on feeding tube. Weight is stable. Staying hydrated.    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   On observation.   ______________________________________________________________________________    History of Present Illness/ Interval History    Ms. Adrianna Pereira  is a 63 year old undergoing adjuvant RT with sensitizing weekly cisplatin. Initiated last week, eval before day 8/week 2.    She is tolerating treatment very well, so far.   Mild fatigue. Baseline hearing loss is stable. No neuropathy. No nausea.     Caloric/hydration intake through feeding tube, tolerating well.     ECOG Performance    0    Oncology History/Treatment  Diagnosis/Stage:   7/2023: [id number] Floor of mouth squamous cell cancer  -hx alcoholism (stopped all use) and smoking (quit 8/2023)  -hx premalignant oral cavity lesions s/p CO2 laser ablation with ENT  -presented with lower lip/chin swelling  -7/3/2023 left mandibular alveolus biopsy: invasive keratinizing moderately differential squamous  cell carcinoma.   -PET: 4.4 x 3.7 x 3.2 cm anterior oral cavity mass invading into the mandible with a separate gluco-avid nodule on the right mandibular buccal mucosa and bilateral level 1B, level 2 and L3 metastatic lymphadenopathy without evidence of metastatic disease.   -8/17/2023 surgical path: pT4a, N3b tumor with positive left anterior lateral soft tissue margin, 11/98+ lymph nodes including STEFFEN (largest 4 cm) and several bilateral lymph nodes consistent with involvement by low-grade B-cell neoplasm suggestive of CLL/SLL.     Treatment:  8/17/2023: segmental mandibulectomy, floor of mouth resection, anterior glossectomy, left fibular free flap, skin grafting, and tracheostomy.   -Post-op course complicated by skin necrosis requiring OR debridment and resuturing of neck incision/intraoral flap    10/20/2023 - present: adjuvant concurrent RT + weekly cisplatin      Physical Exam    GENERAL: Alert and oriented to time place and person. Seated comfortably. In no distress. Dysarthria. Daughter accompanied, assisted with communication when needed.  HEAD: Atraumatic and normocephalic. No alopecia.  EYES: SHAE, EOMI. No erythema. No icterus.  CHEST: regular respiratory effort.  NEURO: No gross deficit noted.       Lab Results    Recent Results (from the past 168 hour(s))   Comprehensive metabolic panel   Result Value Ref Range    Sodium 139 135 - 145 mmol/L    Potassium 4.4 3.4 - 5.3 mmol/L    Carbon Dioxide (CO2) 22 22 - 29 mmol/L    Anion Gap 13 7 - 15 mmol/L    Urea Nitrogen 18.0 8.0 - 23.0 mg/dL    Creatinine 0.43 (L) 0.51 - 0.95 mg/dL    GFR Estimate >90 >60 mL/min/1.73m2    Calcium 9.6 8.8 - 10.2 mg/dL    Chloride 104 98 - 107 mmol/L    Glucose 83 70 - 99 mg/dL    Alkaline Phosphatase 72 35 - 104 U/L    AST 16 0 - 45 U/L    ALT 9 0 - 50 U/L    Protein Total 6.9 6.4 - 8.3 g/dL    Albumin 3.8 3.5 - 5.2 g/dL    Bilirubin Total <0.2 <=1.2 mg/dL   Magnesium   Result Value Ref Range    Magnesium 2.2 1.7 - 2.3 mg/dL    Prealbumin   Result Value Ref Range    Prealbumin 18 15 - 45 mg/dL   CBC with platelets and differential   Result Value Ref Range    WBC Count 9.5 4.0 - 11.0 10e3/uL    RBC Count 3.00 (L) 3.80 - 5.20 10e6/uL    Hemoglobin 9.6 (L) 11.7 - 15.7 g/dL    Hematocrit 29.8 (L) 35.0 - 47.0 %    MCV 99 78 - 100 fL    MCH 32.0 26.5 - 33.0 pg    MCHC 32.2 31.5 - 36.5 g/dL    RDW 15.2 (H) 10.0 - 15.0 %    Platelet Count 389 150 - 450 10e3/uL    % Neutrophils 57 %    % Lymphocytes 32 %    % Monocytes 7 %    Mids % (Monos, Eos, Basos)      % Eosinophils 3 %    % Basophils 1 %    % Immature Granulocytes 0 %    NRBCs per 100 WBC 0 <1 /100    Absolute Neutrophils 5.4 1.6 - 8.3 10e3/uL    Absolute Lymphocytes 3.0 0.8 - 5.3 10e3/uL    Absolute Monocytes 0.7 0.0 - 1.3 10e3/uL    Mids Abs (Monos, Eos, Basos)      Absolute Eosinophils 0.3 0.0 - 0.7 10e3/uL    Absolute Basophils 0.1 0.0 - 0.2 10e3/uL    Absolute Immature Granulocytes 0.0 <=0.4 10e3/uL    Absolute NRBCs 0.0 10e3/uL       Imaging    XR Chest Port 1 View    Result Date: 10/18/2023  XR CHEST PORT 1 VIEW 10/18/2023 11:40 AM HISTORY: confirm placement right IJ port, assess for pneumothorax COMPARISON: 7/13/2023     IMPRESSION: Right IJ port catheter has been placed with tip at the SVC/right atrial junction. No pneumothorax. Tracheostomy. Left neck surgical clips. Slight increased prominence of a 6 mm right midlung nodular opacity. A 9 mm nodular opacity projecting over the left lung base could represent a nipple shadow. Attention to these at follow-up CTs is recommended. Mild thoracic scoliosis. MADDY MCCRARY MD   SYSTEM ID:  [id number]    XR Surgery MADELINE Fluoro Less Than 5 Min    Result Date: 10/18/2023  This exam was marked as non-reportable because it will not be read by a radiologist or a [place] non-radiologist provider.      Billing  Total time 30 minutes, to include face to face visit, review of EMR, ordering, documentation and coordination of care on date of  service    Start time: 0916 / End time: 0927  Platform: Invaluable   Patient location: [facility]  Provider location: [facility]     Signed by: Marina Cronin NP

## 2023-10-26 NOTE — NURSING NOTE
Patient here for trach removal. Has been tolerating capping for 1 week with no issues. Educated patient on guaze and tape dressing that will be applied to site for the next few weeks as it heals. Supplies given to patient (guaze and tape). Educated patient on needing to hold light pressure on the site while talking, coughing, laughing, and anything else where air would be released out of the trach site. Patient verbalized understanding and had no further questions or concerns.     Trach removed and site CDI. Guaze dressing applied with tape to hold in place. Patient practiced talking with light pressure over the site and tolerated well.     Itzel Holley, RN, BSN  RN Care Coordinator, ENT Clinic  [facility]  Direct: [phone]

## 2023-10-27 ENCOUNTER — ONCOLOGY VISIT (OUTPATIENT)
Dept: ONCOLOGY | Facility: CLINIC | Age: 63
End: 2023-10-27
Attending: INTERNAL MEDICINE
Payer: COMMERCIAL

## 2023-10-27 ENCOUNTER — LAB (OUTPATIENT)
Dept: INFUSION THERAPY | Facility: CLINIC | Age: 63
End: 2023-10-27
Attending: INTERNAL MEDICINE
Payer: COMMERCIAL

## 2023-10-27 ENCOUNTER — APPOINTMENT (OUTPATIENT)
Dept: RADIATION THERAPY | Facility: OUTPATIENT CENTER | Age: 63
End: 2023-10-27
Payer: COMMERCIAL

## 2023-10-27 VITALS
HEIGHT: 64 IN | OXYGEN SATURATION: 98 % | BODY MASS INDEX: 17.75 KG/M2 | DIASTOLIC BLOOD PRESSURE: 61 MMHG | HEART RATE: 77 BPM | SYSTOLIC BLOOD PRESSURE: 108 MMHG | WEIGHT: 104 LBS | RESPIRATION RATE: 12 BRPM | TEMPERATURE: 98.1 F

## 2023-10-27 VITALS — DIASTOLIC BLOOD PRESSURE: 56 MMHG | HEART RATE: 76 BPM | SYSTOLIC BLOOD PRESSURE: 110 MMHG

## 2023-10-27 DIAGNOSIS — C06.9 SQUAMOUS CELL CARCINOMA OF ORAL CAVITY (H): Primary | ICD-10-CM

## 2023-10-27 LAB
ALBUMIN SERPL BCG-MCNC: 3.8 G/DL (ref 3.5–5.2)
ALP SERPL-CCNC: 72 U/L (ref 35–104)
ALT SERPL W P-5'-P-CCNC: 16 U/L (ref 0–50)
ANION GAP SERPL CALCULATED.3IONS-SCNC: 7 MMOL/L (ref 7–15)
AST SERPL W P-5'-P-CCNC: 13 U/L (ref 0–45)
BASOPHILS # BLD AUTO: 0 10E3/UL (ref 0–0.2)
BASOPHILS NFR BLD AUTO: 0 %
BILIRUB SERPL-MCNC: <0.2 MG/DL
BUN SERPL-MCNC: 27.6 MG/DL (ref 8–23)
CALCIUM SERPL-MCNC: 9.5 MG/DL (ref 8.8–10.2)
CHLORIDE SERPL-SCNC: 102 MMOL/L (ref 98–107)
CREAT SERPL-MCNC: 0.67 MG/DL (ref 0.51–0.95)
DEPRECATED HCO3 PLAS-SCNC: 28 MMOL/L (ref 22–29)
EGFRCR SERPLBLD CKD-EPI 2021: >90 ML/MIN/1.73M2
EOSINOPHIL # BLD AUTO: 0.3 10E3/UL (ref 0–0.7)
EOSINOPHIL NFR BLD AUTO: 3 %
ERYTHROCYTE [DISTWIDTH] IN BLOOD BY AUTOMATED COUNT: 14.8 % (ref 10–15)
GLUCOSE SERPL-MCNC: 103 MG/DL (ref 70–99)
HCT VFR BLD AUTO: 29.2 % (ref 35–47)
HGB BLD-MCNC: 9.2 G/DL (ref 11.7–15.7)
IMM GRANULOCYTES # BLD: 0 10E3/UL
IMM GRANULOCYTES NFR BLD: 0 %
LYMPHOCYTES # BLD AUTO: 1.6 10E3/UL (ref 0.8–5.3)
LYMPHOCYTES NFR BLD AUTO: 15 %
MAGNESIUM SERPL-MCNC: 2 MG/DL (ref 1.7–2.3)
MCH RBC QN AUTO: 31 PG (ref 26.5–33)
MCHC RBC AUTO-ENTMCNC: 31.5 G/DL (ref 31.5–36.5)
MCV RBC AUTO: 98 FL (ref 78–100)
MONOCYTES # BLD AUTO: 1 10E3/UL (ref 0–1.3)
MONOCYTES NFR BLD AUTO: 10 %
NEUTROPHILS # BLD AUTO: 7.3 10E3/UL (ref 1.6–8.3)
NEUTROPHILS NFR BLD AUTO: 72 %
NRBC # BLD AUTO: 0 10E3/UL
NRBC BLD AUTO-RTO: 0 /100
PLATELET # BLD AUTO: 427 10E3/UL (ref 150–450)
POTASSIUM SERPL-SCNC: 4.5 MMOL/L (ref 3.4–5.3)
PROT SERPL-MCNC: 6.9 G/DL (ref 6.4–8.3)
RBC # BLD AUTO: 2.97 10E6/UL (ref 3.8–5.2)
SODIUM SERPL-SCNC: 137 MMOL/L (ref 135–145)
WBC # BLD AUTO: 10.2 10E3/UL (ref 4–11)

## 2023-10-27 PROCEDURE — 99214 OFFICE O/P EST MOD 30 MIN: CPT | Performed by: NURSE PRACTITIONER

## 2023-10-27 PROCEDURE — 250N000011 HC RX IP 250 OP 636: Performed by: INTERNAL MEDICINE

## 2023-10-27 PROCEDURE — 36415 COLL VENOUS BLD VENIPUNCTURE: CPT | Performed by: INTERNAL MEDICINE

## 2023-10-27 PROCEDURE — 80053 COMPREHEN METABOLIC PANEL: CPT | Performed by: INTERNAL MEDICINE

## 2023-10-27 PROCEDURE — 96413 CHEMO IV INFUSION 1 HR: CPT

## 2023-10-27 PROCEDURE — 96375 TX/PRO/DX INJ NEW DRUG ADDON: CPT

## 2023-10-27 PROCEDURE — 96367 TX/PROPH/DG ADDL SEQ IV INF: CPT

## 2023-10-27 PROCEDURE — 84134 ASSAY OF PREALBUMIN: CPT | Performed by: INTERNAL MEDICINE

## 2023-10-27 PROCEDURE — 85025 COMPLETE CBC W/AUTO DIFF WBC: CPT | Performed by: INTERNAL MEDICINE

## 2023-10-27 PROCEDURE — 258N000003 HC RX IP 258 OP 636: Performed by: INTERNAL MEDICINE

## 2023-10-27 PROCEDURE — 83735 ASSAY OF MAGNESIUM: CPT | Performed by: INTERNAL MEDICINE

## 2023-10-27 RX ORDER — HEPARIN SODIUM (PORCINE) LOCK FLUSH IV SOLN 100 UNIT/ML 100 UNIT/ML
5 SOLUTION INTRAVENOUS
Status: DISCONTINUED | OUTPATIENT
Start: 2023-10-27 | End: 2023-10-27 | Stop reason: HOSPADM

## 2023-10-27 RX ORDER — PALONOSETRON 0.05 MG/ML
0.25 INJECTION, SOLUTION INTRAVENOUS ONCE
Qty: 5 ML | Refills: 0 | Status: COMPLETED | OUTPATIENT
Start: 2023-10-27 | End: 2023-10-27

## 2023-10-27 RX ADMIN — Medication 5 ML: at 12:18

## 2023-10-27 RX ADMIN — SODIUM CHLORIDE 1000 ML: 9 INJECTION, SOLUTION INTRAVENOUS at 09:56

## 2023-10-27 RX ADMIN — FAMOTIDINE 20 MG: 10 INJECTION INTRAVENOUS at 10:27

## 2023-10-27 RX ADMIN — CISPLATIN 58 MG: 1 INJECTION, SOLUTION INTRAVENOUS at 11:09

## 2023-10-27 RX ADMIN — PALONOSETRON 0.25 MG: 0.05 INJECTION, SOLUTION INTRAVENOUS at 10:30

## 2023-10-27 RX ADMIN — DEXAMETHASONE SODIUM PHOSPHATE: 10 INJECTION, SOLUTION INTRAMUSCULAR; INTRAVENOUS at 10:37

## 2023-10-27 ASSESSMENT — PAIN SCALES - GENERAL: PAINLEVEL: NO PAIN (0)

## 2023-10-27 NOTE — PROGRESS NOTES
Infusion Nursing Note:  Adrianna Pereira presents today for C1D8 Cisplatin.    Patient seen by provider today: Yes: Virtual with Marina Cronin NP   present during visit today: Not Applicable.    Note: N/A.      Intravenous Access:  Labs drawn without difficulty.  Implanted Port.    Treatment Conditions:  Lab Results   Component Value Date    HGB 9.2 (L) 10/27/2023    WBC 10.2 10/27/2023    ANEUTAUTO 7.3 10/27/2023     10/27/2023        Lab Results   Component Value Date     10/27/2023    POTASSIUM 4.5 10/27/2023    MAG 2.0 10/27/2023    CR 0.67 10/27/2023    RACHAEL 9.5 10/27/2023    BILITOTAL <0.2 10/27/2023    ALBUMIN 3.8 10/27/2023    ALT 16 10/27/2023    AST 13 10/27/2023       Results reviewed, labs MET treatment parameters, ok to proceed with treatment.      Post Infusion Assessment:  Patient tolerated infusion without incident.  Blood return noted pre and post infusion.  Site patent and intact, free from redness, edema or discomfort.  No evidence of extravasations.  Access discontinued per protocol.       Discharge Plan:   Patient discharged in stable condition accompanied by: daughterVaishnavi  Departure Mode: Ambulatory.  Pt to return on 11/3/23 at 9:30 am for pac labs followed by clinic appt with Marina Cronin NP, radiation, and C1D15 Cisplatin      Leandra Farris RN

## 2023-10-27 NOTE — PROGRESS NOTES
Virtual Visit Details    Type of service:  Video Visit     Originating Location (pt. Location): Other in [facility]    Distant Location (provider location):  On-site - [place]   Platform used for Video Visit: Eduardo Philippe CMA on 10/27/2023 at 9:05 AM

## 2023-10-27 NOTE — LETTER
10/27/2023         RE: Adrianna Pereira  [address] [address] [place] [id number]        Dear Colleague,    Thank you for referring your patient, Adrianna Pereira, to the [facility] [place]. Please see a copy of my visit note below.    [facility] Hematology and Oncology Outpatient Progress Note    Patient: Adrianna Pereira  MRN: [id number]  Date of Service: Oct 27, 2023          Reason for Visit    Floor of mouth cancer    Primary Oncologist: Dr. 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   In week 2.   So far, tolerating this very well.    Labwork: hgb 9.2 (stable), rest CBC WNL. Creatinine and lytes WNL.     Plan:  -Continue weekly labs and cisplatin through weeks of RT, pending tolerance. RT completion date planned for 12/5  -See Marina/Dr. Castillo weekly through treatment for toxicity mgmt.    Baseline sensorineural hearing loss L>R  This is stable so far on cisplatin. Monitor for any progression.    Nutrition/hydration  Dependant on feeding tube. Weight is stable. Staying hydrated.    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   On observation.   ______________________________________________________________________________    History of Present Illness/ Interval History    Ms. Adrianna Pereira  is a 63 year old undergoing adjuvant RT with sensitizing weekly cisplatin. Initiated last week, eval before day 8/week 2.    She is tolerating treatment very well, so far.   Mild fatigue. Baseline hearing loss is stable. No neuropathy. No nausea.     Caloric/hydration intake through feeding tube, tolerating well.     ECOG Performance    0    Oncology History/Treatment  Diagnosis/Stage:   7/2023: [id number] Floor of mouth squamous cell cancer  -hx alcoholism (stopped  all use) and smoking (quit 8/2023)  -hx premalignant oral cavity lesions s/p CO2 laser ablation with ENT  -presented with lower lip/chin swelling  -7/3/2023 left mandibular alveolus biopsy: invasive keratinizing moderately differential squamous cell carcinoma.   -PET: 4.4 x 3.7 x 3.2 cm anterior oral cavity mass invading into the mandible with a separate gluco-avid nodule on the right mandibular buccal mucosa and bilateral level 1B, level 2 and L3 metastatic lymphadenopathy without evidence of metastatic disease.   -8/17/2023 surgical path: pT4a, N3b tumor with positive left anterior lateral soft tissue margin, 11/98+ lymph nodes including STEFFEN (largest 4 cm) and several bilateral lymph nodes consistent with involvement by low-grade B-cell neoplasm suggestive of CLL/SLL.     Treatment:  8/17/2023: segmental mandibulectomy, floor of mouth resection, anterior glossectomy, left fibular free flap, skin grafting, and tracheostomy.   -Post-op course complicated by skin necrosis requiring OR debridment and resuturing of neck incision/intraoral flap    10/20/2023 - present: adjuvant concurrent RT + weekly cisplatin      Physical Exam    GENERAL: Alert and oriented to time place and person. Seated comfortably. In no distress. Dysarthria. Daughter accompanied, assisted with communication when needed.  HEAD: Atraumatic and normocephalic. No alopecia.  EYES: SHAE, EOMI. No erythema. No icterus.  CHEST: regular respiratory effort.  NEURO: No gross deficit noted.       Lab Results    Recent Results (from the past 168 hour(s))   Comprehensive metabolic panel   Result Value Ref Range    Sodium 139 135 - 145 mmol/L    Potassium 4.4 3.4 - 5.3 mmol/L    Carbon Dioxide (CO2) 22 22 - 29 mmol/L    Anion Gap 13 7 - 15 mmol/L    Urea Nitrogen 18.0 8.0 - 23.0 mg/dL    Creatinine 0.43 (L) 0.51 - 0.95 mg/dL    GFR Estimate >90 >60 mL/min/1.73m2    Calcium 9.6 8.8 - 10.2 mg/dL    Chloride 104 98 - 107 mmol/L    Glucose 83 70 - 99 mg/dL     Alkaline Phosphatase 72 35 - 104 U/L    AST 16 0 - 45 U/L    ALT 9 0 - 50 U/L    Protein Total 6.9 6.4 - 8.3 g/dL    Albumin 3.8 3.5 - 5.2 g/dL    Bilirubin Total <0.2 <=1.2 mg/dL   Magnesium   Result Value Ref Range    Magnesium 2.2 1.7 - 2.3 mg/dL   Prealbumin   Result Value Ref Range    Prealbumin 18 15 - 45 mg/dL   CBC with platelets and differential   Result Value Ref Range    WBC Count 9.5 4.0 - 11.0 10e3/uL    RBC Count 3.00 (L) 3.80 - 5.20 10e6/uL    Hemoglobin 9.6 (L) 11.7 - 15.7 g/dL    Hematocrit 29.8 (L) 35.0 - 47.0 %    MCV 99 78 - 100 fL    MCH 32.0 26.5 - 33.0 pg    MCHC 32.2 31.5 - 36.5 g/dL    RDW 15.2 (H) 10.0 - 15.0 %    Platelet Count 389 150 - 450 10e3/uL    % Neutrophils 57 %    % Lymphocytes 32 %    % Monocytes 7 %    Mids % (Monos, Eos, Basos)      % Eosinophils 3 %    % Basophils 1 %    % Immature Granulocytes 0 %    NRBCs per 100 WBC 0 <1 /100    Absolute Neutrophils 5.4 1.6 - 8.3 10e3/uL    Absolute Lymphocytes 3.0 0.8 - 5.3 10e3/uL    Absolute Monocytes 0.7 0.0 - 1.3 10e3/uL    Mids Abs (Monos, Eos, Basos)      Absolute Eosinophils 0.3 0.0 - 0.7 10e3/uL    Absolute Basophils 0.1 0.0 - 0.2 10e3/uL    Absolute Immature Granulocytes 0.0 <=0.4 10e3/uL    Absolute NRBCs 0.0 10e3/uL       Imaging    XR Chest Port 1 View    Result Date: 10/18/2023  XR CHEST PORT 1 VIEW 10/18/2023 11:40 AM HISTORY: confirm placement right IJ port, assess for pneumothorax COMPARISON: 7/13/2023     IMPRESSION: Right IJ port catheter has been placed with tip at the SVC/right atrial junction. No pneumothorax. Tracheostomy. Left neck surgical clips. Slight increased prominence of a 6 mm right midlung nodular opacity. A 9 mm nodular opacity projecting over the left lung base could represent a nipple shadow. Attention to these at follow-up CTs is recommended. Mild thoracic scoliosis. MADDY MCCRARY MD   SYSTEM ID:  [id number]    XR Surgery MADELINE Fluoro Less Than 5 Min    Result Date: 10/18/2023  This exam was marked as  non-reportable because it will not be read by a radiologist or a [place] non-radiologist provider.      Billing  Total time 30 minutes, to include face to face visit, review of EMR, ordering, documentation and coordination of care on date of service    Start time: 0916 / End time: 0927  Platform: Nulu   Patient location: [facility]  Provider location: [facility]     Signed by: Marina Cronin NP      Virtual Visit Details    Type of service:  Video Visit     Originating Location (pt. Location): Other in [facility]    Distant Location (provider location):  On-site - [place]   Platform used for Video Visit: Eduardo Philippe CMA on 10/27/2023 at 9:05 AM      Again, thank you for allowing me to participate in the care of your patient.        Sincerely,        Marina Cronin NP

## 2023-10-27 NOTE — PROGRESS NOTES
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    Recent oncology review:  The patient started chemoradiation with radiation on 10/19/2023 and cisplatin on 10/20/2023.  Today's white count 7100, hemoglobin 8.9, platelet count 375,000, absolute neutrophil count 5200.  CMP is stable.  Prealbumin is pending.    She denies any significant fatigue.  She denies tinnitus or paresthesias, nausea, vomiting or oral pain.    History of present illness:  In July, 2023 the patient presented to Dr. Darien Thorne for history of previous CO2 laser ablation for premalignant oral cavity lesions and more recent swelling of the lower lip and chin.    7/3/2023 left mandibular alveolus biopsy revealed an invasive keratinizing moderately differential squamous cell carcinoma.  7/13/2023  PET/dedicated CT revealed a 4.4 x 3.7 x 3.2 cm anterior oral cavity mass invading into the mandible with a separate gluco-avid nodule on the right mandibular buccal mucosa and bilateral level 1B, level 2 and L3 metastatic lymphadenopathy without evidence of metastatic disease.    On 8/17/2023, she underwent segmental mandibulectomy, floor of mouth resection, anterior glossectomy, left fibular free flap, skin grafting, and tracheostomy revealing a pT4a, N3b tumor with positive left anterior lateral soft tissue margin, 11/98+ lymph nodes including STEFFEN and several bilateral lymph nodes consistent with involvement by low-grade B-cell neoplasm suggestive of CLL/SLL.    On 8/25/2023, head neck tumor conference recommended consideration of postoperative chemoradiation.  Postoperative course has been complicated by 8/24/2023 return to the operating room for debridement of multiple areas of skin necrosis and resuturing of neck incision and intraoral flap.    On 10/3/2023, the patient was seen by audiology with left>right sensorineural hearing loss.  On 10/18/2023, an implanted port was placed by Raphael Goodman.    Past medical history:  Remarkable for past 1/2 ppd tobacco use (until 8/2023), former alcohol abuse for which she considers herself an alcoholic although she does not use alcohol now.    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)     Tobacco dependence syndrome     Underweight 10/16/2023     Family history:  She is originally from [place], is not a  and has 2 daughters.    Medications:  Current Outpatient Medications   Medication    acetaminophen (TYLENOL) 325 MG tablet    chlorhexidine (PERIDEX) 0.12 % solution    dexAMETHasone (DECADRON) 4 MG tablet    ibuprofen (ADVIL/MOTRIN) 600 MG tablet    mineral oil-hydrophilic petrolatum (AQUAPHOR) external ointment    ondansetron (ZOFRAN ODT) 4 MG  "ODT tab    ondansetron (ZOFRAN) 8 MG tablet    oxyCODONE (ROXICODONE) 5 MG/5ML solution    polyethylene glycol (MIRALAX) 17 g packet    prochlorperazine (COMPAZINE) 10 MG tablet    senna-docusate (SENOKOT-S/PERICOLACE) 8.6-50 MG tablet     No current facility-administered medications for this visit.       Allergies:  No Known Allergies    Physical examination:  BP 96/48 (BP Location: Left arm, Patient Position: Sitting, Cuff Size: Adult Small)   Pulse 68   Temp 97.9  F (36.6  C) (Tympanic)   Resp 12   Ht 1.613 m (5' 3.5\")   Wt 46.7 kg (103 lb)   SpO2 98%   BMI 17.96 kg/m      The patient is alert and oriented.    Rai Castillo MD          "

## 2023-10-30 ENCOUNTER — APPOINTMENT (OUTPATIENT)
Dept: RADIATION THERAPY | Facility: OUTPATIENT CENTER | Age: 63
End: 2023-10-30
Payer: COMMERCIAL

## 2023-10-30 ENCOUNTER — OFFICE VISIT (OUTPATIENT)
Dept: RADIATION THERAPY | Facility: OUTPATIENT CENTER | Age: 63
End: 2023-10-30
Payer: COMMERCIAL

## 2023-10-30 VITALS
SYSTOLIC BLOOD PRESSURE: 109 MMHG | BODY MASS INDEX: 18.83 KG/M2 | HEART RATE: 66 BPM | DIASTOLIC BLOOD PRESSURE: 59 MMHG | WEIGHT: 108 LBS

## 2023-10-30 DIAGNOSIS — C06.9 SQUAMOUS CELL CARCINOMA OF ORAL CAVITY (H): Primary | ICD-10-CM

## 2023-10-30 LAB — PREALB SERPL IA-MCNC: 16 MG/DL (ref 15–45)

## 2023-10-30 NOTE — LETTER
10/30/2023         RE: Adrianna Pereira  [address] [address] [place] [id number]        Dear Colleague,    Thank you for referring your patient, Adrianna Pereira, to the  [facility]. Please see a copy of my visit note below.    [facility]  SPECIALIZING IN BREAKTHROUGHS  Radiation Oncology    On Treatment Visit Note      Adrianna Pereira      Date: 10/30/2023   MRN: [id number]   : 1960  Diagnosis: SCC oral cavity      Reason for Visit:  On Radiation Treatment Visit     Treatment Summary to Date  Treatment Site: head/neck Current Dose: 1600/6600 cGy Fractions:       Chemotherapy  Chemo concurrent with radx?: Yes  Oncologist: Dr. Castillo  Drug Name/Frequency 1: weekly cisplat    Subjective:    Doing okay.  No acute complaints.  Nutrition through tube.   Denies significant pain.   No xerostomia.      Nursing ROS:   Nutrition Alteration  Diet Type: Patient's Preference  Nutrition Note: all nutrition via peg, doing 3-4 cartons per day, goal is 4 1/2 per day  Skin  Skin Reaction: 0 - No changes     ENT and Mouth Exam  ENT/Mouth Note: only doing oral rinses bid/Rx  Cardiovascular  Respiratory effort: 1 - Normal - without distress  Cardio/Resp Note: trach capped, being removed tomorrow  Gastrointestinal  GI Note: some consipation, will try mom, also using miralax        Pain Assessment  Pain Note: no oral pain, but having headaches, tylenol not helpful      Objective:   /59   Pulse 66   Wt 49 kg (108 lb)   BMI 18.83 kg/m    NAD  HEENT: S/p segmental mandibulectomy and partial glossectomy with reconstruction. + Mild mucositis.   Neck: + s/p neck dissection bilaterally, anterior neck wound open and packing in place  Pulm: +S/p trach placement  Abdominal: S/p PEG    Labs:  CBC RESULTS:   Recent Labs   Lab Test 10/20/23  1114   WBC 9.5   RBC 3.00*   HGB 9.6*   HCT 29.8*   MCV 99   MCH 32.0   MCHC 32.2   RDW 15.2*        ELECTROLYTES:  Recent Labs   Lab Test 10/20/23  1114       POTASSIUM 4.4   CHLORIDE 104   RACHAEL 9.6   CO2 22   BUN 18.0   CR 0.43*   GLC 83       Assessment:  Ms. 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  Final pathology demonstrated squamous cell carcinoma moderately differentiated, 4.1 cm in size originating from  floor mouth/ anterior tongue,  depth of invasion 29 mm, no LVSI, positive PNI, positive margin (left anterior lateral).  11 of 98 lymph nodes positive (4 cm largest deposit, positive extracapsular disease present), pathologic T4N3b.   She is undergoing adjuvant chemoradiation therapy.     Tolerating radiation therapy well.  All questions and concerns addressed.    Plan:   Continue current therapy.        Mosaiq chart and setup information reviewed  Ports checked    Medication Review  Med list reviewed with patient?: Yes           Hernán Egan MD

## 2023-10-30 NOTE — PROGRESS NOTES
[facility]  SPECIALIZING IN BREAKTHROUGHS  Radiation Oncology    On Treatment Visit Note      Adrianna Pereira      Date: 10/30/2023   MRN: [id number]   : 1960  Diagnosis: SCC oral cavity      Reason for Visit:  On Radiation Treatment Visit     Treatment Summary to Date  Treatment Site: head/neck Current Dose: 1600/6600 cGy Fractions:       Chemotherapy  Chemo concurrent with radx?: Yes  Oncologist: Dr. Castillo  Drug Name/Frequency 1: weekly cisplat    Subjective:    Doing okay.  No acute complaints.  Nutrition through tube.   Denies significant pain.   No xerostomia.      Nursing ROS:   Nutrition Alteration  Diet Type: Patient's Preference  Nutrition Note: all nutrition via peg, doing 3-4 cartons per day, goal is 4 1/2 per day  Skin  Skin Reaction: 0 - No changes     ENT and Mouth Exam  ENT/Mouth Note: only doing oral rinses bid/Rx  Cardiovascular  Respiratory effort: 1 - Normal - without distress  Cardio/Resp Note: trach capped, being removed tomorrow  Gastrointestinal  GI Note: some consipation, will try mom, also using miralax        Pain Assessment  Pain Note: no oral pain, but having headaches, tylenol not helpful      Objective:   /59   Pulse 66   Wt 49 kg (108 lb)   BMI 18.83 kg/m    NAD  HEENT: S/p segmental mandibulectomy and partial glossectomy with reconstruction. + Mild mucositis.   Neck: + s/p neck dissection bilaterally, anterior neck wound open and packing in place  Pulm: +S/p trach placement  Abdominal: S/p PEG    Labs:  CBC RESULTS:   Recent Labs   Lab Test 10/20/23  1114   WBC 9.5   RBC 3.00*   HGB 9.6*   HCT 29.8*   MCV 99   MCH 32.0   MCHC 32.2   RDW 15.2*        ELECTROLYTES:  Recent Labs   Lab Test 10/20/23  1114      POTASSIUM 4.4   CHLORIDE 104   RACHAEL 9.6   CO2 22   BUN 18.0   CR 0.43*   GLC 83       Assessment:  Ms. Pereira is a 63 year old female with a diagnosis of squamous cell carcinoma of the oral cavity status post segmental mandibulectomy, anterior  glossectomy,  lymph node dissection and reconstruction.  Final pathology demonstrated squamous cell carcinoma moderately differentiated, 4.1 cm in size originating from  floor mouth/ anterior tongue,  depth of invasion 29 mm, no LVSI, positive PNI, positive margin (left anterior lateral).  11 of 98 lymph nodes positive (4 cm largest deposit, positive extracapsular disease present), pathologic T4N3b.   She is undergoing adjuvant chemoradiation therapy.     Tolerating radiation therapy well.  All questions and concerns addressed.    Plan:   Continue current therapy.        Mosaiq chart and setup information reviewed  Ports checked    Medication Review  Med list reviewed with patient?: Yes           Hernán Egan MD

## 2023-10-31 ENCOUNTER — APPOINTMENT (OUTPATIENT)
Dept: RADIATION THERAPY | Facility: OUTPATIENT CENTER | Age: 63
End: 2023-10-31
Payer: COMMERCIAL

## 2023-11-01 ENCOUNTER — THERAPY VISIT (OUTPATIENT)
Dept: PHYSICAL THERAPY | Facility: CLINIC | Age: 63
End: 2023-11-01
Attending: RADIOLOGY
Payer: COMMERCIAL

## 2023-11-01 ENCOUNTER — APPOINTMENT (OUTPATIENT)
Dept: RADIATION THERAPY | Facility: OUTPATIENT CENTER | Age: 63
End: 2023-11-01
Payer: COMMERCIAL

## 2023-11-01 DIAGNOSIS — C06.9 SQUAMOUS CELL CARCINOMA OF ORAL CAVITY (H): ICD-10-CM

## 2023-11-01 DIAGNOSIS — I89.0 LYMPHEDEMA: ICD-10-CM

## 2023-11-01 PROCEDURE — 97140 MANUAL THERAPY 1/> REGIONS: CPT | Mod: GP | Performed by: PHYSICAL THERAPIST

## 2023-11-01 PROCEDURE — 97161 PT EVAL LOW COMPLEX 20 MIN: CPT | Mod: GP | Performed by: PHYSICAL THERAPIST

## 2023-11-01 NOTE — PROGRESS NOTES
PHYSICAL THERAPY EVALUATION  Type of Visit: Evaluation    See electronic medical record for Abuse and Falls Screening details.    Subjective       Presenting condition or subjective complaint: H&N lymphedema  Date of onset: 10/18/23 (date of referral)    Relevant medical history:     Dates & types of surgery:  see below    Prior diagnostic imaging/testing results:       Prior therapy history for the same diagnosis, illness or injury: No      Prior Level of Function  Transfers: Independent  Ambulation: Independent    Living Environment  Social support: With a significant other or spouse   Type of home: House; Multi-level   Stairs to enter the home: Yes 6 Is there a railing: Yes   Ramp: No   Stairs inside the home: Yes 12 Is there a railing: Yes   Help at home: Self Cares (home health aide/personal care attendant, family, etc)  Equipment owned: -- (none)     Employment: Not Applicable      Patient goals for therapy: to learn about H&N lymphedema    Pain assessment: Pain present, R ear pain 4/10 (will keep MD updated if worsens or doesn't change)     Objective       EDEMA EVALUATION  Additional history:  Body part affected by edema: H&N  If cancer related, treatment: chemoradiation  If not cancer related, problems with veins or cause of swelling:    Distance able to walk:    Time able to stand:    Sensation problems in hands/feet:      Edema etiology: Cancer with lymph node dissection, Chemo, Radiation, Surgery, 63 year old female with a diganosis of squamous cell carcinoma of the oral cavity s/p segmental mandibulectomy, anterior glossectomy, LN dissection and reconstruction on 8/17/23.  Pt had 11/98 positive lymph nodes. She is undergoing adjuvant chemoradiation therapy.  Today is 10/33 radiation treatments.      Cognitive Status Examination  Orientation: Oriented to person, place and time   Level of Consciousness: Alert  Follows Commands and Answers Questions: 100% of the time  Personal Safety and Judgement:  Intact  Memory: Intact    EDEMA  Skin Condition:  cervical skin all intact with extreme tightness at distal/inferior portion of neck from scarring; scars are all tight and puckered; skin flap (taken from L-thigh) that covers entire anterior neck/waddle region with very full non-pitting lymphedema and skin is tight; non-pitting slightly softer but still tight lymphedema present at R and L sides of neck that are also red/discolored with beginning stages of fibrosis; significant decrease soft tissue mobility at entire neck  Scar: Yes - very tight and puckered  Capillary Refill: Symmetrical  Stemmer Sign: -  Ulceration: No    GIRTH MEASUREMENTS: Refer to separate girth measurement flowsheet.     Cervical Girth  Superior Neck 42.2 cm   Middle Neck 39.2 cm   Lower Neck 32.3 cm         RANGE OF MOTION:  R and L cervical rotation limited, will formally measure at next treatment session tomorrow  STRENGTH:  cervical strength functional   POSTURE:  slightly forward head in sitting  PALPATION: denies hypersensitivities with pitting assessment/palpation; absent sensation at anterior neck  ACTIVITIES OF DAILY LIVING: independent to mod-I  GAIT/LOCOMOTION: independent, no AD  BALANCE: WFL  SENSATION:  absent sensation at anterior neck from skin flap  VASCULAR:  no concerns  COORDINATION: WFL  MUSCLE TONE: WFL    Assessment & Plan   CLINICAL IMPRESSIONS  Medical Diagnosis: squamous cell carcinoma of oral cavity & lymphedema    Treatment Diagnosis: H&N lymphedema with scarring and significant myofascial tightness   Impression/Assessment: Patient is a 63 year old female with H&N lymphedema, scarring, significant myofascial tightness and decrease soft tissue mobility complaints.  The following significant findings have been identified: Pain, Decreased ROM/flexibility, Decreased joint mobility, Edema, and Impaired posture. These impairments interfere with their ability to perform self care tasks, recreational activities, household  chores, and driving  as compared to previous level of function.     Clinical Decision Making (Complexity):  Clinical Presentation: Stable/Uncomplicated  Clinical Presentation Rationale: based on medical and personal factors listed in PT evaluation  Clinical Decision Making (Complexity): Moderate complexity    PLAN OF CARE  Treatment Interventions:  Interventions: Manual Therapy, Therapeutic Exercise, Self-Care/Home Management, Gradient Compression Bandaging, MFR, STM and compression garment fitting/training    Long Term Goals     PT Goal 1  Goal Identifier: stg  Goal Description: pt to have at least 1 hour weartime tolerance to H&N Eisenberg pack compression garment to decrease H&N lymphedema and related fibrosis  Rationale: to maximize safety and independence with self cares;to maximize safety and independence with performance of ADLs and functional tasks  Target Date: 11/15/23  PT Goal 2  Goal Identifier: stg  Goal Description: pt and/or  to be independent with self-MLD of H&N to decrease lymphedema and related fibrosis to increase ROM and ease during functional activity/ADL  Rationale: to maximize safety and independence with performance of ADLs and functional tasks;to maximize safety and independence with self cares  Target Date: 11/15/23  PT Goal 3  Goal Identifier: ltg  Goal Description: once appropriate, pt and  to be independent with donning, doffing and care of H&N compression garment for lonterm H&N lymphedema management for maintenance  Target Date: 01/24/24  PT Goal 4  Goal Identifier: ltg  Goal Description: pt to be able to demonstrate 45 degrees of R and L cervical rotation for ease, independence and safety during driving and ADL  Rationale: to maximize safety and independence with performance of ADLs and functional tasks;to maximize safety and independence with self cares;to maximize safety and independence with transportation  Target Date: 01/24/24  PT Goal 5  Goal Identifier: ltg  Goal  Description: pt to be independent with longterm H&N lymphedema management via HEP, skin cares, self-MLD, self-MFR and compression garment wear/use  Target Date: 01/24/24      Frequency of Treatment: 2x/week  Duration of Treatment: x12 weeks    Recommended Referrals to Other Professionals:  n/a  Education Assessment:   Learner/Method: Patient;Significant Other;Listening;Reading;Pictures/Video;No Barriers to Learning    Risks and benefits of evaluation/treatment have been explained.   Patient/Family/caregiver agrees with Plan of Care.     Evaluation Time:     PT Eval, Low Complexity Minutes (54284): 10     Signing Clinician: Patricia Magaña, PT, DPT, CLT

## 2023-11-01 NOTE — PROGRESS NOTES
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)  History of former (8/2023) tobacco use and former alcohol abuse    Recommendation, plan, instructions:  Proceed with week #4 radiosensitizing cisplatin  Advised patient that if her hemoglobin drops closer to 8.0 g/dL that she may benefit from red cell transfusion therapy; recommended additional clear fluids before and after her tube feedings and flushes;   Continue oxycodone for symptomatic pain control particularly at bedtime  Letter written for [place] care and Social Security disability  Follow-up in one week for anticipated week #5 radiosensitizing cisplatin    Time with patient including review, documentation, history, examination, coordination of care and counseling was 30 minutes.    Recent oncology review:  On 10/19/2023, cisplatin chemoradiation was initiated.  Therapy has been more difficult tolerate with increased pain, and hemoglobin today down to 8.6.  Her oral communication has also declined.    History of present  illness:  In July, 2023 the patient presented to Dr. Darien Thorne for history of previous CO2 laser ablation for premalignant oral cavity lesions and more recent swelling of the lower lip and chin.    7/3/2023 left mandibular alveolus biopsy revealed an invasive keratinizing moderately differential squamous cell carcinoma.  7/13/2023 PET/dedicated CT revealed a 4.4 x 3.7 x 3.2 cm anterior oral cavity mass invading into the mandible with a separate gluco-avid nodule on the right mandibular buccal mucosa and bilateral level 1B, level 2 and L3 metastatic lymphadenopathy without evidence of metastatic disease.    On 8/17/2023, she underwent segmental mandibulectomy, floor of mouth resection, anterior glossectomy, left fibular free flap, skin grafting, and tracheostomy revealing a pT4a, N3b tumor with positive left anterior lateral soft tissue margin, 11/98+ lymph nodes including STEFFEN and several bilateral lymph nodes consistent with involvement by low-grade B-cell neoplasm suggestive of CLL/SLL.    On 8/25/2023, head neck tumor conference recommended consideration of postoperative chemoradiation.  Postoperative course has been complicated by 8/24/2023 return to the operating room for debridement of multiple areas of skin necrosis and resuturing of neck incision and intraoral flap.    On 10/3/2023, the patient was seen by audiology with left>right sensorineural hearing loss.     Past medical history:  Remarkable for past 1/2 ppd tobacco use (until 8/2023), former alcohol abuse for which she considers herself an alcoholic although she does not use alcohol now.    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    Medications:  Current Outpatient Medications  "  Medication    acetaminophen (TYLENOL) 325 MG tablet    chlorhexidine (PERIDEX) 0.12 % solution    dexAMETHasone (DECADRON) 4 MG tablet    magic mouthwash (ENTER INGREDIENTS IN COMMENTS) suspension    mineral oil-hydrophilic petrolatum (AQUAPHOR) external ointment    ondansetron (ZOFRAN) 8 MG tablet    oxyCODONE (ROXICODONE) 5 MG/5ML solution    polyethylene glycol (MIRALAX) 17 g packet    prochlorperazine (COMPAZINE) 10 MG tablet    ibuprofen (ADVIL/MOTRIN) 600 MG tablet    ondansetron (ZOFRAN ODT) 4 MG ODT tab    senna-docusate (SENOKOT-S/PERICOLACE) 8.6-50 MG tablet     No current facility-administered medications for this visit.       Allergies:  No Known Allergies    Physical examination:  /45 (BP Location: Left arm, Patient Position: Sitting, Cuff Size: Adult Small)   Pulse 70   Temp 98  F (36.7  C) (Tympanic)   Resp 12   Ht 1.613 m (5' 3.5\")   Wt 46.3 kg (102 lb)   SpO2 100%   BMI 17.79 kg/m      The patient is alert and oriented.  Slight increased redness particularly over the left lateral aspect of her submental skin flap.    Rai Castillo MD          "

## 2023-11-02 ENCOUNTER — APPOINTMENT (OUTPATIENT)
Dept: RADIATION THERAPY | Facility: OUTPATIENT CENTER | Age: 63
End: 2023-11-02
Payer: COMMERCIAL

## 2023-11-02 ENCOUNTER — THERAPY VISIT (OUTPATIENT)
Dept: PHYSICAL THERAPY | Facility: CLINIC | Age: 63
End: 2023-11-02
Attending: RADIOLOGY
Payer: COMMERCIAL

## 2023-11-02 DIAGNOSIS — I89.0 LYMPHEDEMA: Primary | ICD-10-CM

## 2023-11-02 PROCEDURE — 97140 MANUAL THERAPY 1/> REGIONS: CPT | Mod: GP | Performed by: PHYSICAL THERAPIST

## 2023-11-03 ENCOUNTER — LAB (OUTPATIENT)
Dept: INFUSION THERAPY | Facility: CLINIC | Age: 63
End: 2023-11-03
Attending: INTERNAL MEDICINE
Payer: COMMERCIAL

## 2023-11-03 ENCOUNTER — APPOINTMENT (OUTPATIENT)
Dept: RADIATION THERAPY | Facility: OUTPATIENT CENTER | Age: 63
End: 2023-11-03
Payer: COMMERCIAL

## 2023-11-03 ENCOUNTER — ONCOLOGY VISIT (OUTPATIENT)
Dept: ONCOLOGY | Facility: CLINIC | Age: 63
End: 2023-11-03
Attending: INTERNAL MEDICINE
Payer: COMMERCIAL

## 2023-11-03 VITALS
TEMPERATURE: 97.9 F | OXYGEN SATURATION: 98 % | HEART RATE: 68 BPM | BODY MASS INDEX: 17.58 KG/M2 | HEIGHT: 64 IN | DIASTOLIC BLOOD PRESSURE: 48 MMHG | SYSTOLIC BLOOD PRESSURE: 96 MMHG | WEIGHT: 103 LBS | RESPIRATION RATE: 12 BRPM

## 2023-11-03 VITALS — HEART RATE: 79 BPM | DIASTOLIC BLOOD PRESSURE: 50 MMHG | SYSTOLIC BLOOD PRESSURE: 107 MMHG

## 2023-11-03 DIAGNOSIS — C06.9 SQUAMOUS CELL CARCINOMA OF ORAL CAVITY (H): Primary | ICD-10-CM

## 2023-11-03 DIAGNOSIS — Z51.11 ENCOUNTER FOR ANTINEOPLASTIC CHEMOTHERAPY: ICD-10-CM

## 2023-11-03 LAB
ALBUMIN SERPL BCG-MCNC: 3.8 G/DL (ref 3.5–5.2)
ALP SERPL-CCNC: 68 U/L (ref 35–104)
ALT SERPL W P-5'-P-CCNC: 21 U/L (ref 0–50)
ANION GAP SERPL CALCULATED.3IONS-SCNC: 10 MMOL/L (ref 7–15)
AST SERPL W P-5'-P-CCNC: 16 U/L (ref 0–45)
BASOPHILS # BLD AUTO: 0 10E3/UL (ref 0–0.2)
BASOPHILS NFR BLD AUTO: 0 %
BILIRUB SERPL-MCNC: <0.2 MG/DL
BUN SERPL-MCNC: 30.2 MG/DL (ref 8–23)
CALCIUM SERPL-MCNC: 9.2 MG/DL (ref 8.8–10.2)
CHLORIDE SERPL-SCNC: 105 MMOL/L (ref 98–107)
CREAT SERPL-MCNC: 0.69 MG/DL (ref 0.51–0.95)
DEPRECATED HCO3 PLAS-SCNC: 24 MMOL/L (ref 22–29)
EGFRCR SERPLBLD CKD-EPI 2021: >90 ML/MIN/1.73M2
EOSINOPHIL # BLD AUTO: 0.1 10E3/UL (ref 0–0.7)
EOSINOPHIL NFR BLD AUTO: 1 %
ERYTHROCYTE [DISTWIDTH] IN BLOOD BY AUTOMATED COUNT: 14.5 % (ref 10–15)
GLUCOSE SERPL-MCNC: 92 MG/DL (ref 70–99)
HCT VFR BLD AUTO: 27.5 % (ref 35–47)
HGB BLD-MCNC: 8.9 G/DL (ref 11.7–15.7)
IMM GRANULOCYTES # BLD: 0 10E3/UL
IMM GRANULOCYTES NFR BLD: 0 %
LYMPHOCYTES # BLD AUTO: 1.1 10E3/UL (ref 0.8–5.3)
LYMPHOCYTES NFR BLD AUTO: 16 %
MAGNESIUM SERPL-MCNC: 2 MG/DL (ref 1.7–2.3)
MCH RBC QN AUTO: 32 PG (ref 26.5–33)
MCHC RBC AUTO-ENTMCNC: 32.4 G/DL (ref 31.5–36.5)
MCV RBC AUTO: 99 FL (ref 78–100)
MONOCYTES # BLD AUTO: 0.6 10E3/UL (ref 0–1.3)
MONOCYTES NFR BLD AUTO: 9 %
NEUTROPHILS # BLD AUTO: 5.2 10E3/UL (ref 1.6–8.3)
NEUTROPHILS NFR BLD AUTO: 74 %
NRBC # BLD AUTO: 0 10E3/UL
NRBC BLD AUTO-RTO: 0 /100
PLATELET # BLD AUTO: 375 10E3/UL (ref 150–450)
POTASSIUM SERPL-SCNC: 4.9 MMOL/L (ref 3.4–5.3)
PROT SERPL-MCNC: 6.7 G/DL (ref 6.4–8.3)
RBC # BLD AUTO: 2.78 10E6/UL (ref 3.8–5.2)
SODIUM SERPL-SCNC: 139 MMOL/L (ref 135–145)
WBC # BLD AUTO: 7.1 10E3/UL (ref 4–11)

## 2023-11-03 PROCEDURE — 250N000011 HC RX IP 250 OP 636: Mod: JZ | Performed by: INTERNAL MEDICINE

## 2023-11-03 PROCEDURE — 36591 DRAW BLOOD OFF VENOUS DEVICE: CPT | Performed by: INTERNAL MEDICINE

## 2023-11-03 PROCEDURE — 96413 CHEMO IV INFUSION 1 HR: CPT

## 2023-11-03 PROCEDURE — 83735 ASSAY OF MAGNESIUM: CPT | Performed by: INTERNAL MEDICINE

## 2023-11-03 PROCEDURE — 84134 ASSAY OF PREALBUMIN: CPT | Performed by: INTERNAL MEDICINE

## 2023-11-03 PROCEDURE — 96367 TX/PROPH/DG ADDL SEQ IV INF: CPT

## 2023-11-03 PROCEDURE — 99213 OFFICE O/P EST LOW 20 MIN: CPT | Performed by: INTERNAL MEDICINE

## 2023-11-03 PROCEDURE — 258N000003 HC RX IP 258 OP 636: Performed by: INTERNAL MEDICINE

## 2023-11-03 PROCEDURE — 99214 OFFICE O/P EST MOD 30 MIN: CPT | Performed by: INTERNAL MEDICINE

## 2023-11-03 PROCEDURE — 96375 TX/PRO/DX INJ NEW DRUG ADDON: CPT

## 2023-11-03 PROCEDURE — 80053 COMPREHEN METABOLIC PANEL: CPT | Performed by: INTERNAL MEDICINE

## 2023-11-03 PROCEDURE — 85025 COMPLETE CBC W/AUTO DIFF WBC: CPT | Performed by: INTERNAL MEDICINE

## 2023-11-03 RX ORDER — PALONOSETRON 0.05 MG/ML
0.25 INJECTION, SOLUTION INTRAVENOUS ONCE
Status: COMPLETED | OUTPATIENT
Start: 2023-11-03 | End: 2023-11-03

## 2023-11-03 RX ORDER — HEPARIN SODIUM (PORCINE) LOCK FLUSH IV SOLN 100 UNIT/ML 100 UNIT/ML
5 SOLUTION INTRAVENOUS
Status: DISCONTINUED | OUTPATIENT
Start: 2023-11-03 | End: 2023-11-03 | Stop reason: HOSPADM

## 2023-11-03 RX ADMIN — FAMOTIDINE 20 MG: 10 INJECTION INTRAVENOUS at 11:59

## 2023-11-03 RX ADMIN — DEXAMETHASONE SODIUM PHOSPHATE: 10 INJECTION, SOLUTION INTRAMUSCULAR; INTRAVENOUS at 11:34

## 2023-11-03 RX ADMIN — Medication 5 ML: at 13:03

## 2023-11-03 RX ADMIN — PALONOSETRON 0.25 MG: 0.05 INJECTION, SOLUTION INTRAVENOUS at 11:59

## 2023-11-03 RX ADMIN — CISPLATIN 60 MG: 1 INJECTION, SOLUTION INTRAVENOUS at 11:56

## 2023-11-03 RX ADMIN — SODIUM CHLORIDE 1000 ML: 9 INJECTION, SOLUTION INTRAVENOUS at 11:13

## 2023-11-03 ASSESSMENT — PAIN SCALES - GENERAL: PAINLEVEL: NO PAIN (0)

## 2023-11-03 NOTE — LETTER
11/3/2023         RE: Adrianna Pereira  [id number] [address] [place] [id number]        Dear Colleague,    Thank you for referring your patient, Adrianna Pereira, to the [facility]. Please see a copy of my visit note below.    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    Recent oncology review:  The patient started chemoradiation with radiation on 10/19/2023 and cisplatin on 10/20/2023.  Today's white count 7100, hemoglobin 8.9, platelet count 375,000, absolute neutrophil count 5200.  CMP is stable.  Prealbumin is pending.    She denies any significant fatigue.  She denies tinnitus or paresthesias, nausea, vomiting or oral pain.    History of present illness:  In July, 2023 the patient presented to Dr. Darien Thorne for history of previous CO2 laser  ablation for premalignant oral cavity lesions and more recent swelling of the lower lip and chin.    7/3/2023 left mandibular alveolus biopsy revealed an invasive keratinizing moderately differential squamous cell carcinoma.  7/13/2023 PET/dedicated CT revealed a 4.4 x 3.7 x 3.2 cm anterior oral cavity mass invading into the mandible with a separate gluco-avid nodule on the right mandibular buccal mucosa and bilateral level 1B, level 2 and L3 metastatic lymphadenopathy without evidence of metastatic disease.    On 8/17/2023, she underwent segmental mandibulectomy, floor of mouth resection, anterior glossectomy, left fibular free flap, skin grafting, and tracheostomy revealing a pT4a, N3b tumor with positive left anterior lateral soft tissue margin, 11/98+ lymph nodes including STEFFEN and several bilateral lymph nodes consistent with involvement by low-grade B-cell neoplasm suggestive of CLL/SLL.    On 8/25/2023, head neck tumor conference recommended consideration of postoperative chemoradiation.  Postoperative course has been complicated by 8/24/2023 return to the operating room for debridement of multiple areas of skin necrosis and resuturing of neck incision and intraoral flap.    On 10/3/2023, the patient was seen by audiology with left>right sensorineural hearing loss.  On 10/18/2023, an implanted port was placed by Raphael Goodman.    Past medical history:  Remarkable for past 1/2 ppd tobacco use (until 8/2023), former alcohol abuse for which she considers herself an alcoholic although she does not use alcohol now.    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    Medications:  Current Outpatient Medications   Medication     acetaminophen  "(TYLENOL) 325 MG tablet     chlorhexidine (PERIDEX) 0.12 % solution     dexAMETHasone (DECADRON) 4 MG tablet     ibuprofen (ADVIL/MOTRIN) 600 MG tablet     mineral oil-hydrophilic petrolatum (AQUAPHOR) external ointment     ondansetron (ZOFRAN ODT) 4 MG ODT tab     ondansetron (ZOFRAN) 8 MG tablet     oxyCODONE (ROXICODONE) 5 MG/5ML solution     polyethylene glycol (MIRALAX) 17 g packet     prochlorperazine (COMPAZINE) 10 MG tablet     senna-docusate (SENOKOT-S/PERICOLACE) 8.6-50 MG tablet     No current facility-administered medications for this visit.       Allergies:  No Known Allergies    Physical examination:  BP 96/48 (BP Location: Left arm, Patient Position: Sitting, Cuff Size: Adult Small)   Pulse 68   Temp 97.9  F (36.6  C) (Tympanic)   Resp 12   Ht 1.613 m (5' 3.5\")   Wt 46.7 kg (103 lb)   SpO2 98%   BMI 17.96 kg/m      The patient is alert and oriented.    Rai Castillo MD            Oncology Rooming Note    November 3, 2023 9:59 AM   Adrianna Pereira is a 63 year old female who presents for:    Chief Complaint   Patient presents with     Oncology Clinic Visit     Squamous cell carcinoma of oral cavity - Labs provider and infusion     Initial Vitals: BP 96/48 (BP Location: Left arm, Patient Position: Sitting, Cuff Size: Adult Small)   Pulse 68   Temp 97.9  F (36.6  C) (Tympanic)   Resp 12   Ht 1.613 m (5' 3.5\")   Wt 46.7 kg (103 lb)   SpO2 98%   BMI 17.96 kg/m   Estimated body mass index is 17.96 kg/m  as calculated from the following:    Height as of this encounter: 1.613 m (5' 3.5\").    Weight as of this encounter: 46.7 kg (103 lb). Body surface area is 1.45 meters squared.  No Pain (0) Comment: Data Unavailable   No LMP recorded. Patient is postmenopausal.  Allergies reviewed: Yes  Medications reviewed: Yes    Medications: Medication refills not needed today.  Pharmacy name entered into EPIC:    [id number] - [place], [place] - [address] [place] [address] - [place], " [place] - [address]  [facility] - [place], [place] - [address] [facility]    Clinical concerns:  None      Tanya Philippe [facility]                Again, thank you for allowing me to participate in the care of your patient.        Sincerely,        Rai Castillo MD

## 2023-11-03 NOTE — PROGRESS NOTES
"Oncology Rooming Note    November 3, 2023 9:59 AM   Adrianna Pereira is a 63 year old female who presents for:    Chief Complaint   Patient presents with    Oncology Clinic Visit     Squamous cell carcinoma of oral cavity - Labs provider and infusion     Initial Vitals: BP 96/48 (BP Location: Left arm, Patient Position: Sitting, Cuff Size: Adult Small)   Pulse 68   Temp 97.9  F (36.6  C) (Tympanic)   Resp 12   Ht 1.613 m (5' 3.5\")   Wt 46.7 kg (103 lb)   SpO2 98%   BMI 17.96 kg/m   Estimated body mass index is 17.96 kg/m  as calculated from the following:    Height as of this encounter: 1.613 m (5' 3.5\").    Weight as of this encounter: 46.7 kg (103 lb). Body surface area is 1.45 meters squared.  No Pain (0) Comment: Data Unavailable   No LMP recorded. Patient is postmenopausal.  Allergies reviewed: Yes  Medications reviewed: Yes    Medications: Medication refills not needed today.  Pharmacy name entered into EPIC:    [id number] - [place], [place] - [address] [place] [address] [facility]ACY - [place], [place] - [address] [facility] [place] - [place], [place] - [address] [facility]    Clinical concerns:  None      Tanya Philippe CMA              "

## 2023-11-06 ENCOUNTER — APPOINTMENT (OUTPATIENT)
Dept: RADIATION THERAPY | Facility: OUTPATIENT CENTER | Age: 63
End: 2023-11-06
Payer: COMMERCIAL

## 2023-11-06 LAB — PREALB SERPL IA-MCNC: 16 MG/DL (ref 15–45)

## 2023-11-07 ENCOUNTER — APPOINTMENT (OUTPATIENT)
Dept: RADIATION THERAPY | Facility: OUTPATIENT CENTER | Age: 63
End: 2023-11-07
Payer: COMMERCIAL

## 2023-11-08 ENCOUNTER — APPOINTMENT (OUTPATIENT)
Dept: RADIATION THERAPY | Facility: OUTPATIENT CENTER | Age: 63
End: 2023-11-08
Payer: COMMERCIAL

## 2023-11-08 ENCOUNTER — OFFICE VISIT (OUTPATIENT)
Dept: RADIATION THERAPY | Facility: OUTPATIENT CENTER | Age: 63
End: 2023-11-08
Payer: COMMERCIAL

## 2023-11-08 VITALS
WEIGHT: 102 LBS | BODY MASS INDEX: 17.79 KG/M2 | HEART RATE: 67 BPM | OXYGEN SATURATION: 99 % | SYSTOLIC BLOOD PRESSURE: 109 MMHG | RESPIRATION RATE: 18 BRPM | DIASTOLIC BLOOD PRESSURE: 53 MMHG

## 2023-11-08 DIAGNOSIS — K12.33 MUCOSITIS DUE TO RADIATION THERAPY: Primary | ICD-10-CM

## 2023-11-08 ASSESSMENT — PAIN SCALES - GENERAL: PAINLEVEL: MILD PAIN (2)

## 2023-11-08 NOTE — PROGRESS NOTES
[facility]  SPECIALIZING IN BREAKTHROUGHS  Radiation Oncology    On Treatment Visit Note      Adrianna Pereira      Date: 2023   MRN: [id number]   : 1960  Diagnosis: SCC oral cavity      Reason for Visit:  On Radiation Treatment Visit     Treatment Summary to Date  Treatment Site: head/neck Current Dose: 3000/6600 cGy Fractions: 15/33      Chemotherapy  Chemo concurrent with radx?: Yes  Oncologist: Dr. Castillo  Drug Name/Frequency 1: weekly cisplat    Subjective:    Doing okay.   Noticing increase in oral mucositis.  Nutrition through tube.   More thickened saliva.  Weight slightly down.      Nursing ROS:   Nutrition Alteration  Diet Type: Patient's Preference  Nutrition Note: all nutrition via peg, doing 3-4 cartons per day, goal is 4 1/2 per day  Skin  Skin Reaction: 0 - No changes     ENT and Mouth Exam  ENT/Mouth Note: only doing oral rinses bid/Rx  Cardiovascular  Respiratory effort: 1 - Normal - without distress  Cardio/Resp Note: trach capped, being removed tomorrow  Gastrointestinal  GI Note: some consipation, will try mom, also using miralax        Pain Assessment  Pain Note: no oral pain, but having headaches, tylenol not helpful      Objective:   /53   Pulse 67   Resp 18   Wt 46.3 kg (102 lb)   SpO2 99%   BMI 17.79 kg/m    NAD  HEENT: S/p segmental mandibulectomy and partial glossectomy with reconstruction. + Moderate mucositis.   Neck: + s/p neck dissection bilaterally, anterior neck wound open and packing in place  Pulm: +S/p trach placement  Abdominal: S/p PEG    Labs:  CBC RESULTS:   Recent Labs   Lab Test 10/20/23  1114   WBC 9.5   RBC 3.00*   HGB 9.6*   HCT 29.8*   MCV 99   MCH 32.0   MCHC 32.2   RDW 15.2*        ELECTROLYTES:  Recent Labs   Lab Test 10/20/23  1114      POTASSIUM 4.4   CHLORIDE 104   RACHAEL 9.6   CO2 22   BUN 18.0   CR 0.43*   GLC 83       Assessment:  Ms. Pereira is a 63 year old female with a diagnosis of squamous cell carcinoma of the oral cavity  status post segmental mandibulectomy, anterior glossectomy,  lymph node dissection and reconstruction.  Final pathology demonstrated squamous cell carcinoma moderately differentiated, 4.1 cm in size originating from  floor mouth/ anterior tongue,  depth of invasion 29 mm, no LVSI, positive PNI, positive margin (left anterior lateral).  11 of 98 lymph nodes positive (4 cm largest deposit, positive extracapsular disease present), pathologic T4N3b.   She is undergoing adjuvant chemoradiation therapy.     Tolerating radiation therapy well.  All questions and concerns addressed.    Plan:   Continue current therapy.    Cancer related pain.  Oxycodone oral solution as needed.  Radiation mucositis.  Magic mouthwash as needed.   Xerostomia.  Salt and soda rinses as needed.   Nutrition.   Status post PEG placement continue to advance tube feeds as tolerated.      Mosaiq chart and setup information reviewed  Ports checked    Medication Review  Med list reviewed with patient?: Yes           Hernán Egan MD

## 2023-11-08 NOTE — LETTER
2023         RE: Adrianna Pereira  [address] [address] [place] [id number]        Dear Colleague,    Thank you for referring your patient, Adrianna Pereira, to the  [facility]. Please see a copy of my visit note below.    [facility]  SPECIALIZING IN BREAKTHROUGHS  Radiation Oncology    On Treatment Visit Note      Adrianna Pereira      Date: 2023   MRN: [id number]   : 1960  Diagnosis: SCC oral cavity      Reason for Visit:  On Radiation Treatment Visit     Treatment Summary to Date  Treatment Site: head/neck Current Dose: 3000/6600 cGy Fractions: 15/33      Chemotherapy  Chemo concurrent with radx?: Yes  Oncologist: Dr. Castillo  Drug Name/Frequency 1: weekly cisplat    Subjective:    Doing okay.   Noticing increase in oral mucositis.  Nutrition through tube.   More thickened saliva.  Weight slightly down.      Nursing ROS:   Nutrition Alteration  Diet Type: Patient's Preference  Nutrition Note: all nutrition via peg, doing 3-4 cartons per day, goal is 4 1/2 per day  Skin  Skin Reaction: 0 - No changes     ENT and Mouth Exam  ENT/Mouth Note: only doing oral rinses bid/Rx  Cardiovascular  Respiratory effort: 1 - Normal - without distress  Cardio/Resp Note: trach capped, being removed tomorrow  Gastrointestinal  GI Note: some consipation, will try mom, also using miralax        Pain Assessment  Pain Note: no oral pain, but having headaches, tylenol not helpful      Objective:   /53   Pulse 67   Resp 18   Wt 46.3 kg (102 lb)   SpO2 99%   BMI 17.79 kg/m    NAD  HEENT: S/p segmental mandibulectomy and partial glossectomy with reconstruction. + Moderate mucositis.   Neck: + s/p neck dissection bilaterally, anterior neck wound open and packing in place  Pulm: +S/p trach placement  Abdominal: S/p PEG    Labs:  CBC RESULTS:   Recent Labs   Lab Test 10/20/23  1114   WBC 9.5   RBC 3.00*   HGB 9.6*   HCT 29.8*   MCV 99   MCH 32.0   MCHC 32.2   RDW 15.2*         ELECTROLYTES:  Recent Labs   Lab Test 10/20/23  1114      POTASSIUM 4.4   CHLORIDE 104   RACHAEL 9.6   CO2 22   BUN 18.0   CR 0.43*   GLC 83       Assessment:  Ms. 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  Final pathology demonstrated squamous cell carcinoma moderately differentiated, 4.1 cm in size originating from  floor mouth/ anterior tongue,  depth of invasion 29 mm, no LVSI, positive PNI, positive margin (left anterior lateral).  11 of 98 lymph nodes positive (4 cm largest deposit, positive extracapsular disease present), pathologic T4N3b.   She is undergoing adjuvant chemoradiation therapy.     Tolerating radiation therapy well.  All questions and concerns addressed.    Plan:   Continue current therapy.    Cancer related pain.  Oxycodone oral solution as needed.  Radiation mucositis.  Magic mouthwash as needed.   Xerostomia.  Salt and soda rinses as needed.   Nutrition.   Status post PEG placement continue to advance tube feeds as tolerated.      Mosaiq chart and setup information reviewed  Ports checked    Medication Review  Med list reviewed with patient?: Yes           Hernán Egan MD

## 2023-11-09 ENCOUNTER — APPOINTMENT (OUTPATIENT)
Dept: RADIATION THERAPY | Facility: OUTPATIENT CENTER | Age: 63
End: 2023-11-09
Payer: COMMERCIAL

## 2023-11-09 ENCOUNTER — LAB (OUTPATIENT)
Dept: INFUSION THERAPY | Facility: CLINIC | Age: 63
End: 2023-11-09
Attending: INTERNAL MEDICINE
Payer: COMMERCIAL

## 2023-11-09 ENCOUNTER — THERAPY VISIT (OUTPATIENT)
Dept: PHYSICAL THERAPY | Facility: CLINIC | Age: 63
End: 2023-11-09
Attending: RADIOLOGY
Payer: COMMERCIAL

## 2023-11-09 ENCOUNTER — ONCOLOGY VISIT (OUTPATIENT)
Dept: ONCOLOGY | Facility: CLINIC | Age: 63
End: 2023-11-09
Attending: INTERNAL MEDICINE
Payer: COMMERCIAL

## 2023-11-09 VITALS
HEIGHT: 64 IN | RESPIRATION RATE: 12 BRPM | HEART RATE: 70 BPM | OXYGEN SATURATION: 100 % | DIASTOLIC BLOOD PRESSURE: 45 MMHG | BODY MASS INDEX: 17.42 KG/M2 | SYSTOLIC BLOOD PRESSURE: 105 MMHG | TEMPERATURE: 98 F | WEIGHT: 102 LBS

## 2023-11-09 DIAGNOSIS — Z51.11 ENCOUNTER FOR ANTINEOPLASTIC CHEMOTHERAPY: ICD-10-CM

## 2023-11-09 DIAGNOSIS — I89.0 LYMPHEDEMA: Primary | ICD-10-CM

## 2023-11-09 DIAGNOSIS — C06.9 SQUAMOUS CELL CARCINOMA OF ORAL CAVITY (H): Primary | ICD-10-CM

## 2023-11-09 LAB
ALBUMIN SERPL BCG-MCNC: 3.8 G/DL (ref 3.5–5.2)
ALP SERPL-CCNC: 67 U/L (ref 35–104)
ALT SERPL W P-5'-P-CCNC: 22 U/L (ref 0–50)
ANION GAP SERPL CALCULATED.3IONS-SCNC: 8 MMOL/L (ref 7–15)
AST SERPL W P-5'-P-CCNC: 17 U/L (ref 0–45)
BASOPHILS # BLD AUTO: 0 10E3/UL (ref 0–0.2)
BASOPHILS NFR BLD AUTO: 0 %
BILIRUB SERPL-MCNC: <0.2 MG/DL
BUN SERPL-MCNC: 31.1 MG/DL (ref 8–23)
CALCIUM SERPL-MCNC: 9.5 MG/DL (ref 8.8–10.2)
CHLORIDE SERPL-SCNC: 104 MMOL/L (ref 98–107)
CREAT SERPL-MCNC: 0.71 MG/DL (ref 0.51–0.95)
DEPRECATED HCO3 PLAS-SCNC: 25 MMOL/L (ref 22–29)
EGFRCR SERPLBLD CKD-EPI 2021: >90 ML/MIN/1.73M2
EOSINOPHIL # BLD AUTO: 0.1 10E3/UL (ref 0–0.7)
EOSINOPHIL NFR BLD AUTO: 1 %
ERYTHROCYTE [DISTWIDTH] IN BLOOD BY AUTOMATED COUNT: 14.1 % (ref 10–15)
GLUCOSE SERPL-MCNC: 91 MG/DL (ref 70–99)
HCT VFR BLD AUTO: 26.5 % (ref 35–47)
HGB BLD-MCNC: 8.6 G/DL (ref 11.7–15.7)
IMM GRANULOCYTES # BLD: 0 10E3/UL
IMM GRANULOCYTES NFR BLD: 1 %
LYMPHOCYTES # BLD AUTO: 1.1 10E3/UL (ref 0.8–5.3)
LYMPHOCYTES NFR BLD AUTO: 18 %
MAGNESIUM SERPL-MCNC: 2.1 MG/DL (ref 1.7–2.3)
MCH RBC QN AUTO: 31.7 PG (ref 26.5–33)
MCHC RBC AUTO-ENTMCNC: 32.5 G/DL (ref 31.5–36.5)
MCV RBC AUTO: 98 FL (ref 78–100)
MONOCYTES # BLD AUTO: 0.5 10E3/UL (ref 0–1.3)
MONOCYTES NFR BLD AUTO: 8 %
NEUTROPHILS # BLD AUTO: 4.6 10E3/UL (ref 1.6–8.3)
NEUTROPHILS NFR BLD AUTO: 72 %
NRBC # BLD AUTO: 0 10E3/UL
NRBC BLD AUTO-RTO: 0 /100
PLATELET # BLD AUTO: 324 10E3/UL (ref 150–450)
POTASSIUM SERPL-SCNC: 4.5 MMOL/L (ref 3.4–5.3)
PREALB SERPL-MCNC: 16.8 MG/DL (ref 20–40)
PROT SERPL-MCNC: 6.9 G/DL (ref 6.4–8.3)
RBC # BLD AUTO: 2.71 10E6/UL (ref 3.8–5.2)
SODIUM SERPL-SCNC: 137 MMOL/L (ref 135–145)
WBC # BLD AUTO: 6.4 10E3/UL (ref 4–11)

## 2023-11-09 PROCEDURE — 80053 COMPREHEN METABOLIC PANEL: CPT | Performed by: INTERNAL MEDICINE

## 2023-11-09 PROCEDURE — 85025 COMPLETE CBC W/AUTO DIFF WBC: CPT | Performed by: INTERNAL MEDICINE

## 2023-11-09 PROCEDURE — 36415 COLL VENOUS BLD VENIPUNCTURE: CPT | Performed by: INTERNAL MEDICINE

## 2023-11-09 PROCEDURE — 99214 OFFICE O/P EST MOD 30 MIN: CPT | Performed by: INTERNAL MEDICINE

## 2023-11-09 PROCEDURE — 97140 MANUAL THERAPY 1/> REGIONS: CPT | Mod: GP | Performed by: PHYSICAL THERAPIST

## 2023-11-09 PROCEDURE — 99213 OFFICE O/P EST LOW 20 MIN: CPT | Performed by: INTERNAL MEDICINE

## 2023-11-09 PROCEDURE — 84134 ASSAY OF PREALBUMIN: CPT | Performed by: INTERNAL MEDICINE

## 2023-11-09 PROCEDURE — 83735 ASSAY OF MAGNESIUM: CPT | Performed by: INTERNAL MEDICINE

## 2023-11-09 PROCEDURE — 250N000011 HC RX IP 250 OP 636: Mod: JZ | Performed by: INTERNAL MEDICINE

## 2023-11-09 RX ORDER — HEPARIN SODIUM (PORCINE) LOCK FLUSH IV SOLN 100 UNIT/ML 100 UNIT/ML
500 SOLUTION INTRAVENOUS ONCE
Status: COMPLETED | OUTPATIENT
Start: 2023-11-09 | End: 2023-11-09

## 2023-11-09 RX ADMIN — Medication 500 UNITS: at 11:31

## 2023-11-09 ASSESSMENT — PAIN SCALES - GENERAL: PAINLEVEL: SEVERE PAIN (7)

## 2023-11-09 NOTE — LETTER
"    11/9/2023         RE: Adrianna Pereira  [address] [address] [facility] [address]        Dear Colleague,    Thank you for referring your patient, Adrianna ePreira, to the [facility]. Please see a copy of my visit note below.    Oncology Rooming Note    November 9, 2023 11:50 AM   Adrianna Pereira is a 63 year old female who presents for:    Chief Complaint   Patient presents with     Oncology Clinic Visit     Squamous cell carcinoma of oral cavity - Labs and provider visit     Initial Vitals: /45 (BP Location: Left arm, Patient Position: Sitting, Cuff Size: Adult Small)   Pulse 70   Temp 98  F (36.7  C) (Tympanic)   Resp 12   Ht 1.613 m (5' 3.5\")   Wt 46.3 kg (102 lb)   SpO2 100%   BMI 17.79 kg/m   Estimated body mass index is 17.79 kg/m  as calculated from the following:    Height as of this encounter: 1.613 m (5' 3.5\").    Weight as of this encounter: 46.3 kg (102 lb). Body surface area is 1.44 meters squared.  Severe Pain (7) Comment: Data Unavailable   No LMP recorded. Patient is postmenopausal.  Allergies reviewed: Yes  Medications reviewed: Yes    Medications: Medication refills not needed today.  Pharmacy name entered into EPIC:    [id number] - [place], [place] - [address] [place] [address] PHARMACY - [place], [place] - [address] [facility] [place], [place] - [address] 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)  History of former (8/2023) tobacco use and former alcohol abuse    Recommendation, plan, instructions:  Proceed with week #4 radiosensitizing cisplatin  Advised patient that if her hemoglobin drops closer to 8.0 g/dL that she may benefit from red cell transfusion therapy; recommended additional clear fluids before and after her tube feedings and flushes;   Continue oxycodone for symptomatic pain control particularly at bedtime  Letter written for [place] care and Social Security disability  Follow-up in one week for anticipated week #5 radiosensitizing cisplatin    Time with patient including review, documentation, history, examination, coordination of care and counseling was 30 minutes.    Recent oncology review:  On 10/19/2023, cisplatin chemoradiation was initiated.  Therapy has been more difficult tolerate with increased pain, and hemoglobin today down to 8.6.  Her oral communication has also declined.    History of present illness:  In July, 2023 the patient presented to Dr. Darien Thorne for history of previous CO2 laser ablation for premalignant oral cavity lesions and more recent swelling of the lower lip and chin.    7/3/2023 left mandibular alveolus biopsy revealed an invasive keratinizing moderately differential squamous cell carcinoma.  7/13/2023 PET/dedicated CT revealed a 4.4 x 3.7 x 3.2 cm anterior oral cavity mass invading into the mandible with a separate gluco-avid nodule on the right mandibular buccal mucosa and  bilateral level 1B, level 2 and L3 metastatic lymphadenopathy without evidence of metastatic disease.    On 8/17/2023, she underwent segmental mandibulectomy, floor of mouth resection, anterior glossectomy, left fibular free flap, skin grafting, and tracheostomy revealing a pT4a, N3b tumor with positive left anterior lateral soft tissue margin, 11/98+ lymph nodes including STEFFEN and several bilateral lymph nodes consistent with involvement by low-grade B-cell neoplasm suggestive of CLL/SLL.    On 8/25/2023, head neck tumor conference recommended consideration of postoperative chemoradiation.  Postoperative course has been complicated by 8/24/2023 return to the operating room for debridement of multiple areas of skin necrosis and resuturing of neck incision and intraoral flap.    On 10/3/2023, the patient was seen by audiology with left>right sensorineural hearing loss.     Past medical history:  Remarkable for past 1/2 ppd tobacco use (until 8/2023), former alcohol abuse for which she considers herself an alcoholic although she does not use alcohol now.    Past Medical History:   Diagnosis Date     At risk for malnutrition      Hyperlipidemia LDL goal <130 10/16/2023     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)      Tobacco dependence syndrome      Underweight 10/16/2023     Family history:  She is originally from [place], is not a  and has 2 daughters.    Medications:  Current Outpatient Medications   Medication     acetaminophen (TYLENOL) 325 MG tablet     chlorhexidine (PERIDEX) 0.12 % solution     dexAMETHasone (DECADRON) 4 MG tablet     magic mouthwash (ENTER INGREDIENTS IN COMMENTS) suspension     mineral oil-hydrophilic petrolatum (AQUAPHOR) external ointment     ondansetron (ZOFRAN) 8 MG tablet     oxyCODONE (ROXICODONE) 5 MG/5ML solution     polyethylene glycol (MIRALAX) 17 g packet     prochlorperazine (COMPAZINE) 10 MG tablet     ibuprofen (ADVIL/MOTRIN) 600 MG tablet     ondansetron (ZOFRAN  "ODT) 4 MG ODT tab     senna-docusate (SENOKOT-S/PERICOLACE) 8.6-50 MG tablet     No current facility-administered medications for this visit.       Allergies:  No Known Allergies    Physical examination:  /45 (BP Location: Left arm, Patient Position: Sitting, Cuff Size: Adult Small)   Pulse 70   Temp 98  F (36.7  C) (Tympanic)   Resp 12   Ht 1.613 m (5' 3.5\")   Wt 46.3 kg (102 lb)   SpO2 100%   BMI 17.79 kg/m      The patient is alert and oriented.  Slight increased redness particularly over the left lateral aspect of her submental skin flap.    Rai Castillo MD            Again, thank you for allowing me to participate in the care of your patient.        Sincerely,        Rai Castillo MD  "

## 2023-11-09 NOTE — PATIENT INSTRUCTIONS
1. Proceed with cisplatin today  2. Follow-up Marina Cronin NP in one week  3. Try to add extra clear fluids to your tube feedings  4. If your hemoglobin drops further to 8.0 gm/dl, you may benefit from a red cell transfusion

## 2023-11-09 NOTE — LETTER
"    11/9/2023         RE: Adrianna Pereira  [address] [address] [facility] [address]        Dear Colleague,    Thank you for referring your patient, Adrianna Pereira, to the [facility]. Please see a copy of my visit note below.    Oncology Rooming Note    November 9, 2023 11:50 AM   Adrianna Pereira is a 63 year old female who presents for:    Chief Complaint   Patient presents with     Oncology Clinic Visit     Squamous cell carcinoma of oral cavity - Labs and provider visit     Initial Vitals: /45 (BP Location: Left arm, Patient Position: Sitting, Cuff Size: Adult Small)   Pulse 70   Temp 98  F (36.7  C) (Tympanic)   Resp 12   Ht 1.613 m (5' 3.5\")   Wt 46.3 kg (102 lb)   SpO2 100%   BMI 17.79 kg/m   Estimated body mass index is 17.79 kg/m  as calculated from the following:    Height as of this encounter: 1.613 m (5' 3.5\").    Weight as of this encounter: 46.3 kg (102 lb). Body surface area is 1.44 meters squared.  Severe Pain (7) Comment: Data Unavailable   No LMP recorded. Patient is postmenopausal.  Allergies reviewed: Yes  Medications reviewed: Yes    Medications: Medication refills not needed today.  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)  History of former (8/2023) tobacco use and former alcohol abuse    Recommendation, plan, instructions:  Proceed with week #4 radiosensitizing cisplatin  Advised patient that if her hemoglobin drops closer to 8.0 g/dL that she may benefit from red cell transfusion therapy; recommended additional clear fluids before and after her tube feedings and flushes;   Continue oxycodone for symptomatic pain control particularly at bedtime  Letter written for [place] care and Social Security disability  Follow-up in one week for anticipated week #5 radiosensitizing cisplatin    Time with patient including review, documentation, history, examination, coordination of care and counseling was 30 minutes.    Recent oncology review:  On 10/19/2023, cisplatin chemoradiation was initiated.  Therapy has been more difficult tolerate with increased pain, and hemoglobin today down to 8.6.  Her oral communication has also declined.    History of present illness:  In July, 2023 the patient presented to Dr. Darien Thorne for history of previous CO2 laser ablation for premalignant oral cavity lesions and more recent swelling of the lower lip and chin.    7/3/2023 left mandibular alveolus biopsy revealed an invasive keratinizing moderately differential squamous cell carcinoma.  7/13/2023 PET/dedicated CT revealed a 4.4 x 3.7 x 3.2 cm anterior oral cavity mass invading into the mandible with a separate gluco-avid nodule on the right mandibular buccal mucosa and  bilateral level 1B, level 2 and L3 metastatic lymphadenopathy without evidence of metastatic disease.    On 8/17/2023, she underwent segmental mandibulectomy, floor of mouth resection, anterior glossectomy, left fibular free flap, skin grafting, and tracheostomy revealing a pT4a, N3b tumor with positive left anterior lateral soft tissue margin, 11/98+ lymph nodes including STEFFEN and several bilateral lymph nodes consistent with involvement by low-grade B-cell neoplasm suggestive of CLL/SLL.    On 8/25/2023, head neck tumor conference recommended consideration of postoperative chemoradiation.  Postoperative course has been complicated by 8/24/2023 return to the operating room for debridement of multiple areas of skin necrosis and resuturing of neck incision and intraoral flap.    On 10/3/2023, the patient was seen by audiology with left>right sensorineural hearing loss.     Past medical history:  Remarkable for past 1/2 ppd tobacco use (until 8/2023), former alcohol abuse for which she considers herself an alcoholic although she does not use alcohol now.    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    Medications:  Current Outpatient Medications   Medication     acetaminophen (TYLENOL) 325 MG tablet     chlorhexidine (PERIDEX) 0.12 % solution     dexAMETHasone (DECADRON) 4 MG tablet     magic mouthwash (ENTER INGREDIENTS IN COMMENTS) suspension     mineral oil-hydrophilic petrolatum (AQUAPHOR) external ointment     ondansetron (ZOFRAN) 8 MG tablet     oxyCODONE (ROXICODONE) 5 MG/5ML solution     polyethylene glycol (MIRALAX) 17 g packet     prochlorperazine (COMPAZINE) 10 MG tablet     ibuprofen (ADVIL/MOTRIN) 600 MG tablet     ondansetron (ZOFRAN  "ODT) 4 MG ODT tab     senna-docusate (SENOKOT-S/PERICOLACE) 8.6-50 MG tablet     No current facility-administered medications for this visit.       Allergies:  No Known Allergies    Physical examination:  /45 (BP Location: Left arm, Patient Position: Sitting, Cuff Size: Adult Small)   Pulse 70   Temp 98  F (36.7  C) (Tympanic)   Resp 12   Ht 1.613 m (5' 3.5\")   Wt 46.3 kg (102 lb)   SpO2 100%   BMI 17.79 kg/m      The patient is alert and oriented.  Slight increased redness particularly over the left lateral aspect of her submental skin flap.    Rai Castillo MD            Again, thank you for allowing me to participate in the care of your patient.        Sincerely,        Rai Castillo MD  "

## 2023-11-09 NOTE — PROGRESS NOTES
Port accessed and labs drawn without difficulty.  Port then flushed per [facility] protocol and deaccessed.  Pt tolerated well.    Leandra Farris RN

## 2023-11-09 NOTE — LETTER
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    She underwent surgical removal of the jaw with lymph node dissection and tracheostomy placement on 2023. She has required additional surgical procedures for skin necrosis. She has been receiving chemotherapy and radiation since 10/19/2023. She has been on enteral tube feedings the entire course.    She is not expected to be able to return to any/all gainful employment. She is unable to verbalize due to her cancer and its treatment.    Sincerely,      Rai Castillo MD  Hematology-Medical Oncology  [facility]

## 2023-11-09 NOTE — PROGRESS NOTES
"Oncology Rooming Note    November 9, 2023 11:50 AM   Adrianna Pereira is a 63 year old female who presents for:    Chief Complaint   Patient presents with    Oncology Clinic Visit     Squamous cell carcinoma of oral cavity - Labs and provider visit     Initial Vitals: /45 (BP Location: Left arm, Patient Position: Sitting, Cuff Size: Adult Small)   Pulse 70   Temp 98  F (36.7  C) (Tympanic)   Resp 12   Ht 1.613 m (5' 3.5\")   Wt 46.3 kg (102 lb)   SpO2 100%   BMI 17.79 kg/m   Estimated body mass index is 17.79 kg/m  as calculated from the following:    Height as of this encounter: 1.613 m (5' 3.5\").    Weight as of this encounter: 46.3 kg (102 lb). Body surface area is 1.44 meters squared.  Severe Pain (7) Comment: Data Unavailable   No LMP recorded. Patient is postmenopausal.  Allergies reviewed: Yes  Medications reviewed: Yes    Medications: Medication refills not needed today.  Pharmacy name entered into EPIC:    [id number] - [place], [place] - [address] [place] [address] PHARMACY - [place], [place] - [address] [facility] [place] - [place], [place] - [address] [facility]    Clinical concerns:  None      Tanya Philippe CMA              "

## 2023-11-10 ENCOUNTER — INFUSION THERAPY VISIT (OUTPATIENT)
Dept: INFUSION THERAPY | Facility: CLINIC | Age: 63
End: 2023-11-10
Attending: INTERNAL MEDICINE
Payer: COMMERCIAL

## 2023-11-10 ENCOUNTER — APPOINTMENT (OUTPATIENT)
Dept: RADIATION THERAPY | Facility: OUTPATIENT CENTER | Age: 63
End: 2023-11-10
Payer: COMMERCIAL

## 2023-11-10 VITALS
RESPIRATION RATE: 16 BRPM | TEMPERATURE: 97.3 F | DIASTOLIC BLOOD PRESSURE: 47 MMHG | HEART RATE: 74 BPM | OXYGEN SATURATION: 100 % | SYSTOLIC BLOOD PRESSURE: 104 MMHG

## 2023-11-10 DIAGNOSIS — C06.9 SQUAMOUS CELL CARCINOMA OF ORAL CAVITY (H): Primary | ICD-10-CM

## 2023-11-10 PROCEDURE — 96367 TX/PROPH/DG ADDL SEQ IV INF: CPT

## 2023-11-10 PROCEDURE — 96413 CHEMO IV INFUSION 1 HR: CPT

## 2023-11-10 PROCEDURE — 250N000011 HC RX IP 250 OP 636: Mod: JZ | Performed by: INTERNAL MEDICINE

## 2023-11-10 PROCEDURE — 96375 TX/PRO/DX INJ NEW DRUG ADDON: CPT

## 2023-11-10 PROCEDURE — 258N000003 HC RX IP 258 OP 636: Performed by: INTERNAL MEDICINE

## 2023-11-10 PROCEDURE — 96361 HYDRATE IV INFUSION ADD-ON: CPT

## 2023-11-10 RX ORDER — HEPARIN SODIUM (PORCINE) LOCK FLUSH IV SOLN 100 UNIT/ML 100 UNIT/ML
5 SOLUTION INTRAVENOUS
Status: DISCONTINUED | OUTPATIENT
Start: 2023-11-10 | End: 2023-11-10 | Stop reason: HOSPADM

## 2023-11-10 RX ORDER — PALONOSETRON 0.05 MG/ML
0.25 INJECTION, SOLUTION INTRAVENOUS ONCE
Status: COMPLETED | OUTPATIENT
Start: 2023-11-10 | End: 2023-11-10

## 2023-11-10 RX ADMIN — CISPLATIN 60 MG: 1 INJECTION, SOLUTION INTRAVENOUS at 12:15

## 2023-11-10 RX ADMIN — Medication 5 ML: at 13:21

## 2023-11-10 RX ADMIN — DEXAMETHASONE SODIUM PHOSPHATE: 10 INJECTION, SOLUTION INTRAMUSCULAR; INTRAVENOUS at 11:50

## 2023-11-10 RX ADMIN — SODIUM CHLORIDE 1000 ML: 9 INJECTION, SOLUTION INTRAVENOUS at 11:02

## 2023-11-10 RX ADMIN — FAMOTIDINE 20 MG: 10 INJECTION, SOLUTION INTRAVENOUS at 12:21

## 2023-11-10 RX ADMIN — PALONOSETRON 0.25 MG: 0.05 INJECTION, SOLUTION INTRAVENOUS at 12:11

## 2023-11-10 NOTE — PROGRESS NOTES
Infusion Nursing Note:  Adrianna Pereira presents today for D22C1 Cisplatin.    Patient seen by provider today: No   present during visit today: Not Applicable.    Note: N/A.      Intravenous Access:  Implanted Port.    Treatment Conditions:  Lab Results   Component Value Date    HGB 8.6 (L) 11/09/2023    WBC 6.4 11/09/2023    ANEUTAUTO 4.6 11/09/2023     11/09/2023        Lab Results   Component Value Date     11/09/2023    POTASSIUM 4.5 11/09/2023    MAG 2.1 11/09/2023    CR 0.71 11/09/2023    RACHAEL 9.5 11/09/2023    BILITOTAL <0.2 11/09/2023    ALBUMIN 3.8 11/09/2023    ALT 22 11/09/2023    AST 17 11/09/2023       Results reviewed, labs MET treatment parameters, ok to proceed with treatment.      Post Infusion Assessment:  Patient tolerated infusion without incident.  Site patent and intact, free from redness, edema or discomfort.  No evidence of extravasations.  Access discontinued per protocol.       Discharge Plan:   Discharge instructions reviewed with: Patient.  Patient discharged in stable condition accompanied by: self.  Departure Mode: Ambulatory.      Yakelin Gilman RN

## 2023-11-13 ENCOUNTER — APPOINTMENT (OUTPATIENT)
Dept: RADIATION THERAPY | Facility: OUTPATIENT CENTER | Age: 63
End: 2023-11-13
Payer: COMMERCIAL

## 2023-11-14 ENCOUNTER — APPOINTMENT (OUTPATIENT)
Dept: RADIATION THERAPY | Facility: OUTPATIENT CENTER | Age: 63
End: 2023-11-14
Payer: COMMERCIAL

## 2023-11-14 DIAGNOSIS — C06.9 SQUAMOUS CELL CARCINOMA OF ORAL CAVITY (H): ICD-10-CM

## 2023-11-14 RX ORDER — DEXAMETHASONE 4 MG/1
8 TABLET ORAL DAILY
Qty: 12 TABLET | Refills: 2 | Status: SHIPPED | OUTPATIENT
Start: 2023-11-14 | End: 2023-11-28

## 2023-11-15 ENCOUNTER — APPOINTMENT (OUTPATIENT)
Dept: RADIATION THERAPY | Facility: OUTPATIENT CENTER | Age: 63
End: 2023-11-15
Payer: COMMERCIAL

## 2023-11-15 ENCOUNTER — OFFICE VISIT (OUTPATIENT)
Dept: RADIATION THERAPY | Facility: OUTPATIENT CENTER | Age: 63
End: 2023-11-15
Payer: COMMERCIAL

## 2023-11-15 ENCOUNTER — THERAPY VISIT (OUTPATIENT)
Dept: PHYSICAL THERAPY | Facility: CLINIC | Age: 63
End: 2023-11-15
Attending: RADIOLOGY
Payer: COMMERCIAL

## 2023-11-15 VITALS
SYSTOLIC BLOOD PRESSURE: 119 MMHG | WEIGHT: 101.2 LBS | HEART RATE: 69 BPM | DIASTOLIC BLOOD PRESSURE: 64 MMHG | BODY MASS INDEX: 17.65 KG/M2

## 2023-11-15 DIAGNOSIS — I89.0 LYMPHEDEMA: Primary | ICD-10-CM

## 2023-11-15 DIAGNOSIS — C06.9 SQUAMOUS CELL CARCINOMA OF ORAL CAVITY (H): Primary | ICD-10-CM

## 2023-11-15 PROCEDURE — 97140 MANUAL THERAPY 1/> REGIONS: CPT | Mod: GP,CQ | Performed by: REHABILITATION PRACTITIONER

## 2023-11-15 NOTE — LETTER
"    11/15/2023         RE: Adrianna Pereira  [id number] [facility] [address]        Dear Colleague,    Thank you for referring your patient, Adrianna Pereira, to the [facility] RADIATION THERAPY CLINIC. Please see a copy of my visit note below.    RADIATION ONCOLOGY WEEKLY ON TREATMENT VISIT   [facility] therapy  [facility]   Encounter Date: November 15, 2023    Patient Name: Adrianna Pereira  MRN: [id number]  : 1960     Disease and Stage: SSCA oral cavity   postop RT   Treatment Site: head/neck  Current Dose: 4000/6000 cGy  Fractions:   Daily Fraction Size: [200] cGy/day, [5] times/week  Concurrent Chemotherapy: Yes  Drug and Frequency: weekly platinum    Subjective: Ms. Pereira presents to clinic today for her weekly on-treatment visit.  She is \"hanging in there\"    She  has thick mucus bbut has not been doing salt rinses.  She is slightly constipated.   She started oxycodone at night with good results.  She has maintained her weight.  Her headache resolved.     ROS:   Nutrition Alteration  Diet Type: Patient's Preference  Nutrition Note: wt down - discussed increasing PEG tube feedings -curretnly doing 1 1/3 carton 3x/day - can do  1 1/2 cartons 3x/day. discussed doing pedialyte and more water via PEG for hydration as well.  Skin  Skin Reaction: 0 - No changes     ENT and Mouth Exam  ENT/Mouth Note: only doing oral rinses bid/Rx  Cardiovascular  Respiratory effort: 1 - Normal - without distress  Cardio/Resp Note: trach removed, no concerns           Pain Assessment  0-10 Pain Scale: 3  Pain Note: pressure/tightness in jaw, burning in mouth - reviewed that she can use tylenol or oxycodone.  she only occassiaonlly is using tylenol.    Objective:   Slight erythema over the reconstructed skin. No desquamation     General: alert. bright eyed.   HEENT: S/p segmental mandibulectomy and partial glossectomy with reconstruction. + Moderate mucositis.  No evidence of thrush.        Treatment-related toxicities " (CTCAE v4.0):  grade 1 erythema skin  grade 1 mucositis    Assessment:  T4 N3b   Ms. Pereira is a 63 year old female with a ssca of oral cavity .   s/p surgery with positive margin and 11/98 pos nodes  ( fina   )     Plan:   Continue chemoradiotherapy  2.  Lymphedema eval today      Mosaiq chart and setup information reviewed  IGRT images reviewed    Medication Review  Med list reviewed with patient?: Yes    Educational Topic Discussed  Education Instructions: reviewed nutrition/hydration, pain medication    Tasneem Ryan  [phone] pager   Department of Radiation Oncology  [facility]      Again, thank you for allowing me to participate in the care of your patient.        Sincerely,        Tasneem Ryan MD

## 2023-11-15 NOTE — PROGRESS NOTES
"RADIATION ONCOLOGY WEEKLY ON TREATMENT VISIT   [facility] radiation therapy  [facility]   Encounter Date: November 15, 2023    Patient Name: Adrianna Pereira  MRN: [id number]  : 1960     Disease and Stage: SSCA oral cavity   postop RT   Treatment Site: head/neck  Current Dose: 4000/6000 cGy  Fractions: 20/33  Daily Fraction Size: [200] cGy/day, [5] times/week  Concurrent Chemotherapy: Yes  Drug and Frequency: weekly platinum    Subjective: Ms. Pereira presents to clinic today for her weekly on-treatment visit.  She is \"hanging in there\"    She  has thick mucus bbut has not been doing salt rinses.  She is slightly constipated.   She started oxycodone at night with good results.  She has maintained her weight.  Her headache resolved.     ROS:   Nutrition Alteration  Diet Type: Patient's Preference  Nutrition Note: wt down - discussed increasing PEG tube feedings -curretnly doing 1 1/3 carton 3x/day - can do  1 1/2 cartons 3x/day. discussed doing pedialyte and more water via PEG for hydration as well.  Skin  Skin Reaction: 0 - No changes     ENT and Mouth Exam  ENT/Mouth Note: only doing oral rinses bid/Rx  Cardiovascular  Respiratory effort: 1 - Normal - without distress  Cardio/Resp Note: trach removed, no concerns           Pain Assessment  0-10 Pain Scale: 3  Pain Note: pressure/tightness in jaw, burning in mouth - reviewed that she can use tylenol or oxycodone.  she only occassiaonlly is using tylenol.    Objective:   Slight erythema over the reconstructed skin. No desquamation     General: alert. bright eyed.   HEENT: S/p segmental mandibulectomy and partial glossectomy with reconstruction. + Moderate mucositis.  No evidence of thrush.        Treatment-related toxicities (CTCAE v4.0):  grade 1 erythema skin  grade 1 mucositis    Assessment:  T4 N3b   Ms. Pereira is a 63 year old female with a ssca of oral cavity .   s/p surgery with positive margin and  pos nodes  ( fina   )     Plan:   Continue " chemoradiotherapy  2.  Lymphedema eval today      Mosaiq chart and setup information reviewed  IGRT images reviewed    Medication Review  Med list reviewed with patient?: Yes    Educational Topic Discussed  Education Instructions: reviewed nutrition/hydration, pain medication    Tasneem Ryan  [phone] pager   Department of Radiation Oncology  [facility]

## 2023-11-16 ENCOUNTER — APPOINTMENT (OUTPATIENT)
Dept: RADIATION THERAPY | Facility: OUTPATIENT CENTER | Age: 63
End: 2023-11-16
Payer: COMMERCIAL

## 2023-11-16 ENCOUNTER — LAB (OUTPATIENT)
Dept: INFUSION THERAPY | Facility: CLINIC | Age: 63
End: 2023-11-16
Attending: INTERNAL MEDICINE
Payer: COMMERCIAL

## 2023-11-16 DIAGNOSIS — C06.9 SQUAMOUS CELL CARCINOMA OF ORAL CAVITY (H): Primary | ICD-10-CM

## 2023-11-16 LAB
ALBUMIN SERPL BCG-MCNC: 3.5 G/DL (ref 3.5–5.2)
ALP SERPL-CCNC: 70 U/L (ref 40–150)
ALT SERPL W P-5'-P-CCNC: 17 U/L (ref 0–50)
ANION GAP SERPL CALCULATED.3IONS-SCNC: 11 MMOL/L (ref 7–15)
AST SERPL W P-5'-P-CCNC: 13 U/L (ref 0–45)
BASOPHILS # BLD AUTO: 0 10E3/UL (ref 0–0.2)
BASOPHILS NFR BLD AUTO: 0 %
BILIRUB SERPL-MCNC: <0.2 MG/DL
BUN SERPL-MCNC: 27.8 MG/DL (ref 8–23)
CALCIUM SERPL-MCNC: 9.1 MG/DL (ref 8.8–10.2)
CHLORIDE SERPL-SCNC: 103 MMOL/L (ref 98–107)
CREAT SERPL-MCNC: 0.7 MG/DL (ref 0.51–0.95)
DEPRECATED HCO3 PLAS-SCNC: 24 MMOL/L (ref 22–29)
EGFRCR SERPLBLD CKD-EPI 2021: >90 ML/MIN/1.73M2
EOSINOPHIL # BLD AUTO: 0 10E3/UL (ref 0–0.7)
EOSINOPHIL NFR BLD AUTO: 1 %
ERYTHROCYTE [DISTWIDTH] IN BLOOD BY AUTOMATED COUNT: 13.9 % (ref 10–15)
GLUCOSE SERPL-MCNC: 92 MG/DL (ref 70–99)
HCT VFR BLD AUTO: 24.7 % (ref 35–47)
HGB BLD-MCNC: 8.3 G/DL (ref 11.7–15.7)
IMM GRANULOCYTES # BLD: 0 10E3/UL
IMM GRANULOCYTES NFR BLD: 1 %
LYMPHOCYTES # BLD AUTO: 0.6 10E3/UL (ref 0.8–5.3)
LYMPHOCYTES NFR BLD AUTO: 16 %
MAGNESIUM SERPL-MCNC: 1.8 MG/DL (ref 1.7–2.3)
MCH RBC QN AUTO: 31.9 PG (ref 26.5–33)
MCHC RBC AUTO-ENTMCNC: 33.6 G/DL (ref 31.5–36.5)
MCV RBC AUTO: 95 FL (ref 78–100)
MONOCYTES # BLD AUTO: 0.4 10E3/UL (ref 0–1.3)
MONOCYTES NFR BLD AUTO: 9 %
NEUTROPHILS # BLD AUTO: 3 10E3/UL (ref 1.6–8.3)
NEUTROPHILS NFR BLD AUTO: 73 %
NRBC # BLD AUTO: 0 10E3/UL
NRBC BLD AUTO-RTO: 0 /100
PLATELET # BLD AUTO: 243 10E3/UL (ref 150–450)
POTASSIUM SERPL-SCNC: 4.8 MMOL/L (ref 3.4–5.3)
PREALB SERPL-MCNC: 14.6 MG/DL (ref 20–40)
PROT SERPL-MCNC: 6.6 G/DL (ref 6.4–8.3)
RBC # BLD AUTO: 2.6 10E6/UL (ref 3.8–5.2)
SODIUM SERPL-SCNC: 138 MMOL/L (ref 135–145)
WBC # BLD AUTO: 4 10E3/UL (ref 4–11)

## 2023-11-16 PROCEDURE — 84134 ASSAY OF PREALBUMIN: CPT | Performed by: INTERNAL MEDICINE

## 2023-11-16 PROCEDURE — 85025 COMPLETE CBC W/AUTO DIFF WBC: CPT | Performed by: INTERNAL MEDICINE

## 2023-11-16 PROCEDURE — 83735 ASSAY OF MAGNESIUM: CPT | Performed by: INTERNAL MEDICINE

## 2023-11-16 PROCEDURE — 250N000011 HC RX IP 250 OP 636: Mod: JZ

## 2023-11-16 PROCEDURE — 36591 DRAW BLOOD OFF VENOUS DEVICE: CPT | Performed by: INTERNAL MEDICINE

## 2023-11-16 PROCEDURE — 80053 COMPREHEN METABOLIC PANEL: CPT | Performed by: INTERNAL MEDICINE

## 2023-11-16 RX ORDER — HEPARIN SODIUM (PORCINE) LOCK FLUSH IV SOLN 100 UNIT/ML 100 UNIT/ML
SOLUTION INTRAVENOUS
Status: COMPLETED
Start: 2023-11-16 | End: 2023-11-16

## 2023-11-16 RX ADMIN — Medication 500 UNITS: at 09:50

## 2023-11-17 ENCOUNTER — INFUSION THERAPY VISIT (OUTPATIENT)
Dept: INFUSION THERAPY | Facility: CLINIC | Age: 63
End: 2023-11-17
Attending: INTERNAL MEDICINE
Payer: COMMERCIAL

## 2023-11-17 ENCOUNTER — VIRTUAL VISIT (OUTPATIENT)
Dept: SPEECH THERAPY | Facility: CLINIC | Age: 63
End: 2023-11-17
Payer: COMMERCIAL

## 2023-11-17 ENCOUNTER — APPOINTMENT (OUTPATIENT)
Dept: RADIATION THERAPY | Facility: OUTPATIENT CENTER | Age: 63
End: 2023-11-17
Payer: COMMERCIAL

## 2023-11-17 VITALS — SYSTOLIC BLOOD PRESSURE: 112 MMHG | BODY MASS INDEX: 17.68 KG/M2 | DIASTOLIC BLOOD PRESSURE: 73 MMHG | WEIGHT: 101.4 LBS

## 2023-11-17 DIAGNOSIS — R13.12 DYSPHAGIA, OROPHARYNGEAL PHASE: Primary | ICD-10-CM

## 2023-11-17 DIAGNOSIS — C06.9 SQUAMOUS CELL CARCINOMA OF ORAL CAVITY (H): Primary | ICD-10-CM

## 2023-11-17 DIAGNOSIS — C04.9 SQUAMOUS CELL CARCINOMA OF FLOOR OF MOUTH (H): ICD-10-CM

## 2023-11-17 PROCEDURE — 92526 ORAL FUNCTION THERAPY: CPT | Mod: GN | Performed by: SPEECH-LANGUAGE PATHOLOGIST

## 2023-11-17 PROCEDURE — 96375 TX/PRO/DX INJ NEW DRUG ADDON: CPT

## 2023-11-17 PROCEDURE — 96361 HYDRATE IV INFUSION ADD-ON: CPT

## 2023-11-17 PROCEDURE — 258N000003 HC RX IP 258 OP 636: Performed by: INTERNAL MEDICINE

## 2023-11-17 PROCEDURE — 96413 CHEMO IV INFUSION 1 HR: CPT

## 2023-11-17 PROCEDURE — 250N000011 HC RX IP 250 OP 636: Mod: JZ | Performed by: INTERNAL MEDICINE

## 2023-11-17 RX ORDER — HEPARIN SODIUM (PORCINE) LOCK FLUSH IV SOLN 100 UNIT/ML 100 UNIT/ML
5 SOLUTION INTRAVENOUS
Status: DISCONTINUED | OUTPATIENT
Start: 2023-11-17 | End: 2023-11-17 | Stop reason: HOSPADM

## 2023-11-17 RX ORDER — PALONOSETRON 0.05 MG/ML
0.25 INJECTION, SOLUTION INTRAVENOUS ONCE
Qty: 5 ML | Refills: 0 | Status: COMPLETED | OUTPATIENT
Start: 2023-11-17 | End: 2023-11-17

## 2023-11-17 RX ADMIN — FAMOTIDINE 20 MG: 10 INJECTION INTRAVENOUS at 10:45

## 2023-11-17 RX ADMIN — Medication 5 ML: at 12:44

## 2023-11-17 RX ADMIN — DEXAMETHASONE SODIUM PHOSPHATE: 10 INJECTION, SOLUTION INTRAMUSCULAR; INTRAVENOUS at 10:45

## 2023-11-17 RX ADMIN — CISPLATIN 60 MG: 1 INJECTION, SOLUTION INTRAVENOUS at 11:14

## 2023-11-17 RX ADMIN — PALONOSETRON 0.25 MG: 0.05 INJECTION, SOLUTION INTRAVENOUS at 10:45

## 2023-11-17 RX ADMIN — SODIUM CHLORIDE 1000 ML: 9 INJECTION, SOLUTION INTRAVENOUS at 10:12

## 2023-11-17 NOTE — PROGRESS NOTES
Hematology/Medical Oncology Follow-up Note    ADDENDUM: Called patient's  as the patient cannot speak because of her head neck tumor.  The thoracic MR scan also shows apparent new lung metastases.  Therefore we need to urgently move up the PET scan to ASAP.  I indicated I will have the staff [facility] at the [facility] get on this in the morning and contact the  as soon as possible.    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   Follow-up with me 2/22/2024 with PET scan/diagnostic CT neck, CBC, CMP and TSH before appointment    Time with patient including review, documentation, history, examination, coordination of care and counseling  was 30 minutes.    Recent oncology review:  On 10/19/2023, cisplatin chemoradiation was initiated.  Therapy has been relatively well-tolerated with decreased pain although her swallowing is unchanged.  Her oral communication has not improved.    Today's hemoglobin is down to 7.7 but she declines Red cell transfusion therapy.  ANC is satisfactory.    History of present illness:  In July, 2023 the patient presented to Dr. Darien Thorne for history of previous CO2 laser ablation for premalignant oral cavity lesions and more recent swelling of the lower lip and chin.    7/3/2023 left mandibular alveolus biopsy revealed an invasive keratinizing moderately differential squamous cell carcinoma.  7/13/2023 PET/dedicated CT revealed a 4.4 x 3.7 x 3.2 cm anterior oral cavity mass invading into the mandible with a separate gluco-avid nodule on the right mandibular buccal mucosa and bilateral level 1B, level 2 and L3 metastatic lymphadenopathy without evidence of metastatic disease.    On 8/17/2023, she underwent segmental mandibulectomy, floor of mouth resection, anterior glossectomy, left fibular free flap, skin grafting, and tracheostomy revealing a pT4a, N3b tumor with positive left anterior lateral soft tissue margin, 11/98+ lymph nodes including STEFFEN and several bilateral lymph nodes consistent with involvement by low-grade B-cell neoplasm suggestive of CLL/SLL.    On 8/25/2023, head neck tumor conference recommended consideration of postoperative chemoradiation.  Postoperative course has been complicated by 8/24/2023 return to the operating room for debridement of multiple areas of skin necrosis and resuturing of neck incision and intraoral flap.    On 10/3/2023, the patient was seen by audiology with left>right sensorineural hearing loss.     Past medical history:  Remarkable for past 1/2 ppd tobacco use (until 8/2023), former alcohol abuse for which she considers herself an alcoholic although she does not use alcohol  now.    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)     Tobacco dependence syndrome     Underweight 10/16/2023     Family history:  She is originally from [place], is not a  and has 2 daughters.    Medications:  Current Outpatient Medications   Medication    acetaminophen (TYLENOL) 325 MG tablet    chlorhexidine (PERIDEX) 0.12 % solution    dexAMETHasone (DECADRON) 4 MG tablet    magic mouthwash (ENTER INGREDIENTS IN COMMENTS) suspension    mineral oil-hydrophilic petrolatum (AQUAPHOR) external ointment    ondansetron (ZOFRAN) 8 MG tablet    polyethylene glycol (MIRALAX) 17 g packet    prochlorperazine (COMPAZINE) 10 MG tablet    ondansetron (ZOFRAN ODT) 4 MG ODT tab    senna-docusate (SENOKOT-S/PERICOLACE) 8.6-50 MG tablet     No current facility-administered medications for this visit.     Facility-Administered Medications Ordered in Other Visits   Medication    CISplatin (PLATINOL) 58 mg in sodium chloride 0.9 % 308 mL infusion    famotidine (PEPCID) injection 20 mg    fosaprepitant (EMEND) 150 mg, dexAMETHasone (DECADRON) 12 mg in sodium chloride 0.9 % 251.2 mL intermittent infusion    heparin 100 unit/mL injection 5 mL    palonosetron (ALOXI) injection 0.25 mg    sodium chloride (PF) 0.9% PF flush 3-20 mL    sodium chloride 0.9% BOLUS 1,000 mL       Allergies:  No Known Allergies    Review of systems:  Except as noted in the note above, the patient denies headaches, diplopia, hearing loss or dizziness; dyspnea, cough, hemoptysis, pleurisy; chest pain/pressure, palpitations, lightheadedness; anorexia, nausea, vomiting, abdominal pain, diarrhea, constipation, melena or rectal bleeding; dysuria, frequency,  blood in the urine; vaginal bleeding or discharge; fever, chills, sweats, hot flashes; tingling, numbness, loss of balance; insomnia, depression, anxiety.    Physical examination:  BP  "(!) 108/38 (BP Location: Left arm, Patient Position: Sitting, Cuff Size: Adult Small)   Pulse 67   Temp 97.8  F (36.6  C) (Tympanic)   Resp 12   Ht 1.613 m (5' 3.5\")   Wt 45.4 kg (100 lb)   SpO2 100%   BMI 17.44 kg/m      The patient is alert and oriented. Decreased redness over the left lateral aspect of her submental skin flap.    Rai Castillo MD          " Patient/Caregiver provided printed discharge information.

## 2023-11-17 NOTE — PROGRESS NOTES
Infusion Nursing Note:  Adrianna Pereira presents today for Cisplatin.    Patient seen by provider today: No   present during visit today: Not Applicable.    Note: N/A.      Intravenous Access:  Implanted Port.    Treatment Conditions:  Lab Results   Component Value Date    HGB 8.3 (L) 11/16/2023    WBC 4.0 11/16/2023    ANEUTAUTO 3.0 11/16/2023     11/16/2023        Lab Results   Component Value Date     11/16/2023    POTASSIUM 4.8 11/16/2023    MAG 1.8 11/16/2023    CR 0.70 11/16/2023    RACHAEL 9.1 11/16/2023    BILITOTAL <0.2 11/16/2023    ALBUMIN 3.5 11/16/2023    ALT 17 11/16/2023    AST 13 11/16/2023       Results reviewed, labs MET treatment parameters, ok to proceed with treatment.      Post Infusion Assessment:  Patient tolerated infusion without incident.  Blood return noted pre and post infusion.  Site patent and intact, free from redness, edema or discomfort.  No evidence of extravasations.  Access discontinued per protocol.       Discharge Plan:   Copy of AVS reviewed with patient and/or family.  Patient will return 11/24/23 for next appointment.  Patient discharged in stable condition accompanied by: self and .  Departure Mode: Ambulatory.      TIARA JOYNER RN

## 2023-11-20 ENCOUNTER — OFFICE VISIT (OUTPATIENT)
Dept: RADIATION THERAPY | Facility: OUTPATIENT CENTER | Age: 63
End: 2023-11-20
Payer: COMMERCIAL

## 2023-11-20 ENCOUNTER — APPOINTMENT (OUTPATIENT)
Dept: RADIATION THERAPY | Facility: OUTPATIENT CENTER | Age: 63
End: 2023-11-20
Payer: COMMERCIAL

## 2023-11-20 VITALS
BODY MASS INDEX: 17.96 KG/M2 | HEART RATE: 69 BPM | SYSTOLIC BLOOD PRESSURE: 112 MMHG | WEIGHT: 103 LBS | DIASTOLIC BLOOD PRESSURE: 67 MMHG

## 2023-11-20 DIAGNOSIS — C06.9 SQUAMOUS CELL CARCINOMA OF ORAL CAVITY (H): Primary | ICD-10-CM

## 2023-11-20 NOTE — LETTER
2023         RE: Adrianna Pereira  [address] [address] [place] [id number]        Dear Colleague,    Thank you for referring your patient, Adrianna Pereira, to the [facility] RADIATION THERAPY CLINIC. Please see a copy of my visit note below.    [facility] PHYSICIANS  SPECIALIZING IN BREAKTHROUGHS  Radiation Oncology    On Treatment Visit Note      Adrianna Pereira      Date: 2023   MRN: [id number]   : 1960  Diagnosis: SCC oral cavity    ID: Ms. 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  Final pathology demonstrated squamous cell carcinoma moderately differentiated, 4.1 cm in size originating from  floor mouth/ anterior tongue,  depth of invasion 29 mm, no LVSI, positive PNI, positive margin (left anterior lateral).  11 of 98 lymph nodes positive (4 cm largest deposit, positive extracapsular disease present), pathologic T4N3b.   She is undergoing adjuvant chemoradiation therapy.      Reason for Visit:  On Radiation Treatment Visit       Treatment Summary to Date  Treatment Site: head/neck Current Dose: 4600/6600 cGy Fractions:         Chemotherapy  Chemo concurrent with radx?: Yes  Oncologist: Dr. Castillo  Drug Name/Frequency 1: weekly cisplat    Subjective:   Stable from last week, oral cavity mucositis, feeding tube, weight stable, thickened secretions.     Nursing ROS:   Nutrition Alteration  Diet Type: Patient's Preference  Nutrition Note: weight stable,  doing3-3.5 cartons per day  Skin  Skin Reaction: 1 - Faint erythema or dry desquamation  Skin Note: using aquaphor     ENT and Mouth Exam  ENT/Mouth Note: only doing oral rinses 1-2x per day/Rx  Cardiovascular  Respiratory effort: 1 - Normal - without distress  Cardio/Resp Note: trach removed, no concerns  Gastrointestinal  GI Note: no gi issues  Genitourinary   Note: no issues     Pain  "Assessment  0-10 Pain Scale: 3  Pain Note: using oxy liquid 5 mg at hs to help her sleep, minimal discomfort, no headaches this week (neck/skin feel \"tight\", but no pain)      Objective:   /67   Pulse 69   Wt 46.7 kg (103 lb)   BMI 17.96 kg/m    Gen: Appears well, in no acute distress  HEENT: S/p segmental mandibulectomy and partial glossectomy with reconstruction. + Moderate mucositis.   Neck: + s/p neck dissection bilaterally, anterior neck wound open and packing in place  CV/Resp: rrr, breathing comfortably on room air  Neuro: CN 2-12 grossly intact, UE/LE full strength     Labs:  CBC RESULTS:   Recent Labs   Lab Test 11/16/23  0950   WBC 4.0   RBC 2.60*   HGB 8.3*   HCT 24.7*   MCV 95   MCH 31.9   MCHC 33.6   RDW 13.9        ELECTROLYTES:  Recent Labs   Lab Test 11/16/23  0950      POTASSIUM 4.8   CHLORIDE 103   RACHAEL 9.1   CO2 24   BUN 27.8*   CR 0.70   GLC 92       Assessment:    Tolerating radiation therapy well.  All questions and concerns addressed.      Plan:   Continue current therapy.        Mosaiq chart and setup information reviewed  Ports checked and MVCT/IGRT images checked    Medication Review  Med list reviewed with patient?: Yes    Educational Topic Discussed  Education Instructions: reviewed nutrition/hydration, pain medication      Rah Egan MD  Radiation Oncology   [facility]  Clinic: [phone]  "

## 2023-11-21 ENCOUNTER — THERAPY VISIT (OUTPATIENT)
Dept: PHYSICAL THERAPY | Facility: CLINIC | Age: 63
End: 2023-11-21
Attending: RADIOLOGY
Payer: COMMERCIAL

## 2023-11-21 ENCOUNTER — APPOINTMENT (OUTPATIENT)
Dept: RADIATION THERAPY | Facility: OUTPATIENT CENTER | Age: 63
End: 2023-11-21
Payer: COMMERCIAL

## 2023-11-21 DIAGNOSIS — I89.0 LYMPHEDEMA: Primary | ICD-10-CM

## 2023-11-21 PROCEDURE — 97140 MANUAL THERAPY 1/> REGIONS: CPT | Mod: GP | Performed by: PHYSICAL THERAPIST

## 2023-11-22 ENCOUNTER — APPOINTMENT (OUTPATIENT)
Dept: RADIATION THERAPY | Facility: OUTPATIENT CENTER | Age: 63
End: 2023-11-22
Payer: COMMERCIAL

## 2023-11-22 NOTE — PROGRESS NOTES
"[facility] PHYSICIANS  SPECIALIZING IN BREAKTHROUGHS  Radiation Oncology    On Treatment Visit Note      Adrianna Pereira      Date: 2023   MRN: [id number]   : 1960  Diagnosis: SCC oral cavity    ID: Ms. 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  Final pathology demonstrated squamous cell carcinoma moderately differentiated, 4.1 cm in size originating from  floor mouth/ anterior tongue,  depth of invasion 29 mm, no LVSI, positive PNI, positive margin (left anterior lateral).  11 of 98 lymph nodes positive (4 cm largest deposit, positive extracapsular disease present), pathologic T4N3b.   She is undergoing adjuvant chemoradiation therapy.      Reason for Visit:  On Radiation Treatment Visit       Treatment Summary to Date  Treatment Site: head/neck Current Dose: 4600/6600 cGy Fractions:         Chemotherapy  Chemo concurrent with radx?: Yes  Oncologist: Dr. Castillo  Drug Name/Frequency 1: weekly cisplat    Subjective:   Stable from last week, oral cavity mucositis, feeding tube, weight stable, thickened secretions.     Nursing ROS:   Nutrition Alteration  Diet Type: Patient's Preference  Nutrition Note: weight stable,  doing3-3.5 cartons per day  Skin  Skin Reaction: 1 - Faint erythema or dry desquamation  Skin Note: using aquaphor     ENT and Mouth Exam  ENT/Mouth Note: only doing oral rinses 1-2x per day/Rx  Cardiovascular  Respiratory effort: 1 - Normal - without distress  Cardio/Resp Note: trach removed, no concerns  Gastrointestinal  GI Note: no gi issues  Genitourinary   Note: no issues     Pain Assessment  0-10 Pain Scale: 3  Pain Note: using oxy liquid 5 mg at hs to help her sleep, minimal discomfort, no headaches this week (neck/skin feel \"tight\", but no pain)      Objective:   /67   Pulse 69   Wt 46.7 kg (103 lb)   BMI " 17.96 kg/m    Gen: Appears well, in no acute distress  HEENT: S/p segmental mandibulectomy and partial glossectomy with reconstruction. + Moderate mucositis.   Neck: + s/p neck dissection bilaterally, anterior neck wound open and packing in place  CV/Resp: rrr, breathing comfortably on room air  Neuro: CN 2-12 grossly intact, UE/LE full strength     Labs:  CBC RESULTS:   Recent Labs   Lab Test 11/16/23  0950   WBC 4.0   RBC 2.60*   HGB 8.3*   HCT 24.7*   MCV 95   MCH 31.9   MCHC 33.6   RDW 13.9        ELECTROLYTES:  Recent Labs   Lab Test 11/16/23  0950      POTASSIUM 4.8   CHLORIDE 103   RACHAEL 9.1   CO2 24   BUN 27.8*   CR 0.70   GLC 92       Assessment:    Tolerating radiation therapy well.  All questions and concerns addressed.      Plan:   Continue current therapy.        Mosaiq chart and setup information reviewed  Ports checked and MVCT/IGRT images checked    Medication Review  Med list reviewed with patient?: Yes    Educational Topic Discussed  Education Instructions: reviewed nutrition/hydration, pain medication      Rah Egan MD  Radiation Oncology   [facility]  Clinic: [phone]

## 2023-11-24 ENCOUNTER — INFUSION THERAPY VISIT (OUTPATIENT)
Dept: INFUSION THERAPY | Facility: CLINIC | Age: 63
End: 2023-11-24
Attending: INTERNAL MEDICINE
Payer: COMMERCIAL

## 2023-11-24 ENCOUNTER — APPOINTMENT (OUTPATIENT)
Dept: RADIATION THERAPY | Facility: OUTPATIENT CENTER | Age: 63
End: 2023-11-24
Payer: COMMERCIAL

## 2023-11-24 ENCOUNTER — ONCOLOGY VISIT (OUTPATIENT)
Dept: ONCOLOGY | Facility: CLINIC | Age: 63
End: 2023-11-24
Attending: INTERNAL MEDICINE
Payer: COMMERCIAL

## 2023-11-24 VITALS — SYSTOLIC BLOOD PRESSURE: 118 MMHG | HEART RATE: 72 BPM | DIASTOLIC BLOOD PRESSURE: 55 MMHG

## 2023-11-24 VITALS
SYSTOLIC BLOOD PRESSURE: 108 MMHG | HEIGHT: 64 IN | RESPIRATION RATE: 12 BRPM | DIASTOLIC BLOOD PRESSURE: 38 MMHG | BODY MASS INDEX: 17.07 KG/M2 | WEIGHT: 100 LBS | TEMPERATURE: 97.8 F | OXYGEN SATURATION: 100 % | HEART RATE: 67 BPM

## 2023-11-24 DIAGNOSIS — C06.9 SQUAMOUS CELL CARCINOMA OF ORAL CAVITY (H): Primary | ICD-10-CM

## 2023-11-24 LAB
ALBUMIN SERPL BCG-MCNC: 3.5 G/DL (ref 3.5–5.2)
ALP SERPL-CCNC: 64 U/L (ref 40–150)
ALT SERPL W P-5'-P-CCNC: 13 U/L (ref 0–50)
ANION GAP SERPL CALCULATED.3IONS-SCNC: 10 MMOL/L (ref 7–15)
AST SERPL W P-5'-P-CCNC: 12 U/L (ref 0–45)
BASOPHILS # BLD AUTO: 0 10E3/UL (ref 0–0.2)
BASOPHILS NFR BLD AUTO: 0 %
BILIRUB SERPL-MCNC: 0.2 MG/DL
BUN SERPL-MCNC: 25.5 MG/DL (ref 8–23)
CALCIUM SERPL-MCNC: 9.1 MG/DL (ref 8.8–10.2)
CHLORIDE SERPL-SCNC: 102 MMOL/L (ref 98–107)
CREAT SERPL-MCNC: 0.73 MG/DL (ref 0.51–0.95)
DEPRECATED HCO3 PLAS-SCNC: 25 MMOL/L (ref 22–29)
EGFRCR SERPLBLD CKD-EPI 2021: >90 ML/MIN/1.73M2
EOSINOPHIL # BLD AUTO: 0 10E3/UL (ref 0–0.7)
EOSINOPHIL NFR BLD AUTO: 0 %
ERYTHROCYTE [DISTWIDTH] IN BLOOD BY AUTOMATED COUNT: 13.9 % (ref 10–15)
GLUCOSE SERPL-MCNC: 94 MG/DL (ref 70–99)
HCT VFR BLD AUTO: 23 % (ref 35–47)
HGB BLD-MCNC: 7.7 G/DL (ref 11.7–15.7)
IMM GRANULOCYTES # BLD: 0 10E3/UL
IMM GRANULOCYTES NFR BLD: 1 %
LYMPHOCYTES # BLD AUTO: 0.6 10E3/UL (ref 0.8–5.3)
LYMPHOCYTES NFR BLD AUTO: 20 %
MAGNESIUM SERPL-MCNC: 1.5 MG/DL (ref 1.7–2.3)
MCH RBC QN AUTO: 32 PG (ref 26.5–33)
MCHC RBC AUTO-ENTMCNC: 33.5 G/DL (ref 31.5–36.5)
MCV RBC AUTO: 95 FL (ref 78–100)
MONOCYTES # BLD AUTO: 0.2 10E3/UL (ref 0–1.3)
MONOCYTES NFR BLD AUTO: 6 %
NEUTROPHILS # BLD AUTO: 2.3 10E3/UL (ref 1.6–8.3)
NEUTROPHILS NFR BLD AUTO: 73 %
NRBC # BLD AUTO: 0 10E3/UL
NRBC BLD AUTO-RTO: 0 /100
PLATELET # BLD AUTO: 181 10E3/UL (ref 150–450)
POTASSIUM SERPL-SCNC: 4.4 MMOL/L (ref 3.4–5.3)
PREALB SERPL-MCNC: 11.8 MG/DL (ref 20–40)
PROT SERPL-MCNC: 6.5 G/DL (ref 6.4–8.3)
RBC # BLD AUTO: 2.41 10E6/UL (ref 3.8–5.2)
SODIUM SERPL-SCNC: 137 MMOL/L (ref 135–145)
WBC # BLD AUTO: 3.2 10E3/UL (ref 4–11)

## 2023-11-24 PROCEDURE — 250N000011 HC RX IP 250 OP 636: Mod: JZ | Performed by: INTERNAL MEDICINE

## 2023-11-24 PROCEDURE — 96413 CHEMO IV INFUSION 1 HR: CPT

## 2023-11-24 PROCEDURE — 36591 DRAW BLOOD OFF VENOUS DEVICE: CPT | Performed by: INTERNAL MEDICINE

## 2023-11-24 PROCEDURE — 96367 TX/PROPH/DG ADDL SEQ IV INF: CPT

## 2023-11-24 PROCEDURE — 83735 ASSAY OF MAGNESIUM: CPT | Performed by: INTERNAL MEDICINE

## 2023-11-24 PROCEDURE — 96375 TX/PRO/DX INJ NEW DRUG ADDON: CPT

## 2023-11-24 PROCEDURE — 84134 ASSAY OF PREALBUMIN: CPT | Performed by: INTERNAL MEDICINE

## 2023-11-24 PROCEDURE — 258N000003 HC RX IP 258 OP 636: Performed by: INTERNAL MEDICINE

## 2023-11-24 PROCEDURE — 99213 OFFICE O/P EST LOW 20 MIN: CPT | Mod: 25 | Performed by: INTERNAL MEDICINE

## 2023-11-24 PROCEDURE — 80053 COMPREHEN METABOLIC PANEL: CPT | Performed by: INTERNAL MEDICINE

## 2023-11-24 PROCEDURE — 85025 COMPLETE CBC W/AUTO DIFF WBC: CPT | Performed by: INTERNAL MEDICINE

## 2023-11-24 PROCEDURE — 99214 OFFICE O/P EST MOD 30 MIN: CPT | Performed by: INTERNAL MEDICINE

## 2023-11-24 RX ORDER — PALONOSETRON 0.05 MG/ML
0.25 INJECTION, SOLUTION INTRAVENOUS ONCE
Status: COMPLETED | OUTPATIENT
Start: 2023-11-24 | End: 2023-11-24

## 2023-11-24 RX ORDER — HEPARIN SODIUM (PORCINE) LOCK FLUSH IV SOLN 100 UNIT/ML 100 UNIT/ML
5 SOLUTION INTRAVENOUS
Status: DISCONTINUED | OUTPATIENT
Start: 2023-11-24 | End: 2023-11-24 | Stop reason: HOSPADM

## 2023-11-24 RX ADMIN — FAMOTIDINE 20 MG: 10 INJECTION INTRAVENOUS at 11:07

## 2023-11-24 RX ADMIN — SODIUM CHLORIDE 1000 ML: 9 INJECTION, SOLUTION INTRAVENOUS at 10:41

## 2023-11-24 RX ADMIN — CISPLATIN 58 MG: 1 INJECTION, SOLUTION INTRAVENOUS at 11:30

## 2023-11-24 RX ADMIN — DEXAMETHASONE SODIUM PHOSPHATE: 10 INJECTION, SOLUTION INTRAMUSCULAR; INTRAVENOUS at 11:08

## 2023-11-24 RX ADMIN — Medication 5 ML: at 12:35

## 2023-11-24 RX ADMIN — PALONOSETRON 0.25 MG: 0.05 INJECTION, SOLUTION INTRAVENOUS at 11:30

## 2023-11-24 ASSESSMENT — PAIN SCALES - GENERAL: PAINLEVEL: NO PAIN (0)

## 2023-11-24 NOTE — PROGRESS NOTES
"Oncology Rooming Note    November 24, 2023 9:57 AM   Adrianna Pereira is a 63 year old female who presents for:    Chief Complaint   Patient presents with    Oncology Clinic Visit     Squamous cell carcinoma of oral cavity - Labs provider and infusion     Initial Vitals: BP (!) 108/38 (BP Location: Left arm, Patient Position: Sitting, Cuff Size: Adult Small)   Pulse 67   Temp 97.8  F (36.6  C) (Tympanic)   Resp 12   Ht 1.613 m (5' 3.5\")   Wt 45.4 kg (100 lb)   SpO2 100%   BMI 17.44 kg/m   Estimated body mass index is 17.44 kg/m  as calculated from the following:    Height as of this encounter: 1.613 m (5' 3.5\").    Weight as of this encounter: 45.4 kg (100 lb). Body surface area is 1.43 meters squared.  No Pain (0) Comment: Data Unavailable   No LMP recorded. Patient is postmenopausal.  Allergies reviewed: Yes  Medications reviewed: Yes    Medications: Medication refills not needed today.  Pharmacy name entered into EPIC:    [id number] - [place], [place] - [address] [place] [address] [facility]Y - [place], [place] - [address] [facility] - [place], [place] - [address] [facility]    Clinical concerns:  None      Tanya Philippe CMA              "

## 2023-11-24 NOTE — LETTER
11/24/2023         RE: Adrianna Pereira  [address] [address] [place] [id number]        Dear Colleague,    Thank you for referring your patient, Adrianna Pereira, to the [facility]. Please see a copy of my visit note below.    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   Follow-up with me 2/22/2024 with PET scan/diagnostic CT neck, CBC, CMP and TSH before appointment    Time with patient including review, documentation, history, examination, coordination of care and counseling was 30 minutes.    Recent oncology review:  On 10/19/2023, cisplatin chemoradiation was initiated.  Therapy has been relatively  well-tolerated with decreased pain although her swallowing is unchanged.  Her oral communication has not improved.    Today's hemoglobin is down to 7.7 but she declines Red cell transfusion therapy.  ANC is satisfactory.    History of present illness:  In July, 2023 the patient presented to Dr. Darien Thorne for history of previous CO2 laser ablation for premalignant oral cavity lesions and more recent swelling of the lower lip and chin.    7/3/2023 left mandibular alveolus biopsy revealed an invasive keratinizing moderately differential squamous cell carcinoma.  7/13/2023 PET/dedicated CT revealed a 4.4 x 3.7 x 3.2 cm anterior oral cavity mass invading into the mandible with a separate gluco-avid nodule on the right mandibular buccal mucosa and bilateral level 1B, level 2 and L3 metastatic lymphadenopathy without evidence of metastatic disease.    On 8/17/2023, she underwent segmental mandibulectomy, floor of mouth resection, anterior glossectomy, left fibular free flap, skin grafting, and tracheostomy revealing a pT4a, N3b tumor with positive left anterior lateral soft tissue margin, 11/98+ lymph nodes including STEFFEN and several bilateral lymph nodes consistent with involvement by low-grade B-cell neoplasm suggestive of CLL/SLL.    On 8/25/2023, head neck tumor conference recommended consideration of postoperative chemoradiation.  Postoperative course has been complicated by 8/24/2023 return to the operating room for debridement of multiple areas of skin necrosis and resuturing of neck incision and intraoral flap.    On 10/3/2023, the patient was seen by audiology with left>right sensorineural hearing loss.     Past medical history:  Remarkable for past 1/2 ppd tobacco use (until 8/2023), former alcohol abuse for which she considers herself an alcoholic although she does not use alcohol now.    Past Medical History:   Diagnosis Date     At risk for malnutrition      Hyperlipidemia LDL goal <130 10/16/2023      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)      Tobacco dependence syndrome      Underweight 10/16/2023     Family history:  She is originally from [place], is not a  and has 2 daughters.    Medications:  Current Outpatient Medications   Medication     acetaminophen (TYLENOL) 325 MG tablet     chlorhexidine (PERIDEX) 0.12 % solution     dexAMETHasone (DECADRON) 4 MG tablet     magic mouthwash (ENTER INGREDIENTS IN COMMENTS) suspension     mineral oil-hydrophilic petrolatum (AQUAPHOR) external ointment     ondansetron (ZOFRAN) 8 MG tablet     polyethylene glycol (MIRALAX) 17 g packet     prochlorperazine (COMPAZINE) 10 MG tablet     ondansetron (ZOFRAN ODT) 4 MG ODT tab     senna-docusate (SENOKOT-S/PERICOLACE) 8.6-50 MG tablet     No current facility-administered medications for this visit.     Facility-Administered Medications Ordered in Other Visits   Medication     CISplatin (PLATINOL) 58 mg in sodium chloride 0.9 % 308 mL infusion     famotidine (PEPCID) injection 20 mg     fosaprepitant (EMEND) 150 mg, dexAMETHasone (DECADRON) 12 mg in sodium chloride 0.9 % 251.2 mL intermittent infusion     heparin 100 unit/mL injection 5 mL     palonosetron (ALOXI) injection 0.25 mg     sodium chloride (PF) 0.9% PF flush 3-20 mL     sodium chloride 0.9% BOLUS 1,000 mL       Allergies:  No Known Allergies    Review of systems:  Except as noted in the note above, the patient denies headaches, diplopia, hearing loss or dizziness; dyspnea, cough, hemoptysis, pleurisy; chest pain/pressure, palpitations, lightheadedness; anorexia, nausea, vomiting, abdominal pain, diarrhea, constipation, melena or rectal bleeding; dysuria, frequency,  blood in the urine; vaginal bleeding or discharge; fever, chills, sweats, hot flashes; tingling, numbness, loss of balance; insomnia, depression, anxiety.    Physical examination:  BP (!) 108/38 (BP Location: Left arm, Patient Position: Sitting, Cuff Size: Adult Small)   Pulse 67    "Temp 97.8  F (36.6  C) (Tympanic)   Resp 12   Ht 1.613 m (5' 3.5\")   Wt 45.4 kg (100 lb)   SpO2 100%   BMI 17.44 kg/m      The patient is alert and oriented. Decreased redness over the left lateral aspect of her submental skin flap.    Rai Castillo MD            Oncology Rooming Note    November 24, 2023 9:57 AM   Adrianna Pereira is a 63 year old female who presents for:    Chief Complaint   Patient presents with     Oncology Clinic Visit     Squamous cell carcinoma of oral cavity - Labs provider and infusion     Initial Vitals: BP (!) 108/38 (BP Location: Left arm, Patient Position: Sitting, Cuff Size: Adult Small)   Pulse 67   Temp 97.8  F (36.6  C) (Tympanic)   Resp 12   Ht 1.613 m (5' 3.5\")   Wt 45.4 kg (100 lb)   SpO2 100%   BMI 17.44 kg/m   Estimated body mass index is 17.44 kg/m  as calculated from the following:    Height as of this encounter: 1.613 m (5' 3.5\").    Weight as of this encounter: 45.4 kg (100 lb). Body surface area is 1.43 meters squared.  No Pain (0) Comment: Data Unavailable   No LMP recorded. Patient is postmenopausal.  Allergies reviewed: Yes  Medications reviewed: Yes    Medications: Medication refills not needed today.  Pharmacy name entered into EPIC:    [id number] - [place], [place] - [address] [place] [address] [facility]ARM[facility]Y - [place], [place] - [address] [place] [address] [facility]Y [place] - [address]    Clinical concerns:  None      Tanya Philippe [facility]                Again, thank you for allowing me to participate in the care of your patient.        Sincerely,        Rai Castillo MD  "

## 2023-11-24 NOTE — PATIENT INSTRUCTIONS
1. Cisplatin chemotherapy today  2. Follow-up Marina Cronin NP 11/28/2023 as already scheduled with last chemotherapy planned for 12/1/2023  3. Follow-up Dr. Castillo 2/22/2024 with PET scan and blood tests before appointment

## 2023-11-24 NOTE — PROGRESS NOTES
Infusion Nursing Note:  Adrianna Pereira presents today for Cisplatin C1D36.    Patient seen by provider today: Yes: Dr. Castillo; therefore, assessment deferred   present during visit today: Not Applicable.    Note: N/A.    Intravenous Access:  Implanted Port.    Treatment Conditions:  Lab Results   Component Value Date    HGB 7.7 (L) 11/24/2023    WBC 3.2 (L) 11/24/2023    ANEUTAUTO 2.3 11/24/2023     11/24/2023      Lab Results   Component Value Date     11/24/2023    POTASSIUM 4.4 11/24/2023    MAG 1.5 (L) 11/24/2023    CR 0.73 11/24/2023    RACHAEL 9.1 11/24/2023    BILITOTAL 0.2 11/24/2023    ALBUMIN 3.5 11/24/2023    ALT 13 11/24/2023    AST 12 11/24/2023   Results reviewed, labs MET treatment parameters, ok to proceed with treatment.    Post Infusion Assessment:  Patient tolerated infusion without incident.  Blood return noted pre and post infusion.  Site patent and intact, free from redness, edema or discomfort.  No evidence of extravasations.  Access discontinued per protocol.     Discharge Plan:   Patient declined prescription refills.  Discharge instructions reviewed with: Patient.  AVS to patient via [employer].  Patient will return 11/29/2023 for next appointment.   Patient discharged in stable condition accompanied by: self and .  Departure Mode: Ambulatory.    La Monzon RN

## 2023-11-27 ENCOUNTER — APPOINTMENT (OUTPATIENT)
Dept: RADIATION THERAPY | Facility: OUTPATIENT CENTER | Age: 63
End: 2023-11-27
Payer: COMMERCIAL

## 2023-11-28 ENCOUNTER — APPOINTMENT (OUTPATIENT)
Dept: RADIATION THERAPY | Facility: OUTPATIENT CENTER | Age: 63
End: 2023-11-28
Payer: COMMERCIAL

## 2023-11-28 DIAGNOSIS — C06.9 SQUAMOUS CELL CARCINOMA OF ORAL CAVITY (H): ICD-10-CM

## 2023-11-28 RX ORDER — DEXAMETHASONE 4 MG/1
8 TABLET ORAL DAILY
Qty: 12 TABLET | Refills: 2 | Status: SHIPPED | OUTPATIENT
Start: 2023-11-28 | End: 2023-11-29

## 2023-11-29 ENCOUNTER — LAB (OUTPATIENT)
Dept: INFUSION THERAPY | Facility: CLINIC | Age: 63
End: 2023-11-29
Attending: INTERNAL MEDICINE
Payer: COMMERCIAL

## 2023-11-29 ENCOUNTER — OFFICE VISIT (OUTPATIENT)
Dept: RADIATION THERAPY | Facility: OUTPATIENT CENTER | Age: 63
End: 2023-11-29
Payer: COMMERCIAL

## 2023-11-29 ENCOUNTER — VIRTUAL VISIT (OUTPATIENT)
Dept: SPEECH THERAPY | Facility: CLINIC | Age: 63
End: 2023-11-29
Payer: COMMERCIAL

## 2023-11-29 ENCOUNTER — APPOINTMENT (OUTPATIENT)
Dept: RADIATION THERAPY | Facility: OUTPATIENT CENTER | Age: 63
End: 2023-11-29
Payer: COMMERCIAL

## 2023-11-29 ENCOUNTER — ONCOLOGY VISIT (OUTPATIENT)
Dept: ONCOLOGY | Facility: CLINIC | Age: 63
End: 2023-11-29
Attending: NURSE PRACTITIONER
Payer: COMMERCIAL

## 2023-11-29 VITALS
HEIGHT: 64 IN | RESPIRATION RATE: 16 BRPM | DIASTOLIC BLOOD PRESSURE: 55 MMHG | BODY MASS INDEX: 16.56 KG/M2 | TEMPERATURE: 97.7 F | SYSTOLIC BLOOD PRESSURE: 104 MMHG | OXYGEN SATURATION: 100 % | WEIGHT: 97 LBS | HEART RATE: 77 BPM

## 2023-11-29 VITALS
WEIGHT: 97.8 LBS | BODY MASS INDEX: 17.05 KG/M2 | RESPIRATION RATE: 18 BRPM | DIASTOLIC BLOOD PRESSURE: 64 MMHG | OXYGEN SATURATION: 97 % | SYSTOLIC BLOOD PRESSURE: 114 MMHG | HEART RATE: 79 BPM

## 2023-11-29 DIAGNOSIS — R13.12 OROPHARYNGEAL DYSPHAGIA: ICD-10-CM

## 2023-11-29 DIAGNOSIS — C06.9 SQUAMOUS CELL CARCINOMA OF ORAL CAVITY (H): Primary | ICD-10-CM

## 2023-11-29 DIAGNOSIS — R13.12 DYSPHAGIA, OROPHARYNGEAL PHASE: Primary | ICD-10-CM

## 2023-11-29 DIAGNOSIS — C04.9 SQUAMOUS CELL CARCINOMA OF FLOOR OF MOUTH (H): ICD-10-CM

## 2023-11-29 DIAGNOSIS — E83.42 HYPOMAGNESEMIA: ICD-10-CM

## 2023-11-29 DIAGNOSIS — D64.81 ANTINEOPLASTIC CHEMOTHERAPY INDUCED ANEMIA: ICD-10-CM

## 2023-11-29 DIAGNOSIS — T45.1X5A ANTINEOPLASTIC CHEMOTHERAPY INDUCED ANEMIA: ICD-10-CM

## 2023-11-29 LAB
ALBUMIN SERPL BCG-MCNC: 3.6 G/DL (ref 3.5–5.2)
ALP SERPL-CCNC: 64 U/L (ref 40–150)
ALT SERPL W P-5'-P-CCNC: 15 U/L (ref 0–50)
ANION GAP SERPL CALCULATED.3IONS-SCNC: 14 MMOL/L (ref 7–15)
AST SERPL W P-5'-P-CCNC: 15 U/L (ref 0–45)
BASOPHILS # BLD AUTO: 0 10E3/UL (ref 0–0.2)
BASOPHILS NFR BLD AUTO: 0 %
BILIRUB SERPL-MCNC: 0.2 MG/DL
BUN SERPL-MCNC: 32.1 MG/DL (ref 8–23)
CALCIUM SERPL-MCNC: 8.8 MG/DL (ref 8.8–10.2)
CHLORIDE SERPL-SCNC: 100 MMOL/L (ref 98–107)
CREAT SERPL-MCNC: 0.77 MG/DL (ref 0.51–0.95)
DEPRECATED HCO3 PLAS-SCNC: 22 MMOL/L (ref 22–29)
EGFRCR SERPLBLD CKD-EPI 2021: 86 ML/MIN/1.73M2
EOSINOPHIL # BLD AUTO: 0 10E3/UL (ref 0–0.7)
EOSINOPHIL NFR BLD AUTO: 0 %
ERYTHROCYTE [DISTWIDTH] IN BLOOD BY AUTOMATED COUNT: 14.2 % (ref 10–15)
GLUCOSE SERPL-MCNC: 89 MG/DL (ref 70–99)
HCT VFR BLD AUTO: 21.6 % (ref 35–47)
HGB BLD-MCNC: 7.2 G/DL (ref 11.7–15.7)
IMM GRANULOCYTES # BLD: 0 10E3/UL
IMM GRANULOCYTES NFR BLD: 2 %
LYMPHOCYTES # BLD AUTO: 0.6 10E3/UL (ref 0.8–5.3)
LYMPHOCYTES NFR BLD AUTO: 22 %
MAGNESIUM SERPL-MCNC: 1.4 MG/DL (ref 1.7–2.3)
MCH RBC QN AUTO: 31.7 PG (ref 26.5–33)
MCHC RBC AUTO-ENTMCNC: 33.3 G/DL (ref 31.5–36.5)
MCV RBC AUTO: 95 FL (ref 78–100)
MONOCYTES # BLD AUTO: 0.2 10E3/UL (ref 0–1.3)
MONOCYTES NFR BLD AUTO: 8 %
NEUTROPHILS # BLD AUTO: 1.8 10E3/UL (ref 1.6–8.3)
NEUTROPHILS NFR BLD AUTO: 68 %
NRBC # BLD AUTO: 0 10E3/UL
NRBC BLD AUTO-RTO: 0 /100
PLATELET # BLD AUTO: 202 10E3/UL (ref 150–450)
POTASSIUM SERPL-SCNC: 3.9 MMOL/L (ref 3.4–5.3)
PREALB SERPL-MCNC: 13 MG/DL (ref 20–40)
PROT SERPL-MCNC: 6.4 G/DL (ref 6.4–8.3)
RBC # BLD AUTO: 2.27 10E6/UL (ref 3.8–5.2)
SODIUM SERPL-SCNC: 136 MMOL/L (ref 135–145)
WBC # BLD AUTO: 2.6 10E3/UL (ref 4–11)

## 2023-11-29 PROCEDURE — 82374 ASSAY BLOOD CARBON DIOXIDE: CPT | Performed by: INTERNAL MEDICINE

## 2023-11-29 PROCEDURE — 92526 ORAL FUNCTION THERAPY: CPT | Mod: GN | Performed by: SPEECH-LANGUAGE PATHOLOGIST

## 2023-11-29 PROCEDURE — 83735 ASSAY OF MAGNESIUM: CPT | Performed by: INTERNAL MEDICINE

## 2023-11-29 PROCEDURE — 99214 OFFICE O/P EST MOD 30 MIN: CPT | Performed by: NURSE PRACTITIONER

## 2023-11-29 PROCEDURE — 250N000011 HC RX IP 250 OP 636: Mod: JZ

## 2023-11-29 PROCEDURE — 84134 ASSAY OF PREALBUMIN: CPT | Performed by: INTERNAL MEDICINE

## 2023-11-29 PROCEDURE — 85041 AUTOMATED RBC COUNT: CPT | Performed by: INTERNAL MEDICINE

## 2023-11-29 PROCEDURE — 36415 COLL VENOUS BLD VENIPUNCTURE: CPT

## 2023-11-29 PROCEDURE — 99213 OFFICE O/P EST LOW 20 MIN: CPT | Performed by: NURSE PRACTITIONER

## 2023-11-29 PROCEDURE — 82310 ASSAY OF CALCIUM: CPT | Performed by: INTERNAL MEDICINE

## 2023-11-29 RX ORDER — HEPARIN SODIUM,PORCINE 10 UNIT/ML
5-20 VIAL (ML) INTRAVENOUS DAILY PRN
Status: CANCELLED | OUTPATIENT
Start: 2023-12-01

## 2023-11-29 RX ORDER — MAGNESIUM SULFATE HEPTAHYDRATE 40 MG/ML
2 INJECTION, SOLUTION INTRAVENOUS ONCE
Status: CANCELLED
Start: 2023-12-01 | End: 2023-12-01

## 2023-11-29 RX ORDER — HEPARIN SODIUM (PORCINE) LOCK FLUSH IV SOLN 100 UNIT/ML 100 UNIT/ML
SOLUTION INTRAVENOUS
Status: COMPLETED
Start: 2023-11-29 | End: 2023-11-29

## 2023-11-29 RX ORDER — METHYLPREDNISOLONE SODIUM SUCCINATE 125 MG/2ML
125 INJECTION, POWDER, LYOPHILIZED, FOR SOLUTION INTRAMUSCULAR; INTRAVENOUS
Status: CANCELLED
Start: 2023-12-01

## 2023-11-29 RX ORDER — HEPARIN SODIUM (PORCINE) LOCK FLUSH IV SOLN 100 UNIT/ML 100 UNIT/ML
5 SOLUTION INTRAVENOUS
Status: CANCELLED | OUTPATIENT
Start: 2023-12-01

## 2023-11-29 RX ORDER — EPINEPHRINE 1 MG/ML
0.3 INJECTION, SOLUTION, CONCENTRATE INTRAVENOUS EVERY 5 MIN PRN
Status: CANCELLED | OUTPATIENT
Start: 2023-12-01

## 2023-11-29 RX ORDER — ALBUTEROL SULFATE 0.83 MG/ML
2.5 SOLUTION RESPIRATORY (INHALATION)
Status: CANCELLED | OUTPATIENT
Start: 2023-12-01

## 2023-11-29 RX ORDER — LORAZEPAM 2 MG/ML
0.5 INJECTION INTRAMUSCULAR EVERY 4 HOURS PRN
Status: CANCELLED | OUTPATIENT
Start: 2023-12-01

## 2023-11-29 RX ORDER — ALBUTEROL SULFATE 90 UG/1
1-2 AEROSOL, METERED RESPIRATORY (INHALATION)
Status: CANCELLED
Start: 2023-12-01

## 2023-11-29 RX ORDER — HEPARIN SODIUM,PORCINE 10 UNIT/ML
5-20 VIAL (ML) INTRAVENOUS DAILY PRN
Status: CANCELLED | OUTPATIENT
Start: 2023-12-04

## 2023-11-29 RX ORDER — DIPHENHYDRAMINE HYDROCHLORIDE 50 MG/ML
50 INJECTION INTRAMUSCULAR; INTRAVENOUS
Status: CANCELLED
Start: 2023-12-01

## 2023-11-29 RX ORDER — MEPERIDINE HYDROCHLORIDE 25 MG/ML
25 INJECTION INTRAMUSCULAR; INTRAVENOUS; SUBCUTANEOUS EVERY 30 MIN PRN
Status: CANCELLED | OUTPATIENT
Start: 2023-12-01

## 2023-11-29 RX ORDER — HEPARIN SODIUM (PORCINE) LOCK FLUSH IV SOLN 100 UNIT/ML 100 UNIT/ML
5 SOLUTION INTRAVENOUS
Status: CANCELLED | OUTPATIENT
Start: 2023-12-04

## 2023-11-29 RX ORDER — PALONOSETRON 0.05 MG/ML
0.25 INJECTION, SOLUTION INTRAVENOUS ONCE
Status: CANCELLED | OUTPATIENT
Start: 2023-12-01

## 2023-11-29 RX ORDER — DEXAMETHASONE 4 MG/1
8 TABLET ORAL DAILY
Qty: 12 TABLET | Refills: 2 | Status: SHIPPED | OUTPATIENT
Start: 2023-11-29 | End: 2023-12-20

## 2023-11-29 RX ADMIN — Medication 500 UNITS: at 11:19

## 2023-11-29 ASSESSMENT — PAIN SCALES - GENERAL: PAINLEVEL: NO PAIN (0)

## 2023-11-29 NOTE — PROGRESS NOTES
[facility] Hematology and Oncology Outpatient Progress Note    Patient: Adrianna Pereira  MRN: [id number]  Date of Service: Nov 29, 2023          Reason for Visit    Floor of mouth cancer    Primary Oncologist: Dr. 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  She has completed 6 weeks of chemo with her radiation.  She is in her last week of radiation, she will complete this next Tuesday 12/5.    Tolerating generally well with expected mucositis (well-managed) and fatigue.    Labwork: hgb 7.2, rest CBC WNL.  CMP WNL.  Mag 1.4    Although patient is minimally symptomatic from her anemia currently, I suspect her hemoglobin will decline <7.0 with her next dose of chemotherapy this week and I recommended consideration of a blood transfusion.  After discussions of risks/benefits she did agree to proceed if it is<7.0 and signed consent.    Plan:  -Proceed with final weekly cisplatin dose (#7) this Friday 12/1  -Will give 2 g IV mag with chemotherapy on Friday  -Repeat CBC and hold time for 1 unit PRBC next Monday if hemoglobin <7.0  -Complete RT next Tuesday 12/5  -Continue oxycodone for symptomatic pain control particularly at bedtime  -Return with labs and NP in 2 weeks for continued assessment of her recovery posttreatment.  Pending recovery, will decide how often we should see her following that  -Restaging PET scan with diagnostic CT neck and labwork (CBC, CMP and TSH) in 3 months with Dr. Castillo (scheduled for late February)  -Continue with routine surveillance with ENT and Rad Onc moving forward    Baseline sensorineural hearing loss L>R  This is stable so far on cisplatin. Monitor for any progression.    Nutrition/hydration  Dependant on feeding tube. Weight is stable. Staying  hydrated.    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   On observation.   ______________________________________________________________________________    History of Present Illness/ Interval History    Ms. Adrianna Pereira is a 63 year old undergoing adjuvant RT with sensitizing weekly cisplatin.  Returns in week 7, her final week.    Overall, feels she has been tolerating things quite well.  Mucositis pain in mouth and throat, managed with oxycodone at bedtime only.  She does have thick oral secretions.  Oral communication is an ongoing issue.  Baseline hearing loss is stable.  No neuropathy.  No nausea.  No fevers.  She has had cumulative fatigue, does not find it significant.  No dizziness, dyspnea no cardiac symptoms.    Dependent on feeding tube.  They have made some adjustments with her calorie concentration to try to keep her weight stable.    ECOG Performance    0    Oncology History/Treatment  Diagnosis/Stage:   7/2023: [id number] Floor of mouth squamous cell cancer  -hx alcoholism (stopped all use) and smoking (quit 8/2023)  -hx premalignant oral cavity lesions s/p CO2 laser ablation with ENT  -presented with lower lip/chin swelling  -7/3/2023 left mandibular alveolus biopsy: invasive keratinizing moderately differential squamous cell carcinoma.   -PET: 4.4 x 3.7 x 3.2 cm anterior oral cavity mass invading into the mandible with a separate gluco-avid nodule on the right mandibular buccal mucosa and bilateral level 1B, level 2 and L3 metastatic lymphadenopathy without evidence of metastatic disease.   -8/17/2023 surgical path: pT4a, N3b tumor with positive left anterior lateral soft tissue margin, 11/98+ lymph nodes including STEFFEN (largest 4  cm) and several bilateral lymph nodes consistent with involvement by low-grade B-cell neoplasm suggestive of CLL/SLL.     Treatment:  8/17/2023: segmental mandibulectomy, floor of mouth resection, anterior glossectomy, left fibular free flap, skin grafting, and tracheostomy.   -Post-op course complicated by skin necrosis requiring OR debridment and resuturing of neck incision/intraoral flap    10/20/2023 - present: adjuvant concurrent RT + weekly cisplatin      Physical Exam    GENERAL: Alert and oriented to time place and person. Seated comfortably. In no distress. Dysarthria.   accompanied.  HEAD: Atraumatic and normocephalic. No alopecia.  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  No skin breakdown.  No evident adenopathy/masses.  EYES: SHAE, EOMI. No erythema. No icterus.  CHEST: regular respiratory effort.  NEURO: No gross deficit noted.       Lab Results    Recent Results (from the past 168 hour(s))   Comprehensive metabolic panel   Result Value Ref Range    Sodium 137 135 - 145 mmol/L    Potassium 4.4 3.4 - 5.3 mmol/L    Carbon Dioxide (CO2) 25 22 - 29 mmol/L    Anion Gap 10 7 - 15 mmol/L    Urea Nitrogen 25.5 (H) 8.0 - 23.0 mg/dL    Creatinine 0.73 0.51 - 0.95 mg/dL    GFR Estimate >90 >60 mL/min/1.73m2    Calcium 9.1 8.8 - 10.2 mg/dL    Chloride 102 98 - 107 mmol/L    Glucose 94 70 - 99 mg/dL    Alkaline Phosphatase 64 40 - 150 U/L    AST 12 0 - 45 U/L    ALT 13 0 - 50 U/L    Protein Total 6.5 6.4 - 8.3 g/dL    Albumin 3.5 3.5 - 5.2 g/dL    Bilirubin Total 0.2 <=1.2 mg/dL   Magnesium   Result Value Ref Range    Magnesium 1.5 (L) 1.7 - 2.3 mg/dL   Prealbumin   Result Value Ref Range    Prealbumin 11.8 (L) 20.0 - 40.0 mg/dL   CBC with platelets and differential   Result Value Ref Range    WBC Count 3.2 (L) 4.0 - 11.0 10e3/uL    RBC Count 2.41 (L) 3.80 - 5.20 10e6/uL    Hemoglobin 7.7 (L) 11.7 - 15.7 g/dL    Hematocrit 23.0 (L) 35.0 - 47.0 %    MCV 95 78 - 100 fL    MCH 32.0  26.5 - 33.0 pg    MCHC 33.5 31.5 - 36.5 g/dL    RDW 13.9 10.0 - 15.0 %    Platelet Count 181 150 - 450 10e3/uL    % Neutrophils 73 %    % Lymphocytes 20 %    % Monocytes 6 %    % Eosinophils 0 %    % Basophils 0 %    % Immature Granulocytes 1 %    NRBCs per 100 WBC 0 <1 /100    Absolute Neutrophils 2.3 1.6 - 8.3 10e3/uL    Absolute Lymphocytes 0.6 (L) 0.8 - 5.3 10e3/uL    Absolute Monocytes 0.2 0.0 - 1.3 10e3/uL    Absolute Eosinophils 0.0 0.0 - 0.7 10e3/uL    Absolute Basophils 0.0 0.0 - 0.2 10e3/uL    Absolute Immature Granulocytes 0.0 <=0.4 10e3/uL    Absolute NRBCs 0.0 10e3/uL       Imaging    No results found.    Billing  Total time 35 minutes, to include face to face visit, review of EMR, ordering, documentation and coordination of care on date of service    Signed by: Marina Cronin, NP

## 2023-11-29 NOTE — PROGRESS NOTES
[facility]  SPECIALIZING IN BREAKTHROUGHS  Radiation Oncology    On Treatment Visit Note      Adrianna Pereira      Date: 2023   MRN: [id number]   : 1960  Diagnosis: SCC oral cavity      Reason for Visit:  On Radiation Treatment Visit     Treatment Summary to Date  Treatment Site: head/neck Current Dose: 5800/6600 cGy Fractions:       Chemotherapy  Chemo concurrent with radx?: Yes  Oncologist: Dr. Castillo  Drug Name/Frequency 1: weekly cisplat    Subjective:    Doing okay.   Noticing increase in oral mucositis. Using magic mouthwash and oxycodone oral solution PRN.  Nutrition through tube.   More thickened saliva.  Weight slightly down.      Nursing ROS:   Nutrition Alteration  Diet Type: Patient's Preference  Nutrition Note: all nutrition via peg, doing 3-4 cartons per day, goal is 4 1/2 per day  Skin  Skin Reaction: 0 - No changes     ENT and Mouth Exam  ENT/Mouth Note: only doing oral rinses bid/Rx  Cardiovascular  Respiratory effort: 1 - Normal - without distress  Cardio/Resp Note: trach capped, being removed tomorrow  Gastrointestinal  GI Note: some consipation, will try mom, also using miralax        Pain Assessment  Pain Note: no oral pain, but having headaches, tylenol not helpful      Objective:   /64   Pulse 79   Resp 18   Wt 44.4 kg (97 lb 12.8 oz)   SpO2 97%   BMI 17.05 kg/m    NAD  HEENT: S/p segmental mandibulectomy and partial glossectomy with reconstruction. + Diffuse mucositis  Neck: + s/p neck dissection bilaterally, anterior neck wound open and packing in place  Pulm: +S/p trach placement  Abdominal: S/p PEG    Labs:  CBC RESULTS:   Recent Labs   Lab Test 10/20/23  1114   WBC 9.5   RBC 3.00*   HGB 9.6*   HCT 29.8*   MCV 99   MCH 32.0   MCHC 32.2   RDW 15.2*        ELECTROLYTES:  Recent Labs   Lab Test 10/20/23  1114      POTASSIUM 4.4   CHLORIDE 104   RACHAEL 9.6   CO2 22   BUN 18.0   CR 0.43*   GLC 83       Assessment:  Ms. Pereira is a 63 year old female with  a diagnosis of squamous cell carcinoma of the oral cavity status post segmental mandibulectomy, anterior glossectomy,  lymph node dissection and reconstruction.  Final pathology demonstrated squamous cell carcinoma moderately differentiated, 4.1 cm in size originating from  floor mouth/ anterior tongue,  depth of invasion 29 mm, no LVSI, positive PNI, positive margin (left anterior lateral).  11 of 98 lymph nodes positive (4 cm largest deposit, positive extracapsular disease present), pathologic T4N3b.   She is undergoing adjuvant chemoradiation therapy.     Tolerating radiation therapy well.  All questions and concerns addressed.    Plan:   Continue current therapy.    Cancer related pain.  Oxycodone oral solution as needed.  Radiation mucositis.  Magic mouthwash as needed.   Xerostomia.  Salt and soda rinses as needed.   Nutrition.   Status post PEG placement continue to advance tube feeds as tolerated.      Mosaiq chart and setup information reviewed  Ports checked    Medication Review  Med list reviewed with patient?: Yes           Hernán Egan MD

## 2023-11-29 NOTE — LETTER
11/29/2023         RE: Adrianna Pereira  [address] [address] [place] [id number]        Dear Colleague,    Thank you for referring your patient, Adrianna Pereira, to the [facility] [place]. Please see a copy of my visit note below.    [facility] Outpatient Progress Note    Patient: Adrianna Pereira  MRN: [id number]  Date of Service: Nov 29, 2023          Reason for Visit    Floor of mouth cancer    Primary Oncologist: Dr. 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  She has completed 6 weeks of chemo with her radiation.  She is in her last week of radiation, she will complete this next Tuesday 12/5.    Tolerating generally well with expected mucositis (well-managed) and fatigue.    Labwork: hgb 7.2, rest CBC WNL.  CMP WNL.  Mag 1.4    Although patient is minimally symptomatic from her anemia currently, I suspect her hemoglobin will decline <7.0 with her next dose of chemotherapy this week and I recommended consideration of a blood transfusion.  After discussions of risks/benefits she did agree to proceed if it is<7.0 and signed consent.    Plan:  -Proceed with final weekly cisplatin dose (#7) this Friday 12/1  -Will give 2 g IV mag with chemotherapy on Friday  -Repeat CBC and hold time for 1 unit PRBC next Monday if hemoglobin <7.0  -Complete RT next Tuesday 12/5  -Continue oxycodone for symptomatic pain control particularly at bedtime  -Return with labs and NP in 2 weeks for continued assessment of her recovery posttreatment.  Pending recovery, will decide how often we should see her following that  -Restaging PET scan with diagnostic CT neck and labwork (CBC, CMP and TSH) in 3 months with Dr. Castillo (scheduled for late February)  -Continue with routine  surveillance with ENT and Rad Onc moving forward    Baseline sensorineural hearing loss L>R  This is stable so far on cisplatin. Monitor for any progression.    Nutrition/hydration  Dependant on feeding tube. Weight is stable. Staying hydrated.    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   On observation.   ______________________________________________________________________________    History of Present Illness/ Interval History    Ms. Adrianna Pereira is a 63 year old undergoing adjuvant RT with sensitizing weekly cisplatin.  Returns in week 7, her final week.    Overall, feels she has been tolerating things quite well.  Mucositis pain in mouth and throat, managed with oxycodone at bedtime only.  She does have thick oral secretions.  Oral communication is an ongoing issue.  Baseline hearing loss is stable.  No neuropathy.  No nausea.  No fevers.  She has had cumulative fatigue, does not find it significant.  No dizziness, dyspnea no cardiac symptoms.    Dependent on feeding tube.  They have made some adjustments with her calorie concentration to try to keep her weight stable.    ECOG Performance    0    Oncology History/Treatment  Diagnosis/Stage:   7/2023: [id number] Floor of mouth squamous cell cancer  -hx alcoholism (stopped all use) and smoking (quit 8/2023)  -hx premalignant oral cavity lesions s/p CO2 laser ablation with ENT  -presented with lower lip/chin swelling  -7/3/2023 left mandibular alveolus biopsy: invasive keratinizing moderately differential squamous cell carcinoma.   -PET: 4.4 x 3.7 x 3.2 cm anterior oral cavity mass invading into the mandible with a separate gluco-avid nodule on the right mandibular buccal mucosa and bilateral  level 1B, level 2 and L3 metastatic lymphadenopathy without evidence of metastatic disease.   -8/17/2023 surgical path: pT4a, N3b tumor with positive left anterior lateral soft tissue margin, 11/98+ lymph nodes including STEFFEN (largest 4 cm) and several bilateral lymph nodes consistent with involvement by low-grade B-cell neoplasm suggestive of CLL/SLL.     Treatment:  8/17/2023: segmental mandibulectomy, floor of mouth resection, anterior glossectomy, left fibular free flap, skin grafting, and tracheostomy.   -Post-op course complicated by skin necrosis requiring OR debridment and resuturing of neck incision/intraoral flap    10/20/2023 - present: adjuvant concurrent RT + weekly cisplatin      Physical Exam    GENERAL: Alert and oriented to time place and person. Seated comfortably. In no distress. Dysarthria.   accompanied.  HEAD: Atraumatic and normocephalic. No alopecia.  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  No skin breakdown.  No evident adenopathy/masses.  EYES: SHAE, EOMI. No erythema. No icterus.  CHEST: regular respiratory effort.  NEURO: No gross deficit noted.       Lab Results    Recent Results (from the past 168 hour(s))   Comprehensive metabolic panel   Result Value Ref Range    Sodium 137 135 - 145 mmol/L    Potassium 4.4 3.4 - 5.3 mmol/L    Carbon Dioxide (CO2) 25 22 - 29 mmol/L    Anion Gap 10 7 - 15 mmol/L    Urea Nitrogen 25.5 (H) 8.0 - 23.0 mg/dL    Creatinine 0.73 0.51 - 0.95 mg/dL    GFR Estimate >90 >60 mL/min/1.73m2    Calcium 9.1 8.8 - 10.2 mg/dL    Chloride 102 98 - 107 mmol/L    Glucose 94 70 - 99 mg/dL    Alkaline Phosphatase 64 40 - 150 U/L    AST 12 0 - 45 U/L    ALT 13 0 - 50 U/L    Protein Total 6.5 6.4 - 8.3 g/dL    Albumin 3.5 3.5 - 5.2 g/dL    Bilirubin Total 0.2 <=1.2 mg/dL   Magnesium   Result Value Ref Range    Magnesium 1.5 (L) 1.7 - 2.3 mg/dL   Prealbumin   Result Value Ref Range    Prealbumin 11.8 (L) 20.0 - 40.0 mg/dL   CBC with platelets  "and differential   Result Value Ref Range    WBC Count 3.2 (L) 4.0 - 11.0 10e3/uL    RBC Count 2.41 (L) 3.80 - 5.20 10e6/uL    Hemoglobin 7.7 (L) 11.7 - 15.7 g/dL    Hematocrit 23.0 (L) 35.0 - 47.0 %    MCV 95 78 - 100 fL    MCH 32.0 26.5 - 33.0 pg    MCHC 33.5 31.5 - 36.5 g/dL    RDW 13.9 10.0 - 15.0 %    Platelet Count 181 150 - 450 10e3/uL    % Neutrophils 73 %    % Lymphocytes 20 %    % Monocytes 6 %    % Eosinophils 0 %    % Basophils 0 %    % Immature Granulocytes 1 %    NRBCs per 100 WBC 0 <1 /100    Absolute Neutrophils 2.3 1.6 - 8.3 10e3/uL    Absolute Lymphocytes 0.6 (L) 0.8 - 5.3 10e3/uL    Absolute Monocytes 0.2 0.0 - 1.3 10e3/uL    Absolute Eosinophils 0.0 0.0 - 0.7 10e3/uL    Absolute Basophils 0.0 0.0 - 0.2 10e3/uL    Absolute Immature Granulocytes 0.0 <=0.4 10e3/uL    Absolute NRBCs 0.0 10e3/uL       Imaging    No results found.    Billing  Total time 35 minutes, to include face to face visit, review of EMR, ordering, documentation and coordination of care on date of service    Signed by: Marina Cronin NP      Oncology Rooming Note    November 29, 2023 11:12 AM   Adrianna Pereira is a 63 year old female who presents for:    Chief Complaint   Patient presents with     Oncology Clinic Visit     Squamous cell carcinoma of oral cavity - Labs and provider visit      Initial Vitals: /55 (BP Location: Left arm, Patient Position: Sitting, Cuff Size: Adult Small)   Pulse 77   Temp 97.7  F (36.5  C) (Tympanic)   Resp 16   Ht 1.613 m (5' 3.5\")   Wt 44 kg (97 lb)   SpO2 100%   BMI 16.91 kg/m   Estimated body mass index is 16.91 kg/m  as calculated from the following:    Height as of this encounter: 1.613 m (5' 3.5\").    Weight as of this encounter: 44 kg (97 lb). Body surface area is 1.4 meters squared.  No Pain (0) Comment: Data Unavailable   No LMP recorded. Patient is postmenopausal.  Allergies reviewed: Yes  Medications reviewed: Yes    Medications: Medication refills not needed today.  Pharmacy name " entered into [facility]:    [id number] - [place], [place] - [address] [facility] - [place], [place] - [address] [facility] [place] - [place], [place] - [address]    Clinical concerns:  None      Tanya Philippe, HELEN                Again, thank you for allowing me to participate in the care of your patient.        Sincerely,        Marina Cronin, NP

## 2023-11-29 NOTE — PROGRESS NOTES
"Oncology Rooming Note    November 29, 2023 11:12 AM   Adrianna Pereira is a 63 year old female who presents for:    Chief Complaint   Patient presents with    Oncology Clinic Visit     Squamous cell carcinoma of oral cavity - Labs and provider visit      Initial Vitals: /55 (BP Location: Left arm, Patient Position: Sitting, Cuff Size: Adult Small)   Pulse 77   Temp 97.7  F (36.5  C) (Tympanic)   Resp 16   Ht 1.613 m (5' 3.5\")   Wt 44 kg (97 lb)   SpO2 100%   BMI 16.91 kg/m   Estimated body mass index is 16.91 kg/m  as calculated from the following:    Height as of this encounter: 1.613 m (5' 3.5\").    Weight as of this encounter: 44 kg (97 lb). Body surface area is 1.4 meters squared.  No Pain (0) Comment: Data Unavailable   No LMP recorded. Patient is postmenopausal.  Allergies reviewed: Yes  Medications reviewed: Yes    Medications: Medication refills not needed today.  Pharmacy name entered into EPIC:    [id number] - [place], [place] - [address] [place] [address] [facility]Y - [place], [place] - [address] [facility] [place] - [place], [place] - [address] [facility]    Clinical concerns:  None      Tanya Philippe CMA              "

## 2023-11-29 NOTE — LETTER
2023         RE: Adrianna Pereira  [address] [address] [place] [id number]        Dear Colleague,    Thank you for referring your patient, Adrianna Pereira, to the  [facility]. Please see a copy of my visit note below.    [facility]  SPECIALIZING IN BREAKTHROUGHS  Radiation Oncology    On Treatment Visit Note      Adrianna Pereira      Date: 2023   MRN: [id number]   : 1960  Diagnosis: SCC oral cavity      Reason for Visit:  On Radiation Treatment Visit     Treatment Summary to Date  Treatment Site: head/neck Current Dose: 5800/6600 cGy Fractions:       Chemotherapy  Chemo concurrent with radx?: Yes  Oncologist: Dr. Castillo  Drug Name/Frequency 1: weekly cisplat    Subjective:    Doing okay.   Noticing increase in oral mucositis. Using magic mouthwash and oxycodone oral solution PRN.  Nutrition through tube.   More thickened saliva.  Weight slightly down.      Nursing ROS:   Nutrition Alteration  Diet Type: Patient's Preference  Nutrition Note: all nutrition via peg, doing 3-4 cartons per day, goal is 4 1/2 per day  Skin  Skin Reaction: 0 - No changes     ENT and Mouth Exam  ENT/Mouth Note: only doing oral rinses bid/Rx  Cardiovascular  Respiratory effort: 1 - Normal - without distress  Cardio/Resp Note: trach capped, being removed tomorrow  Gastrointestinal  GI Note: some consipation, will try mom, also using miralax        Pain Assessment  Pain Note: no oral pain, but having headaches, tylenol not helpful      Objective:   /64   Pulse 79   Resp 18   Wt 44.4 kg (97 lb 12.8 oz)   SpO2 97%   BMI 17.05 kg/m    NAD  HEENT: S/p segmental mandibulectomy and partial glossectomy with reconstruction. + Diffuse mucositis  Neck: + s/p neck dissection bilaterally, anterior neck wound open and packing in place  Pulm: +S/p trach placement  Abdominal: S/p PEG    Labs:  CBC RESULTS:   Recent Labs   Lab Test 10/20/23  1114   WBC 9.5   RBC 3.00*   HGB 9.6*   HCT 29.8*   MCV 99   MCH  32.0   MCHC 32.2   RDW 15.2*        ELECTROLYTES:  Recent Labs   Lab Test 10/20/23  1114      POTASSIUM 4.4   CHLORIDE 104   RACHAEL 9.6   CO2 22   BUN 18.0   CR 0.43*   GLC 83       Assessment:  Ms. 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  Final pathology demonstrated squamous cell carcinoma moderately differentiated, 4.1 cm in size originating from  floor mouth/ anterior tongue,  depth of invasion 29 mm, no LVSI, positive PNI, positive margin (left anterior lateral).  11 of 98 lymph nodes positive (4 cm largest deposit, positive extracapsular disease present), pathologic T4N3b.   She is undergoing adjuvant chemoradiation therapy.     Tolerating radiation therapy well.  All questions and concerns addressed.    Plan:   Continue current therapy.    Cancer related pain.  Oxycodone oral solution as needed.  Radiation mucositis.  Magic mouthwash as needed.   Xerostomia.  Salt and soda rinses as needed.   Nutrition.   Status post PEG placement continue to advance tube feeds as tolerated.      Mosaiq chart and setup information reviewed  Ports checked    Medication Review  Med list reviewed with patient?: Yes           Hernán Egan MD

## 2023-11-30 ENCOUNTER — THERAPY VISIT (OUTPATIENT)
Dept: PHYSICAL THERAPY | Facility: CLINIC | Age: 63
End: 2023-11-30
Attending: RADIOLOGY
Payer: COMMERCIAL

## 2023-11-30 ENCOUNTER — APPOINTMENT (OUTPATIENT)
Dept: RADIATION THERAPY | Facility: OUTPATIENT CENTER | Age: 63
End: 2023-11-30
Payer: COMMERCIAL

## 2023-11-30 DIAGNOSIS — I89.0 LYMPHEDEMA: Primary | ICD-10-CM

## 2023-11-30 PROCEDURE — 97140 MANUAL THERAPY 1/> REGIONS: CPT | Mod: GP,CQ | Performed by: REHABILITATION PRACTITIONER

## 2023-12-01 ENCOUNTER — INFUSION THERAPY VISIT (OUTPATIENT)
Dept: INFUSION THERAPY | Facility: CLINIC | Age: 63
End: 2023-12-01
Attending: INTERNAL MEDICINE
Payer: COMMERCIAL

## 2023-12-01 ENCOUNTER — APPOINTMENT (OUTPATIENT)
Dept: RADIATION THERAPY | Facility: OUTPATIENT CENTER | Age: 63
End: 2023-12-01
Payer: COMMERCIAL

## 2023-12-01 VITALS
WEIGHT: 98.4 LBS | TEMPERATURE: 97.1 F | DIASTOLIC BLOOD PRESSURE: 49 MMHG | OXYGEN SATURATION: 98 % | SYSTOLIC BLOOD PRESSURE: 108 MMHG | RESPIRATION RATE: 16 BRPM | HEART RATE: 79 BPM | BODY MASS INDEX: 17.16 KG/M2

## 2023-12-01 DIAGNOSIS — C06.9 SQUAMOUS CELL CARCINOMA OF ORAL CAVITY (H): Primary | ICD-10-CM

## 2023-12-01 PROCEDURE — 96368 THER/DIAG CONCURRENT INF: CPT

## 2023-12-01 PROCEDURE — 250N000011 HC RX IP 250 OP 636: Performed by: NURSE PRACTITIONER

## 2023-12-01 PROCEDURE — 96367 TX/PROPH/DG ADDL SEQ IV INF: CPT

## 2023-12-01 PROCEDURE — 96375 TX/PRO/DX INJ NEW DRUG ADDON: CPT

## 2023-12-01 PROCEDURE — 96413 CHEMO IV INFUSION 1 HR: CPT

## 2023-12-01 PROCEDURE — 258N000003 HC RX IP 258 OP 636: Performed by: NURSE PRACTITIONER

## 2023-12-01 RX ORDER — MAGNESIUM SULFATE HEPTAHYDRATE 40 MG/ML
2 INJECTION, SOLUTION INTRAVENOUS ONCE
Status: COMPLETED | OUTPATIENT
Start: 2023-12-01 | End: 2023-12-01

## 2023-12-01 RX ORDER — PALONOSETRON 0.05 MG/ML
0.25 INJECTION, SOLUTION INTRAVENOUS ONCE
Status: COMPLETED | OUTPATIENT
Start: 2023-12-01 | End: 2023-12-01

## 2023-12-01 RX ORDER — HEPARIN SODIUM (PORCINE) LOCK FLUSH IV SOLN 100 UNIT/ML 100 UNIT/ML
5 SOLUTION INTRAVENOUS
Status: DISCONTINUED | OUTPATIENT
Start: 2023-12-01 | End: 2023-12-01 | Stop reason: HOSPADM

## 2023-12-01 RX ADMIN — DEXAMETHASONE SODIUM PHOSPHATE: 10 INJECTION, SOLUTION INTRAMUSCULAR; INTRAVENOUS at 10:04

## 2023-12-01 RX ADMIN — Medication 5 ML: at 12:04

## 2023-12-01 RX ADMIN — MAGNESIUM SULFATE HEPTAHYDRATE 2 G: 2 INJECTION, SOLUTION INTRAVENOUS at 10:30

## 2023-12-01 RX ADMIN — SODIUM CHLORIDE 1000 ML: 9 INJECTION, SOLUTION INTRAVENOUS at 09:53

## 2023-12-01 RX ADMIN — PALONOSETRON 0.25 MG: 0.05 INJECTION, SOLUTION INTRAVENOUS at 09:55

## 2023-12-01 RX ADMIN — CISPLATIN 60 MG: 1 INJECTION, SOLUTION INTRAVENOUS at 10:52

## 2023-12-01 RX ADMIN — FAMOTIDINE 20 MG: 10 INJECTION INTRAVENOUS at 09:54

## 2023-12-01 ASSESSMENT — PAIN SCALES - GENERAL: PAINLEVEL: NO PAIN (0)

## 2023-12-01 NOTE — PROGRESS NOTES
Infusion Nursing Note:  Adrianna Pereira presents today for C1D43 Cisplatin.    Patient seen by provider today: No   present during visit today: Not Applicable.    Note: Labs drawn 11/29.      Intravenous Access:  Implanted Port.    Treatment Conditions:  Lab Results   Component Value Date    HGB 7.2 (L) 11/29/2023    WBC 2.6 (L) 11/29/2023    ANEUTAUTO 1.8 11/29/2023     11/29/2023        Lab Results   Component Value Date     11/29/2023    POTASSIUM 3.9 11/29/2023    MAG 1.4 (L) 11/29/2023    CR 0.77 11/29/2023    RACHAEL 8.8 11/29/2023    BILITOTAL 0.2 11/29/2023    ALBUMIN 3.6 11/29/2023    ALT 15 11/29/2023    AST 15 11/29/2023       Results reviewed, labs MET treatment parameters, ok to proceed with treatment.  Magnesium replaced as ordered.      Post Infusion Assessment:  Patient tolerated infusion without incident.  Blood return noted pre and post infusion.  Site patent and intact, free from redness, edema or discomfort.  No evidence of extravasations.  Access discontinued per protocol.       Discharge Plan:   Patient discharged in stable condition accompanied by: .  Departure Mode: Ambulatory.      Andreas Lee RN

## 2023-12-03 ENCOUNTER — HEALTH MAINTENANCE LETTER (OUTPATIENT)
Age: 63
End: 2023-12-03

## 2023-12-04 ENCOUNTER — APPOINTMENT (OUTPATIENT)
Dept: RADIATION THERAPY | Facility: OUTPATIENT CENTER | Age: 63
End: 2023-12-04
Payer: COMMERCIAL

## 2023-12-04 ENCOUNTER — LAB (OUTPATIENT)
Dept: INFUSION THERAPY | Facility: CLINIC | Age: 63
End: 2023-12-04
Attending: NURSE PRACTITIONER
Payer: COMMERCIAL

## 2023-12-04 VITALS
TEMPERATURE: 97.1 F | DIASTOLIC BLOOD PRESSURE: 68 MMHG | RESPIRATION RATE: 16 BRPM | SYSTOLIC BLOOD PRESSURE: 122 MMHG | HEART RATE: 52 BPM

## 2023-12-04 DIAGNOSIS — D64.81 ANTINEOPLASTIC CHEMOTHERAPY INDUCED ANEMIA: Primary | ICD-10-CM

## 2023-12-04 DIAGNOSIS — T45.1X5A ANTINEOPLASTIC CHEMOTHERAPY INDUCED ANEMIA: Primary | ICD-10-CM

## 2023-12-04 LAB
ABO/RH(D): NORMAL
ANTIBODY SCREEN: NEGATIVE
BASOPHILS # BLD AUTO: ABNORMAL 10*3/UL
BASOPHILS # BLD MANUAL: 0 10E3/UL (ref 0–0.2)
BASOPHILS NFR BLD AUTO: ABNORMAL %
BASOPHILS NFR BLD MANUAL: 0 %
BLD PROD TYP BPU: NORMAL
BLOOD COMPONENT TYPE: NORMAL
CODING SYSTEM: NORMAL
CROSSMATCH: NORMAL
EOSINOPHIL # BLD AUTO: ABNORMAL 10*3/UL
EOSINOPHIL # BLD MANUAL: 0 10E3/UL (ref 0–0.7)
EOSINOPHIL NFR BLD AUTO: ABNORMAL %
EOSINOPHIL NFR BLD MANUAL: 0 %
ERYTHROCYTE [DISTWIDTH] IN BLOOD BY AUTOMATED COUNT: 14.5 % (ref 10–15)
HCT VFR BLD AUTO: 19.4 % (ref 35–47)
HGB BLD-MCNC: 6.6 G/DL (ref 11.7–15.7)
IMM GRANULOCYTES # BLD: ABNORMAL 10*3/UL
IMM GRANULOCYTES NFR BLD: ABNORMAL %
ISSUE DATE AND TIME: NORMAL
LYMPHOCYTES # BLD AUTO: ABNORMAL 10*3/UL
LYMPHOCYTES # BLD MANUAL: 0.3 10E3/UL (ref 0.8–5.3)
LYMPHOCYTES NFR BLD AUTO: ABNORMAL %
LYMPHOCYTES NFR BLD MANUAL: 10 %
MCH RBC QN AUTO: 32 PG (ref 26.5–33)
MCHC RBC AUTO-ENTMCNC: 34 G/DL (ref 31.5–36.5)
MCV RBC AUTO: 94 FL (ref 78–100)
MONOCYTES # BLD AUTO: ABNORMAL 10*3/UL
MONOCYTES # BLD MANUAL: 0.2 10E3/UL (ref 0–1.3)
MONOCYTES NFR BLD AUTO: ABNORMAL %
MONOCYTES NFR BLD MANUAL: 7 %
NEUTROPHILS # BLD AUTO: ABNORMAL 10*3/UL
NEUTROPHILS # BLD MANUAL: 2.2 10E3/UL (ref 1.6–8.3)
NEUTROPHILS NFR BLD AUTO: ABNORMAL %
NEUTROPHILS NFR BLD MANUAL: 83 %
NRBC # BLD AUTO: 0 10E3/UL
NRBC BLD AUTO-RTO: 0 /100
PLAT MORPH BLD: ABNORMAL
PLATELET # BLD AUTO: 224 10E3/UL (ref 150–450)
RBC # BLD AUTO: 2.06 10E6/UL (ref 3.8–5.2)
RBC MORPH BLD: ABNORMAL
SPECIMEN EXPIRATION DATE: NORMAL
UNIT ABO/RH: NORMAL
UNIT NUMBER: NORMAL
UNIT STATUS: NORMAL
UNIT TYPE ISBT: 5100
VARIANT LYMPHS BLD QL SMEAR: PRESENT
WBC # BLD AUTO: 2.6 10E3/UL (ref 4–11)

## 2023-12-04 PROCEDURE — 86850 RBC ANTIBODY SCREEN: CPT | Performed by: NURSE PRACTITIONER

## 2023-12-04 PROCEDURE — 85041 AUTOMATED RBC COUNT: CPT | Performed by: NURSE PRACTITIONER

## 2023-12-04 PROCEDURE — P9016 RBC LEUKOCYTES REDUCED: HCPCS | Performed by: NURSE PRACTITIONER

## 2023-12-04 PROCEDURE — 36415 COLL VENOUS BLD VENIPUNCTURE: CPT | Performed by: NURSE PRACTITIONER

## 2023-12-04 PROCEDURE — 85027 COMPLETE CBC AUTOMATED: CPT | Performed by: NURSE PRACTITIONER

## 2023-12-04 PROCEDURE — 86901 BLOOD TYPING SEROLOGIC RH(D): CPT | Performed by: NURSE PRACTITIONER

## 2023-12-04 PROCEDURE — 85007 BL SMEAR W/DIFF WBC COUNT: CPT | Performed by: NURSE PRACTITIONER

## 2023-12-04 PROCEDURE — 86923 COMPATIBILITY TEST ELECTRIC: CPT | Performed by: NURSE PRACTITIONER

## 2023-12-04 PROCEDURE — 250N000011 HC RX IP 250 OP 636: Mod: JZ | Performed by: NURSE PRACTITIONER

## 2023-12-04 PROCEDURE — 36430 TRANSFUSION BLD/BLD COMPNT: CPT

## 2023-12-04 RX ORDER — HEPARIN SODIUM (PORCINE) LOCK FLUSH IV SOLN 100 UNIT/ML 100 UNIT/ML
5 SOLUTION INTRAVENOUS
Status: CANCELLED | OUTPATIENT
Start: 2023-12-04

## 2023-12-04 RX ORDER — HEPARIN SODIUM,PORCINE 10 UNIT/ML
5-20 VIAL (ML) INTRAVENOUS DAILY PRN
Status: CANCELLED | OUTPATIENT
Start: 2023-12-04

## 2023-12-04 RX ORDER — HEPARIN SODIUM (PORCINE) LOCK FLUSH IV SOLN 100 UNIT/ML 100 UNIT/ML
5 SOLUTION INTRAVENOUS
Status: DISCONTINUED | OUTPATIENT
Start: 2023-12-04 | End: 2023-12-04 | Stop reason: HOSPADM

## 2023-12-04 RX ADMIN — Medication 5 ML: at 12:27

## 2023-12-04 NOTE — PROGRESS NOTES
Infusion Nursing Note:  Adrianna Pereira presents today for possible PRBC's.    Patient seen by provider today: No   present during visit today: Not Applicable.    Note: DATE/TIME OF CALL RECEIVED FROM LAB:  12/04/23 at 10:00 AM   LAB TEST:  hgb  LAB VALUE:  6.6  PROVIDER NOTIFIED?: No (Please explain why the provider was not notified): Standing orders for blood replacement  PROVIDER NAME: Dr. Castillo    Intravenous Access:  Implanted Port.    Treatment Conditions:  Lab Results   Component Value Date    HGB 6.6 (LL) 12/04/2023    WBC 2.6 (L) 12/04/2023    ANEU 2.2 12/04/2023    ANEUTAUTO 1.8 11/29/2023     12/04/2023        Results reviewed, labs MET treatment parameters, ok to proceed with treatment.      Post Infusion Assessment:  Patient tolerated infusion without incident.  Blood return noted pre and post infusion.  Site patent and intact, free from redness, edema or discomfort.  No evidence of extravasations.  Access discontinued per protocol.       Discharge Plan:   Discharge instructions reviewed with: Patient.  Patient and/or family verbalized understanding of discharge instructions and all questions answered.  Patient discharged in stable condition accompanied by: self.  Departure Mode: Ambulatory.      Dianna Osuna RN

## 2023-12-05 ENCOUNTER — PATIENT OUTREACH (OUTPATIENT)
Dept: ONCOLOGY | Facility: CLINIC | Age: 63
End: 2023-12-05
Payer: COMMERCIAL

## 2023-12-05 ENCOUNTER — APPOINTMENT (OUTPATIENT)
Dept: RADIATION THERAPY | Facility: OUTPATIENT CENTER | Age: 63
End: 2023-12-05
Payer: COMMERCIAL

## 2023-12-05 ENCOUNTER — MYC MEDICAL ADVICE (OUTPATIENT)
Dept: ONCOLOGY | Facility: CLINIC | Age: 63
End: 2023-12-05
Payer: COMMERCIAL

## 2023-12-05 DIAGNOSIS — Z48.89 ENCOUNTER FOR POSTOPERATIVE CARE: ICD-10-CM

## 2023-12-05 RX ORDER — CHLORHEXIDINE GLUCONATE ORAL RINSE 1.2 MG/ML
15 SOLUTION DENTAL 3 TIMES DAILY
Qty: 473 ML | Refills: 1 | Status: SHIPPED | OUTPATIENT
Start: 2023-12-05 | End: 2024-02-27

## 2023-12-05 NOTE — PROGRESS NOTES
[facility]: Cancer Care                                                                                          Pt requesting refill of chlorhexidine solution. Reported not taking on 11/29/23, MyChart sent to pt to ensure she is still using this so a refill can be sent.    Signature:  TIARA JOYNER RN

## 2023-12-05 NOTE — TELEPHONE ENCOUNTER
Mychart request received from patient requesting a refill of peridex solution on behalf of self.  Last refill: 10/23/23  # 473mL with 1 refills at [facility].  Last office visit:  11/29/23  Next office visit:  12/15/23    Carol Kumar RN

## 2023-12-11 ENCOUNTER — THERAPY VISIT (OUTPATIENT)
Dept: PHYSICAL THERAPY | Facility: CLINIC | Age: 63
End: 2023-12-11
Attending: RADIOLOGY
Payer: COMMERCIAL

## 2023-12-11 DIAGNOSIS — I89.0 LYMPHEDEMA: Primary | ICD-10-CM

## 2023-12-11 PROCEDURE — 97140 MANUAL THERAPY 1/> REGIONS: CPT | Mod: GP | Performed by: PHYSICAL THERAPIST

## 2023-12-13 ENCOUNTER — DOCUMENTATION ONLY (OUTPATIENT)
Dept: RADIATION THERAPY | Facility: OUTPATIENT CENTER | Age: 63
End: 2023-12-13

## 2023-12-13 NOTE — PROGRESS NOTES
Radiotherapy Treatment Summary              PATIENT: Adrianna Pereira  MEDICAL RECORD NO: [id number]   : 1960    DIAGNOSIS: Squamous cell carcinoma of the oral cavity   INTENT OF RADIOTHERAPY: Adjuvant  PATHOLOGY: Squamous cell carcinoma moderately differentiated, 4.1 cm in size originating from  floor mouth/ anterior tongue,  depth of invasion 29 mm, no LVSI, positive PNI, positive margin (left anterior lateral).  11 of 98 lymph nodes positive (4 cm largest deposit, positive extracapsular disease present),                                  STAGE: pT4N3b  CONCURRENT SYSTEMIC THERAPY:   Yes, weekly Cisplatin    ONCOLOGIC HISTORY:          Ms. Pereira is a 63 year old female  with diagnosis of oral cavity cancer.     The patient has a history of prior CO2 laser ablation for premalignant lesions in the oral cavity.  More recently the patient was noted to have firmness and swelling in the lower lip/chin region.  Biopsy was performed and demonstrated premalignant epithelial dysplasia.  The patient was seen by Dr. Thorne on 7/3/2023.  Physical exam demonstrated an ulcerative lesion involving the mandibular alveolus, floor mouth, and ventral tongue.  Biopsy of left mandibular alveolus demonstrated squamous cell carcinoma, moderately differentiated.   PET scan obtained on 2023 demonstrated a 4.4 cm anterior oral cavity mass with invasion into the mandible.  There were bilateral noted lymphadenopathy located in levels 1B,  2 and 3.  On 2023 the patient underwent segmental mandibulectomy, anterior glossectomy, and bilateral neck dissection with fibular free flap reconstruction by Dr. Dao and Dr. Thorne.  Intraoperative findings demonstrated an ulcerative   tumor of the midline floor of mouth and ventral tongue with extension to the skin of the chin. Final pathology demonstrated Final pathology demonstrated squamous cell carcinoma moderately differentiated, 4.1 cm in size originating from  floor mouth/ anterior  tongue,  depth of invasion 29 mm, no LVSI, positive PNI, positive margin (left anterior lateral).  11 of 98 lymph nodes positive (4 cm largest deposit, positive extracapsular disease present), pathologic T4N3b.  Postoperative course was complicated by multiple areas of skin necrosis for debridement including on 8/24/2023 and debridement of the anterior neck on 8/29/2023.   The patient's case was discussed at Head Neck Tumor Board where resection for positive margin was discussed.  However this would require full resection of the lower lip and recommendation was to proceed with adjuvant chemoradiation therapy.  She completed radiation.     SITE OF TREATMENT:  Head and neck    DATES  OF TREATMENT: 10/19/23 to 12/5/23    TOTAL DOSE OF TREATMENT: 6,600 cGy    DOSE PER FRACTION OF TREATMENT: 200 cGy x 33 fractions       COMMENT/TOXICITY:                  Radiation mucositis-Magic mouthwash as needed.   Xerostomia-Salt and soda rinses as needed.  Nutrition-Status post PEG placement continue to advance tube feeds as tolerated.     PAIN MANAGEMENT:  Oxycodone oral solution                          FOLLOW UP PLAN: One month  CC  Patient Care Team:  Sukumar Hendricks MD as PCP - General (Family Medicine)  Darien Thorne MD as Assigned Surgical Provider  Sukumar Hendricks MD as Assigned PCP  Hernán Egan MD as MD (Radiation Oncology)  Rai Castillo MD as MD (Hematology & Oncology)  Aramis Giang AuD (Audiology)  Maya Mao RN as Specialty Care Coordinator (Hematology & Oncology)  Hernán Egan MD as Assigned Cancer Care Provider  Maya Mao RN as Specialty Care Coordinator (Hematology & Oncology)       ADIN Herron  Department of Radiation Oncology  [facility]

## 2023-12-14 ENCOUNTER — THERAPY VISIT (OUTPATIENT)
Dept: PHYSICAL THERAPY | Facility: CLINIC | Age: 63
End: 2023-12-14
Attending: RADIOLOGY
Payer: COMMERCIAL

## 2023-12-14 DIAGNOSIS — I89.0 LYMPHEDEMA: Primary | ICD-10-CM

## 2023-12-14 PROCEDURE — 97140 MANUAL THERAPY 1/> REGIONS: CPT | Mod: GP | Performed by: PHYSICAL THERAPIST

## 2023-12-15 ENCOUNTER — ONCOLOGY VISIT (OUTPATIENT)
Dept: ONCOLOGY | Facility: CLINIC | Age: 63
End: 2023-12-15
Attending: NURSE PRACTITIONER
Payer: COMMERCIAL

## 2023-12-15 ENCOUNTER — LAB (OUTPATIENT)
Dept: INFUSION THERAPY | Facility: CLINIC | Age: 63
End: 2023-12-15
Attending: NURSE PRACTITIONER
Payer: COMMERCIAL

## 2023-12-15 VITALS
DIASTOLIC BLOOD PRESSURE: 51 MMHG | TEMPERATURE: 97.8 F | BODY MASS INDEX: 17.07 KG/M2 | WEIGHT: 97.9 LBS | SYSTOLIC BLOOD PRESSURE: 98 MMHG | HEART RATE: 86 BPM | OXYGEN SATURATION: 98 %

## 2023-12-15 DIAGNOSIS — C06.9 SQUAMOUS CELL CARCINOMA OF ORAL CAVITY (H): Primary | ICD-10-CM

## 2023-12-15 DIAGNOSIS — T45.1X5A ANTINEOPLASTIC CHEMOTHERAPY INDUCED ANEMIA: ICD-10-CM

## 2023-12-15 DIAGNOSIS — G89.3 CANCER RELATED PAIN: ICD-10-CM

## 2023-12-15 DIAGNOSIS — C06.9 SQUAMOUS CELL CARCINOMA OF ORAL CAVITY (H): ICD-10-CM

## 2023-12-15 DIAGNOSIS — D64.81 ANTINEOPLASTIC CHEMOTHERAPY INDUCED ANEMIA: ICD-10-CM

## 2023-12-15 LAB
ALBUMIN SERPL BCG-MCNC: 3.6 G/DL (ref 3.5–5.2)
ALP SERPL-CCNC: 74 U/L (ref 40–150)
ALT SERPL W P-5'-P-CCNC: 11 U/L (ref 0–50)
ANION GAP SERPL CALCULATED.3IONS-SCNC: 11 MMOL/L (ref 7–15)
AST SERPL W P-5'-P-CCNC: 14 U/L (ref 0–45)
BASOPHILS # BLD AUTO: 0 10E3/UL (ref 0–0.2)
BASOPHILS NFR BLD AUTO: 0 %
BILIRUB SERPL-MCNC: 0.2 MG/DL
BUN SERPL-MCNC: 37.3 MG/DL (ref 8–23)
CALCIUM SERPL-MCNC: 9.7 MG/DL (ref 8.8–10.2)
CHLORIDE SERPL-SCNC: 99 MMOL/L (ref 98–107)
CREAT SERPL-MCNC: 0.85 MG/DL (ref 0.51–0.95)
DEPRECATED HCO3 PLAS-SCNC: 27 MMOL/L (ref 22–29)
EGFRCR SERPLBLD CKD-EPI 2021: 77 ML/MIN/1.73M2
EOSINOPHIL # BLD AUTO: 0 10E3/UL (ref 0–0.7)
EOSINOPHIL NFR BLD AUTO: 1 %
ERYTHROCYTE [DISTWIDTH] IN BLOOD BY AUTOMATED COUNT: 14.6 % (ref 10–15)
GLUCOSE SERPL-MCNC: 118 MG/DL (ref 70–99)
HCT VFR BLD AUTO: 23.7 % (ref 35–47)
HGB BLD-MCNC: 7.7 G/DL (ref 11.7–15.7)
IMM GRANULOCYTES # BLD: 0 10E3/UL
IMM GRANULOCYTES NFR BLD: 1 %
LYMPHOCYTES # BLD AUTO: 0.8 10E3/UL (ref 0.8–5.3)
LYMPHOCYTES NFR BLD AUTO: 23 %
MAGNESIUM SERPL-MCNC: 1.9 MG/DL (ref 1.7–2.3)
MCH RBC QN AUTO: 31 PG (ref 26.5–33)
MCHC RBC AUTO-ENTMCNC: 32.5 G/DL (ref 31.5–36.5)
MCV RBC AUTO: 96 FL (ref 78–100)
MONOCYTES # BLD AUTO: 0.5 10E3/UL (ref 0–1.3)
MONOCYTES NFR BLD AUTO: 15 %
NEUTROPHILS # BLD AUTO: 2.1 10E3/UL (ref 1.6–8.3)
NEUTROPHILS NFR BLD AUTO: 60 %
NRBC # BLD AUTO: 0 10E3/UL
NRBC BLD AUTO-RTO: 0 /100
PLATELET # BLD AUTO: 288 10E3/UL (ref 150–450)
POTASSIUM SERPL-SCNC: 4.7 MMOL/L (ref 3.4–5.3)
PROT SERPL-MCNC: 7 G/DL (ref 6.4–8.3)
RBC # BLD AUTO: 2.48 10E6/UL (ref 3.8–5.2)
SODIUM SERPL-SCNC: 137 MMOL/L (ref 135–145)
WBC # BLD AUTO: 3.4 10E3/UL (ref 4–11)

## 2023-12-15 PROCEDURE — 80053 COMPREHEN METABOLIC PANEL: CPT | Performed by: NURSE PRACTITIONER

## 2023-12-15 PROCEDURE — 83735 ASSAY OF MAGNESIUM: CPT | Performed by: NURSE PRACTITIONER

## 2023-12-15 PROCEDURE — 99212 OFFICE O/P EST SF 10 MIN: CPT | Performed by: NURSE PRACTITIONER

## 2023-12-15 PROCEDURE — 36591 DRAW BLOOD OFF VENOUS DEVICE: CPT

## 2023-12-15 PROCEDURE — 250N000011 HC RX IP 250 OP 636: Mod: JZ | Performed by: NURSE PRACTITIONER

## 2023-12-15 PROCEDURE — 85025 COMPLETE CBC W/AUTO DIFF WBC: CPT | Performed by: NURSE PRACTITIONER

## 2023-12-15 PROCEDURE — 99214 OFFICE O/P EST MOD 30 MIN: CPT | Performed by: NURSE PRACTITIONER

## 2023-12-15 RX ORDER — OXYCODONE HCL 5 MG/5 ML
5 SOLUTION, ORAL ORAL EVERY 6 HOURS PRN
Qty: 100 ML | Refills: 0 | Status: SHIPPED | OUTPATIENT
Start: 2023-12-15 | End: 2024-01-17

## 2023-12-15 RX ORDER — HEPARIN SODIUM (PORCINE) LOCK FLUSH IV SOLN 100 UNIT/ML 100 UNIT/ML
500 SOLUTION INTRAVENOUS ONCE
Status: COMPLETED | OUTPATIENT
Start: 2023-12-15 | End: 2023-12-15

## 2023-12-15 RX ADMIN — Medication 500 UNITS: at 12:08

## 2023-12-15 ASSESSMENT — PAIN SCALES - GENERAL: PAINLEVEL: SEVERE PAIN (7)

## 2023-12-15 NOTE — PROGRESS NOTES
Infusion Nursing Note:  Adrianna Pereira presents today for possible blood tranfusion and IVF.  Patient seen by provider today: Yes: Marina Cronin   present during visit today: Not Applicable.    Note: Declined fluids today.     Intravenous Access:  Implanted Port.    Treatment Conditions:  Lab Results   Component Value Date    HGB 7.7 (L) 12/15/2023    WBC 3.4 (L) 12/15/2023    ANEU 2.2 12/04/2023    ANEUTAUTO 2.1 12/15/2023     12/15/2023        Results reviewed, labs did NOT meet treatment parameters: Hgb 7.7    Post Infusion Assessment:  Blood return noted.  Site patent and intact, free from redness, edema or discomfort.  Access discontinued per protocol.     Discharge Plan:   Patient discharged in stable condition accompanied by: self.  Departure Mode: Ambulatory.    Reynaldo Ibrahim RN

## 2023-12-15 NOTE — LETTER
12/15/2023         RE: Adrianna Pereira  [address] [address] [place] [id number]        Dear Colleague,    Thank you for referring your patient, Adrianna Pereira, to the [facility] [place]. Please see a copy of my visit note below.    [facility] Hematology and Oncology Outpatient Progress Note    Patient: Adrianna Pereira  MRN: [id number]  Date of Service: Dec 15, 2023          Reason for Visit    Floor of mouth cancer    Primary Oncologist: Dr. 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    Tolerated generally well with expected mucositis (well-managed) and fatigue. Mucositis pain persists, managed with oxycodone at bedtime only as she gets fatigued if using during day.     She had 1 unit pRBC 10 days ago for chemo-induced anemia.    Labwork: hgb 7.7, WBC 3.4, ANC and platelets WNL.  CMP WNL.  Mag 1.9    Plan:  -Continue oxycodone for mucositis pain. Reviewed she could take 2.5 mg during day to minimize fatigue and reserve 5 mg dose for bedtime. Expectant improvement over next few weeks. Rx refill sent  -Repeat CBC in 3 weeks to ensure anemia continues recovering.   -Patient instructed to call with new concerns/symptoms, to return as needed  -Restaging PET scan with diagnostic CT neck and labwork (CBC, CMP and TSH) in 3 months with Dr. Castillo (scheduled for early March)  -Continue with routine surveillance with ENT and Rad Onc moving forward    Baseline sensorineural hearing loss L>R  This is stable so far on cisplatin. Monitor for any progression.    Nutrition/hydration  Dependant on feeding tube. Weight is stable. Staying hydrated.    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   On observation.   ______________________________________________________________________________    History of Present Illness/ Interval History    Ms. Adrianna Pereira is a 63 year old completed adjuvant RT with sensitizing weekly cisplatin two weeks ago.    Overall, she tolerated treatment pretty well. She needed blood transfusion at completion of her treatment a few weeks ago for hgb 6.6. Was having minimal symptoms, so didn't notice much change after transfusion.     Mucositis pain persists in mouth but throat pain is resolved. Managed with oxycodone 5 mg at bedtime only as it makes her drowsy if she takes during day. Magic Mouthwash isn't providing long enough relieve so not using this. She does have thick oral secretions.  Oral communication is an ongoing issue.  Baseline hearing loss is stable.  No neuropathy.  No nausea.  No fevers.      Dependent on feeding tube.      ECOG Performance    0    Oncology History/Treatment  Diagnosis/Stage:   7/2023: [id number] Floor of mouth squamous cell cancer  -hx alcoholism (stopped all use) and smoking (quit 8/2023)  -hx premalignant oral cavity lesions s/p CO2 laser ablation with ENT  -presented with lower lip/chin swelling  -7/3/2023 left mandibular alveolus biopsy: invasive keratinizing moderately differential squamous cell carcinoma.   -PET: 4.4 x 3.7 x 3.2 cm anterior oral cavity mass invading into the mandible with a separate gluco-avid nodule on the right mandibular buccal mucosa and bilateral level 1B, level 2 and L3 metastatic lymphadenopathy without evidence of metastatic disease.   -8/17/2023 surgical path: pT4a, N3b tumor with positive left anterior lateral soft tissue margin, 11/98+ lymph nodes including STEFFEN (largest 4 cm) and several bilateral lymph nodes consistent with involvement by low-grade B-cell neoplasm suggestive of CLL/SLL.     Treatment:  8/17/2023: segmental mandibulectomy, floor of mouth resection, anterior  glossectomy, left fibular free flap, skin grafting, and tracheostomy.   -Post-op course complicated by skin necrosis requiring OR debridment and resuturing of neck incision/intraoral flap    10/20/2023 - 12/5/2023: completed adjuvant concurrent RT + weekly cisplatin (x 7 cycles)      Physical Exam    GENERAL: Alert and oriented to time place and person. Seated comfortably. In no distress. Dysarthria.   accompanied.  HEAD: Atraumatic and normocephalic. No alopecia.  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  No skin breakdown.  No evident adenopathy/masses.  EYES: SHAE, EOMI. No erythema. No icterus.  CHEST: regular respiratory effort.  NEURO: No gross deficit noted.       Lab Results    Recent Results (from the past 168 hour(s))   CBC with platelets and differential   Result Value Ref Range    WBC Count 3.4 (L) 4.0 - 11.0 10e3/uL    RBC Count 2.48 (L) 3.80 - 5.20 10e6/uL    Hemoglobin 7.7 (L) 11.7 - 15.7 g/dL    Hematocrit 23.7 (L) 35.0 - 47.0 %    MCV 96 78 - 100 fL    MCH 31.0 26.5 - 33.0 pg    MCHC 32.5 31.5 - 36.5 g/dL    RDW 14.6 10.0 - 15.0 %    Platelet Count 288 150 - 450 10e3/uL    % Neutrophils 60 %    % Lymphocytes 23 %    % Monocytes 15 %    % Eosinophils 1 %    % Basophils 0 %    % Immature Granulocytes 1 %    NRBCs per 100 WBC 0 <1 /100    Absolute Neutrophils 2.1 1.6 - 8.3 10e3/uL    Absolute Lymphocytes 0.8 0.8 - 5.3 10e3/uL    Absolute Monocytes 0.5 0.0 - 1.3 10e3/uL    Absolute Eosinophils 0.0 0.0 - 0.7 10e3/uL    Absolute Basophils 0.0 0.0 - 0.2 10e3/uL    Absolute Immature Granulocytes 0.0 <=0.4 10e3/uL    Absolute NRBCs 0.0 10e3/uL       Imaging    No results found.    Billing  Total time 30 minutes, to include face to face visit, review of EMR, ordering, documentation and coordination of care on date of service    Start time: 1153 / End time: 1202  Platform: Anna  Patient location: [facility]  Provider location: [facility]  "Clinic      Signed by: Marina Cronin NP      Oncology Rooming Note    December 15, 2023 11:40 AM   Adrianna Pereira is a 63 year old female who presents for:    No chief complaint on file.    Initial Vitals: BP 98/51   Pulse 86   Temp 97.8  F (36.6  C)   Wt 44.4 kg (97 lb 14.4 oz)   SpO2 98%   BMI 17.07 kg/m   Estimated body mass index is 17.07 kg/m  as calculated from the following:    Height as of 11/29/23: 1.613 m (5' 3.5\").    Weight as of this encounter: 44.4 kg (97 lb 14.4 oz). Body surface area is 1.41 meters squared.  Severe Pain (7) Comment: Data Unavailable   No LMP recorded. Patient is postmenopausal.  Allergies reviewed: Yes  Medications reviewed: Yes    Medications: refills not needed today.     [id number] - [place], [place] - [address] [place] [address] [facility]ARMACY [place] [place] [facility] [place] [place] - [address] [place] [address] [address] [facility]     Clinical concerns: Continues to have some mouth pain. Rates at a 7/10, Using oxycodone at night and tylenol during the day. Using feeding tube, 3 cans a day. Also using pedalyte and prosource.   Marina Cronin NP was notified.      Maya Moa RN              Again, thank you for allowing me to participate in the care of your patient.        Sincerely,        Marina Cronin NP  "

## 2023-12-15 NOTE — PROGRESS NOTES
[facility] Hematology and Oncology Outpatient Progress Note    Patient: Adrianna Pereira  MRN: [id number]  Date of Service: Dec 15, 2023          Reason for Visit    Floor of mouth cancer    Primary Oncologist: Dr. 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    Tolerated generally well with expected mucositis (well-managed) and fatigue. Mucositis pain persists, managed with oxycodone at bedtime only as she gets fatigued if using during day.     She had 1 unit pRBC 10 days ago for chemo-induced anemia.    Labwork: hgb 7.7, WBC 3.4, ANC and platelets WNL.  CMP WNL.  Mag 1.9    Plan:  -Continue oxycodone for mucositis pain. Reviewed she could take 2.5 mg during day to minimize fatigue and reserve 5 mg dose for bedtime. Expectant improvement over next few weeks. Rx refill sent  -Repeat CBC in 3 weeks to ensure anemia continues recovering.   -Patient instructed to call with new concerns/symptoms, to return as needed  -Restaging PET scan with diagnostic CT neck and labwork (CBC, CMP and TSH) in 3 months with Dr. Castillo (scheduled for early March)  -Continue with routine surveillance with ENT and Rad Onc moving forward    Baseline sensorineural hearing loss L>R  This is stable so far on cisplatin. Monitor for any progression.    Nutrition/hydration  Dependant on feeding tube. Weight is stable. Staying hydrated.    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   On observation.   ______________________________________________________________________________    History of Present Illness/ Interval History    Ms. Adrianna Pereira is a 63 year old completed adjuvant  RT with sensitizing weekly cisplatin two weeks ago.    Overall, she tolerated treatment pretty well. She needed blood transfusion at completion of her treatment a few weeks ago for hgb 6.6. Was having minimal symptoms, so didn't notice much change after transfusion.     Mucositis pain persists in mouth but throat pain is resolved. Managed with oxycodone 5 mg at bedtime only as it makes her drowsy if she takes during day. Magic Mouthwash isn't providing long enough relieve so not using this. She does have thick oral secretions.  Oral communication is an ongoing issue.  Baseline hearing loss is stable.  No neuropathy.  No nausea.  No fevers.      Dependent on feeding tube.      ECOG Performance    0    Oncology History/Treatment  Diagnosis/Stage:   7/2023: [id number] Floor of mouth squamous cell cancer  -hx alcoholism (stopped all use) and smoking (quit 8/2023)  -hx premalignant oral cavity lesions s/p CO2 laser ablation with ENT  -presented with lower lip/chin swelling  -7/3/2023 left mandibular alveolus biopsy: invasive keratinizing moderately differential squamous cell carcinoma.   -PET: 4.4 x 3.7 x 3.2 cm anterior oral cavity mass invading into the mandible with a separate gluco-avid nodule on the right mandibular buccal mucosa and bilateral level 1B, level 2 and L3 metastatic lymphadenopathy without evidence of metastatic disease.   -8/17/2023 surgical path: pT4a, N3b tumor with positive left anterior lateral soft tissue margin, 11/98+ lymph nodes including STEFFEN (largest 4 cm) and several bilateral lymph nodes consistent with involvement by low-grade B-cell neoplasm suggestive of CLL/SLL.     Treatment:  8/17/2023: segmental mandibulectomy, floor of mouth resection, anterior glossectomy, left fibular free flap, skin grafting, and tracheostomy.   -Post-op course complicated by skin necrosis requiring OR debridment and resuturing of neck incision/intraoral flap    10/20/2023 - 12/5/2023: completed adjuvant concurrent  RT + weekly cisplatin (x 7 cycles)      Physical Exam    GENERAL: Alert and oriented to time place and person. Seated comfortably. In no distress. Dysarthria.   accompanied.  HEAD: Atraumatic and normocephalic. No alopecia.  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  No skin breakdown.  No evident adenopathy/masses.  EYES: SHAE, EOMI. No erythema. No icterus.  CHEST: regular respiratory effort.  NEURO: No gross deficit noted.       Lab Results    Recent Results (from the past 168 hour(s))   CBC with platelets and differential   Result Value Ref Range    WBC Count 3.4 (L) 4.0 - 11.0 10e3/uL    RBC Count 2.48 (L) 3.80 - 5.20 10e6/uL    Hemoglobin 7.7 (L) 11.7 - 15.7 g/dL    Hematocrit 23.7 (L) 35.0 - 47.0 %    MCV 96 78 - 100 fL    MCH 31.0 26.5 - 33.0 pg    MCHC 32.5 31.5 - 36.5 g/dL    RDW 14.6 10.0 - 15.0 %    Platelet Count 288 150 - 450 10e3/uL    % Neutrophils 60 %    % Lymphocytes 23 %    % Monocytes 15 %    % Eosinophils 1 %    % Basophils 0 %    % Immature Granulocytes 1 %    NRBCs per 100 WBC 0 <1 /100    Absolute Neutrophils 2.1 1.6 - 8.3 10e3/uL    Absolute Lymphocytes 0.8 0.8 - 5.3 10e3/uL    Absolute Monocytes 0.5 0.0 - 1.3 10e3/uL    Absolute Eosinophils 0.0 0.0 - 0.7 10e3/uL    Absolute Basophils 0.0 0.0 - 0.2 10e3/uL    Absolute Immature Granulocytes 0.0 <=0.4 10e3/uL    Absolute NRBCs 0.0 10e3/uL       Imaging    No results found.    Billing  Total time 30 minutes, to include face to face visit, review of EMR, ordering, documentation and coordination of care on date of service    Start time: 1153 / End time: 1202  Platform: Surface Tension  Patient location: [facility]  Provider location: [facility]      Signed by: Marina Cronin NP

## 2023-12-15 NOTE — PROGRESS NOTES
"Oncology Rooming Note    December 15, 2023 11:40 AM   Adrianna Pereira is a 63 year old female who presents for:    No chief complaint on file.    Initial Vitals: BP 98/51   Pulse 86   Temp 97.8  F (36.6  C)   Wt 44.4 kg (97 lb 14.4 oz)   SpO2 98%   BMI 17.07 kg/m   Estimated body mass index is 17.07 kg/m  as calculated from the following:    Height as of 11/29/23: 1.613 m (5' 3.5\").    Weight as of this encounter: 44.4 kg (97 lb 14.4 oz). Body surface area is 1.41 meters squared.  Severe Pain (7) Comment: Data Unavailable   No LMP recorded. Patient is postmenopausal.  Allergies reviewed: Yes  Medications reviewed: Yes    Medications: refills not needed today.     [facility] - [place], [place] - [address] [place] [address] PHARMACY - [place], [place] - [address] [place] [address] - [address] [place] [address] PHARMACY [place], [place] - 0 [facility]     Clinical concerns: Continues to have some mouth pain. Rates at a 7/10, Using oxycodone at night and tylenol during the day. Using feeding tube, 3 cans a day. Also using pedalyte and prosource.   Marina Cronin NP was notified.      Maya Mao RN            "

## 2023-12-20 ENCOUNTER — VIRTUAL VISIT (OUTPATIENT)
Dept: SPEECH THERAPY | Facility: CLINIC | Age: 63
End: 2023-12-20
Payer: COMMERCIAL

## 2023-12-20 ENCOUNTER — OFFICE VISIT (OUTPATIENT)
Dept: RADIATION THERAPY | Facility: OUTPATIENT CENTER | Age: 63
End: 2023-12-20
Payer: COMMERCIAL

## 2023-12-20 VITALS
WEIGHT: 99.8 LBS | SYSTOLIC BLOOD PRESSURE: 116 MMHG | HEART RATE: 69 BPM | DIASTOLIC BLOOD PRESSURE: 64 MMHG | RESPIRATION RATE: 16 BRPM | OXYGEN SATURATION: 96 % | BODY MASS INDEX: 17.4 KG/M2

## 2023-12-20 DIAGNOSIS — R13.12 OROPHARYNGEAL DYSPHAGIA: ICD-10-CM

## 2023-12-20 DIAGNOSIS — R13.12 DYSPHAGIA, OROPHARYNGEAL PHASE: Primary | ICD-10-CM

## 2023-12-20 DIAGNOSIS — B37.0 CANDIDIASIS OF MOUTH: Primary | ICD-10-CM

## 2023-12-20 DIAGNOSIS — C04.9 SQUAMOUS CELL CARCINOMA OF FLOOR OF MOUTH (H): ICD-10-CM

## 2023-12-20 PROCEDURE — 92526 ORAL FUNCTION THERAPY: CPT | Mod: GN | Performed by: SPEECH-LANGUAGE PATHOLOGIST

## 2023-12-20 RX ORDER — FLUCONAZOLE 40 MG/ML
100 POWDER, FOR SUSPENSION ORAL DAILY
Qty: 25 ML | Refills: 0 | Status: SHIPPED | OUTPATIENT
Start: 2023-12-20 | End: 2023-12-27

## 2023-12-20 NOTE — NURSING NOTE
FOLLOW-UP VISIT    Patient Name: Adrianna Pereira      : 1960     Age: 63 year old        ______________________________________________________________________________     Chief Complaint   Patient presents with    Radiation Therapy     Return visit, head/neck cancer     /64 (BP Location: Left arm, Cuff Size: Adult Regular)   Pulse 69   Resp 16   Wt 45.3 kg (99 lb 12.8 oz)   SpO2 96%   BMI 17.40 kg/m       Date Radiation Completed: 2023    Pain  Some discomfort, only using tylenol 1x per day and 5 mg oxy at HS to help with sleep    Meds  Current Med List Reviewed: Yes  Medication Note:     Labs  TSH   Date Value Ref Range Status   2023 2.49 0.30 - 4.20 uIU/mL Final       Imaging  None    Skin: Healing well, using aquaphor  Oral Products: peridex oral solution, not using s/s rinses  Mucositis:  getting better, no recent fungal infections  Dry mouth: about the same, sucking on ice, has a humidifyer in the home  Dental evaluation: N/A  PEG tube: using 3 carton/day, also pedialyte and prosource   Speech therapy: Yes: still doing swallow exercises  Lymphedema: awaiting to restart at [facility]  Energy Level: getting better  Appetite: npo  Intake: still npo  Weight:    Wt Readings from Last 5 Encounters:   23 45.3 kg (99 lb 12.8 oz)   12/15/23 44.4 kg (97 lb 14.4 oz)   23 44.6 kg (98 lb 6.4 oz)   23 44 kg (97 lb)   23 44.4 kg (97 lb 12.8 oz)       Appointments:     DATE  Oncologist: Jonathan Upcoming appt in Feb following PET   ENT: Feliberto Follow up 24   Primary:      Other Notes:

## 2023-12-20 NOTE — PROGRESS NOTES
Radiation Oncology Progress Note    HPI: Ms. 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  Final pathology demonstrated squamous cell carcinoma moderately differentiated, 4.1 cm in size originating from  floor mouth/ anterior tongue,  depth of invasion 29 mm, no LVSI, positive PNI, positive margin (left anterior lateral).  11 of 98 lymph nodes positive (4 cm largest deposit, positive extracapsular disease present), pathologic T4N3b.   She underwent adjuvant chemoradiation therapy.      Radiation Treatment  10/19/23 to 12/5/23: Head and neck, 6600 cGy in 33 fractions    Patient returns for follow-up.    She is overall doing okay.  Appropriate recovery thus far posttreatment.  Improving mucositis.  Continues to have xerostomia and taste change.  Nutrition all through the tube.  Pain is currently managed with intermittent use of Magic mouthwash and oxycodone oral solution.    Continues to follow with ENT team (Dr. Dao).    Continues to follow with medical oncology team (Dr. Castillo).    Plan  1: Appropriate recovery post completion of adjuvant chemoradiation thus far.  Anticipate continued recovery with time.  2.Cancer related pain.  Oxycodone oral solution as needed.  3. Radiation mucositis.  Magic mouthwash as needed.  4. Xerostomia.  Salt and soda rinses as needed. Mucinex to thin secretions.   5. Nutrition.   Status post PEG placement continue to advance tube feeds as tolerated.  6.  Continue follow-up with ENT team.  7.  Continue follow-up with medical oncology team.  Posttreatment PET scheduled for end of February 2024.   8.  Return to clinic in 2 weeks    Hernán Egan M.D.  Department of Radiation Oncology  [facility]

## 2023-12-20 NOTE — LETTER
12/20/2023         RE: Adrianna Pereira  [address] [place] [address]        Dear Colleague,    Thank you for referring your patient, Adrianna Pereira, to the [facility] RADIATION THERAPY [facility]INIC. Please see a copy of my visit note below.    Radiation Oncology Progress Note    HPI: Ms. 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  Final pathology demonstrated squamous cell carcinoma moderately differentiated, 4.1 cm in size originating from  floor mouth/ anterior tongue,  depth of invasion 29 mm, no LVSI, positive PNI, positive margin (left anterior lateral).  11 of 98 lymph nodes positive (4 cm largest deposit, positive extracapsular disease present), pathologic T4N3b.   She underwent adjuvant chemoradiation therapy.      Radiation Treatment  10/19/23 to 12/5/23: Head and neck, 6600 cGy in 33 fractions    Patient returns for follow-up.    She is overall doing okay.  Appropriate recovery thus far posttreatment.  Improving mucositis.  Continues to have xerostomia and taste change.  Nutrition all through the tube.  Pain is currently managed with intermittent use of Magic mouthwash and oxycodone oral solution.    Continues to follow with ENT team (Dr. Dao).    Continues to follow with medical oncology team (Dr. Castillo).    Plan  1: Appropriate recovery post completion of adjuvant chemoradiation thus far.  Anticipate continued recovery with time.  2.Cancer related pain.  Oxycodone oral solution as needed.  3. Radiation mucositis.  Magic mouthwash as needed.  4. Xerostomia.  Salt and soda rinses as needed. Mucinex to thin secretions.   5. Nutrition.   Status post PEG placement continue to advance tube feeds as tolerated.  6.  Continue follow-up with ENT team.  7.  Continue follow-up with medical oncology team.  Posttreatment PET scheduled for end of February 2024.   8.   Return to clinic in 2 weeks    Hernán Egan M.D.  Department of Radiation Oncology  [facility]

## 2023-12-27 ENCOUNTER — OFFICE VISIT (OUTPATIENT)
Dept: RADIATION THERAPY | Facility: OUTPATIENT CENTER | Age: 63
End: 2023-12-27
Payer: COMMERCIAL

## 2023-12-27 VITALS
TEMPERATURE: 98.4 F | WEIGHT: 98.2 LBS | BODY MASS INDEX: 17.12 KG/M2 | SYSTOLIC BLOOD PRESSURE: 124 MMHG | OXYGEN SATURATION: 100 % | HEART RATE: 87 BPM | RESPIRATION RATE: 18 BRPM | DIASTOLIC BLOOD PRESSURE: 74 MMHG

## 2023-12-27 DIAGNOSIS — K12.2 ORAL INFECTION: Primary | ICD-10-CM

## 2023-12-27 DIAGNOSIS — B37.0 CANDIDIASIS OF MOUTH: ICD-10-CM

## 2023-12-27 RX ORDER — AMOXICILLIN AND CLAVULANATE POTASSIUM 400; 57 MG/5ML; MG/5ML
875 POWDER, FOR SUSPENSION ORAL 2 TIMES DAILY
Qty: 218.8 ML | Refills: 0 | Status: SHIPPED | OUTPATIENT
Start: 2023-12-27 | End: 2024-01-05

## 2023-12-27 RX ORDER — FLUCONAZOLE 40 MG/ML
100 POWDER, FOR SUSPENSION ORAL DAILY
Qty: 17.5 ML | Refills: 0 | Status: SHIPPED | OUTPATIENT
Start: 2023-12-27 | End: 2024-01-03

## 2023-12-27 ASSESSMENT — PAIN SCALES - GENERAL: PAINLEVEL: MODERATE PAIN (4)

## 2023-12-27 NOTE — LETTER
12/27/2023         RE: Adrianna Pereira  [address] [address] [place] [address]        Dear Colleague,    Thank you for referring your patient, Adrianna Pereira, to the [facility] RADIATION THERAPY CLINIC. Please see a copy of my visit note below.    Radiation Oncology Progress Note    HPI: Ms. 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  Final pathology demonstrated squamous cell carcinoma moderately differentiated, 4.1 cm in size originating from  floor mouth/ anterior tongue,  depth of invasion 29 mm, no LVSI, positive PNI, positive margin (left anterior lateral).  11 of 98 lymph nodes positive (4 cm largest deposit, positive extracapsular disease present), pathologic T4N3b.   She underwent adjuvant chemoradiation therapy.      Radiation Treatment  10/19/23 to 12/5/23: Head and neck, 6600 cGy in 33 fractions    Patient returns for follow-up.    She is doing okay.  Appropriate recovery thus far posttreatment.  Improving mucositis.  Continues to have xerostomia and taste change.  Nutrition all through the tube.  Pain is currently managed with intermittent use of Magic mouthwash and oxycodone oral solution. She was started on antifungal last week for possible oral candidiasis. Has noticed slightly more pain in oral cavity region this week however. Some drainage near skin site and ? Erythema.     Continues to follow with ENT team (Dr. Dao).    Continues to follow with medical oncology team (Dr. Castillo).    Plan  1: Appropriate recovery post completion of adjuvant chemoradiation thus far.  Anticipate continued recovery with time.  2. Oral infection. Possible fungal vs bacterial component. Finish prescribed antifungal course. Will add antibiotic.   3. Cancer related pain.  Oxycodone oral solution as needed.  4. Radiation mucositis.  Magic mouthwash as needed.  5. Xerostomia.   Salt and soda rinses as needed. Mucinex to thin secretions.   6. Nutrition.   Status post PEG placement continue to advance tube feeds as tolerated.  7.  Continue follow-up with ENT team.  8.  Continue follow-up with medical oncology team.  Posttreatment PET scheduled for end of February 2024.   9.  Return to clinic in 1 week.    Hernán Egan M.D.  Department of Radiation Oncology  [facility]       Again, thank you for allowing me to participate in the care of your patient.        Sincerely,        Hernán Egan MD

## 2023-12-27 NOTE — NURSING NOTE
FOLLOW-UP VISIT    Patient Name: Adrianna Pereira      : 1960     Age: 63 year old        ______________________________________________________________________________     Chief Complaint   Patient presents with    Radiation Therapy     Follow up     /74   Pulse 87   Temp 98.4  F (36.9  C) (Axillary)   Resp 18   Wt 44.5 kg (98 lb 3.2 oz)   SpO2 100%   BMI 17.12 kg/m       Date Radiation Completed: 23    Pain  Current history of pain associated with this visit:   Intensity: 4/-510  Current: pain/pressure in L chin. Has had some drainage - yellow/green thick from inner lower lip and small drop oozing from pinpoint area chin/incision area  Location: L lower lip, possible small spot on L chin/incision area  Treatment: has been doing antifungal daily, ibuprofen 2x/day, mucinex 2x/day and oxycodone 5 mg at HS    Meds  Current Med List Reviewed: Yes  Medication Note:     Labs  TSH   Date Value Ref Range Status   2023 2.49 0.30 - 4.20 uIU/mL Final   ]    Imaging  None    Skin: very faint pink. No obvious s/s of infection. Appears pinpoint area has yellow/white drainage  Oral Products: peridex oral solution  Mucositis: slowly improving  Dry mouth: Yes: able to suck on ice chips. Has humidifier in home.  Dental evaluation: not yet  PEG tube: Yes:  3 cartons per day. Also pedialyte for fluids  Speech therapy: Yes:   Lymphedema: will resume at [facility] in early January  Energy Level:fair to good -- even though she can't eat, she made [place] cookies, and is cooking meals for family  Appetite: NA - npo  Intake: all nurition via PEG  Weight:    Wt Readings from Last 5 Encounters:   23 44.5 kg (98 lb 3.2 oz)   23 45.3 kg (99 lb 12.8 oz)   12/15/23 44.4 kg (97 lb 14.4 oz)   23 44.6 kg (98 lb 6.4 oz)   23 44 kg (97 lb)       Appointments:     DATE  Oncologist: Dr. Castillo    ENT:  Dr. Erazo   PET and visit 24   Primary:      Other Notes:

## 2023-12-27 NOTE — PROGRESS NOTES
Radiation Oncology Progress Note    HPI: Ms. 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  Final pathology demonstrated squamous cell carcinoma moderately differentiated, 4.1 cm in size originating from  floor mouth/ anterior tongue,  depth of invasion 29 mm, no LVSI, positive PNI, positive margin (left anterior lateral).  11 of 98 lymph nodes positive (4 cm largest deposit, positive extracapsular disease present), pathologic T4N3b.   She underwent adjuvant chemoradiation therapy.      Radiation Treatment  10/19/23 to 12/5/23: Head and neck, 6600 cGy in 33 fractions    Patient returns for follow-up.    She is doing okay.  Appropriate recovery thus far posttreatment.  Improving mucositis.  Continues to have xerostomia and taste change.  Nutrition all through the tube.  Pain is currently managed with intermittent use of Magic mouthwash and oxycodone oral solution. She was started on antifungal last week for possible oral candidiasis. Has noticed slightly more pain in oral cavity region this week however. Some drainage near skin site and ? Erythema.     Continues to follow with ENT team (Dr. Dao).    Continues to follow with medical oncology team (Dr. Castillo).    Plan  1: Appropriate recovery post completion of adjuvant chemoradiation thus far.  Anticipate continued recovery with time.  2. Oral infection. Possible fungal vs bacterial component. Finish prescribed antifungal course. Will add antibiotic.   3. Cancer related pain.  Oxycodone oral solution as needed.  4. Radiation mucositis.  Magic mouthwash as needed.  5. Xerostomia.  Salt and soda rinses as needed. Mucinex to thin secretions.   6. Nutrition.   Status post PEG placement continue to advance tube feeds as tolerated.  7.  Continue follow-up with ENT team.  8.  Continue follow-up with medical oncology team.   Posttreatment PET scheduled for end of February 2024.   9.  Return to clinic in 1 week.    Hernán Egan M.D.  Department of Radiation Oncology  [facility]

## 2024-01-05 ENCOUNTER — TELEPHONE (OUTPATIENT)
Dept: ONCOLOGY | Facility: CLINIC | Age: 64
End: 2024-01-05

## 2024-01-05 ENCOUNTER — OFFICE VISIT (OUTPATIENT)
Dept: RADIATION THERAPY | Facility: OUTPATIENT CENTER | Age: 64
End: 2024-01-05
Payer: COMMERCIAL

## 2024-01-05 ENCOUNTER — LAB (OUTPATIENT)
Dept: INFUSION THERAPY | Facility: CLINIC | Age: 64
End: 2024-01-05
Attending: INTERNAL MEDICINE
Payer: COMMERCIAL

## 2024-01-05 VITALS
OXYGEN SATURATION: 100 % | WEIGHT: 96.6 LBS | BODY MASS INDEX: 16.84 KG/M2 | RESPIRATION RATE: 16 BRPM | SYSTOLIC BLOOD PRESSURE: 100 MMHG | HEART RATE: 86 BPM | DIASTOLIC BLOOD PRESSURE: 62 MMHG

## 2024-01-05 DIAGNOSIS — T45.1X5A ANTINEOPLASTIC CHEMOTHERAPY INDUCED ANEMIA: ICD-10-CM

## 2024-01-05 DIAGNOSIS — C06.9 SQUAMOUS CELL CARCINOMA OF ORAL CAVITY (H): Primary | ICD-10-CM

## 2024-01-05 DIAGNOSIS — D64.9 ANEMIA, UNSPECIFIED TYPE: ICD-10-CM

## 2024-01-05 DIAGNOSIS — D64.81 ANTINEOPLASTIC CHEMOTHERAPY INDUCED ANEMIA: Primary | ICD-10-CM

## 2024-01-05 DIAGNOSIS — D64.81 ANTINEOPLASTIC CHEMOTHERAPY INDUCED ANEMIA: ICD-10-CM

## 2024-01-05 DIAGNOSIS — T45.1X5A ANTINEOPLASTIC CHEMOTHERAPY INDUCED ANEMIA: Primary | ICD-10-CM

## 2024-01-05 DIAGNOSIS — D64.9 ANEMIA, UNSPECIFIED TYPE: Primary | ICD-10-CM

## 2024-01-05 LAB
BASOPHILS # BLD AUTO: 0 10E3/UL (ref 0–0.2)
BASOPHILS NFR BLD AUTO: 0 %
EOSINOPHIL # BLD AUTO: 0.1 10E3/UL (ref 0–0.7)
EOSINOPHIL NFR BLD AUTO: 1 %
ERYTHROCYTE [DISTWIDTH] IN BLOOD BY AUTOMATED COUNT: 17.2 % (ref 10–15)
HCT VFR BLD AUTO: 22.1 % (ref 35–47)
HGB BLD-MCNC: 7.1 G/DL (ref 11.7–15.7)
IMM GRANULOCYTES # BLD: 0.1 10E3/UL
IMM GRANULOCYTES NFR BLD: 1 %
LYMPHOCYTES # BLD AUTO: 1.2 10E3/UL (ref 0.8–5.3)
LYMPHOCYTES NFR BLD AUTO: 11 %
MCH RBC QN AUTO: 31.7 PG (ref 26.5–33)
MCHC RBC AUTO-ENTMCNC: 32.1 G/DL (ref 31.5–36.5)
MCV RBC AUTO: 99 FL (ref 78–100)
MONOCYTES # BLD AUTO: 0.8 10E3/UL (ref 0–1.3)
MONOCYTES NFR BLD AUTO: 7 %
NEUTROPHILS # BLD AUTO: 9.2 10E3/UL (ref 1.6–8.3)
NEUTROPHILS NFR BLD AUTO: 80 %
NRBC # BLD AUTO: 0 10E3/UL
NRBC BLD AUTO-RTO: 0 /100
PLATELET # BLD AUTO: 381 10E3/UL (ref 150–450)
RBC # BLD AUTO: 2.24 10E6/UL (ref 3.8–5.2)
RETICS # AUTO: 0.04 10E6/UL (ref 0.03–0.1)
RETICS/RBC NFR AUTO: 2 % (ref 0.5–2)
WBC # BLD AUTO: 11.5 10E3/UL (ref 4–11)

## 2024-01-05 PROCEDURE — 85025 COMPLETE CBC W/AUTO DIFF WBC: CPT | Performed by: NURSE PRACTITIONER

## 2024-01-05 PROCEDURE — 85045 AUTOMATED RETICULOCYTE COUNT: CPT | Performed by: NURSE PRACTITIONER

## 2024-01-05 PROCEDURE — 36591 DRAW BLOOD OFF VENOUS DEVICE: CPT

## 2024-01-05 PROCEDURE — 250N000011 HC RX IP 250 OP 636

## 2024-01-05 RX ORDER — HEPARIN SODIUM (PORCINE) LOCK FLUSH IV SOLN 100 UNIT/ML 100 UNIT/ML
SOLUTION INTRAVENOUS
Status: COMPLETED
Start: 2024-01-05 | End: 2024-01-05

## 2024-01-05 RX ORDER — HEPARIN SODIUM,PORCINE 10 UNIT/ML
5-20 VIAL (ML) INTRAVENOUS DAILY PRN
Status: CANCELLED | OUTPATIENT
Start: 2024-01-05

## 2024-01-05 RX ORDER — HEPARIN SODIUM (PORCINE) LOCK FLUSH IV SOLN 100 UNIT/ML 100 UNIT/ML
5 SOLUTION INTRAVENOUS
Status: CANCELLED | OUTPATIENT
Start: 2024-01-05

## 2024-01-05 RX ORDER — DIPHENHYDRAMINE HYDROCHLORIDE 50 MG/ML
50 INJECTION INTRAMUSCULAR; INTRAVENOUS
Status: CANCELLED
Start: 2024-01-05

## 2024-01-05 RX ORDER — EPINEPHRINE 1 MG/ML
0.3 INJECTION, SOLUTION, CONCENTRATE INTRAVENOUS EVERY 5 MIN PRN
Status: CANCELLED | OUTPATIENT
Start: 2024-01-05

## 2024-01-05 RX ADMIN — Medication 500 UNITS: at 10:02

## 2024-01-05 ASSESSMENT — PAIN SCALES - GENERAL: PAINLEVEL: NO PAIN (0)

## 2024-01-05 NOTE — PROGRESS NOTES
Infusion Nursing Note:  Adrianna Pereira presents today for Port labs.    Patient seen by provider today: No   present during visit today: Not Applicable.    Note: N/A.    Intravenous Access:  Implanted Port.    Treatment Conditions:  Not Applicable.    Post Infusion Assessment:  Blood return noted.  Site patent and intact, free from redness, edema or discomfort.  No evidence of extravasations.  Access discontinued per protocol.     Discharge Plan:   Discharge instructions reviewed with: Patient.  Patient and/or family verbalized understanding of discharge instructions and all questions answered.  AVS to patient via [employer].  Patient will return 2/29/2024 for next appointment.   Patient discharged in stable condition accompanied by: self.  Departure Mode: Ambulatory.    La Monzon RN

## 2024-01-05 NOTE — PROGRESS NOTES
Radiation Oncology Progress Note    HPI: Ms. 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  Final pathology demonstrated squamous cell carcinoma moderately differentiated, 4.1 cm in size originating from  floor mouth/ anterior tongue,  depth of invasion 29 mm, no LVSI, positive PNI, positive margin (left anterior lateral).  11 of 98 lymph nodes positive (4 cm largest deposit, positive extracapsular disease present), pathologic T4N3b.   She underwent adjuvant chemoradiation therapy.      Radiation Treatment  10/19/23 to 12/5/23: Head and neck, 6600 cGy in 33 fractions    Patient returns for follow-up.    She is doing okay.  Appropriate recovery thus far posttreatment.  Improving mucositis.  Continues to have xerostomia and taste change.  Nutrition all through the tube.  Pain is currently managed with intermittent use of Magic mouthwash and oxycodone oral solution. She was started on Abx due to drainage near skin site and ? Erythema with concern for infection. This is improving.    Continues to follow with ENT team (Dr. Dao).    Continues to follow with medical oncology team (Dr. Castillo).    Plan  1: Appropriate recovery post completion of adjuvant chemoradiation thus far.  Anticipate continued recovery with time.  2. Oral infection. Possible fungal vs bacterial component. Finish prescribed antifungal course. Finish antibiotic course.   3. Cancer related pain.  Oxycodone oral solution as needed, discuss taper.  4. Radiation mucositis.  Magic mouthwash as needed.  5. Xerostomia.  Salt and soda rinses as needed. Mucinex to thin secretions.   6. Nutrition.   Status post PEG placement continue to advance tube feeds as tolerated.  7.  Continue follow-up with ENT team.  8.  Continue follow-up with medical oncology team.  Posttreatment PET scheduled for end of February 2024.    9.  Return to clinic in 4 weeks.    Hernán Egan M.D.  Department of Radiation Oncology  [facility]

## 2024-01-05 NOTE — LETTER
1/5/2024         RE: Adrianna Pereira  [address] [address] [place] [id number]        Dear Colleague,    Thank you for referring your patient, Adrianna Pereira, to the [facility] RAD[facility]ATION [facility]Y [facility]IC. Please see a copy of my visit note below.    Radiation Oncology Progress Note    HPI: Ms. 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  Final pathology demonstrated squamous cell carcinoma moderately differentiated, 4.1 cm in size originating from  floor mouth/ anterior tongue,  depth of invasion 29 mm, no LVSI, positive PNI, positive margin (left anterior lateral).  11 of 98 lymph nodes positive (4 cm largest deposit, positive extracapsular disease present), pathologic T4N3b.   She underwent adjuvant chemoradiation therapy.      Radiation Treatment  10/19/23 to 12/5/23: Head and neck, 6600 cGy in 33 fractions    Patient returns for follow-up.    She is doing okay.  Appropriate recovery thus far posttreatment.  Improving mucositis.  Continues to have xerostomia and taste change.  Nutrition all through the tube.  Pain is currently managed with intermittent use of Magic mouthwash and oxycodone oral solution. She was started on Abx due to drainage near skin site and ? Erythema with concern for infection. This is improving.    Continues to follow with ENT team (Dr. Dao).    Continues to follow with medical oncology team (Dr. Castillo).    Plan  1: Appropriate recovery post completion of adjuvant chemoradiation thus far.  Anticipate continued recovery with time.  2. Oral infection. Possible fungal vs bacterial component. Finish prescribed antifungal course. Finish antibiotic course.   3. Cancer related pain.  Oxycodone oral solution as needed, discuss taper.  4. Radiation mucositis.  Magic mouthwash as needed.  5. Xerostomia.  Salt and soda rinses as needed. Mucinex to thin  secretions.   6. Nutrition.   Status post PEG placement continue to advance tube feeds as tolerated.  7.  Continue follow-up with ENT team.  8.  Continue follow-up with medical oncology team.  Posttreatment PET scheduled for end of February 2024.   9.  Return to clinic in 4 weeks.    Hernán Egan M.D.  Department of Radiation Oncology  [facility]       Again, thank you for allowing me to participate in the care of your patient.        Sincerely,        Hernán Egan MD

## 2024-01-05 NOTE — NURSING NOTE
FOLLOW-UP VISIT    Patient Name: Adrianna Pereira      : 1960     Age: 63 year old        ______________________________________________________________________________     Chief Complaint   Patient presents with    Radiation Therapy     Return appointment with Dr. Egan     /62   Pulse 86   Resp 16   Wt 43.8 kg (96 lb 9.6 oz)   SpO2 100%   BMI 16.84 kg/m       Date Radiation Completed: 2023    Pain  Denies    Meds  Current Med List Reviewed: Yes  Medication Note:     Labs  TSH   Date Value Ref Range Status   2023 2.49 0.30 - 4.20 uIU/mL Final   ]    Imaging  Next PET scheduled for 24    Skin:Warm, dry, intact- small area to radiation site now healed, no further drainage, patient finished antibiotic   Oral Products: Peridex 3 x day  Mucositis: No- denies need for magic mouthwash  Dry mouth: Yes: patient reports does not use salt and soda rinses due to inability to swish and spit.  Dental evaluation: No  PEG tube: Yes: Tube feedings for nutrition  Speech therapy: Yes: Next appointment 1/10/24  Lymphedema: Yes: Next appointment 24  Energy Level: improving  Appetite: Tube feedings only  Intake: No oral intake  Weight:    Wt Readings from Last 5 Encounters:   24 43.8 kg (96 lb 9.6 oz)   23 44.5 kg (98 lb 3.2 oz)   23 45.3 kg (99 lb 12.8 oz)   12/15/23 44.4 kg (97 lb 14.4 oz)   23 44.6 kg (98 lb 6.4 oz)       Appointments:     DATE  Oncologist: Dr. Castillo  3/1/24   ENT: Dr. Dao  24   Primary:      Other Notes: Follow up with Dr. Egan on 24.    Kelly Ortega RN

## 2024-01-05 NOTE — CONFIDENTIAL NOTE
[facility]: Cancer Care                                                                                        Patients  Duane notified of recommendations regarding rechecking labs and possible blood transfusion next week and also stated will have her start taking folic acid.       Signature:  Maya Mao RN

## 2024-01-08 LAB
ABO/RH(D): NORMAL
ANTIBODY SCREEN: NEGATIVE
SPECIMEN EXPIRATION DATE: NORMAL

## 2024-01-09 ENCOUNTER — THERAPY VISIT (OUTPATIENT)
Dept: PHYSICAL THERAPY | Facility: CLINIC | Age: 64
End: 2024-01-09
Attending: RADIOLOGY
Payer: COMMERCIAL

## 2024-01-09 ENCOUNTER — LAB (OUTPATIENT)
Dept: INFUSION THERAPY | Facility: CLINIC | Age: 64
End: 2024-01-09
Attending: INTERNAL MEDICINE
Payer: COMMERCIAL

## 2024-01-09 DIAGNOSIS — C06.9 SQUAMOUS CELL CARCINOMA OF ORAL CAVITY (H): Primary | ICD-10-CM

## 2024-01-09 DIAGNOSIS — I89.0 LYMPHEDEMA: Primary | ICD-10-CM

## 2024-01-09 DIAGNOSIS — D64.9 ANEMIA, UNSPECIFIED TYPE: ICD-10-CM

## 2024-01-09 LAB
BASOPHILS # BLD AUTO: 0 10E3/UL (ref 0–0.2)
BASOPHILS NFR BLD AUTO: 0 %
BLD PROD TYP BPU: NORMAL
BLOOD COMPONENT TYPE: NORMAL
CODING SYSTEM: NORMAL
CROSSMATCH: NORMAL
EOSINOPHIL # BLD AUTO: 0.3 10E3/UL (ref 0–0.7)
EOSINOPHIL NFR BLD AUTO: 3 %
ERYTHROCYTE [DISTWIDTH] IN BLOOD BY AUTOMATED COUNT: 17.5 % (ref 10–15)
FERRITIN SERPL-MCNC: 1115 NG/ML (ref 11–328)
FOLATE SERPL-MCNC: 34.6 NG/ML (ref 4.6–34.8)
HCT VFR BLD AUTO: 21.1 % (ref 35–47)
HGB BLD-MCNC: 6.9 G/DL (ref 11.7–15.7)
IMM GRANULOCYTES # BLD: 0.1 10E3/UL
IMM GRANULOCYTES NFR BLD: 1 %
IRON BINDING CAPACITY (ROCHE): 227 UG/DL (ref 240–430)
IRON SATN MFR SERPL: 18 % (ref 15–46)
IRON SERPL-MCNC: 40 UG/DL (ref 37–145)
ISSUE DATE AND TIME: NORMAL
LYMPHOCYTES # BLD AUTO: 2 10E3/UL (ref 0.8–5.3)
LYMPHOCYTES NFR BLD AUTO: 18 %
MCH RBC QN AUTO: 32.2 PG (ref 26.5–33)
MCHC RBC AUTO-ENTMCNC: 32.7 G/DL (ref 31.5–36.5)
MCV RBC AUTO: 99 FL (ref 78–100)
MONOCYTES # BLD AUTO: 0.8 10E3/UL (ref 0–1.3)
MONOCYTES NFR BLD AUTO: 7 %
NEUTROPHILS # BLD AUTO: 8 10E3/UL (ref 1.6–8.3)
NEUTROPHILS NFR BLD AUTO: 71 %
NRBC # BLD AUTO: 0 10E3/UL
NRBC BLD AUTO-RTO: 0 /100
PLATELET # BLD AUTO: 397 10E3/UL (ref 150–450)
RBC # BLD AUTO: 2.14 10E6/UL (ref 3.8–5.2)
TSH SERPL DL<=0.005 MIU/L-ACNC: 1.48 UIU/ML (ref 0.3–4.2)
UNIT ABO/RH: NORMAL
UNIT NUMBER: NORMAL
UNIT STATUS: NORMAL
UNIT TYPE ISBT: 5100
WBC # BLD AUTO: 11.3 10E3/UL (ref 4–11)

## 2024-01-09 PROCEDURE — 84443 ASSAY THYROID STIM HORMONE: CPT | Performed by: NURSE PRACTITIONER

## 2024-01-09 PROCEDURE — 83550 IRON BINDING TEST: CPT | Performed by: NURSE PRACTITIONER

## 2024-01-09 PROCEDURE — 82728 ASSAY OF FERRITIN: CPT | Performed by: NURSE PRACTITIONER

## 2024-01-09 PROCEDURE — 85025 COMPLETE CBC W/AUTO DIFF WBC: CPT | Performed by: NURSE PRACTITIONER

## 2024-01-09 PROCEDURE — 83540 ASSAY OF IRON: CPT | Performed by: NURSE PRACTITIONER

## 2024-01-09 PROCEDURE — 82746 ASSAY OF FOLIC ACID SERUM: CPT | Performed by: NURSE PRACTITIONER

## 2024-01-09 PROCEDURE — 82607 VITAMIN B-12: CPT | Performed by: NURSE PRACTITIONER

## 2024-01-09 PROCEDURE — 83010 ASSAY OF HAPTOGLOBIN QUANT: CPT | Performed by: NURSE PRACTITIONER

## 2024-01-09 PROCEDURE — 97140 MANUAL THERAPY 1/> REGIONS: CPT | Mod: GP,CQ | Performed by: REHABILITATION PRACTITIONER

## 2024-01-09 PROCEDURE — 86900 BLOOD TYPING SEROLOGIC ABO: CPT | Performed by: NURSE PRACTITIONER

## 2024-01-09 PROCEDURE — 250N000011 HC RX IP 250 OP 636

## 2024-01-09 PROCEDURE — 36591 DRAW BLOOD OFF VENOUS DEVICE: CPT

## 2024-01-09 RX ORDER — HEPARIN SODIUM (PORCINE) LOCK FLUSH IV SOLN 100 UNIT/ML 100 UNIT/ML
5 SOLUTION INTRAVENOUS
Status: CANCELLED | OUTPATIENT
Start: 2024-01-09

## 2024-01-09 RX ORDER — HEPARIN SODIUM (PORCINE) LOCK FLUSH IV SOLN 100 UNIT/ML 100 UNIT/ML
5 SOLUTION INTRAVENOUS
Status: DISCONTINUED | OUTPATIENT
Start: 2024-01-09 | End: 2024-01-09 | Stop reason: HOSPADM

## 2024-01-09 RX ORDER — HEPARIN SODIUM,PORCINE 10 UNIT/ML
5-20 VIAL (ML) INTRAVENOUS DAILY PRN
Status: CANCELLED | OUTPATIENT
Start: 2024-01-09

## 2024-01-09 RX ORDER — HEPARIN SODIUM (PORCINE) LOCK FLUSH IV SOLN 100 UNIT/ML 100 UNIT/ML
SOLUTION INTRAVENOUS
Status: COMPLETED
Start: 2024-01-09 | End: 2024-01-09

## 2024-01-09 RX ORDER — DIPHENHYDRAMINE HYDROCHLORIDE 50 MG/ML
50 INJECTION INTRAMUSCULAR; INTRAVENOUS
Status: CANCELLED
Start: 2024-01-09

## 2024-01-09 RX ORDER — EPINEPHRINE 1 MG/ML
0.3 INJECTION, SOLUTION, CONCENTRATE INTRAVENOUS EVERY 5 MIN PRN
Status: CANCELLED | OUTPATIENT
Start: 2024-01-09

## 2024-01-09 RX ADMIN — HEPARIN SODIUM (PORCINE) LOCK FLUSH IV SOLN 100 UNIT/ML 5 ML: 100 SOLUTION at 14:23

## 2024-01-09 RX ADMIN — Medication 5 ML: at 14:23

## 2024-01-09 NOTE — PROGRESS NOTES
Infusion Nursing Note:  Adrianna Pereira presents today for lab draw from port.    Labs drawn without incident.  Pt will return tomorrow for transfusion if needed.     Leana Manriquez RN

## 2024-01-10 ENCOUNTER — INFUSION THERAPY VISIT (OUTPATIENT)
Dept: INFUSION THERAPY | Facility: CLINIC | Age: 64
End: 2024-01-10
Attending: NURSE PRACTITIONER
Payer: COMMERCIAL

## 2024-01-10 ENCOUNTER — VIRTUAL VISIT (OUTPATIENT)
Dept: SPEECH THERAPY | Facility: CLINIC | Age: 64
End: 2024-01-10
Payer: COMMERCIAL

## 2024-01-10 VITALS
RESPIRATION RATE: 18 BRPM | HEART RATE: 67 BPM | TEMPERATURE: 95.9 F | SYSTOLIC BLOOD PRESSURE: 101 MMHG | DIASTOLIC BLOOD PRESSURE: 66 MMHG

## 2024-01-10 DIAGNOSIS — R13.12 DYSPHAGIA, OROPHARYNGEAL PHASE: Primary | ICD-10-CM

## 2024-01-10 DIAGNOSIS — C04.9 SQUAMOUS CELL CARCINOMA OF FLOOR OF MOUTH (H): ICD-10-CM

## 2024-01-10 DIAGNOSIS — D64.81 ANTINEOPLASTIC CHEMOTHERAPY INDUCED ANEMIA: Primary | ICD-10-CM

## 2024-01-10 DIAGNOSIS — R13.12 OROPHARYNGEAL DYSPHAGIA: ICD-10-CM

## 2024-01-10 DIAGNOSIS — T45.1X5A ANTINEOPLASTIC CHEMOTHERAPY INDUCED ANEMIA: Primary | ICD-10-CM

## 2024-01-10 LAB
HAPTOGLOB SERPL-MCNC: 318 MG/DL (ref 30–200)
VIT B12 SERPL-MCNC: 1118 PG/ML (ref 232–1245)

## 2024-01-10 PROCEDURE — 250N000011 HC RX IP 250 OP 636: Performed by: NURSE PRACTITIONER

## 2024-01-10 PROCEDURE — P9016 RBC LEUKOCYTES REDUCED: HCPCS | Performed by: NURSE PRACTITIONER

## 2024-01-10 PROCEDURE — 36430 TRANSFUSION BLD/BLD COMPNT: CPT

## 2024-01-10 PROCEDURE — 86923 COMPATIBILITY TEST ELECTRIC: CPT | Performed by: NURSE PRACTITIONER

## 2024-01-10 PROCEDURE — 92526 ORAL FUNCTION THERAPY: CPT | Mod: GN | Performed by: SPEECH-LANGUAGE PATHOLOGIST

## 2024-01-10 RX ORDER — HEPARIN SODIUM (PORCINE) LOCK FLUSH IV SOLN 100 UNIT/ML 100 UNIT/ML
5 SOLUTION INTRAVENOUS
Status: DISCONTINUED | OUTPATIENT
Start: 2024-01-10 | End: 2024-01-10 | Stop reason: HOSPADM

## 2024-01-10 RX ADMIN — Medication 5 ML: at 14:33

## 2024-01-10 NOTE — PROGRESS NOTES
Infusion Nursing Note:  Adrianna Pereira presents today for 1 unit PRBCs.    Patient seen by provider today: No   present during visit today: Not Applicable.    Note: Patient denies any new medical concerns.    Intravenous Access:  Implanted Port.    Treatment Conditions:  Lab Results   Component Value Date    HGB 6.9 (LL) 01/09/2024    WBC 11.3 (H) 01/09/2024    ANEU 2.2 12/04/2023    ANEUTAUTO 8.0 01/09/2024     01/09/2024   Results reviewed, labs MET treatment parameters, ok to proceed with treatment.    Post Infusion Assessment:  Patient tolerated infusion without incident.  Blood return noted pre and post infusion.  Site patent and intact, free from redness, edema or discomfort.  No evidence of extravasations.  Access discontinued per protocol.     Discharge Plan:   Discharge instructions reviewed with: Patient.  Patient and/or family verbalized understanding of discharge instructions and all questions answered.  AVS to patient via [employer].  Patient will return 2/29/2024 for next appointment.   Patient discharged in stable condition accompanied by: self and .  Departure Mode: Ambulatory.    La Monzon RN

## 2024-01-15 ENCOUNTER — THERAPY VISIT (OUTPATIENT)
Dept: PHYSICAL THERAPY | Facility: CLINIC | Age: 64
End: 2024-01-15
Attending: RADIOLOGY
Payer: COMMERCIAL

## 2024-01-15 DIAGNOSIS — I89.0 LYMPHEDEMA: Primary | ICD-10-CM

## 2024-01-15 PROCEDURE — 97140 MANUAL THERAPY 1/> REGIONS: CPT | Mod: GP,CQ | Performed by: REHABILITATION PRACTITIONER

## 2024-01-17 ENCOUNTER — MYC REFILL (OUTPATIENT)
Dept: ONCOLOGY | Facility: CLINIC | Age: 64
End: 2024-01-17
Payer: COMMERCIAL

## 2024-01-17 DIAGNOSIS — G89.3 CANCER RELATED PAIN: ICD-10-CM

## 2024-01-18 RX ORDER — OXYCODONE HCL 5 MG/5 ML
5 SOLUTION, ORAL ORAL EVERY 6 HOURS PRN
Qty: 100 ML | Refills: 0 | Status: SHIPPED | OUTPATIENT
Start: 2024-01-18 | End: 2024-02-06

## 2024-01-24 ENCOUNTER — THERAPY VISIT (OUTPATIENT)
Dept: SPEECH THERAPY | Facility: CLINIC | Age: 64
End: 2024-01-24
Payer: COMMERCIAL

## 2024-01-24 ENCOUNTER — OFFICE VISIT (OUTPATIENT)
Dept: OTOLARYNGOLOGY | Facility: CLINIC | Age: 64
End: 2024-01-24
Payer: COMMERCIAL

## 2024-01-24 VITALS
BODY MASS INDEX: 16.39 KG/M2 | TEMPERATURE: 96.7 F | HEART RATE: 104 BPM | SYSTOLIC BLOOD PRESSURE: 100 MMHG | WEIGHT: 96 LBS | HEIGHT: 64 IN | DIASTOLIC BLOOD PRESSURE: 59 MMHG | OXYGEN SATURATION: 98 %

## 2024-01-24 DIAGNOSIS — C77.9 METASTATIC SQUAMOUS CELL CARCINOMA TO LYMPH NODE (H): Primary | ICD-10-CM

## 2024-01-24 DIAGNOSIS — C04.9 SQUAMOUS CELL CARCINOMA OF FLOOR OF MOUTH (H): ICD-10-CM

## 2024-01-24 DIAGNOSIS — R13.12 DYSPHAGIA, OROPHARYNGEAL PHASE: Primary | ICD-10-CM

## 2024-01-24 PROCEDURE — 99214 OFFICE O/P EST MOD 30 MIN: CPT | Performed by: OTOLARYNGOLOGY

## 2024-01-24 PROCEDURE — 92526 ORAL FUNCTION THERAPY: CPT | Mod: GN | Performed by: SPEECH-LANGUAGE PATHOLOGIST

## 2024-01-24 ASSESSMENT — PAIN SCALES - GENERAL: PAINLEVEL: SEVERE PAIN (7)

## 2024-01-24 NOTE — PROGRESS NOTES
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  The patient presented with disease involving the mental skin.  Dr. Harrington did a fibula and anterolateral thigh free tissue reconstruction.  The patient did have some hardware exposed prior to radiation and then completed her chemoradiation therapy with Dr. Egan on December 5, 2023.    Interval history: The patient has been using her feeding tube for all nutrition, she does note that she has been having back pain between her shoulder blades for several weeks, the etiology is not yet clear.  She denies any lumps or bumps in her neck.  They do think that the hardware exposure might have expanded slightly during radiation.    Physical examination: The patient is well-appearing and in no distress.  Examination of the oral cavity reveals she has significant loss of bulk given the partial glossectomy, the free flap both internally and externally has healed in nicely there is an approximately 2 cm area of plate exposed near the junction of the skin paddle with the left lower lip unable to see at least 1 screw in the plate on the external skin she has a small area of either exposed bone or plate although it does appear to be a little bit of exposed bone otherwise the neck looks excellent and as expected after radiation and chemotherapy.  Palpation of the oral cavity and of the neck does not reveal any abnormalities.    Impression:  1.  Back pain, requires evaluation  2.  Exposed hardware    Plan:  1.  We will get a CT scan of the thoracic and cervical spine to try to assess the source of the back pain  2.  The patient will be due for a PET scan in late February or early March, she will see myself and Dr. Thorne at that time to  address the results and also allow for planning for addressing the hardware exposure.

## 2024-01-24 NOTE — PATIENT INSTRUCTIONS
1. Please schedule CT cervical and thoracic spine. Please also schedule follow-up with Dr. Dao and Dr. Thorne after PET scan scheduled on 2/29  2. Please call the ENT clinic with any questions,concerns, new or worsening symptoms.    -Clinic number is [phone]   - La's direct line (Dr. Dao's nurse) [phone]

## 2024-01-24 NOTE — LETTER
1/24/2024       RE: Adrianna Pereira  [address] [address] [place] [id number]     Dear Colleague,    Thank you for referring your patient, Adrianna Pereira, to the [facility] EAR NOSE AND THROAT CLINIC [place] at [facility]. Please see a copy of my visit note below.    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  The patient presented with disease involving the mental skin.  Dr. Harrington did a fibula and anterolateral thigh free tissue reconstruction.  The patient did have some hardware exposed prior to radiation and then completed her chemoradiation therapy with Dr. Egan on December 5, 2023.    Interval history: The patient has been using her feeding tube for all nutrition, she does note that she has been having back pain between her shoulder blades for several weeks, the etiology is not yet clear.  She denies any lumps or bumps in her neck.  They do think that the hardware exposure might have expanded slightly during radiation.    Physical examination: The patient is well-appearing and in no distress.  Examination of the oral cavity reveals she has significant loss of bulk given the partial glossectomy, the free flap both internally and externally has healed in nicely there is an approximately 2 cm area of plate exposed near the junction of the skin paddle with the left lower lip unable to see at least 1 screw in the plate on the external skin she has a small area of either exposed bone or plate although it does appear to be a little bit of exposed bone otherwise the neck looks excellent and as expected after radiation and chemotherapy.  Palpation of the oral cavity and of the neck does not reveal any  abnormalities.    Impression:  1.  Back pain, requires evaluation  2.  Exposed hardware    Plan:  1.  We will get a CT scan of the thoracic and cervical spine to try to assess the source of the back pain  2.  The patient will be due for a PET scan in late February or early March, she will see myself and Dr. Thorne at that time to address the results and also allow for planning for addressing the hardware exposure.      Again, thank you for allowing me to participate in the care of your patient.      Sincerely,    Tyshawn Dao MD

## 2024-01-24 NOTE — NURSING NOTE
"Chief Complaint   Patient presents with    RECHECK     Follow up after radiation     Blood pressure 100/59, pulse 104, temperature (!) 96.7  F (35.9  C), height 1.626 m (5' 4\"), weight 43.5 kg (96 lb), SpO2 98%, not currently breastfeeding.  Manjeet Salamanca LPN    "

## 2024-01-26 ENCOUNTER — MYC MEDICAL ADVICE (OUTPATIENT)
Dept: SURGERY | Facility: CLINIC | Age: 64
End: 2024-01-26
Payer: COMMERCIAL

## 2024-01-26 ENCOUNTER — INFUSION THERAPY VISIT (OUTPATIENT)
Dept: INFUSION THERAPY | Facility: CLINIC | Age: 64
End: 2024-01-26
Attending: NURSE PRACTITIONER
Payer: COMMERCIAL

## 2024-01-26 DIAGNOSIS — C06.9 SQUAMOUS CELL CARCINOMA OF ORAL CAVITY (H): Primary | ICD-10-CM

## 2024-01-26 DIAGNOSIS — D64.9 ANEMIA, UNSPECIFIED TYPE: ICD-10-CM

## 2024-01-26 LAB
BASOPHILS # BLD AUTO: 0.1 10E3/UL (ref 0–0.2)
BASOPHILS NFR BLD AUTO: 0 %
EOSINOPHIL # BLD AUTO: 0.5 10E3/UL (ref 0–0.7)
EOSINOPHIL NFR BLD AUTO: 4 %
ERYTHROCYTE [DISTWIDTH] IN BLOOD BY AUTOMATED COUNT: 15.4 % (ref 10–15)
HCT VFR BLD AUTO: 26.2 % (ref 35–47)
HGB BLD-MCNC: 8.7 G/DL (ref 11.7–15.7)
IMM GRANULOCYTES # BLD: 0.1 10E3/UL
IMM GRANULOCYTES NFR BLD: 1 %
LYMPHOCYTES # BLD AUTO: 1.6 10E3/UL (ref 0.8–5.3)
LYMPHOCYTES NFR BLD AUTO: 13 %
MCH RBC QN AUTO: 32.1 PG (ref 26.5–33)
MCHC RBC AUTO-ENTMCNC: 33.2 G/DL (ref 31.5–36.5)
MCV RBC AUTO: 97 FL (ref 78–100)
MONOCYTES # BLD AUTO: 0.8 10E3/UL (ref 0–1.3)
MONOCYTES NFR BLD AUTO: 7 %
NEUTROPHILS # BLD AUTO: 8.7 10E3/UL (ref 1.6–8.3)
NEUTROPHILS NFR BLD AUTO: 75 %
NRBC # BLD AUTO: 0 10E3/UL
NRBC BLD AUTO-RTO: 0 /100
PLATELET # BLD AUTO: 383 10E3/UL (ref 150–450)
RBC # BLD AUTO: 2.71 10E6/UL (ref 3.8–5.2)
WBC # BLD AUTO: 11.8 10E3/UL (ref 4–11)

## 2024-01-26 PROCEDURE — 250N000011 HC RX IP 250 OP 636: Performed by: INTERNAL MEDICINE

## 2024-01-26 PROCEDURE — 36415 COLL VENOUS BLD VENIPUNCTURE: CPT

## 2024-01-26 PROCEDURE — 85025 COMPLETE CBC W/AUTO DIFF WBC: CPT

## 2024-01-26 RX ORDER — HEPARIN SODIUM (PORCINE) LOCK FLUSH IV SOLN 100 UNIT/ML 100 UNIT/ML
5 SOLUTION INTRAVENOUS
Status: CANCELLED | OUTPATIENT
Start: 2024-01-26

## 2024-01-26 RX ORDER — HEPARIN SODIUM,PORCINE 10 UNIT/ML
5-20 VIAL (ML) INTRAVENOUS DAILY PRN
Status: CANCELLED | OUTPATIENT
Start: 2024-01-26

## 2024-01-26 RX ORDER — HEPARIN SODIUM (PORCINE) LOCK FLUSH IV SOLN 100 UNIT/ML 100 UNIT/ML
5 SOLUTION INTRAVENOUS
Status: DISCONTINUED | OUTPATIENT
Start: 2024-01-26 | End: 2024-01-26 | Stop reason: HOSPADM

## 2024-01-26 RX ADMIN — HEPARIN 5 ML: 100 SYRINGE at 11:54

## 2024-01-26 NOTE — PROGRESS NOTES
Infusion Nursing Note:  Adrianna Pereira presents today for port labs.    Patient seen by provider today: No   present during visit today: Not Applicable.    Note: N/A.      Intravenous Access:  Labs drawn without difficulty.  Implanted Port.      Kaylyn Lees RN

## 2024-02-01 ENCOUNTER — HOSPITAL ENCOUNTER (OUTPATIENT)
Dept: CT IMAGING | Facility: CLINIC | Age: 64
Discharge: HOME | End: 2024-02-01
Attending: OTOLARYNGOLOGY
Payer: COMMERCIAL

## 2024-02-01 DIAGNOSIS — C77.9 METASTATIC SQUAMOUS CELL CARCINOMA TO LYMPH NODE (H): ICD-10-CM

## 2024-02-01 DIAGNOSIS — C06.9 SQUAMOUS CELL CARCINOMA OF ORAL CAVITY (H): ICD-10-CM

## 2024-02-01 PROCEDURE — 72128 CT CHEST SPINE W/O DYE: CPT

## 2024-02-01 PROCEDURE — 72125 CT NECK SPINE W/O DYE: CPT

## 2024-02-01 NOTE — TELEPHONE ENCOUNTER
See 1/26/24 [employer] message. Patient was informed to contact her PCP for additional ibuprofen refills.    Carole Galindo RN on 2/1/2024 at 1:56 PM

## 2024-02-02 ENCOUNTER — MYC MEDICAL ADVICE (OUTPATIENT)
Dept: FAMILY MEDICINE | Facility: CLINIC | Age: 64
End: 2024-02-02

## 2024-02-02 ENCOUNTER — TUMOR CONFERENCE (OUTPATIENT)
Dept: ONCOLOGY | Facility: CLINIC | Age: 64
End: 2024-02-02
Payer: COMMERCIAL

## 2024-02-02 DIAGNOSIS — C06.9 SQUAMOUS CELL CARCINOMA OF ORAL CAVITY (H): ICD-10-CM

## 2024-02-02 DIAGNOSIS — M89.9 LESION OF BONE OF THORACIC SPINE: Primary | ICD-10-CM

## 2024-02-02 NOTE — TUMOR CONFERENCE
Called and spoke with patient's spouse regarding the following CT scan results:      IMPRESSION:   1. Cervical spine:   a. No acute fracture or subluxation.   b. Multilevel cervical spondylosis as detailed above.  c. New bilateral hypodense thyroid nodules and deep neck edema with  effacement of fat planes. Recommend CT with contrast.  2. Thoracic spine:    a. New T3 compression fracture with greater than 80% height loss in  the anterior vertebral body. Hypodensity in the vertebral body  extending into the left posterior elements, suggesting presence of  metastasis and pathologic fracture.  b. New hypodense lesions in the T3 left posterior vertebral body and  T4 posterior vertebral body highly suspicious for metastatic disease.  3. Recommend cervical and thoracic MRI with contrast to exclude  additional metastatic disease. CT is not as sensitive as MR to show  small metastatic lesions.  4. Multiple new pulmonary nodules, highly suggestive of metastatic  disease.      Reviewed tumor board discussion and recommendations with spouse. Advised that next step would be to obtain MRI ASAP and then likely see radiation and neurosurgery for further planning. Spouse verbalized understanding and will await calls to schedule.     Message sent to schedulers to arrange MRI ASAP as well as neurosurgery consult and return appt with Dr. Egan in radiation.      La Flores, RN, BSN

## 2024-02-02 NOTE — TELEPHONE ENCOUNTER
Ibuprofen 600 mg not on current med list. Please review and advise.     Yamileth Garcia, BSN, RN

## 2024-02-02 NOTE — TUMOR CONFERENCE
Head & Neck Tumor Conference Note   2/2/2024    Status: Established  Staff: Dr. Dao    Tumor Site: Oral cavity  Tumor Pathology: SCC  Tumor Stage: [id number]  Tumor Treatment:   7/3/23 - clinic biopsy  8/17/23 - segmental mandibulectomy, floor of mouth resection, anterior glossectomy with ALT and fibula free flap reconstruction (red lip was spared) - The left anterolateral soft tissue margin was positive   8/24/23 - return to OR for debridement of multiple areas of skin necrosis, and resuturing of neck incision and intraoral flap     Reason for Review: Review imaging, path, and POC     Brief History: This is a 62 year old female with a history of prior CO2 laser ablation for premalignant lesions in the oral cavity. She was referred for evaluation of a likely oral cancer. She recently noticed some firmness and swelling of the lower lip and chin. 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 She underwent segmental mandibulectomy, floor of mouth resection, anterior glossectomy and chin skin resection with free flap reconstruction (ALT and fibula free flap). Her path showed the left anterolateral soft tissue margin was positive (skin and mucosa surrounding was negative). 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 Re-resection of the positive margin was discussed but this would require full resection of the lower lip which would have significant clinical implications, so plan instead for adjuvant chemoradiation was discussed. The patient did have some hardware exposed prior to radiation and then completed her chemoradiation therapy with Dr. Egan on December 5, 2023.     At her recent clinic visit she was having back pain. Additionally her hardware exposure may have expanded slightly - with 2cm area of exposed plate near jxn of skin paddle with  left lower lip. CT of her back was recommended which we will review today.     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 Cervical spine:   a. No acute fracture or subluxation.   b. Multilevel cervical spondylosis as detailed above.  c. New bilateral hypodense thyroid nodules and deep neck edema with  effacement of fat planes. Recommend CT with contrast.    2. Thoracic spine:    a. New T3 compression fracture with greater than 80% height loss in  the anterior vertebral body. Hypodensity in the vertebral body  extending into the left posterior elements, suggesting presence of  metastasis and pathologic fracture.  b. New hypodense lesions in the T3 left posterior vertebral body and  T4 posterior vertebral body highly suspicious for metastatic disease.  3. Recommend cervical and thoracic MRI with contrast to exclude  additional metastatic disease. CT is not as sensitive as MR to show  small metastatic lesions.  4. Multiple new pulmonary nodules, highly suggestive of metastatic  disease.    Pathology:   N/a    Tumor Board Recommendation:   Discussion:   On imaging there's new pathologic fracture of T3. Suspicious that there's soft tissue component extending into the foramen. Should get an MRI of T spine    Plan:   MRI T  spine  Possibly nsgy referral    Deya Tavares MD  Otolaryngology-Head & Neck Surgery PGY3    Documentation / Disclaimer Cancer Tumor Board Note  Cancer tumor board recommendations do not override what is determined to be reasonable care and treatment, which is dependent on the circumstances of a patient's case; the patient's medical, social, and personal concerns; and the clinical judgment of the oncologist [physician].

## 2024-02-04 RX ORDER — IBUPROFEN 600 MG/1
600 TABLET, FILM COATED ORAL EVERY 6 HOURS PRN
Qty: 90 TABLET | Refills: 0 | Status: SHIPPED | OUTPATIENT
Start: 2024-02-04 | End: 2024-02-05

## 2024-02-05 ENCOUNTER — TELEPHONE (OUTPATIENT)
Dept: RADIATION THERAPY | Facility: OUTPATIENT CENTER | Age: 64
End: 2024-02-05

## 2024-02-05 ENCOUNTER — MYC REFILL (OUTPATIENT)
Dept: ONCOLOGY | Facility: CLINIC | Age: 64
End: 2024-02-05
Payer: COMMERCIAL

## 2024-02-05 DIAGNOSIS — C77.9 METASTATIC SQUAMOUS CELL CARCINOMA TO LYMPH NODE (H): Primary | ICD-10-CM

## 2024-02-05 DIAGNOSIS — G89.3 CANCER RELATED PAIN: ICD-10-CM

## 2024-02-05 RX ORDER — IBUPROFEN 600 MG/1
600 TABLET, FILM COATED ORAL EVERY 6 HOURS PRN
Qty: 90 TABLET | Refills: 0 | Status: SHIPPED | OUTPATIENT
Start: 2024-02-05 | End: 2024-08-11

## 2024-02-05 NOTE — TELEPHONE ENCOUNTER
Message received from ENT that Adrianna had recent back pain, CT was done showing concerning changes in T-spine. They have ordered and scheduled a T -spine MRI and asked if Dr. Egan could see patient to review option of radiation.  This RN called Duane, Julies . Reviewed the above info. Duane confirmed that Adrianna had been having back pain for a few weeks which prompted the CT scan.  RN shared that we do need the MRI scan to get the clearest picture of what is going on. Dr. Egan will see Adrianna and Duane on 2/12 in clinic to review MRI results and discuss what options might be recommended.  Duane asked if they would hear from anyone before 2/12 about MRI, this RN shared that Dr. Egan would be the first to review and talk about treatment options. Reviewed that we were unable to get an appointment sooner - Duane said he understand and that the timing is ok, he just wanted to understand what to expect.  He and Adrianna will look forward to getting more information next week 2/12/24.

## 2024-02-06 ENCOUNTER — THERAPY VISIT (OUTPATIENT)
Dept: PHYSICAL THERAPY | Facility: CLINIC | Age: 64
End: 2024-02-06
Attending: RADIOLOGY
Payer: COMMERCIAL

## 2024-02-06 ENCOUNTER — TELEPHONE (OUTPATIENT)
Dept: OTOLARYNGOLOGY | Facility: CLINIC | Age: 64
End: 2024-02-06

## 2024-02-06 DIAGNOSIS — K12.2 ORAL INFECTION: Primary | ICD-10-CM

## 2024-02-06 DIAGNOSIS — I89.0 LYMPHEDEMA: Primary | ICD-10-CM

## 2024-02-06 DIAGNOSIS — G89.3 CANCER RELATED PAIN: ICD-10-CM

## 2024-02-06 PROCEDURE — 97140 MANUAL THERAPY 1/> REGIONS: CPT | Mod: GP | Performed by: REHABILITATION PRACTITIONER

## 2024-02-06 RX ORDER — AMOXICILLIN AND CLAVULANATE POTASSIUM 400; 57 MG/5ML; MG/5ML
875 POWDER, FOR SUSPENSION ORAL 2 TIMES DAILY
Qty: 218.8 ML | Refills: 0 | Status: SHIPPED | OUTPATIENT
Start: 2024-02-06 | End: 2024-02-16

## 2024-02-06 RX ORDER — OXYCODONE HCL 5 MG/5 ML
5 SOLUTION, ORAL ORAL EVERY 6 HOURS PRN
Qty: 100 ML | Refills: 0 | Status: SHIPPED | OUTPATIENT
Start: 2024-02-06 | End: 2024-05-13

## 2024-02-06 RX ORDER — OXYCODONE HCL 5 MG/5 ML
5 SOLUTION, ORAL ORAL EVERY 6 HOURS PRN
Qty: 100 ML | Refills: 0 | Status: SHIPPED | OUTPATIENT
Start: 2024-02-06 | End: 2024-02-27

## 2024-02-06 RX ORDER — DIAZEPAM 5 MG
TABLET ORAL
Qty: 2 TABLET | Refills: 0 | Status: SHIPPED | OUTPATIENT
Start: 2024-02-06 | End: 2024-03-07

## 2024-02-06 RX ORDER — FLUCONAZOLE 40 MG/ML
100 POWDER, FOR SUSPENSION ORAL DAILY
Qty: 25 ML | Refills: 0 | Status: SHIPPED | OUTPATIENT
Start: 2024-02-06 | End: 2024-02-16

## 2024-02-06 NOTE — TELEPHONE ENCOUNTER
[facility] Call Center    Phone Message    May a detailed message be left on voicemail: yes     Reason for Call: Other: Pt was suppose to have an rx for valium sent in for her MRI tomorrow. [facility] pharmacy has not received it.  Please advise. Thanks.    Action Taken: Other: ENT    Travel Screening: Not Applicable

## 2024-02-06 NOTE — TELEPHONE ENCOUNTER
Called pharmacy to confirm that medication was receive. Pharmacy stated that had the medication.      Called patient's  to confirm medication should be ready shortly.  verbalized understanding and will follow up with imaging as scheduled.    Alondra DAVIDN, RN

## 2024-02-07 ENCOUNTER — PATIENT OUTREACH (OUTPATIENT)
Dept: ONCOLOGY | Facility: CLINIC | Age: 64
End: 2024-02-07
Payer: COMMERCIAL

## 2024-02-07 ENCOUNTER — HOSPITAL ENCOUNTER (OUTPATIENT)
Dept: MRI IMAGING | Facility: CLINIC | Age: 64
Discharge: HOME | End: 2024-02-07
Attending: OTOLARYNGOLOGY | Admitting: OTOLARYNGOLOGY
Payer: COMMERCIAL

## 2024-02-07 DIAGNOSIS — C06.9 SQUAMOUS CELL CARCINOMA OF ORAL CAVITY (H): ICD-10-CM

## 2024-02-07 DIAGNOSIS — M89.9 LESION OF BONE OF THORACIC SPINE: ICD-10-CM

## 2024-02-07 DIAGNOSIS — C06.9 SQUAMOUS CELL CARCINOMA OF ORAL CAVITY (H): Primary | ICD-10-CM

## 2024-02-07 PROCEDURE — 255N000002 HC RX 255 OP 636: Performed by: OTOLARYNGOLOGY

## 2024-02-07 PROCEDURE — 250N000011 HC RX IP 250 OP 636: Performed by: OTOLARYNGOLOGY

## 2024-02-07 PROCEDURE — 72157 MRI CHEST SPINE W/O & W/DYE: CPT

## 2024-02-07 PROCEDURE — A9585 GADOBUTROL INJECTION: HCPCS | Performed by: OTOLARYNGOLOGY

## 2024-02-07 RX ORDER — GADOBUTROL 604.72 MG/ML
4.5 INJECTION INTRAVENOUS ONCE
Status: COMPLETED | OUTPATIENT
Start: 2024-02-07 | End: 2024-02-07

## 2024-02-07 RX ORDER — HEPARIN SODIUM (PORCINE) LOCK FLUSH IV SOLN 100 UNIT/ML 100 UNIT/ML
500 SOLUTION INTRAVENOUS ONCE
Status: COMPLETED | OUTPATIENT
Start: 2024-02-07 | End: 2024-02-07

## 2024-02-07 RX ADMIN — GADOBUTROL 4.5 ML: 604.72 INJECTION INTRAVENOUS at 13:00

## 2024-02-07 RX ADMIN — Medication 500 UNITS: at 13:54

## 2024-02-08 PROCEDURE — 88360 TUMOR IMMUNOHISTOCHEM/MANUAL: CPT | Mod: 26 | Performed by: STUDENT IN AN ORGANIZED HEALTH CARE EDUCATION/TRAINING PROGRAM

## 2024-02-08 PROCEDURE — 88360 TUMOR IMMUNOHISTOCHEM/MANUAL: CPT | Mod: TC | Performed by: OTOLARYNGOLOGY

## 2024-02-08 NOTE — TUMOR CONFERENCE
Head & Neck Tumor Conference Note   2/9/2024    Status: Established  Staff: Dr. Dao     Tumor Site: Oral cavity  Tumor Pathology: SCC  Tumor Stage: [id number]  Tumor Treatment:   7/3/23 - clinic biopsy  8/17/23 - segmental mandibulectomy, floor of mouth resection, anterior glossectomy with ALT and fibula free flap reconstruction (red lip was spared) - The left anterolateral soft tissue margin was positive   8/24/23 - return to OR for debridement of multiple areas of skin necrosis, and resuturing of neck incision and intraoral flap     Reason for Review: Review imaging, path, and POC     Brief History: This is a 62 year old female with a history of prior CO2 laser ablation for premalignant lesions in the oral cavity. She was referred for evaluation of a likely oral cancer. She recently noticed some firmness and swelling of the lower lip and chin. 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 She underwent segmental mandibulectomy, floor of mouth resection, anterior glossectomy and chin skin resection with free flap reconstruction (ALT and fibula free flap). Her path showed the left anterolateral soft tissue margin was positive (skin and mucosa surrounding was negative). 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 Re-resection of the positive margin was discussed but this would require full resection of the lower lip which would have significant clinical implications, so plan instead for adjuvant chemoradiation was discussed. The patient did have some hardware exposed prior to radiation and then completed her chemoradiation therapy with Dr. Egan on December 5, 2023.     At her recent clinic visit she was having back pain. Additionally her hardware exposure may have expanded slightly - with 2cm area of exposed plate near jxn of skin paddle with  left lower lip. CT of her back was recommended which showed pathologic fx of T3 with suspicious soft tissue component extending into foramen. 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 Redemonstrated presumed pathologic T3 burst-type compression  fracture with retropulsion, as described. There is evidence for  extraosseous tumor, including involvement of the ventral epidural  space, contributing to moderate to severe central spinal canal  stenosis at level of T3. There also appears to be abnormal  extraosseous tumor extending into the region of multiple neural  foramina at the T2-T3 and T3-T4 levels. There also is a presumed  osseous metastasis in the posterior aspect of the T4 vertebral body.  2. Focal kyphosis centered at T3 related to the pathologic T3  compression fracture. There also appears to be mild to moderate  sigmoid curvature of the thoracic spine.  3. Moderate to severe central spinal canal stenosis at the level of  T3. No other significant thoracic spinal canal narrowing. Varying  degrees of multilevel neural foraminal stenosis, as described.  4. Multiple nodules in the lungs are redemonstrated, concerning for  metastatic disease. Chest CT follow-up is suggested.  5.  There is no definite spinal cord signal abnormality.     Pathology:   N/a    Tumor Board Recommendation:   Discussion:   MRI spine vertebral lesion extending to canal pushing the spinal cord. The cord is not involved but displaced. It involves 2-3 vertebrae.     NSGY consult for discussion of biopsy and decompression. It extends to neruoframinan at two levels. This should be a fast consult. She lives in [place] but she seems able. No neurologic concerns base on HPI. She should possibly come to the ED for evaluation.      Cord compression and neuroframinia compromise.     Plan:   - Call pt present to ED and NSGY consult        Documentation / Disclaimer Cancer Tumor Board Note  Cancer tumor board recommendations do not override what is determined to be reasonable care and treatment, which is dependent on the circumstances of a patient's case; the patient's medical, social, and personal concerns; and the clinical judgment of the oncologist [physician].

## 2024-02-09 ENCOUNTER — TUMOR CONFERENCE (OUTPATIENT)
Dept: ONCOLOGY | Facility: CLINIC | Age: 64
End: 2024-02-09
Payer: COMMERCIAL

## 2024-02-09 ENCOUNTER — INFUSION THERAPY VISIT (OUTPATIENT)
Dept: INFUSION THERAPY | Facility: CLINIC | Age: 64
End: 2024-02-09
Attending: NURSE PRACTITIONER
Payer: COMMERCIAL

## 2024-02-09 DIAGNOSIS — T45.1X5A ANTINEOPLASTIC CHEMOTHERAPY INDUCED ANEMIA: Primary | ICD-10-CM

## 2024-02-09 DIAGNOSIS — M89.9 LESION OF BONE OF THORACIC SPINE: ICD-10-CM

## 2024-02-09 DIAGNOSIS — C06.9 SQUAMOUS CELL CARCINOMA OF ORAL CAVITY (H): Primary | ICD-10-CM

## 2024-02-09 DIAGNOSIS — D64.81 ANTINEOPLASTIC CHEMOTHERAPY INDUCED ANEMIA: Primary | ICD-10-CM

## 2024-02-09 DIAGNOSIS — C06.9 SQUAMOUS CELL CARCINOMA OF ORAL CAVITY (H): ICD-10-CM

## 2024-02-09 LAB
BASOPHILS # BLD AUTO: 0 10E3/UL (ref 0–0.2)
BASOPHILS NFR BLD AUTO: 0 %
EOSINOPHIL # BLD AUTO: 0.3 10E3/UL (ref 0–0.7)
EOSINOPHIL NFR BLD AUTO: 2 %
ERYTHROCYTE [DISTWIDTH] IN BLOOD BY AUTOMATED COUNT: 14.5 % (ref 10–15)
HCT VFR BLD AUTO: 24.4 % (ref 35–47)
HGB BLD-MCNC: 8.1 G/DL (ref 11.7–15.7)
IMM GRANULOCYTES # BLD: 0.1 10E3/UL
IMM GRANULOCYTES NFR BLD: 1 %
LYMPHOCYTES # BLD AUTO: 0.9 10E3/UL (ref 0.8–5.3)
LYMPHOCYTES NFR BLD AUTO: 6 %
MCH RBC QN AUTO: 32.7 PG (ref 26.5–33)
MCHC RBC AUTO-ENTMCNC: 33.2 G/DL (ref 31.5–36.5)
MCV RBC AUTO: 98 FL (ref 78–100)
MONOCYTES # BLD AUTO: 0.7 10E3/UL (ref 0–1.3)
MONOCYTES NFR BLD AUTO: 5 %
NEUTROPHILS # BLD AUTO: 12.8 10E3/UL (ref 1.6–8.3)
NEUTROPHILS NFR BLD AUTO: 86 %
NRBC # BLD AUTO: 0 10E3/UL
NRBC BLD AUTO-RTO: 0 /100
PATH REPORT.ADDENDUM SPEC: ABNORMAL
PATH REPORT.COMMENTS IMP SPEC: ABNORMAL
PATH REPORT.COMMENTS IMP SPEC: YES
PATH REPORT.FINAL DX SPEC: ABNORMAL
PATH REPORT.GROSS SPEC: ABNORMAL
PATH REPORT.INTRAOP OBS SPEC DOC: ABNORMAL
PATH REPORT.MICROSCOPIC SPEC OTHER STN: ABNORMAL
PATH REPORT.RELEVANT HX SPEC: ABNORMAL
PATHOLOGY SYNOPTIC REPORT: ABNORMAL
PHOTO IMAGE: ABNORMAL
PLATELET # BLD AUTO: 402 10E3/UL (ref 150–450)
RBC # BLD AUTO: 2.48 10E6/UL (ref 3.8–5.2)
WBC # BLD AUTO: 14.9 10E3/UL (ref 4–11)

## 2024-02-09 PROCEDURE — 85025 COMPLETE CBC W/AUTO DIFF WBC: CPT | Performed by: NURSE PRACTITIONER

## 2024-02-09 PROCEDURE — 36415 COLL VENOUS BLD VENIPUNCTURE: CPT | Performed by: NURSE PRACTITIONER

## 2024-02-09 PROCEDURE — 250N000011 HC RX IP 250 OP 636: Performed by: NURSE PRACTITIONER

## 2024-02-09 RX ORDER — HEPARIN SODIUM (PORCINE) LOCK FLUSH IV SOLN 100 UNIT/ML 100 UNIT/ML
5 SOLUTION INTRAVENOUS
Status: DISCONTINUED | OUTPATIENT
Start: 2024-02-09 | End: 2024-02-09 | Stop reason: HOSPADM

## 2024-02-09 RX ORDER — DIPHENHYDRAMINE HYDROCHLORIDE 50 MG/ML
50 INJECTION INTRAMUSCULAR; INTRAVENOUS
Status: CANCELLED
Start: 2024-02-09

## 2024-02-09 RX ORDER — HEPARIN SODIUM,PORCINE 10 UNIT/ML
5-20 VIAL (ML) INTRAVENOUS DAILY PRN
Status: CANCELLED | OUTPATIENT
Start: 2024-02-09

## 2024-02-09 RX ORDER — HEPARIN SODIUM (PORCINE) LOCK FLUSH IV SOLN 100 UNIT/ML 100 UNIT/ML
5 SOLUTION INTRAVENOUS
Status: CANCELLED | OUTPATIENT
Start: 2024-02-09

## 2024-02-09 RX ORDER — EPINEPHRINE 1 MG/ML
0.3 INJECTION, SOLUTION, CONCENTRATE INTRAVENOUS EVERY 5 MIN PRN
Status: CANCELLED | OUTPATIENT
Start: 2024-02-09

## 2024-02-09 RX ORDER — DEXAMETHASONE 4 MG/1
4 TABLET ORAL 2 TIMES DAILY WITH MEALS
Qty: 30 TABLET | Refills: 0 | Status: SHIPPED | OUTPATIENT
Start: 2024-02-09 | End: 2024-02-23

## 2024-02-09 RX ADMIN — HEPARIN 5 ML: 100 SYRINGE at 10:19

## 2024-02-09 NOTE — PROGRESS NOTES
Infusion Nursing Note:  Adrianna Pereira presents today for possible infusion of PRBC's.    Patient seen by provider today: No   present during visit today: Not Applicable.    Note: N/A.      Intravenous Access:  Implanted Port.    Treatment Conditions:  Lab Results   Component Value Date    HGB 8.1 (L) 02/09/2024    WBC 14.9 (H) 02/09/2024    ANEU 2.2 12/04/2023    ANEUTAUTO 12.8 (H) 02/09/2024     02/09/2024        Results reviewed, labs did NOT meet treatment parameters: Blood transfusion not needed.      Discharge Plan:   Patient discharged in stable condition accompanied by: .  Departure Mode: Ambulatory.      Kaylyn Lees RN

## 2024-02-09 NOTE — TUMOR CONFERENCE
Called patient's  to discuss tumor conference recommendations. Reviewed results of MRI. Reviewed symptoms.  stated that patient is experiencing pain in back but denies any neurological symptoms. Reviewed with neurosurgery team. Plan for order of decadron and will have patient follow up with neurosurgery.  verbalized understanding of plan of care. Will call clinic with further questions or concerns.    Alondra DAVIDN, RN

## 2024-02-10 NOTE — TUMOR CONFERENCE
Head & Neck Tumor Conference Note   2/9/2024    Status: Established  Staff: Dr. Dao     Tumor Site: Oral cavity  Tumor Pathology: SCC  Tumor Stage: [id number]  Tumor Treatment:   7/3/23 - clinic biopsy  8/17/23 - segmental mandibulectomy, floor of mouth resection, anterior glossectomy with ALT and fibula free flap reconstruction (red lip was spared) - The left anterolateral soft tissue margin was positive   8/24/23 - return to OR for debridement of multiple areas of skin necrosis, and resuturing of neck incision and intraoral flap     Reason for Review: Review imaging, path, and POC     Brief History: This is a 62 year old female with a history of prior CO2 laser ablation for premalignant lesions in the oral cavity. She was referred for evaluation of a likely oral cancer. She recently noticed some firmness and swelling of the lower lip and chin. 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 She underwent segmental mandibulectomy, floor of mouth resection, anterior glossectomy and chin skin resection with free flap reconstruction (ALT and fibula free flap). Her path showed the left anterolateral soft tissue margin was positive (skin and mucosa surrounding was negative). 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 Re-resection of the positive margin was discussed but this would require full resection of the lower lip which would have significant clinical implications, so plan instead for adjuvant chemoradiation was discussed. The patient did have some hardware exposed prior to radiation and then completed her chemoradiation therapy with Dr. Egan on December 5, 2023.     At her recent clinic visit she was having back pain. Additionally her hardware exposure may have expanded slightly - with 2cm area of exposed plate near jxn of skin paddle with  left lower lip. CT of her back was recommended which showed pathologic fx of T3 with suspicious soft tissue component extending into foramen. 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 Redemonstrated presumed pathologic T3 burst-type compression  fracture with retropulsion, as described. There is evidence for  extraosseous tumor, including involvement of the ventral epidural  space, contributing to moderate to severe central spinal canal  stenosis at level of T3. There also appears to be abnormal  extraosseous tumor extending into the region of multiple neural  foramina at the T2-T3 and T3-T4 levels. There also is a presumed  osseous metastasis in the posterior aspect of the T4 vertebral body.  2. Focal kyphosis centered at T3 related to the pathologic T3  compression fracture. There also appears to be mild to moderate  sigmoid curvature of the thoracic spine.  3. Moderate to severe central spinal canal stenosis at the level of  T3. No other significant thoracic spinal canal narrowing. Varying  degrees of multilevel neural foraminal stenosis, as described.  4. Multiple nodules in the lungs are redemonstrated, concerning for  metastatic disease. Chest CT follow-up is suggested.  5.  There is no definite spinal cord signal abnormality.     Pathology:   N/a    Tumor Board Recommendation:   Discussion:   MRI spine demonstrates a vertebral lesion extending to canal pushing the spinal cord. The cord is not involved but displaced. It involves 2-3 vertebrae.   It extends to neruoframinan at two levels. She has no neurological concerns on her last clinic visit. There is concern that the compression of this mass on her neruoframinan can cause acute neurological symptoms therefore we recommended an urgent NSGY referral. If she develops symptoms she should present to the ED.       Plan:   - NSGY consult  and patient education      Documentation / Disclaimer Cancer Tumor Board Note  Cancer tumor board recommendations do not override what is determined to be reasonable care and treatment, which is dependent on the circumstances of a patient's case; the patient's medical, social, and personal concerns; and the clinical judgment of the oncologist [physician].

## 2024-02-12 ENCOUNTER — OFFICE VISIT (OUTPATIENT)
Dept: RADIATION THERAPY | Facility: OUTPATIENT CENTER | Age: 64
End: 2024-02-12
Payer: COMMERCIAL

## 2024-02-12 VITALS
DIASTOLIC BLOOD PRESSURE: 58 MMHG | SYSTOLIC BLOOD PRESSURE: 107 MMHG | OXYGEN SATURATION: 96 % | BODY MASS INDEX: 16.31 KG/M2 | RESPIRATION RATE: 16 BRPM | WEIGHT: 95 LBS | HEART RATE: 79 BPM

## 2024-02-12 DIAGNOSIS — C06.9 SQUAMOUS CELL CARCINOMA OF ORAL CAVITY (H): Primary | ICD-10-CM

## 2024-02-12 LAB — BKR LAB AP ADDL TEST(S) ADDED: YES

## 2024-02-12 NOTE — TELEPHONE ENCOUNTER
SPINE PATIENTS - NEW PROTOCOL PREVISIT    RECORDS RECEIVED FROM: internal   REASON FOR VISIT: Thoracic spine lesion; hx oral squamous cell CA    PROVIDER: Dr. Leilani Gu   DATE OF APPT: 2/13/24   NOTES (FOR ALL VISITS) STATUS DETAILS   OFFICE NOTE from referring provider Internal Dr Tyshawn Dao @ [facility]:  2/2/24 tumor conference  1/24/24   OPERATIVE REPORT Internal [facility]:  8/29/23  EXCISIONAL DEBRIDEMENT NECK, IRRIGATION OF ORAL CAVITY     [facility]:  8/24/23  EXAM UNDER ANESTHESIA, ORAL CAVITY, IRRIGATION AND DEBRIDEMENT, ORAL CAVITY, neck dissection     [facility]:  8/17/23  Chin resection N/A General   segmental mandibulectomy N/A General   GLOSSECTOMY, PARTIAL N/A General   Bilateral modified radical neck dissection Bilateral General   Tracheostomy placement, nasogastric tube placement        MEDICATION LIST Internal    IMAGING  (FOR ALL VISITS)     MRI (HEAD, NECK, SPINE) Internal [facility]:  MRI Thoracic Spine 2/7/24   CT (HEAD, NECK, SPINE) Internal [facility]:  CT Thoracic Spine 2/1/24  CT Cervical Spine 2/1/24

## 2024-02-12 NOTE — NURSING NOTE
"Oncology Rooming Note    February 12, 2024 10:53 AM   Adrianna Pereira is a 63 year old female who presents for:    Chief Complaint   Patient presents with    Radiation Therapy     Return visit, head and neck cancer     Initial Vitals: /58 (BP Location: Left arm, Cuff Size: Adult Regular)   Pulse 79   Resp 16   Wt 43.1 kg (95 lb)   SpO2 96%   BMI 16.31 kg/m   Estimated body mass index is 16.31 kg/m  as calculated from the following:    Height as of 1/24/24: 1.626 m (5' 4\").    Weight as of this encounter: 43.1 kg (95 lb). Body surface area is 1.4 meters squared.  Data Unavailable Comment: Data Unavailable   No LMP recorded. Patient is postmenopausal.  Allergies reviewed: Yes  Medications reviewed: Yes    Medications: Medication refills not needed today.  Pharmacy name entered into EPIC:    [id number] - [place], [place] - [address] [place] [address] [facility] [place] [place] [facility] [address] [facility] [place] [place] [facility] [address] [place] [address] PHARMACY [place], [place] - [address] [place] [address]     Frailty Screening:   Is the patient here for a new oncology consult visit in cancer care? 2. No      Clinical concerns: follow up today with Dr. Julisa Cao RN              "

## 2024-02-12 NOTE — LETTER
2/12/2024         RE: Adrianna Pereira  [address] [address] [place] [id number]        Dear Colleague,    Thank you for referring your patient, Adrianna Pereira, to the [facility]. Please see a copy of my visit note below.    Radiation Oncology Progress Note    HPI: Ms. 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  Final pathology demonstrated squamous cell carcinoma moderately differentiated, 4.1 cm in size originating from  floor mouth/ anterior tongue,  depth of invasion 29 mm, no LVSI, positive PNI, positive margin (left anterior lateral).  11 of 98 lymph nodes positive (4 cm largest deposit, positive extracapsular disease present), pathologic T4N3b.   She underwent adjuvant chemoradiation therapy.      Radiation Treatment  10/19/23 to 12/5/23: Head and neck, 6600 cGy in 33 fractions    Patient returns for follow-up.    In the interval, the patient underwent MRI of the T-spine on 2/7/2024.  Imaging demonstrated a presumed pathologic fracture of T3 with evidence of extraosseous tumor with involvement of the ventral epidural space contributing to spinal canal stenosis at the level 3.  There seem to also be abnormal extraosseous tumor with extension into the neuroforamina involving T2/T3 and T3/T4 levels.    The patient will meet with neurosurgery team soon to discuss surgical options for her thoracic spine disease.    She is otherwise doing okay.  Appropriate recovery thus far posttreatment.  Improving mucositis.  Continues to have xerostomia and taste change.  Nutrition all through the tube.  Pain is currently managed with intermittent use of Magic mouthwash and oxycodone oral solution. She was started on Abx due to drainage near skin site and ? Erythema with concern for infection. This is improving.    Continues to follow with ENT team (  Feliberto).    Continues to follow with medical oncology team (Dr. Castillo). PET currently scheduled for 2/15/24.      Plan  1:  T spine.  MRI of the T-spine suggested concern for spinal disease with involvement of T3 and epidural extent causing canal stenosis.  Patient will meet with neurosurgery team to discuss surgical options.  If surgery is pursued, she would likely also need adjuvant radiation therapy.  If surgery is not recommended, would also plan to proceed with radiation therapy.  2. Cancer related pain.  Oxycodone oral solution as needed, discuss taper.  3. Radiation mucositis.  Magic mouthwash as needed.  4. Xerostomia.  Salt and soda rinses as needed. Mucinex to thin secretions.   5. Nutrition.   Status post PEG placement continue to advance tube feeds as tolerated.  6.  Continue follow-up with ENT team.  7.  Continue follow-up with medical oncology team.  Posttreatment PET scheduled for 2/15/24.  8.  Return to clinic in 1 week to clarify next steps with regard to her presumed spinal disease.    Hernán Egan M.D.  Department of Radiation Oncology  [facility]       Again, thank you for allowing me to participate in the care of your patient.        Sincerely,        Hernán Egan MD

## 2024-02-12 NOTE — PROGRESS NOTES
Radiation Oncology Progress Note    HPI: Ms. 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  Final pathology demonstrated squamous cell carcinoma moderately differentiated, 4.1 cm in size originating from  floor mouth/ anterior tongue,  depth of invasion 29 mm, no LVSI, positive PNI, positive margin (left anterior lateral).  11 of 98 lymph nodes positive (4 cm largest deposit, positive extracapsular disease present), pathologic T4N3b.   She underwent adjuvant chemoradiation therapy.      Radiation Treatment  10/19/23 to 12/5/23: Head and neck, 6600 cGy in 33 fractions    Patient returns for follow-up.    In the interval, the patient underwent MRI of the T-spine on 2/7/2024.  Imaging demonstrated a presumed pathologic fracture of T3 with evidence of extraosseous tumor with involvement of the ventral epidural space contributing to spinal canal stenosis at the level 3.  There seem to also be abnormal extraosseous tumor with extension into the neuroforamina involving T2/T3 and T3/T4 levels.    The patient will meet with neurosurgery team soon to discuss surgical options for her thoracic spine disease.    She is otherwise doing okay.  Appropriate recovery thus far posttreatment.  Improving mucositis.  Continues to have xerostomia and taste change.  Nutrition all through the tube.  Pain is currently managed with intermittent use of Magic mouthwash and oxycodone oral solution. She was started on Abx due to drainage near skin site and ? Erythema with concern for infection. This is improving.    Continues to follow with ENT team (Dr. Dao).    Continues to follow with medical oncology team (Dr. Castillo). PET currently scheduled for 2/15/24.      Plan  1:  T spine.  MRI of the T-spine suggested concern for spinal disease with involvement of T3 and epidural extent causing canal  stenosis.  Patient will meet with neurosurgery team to discuss surgical options.  If surgery is pursued, she would likely also need adjuvant radiation therapy.  If surgery is not recommended, would also plan to proceed with radiation therapy.  2. Cancer related pain.  Oxycodone oral solution as needed, discuss taper.  3. Radiation mucositis.  Magic mouthwash as needed.  4. Xerostomia.  Salt and soda rinses as needed. Mucinex to thin secretions.   5. Nutrition.   Status post PEG placement continue to advance tube feeds as tolerated.  6.  Continue follow-up with ENT team.  7.  Continue follow-up with medical oncology team.  Posttreatment PET scheduled for 2/15/24.  8.  Return to clinic in 1 week to clarify next steps with regard to her presumed spinal disease.    Hernán Egan M.D.  Department of Radiation Oncology  [facility]

## 2024-02-13 ENCOUNTER — PRE VISIT (OUTPATIENT)
Dept: NEUROSURGERY | Facility: CLINIC | Age: 64
End: 2024-02-13

## 2024-02-13 ENCOUNTER — OFFICE VISIT (OUTPATIENT)
Dept: NEUROSURGERY | Facility: CLINIC | Age: 64
End: 2024-02-13
Payer: COMMERCIAL

## 2024-02-13 VITALS
RESPIRATION RATE: 16 BRPM | DIASTOLIC BLOOD PRESSURE: 71 MMHG | SYSTOLIC BLOOD PRESSURE: 125 MMHG | WEIGHT: 96 LBS | OXYGEN SATURATION: 97 % | HEIGHT: 64 IN | HEART RATE: 77 BPM | BODY MASS INDEX: 16.39 KG/M2

## 2024-02-13 DIAGNOSIS — M89.9 LESION OF BONE OF THORACIC SPINE: ICD-10-CM

## 2024-02-13 DIAGNOSIS — C06.9 SQUAMOUS CELL CARCINOMA OF ORAL CAVITY (H): ICD-10-CM

## 2024-02-13 PROCEDURE — 99204 OFFICE O/P NEW MOD 45 MIN: CPT | Mod: GC | Performed by: NEUROLOGICAL SURGERY

## 2024-02-13 NOTE — NURSING NOTE
Chief Complaint   Patient presents with    New Patient     Here for consult, confirmed with patient     Diana Fernandes

## 2024-02-13 NOTE — PATIENT INSTRUCTIONS
Dr Gu did discuss the possibility of surgery with you.  Complete your upcoming PET scan, and discuss prognosis and plan with your oncology team.  If you wish to move ahead with the surgery offered by Dr Gu call our clinic at [phone] and we can get things moving according to your wishes.
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
## 2024-02-15 ENCOUNTER — HOSPITAL ENCOUNTER (OUTPATIENT)
Dept: PET IMAGING | Facility: CLINIC | Age: 64
Discharge: HOME | End: 2024-02-15
Attending: INTERNAL MEDICINE
Payer: COMMERCIAL

## 2024-02-15 ENCOUNTER — ANCILLARY ORDERS (OUTPATIENT)
Dept: ONCOLOGY | Facility: CLINIC | Age: 64
End: 2024-02-15

## 2024-02-15 ENCOUNTER — LAB (OUTPATIENT)
Dept: INFUSION THERAPY | Facility: CLINIC | Age: 64
End: 2024-02-15
Attending: INTERNAL MEDICINE
Payer: COMMERCIAL

## 2024-02-15 DIAGNOSIS — C06.9 SQUAMOUS CELL CARCINOMA OF ORAL CAVITY (H): Primary | ICD-10-CM

## 2024-02-15 DIAGNOSIS — C06.9 SQUAMOUS CELL CARCINOMA OF ORAL CAVITY (H): ICD-10-CM

## 2024-02-15 LAB
ALBUMIN SERPL BCG-MCNC: 3.9 G/DL (ref 3.5–5.2)
ALP SERPL-CCNC: 81 U/L (ref 40–150)
ALT SERPL W P-5'-P-CCNC: 18 U/L (ref 0–50)
ANION GAP SERPL CALCULATED.3IONS-SCNC: 12 MMOL/L (ref 7–15)
AST SERPL W P-5'-P-CCNC: 14 U/L (ref 0–45)
BASOPHILS # BLD AUTO: 0 10E3/UL (ref 0–0.2)
BASOPHILS NFR BLD AUTO: 0 %
BILIRUB SERPL-MCNC: <0.2 MG/DL
BUN SERPL-MCNC: 67.2 MG/DL (ref 8–23)
CALCIUM SERPL-MCNC: 9.9 MG/DL (ref 8.8–10.2)
CHLORIDE SERPL-SCNC: 101 MMOL/L (ref 98–107)
CREAT SERPL-MCNC: 0.94 MG/DL (ref 0.51–0.95)
DEPRECATED HCO3 PLAS-SCNC: 23 MMOL/L (ref 22–29)
EGFRCR SERPLBLD CKD-EPI 2021: 68 ML/MIN/1.73M2
EOSINOPHIL # BLD AUTO: 0 10E3/UL (ref 0–0.7)
EOSINOPHIL NFR BLD AUTO: 0 %
ERYTHROCYTE [DISTWIDTH] IN BLOOD BY AUTOMATED COUNT: 14.3 % (ref 10–15)
GLUCOSE SERPL-MCNC: 89 MG/DL (ref 70–99)
HCT VFR BLD AUTO: 26.2 % (ref 35–47)
HGB BLD-MCNC: 8.7 G/DL (ref 11.7–15.7)
IMM GRANULOCYTES # BLD: 0.1 10E3/UL
IMM GRANULOCYTES NFR BLD: 1 %
LYMPHOCYTES # BLD AUTO: 1.4 10E3/UL (ref 0.8–5.3)
LYMPHOCYTES NFR BLD AUTO: 9 %
MCH RBC QN AUTO: 32.8 PG (ref 26.5–33)
MCHC RBC AUTO-ENTMCNC: 33.2 G/DL (ref 31.5–36.5)
MCV RBC AUTO: 99 FL (ref 78–100)
MONOCYTES # BLD AUTO: 1.2 10E3/UL (ref 0–1.3)
MONOCYTES NFR BLD AUTO: 7 %
NEUTROPHILS # BLD AUTO: 13.8 10E3/UL (ref 1.6–8.3)
NEUTROPHILS NFR BLD AUTO: 83 %
NRBC # BLD AUTO: 0 10E3/UL
NRBC BLD AUTO-RTO: 0 /100
PLATELET # BLD AUTO: 452 10E3/UL (ref 150–450)
POTASSIUM SERPL-SCNC: 4.5 MMOL/L (ref 3.4–5.3)
PROT SERPL-MCNC: 7.3 G/DL (ref 6.4–8.3)
RBC # BLD AUTO: 2.65 10E6/UL (ref 3.8–5.2)
SODIUM SERPL-SCNC: 136 MMOL/L (ref 135–145)
TSH SERPL DL<=0.005 MIU/L-ACNC: 1.18 UIU/ML (ref 0.3–4.2)
WBC # BLD AUTO: 16.6 10E3/UL (ref 4–11)

## 2024-02-15 PROCEDURE — 78815 PET IMAGE W/CT SKULL-THIGH: CPT | Mod: 26 | Performed by: RADIOLOGY

## 2024-02-15 PROCEDURE — 70491 CT SOFT TISSUE NECK W/DYE: CPT

## 2024-02-15 PROCEDURE — 250N000011 HC RX IP 250 OP 636: Performed by: INTERNAL MEDICINE

## 2024-02-15 PROCEDURE — 80053 COMPREHEN METABOLIC PANEL: CPT | Performed by: INTERNAL MEDICINE

## 2024-02-15 PROCEDURE — 78815 PET IMAGE W/CT SKULL-THIGH: CPT | Mod: PS

## 2024-02-15 PROCEDURE — 85025 COMPLETE CBC W/AUTO DIFF WBC: CPT | Performed by: INTERNAL MEDICINE

## 2024-02-15 PROCEDURE — A9552 F18 FDG: HCPCS | Performed by: INTERNAL MEDICINE

## 2024-02-15 PROCEDURE — 343N000001 HC RX 343: Performed by: INTERNAL MEDICINE

## 2024-02-15 PROCEDURE — 70491 CT SOFT TISSUE NECK W/DYE: CPT | Mod: 26 | Performed by: RADIOLOGY

## 2024-02-15 PROCEDURE — 84443 ASSAY THYROID STIM HORMONE: CPT | Performed by: INTERNAL MEDICINE

## 2024-02-15 PROCEDURE — 36591 DRAW BLOOD OFF VENOUS DEVICE: CPT

## 2024-02-15 RX ORDER — HEPARIN SODIUM (PORCINE) LOCK FLUSH IV SOLN 100 UNIT/ML 100 UNIT/ML
5 SOLUTION INTRAVENOUS
Status: CANCELLED | OUTPATIENT
Start: 2024-02-15

## 2024-02-15 RX ORDER — HEPARIN SODIUM,PORCINE 10 UNIT/ML
5-20 VIAL (ML) INTRAVENOUS DAILY PRN
Status: CANCELLED | OUTPATIENT
Start: 2024-02-15

## 2024-02-15 RX ORDER — IOPAMIDOL 755 MG/ML
10-135 INJECTION, SOLUTION INTRAVASCULAR ONCE
Status: COMPLETED | OUTPATIENT
Start: 2024-02-15 | End: 2024-02-15

## 2024-02-15 RX ORDER — HEPARIN SODIUM (PORCINE) LOCK FLUSH IV SOLN 100 UNIT/ML 100 UNIT/ML
5 SOLUTION INTRAVENOUS
Status: DISCONTINUED | OUTPATIENT
Start: 2024-02-15 | End: 2024-02-15 | Stop reason: HOSPADM

## 2024-02-15 RX ADMIN — FLUDEOXYGLUCOSE F-18 14 MILLICURIE: 500 INJECTION, SOLUTION INTRAVENOUS at 07:06

## 2024-02-15 RX ADMIN — Medication 5 ML: at 08:46

## 2024-02-15 RX ADMIN — IOPAMIDOL 58 ML: 755 INJECTION, SOLUTION INTRAVENOUS at 07:06

## 2024-02-15 NOTE — PROGRESS NOTES
Hematology/Medical Oncology Follow-up Note    ADDENDUM: Because of insurance considerations, will initiate zoledronic acid rather than subcu denosumab.    Sergio Castillo MD (2/21/2024)      ADDENDUM: Spoke with  and indicated we will try to start tapering off dexamethasone when I see her 3/1/2024.    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  I discussed the indications, benefits, risks alternatives and side effects of this treatment with the patient and her .  They understand and agree.  I would like to start a  soon as possible and will coordinate this with radiation oncology if everyone is agreeable.  I see no reason why immunotherapy and radiation could not be given concurrently but await Dr. Egan's opinion.  Denosumab (Xgeva) metastatic bone prophylaxis    Time with patient including review, documentation, history, examination, coordination of care and counseling was 40 minutes.    Recent oncology review:  On 10/19/2023-12/5/2023, she received 6600 cGy/33 fractions with concurrent low-dose weekly cisplatin.    On 1/24/2024, she was seen in ENT follow-up by Dr. Tyshawn Dao and complained of a several week history of back pain.  2/1/2024 CT cervical/thoracic spine imaging revealed new T3 compression fracture with hypodensity in the vertebral body extending into the left posterior elements suggesting presence of metastases and pathologic fracture.  A T4 posterior vertebral body lesion was also seen suspicious for metastatic disease as was new incidental lung nodules.    2/7/2024 MR thoracic spine confirmed the T3 compression fracture with extraosseous tumor involving the ventral epidural space with moderate to severe central spinal canal stenosis at T3.    2/15/2024 PET scan and CT soft tissue of the neck revealed a recurrent gluco-avid foci of the left hemimandible, numerous bilateral pulmonary and pleural-based metastases, hypermetabolic metastatic mediastinal and hilar lymphadenopathy, pathologic compression of T3 associated with additional metastatic disease involving T4, and hypermetabolic foci involving the left adductor musculature as well as gluco-avid skin thickening of the right upper thigh.      She is seeing Dr. Hernán Egan for radiation oncology follow-up on 2/19/2024.  PD-L1 analysis from her August, 2023 original tumor biopsy revealed PD-L1 TPS and CPS of 70%.    History of present illness:  In July, 2023 the patient presented to Dr. Darien Thorne for history of previous CO2 laser ablation for premalignant  oral cavity lesions and more recent swelling of the lower lip and chin.    7/3/2023 left mandibular alveolus biopsy revealed an invasive keratinizing moderately differential squamous cell carcinoma.  7/13/2023 PET/dedicated CT revealed a 4.4 x 3.7 x 3.2 cm anterior oral cavity mass invading into the mandible with a separate gluco-avid nodule on the right mandibular buccal mucosa and bilateral level 1B, level 2 and L3 metastatic lymphadenopathy without evidence of metastatic disease.    On 8/17/2023, she underwent segmental mandibulectomy, floor of mouth resection, anterior glossectomy, left fibular free flap, skin grafting, and tracheostomy revealing a pT4a, N3b tumor with positive left anterior lateral soft tissue margin, 11/98+ lymph nodes including STEFFEN and several bilateral lymph nodes consistent with involvement by low-grade B-cell neoplasm suggestive of CLL/SLL.    On 8/25/2023, head neck tumor conference recommended consideration of postoperative chemoradiation.  Postoperative course has been complicated by 8/24/2023 return to the operating room for debridement of multiple areas of skin necrosis and resuturing of neck incision and intraoral flap.    Past medical history:  Remarkable for past 1/2 ppd tobacco use (until 8/2023), former alcohol abuse for which she considers herself an alcoholic although she does not use alcohol now.    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    Medications:  Current Outpatient Medications   Medication    acetaminophen (TYLENOL) 325 MG tablet    amoxicillin-clavulanate (AUGMENTIN) 400-57 MG/5ML suspension    chlorhexidine (PERIDEX) 0.12 % solution    dexAMETHasone (DECADRON) 4 MG tablet    diazepam (VALIUM) 5 MG  "tablet    fluconazole (DIFLUCAN) 40 MG/ML suspension    ibuprofen (ADVIL/MOTRIN) 600 MG tablet    magic mouthwash (ENTER INGREDIENTS IN COMMENTS) suspension    magnesium hydroxide (MILK OF MAGNESIA) 400 MG/5ML suspension    mineral oil-hydrophilic petrolatum (AQUAPHOR) external ointment    oxyCODONE (ROXICODONE) 5 MG/5ML solution    oxyCODONE (ROXICODONE) 5 MG/5ML solution    polyethylene glycol (MIRALAX) 17 g packet    senna-docusate (SENOKOT-S/PERICOLACE) 8.6-50 MG tablet     No current facility-administered medications for this visit.       Allergies:  No Known Allergies      Physical examination:  /69 (BP Location: Left arm, Patient Position: Sitting, Cuff Size: Adult Small)   Pulse 90   Temp 97.9  F (36.6  C) (Tympanic)   Resp 12   Ht 1.613 m (5' 3.5\")   Wt 41.7 kg (92 lb)   SpO2 99%   BMI 16.04 kg/m      The patient is alert and oriented.    Rai Castillo MD          "

## 2024-02-15 NOTE — PROGRESS NOTES
Infusion Nursing Note:  Adrianna Pereira presents today for PAC labs.    Patient seen by provider today: No   present during visit today: Not Applicable.    Note: pt came from PET scan appt and port was already accessed.      Intravenous Access:  Implanted Port.      Post Infusion Assessment:  Blood return noted, flushed per protocol.  Site patent and intact, free from redness, edema or discomfort.  No evidence of extravasations.  Access discontinued per protocol.       Discharge Plan:   Patient discharged in stable condition accompanied by: .  Departure Mode: Ambulatory.      Ngozi Anderson RN  
-Abnormal drowsiness, prolonged sleepiness.

## 2024-02-16 ENCOUNTER — TUMOR CONFERENCE (OUTPATIENT)
Dept: ONCOLOGY | Facility: CLINIC | Age: 64
End: 2024-02-16
Payer: COMMERCIAL

## 2024-02-16 ENCOUNTER — ONCOLOGY VISIT (OUTPATIENT)
Dept: ONCOLOGY | Facility: CLINIC | Age: 64
End: 2024-02-16
Attending: INTERNAL MEDICINE
Payer: COMMERCIAL

## 2024-02-16 VITALS
BODY MASS INDEX: 15.71 KG/M2 | HEART RATE: 90 BPM | TEMPERATURE: 97.9 F | SYSTOLIC BLOOD PRESSURE: 129 MMHG | OXYGEN SATURATION: 99 % | DIASTOLIC BLOOD PRESSURE: 69 MMHG | HEIGHT: 64 IN | WEIGHT: 92 LBS | RESPIRATION RATE: 12 BRPM

## 2024-02-16 DIAGNOSIS — C79.51 MALIGNANT NEOPLASM METASTATIC TO BONE (H): ICD-10-CM

## 2024-02-16 DIAGNOSIS — C06.9 SQUAMOUS CELL CARCINOMA OF ORAL CAVITY (H): ICD-10-CM

## 2024-02-16 DIAGNOSIS — C78.00 MALIGNANT NEOPLASM METASTATIC TO LUNG, UNSPECIFIED LATERALITY (H): Primary | ICD-10-CM

## 2024-02-16 PROCEDURE — G0463 HOSPITAL OUTPT CLINIC VISIT: HCPCS | Performed by: INTERNAL MEDICINE

## 2024-02-16 PROCEDURE — 99215 OFFICE O/P EST HI 40 MIN: CPT | Performed by: INTERNAL MEDICINE

## 2024-02-16 RX ORDER — MEPERIDINE HYDROCHLORIDE 25 MG/ML
25 INJECTION INTRAMUSCULAR; INTRAVENOUS; SUBCUTANEOUS EVERY 30 MIN PRN
Status: CANCELLED | OUTPATIENT
Start: 2024-02-21

## 2024-02-16 RX ORDER — LORAZEPAM 2 MG/ML
0.5 INJECTION INTRAMUSCULAR EVERY 4 HOURS PRN
Status: CANCELLED | OUTPATIENT
Start: 2024-02-21

## 2024-02-16 RX ORDER — DIPHENHYDRAMINE HYDROCHLORIDE 50 MG/ML
50 INJECTION INTRAMUSCULAR; INTRAVENOUS
Status: CANCELLED
Start: 2024-02-21

## 2024-02-16 RX ORDER — ALBUTEROL SULFATE 90 UG/1
1-2 AEROSOL, METERED RESPIRATORY (INHALATION)
Status: CANCELLED
Start: 2024-02-21

## 2024-02-16 RX ORDER — HEPARIN SODIUM,PORCINE 10 UNIT/ML
5-20 VIAL (ML) INTRAVENOUS DAILY PRN
Status: CANCELLED | OUTPATIENT
Start: 2024-02-21

## 2024-02-16 RX ORDER — ALBUTEROL SULFATE 0.83 MG/ML
2.5 SOLUTION RESPIRATORY (INHALATION)
Status: CANCELLED | OUTPATIENT
Start: 2024-02-21

## 2024-02-16 RX ORDER — HEPARIN SODIUM (PORCINE) LOCK FLUSH IV SOLN 100 UNIT/ML 100 UNIT/ML
5 SOLUTION INTRAVENOUS
Status: CANCELLED | OUTPATIENT
Start: 2024-02-21

## 2024-02-16 RX ORDER — EPINEPHRINE 1 MG/ML
0.3 INJECTION, SOLUTION, CONCENTRATE INTRAVENOUS EVERY 5 MIN PRN
Status: CANCELLED | OUTPATIENT
Start: 2024-02-21

## 2024-02-16 RX ORDER — METHYLPREDNISOLONE SODIUM SUCCINATE 125 MG/2ML
125 INJECTION, POWDER, LYOPHILIZED, FOR SOLUTION INTRAMUSCULAR; INTRAVENOUS
Status: CANCELLED
Start: 2024-02-21

## 2024-02-16 ASSESSMENT — PAIN SCALES - GENERAL: PAINLEVEL: NO PAIN (0)

## 2024-02-16 NOTE — PROGRESS NOTES
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    Weight as of this encounter: 41.7 kg (92 lb). Body surface area is 1.37 meters squared.  No Pain (0) Comment: Data Unavailable   No LMP recorded. Patient is postmenopausal.  Allergies reviewed: Yes  Medications reviewed: Yes    Medications: Medication refills not needed today.  Pharmacy name entered into EPIC:    [id number] - [place], [place] - [address] [place], [place] [address] - [place], [place] - [address] [facility] [place] - [address] [facility] [place], [place] - [address] [place] [address]     Frailty Screening:   Is the patient here for a new oncology consult visit in cancer care? 2. No      Clinical concerns:  None      Tanya Philippe CMA              "

## 2024-02-16 NOTE — TUMOR CONFERENCE
Head & Neck Tumor Conference Note   2/16/2024    Status: Established  Staff: Dr. Dao     Tumor Site: Oral cavity  Tumor Pathology: SCC  Tumor Stage: [id number]  Tumor Treatment:   7/3/23 - clinic biopsy  8/17/23 - segmental mandibulectomy, floor of mouth resection, anterior glossectomy with ALT and fibula free flap reconstruction (red lip was spared) - The left anterolateral soft tissue margin was positive   8/24/23 - return to OR for debridement of multiple areas of skin necrosis, and resuturing of neck incision and intraoral flap     Reason for Review: Review imaging, path, and POC     Brief History: This is a 62 year old female with a history of prior CO2 laser ablation for premalignant lesions in the oral cavity. She was referred for evaluation of a likely oral cancer. She recently noticed some firmness and swelling of the lower lip and chin. 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 She underwent segmental mandibulectomy, floor of mouth resection, anterior glossectomy and chin skin resection with free flap reconstruction (ALT and fibula free flap). Her path showed the left anterolateral soft tissue margin was positive (skin and mucosa surrounding was negative). 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 Re-resection of the positive margin was discussed but this would require full resection of the lower lip which would have significant clinical implications, so plan instead for adjuvant chemoradiation was discussed. The patient did have some hardware exposed prior to radiation and then completed her chemoradiation therapy with Dr. Egan on December 5, 2023.     At her recent clinic visit she was having back pain. Additionally her hardware exposure may have expanded slightly - with 2cm area of exposed plate near jxn of skin paddle  with left lower lip. CT and MRI of her back were recommended which showed pathological T3 fracture,  vertebral lesion extending to canal pushing the spinal cord, and neuroframinan involvement. NSGY consult was recommended. We are here today to discuss NSGY recommendations and long term prognosis of Mrs. Pereira.       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)     Tobacco dependence syndrome     Underweight 10/16/2023        Smoking Hx:   Social History     Tobacco Use    Smoking status: Former     Packs/day: 1.00     Years: 40.00     Additional pack years: 0.00     Total pack years: 40.00     Types: Cigarettes     Passive exposure: Current    Smokeless tobacco: Never    Tobacco comments:     Cutting down to 5-7 cigarettes per day   Vaping Use    Vaping Use: Never used   Substance Use Topics    Alcohol use: Yes     Comment: 1 drink a day    Drug use: Not Currently     Types: Marijuana     Imaging:   PET/CT Neck 2/15/2024  HEAD/NECK:  Patient is status post segmental mandibulectomy, partial glossectomy,  free flap reconstruction and lymph node dissection.  - New erosive changes to the body of the left hemimandible near the  mandibular foramen with a corresponding hypermetabolic focus with an  SUV max of 21.12. This is suspicious for a tumor deposit possibly  along the inferior lingular nerve.  Mild hypermetabolic focus adjacent to surgical clips in right level 2A  level with SUV max of 4.73 (series 3 image 65), possibly along the  posterior belly of digastric muscle.     Rest of the pharyngeal mucosal spaces are within normal limits.     Sinonasal region: Paranasal sinuses are clear. No mass within the  nasal cavity.     No definite new enlarging or FDG avid lymph nodes.     Submandibular glands are surgically absent. Parotid glands are  atrophic. Thyroid gland is normal. Vascular  structures are patent.     CHEST:     Lungs: New FDG avid pulmonary nodules:   - Hypermetabolic 8 mm solid nodule in the left upper lobe with an SUV  max of 5.61 (series 3 image 114)  - Hypermetabolic 9 mm right upper lobe nodule with an SUV max of 19.65  (series 3 image 119)  - Hypermetabolic nodule in the anterior right upper lobe along a  linear fibrous band with an SUV max of 20.32 (series 3 image 122)  - Hypermetabolic flat pleural-based lesion in the posterior left lower  lobe with an SUV max of 24.22 (series 3 image 122). Additional  pleural-based lesions along the left lung base.  - Multiple smaller hypermetabolic lung nodules bilaterally, as well as  smaller nodules that do not take up significant FDG uptake at this  time due to size.     Lymph nodes:     -Numerous new hypermetabolic hilar and mediastinal lymph nodes, for  example the left hilar node with an SUV max of 18.73 that is causing  mass effect in the left main pulmonary artery.     Right chest wall port-a-cath with tip in the distal SVC.      ABDOMEN AND PELVIS:     There is uptake posterior to the spleen that appears to be in the left  lung base (series 3 image 163). Focal hypoattenuation in the  peripheral spleen with no definite FDG uptake. There are several  hypermetabolic foci about the dome of the right lobe of the liver, for  example series 3 image 168, 178, and 179, that are favored to  represent pleural metastases.     Percutaneous gastrostomy tube in the stomach. Small hypodensity in the  spleen     LOWER EXTREMITIES:   - Focal uptake in the left abductor musculature with an SUV max of  28.57 (series 3 image 280) with small corresponding nodular density.     There is focal FDG avid skin thickening in the right upper thigh  posteromedially. Clinical correlation would be helpful to appreciate  between an benign inflammatory process versus dermal metastasis..        BONES AND SOFT TISSUES:   -Significant uptake about the T3 pathologic  compression fracture with  an SUV max of 20.62. FDG avid lesion extends from the vertebral body  into bilateral posterior elements more extensive on the left. On the  right the right pedicle, on the left left pedicle, left lamina and  left transverse process are involved. There is posterior epidural  extension with  severe canal stenosis. There is also anterior  perivertebral FDG avid tumor extension. Another FDG avid lesion  involving the T4 vertebral body with slight extension into the  anterior epidural space. Please refer to thoracic spine MRI for spine  findings.                                                                      IMPRESSION: In this patient with a history of oral squamous cell  carcinoma status post surgical resection, segmental mandibulectomy,  and radiation there is disease progression:     1. Interval extensive surgical changes with reconstruction in the oral  cavity and mandible. Recurrent FDG avid focus in the left  hemimandible, near the mandibular foramen suggestive of perineural  tumor along the inferior alveolar nerve.   2. Numerous new hypermetabolic pulmonary and pleural-based metastases.  3. Numerous hypermetabolic metastatic mediastinal and hilar lymph  nodes, including a node causing mild mass effect on the left pulmonary  artery.   4. Severe pathologic compression fracture of T3 with associated  extraosseous tumor extending into the prevertebral soft tissues and  posteriorly into the anterior epidural space with severe canal  stenosis. Additional osseous metastatic disease involving T4 vertebra  with slight extension into the anterior epidural space. Please refer  to recent spine MRI for these findings.  5. Hypermetabolic subcentimeter focal nodular density in the left  adductor musculature concerning for intramuscular tumor deposit. Focal  FDG avid skin thickening in the right upper thigh posteromedially.  Clinical correlation would be helpful to appreciate between an  "benign  inflammatory process versus dermal metastasis.        Initial Urgent result of \"Pulmonary embolus\" given by Dr. Dobbins  to Dr. Castillo was incorrect. The finding in the left pulmonary artery is  secondary to mass effect from lymph nodes. Provider was updated with  the correct finding by Dr. Dobbins at 11:15 am on 2/15/24.     I have personally reviewed the examination and initial interpretation  and I agree with the findings.     LUISANA LÓPEZ MD     Tumor Board Recommendation:   Discussion:   She was seen by Dr. Leilani Gu our neurosurgical colleague. At the time of the visit she had no neurological deficits. The main question NSGY posed was if her prognosis was > 3mo so that they could determine timing of intervention.   PET/CT Concern for invasion into the mandibular foramen and inferior alveolar  nerve. There are bony mets, bl LAD in chest/hilar/medistinal region. Although she has distant metastasis she should still undergo the neurosurgical spinal cord decompression and continue with systemic cancer treatment.      Plan:   - Proceed with NSGY procedure.     Winnie Thomas MD, PGY-5  Otolaryngology- Head and Neck Surgery    Documentation / Disclaimer Cancer Tumor Board Note  Cancer tumor board recommendations do not override what is determined to be reasonable care and treatment, which is dependent on the circumstances of a patient's case; the patient's medical, social, and personal concerns; and the clinical judgment of the oncologist [physician].    "

## 2024-02-16 NOTE — PATIENT INSTRUCTIONS
1. Tentative pembrolizumab Wednesday, February 21, 2024 pending prior authorization and radiation therapy plans  2. Follow-up Marina Cronin NP on Wednesday, March 13, 2024 for anticipated cycle #2  3. Xgeva for metastatic bone prophylaxis

## 2024-02-16 NOTE — TUMOR CONFERENCE
Called patient's  to discuss tumor conference recommendations.  discussed PET results were reviewed with medical oncology at their appointment today. Reviewed plan to discuss infusions and meet with Dr. Egan again to discuss radiation.  discussed patient was not wanting to have surgery with neurosurgery at this time. Would prefer to start with medical and radiation oncology. Patient will follow up as scheduled.    Alondra DAVIDN, RN

## 2024-02-16 NOTE — LETTER
2/16/2024         RE: Adrianna Pereira  [id number] [address] [address]        Dear Colleague,    Thank you for referring your patient, Adrianna Pereira, to the [facility]. Please see a copy of my visit note below.    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  I discussed the indications, benefits, risks alternatives and side effects of this treatment with the patient and her .  They understand and agree.  I would like to start a soon as possible and will coordinate this  with radiation oncology if everyone is agreeable.  I see no reason why immunotherapy and radiation could not be given concurrently but await Dr. Egan's opinion.  Denosumab (Xgeva) metastatic bone prophylaxis    Time with patient including review, documentation, history, examination, coordination of care and counseling was 40 minutes.    Recent oncology review:  On 10/19/2023-12/5/2023, she received 6600 cGy/33 fractions with concurrent low-dose weekly cisplatin.    On 1/24/2024, she was seen in ENT follow-up by Dr. Tyshawn Dao and complained of a several week history of back pain.  2/1/2024 CT cervical/thoracic spine imaging revealed new T3 compression fracture with hypodensity in the vertebral body extending into the left posterior elements suggesting presence of metastases and pathologic fracture.  A T4 posterior vertebral body lesion was also seen suspicious for metastatic disease as was new incidental lung nodules.    2/7/2024 MR thoracic spine confirmed the T3 compression fracture with extraosseous tumor involving the ventral epidural space with moderate to severe central spinal canal stenosis at T3.    2/15/2024 PET scan and CT soft tissue of the neck revealed a recurrent gluco-avid foci of the left hemimandible, numerous bilateral pulmonary and pleural-based metastases, hypermetabolic metastatic mediastinal and hilar lymphadenopathy, pathologic compression of T3 associated with additional metastatic disease involving T4, and hypermetabolic foci involving the left adductor musculature as well as gluco-avid skin thickening of the right upper thigh.      She is seeing Dr. Hernán Egan for radiation oncology follow-up on 2/19/2024.  PD-L1 analysis from her August, 2023 original tumor biopsy revealed PD-L1 TPS and CPS of 70%.    History of present illness:  In July, 2023 the patient presented to Dr. Darien Thorne for history of previous CO2 laser ablation for premalignant oral cavity lesions and more recent  swelling of the lower lip and chin.    7/3/2023 left mandibular alveolus biopsy revealed an invasive keratinizing moderately differential squamous cell carcinoma.  7/13/2023 PET/dedicated CT revealed a 4.4 x 3.7 x 3.2 cm anterior oral cavity mass invading into the mandible with a separate gluco-avid nodule on the right mandibular buccal mucosa and bilateral level 1B, level 2 and L3 metastatic lymphadenopathy without evidence of metastatic disease.    On 8/17/2023, she underwent segmental mandibulectomy, floor of mouth resection, anterior glossectomy, left fibular free flap, skin grafting, and tracheostomy revealing a pT4a, N3b tumor with positive left anterior lateral soft tissue margin, 11/98+ lymph nodes including STEFFEN and several bilateral lymph nodes consistent with involvement by low-grade B-cell neoplasm suggestive of CLL/SLL.    On 8/25/2023, head neck tumor conference recommended consideration of postoperative chemoradiation.  Postoperative course has been complicated by 8/24/2023 return to the operating room for debridement of multiple areas of skin necrosis and resuturing of neck incision and intraoral flap.    Past medical history:  Remarkable for past 1/2 ppd tobacco use (until 8/2023), former alcohol abuse for which she considers herself an alcoholic although she does not use alcohol now.    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    Medications:  Current Outpatient Medications   Medication     acetaminophen (TYLENOL) 325 MG tablet     amoxicillin-clavulanate (AUGMENTIN) 400-57 MG/5ML suspension     chlorhexidine (PERIDEX) 0.12 % solution     dexAMETHasone (DECADRON) 4 MG tablet     diazepam (VALIUM) 5 MG tablet     fluconazole  "(DIFLUCAN) 40 MG/ML suspension     ibuprofen (ADVIL/MOTRIN) 600 MG tablet     magic mouthwash (ENTER INGREDIENTS IN COMMENTS) suspension     magnesium hydroxide (MILK OF MAGNESIA) 400 MG/5ML suspension     mineral oil-hydrophilic petrolatum (AQUAPHOR) external ointment     oxyCODONE (ROXICODONE) 5 MG/5ML solution     oxyCODONE (ROXICODONE) 5 MG/5ML solution     polyethylene glycol (MIRALAX) 17 g packet     senna-docusate (SENOKOT-S/PERICOLACE) 8.6-50 MG tablet     No current facility-administered medications for this visit.       Allergies:  No Known Allergies      Physical examination:  /69 (BP Location: Left arm, Patient Position: Sitting, Cuff Size: Adult Small)   Pulse 90   Temp 97.9  F (36.6  C) (Tympanic)   Resp 12   Ht 1.613 m (5' 3.5\")   Wt 41.7 kg (92 lb)   SpO2 99%   BMI 16.04 kg/m      The patient is alert and oriented.    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    Weight as of this encounter: 41.7 kg (92 lb). Body surface area is 1.37 meters squared.  No Pain (0) Comment: Data Unavailable   No LMP recorded. Patient is postmenopausal.  Allergies reviewed: Yes  Medications reviewed: Yes    Medications: Medication refills not needed today.  Pharmacy name entered into EPIC:    [id number] - [place], [place] - [address] [place] [address] [facility]ACY - [place], [place] - [address] [place] [address] [facility] " [facility], [place] - [address] [facility], [place] - [address] [facility]     Frailty Screening:   Is the patient here for a new oncology consult visit in cancer care? 2. No      Clinical concerns:  None      Tanya Philippe CMA                Again, thank you for allowing me to participate in the care of your patient.        Sincerely,        Rai Castillo MD

## 2024-02-19 ENCOUNTER — VIRTUAL VISIT (OUTPATIENT)
Dept: RADIATION THERAPY | Facility: OUTPATIENT CENTER | Age: 64
End: 2024-02-19
Payer: COMMERCIAL

## 2024-02-19 DIAGNOSIS — R19.7 DIARRHEA, UNSPECIFIED TYPE: Primary | ICD-10-CM

## 2024-02-19 DIAGNOSIS — C06.9 SQUAMOUS CELL CARCINOMA OF ORAL CAVITY (H): Primary | ICD-10-CM

## 2024-02-19 RX ORDER — LOPERAMIDE HYDROCHLORIDE 1 MG/5ML
1 SOLUTION ORAL 4 TIMES DAILY PRN
Qty: 118 ML | Refills: 0 | Status: SHIPPED | OUTPATIENT
Start: 2024-02-19

## 2024-02-19 NOTE — LETTER
2/19/2024         RE: Adrianna Pereira  [id number] [address] [place] [id number]        Dear Colleague,    Thank you for referring your patient, Adrianna Pereira, to the [facility] RADIATION [facility]ER[facility]Y [facility]. Please see a copy of my visit note below.    Radiation Oncology Progress Note    HPI: Ms. 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  Final pathology demonstrated squamous cell carcinoma moderately differentiated, 4.1 cm in size originating from  floor mouth/ anterior tongue,  depth of invasion 29 mm, no LVSI, positive PNI, positive margin (left anterior lateral).  11 of 98 lymph nodes positive (4 cm largest deposit, positive extracapsular disease present), pathologic T4N3b.   She underwent adjuvant chemoradiation therapy.      Radiation Treatment  10/19/23 to 12/5/23: Head and neck, 6600 cGy in 33 fractions    Patient returns for follow-up.    In the interval, the patient underwent MRI of the T-spine on 2/7/2024.  Imaging demonstrated a presumed pathologic fracture of T3 with evidence of extraosseous tumor with involvement of the ventral epidural space contributing to spinal canal stenosis at the level 3.  There seem to also be abnormal extraosseous tumor with extension into the neuroforamina involving T2/T3 and T3/T4 levels.    The patient will meet with neurosurgery team who discussed consideration of surgical options for her thoracic spine disease.    The patient underwent PET scan on 2/15/2024.  Imaging demonstrated concern for local recurrence as well as several sites of distant disease.  Numerous hypermetabolic pulmonary and pleural-based nodules were noted.  There was noted increase in activity in the left hemimandible near the mandibular foramen suggestive of perineural tumor spread along the inferior alveolar nerve.  Pathologic compression fracture of T3 and T4 were  also noted.  Additionally there was noted a site of hypermetabolic activity in the left adductor musculature concerning for intramuscular tumor deposit.    The patient met with Dr. Castillo of medical oncology team who discussed starting systemic therapy with Keytruda.  She is currently tentatively starting this week.    Patient presents today to discuss next steps in management.      Plan  1:  T spine.  MRI of the T-spine suggested concern for spinal disease with involvement of T3 and epidural extent causing canal stenosis.  Most recent restaging scan including PET scan demonstrated concern for several sites of distant metastasis including bone, lung, and possibly soft tissue.     2.  I discussed different treatment options regarding management of the spine.  I discussed consideration of surgery followed by adjuvant radiation therapy to the spine versus pursuing palliative radiation therapy to the spine at this time.  I discussed possibility however of delaying systemic treatment if spinal surgeries performed due to potential complications as well as healing.  Given multiple sites of metastatic spread, I would now wish to delay systemic therapy per Dr. Castillo.  Dr. Castillo is currently planning to start immunotherapy this week.  I would agree with this recommendation to get started on systemic therapy.  With regard to the spine, I recommended proceeding with palliative radiation therapy to the spine that would allow us to address the spinal region at this time and not delay systemic therapy given widespread disease.  I discussed possibility of readdressing the spine with surgical management down the road depending upon response or if any neurologic symptoms were to develop.    3.  The patient is agreeable to plan in place.  Will plan to schedule patient for CT simulation with plan to begin radiation therapy soon thereafter.  Tentatively will plan to proceed with 30 Gray in 10 fraction.  Okay to continue systemic chemotherapy  during radiation.    Due to the concerns around COVID-19 and adhering to social distancing we conduct this visit over the telephone. Telephone call lasted 20 minutes.       Hernán Egan M.D.  Department of Radiation Oncology  [facility]       Again, thank you for allowing me to participate in the care of your patient.        Sincerely,        Hernán Egan MD

## 2024-02-19 NOTE — NURSING NOTE
"Adrianna is a 63 year old who is being evaluated via a billable telephone visit.      What phone number would you like to be contacted at? cell  How would you like to obtain your AVS? Maria Lhart    Distant Location (provider location):  On-site  Phone call duration: 5 minutes   Oncology Rooming Note    February 19, 2024 10:40 AM   Adrianna Pereira is a 63 year old female who presents for:    Chief Complaint   Patient presents with    Radiation Therapy     Follow up results/planning      Initial Vitals: There were no vitals taken for this visit. Estimated body mass index is 16.04 kg/m  as calculated from the following:    Height as of 2/16/24: 1.613 m (5' 3.5\").    Weight as of 2/16/24: 41.7 kg (92 lb). There is no height or weight on file to calculate BSA.  Data Unavailable Comment: Data Unavailable   No LMP recorded. Patient is postmenopausal.  Allergies reviewed: Yes  Medications reviewed: Yes    Medications: Medication refills not needed today.  Pharmacy name entered into EPIC:    [id number] - [place], [place] - [address] [place] [address] [facility]ARMACY [facility] [place] [place] [facility] [address] [facility] [place] [place] [facility] [address] [facility] [place], [place] - [address] [facility]     Frailty Screening:   Is the patient here for a new oncology consult visit in cancer care? 2. No      Clinical concerns: follow up today via phone with Dr. Julisa Cao RN              "

## 2024-02-19 NOTE — PROGRESS NOTES
Radiation Oncology Progress Note    HPI: Ms. 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  Final pathology demonstrated squamous cell carcinoma moderately differentiated, 4.1 cm in size originating from  floor mouth/ anterior tongue,  depth of invasion 29 mm, no LVSI, positive PNI, positive margin (left anterior lateral).  11 of 98 lymph nodes positive (4 cm largest deposit, positive extracapsular disease present), pathologic T4N3b.   She underwent adjuvant chemoradiation therapy.      Radiation Treatment  10/19/23 to 12/5/23: Head and neck, 6600 cGy in 33 fractions    Patient returns for follow-up.    In the interval, the patient underwent MRI of the T-spine on 2/7/2024.  Imaging demonstrated a presumed pathologic fracture of T3 with evidence of extraosseous tumor with involvement of the ventral epidural space contributing to spinal canal stenosis at the level 3.  There seem to also be abnormal extraosseous tumor with extension into the neuroforamina involving T2/T3 and T3/T4 levels.    The patient will meet with neurosurgery team who discussed consideration of surgical options for her thoracic spine disease.    The patient underwent PET scan on 2/15/2024.  Imaging demonstrated concern for local recurrence as well as several sites of distant disease.  Numerous hypermetabolic pulmonary and pleural-based nodules were noted.  There was noted increase in activity in the left hemimandible near the mandibular foramen suggestive of perineural tumor spread along the inferior alveolar nerve.  Pathologic compression fracture of T3 and T4 were also noted.  Additionally there was noted a site of hypermetabolic activity in the left adductor musculature concerning for intramuscular tumor deposit.    The patient met with Dr. Castillo of medical oncology team who discussed starting  systemic therapy with Keytruda.  She is currently tentatively starting this week.    Patient presents today to discuss next steps in management.      Plan  1:  T spine.  MRI of the T-spine suggested concern for spinal disease with involvement of T3 and epidural extent causing canal stenosis.  Most recent restaging scan including PET scan demonstrated concern for several sites of distant metastasis including bone, lung, and possibly soft tissue.     2.  I discussed different treatment options regarding management of the spine.  I discussed consideration of surgery followed by adjuvant radiation therapy to the spine versus pursuing palliative radiation therapy to the spine at this time.  I discussed possibility however of delaying systemic treatment if spinal surgeries performed due to potential complications as well as healing.  Given multiple sites of metastatic spread, I would now wish to delay systemic therapy per Dr. Castillo.  Dr. Castillo is currently planning to start immunotherapy this week.  I would agree with this recommendation to get started on systemic therapy.  With regard to the spine, I recommended proceeding with palliative radiation therapy to the spine that would allow us to address the spinal region at this time and not delay systemic therapy given widespread disease.  I discussed possibility of readdressing the spine with surgical management down the road depending upon response or if any neurologic symptoms were to develop.    3.  The patient is agreeable to plan in place.  Will plan to schedule patient for CT simulation with plan to begin radiation therapy soon thereafter.  Tentatively will plan to proceed with 30 Gray in 10 fraction.  Okay to continue systemic chemotherapy during radiation.    Due to the concerns around COVID-19 and adhering to social distancing we conduct this visit over the telephone. Telephone call lasted 20 minutes.       Hernán Egan M.D.  Department of Radiation  Oncology  [facility]

## 2024-02-21 ENCOUNTER — OFFICE VISIT (OUTPATIENT)
Dept: RADIATION THERAPY | Facility: OUTPATIENT CENTER | Age: 64
End: 2024-02-21
Payer: COMMERCIAL

## 2024-02-21 ENCOUNTER — INFUSION THERAPY VISIT (OUTPATIENT)
Dept: INFUSION THERAPY | Facility: CLINIC | Age: 64
End: 2024-02-21
Attending: INTERNAL MEDICINE
Payer: COMMERCIAL

## 2024-02-21 VITALS
DIASTOLIC BLOOD PRESSURE: 67 MMHG | HEART RATE: 89 BPM | SYSTOLIC BLOOD PRESSURE: 107 MMHG | WEIGHT: 89.6 LBS | BODY MASS INDEX: 15.62 KG/M2

## 2024-02-21 DIAGNOSIS — C06.9 SQUAMOUS CELL CARCINOMA OF ORAL CAVITY (H): Primary | ICD-10-CM

## 2024-02-21 DIAGNOSIS — C79.51 MALIGNANT NEOPLASM METASTATIC TO BONE (H): Primary | ICD-10-CM

## 2024-02-21 DIAGNOSIS — C79.51 MALIGNANT NEOPLASM METASTATIC TO BONE (H): ICD-10-CM

## 2024-02-21 DIAGNOSIS — C06.9 SQUAMOUS CELL CARCINOMA OF ORAL CAVITY (H): ICD-10-CM

## 2024-02-21 DIAGNOSIS — C78.00 MALIGNANT NEOPLASM METASTATIC TO LUNG, UNSPECIFIED LATERALITY (H): ICD-10-CM

## 2024-02-21 LAB
ALBUMIN SERPL BCG-MCNC: 4.1 G/DL (ref 3.5–5.2)
ALP SERPL-CCNC: 94 U/L (ref 40–150)
ALT SERPL W P-5'-P-CCNC: 20 U/L (ref 0–50)
ANION GAP SERPL CALCULATED.3IONS-SCNC: 17 MMOL/L (ref 7–15)
AST SERPL W P-5'-P-CCNC: 14 U/L (ref 0–45)
BILIRUB SERPL-MCNC: <0.2 MG/DL
BUN SERPL-MCNC: 64.3 MG/DL (ref 8–23)
CALCIUM SERPL-MCNC: 9.9 MG/DL (ref 8.8–10.2)
CHLORIDE SERPL-SCNC: 99 MMOL/L (ref 98–107)
CREAT SERPL-MCNC: 0.86 MG/DL (ref 0.51–0.95)
DEPRECATED HCO3 PLAS-SCNC: 17 MMOL/L (ref 22–29)
EGFRCR SERPLBLD CKD-EPI 2021: 75 ML/MIN/1.73M2
GLUCOSE SERPL-MCNC: 162 MG/DL (ref 70–99)
POTASSIUM SERPL-SCNC: 4.4 MMOL/L (ref 3.4–5.3)
PROT SERPL-MCNC: 7.4 G/DL (ref 6.4–8.3)
SODIUM SERPL-SCNC: 133 MMOL/L (ref 135–145)
TSH SERPL DL<=0.005 MIU/L-ACNC: 0.48 UIU/ML (ref 0.3–4.2)

## 2024-02-21 PROCEDURE — 80053 COMPREHEN METABOLIC PANEL: CPT | Performed by: INTERNAL MEDICINE

## 2024-02-21 PROCEDURE — 250N000011 HC RX IP 250 OP 636: Mod: JZ | Performed by: INTERNAL MEDICINE

## 2024-02-21 PROCEDURE — 84443 ASSAY THYROID STIM HORMONE: CPT | Performed by: INTERNAL MEDICINE

## 2024-02-21 PROCEDURE — 258N000003 HC RX IP 258 OP 636: Performed by: NURSE PRACTITIONER

## 2024-02-21 PROCEDURE — 96413 CHEMO IV INFUSION 1 HR: CPT

## 2024-02-21 PROCEDURE — 96367 TX/PROPH/DG ADDL SEQ IV INF: CPT

## 2024-02-21 PROCEDURE — 258N000003 HC RX IP 258 OP 636: Performed by: INTERNAL MEDICINE

## 2024-02-21 PROCEDURE — 250N000011 HC RX IP 250 OP 636: Mod: JZ | Performed by: NURSE PRACTITIONER

## 2024-02-21 PROCEDURE — 36415 COLL VENOUS BLD VENIPUNCTURE: CPT

## 2024-02-21 RX ORDER — HEPARIN SODIUM,PORCINE 10 UNIT/ML
5-20 VIAL (ML) INTRAVENOUS DAILY PRN
Status: CANCELLED | OUTPATIENT
Start: 2024-02-21

## 2024-02-21 RX ORDER — HEPARIN SODIUM (PORCINE) LOCK FLUSH IV SOLN 100 UNIT/ML 100 UNIT/ML
5 SOLUTION INTRAVENOUS
Status: DISCONTINUED | OUTPATIENT
Start: 2024-02-21 | End: 2024-02-21 | Stop reason: HOSPADM

## 2024-02-21 RX ORDER — HEPARIN SODIUM (PORCINE) LOCK FLUSH IV SOLN 100 UNIT/ML 100 UNIT/ML
5 SOLUTION INTRAVENOUS
Status: CANCELLED | OUTPATIENT
Start: 2024-02-21

## 2024-02-21 RX ADMIN — Medication 5 ML: at 12:29

## 2024-02-21 RX ADMIN — ZOLEDRONIC ACID 3.3 MG: 4 INJECTION, SOLUTION, CONCENTRATE INTRAVENOUS at 12:10

## 2024-02-21 RX ADMIN — SODIUM CHLORIDE 250 ML: 9 INJECTION, SOLUTION INTRAVENOUS at 09:51

## 2024-02-21 RX ADMIN — SODIUM CHLORIDE 200 MG: 9 INJECTION, SOLUTION INTRAVENOUS at 09:51

## 2024-02-21 NOTE — LETTER
2/21/2024         RE: Adrianna Pereira  [address] [address] [place] [id number]        Dear Colleague,    Thank you for referring your patient, Adrianna Pereira, to the [facility] RADIATION [facility]ERAPY [facility]IC. Please see a copy of my visit note below.    The patient present for CT simulation.    Consent obtained.  Plan to proceed with palliative radiation therapy to the thoracic spine.    Hernán Egan M.D.  Department of Radiation Oncology  [facility]       Again, thank you for allowing me to participate in the care of your patient.        Sincerely,        Hernán Egan MD

## 2024-02-21 NOTE — PROGRESS NOTES
Infusion Nursing Note:  Adrianna Pereira presents today for C1D1 Keytruda and Zometa (changed from Xgeva due to insurance).    Patient seen by provider today: No   present during visit today: Not Applicable.    Note: N/A.      Intravenous Access:  Labs drawn without difficulty.  Implanted Port.    Treatment Conditions:  Lab Results   Component Value Date     (L) 02/21/2024    POTASSIUM 4.4 02/21/2024    MAG 1.9 12/15/2023    CR 0.86 02/21/2024    RACHAEL 9.9 02/21/2024    BILITOTAL <0.2 02/21/2024    ALBUMIN 4.1 02/21/2024    ALT 20 02/21/2024    AST 14 02/21/2024       Results reviewed, labs MET treatment parameters, ok to proceed with treatment.      Post Infusion Assessment:  Patient tolerated infusion without incident.  Blood return noted pre and post infusion.  Site patent and intact, free from redness, edema or discomfort.  No evidence of extravasations.  Access discontinued per protocol.       Discharge Plan:   Patient discharged in stable condition accompanied by: .  Departure Mode: Ambulatory.      Leandra Farris RN

## 2024-02-21 NOTE — PROGRESS NOTES
The patient present for CT simulation.    Consent obtained.  Plan to proceed with palliative radiation therapy to the thoracic spine.    Hernán Egan M.D.  Department of Radiation Oncology  [facility]

## 2024-02-22 ENCOUNTER — APPOINTMENT (OUTPATIENT)
Dept: RADIATION THERAPY | Facility: OUTPATIENT CENTER | Age: 64
End: 2024-02-22
Payer: COMMERCIAL

## 2024-02-22 NOTE — PROGRESS NOTES
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  Recommend a fleets enema to treat her constipation initially followed by either Senokot or MiraLAX (preferred) to sustain bowel movements with her narcotic regimen  I will continue dexamethasone as before using 4 mg twice daily.  Hopefully the dexamethasone can be reduced when she is reevaluated by medical oncology in 2 weeks.  Zoledronic acid metastatic bone prophylaxis  Follow-up Marina Cronin NP on 3/11/2024 for anticipated cycle #2 pembrolizumab  Anticipate re-staging CT imaging before cycle #4 (week of 4/23/2024)    Time with patient including review, documentation, history, examination, coordination of care and counseling was 20 minutes.    Recent oncology review:  On 10/19/2023-12/5/2023, she received 6600 cGy/33 fractions with concurrent low-dose  weekly cisplatin.    On 1/24/2024, she was seen in ENT follow-up by Dr. Tyshawn Dao and complained of a several week history of back pain.  2/1/2024 CT cervical/thoracic spine imaging revealed new T3 compression fracture with hypodensity in the vertebral body extending into the left posterior elements suggesting presence of metastases and pathologic fracture.  A T4 posterior vertebral body lesion was also seen suspicious for metastatic disease as was new incidental lung nodules.    2/7/2024 MR thoracic spine confirmed the T3 compression fracture with extraosseous tumor involving the ventral epidural space with moderate to severe central spinal canal stenosis at T3.    2/15/2024 PET scan and CT soft tissue of the neck revealed a recurrent gluco-avid foci of the left hemimandible, numerous bilateral pulmonary and pleural-based metastases, hypermetabolic metastatic mediastinal and hilar lymphadenopathy, pathologic compression of T3 associated with additional metastatic disease involving T4, and hypermetabolic foci involving the left adductor musculature as well as gluco-avid skin thickening of the right upper thigh.      On 2/21/2024, pembrolizumab was initiated.    Radiation therapy was initiated by Dr. Egan on 2/26/2024 to be completed by 3/6/2024.  She is on dexamethasone currently at 4 mg BID for pain control.    PD-L1 analysis from her August, 2023 original tumor biopsy revealed PD-L1 TPS and CPS of 70%.    History of present illness:  In July, 2023 the patient presented to Dr. Darien Thorne for history of previous CO2 laser ablation for premalignant oral cavity lesions and more recent swelling of the lower lip and chin.    7/3/2023 left mandibular alveolus biopsy revealed an invasive keratinizing moderately differential squamous cell carcinoma.  7/13/2023 PET/dedicated CT revealed a 4.4 x 3.7 x 3.2 cm anterior oral cavity mass invading into the mandible with a separate gluco-avid nodule on the right mandibular  buccal mucosa and bilateral level 1B, level 2 and L3 metastatic lymphadenopathy without evidence of metastatic disease.    On 8/17/2023, she underwent segmental mandibulectomy, floor of mouth resection, anterior glossectomy, left fibular free flap, skin grafting, and tracheostomy revealing a pT4a, N3b tumor with positive left anterior lateral soft tissue margin, 11/98+ lymph nodes including STEFFEN and several bilateral lymph nodes consistent with involvement by low-grade B-cell neoplasm suggestive of CLL/SLL.    On 8/25/2023, head neck tumor conference recommended consideration of postoperative chemoradiation.  Postoperative course has been complicated by 8/24/2023 return to the operating room for debridement of multiple areas of skin necrosis and resuturing of neck incision and intraoral flap.    Past medical history:  Remarkable for past 1/2 ppd tobacco use (until 8/2023), former alcohol abuse for which she considers herself an alcoholic although she does not use alcohol now.    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    Medications:  Current Outpatient Medications   Medication    acetaminophen (TYLENOL) 325 MG tablet    chlorhexidine (PERIDEX) 0.12 % solution    dexAMETHasone (DECADRON) 4 MG tablet    diazepam (VALIUM) 5 MG tablet    ibuprofen (ADVIL/MOTRIN) 600 MG tablet    loperamide (IMODIUM) 1 MG/5ML liquid    magic mouthwash (ENTER INGREDIENTS IN COMMENTS) suspension    magnesium hydroxide (MILK OF MAGNESIA) 400 MG/5ML suspension    mineral oil-hydrophilic petrolatum (AQUAPHOR) external ointment    oxyCODONE (ROXICODONE) 5 MG/5ML solution    oxyCODONE (ROXICODONE) 5 MG/5ML solution    polyethylene glycol (MIRALAX) 17 g packet    sennosides  "(SENOKOT) 8.8 MG/5ML syrup    senna-docusate (SENOKOT-S/PERICOLACE) 8.6-50 MG tablet     No current facility-administered medications for this visit.     Facility-Administered Medications Ordered in Other Visits   Medication    heparin 100 unit/mL injection 5 mL       Allergies:  No Known Allergies      Physical examination:  /69 (BP Location: Left arm, Patient Position: Sitting, Cuff Size: Adult Small)   Pulse 104   Temp 98.1  F (36.7  C) (Tympanic)   Resp 16   Ht 1.613 m (5' 3.5\")   Wt 40.4 kg (89 lb)   SpO2 98%   BMI 15.52 kg/m      The patient is alert and oriented.    Rai Castillo MD          "

## 2024-02-23 ENCOUNTER — APPOINTMENT (OUTPATIENT)
Dept: RADIATION THERAPY | Facility: OUTPATIENT CENTER | Age: 64
End: 2024-02-23
Payer: COMMERCIAL

## 2024-02-23 ENCOUNTER — LAB (OUTPATIENT)
Dept: INFUSION THERAPY | Facility: CLINIC | Age: 64
End: 2024-02-23
Attending: NURSE PRACTITIONER
Payer: COMMERCIAL

## 2024-02-23 DIAGNOSIS — C06.9 SQUAMOUS CELL CARCINOMA OF ORAL CAVITY (H): ICD-10-CM

## 2024-02-23 DIAGNOSIS — D64.81 ANTINEOPLASTIC CHEMOTHERAPY INDUCED ANEMIA: Primary | ICD-10-CM

## 2024-02-23 DIAGNOSIS — M89.9 LESION OF BONE OF THORACIC SPINE: ICD-10-CM

## 2024-02-23 DIAGNOSIS — T45.1X5A ANTINEOPLASTIC CHEMOTHERAPY INDUCED ANEMIA: Primary | ICD-10-CM

## 2024-02-23 LAB
BASOPHILS # BLD AUTO: 0.1 10E3/UL (ref 0–0.2)
BASOPHILS NFR BLD AUTO: 0 %
EOSINOPHIL # BLD AUTO: 0 10E3/UL (ref 0–0.7)
EOSINOPHIL NFR BLD AUTO: 0 %
ERYTHROCYTE [DISTWIDTH] IN BLOOD BY AUTOMATED COUNT: 14.5 % (ref 10–15)
HCT VFR BLD AUTO: 29.1 % (ref 35–47)
HGB BLD-MCNC: 9.8 G/DL (ref 11.7–15.7)
IMM GRANULOCYTES # BLD: 0.5 10E3/UL
IMM GRANULOCYTES NFR BLD: 2 %
LYMPHOCYTES # BLD AUTO: 0.8 10E3/UL (ref 0.8–5.3)
LYMPHOCYTES NFR BLD AUTO: 2 %
MCH RBC QN AUTO: 33 PG (ref 26.5–33)
MCHC RBC AUTO-ENTMCNC: 33.7 G/DL (ref 31.5–36.5)
MCV RBC AUTO: 98 FL (ref 78–100)
MONOCYTES # BLD AUTO: 2 10E3/UL (ref 0–1.3)
MONOCYTES NFR BLD AUTO: 6 %
NEUTROPHILS # BLD AUTO: 29.8 10E3/UL (ref 1.6–8.3)
NEUTROPHILS NFR BLD AUTO: 90 %
NRBC # BLD AUTO: 0 10E3/UL
NRBC BLD AUTO-RTO: 0 /100
PLATELET # BLD AUTO: 403 10E3/UL (ref 150–450)
RBC # BLD AUTO: 2.97 10E6/UL (ref 3.8–5.2)
WBC # BLD AUTO: 33.1 10E3/UL (ref 4–11)

## 2024-02-23 PROCEDURE — 85025 COMPLETE CBC W/AUTO DIFF WBC: CPT | Performed by: NURSE PRACTITIONER

## 2024-02-23 PROCEDURE — 36591 DRAW BLOOD OFF VENOUS DEVICE: CPT | Performed by: NURSE PRACTITIONER

## 2024-02-23 PROCEDURE — 250N000011 HC RX IP 250 OP 636: Performed by: INTERNAL MEDICINE

## 2024-02-23 RX ORDER — DEXAMETHASONE 4 MG/1
4 TABLET ORAL 2 TIMES DAILY WITH MEALS
Qty: 30 TABLET | Refills: 0 | Status: SHIPPED | OUTPATIENT
Start: 2024-02-23 | End: 2024-03-11

## 2024-02-23 RX ORDER — HEPARIN SODIUM (PORCINE) LOCK FLUSH IV SOLN 100 UNIT/ML 100 UNIT/ML
5 SOLUTION INTRAVENOUS
Status: CANCELLED | OUTPATIENT
Start: 2024-02-23

## 2024-02-23 RX ORDER — HEPARIN SODIUM (PORCINE) LOCK FLUSH IV SOLN 100 UNIT/ML 100 UNIT/ML
5 SOLUTION INTRAVENOUS
Status: DISCONTINUED | OUTPATIENT
Start: 2024-02-23 | End: 2024-02-23 | Stop reason: HOSPADM

## 2024-02-23 RX ORDER — DIPHENHYDRAMINE HYDROCHLORIDE 50 MG/ML
50 INJECTION INTRAMUSCULAR; INTRAVENOUS
Status: CANCELLED
Start: 2024-02-23

## 2024-02-23 RX ORDER — HEPARIN SODIUM,PORCINE 10 UNIT/ML
5-20 VIAL (ML) INTRAVENOUS DAILY PRN
Status: CANCELLED | OUTPATIENT
Start: 2024-02-23

## 2024-02-23 RX ORDER — EPINEPHRINE 1 MG/ML
0.3 INJECTION, SOLUTION, CONCENTRATE INTRAVENOUS EVERY 5 MIN PRN
Status: CANCELLED | OUTPATIENT
Start: 2024-02-23

## 2024-02-23 RX ADMIN — HEPARIN 5 ML: 100 SYRINGE at 10:30

## 2024-02-23 NOTE — PROGRESS NOTES
Infusion Nursing Note:  Adrianna Pereira presents today for port labs.    Patient seen by provider today: No   present during visit today: Not Applicable.    Note: Patient here today with her  and daughter for port labs.  Port accessed without difficulty.  Labs collected and sent to the labs.      Intravenous Access:  Implanted Port.    Treatment Conditions:  Lab Results   Component Value Date    HGB 9.8 (L) 02/23/2024    WBC 33.1 (H) 02/23/2024    ANEU 2.2 12/04/2023    ANEUTAUTO 29.8 (H) 02/23/2024     02/23/2024        Results reviewed, labs did NOT meet treatment parameters: Pt does not meet parameters for treatment today. .      Discharge Plan:   Patient discharged in stable condition accompanied by: self, , and daughter.  Departure Mode: Ambulatory with support from   Patient has appt to return on 03/08 for labs and possible infusion .      Laurence Guevara RN

## 2024-02-26 ENCOUNTER — APPOINTMENT (OUTPATIENT)
Dept: RADIATION THERAPY | Facility: OUTPATIENT CENTER | Age: 64
End: 2024-02-26
Payer: COMMERCIAL

## 2024-02-27 ENCOUNTER — MYC REFILL (OUTPATIENT)
Dept: ONCOLOGY | Facility: CLINIC | Age: 64
End: 2024-02-27
Payer: COMMERCIAL

## 2024-02-27 ENCOUNTER — APPOINTMENT (OUTPATIENT)
Dept: RADIATION THERAPY | Facility: OUTPATIENT CENTER | Age: 64
End: 2024-02-27
Payer: COMMERCIAL

## 2024-02-27 DIAGNOSIS — G89.3 CANCER RELATED PAIN: ICD-10-CM

## 2024-02-27 DIAGNOSIS — Z48.89 ENCOUNTER FOR POSTOPERATIVE CARE: ICD-10-CM

## 2024-02-27 RX ORDER — OXYCODONE HCL 5 MG/5 ML
5 SOLUTION, ORAL ORAL EVERY 6 HOURS PRN
Qty: 100 ML | Refills: 0 | Status: SHIPPED | OUTPATIENT
Start: 2024-02-27 | End: 2024-03-06

## 2024-02-27 RX ORDER — CHLORHEXIDINE GLUCONATE ORAL RINSE 1.2 MG/ML
15 SOLUTION DENTAL 3 TIMES DAILY
Qty: 473 ML | Refills: 1 | Status: SHIPPED | OUTPATIENT
Start: 2024-02-27 | End: 2024-05-29

## 2024-02-28 ENCOUNTER — APPOINTMENT (OUTPATIENT)
Dept: RADIATION THERAPY | Facility: OUTPATIENT CENTER | Age: 64
End: 2024-02-28
Payer: COMMERCIAL

## 2024-02-28 ENCOUNTER — OFFICE VISIT (OUTPATIENT)
Dept: RADIATION THERAPY | Facility: OUTPATIENT CENTER | Age: 64
End: 2024-02-28
Payer: COMMERCIAL

## 2024-02-28 VITALS
HEART RATE: 89 BPM | SYSTOLIC BLOOD PRESSURE: 102 MMHG | DIASTOLIC BLOOD PRESSURE: 71 MMHG | BODY MASS INDEX: 15.52 KG/M2 | WEIGHT: 89 LBS

## 2024-02-28 DIAGNOSIS — K59.03 DRUG-INDUCED CONSTIPATION: Primary | ICD-10-CM

## 2024-02-28 NOTE — PROGRESS NOTES
[facility]  SPECIALIZING IN BREAKTHROUGHS  Radiation Oncology    On Treatment Visit Note      Adrianna Pereira      Date: 2024   MRN: [id number]   : 1960  Diagnosis: metastatic head/neck cancer      Reason for Visit:  On Radiation Treatment Visit     Treatment Summary to Date  Treatment Site: thoracic spine Current Dose: 1250/2500 cGy Fractions: 10      Chemotherapy  Chemo concurrent with radx?: No    Subjective:   Doing okay with respect to palliative radiation therapy to the T-spine.  No esophagitis.  Nutrition continues to be through her PEG tube.  Taking oxycodone oral solution as needed for pain.  Having some issue with constipation.  Denies any focal neurologic change.  Has started systemic treatment with immunotherapy.    Nursing ROS:   Nutrition Alteration  Nutrition Note: all nutrition via peg  Skin  Skin Reaction: 0 - No changes        Cardiovascular  Respiratory effort: 1 - Normal - without distress  Gastrointestinal  GI Note: constipated, using senna, did discuss and [place], no nausea        Pain Assessment  Pain Note: rates back pain today 8/10, has oxy liquid      Objective:   /71   Pulse 89   Wt 40.4 kg (89 lb)   BMI 15.52 kg/m    NAD  S/p oral cavity resection and reconstruction     Labs:  CBC RESULTS:   Recent Labs   Lab Test 24  1016   WBC 33.1*   RBC 2.97*   HGB 9.8*   HCT 29.1*   MCV 98   MCH 33.0   MCHC 33.7   RDW 14.5        ELECTROLYTES:  Recent Labs   Lab Test 24  0853   *   POTASSIUM 4.4   CHLORIDE 99   RACHAEL 9.9   CO2 17*   BUN 64.3*   CR 0.86   *       Assessment:  Ms. 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  Final pathology demonstrated squamous cell carcinoma moderately differentiated, 4.1 cm in size originating from  floor mouth/ anterior tongue,  depth of invasion 29 mm, no  LVSI, positive PNI, positive margin (left anterior lateral).  11 of 98 lymph nodes positive (4 cm largest deposit, positive extracapsular disease present), pathologic T4N3b.   She underwent adjuvant chemoradiation therapy, completed on 12/5/2023.  The patient had early recurrence of disease including locally as well as distantly (spine, lung, soft tissue).  She is now undergoing palliative radiation therapy to thoracic spine.    Tolerating radiation therapy well.  All questions and concerns addressed.    Plan:   Continue current therapy.    Cancer related pain.  Oxycodone oral solution as needed.  Sent stool softeners to pharmacy.      Appcelerator chart and setup information reviewed  Ports checked    Medication Review  Med list reviewed with patient?: Yes           Hernán Egan MD

## 2024-02-28 NOTE — LETTER
2024         RE: Adrianna Pereira  [address] [address] [place] [id number]        Dear Colleague,    Thank you for referring your patient, Adrianna Pereira, to the  [facility]. Please see a copy of my visit note below.    [facility]  SPECIALIZING IN BREAKTHROUGHS  Radiation Oncology    On Treatment Visit Note      Adrianna Pereira      Date: 2024   MRN: [id number]   : 1960  Diagnosis: metastatic head/neck cancer      Reason for Visit:  On Radiation Treatment Visit     Treatment Summary to Date  Treatment Site: thoracic spine Current Dose: 1250/2500 cGy Fractions: 5/10      Chemotherapy  Chemo concurrent with radx?: No    Subjective:   Doing okay with respect to palliative radiation therapy to the T-spine.  No esophagitis.  Nutrition continues to be through her PEG tube.  Taking oxycodone oral solution as needed for pain.  Having some issue with constipation.  Denies any focal neurologic change.  Has started systemic treatment with immunotherapy.    Nursing ROS:   Nutrition Alteration  Nutrition Note: all nutrition via peg  Skin  Skin Reaction: 0 - No changes        Cardiovascular  Respiratory effort: 1 - Normal - without distress  Gastrointestinal  GI Note: constipated, using senna, did discuss and Qawalangin, no nausea        Pain Assessment  Pain Note: rates back pain today 8/10, has oxy liquid      Objective:   /71   Pulse 89   Wt 40.4 kg (89 lb)   BMI 15.52 kg/m    NAD  S/p oral cavity resection and reconstruction     Labs:  CBC RESULTS:   Recent Labs   Lab Test 24  1016   WBC 33.1*   RBC 2.97*   HGB 9.8*   HCT 29.1*   MCV 98   MCH 33.0   MCHC 33.7   RDW 14.5        ELECTROLYTES:  Recent Labs   Lab Test 24  0853   *   POTASSIUM 4.4   CHLORIDE 99   RACHAEL 9.9   CO2 17*   BUN 64.3*   CR 0.86   *       Assessment:  Ms. Pereira is a 63 year old female with a diagnosis of squamous cell carcinoma of the oral cavity status post segmental mandibulectomy,  anterior glossectomy,  lymph node dissection and reconstruction.  Final pathology demonstrated squamous cell carcinoma moderately differentiated, 4.1 cm in size originating from  floor mouth/ anterior tongue,  depth of invasion 29 mm, no LVSI, positive PNI, positive margin (left anterior lateral).  11 of 98 lymph nodes positive (4 cm largest deposit, positive extracapsular disease present), pathologic T4N3b.   She underwent adjuvant chemoradiation therapy, completed on 12/5/2023.  The patient had early recurrence of disease including locally as well as distantly (spine, lung, soft tissue).  She is now undergoing palliative radiation therapy to thoracic spine.    Tolerating radiation therapy well.  All questions and concerns addressed.    Plan:   Continue current therapy.    Cancer related pain.  Oxycodone oral solution as needed.  Sent stool softeners to pharmacy.      [employer] chart and setup information reviewed  Ports checked    Medication Review  Med list reviewed with patient?: Yes           Hernán Egan MD

## 2024-02-29 ENCOUNTER — APPOINTMENT (OUTPATIENT)
Dept: RADIATION THERAPY | Facility: OUTPATIENT CENTER | Age: 64
End: 2024-02-29
Payer: COMMERCIAL

## 2024-03-01 ENCOUNTER — APPOINTMENT (OUTPATIENT)
Dept: RADIATION THERAPY | Facility: OUTPATIENT CENTER | Age: 64
End: 2024-03-01
Payer: COMMERCIAL

## 2024-03-01 ENCOUNTER — INFUSION THERAPY VISIT (OUTPATIENT)
Dept: INFUSION THERAPY | Facility: CLINIC | Age: 64
End: 2024-03-01
Attending: NURSE PRACTITIONER
Payer: COMMERCIAL

## 2024-03-01 ENCOUNTER — ONCOLOGY VISIT (OUTPATIENT)
Dept: ONCOLOGY | Facility: CLINIC | Age: 64
End: 2024-03-01
Attending: INTERNAL MEDICINE
Payer: COMMERCIAL

## 2024-03-01 ENCOUNTER — LAB (OUTPATIENT)
Dept: INFUSION THERAPY | Facility: CLINIC | Age: 64
End: 2024-03-01
Attending: INTERNAL MEDICINE
Payer: COMMERCIAL

## 2024-03-01 VITALS
OXYGEN SATURATION: 98 % | TEMPERATURE: 98.1 F | WEIGHT: 89 LBS | BODY MASS INDEX: 15.19 KG/M2 | DIASTOLIC BLOOD PRESSURE: 69 MMHG | HEART RATE: 104 BPM | HEIGHT: 64 IN | RESPIRATION RATE: 16 BRPM | SYSTOLIC BLOOD PRESSURE: 108 MMHG

## 2024-03-01 DIAGNOSIS — C78.00 MALIGNANT NEOPLASM METASTATIC TO LUNG, UNSPECIFIED LATERALITY (H): Primary | ICD-10-CM

## 2024-03-01 DIAGNOSIS — D64.81 ANTINEOPLASTIC CHEMOTHERAPY INDUCED ANEMIA: Primary | ICD-10-CM

## 2024-03-01 DIAGNOSIS — C06.9 SQUAMOUS CELL CARCINOMA OF ORAL CAVITY (H): ICD-10-CM

## 2024-03-01 DIAGNOSIS — C06.9 SQUAMOUS CELL CARCINOMA OF ORAL CAVITY (H): Primary | ICD-10-CM

## 2024-03-01 DIAGNOSIS — T45.1X5A ANTINEOPLASTIC CHEMOTHERAPY INDUCED ANEMIA: Primary | ICD-10-CM

## 2024-03-01 LAB
BASOPHILS # BLD AUTO: 0 10E3/UL (ref 0–0.2)
BASOPHILS NFR BLD AUTO: 0 %
EOSINOPHIL # BLD AUTO: 0.2 10E3/UL (ref 0–0.7)
EOSINOPHIL NFR BLD AUTO: 1 %
ERYTHROCYTE [DISTWIDTH] IN BLOOD BY AUTOMATED COUNT: 14.5 % (ref 10–15)
HCT VFR BLD AUTO: 28.3 % (ref 35–47)
HGB BLD-MCNC: 9.4 G/DL (ref 11.7–15.7)
IMM GRANULOCYTES # BLD: 0.1 10E3/UL
IMM GRANULOCYTES NFR BLD: 0 %
LYMPHOCYTES # BLD AUTO: 0.4 10E3/UL (ref 0–5.3)
LYMPHOCYTES NFR BLD AUTO: 2 %
MCH RBC QN AUTO: 33.1 PG (ref 26.5–33)
MCHC RBC AUTO-ENTMCNC: 33.2 G/DL (ref 31.5–36.5)
MCV RBC AUTO: 100 FL (ref 78–100)
MONOCYTES # BLD AUTO: 0.9 10E3/UL (ref 0–1.3)
MONOCYTES NFR BLD AUTO: 5 %
NEUTROPHILS # BLD AUTO: 17.9 10E3/UL (ref 1.6–8.3)
NEUTROPHILS NFR BLD AUTO: 92 %
PLATELET # BLD AUTO: 288 10E3/UL (ref 150–450)
RBC # BLD AUTO: 2.84 10E6/UL (ref 3.8–5.2)
WBC # BLD AUTO: 19.4 10E3/UL (ref 4–11)

## 2024-03-01 PROCEDURE — G0463 HOSPITAL OUTPT CLINIC VISIT: HCPCS | Performed by: INTERNAL MEDICINE

## 2024-03-01 PROCEDURE — 99214 OFFICE O/P EST MOD 30 MIN: CPT | Performed by: INTERNAL MEDICINE

## 2024-03-01 PROCEDURE — 250N000011 HC RX IP 250 OP 636: Performed by: INTERNAL MEDICINE

## 2024-03-01 PROCEDURE — 36415 COLL VENOUS BLD VENIPUNCTURE: CPT | Performed by: NURSE PRACTITIONER

## 2024-03-01 PROCEDURE — 85004 AUTOMATED DIFF WBC COUNT: CPT | Performed by: NURSE PRACTITIONER

## 2024-03-01 RX ORDER — HEPARIN SODIUM,PORCINE 10 UNIT/ML
5-20 VIAL (ML) INTRAVENOUS DAILY PRN
Status: CANCELLED | OUTPATIENT
Start: 2024-03-01

## 2024-03-01 RX ORDER — EPINEPHRINE 1 MG/ML
0.3 INJECTION, SOLUTION, CONCENTRATE INTRAVENOUS EVERY 5 MIN PRN
Status: CANCELLED | OUTPATIENT
Start: 2024-03-01

## 2024-03-01 RX ORDER — DIPHENHYDRAMINE HYDROCHLORIDE 50 MG/ML
50 INJECTION INTRAMUSCULAR; INTRAVENOUS
Status: CANCELLED
Start: 2024-03-01

## 2024-03-01 RX ORDER — HEPARIN SODIUM (PORCINE) LOCK FLUSH IV SOLN 100 UNIT/ML 100 UNIT/ML
5 SOLUTION INTRAVENOUS
Status: CANCELLED | OUTPATIENT
Start: 2024-03-01

## 2024-03-01 RX ORDER — HEPARIN SODIUM (PORCINE) LOCK FLUSH IV SOLN 100 UNIT/ML 100 UNIT/ML
5 SOLUTION INTRAVENOUS
Status: DISCONTINUED | OUTPATIENT
Start: 2024-03-01 | End: 2024-03-01 | Stop reason: HOSPADM

## 2024-03-01 RX ADMIN — Medication 5 ML: at 11:35

## 2024-03-01 ASSESSMENT — PAIN SCALES - GENERAL: PAINLEVEL: MODERATE PAIN (4)

## 2024-03-01 NOTE — PROGRESS NOTES
"Oncology Rooming Note    March 1, 2024 10:33 AM   Adrianna Pereira is a 63 year old female who presents for:    Chief Complaint   Patient presents with    Oncology Clinic Visit     Malignant neoplasm metastatic to lung, unspecified laterality - Labs provider and infusion     Initial Vitals: /69 (BP Location: Left arm, Patient Position: Sitting, Cuff Size: Adult Small)   Pulse 104   Temp 98.1  F (36.7  C) (Tympanic)   Resp 16   Ht 1.613 m (5' 3.5\")   Wt 40.4 kg (89 lb)   SpO2 98%   BMI 15.52 kg/m   Estimated body mass index is 15.52 kg/m  as calculated from the following:    Height as of this encounter: 1.613 m (5' 3.5\").    Weight as of this encounter: 40.4 kg (89 lb). Body surface area is 1.35 meters squared.  Moderate Pain (4) Comment: Data Unavailable   No LMP recorded. Patient is postmenopausal.  Allergies reviewed: Yes  Medications reviewed: Yes    Medications: Medication refills not needed today.  Pharmacy name entered into EPIC:    [id number] - [place], [place] - [address] [place] [address] PHARMACY - [place], [place] - [address] [facility] PHARMACY [place], [place] - [address] [facility] PHARMACY [place], [place] -  [facility]     Frailty Screening:   Is the patient here for a new oncology consult visit in cancer care? 2. No      Clinical concerns:  None      Tanya Philippe CMA              "

## 2024-03-01 NOTE — PROGRESS NOTES
PLAN  Continue therapy per current plan of care of 1x-2x/week with focus on MFR to: oral, jaws, neck as pt not has a H&N Flexitouch compression pump to assist with at home lymphedema drainainge.    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   PT Tx Diagnosis H&N lymphedema with scarring and significant myofascial tightness   Other pertinent information current radiation; last one on 12/5/23 then will transfer lymphedema cares to [place]   Progress Note/Certification   Onset of illness/injury or Date of Surgery 10/18/23  (date of referral)   Therapy Frequency 2x/week   Predicted Duration x12 weeks   Progress Note Due Date 01/24/24   Progress Note Completed Date 11/01/23   Supervision   PT Assistant Visit Number 4       Present No   GOALS   PT Goals 2;3;4;5   PT Goal 1   Goal Identifier stg   Goal Description pt to have at least 1 hour weartime tolerance to H&N Eisenberg pack compression garment to decrease H&N lymphedema and related fibrosis   Rationale to maximize safety and independence with self cares;to maximize safety and independence with performance of ADLs and functional tasks   Target Date 11/15/23   Date Met 11/30/23   PT Goal 2   Goal Identifier stg   Goal Description pt and/or  to be independent with self-MLD of H&N to decrease lymphedema and related fibrosis to increase ROM and ease during functional activity/ADL   Rationale to maximize safety and independence with performance of ADLs and functional tasks;to maximize safety and independence with self cares   Goal Progress Pt receptive to instruction and education,  has been present one time at this location, also  receptive to education and instruction. Pt making progress, understanding principles better.   Target Date 11/15/23   PT Goal 3   Goal Identifier ltg   Goal Description once appropriate, pt and  to be independent with donning, doffing and care of H&N compression garment for long term H&N lymphedema management for maintenance   Goal Progress Pt reports she and  have been consistently using her H&N compression garment, comfortable in use and care of it for reduction in her edema. Pt understanding of education in longer wear times to better reduce her swelling.   Target Date 01/24/24   PT Goal 4   Goal Identifier ltg   Goal Description pt to be able to demonstrate 45 degrees of R and L cervical rotation for ease, independence and safety during driving and ADL   Rationale to maximize safety and independence with performance of ADLs and functional tasks;to maximize safety and independence with self cares;to maximize safety and independence with transportation   Goal Progress Pt demos 50 deg R & L cervical rotation, allowing for greater vision in driving. Pt has just started driving short distances.   Target Date 01/24/24   Date Met 12/14/23   PT Goal 5   Goal Identifier ltg   Goal Description pt to be independent with longterm H&N lymphedema management via HEP, skin cares, self-MLD, self-MFR and compression garment wear/use   Target Date 01/24/24   Subjective Report   Subjective Report using pump everyday and I forget my  will remind me   Manual Therapy   Manual Therapy: Mobilization, MFR, MLD, friction massage minutes (13291) 55   Manual Therapy 1 - Details pt in supine: MFR/STM to all scars, inferior mandibul B sides, entire lower lip with focus to L side, with glove on hand perform internal lower jaw and palate, using manual neck stretch, small circles, therapist demonstrated manula neck stretch to be done daily.   Skilled Intervention CDT; MLD   Patient Response/Progress slight pn to R jaw and  neck after tx   Education   Learner/Method Patient;Family;Demonstration;No Barriers to Learning;Listening   Plan   Home program Eisenberg pack wear of 1hour/day minimum; daily pump; daily self-MFR stretching (R and L cervical  rotation and cervical extension   Plan for next session STM/MFR, H&N measurements, garment when needed   Total Session Time   Timed Code Treatment Minutes 55   Total Treatment Time (sum of timed and untimed services) 55

## 2024-03-01 NOTE — PATIENT INSTRUCTIONS
1. Continue dexamethasone as before  2. Try Fleets enema on your left side, holding the contents up to at least 20-30 minutes possible before expelling  3. Sennekot or increased Miralax is okay  4. Follow-up Marina Cronin NP on 3/14/2024 as planned

## 2024-03-01 NOTE — PROGRESS NOTES
Infusion Nursing Note:  Adriannamanda Pereira presents today for Possible 1 unit PRBC's.    Patient seen by provider today: Yes: Dr. Castillo   present during visit today: Not Applicable.    Note: N/A.      Intravenous Access:  Implanted Port.    Treatment Conditions:  Lab Results   Component Value Date    HGB 9.4 (L) 03/01/2024    WBC 19.4 (H) 03/01/2024    ANEU 2.2 12/04/2023    ANEUTAUTO 17.9 (H) 03/01/2024     03/01/2024        Results reviewed, labs did NOT meet treatment parameters: hgb 9.4.      Discharge Plan:   Discharge instructions reviewed with: Patient.  Patient and/or family verbalized understanding of discharge instructions and all questions answered.  Patient discharged in stable condition accompanied by: self.  Departure Mode: Ambulatory.      Dianna Osuna RN

## 2024-03-01 NOTE — LETTER
3/1/2024         RE: Adrianna Pereira  [address] [address] [place] [address]        Dear Colleague,    Thank you for referring your patient, Adrianna Pereira, to the [facility]. Please see a copy of my visit note below.    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  Recommend a fleets enema to treat her constipation initially followed by either Senokot or MiraLAX (preferred) to sustain bowel movements with her narcotic regimen  I will continue dexamethasone as before using 4 mg twice daily.  Hopefully the dexamethasone can be reduced when she is reevaluated by medical oncology in 2 weeks.  Zoledronic acid metastatic bone prophylaxis  Follow-up Marina Cronin NP on 3/11/2024 for anticipated cycle #2 pembrolizumab    Time with patient including review, documentation, history,  examination, coordination of care and counseling was 20 minutes.    Recent oncology review:  On 10/19/2023-12/5/2023, she received 6600 cGy/33 fractions with concurrent low-dose weekly cisplatin.    On 1/24/2024, she was seen in ENT follow-up by Dr. Tyshawn Dao and complained of a several week history of back pain.  2/1/2024 CT cervical/thoracic spine imaging revealed new T3 compression fracture with hypodensity in the vertebral body extending into the left posterior elements suggesting presence of metastases and pathologic fracture.  A T4 posterior vertebral body lesion was also seen suspicious for metastatic disease as was new incidental lung nodules.    2/7/2024 MR thoracic spine confirmed the T3 compression fracture with extraosseous tumor involving the ventral epidural space with moderate to severe central spinal canal stenosis at T3.    2/15/2024 PET scan and CT soft tissue of the neck revealed a recurrent gluco-avid foci of the left hemimandible, numerous bilateral pulmonary and pleural-based metastases, hypermetabolic metastatic mediastinal and hilar lymphadenopathy, pathologic compression of T3 associated with additional metastatic disease involving T4, and hypermetabolic foci involving the left adductor musculature as well as gluco-avid skin thickening of the right upper thigh.      On 2/21/2024, pembrolizumab was initiated.    Radiation therapy was initiated by Dr. Egan on 2/26/2024 to be completed by 3/6/2024.  She is on dexamethasone currently at 4 mg BID for pain control.    PD-L1 analysis from her August, 2023 original tumor biopsy revealed PD-L1 TPS and CPS of 70%.    History of present illness:  In July, 2023 the patient presented to Dr. Darien Thorne for history of previous CO2 laser ablation for premalignant oral cavity lesions and more recent swelling of the lower lip and chin.    7/3/2023 left mandibular alveolus biopsy revealed an invasive keratinizing moderately differential squamous cell  carcinoma.  7/13/2023 PET/dedicated CT revealed a 4.4 x 3.7 x 3.2 cm anterior oral cavity mass invading into the mandible with a separate gluco-avid nodule on the right mandibular buccal mucosa and bilateral level 1B, level 2 and L3 metastatic lymphadenopathy without evidence of metastatic disease.    On 8/17/2023, she underwent segmental mandibulectomy, floor of mouth resection, anterior glossectomy, left fibular free flap, skin grafting, and tracheostomy revealing a pT4a, N3b tumor with positive left anterior lateral soft tissue margin, 11/98+ lymph nodes including STEFFEN and several bilateral lymph nodes consistent with involvement by low-grade B-cell neoplasm suggestive of CLL/SLL.    On 8/25/2023, head neck tumor conference recommended consideration of postoperative chemoradiation.  Postoperative course has been complicated by 8/24/2023 return to the operating room for debridement of multiple areas of skin necrosis and resuturing of neck incision and intraoral flap.    Past medical history:  Remarkable for past 1/2 ppd tobacco use (until 8/2023), former alcohol abuse for which she considers herself an alcoholic although she does not use alcohol now.    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    Medications:  Current Outpatient Medications   Medication     acetaminophen (TYLENOL) 325 MG tablet     chlorhexidine (PERIDEX) 0.12 % solution     dexAMETHasone (DECADRON) 4 MG tablet     diazepam (VALIUM) 5 MG tablet     ibuprofen (ADVIL/MOTRIN) 600 MG tablet     loperamide (IMODIUM) 1 MG/5ML liquid     magic mouthwash (ENTER INGREDIENTS IN COMMENTS) suspension     magnesium hydroxide (MILK OF MAGNESIA) 400 MG/5ML suspension     mineral  "oil-hydrophilic petrolatum (AQUAPHOR) external ointment     oxyCODONE (ROXICODONE) 5 MG/5ML solution     oxyCODONE (ROXICODONE) 5 MG/5ML solution     polyethylene glycol (MIRALAX) 17 g packet     sennosides (SENOKOT) 8.8 MG/5ML syrup     senna-docusate (SENOKOT-S/PERICOLACE) 8.6-50 MG tablet     No current facility-administered medications for this visit.     Facility-Administered Medications Ordered in Other Visits   Medication     heparin 100 unit/mL injection 5 mL       Allergies:  No Known Allergies      Physical examination:  /69 (BP Location: Left arm, Patient Position: Sitting, Cuff Size: Adult Small)   Pulse 104   Temp 98.1  F (36.7  C) (Tympanic)   Resp 16   Ht 1.613 m (5' 3.5\")   Wt 40.4 kg (89 lb)   SpO2 98%   BMI 15.52 kg/m      The patient is alert and oriented.    Rai Castillo MD            Oncology Rooming Note    March 1, 2024 10:33 AM   Adrianna Pereira is a 63 year old female who presents for:    Chief Complaint   Patient presents with     Oncology Clinic Visit     Malignant neoplasm metastatic to lung, unspecified laterality - Labs provider and infusion     Initial Vitals: /69 (BP Location: Left arm, Patient Position: Sitting, Cuff Size: Adult Small)   Pulse 104   Temp 98.1  F (36.7  C) (Tympanic)   Resp 16   Ht 1.613 m (5' 3.5\")   Wt 40.4 kg (89 lb)   SpO2 98%   BMI 15.52 kg/m   Estimated body mass index is 15.52 kg/m  as calculated from the following:    Height as of this encounter: 1.613 m (5' 3.5\").    Weight as of this encounter: 40.4 kg (89 lb). Body surface area is 1.35 meters squared.  Moderate Pain (4) Comment: Data Unavailable   No LMP recorded. Patient is postmenopausal.  Allergies reviewed: Yes  Medications reviewed: Yes    Medications: Medication refills not needed today.  Pharmacy name entered into EPIC:    [id number] - [place] - [address] [place] [address] PHARMACY - [place], [place] - [facility] [address] [facility] [place] - " [place] - [address] [facility] [place] [place] - [address] [facility]     Frailty Screening:   Is the patient here for a new oncology consult visit in cancer care? 2. No      Clinical concerns:  None      Tanya Philippe CMA                Again, thank you for allowing me to participate in the care of your patient.        Sincerely,        Rai Castillo MD

## 2024-03-04 ENCOUNTER — APPOINTMENT (OUTPATIENT)
Dept: GENERAL RADIOLOGY | Facility: CLINIC | Age: 64
End: 2024-03-04
Attending: FAMILY MEDICINE
Payer: COMMERCIAL

## 2024-03-04 ENCOUNTER — APPOINTMENT (OUTPATIENT)
Dept: INTERVENTIONAL RADIOLOGY/VASCULAR | Facility: CLINIC | Age: 64
End: 2024-03-04
Attending: STUDENT IN AN ORGANIZED HEALTH CARE EDUCATION/TRAINING PROGRAM
Payer: COMMERCIAL

## 2024-03-04 ENCOUNTER — HOSPITAL ENCOUNTER (EMERGENCY)
Facility: CLINIC | Age: 64
Discharge: OTHER INSTITUTION | End: 2024-03-04
Attending: FAMILY MEDICINE | Admitting: FAMILY MEDICINE
Payer: COMMERCIAL

## 2024-03-04 ENCOUNTER — HOSPITAL ENCOUNTER (EMERGENCY)
Facility: CLINIC | Age: 64
Discharge: HOME | End: 2024-03-04
Attending: EMERGENCY MEDICINE | Admitting: EMERGENCY MEDICINE
Payer: COMMERCIAL

## 2024-03-04 VITALS
BODY MASS INDEX: 15.03 KG/M2 | TEMPERATURE: 97.8 F | HEART RATE: 93 BPM | OXYGEN SATURATION: 100 % | WEIGHT: 88 LBS | DIASTOLIC BLOOD PRESSURE: 73 MMHG | HEIGHT: 64 IN | RESPIRATION RATE: 16 BRPM | SYSTOLIC BLOOD PRESSURE: 108 MMHG

## 2024-03-04 VITALS
RESPIRATION RATE: 18 BRPM | HEART RATE: 101 BPM | OXYGEN SATURATION: 95 % | SYSTOLIC BLOOD PRESSURE: 104 MMHG | TEMPERATURE: 98.3 F | DIASTOLIC BLOOD PRESSURE: 67 MMHG

## 2024-03-04 DIAGNOSIS — K59.00 CONSTIPATION, UNSPECIFIED CONSTIPATION TYPE: ICD-10-CM

## 2024-03-04 DIAGNOSIS — T85.528A DISLODGED GASTROSTOMY TUBE: ICD-10-CM

## 2024-03-04 DIAGNOSIS — Z93.1 GASTROSTOMY TUBE DEPENDENT (H): ICD-10-CM

## 2024-03-04 LAB
ALBUMIN SERPL BCG-MCNC: 3.5 G/DL (ref 3.5–5.2)
ALP SERPL-CCNC: 94 U/L (ref 40–150)
ALT SERPL W P-5'-P-CCNC: 10 U/L (ref 0–50)
ANION GAP SERPL CALCULATED.3IONS-SCNC: 11 MMOL/L (ref 7–15)
AST SERPL W P-5'-P-CCNC: 18 U/L (ref 0–45)
BASOPHILS # BLD AUTO: 0 10E3/UL (ref 0–0.2)
BASOPHILS NFR BLD AUTO: 0 %
BILIRUB SERPL-MCNC: 0.2 MG/DL
BUN SERPL-MCNC: 37.4 MG/DL (ref 8–23)
CALCIUM SERPL-MCNC: 9 MG/DL (ref 8.8–10.2)
CHLORIDE SERPL-SCNC: 102 MMOL/L (ref 98–107)
CREAT SERPL-MCNC: 0.71 MG/DL (ref 0.51–0.95)
DEPRECATED HCO3 PLAS-SCNC: 23 MMOL/L (ref 22–29)
EGFRCR SERPLBLD CKD-EPI 2021: >90 ML/MIN/1.73M2
EOSINOPHIL # BLD AUTO: 0.1 10E3/UL (ref 0–0.7)
EOSINOPHIL NFR BLD AUTO: 1 %
ERYTHROCYTE [DISTWIDTH] IN BLOOD BY AUTOMATED COUNT: 14.2 % (ref 10–15)
GLUCOSE SERPL-MCNC: 105 MG/DL (ref 70–99)
HCT VFR BLD AUTO: 28.3 % (ref 35–47)
HGB BLD-MCNC: 9.4 G/DL (ref 11.7–15.7)
IMM GRANULOCYTES # BLD: 0.1 10E3/UL
IMM GRANULOCYTES NFR BLD: 1 %
INR PPP: 1.06 (ref 0.85–1.15)
LACTATE SERPL-SCNC: 1.3 MMOL/L (ref 0.7–2)
LYMPHOCYTES # BLD AUTO: 0.9 10E3/UL (ref 0.8–5.3)
LYMPHOCYTES NFR BLD AUTO: 7 %
MCH RBC QN AUTO: 33.2 PG (ref 26.5–33)
MCHC RBC AUTO-ENTMCNC: 33.2 G/DL (ref 31.5–36.5)
MCV RBC AUTO: 100 FL (ref 78–100)
MONOCYTES # BLD AUTO: 0.7 10E3/UL (ref 0–1.3)
MONOCYTES NFR BLD AUTO: 5 %
NEUTROPHILS # BLD AUTO: 11.4 10E3/UL (ref 1.6–8.3)
NEUTROPHILS NFR BLD AUTO: 86 %
NRBC # BLD AUTO: 0 10E3/UL
NRBC BLD AUTO-RTO: 0 /100
PLATELET # BLD AUTO: 314 10E3/UL (ref 150–450)
POTASSIUM SERPL-SCNC: 4.1 MMOL/L (ref 3.4–5.3)
PROT SERPL-MCNC: 6.7 G/DL (ref 6.4–8.3)
RBC # BLD AUTO: 2.83 10E6/UL (ref 3.8–5.2)
SODIUM SERPL-SCNC: 136 MMOL/L (ref 135–145)
WBC # BLD AUTO: 13.2 10E3/UL (ref 4–11)

## 2024-03-04 PROCEDURE — C1769 GUIDE WIRE: HCPCS

## 2024-03-04 PROCEDURE — 99284 EMERGENCY DEPT VISIT MOD MDM: CPT | Performed by: EMERGENCY MEDICINE

## 2024-03-04 PROCEDURE — 49450 REPLACE G/C TUBE PERC: CPT | Performed by: PHYSICIAN ASSISTANT

## 2024-03-04 PROCEDURE — 43762 RPLC GTUBE NO REVJ TRC: CPT

## 2024-03-04 PROCEDURE — 83605 ASSAY OF LACTIC ACID: CPT | Performed by: NURSE PRACTITIONER

## 2024-03-04 PROCEDURE — 82247 BILIRUBIN TOTAL: CPT | Performed by: NURSE PRACTITIONER

## 2024-03-04 PROCEDURE — 99284 EMERGENCY DEPT VISIT MOD MDM: CPT | Mod: 25 | Performed by: FAMILY MEDICINE

## 2024-03-04 PROCEDURE — 49450 REPLACE G/C TUBE PERC: CPT

## 2024-03-04 PROCEDURE — 99284 EMERGENCY DEPT VISIT MOD MDM: CPT | Mod: 25

## 2024-03-04 PROCEDURE — 84155 ASSAY OF PROTEIN SERUM: CPT | Performed by: NURSE PRACTITIONER

## 2024-03-04 PROCEDURE — 85610 PROTHROMBIN TIME: CPT | Performed by: NURSE PRACTITIONER

## 2024-03-04 PROCEDURE — 85025 COMPLETE CBC W/AUTO DIFF WBC: CPT | Performed by: NURSE PRACTITIONER

## 2024-03-04 PROCEDURE — 74019 RADEX ABDOMEN 2 VIEWS: CPT

## 2024-03-04 PROCEDURE — 43762 RPLC GTUBE NO REVJ TRC: CPT | Performed by: FAMILY MEDICINE

## 2024-03-04 PROCEDURE — 36415 COLL VENOUS BLD VENIPUNCTURE: CPT | Performed by: NURSE PRACTITIONER

## 2024-03-04 PROCEDURE — 250N000011 HC RX IP 250 OP 636: Performed by: NURSE PRACTITIONER

## 2024-03-04 PROCEDURE — 99285 EMERGENCY DEPT VISIT HI MDM: CPT | Performed by: EMERGENCY MEDICINE

## 2024-03-04 RX ORDER — POLYETHYLENE GLYCOL 3350 17 G/17G
1 POWDER, FOR SOLUTION ORAL 2 TIMES DAILY
Qty: 527 G | Refills: 0 | Status: SHIPPED | OUTPATIENT
Start: 2024-03-04 | End: 2024-04-03

## 2024-03-04 RX ORDER — POLYETHYLENE GLYCOL 3350 17 G/17G
17 POWDER, FOR SOLUTION ORAL ONCE
Status: DISCONTINUED | OUTPATIENT
Start: 2024-03-04 | End: 2024-03-04 | Stop reason: HOSPADM

## 2024-03-04 RX ORDER — HEPARIN SODIUM (PORCINE) LOCK FLUSH IV SOLN 100 UNIT/ML 100 UNIT/ML
5-10 SOLUTION INTRAVENOUS
Status: DISCONTINUED | OUTPATIENT
Start: 2024-03-04 | End: 2024-03-04 | Stop reason: HOSPADM

## 2024-03-04 RX ORDER — HEPARIN SODIUM (PORCINE) LOCK FLUSH IV SOLN 100 UNIT/ML 100 UNIT/ML
SOLUTION INTRAVENOUS
Status: DISCONTINUED
Start: 2024-03-04 | End: 2024-03-04 | Stop reason: HOSPADM

## 2024-03-04 RX ORDER — BISACODYL 10 MG
10 SUPPOSITORY, RECTAL RECTAL DAILY PRN
Qty: 7 SUPPOSITORY | Refills: 0 | Status: SHIPPED | OUTPATIENT
Start: 2024-03-04 | End: 2024-03-11

## 2024-03-04 RX ADMIN — Medication 5 ML: at 16:24

## 2024-03-04 ASSESSMENT — ACTIVITIES OF DAILY LIVING (ADL)
ADLS_ACUITY_SCORE: 36
ADLS_ACUITY_SCORE: 38

## 2024-03-04 ASSESSMENT — COLUMBIA-SUICIDE SEVERITY RATING SCALE - C-SSRS
6. HAVE YOU EVER DONE ANYTHING, STARTED TO DO ANYTHING, OR PREPARED TO DO ANYTHING TO END YOUR LIFE?: NO
2. HAVE YOU ACTUALLY HAD ANY THOUGHTS OF KILLING YOURSELF IN THE PAST MONTH?: NO
6. HAVE YOU EVER DONE ANYTHING, STARTED TO DO ANYTHING, OR PREPARED TO DO ANYTHING TO END YOUR LIFE?: NO
1. IN THE PAST MONTH, HAVE YOU WISHED YOU WERE DEAD OR WISHED YOU COULD GO TO SLEEP AND NOT WAKE UP?: NO
2. HAVE YOU ACTUALLY HAD ANY THOUGHTS OF KILLING YOURSELF IN THE PAST MONTH?: NO
1. IN THE PAST MONTH, HAVE YOU WISHED YOU WERE DEAD OR WISHED YOU COULD GO TO SLEEP AND NOT WAKE UP?: NO

## 2024-03-04 NOTE — ED TRIAGE NOTES
States no BM for the past 10 days and has tried stool softeners, 2 enemas and no results.  Also her feeding tube fell out this morning.  She gets meds and nutrition through it.  Has had tube for the past 6 months.

## 2024-03-04 NOTE — DISCHARGE INSTRUCTIONS
We treat constipation at home with both medication and non-medication treatments.   - Continue your tube feeds as prescribed, if constipation becomes a persistent problem discussed with your primary provider about changing to a high-fiber tube feed  - Make sure you are taking in water through your G-tube as well so that you stay hydrated, this can help with constipation  - Walk around and move around is much as you are able as this can help relieve constipation    Continue using the Senakot twice daily, this medication does not make you have a bowel movement but it helps to soften your stool so that you are more easily able to have a bowel movement.  Hold this medication if you are having diarrhea or bloody stool.      The other medication I recommend is Miralax, it is a laxative that can help you have a bowel movement. If you are taking the Senna ever day, you may not need to also take the Miralax. And definitely do not take the Miralax if you are having diarrhea. The Miralax may make you have several loose stools after you take it, as it is helping to clear out the constipation.     If you have taken both the Senokot and the MiraLAX and if not have a bowel movement insert a Dulcolax suppository.  You may try a MiraLAX tonight and if no result try suppository in the morning.  You may also try the suppository tonight, however it may make you go to the bathroom within 1-2 hours.    It is important to not become very constipated and to try and regulate your bowels so you are having normal soft bowel movements every day or every other day. If you become too constipated it can cause pain and be harder to get your bowels moving again, it can also cause impactions where it is very difficult to get the stool moving. It is important to strike a balance where you are not having diarrhea, but not constipated.     If you develop worsening abdominal pain, abdominal swelling or distention, vomiting, fever, or other symptoms that  are concerning to you please return to the Emergency Department.

## 2024-03-04 NOTE — ED PROVIDER NOTES
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  Patient states she has had the feeding tube in since August of last year.  This morning it got dislodged.  Patient gets her feedings through the feeding tube.  She states that it is not unusual for her to did not have a bowel movement for a week at a time.  They have tried stool softeners, 2 enemas and no results.  The feeding tube came out about 2 to 3 hours ago.    Problem List:  Patient Active Problem List    Diagnosis Date Noted    Malignant neoplasm metastatic to lung, unspecified laterality (H) 02/16/2024     Priority: Medium    Malignant neoplasm metastatic to bone (H) 02/16/2024     Priority: Medium    Antineoplastic chemotherapy induced anemia 11/29/2023     Priority: Medium    Lymphedema 11/01/2023     Priority: Medium    Hyperlipidemia LDL goal <130 10/16/2023     Priority: Medium    Underweight 10/16/2023     Priority: Medium    S/P percutaneous endoscopic gastrostomy (PEG) tube placement (H) 10/16/2023     Priority: Medium    Tracheostomy dependent (H) 10/16/2023     Priority: Medium    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) 09/28/2023     Priority: Medium    Encounter for postoperative care 08/17/2023     Priority: Medium       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)     Tobacco dependence syndrome     Underweight 10/16/2023       MEDS: acetaminophen (TYLENOL) 325 MG  tablet  chlorhexidine (PERIDEX) 0.12 % solution  dexAMETHasone (DECADRON) 4 MG tablet  diazepam (VALIUM) 5 MG tablet  ibuprofen (ADVIL/MOTRIN) 600 MG tablet  loperamide (IMODIUM) 1 MG/5ML liquid  magic mouthwash (ENTER INGREDIENTS IN COMMENTS) suspension  magnesium hydroxide (MILK OF MAGNESIA) 400 MG/5ML suspension  mineral oil-hydrophilic petrolatum (AQUAPHOR) external ointment  oxyCODONE (ROXICODONE) 5 MG/5ML solution  oxyCODONE (ROXICODONE) 5 MG/5ML solution  polyethylene glycol (MIRALAX) 17 g packet  senna-docusate (SENOKOT-S/PERICOLACE) 8.6-50 MG tablet  sennosides (SENOKOT) 8.8 MG/5ML syrup        ALLERGIES:  No Known Allergies    Past Surgical History:   Procedure Laterality Date    DISSECTION RADICAL NECK MODIFIED Bilateral 8/17/2023    Procedure: Bilateral modified radical neck dissection;  Surgeon: Tyshawn Dao MD;  Location: [facility] OR    ENT SURGERY      oral biopsy    EXCISE LESION LIP N/A 8/17/2023    Procedure: Chin resection;  Surgeon: Tyshawn Dao MD;  Location: [facility]    GLOSSECTOMY PARTIAL N/A 8/17/2023    Procedure: GLOSSECTOMY, PARTIAL;  Surgeon: Tyshawn Dao MD;  Location: [facility]    GRAFT BONE FREE VASCULARIZED FROM LOWER EXTREMITY Left 8/17/2023    Procedure: Left fibula free flap;  Surgeon: Darien Thorne MD;  Location: [facility]    GRAFT FREE VASCULARIZED (LOCATION) Left 8/17/2023    Procedure: anterolateral thigh free flap;  Surgeon: Darien Thorne MD;  Location: [facility]    GRAFT SKIN SPLIT THICKNESS FROM EXTREMITY Left 8/17/2023    Procedure: Split thickness skin graft from left thigh;  Surgeon: Darien Thorne MD;  Location: [facility]    GYN SURGERY      tubal ligation    INSERT PORT VASCULAR ACCESS N/A 10/18/2023    Procedure: INSERTION, VASCULAR ACCESS PORT, Right Internal Jugular;  Surgeon: Sheldon Lim MD;  Location: [facility]    IR GASTROSTOMY TUBE PERCUTANEOUS PLCMNT  8/23/2023    IRRIGATION AND DEBRIDEMENT ORAL, COMBINED N/A 8/24/2023    Procedure: EXAM  "UNDER ANESTHESIA, ORAL CAVITY, IRRIGATION AND DEBRIDEMENT, ORAL CAVITY, neck dissection;  Surgeon: Darien Thorne MD;  Location: [facility]    IRRIGATION AND DEBRIDEMENT ORAL, COMBINED N/A 8/29/2023    Procedure: EXCISIONAL DEBRIDEMENT NECK, IRRIGATION OF ORAL CAVITY;  Surgeon: Darien Thorne MD;  Location: [facility]    MANDIBULECTOMY TOTAL N/A 8/17/2023    Procedure: segmental mandibulectomy;  Surgeon: Tyshawn Dao MD;  Location: [facility]    TRACHEOSTOMY N/A 8/17/2023    Procedure: Tracheostomy placement, nasogastric tube placement;  Surgeon: Tyshawn Dao MD;  Location: [facility]       Social History     Tobacco Use    Smoking status: Former     Packs/day: 1.00     Years: 40.00     Additional pack years: 0.00     Total pack years: 40.00     Types: Cigarettes     Passive exposure: Current    Smokeless tobacco: Never    Tobacco comments:     Cutting down to 5-7 cigarettes per day   Vaping Use    Vaping Use: Never used   Substance Use Topics    Alcohol use: Yes     Comment: 1 drink a day    Drug use: Not Currently     Types: Marijuana         Review of Systems   Except as noted in HPI, all other systems were reviewed and are negative    Physical Exam   Vitals were reviewed  Patient Vitals for the past 12 hrs:   BP Temp Temp src Pulse Resp SpO2 Height Weight   03/04/24 1230 108/73 -- -- 93 -- 100 % -- --   03/04/24 1144 111/76 -- -- 94 -- 98 % -- --   03/04/24 1016 111/76 97.8  F (36.6  C) Temporal (!) 121 16 100 % 1.626 m (5' 4\") 39.9 kg (88 lb)     GENERAL APPEARANCE: Alert, no acute distress  FACE: normal facies  EYES: Pupils are equal  HENT: normal external exam  NECK: no adenopathy or asymmetry  RESP: normal respiratory effort; clear breath sounds bilaterally  CV: regular rate and rhythm; no significant murmurs, gallops or rubs  ABD: soft, thin abdomen; small ostomy site in the mid abdomen.  No tenderness; no rebound or guarding; bowel sounds are normal  SKIN: no worrisome rash        Available " Lab/Imaging Results     Results for orders placed or performed during the hospital encounter of 03/04/24 (from the past 24 hour(s))   XR Abdomen 2 Views    Narrative    ABDOMEN TWO-THREE VIEW  3/4/2024 12:01 PM     HISTORY: Constipated x 10 days; feeding tube dislodged.    COMPARISON: August 17, 2023    FINDINGS: Moderate  amount of stool. No free air. There are no air  filled distended loops of small bowel. The colon is not distended. The  lung bases are unremarkable.      Impression    IMPRESSION: Nonobstructed bowel gas pattern.               Impression     Final diagnoses:   Dislodged gastrostomy tube   Constipation, unspecified constipation type         ED Course & Medical Decision Making   Adrianna Pereira is a 63 year old female who presented to the emergency department with a dislodged gastrostomy tube that occurred 2 to 3 hours ago.  This is the same tube that she had from last August when it was placed at the [facility].  Patient gets all of her fluids and nutrition through the gastrostomy tube.  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  She has had surgeries including radiation treatment.  Patient is accompanied by her .  They also expressed concerns for constipation as she has not had a bowel movement in 10 days.  She will sometimes go a week without but this is one of the longer times that she has not.  She does not have any significant rectal or abdominal pain.     Mental signs reveal a temp of 97.8, blood pressure 108/73, heart rate initially 121, subsequent heart rate of 93, respiratory 16 with 100% oxygen saturation.  Patient appears slightly cachectic, lung and heart exams normal.  Abdominal exam reveals a small ostomy site over the mid abdomen.  No significant localized abdominal tenderness.    Abdominal x-ray reveals moderate amount of stool.  No free air.  There are no air-fluid distended loops of small bowel.  The colon is not distended.  I  attempted to replace the gastrostomy tube with a 20-Hungarian gastrostomy tube.  Patient has an 18 Hungarian, and we do not have any other available sizes in the emergency department or in our surgery department.  I attempted several times and was unsuccessful.  Patient did not want me to make any further attempts.    12:32 PM: Spoke with Dr. Nicola Geronimo, emergency physician at the [facility] who has graciously accepted the patient in transfer.  They will contact interventional radiology if they are unable to replace the gastrostomy tube.    I discussed our plan of care with the patient and her .  They are in agreement with transfer.  They did not want me to attempt any further insertion of the 20-gauge gastrostomy tube.  This is the only size gastrostomy tube that we have.    Patient was transferred by private vehicle to the [facility].  Patient and  were in agreement and understanding of the transfer.    Disclaimer: This note consists of words and symbols derived from keyboarding and dictation using voice recognition software.  As a result, there may be errors that have gone undetected.  Please consider this when interpreting information found in this note.       Maggi Urena MD  03/04/24 5530

## 2024-03-04 NOTE — ED PROVIDER NOTES
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  She is G-tube dependent for all of her tube feeds and has been since August 2023.  She last used it for medications and tube feeding around 06 30 this morning.  Reports that it fell out around on 0930 and she was unable to replace it at home.  She reports that she has not had a bowel movement in approximately 10 days, states that this is not completely unusual for her as she typically only has 1 bowel movement per week.  Has tried over-the-counter enemas x 2 at home as well as stool softeners through her G-tube also without result.      She was seen at [facility] emergency department this morning where an abdominal x-ray was performed, no suspicion for bowel obstruction.  She was referred to this emergency department for replacement of her G-tube by IR as they were unable to replace it there.    Past Medical History  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)     Tobacco dependence syndrome     Underweight 10/16/2023     Past Surgical History:   Procedure Laterality Date    DISSECTION RADICAL NECK MODIFIED Bilateral 8/17/2023    Procedure: Bilateral modified radical neck dissection;  Surgeon: Tyshawn Dao MD;  Location: [facility]    ENT SURGERY      oral biopsy    EXCISE LESION LIP N/A 8/17/2023    Procedure: Chin resection;  Surgeon: Tyshawn Dao MD;  [facility]: [facility]    GLOSSECTOMY PARTIAL N/A 8/17/2023    Procedure: GLOSSECTOMY, PARTIAL;  Surgeon: Tyshawn Dao  MD SALTY;  Location: [facility] OR    GRAFT BONE FREE VASCULARIZED FROM LOWER EXTREMITY Left 8/17/2023    Procedure: Left fibula free flap;  Surgeon: Darien Thorne MD;  [facility]: [facility]    GRAFT FREE VASCULARIZED (LOCATION) Left 8/17/2023    Procedure: anterolateral thigh free flap;  Surgeon: Darien Thorne MD;  [facility]    GRAFT SKIN SPLIT THICKNESS FROM EXTREMITY Left 8/17/2023    Procedure: Split thickness skin graft from left thigh;  Surgeon: Darien Thorne MD;  Location[facility]    GYN SURGERY      tubal ligation    INSERT PORT VASCULAR ACCESS N/A 10/18/2023    Procedure: INSERTION, VASCULAR ACCESS PORT, Right Internal Jugular;  Surgeon: Sheldon Lim MD;  Location: [facility]    IR GASTROSTOMY TUBE CHANGE  3/4/2024    IR GASTROSTOMY TUBE PERCUTANEOUS PLCMNT  8/23/2023    IRRIGATION AND DEBRIDEMENT ORAL, COMBINED N/A 8/24/2023    Procedure: EXAM UNDER ANESTHESIA, ORAL CAVITY, IRRIGATION AND DEBRIDEMENT, ORAL CAVITY, neck dissection;  Surgeon: Darien Thorne MD;  [facility]    IRRIGATION AND DEBRIDEMENT ORAL, COMBINED N/A 8/29/2023    Procedure: EXCISIONAL DEBRIDEMENT NECK, IRRIGATION OF ORAL CAVITY;  Surgeon: Darien Thorne MD;  [facility]    MANDIBULECTOMY TOTAL N/A 8/17/2023    Procedure: segmental mandibulectomy;  Surgeon: Tyshawn Dao MD;  [facility]    TRACHEOSTOMY N/A 8/17/2023    Procedure: Tracheostomy placement, nasogastric tube placement;  Surgeon: Tyshawn Dao MD;  [facility]     bisacodyl (DULCOLAX) 10 MG suppository  polyethylene glycol (MIRALAX) 17 GM/Dose powder  acetaminophen (TYLENOL) 325 MG tablet  chlorhexidine (PERIDEX) 0.12 % solution  dexAMETHasone (DECADRON) 4 MG tablet  diazepam (VALIUM) 5 MG tablet  ibuprofen (ADVIL/MOTRIN) 600 MG tablet  loperamide (IMODIUM) 1 MG/5ML liquid  magic mouthwash (ENTER INGREDIENTS IN COMMENTS) suspension  magnesium hydroxide (MILK OF MAGNESIA) 400 MG/5ML suspension  mineral oil-hydrophilic  petrolatum (AQUAPHOR) external ointment  oxyCODONE (ROXICODONE) 5 MG/5ML solution  oxyCODONE (ROXICODONE) 5 MG/5ML solution  polyethylene glycol (MIRALAX) 17 g packet  senna-docusate (SENOKOT-S/PERICOLACE) 8.6-50 MG tablet  sennosides (SENOKOT) 8.8 MG/5ML syrup      No Known Allergies  Family History  No family history on file.  Social History   Social History     Tobacco Use    Smoking status: Former     Packs/day: 1.00     Years: 40.00     Additional pack years: 0.00     Total pack years: 40.00     Types: Cigarettes     Passive exposure: Current    Smokeless tobacco: Never    Tobacco comments:     Cutting down to 5-7 cigarettes per day   Vaping Use    Vaping Use: Never used   Substance Use Topics    Alcohol use: Yes     Comment: 1 drink a day    Drug use: Not Currently     Types: Marijuana         A medically appropriate review of systems was performed with pertinent positives and negatives noted in the HPI, and all other systems negative.    Physical Exam   BP: 104/67  Pulse: 101  Temp: 98.3  F (36.8  C)  Resp: 18  SpO2: 95 %  Physical Exam  Vitals and nursing note reviewed.   Constitutional:       Appearance: Normal appearance.   HENT:      Head: Normocephalic and atraumatic.   Cardiovascular:      Rate and Rhythm: Normal rate and regular rhythm.      Heart sounds: Normal heart sounds.   Pulmonary:      Effort: Pulmonary effort is normal.      Breath sounds: Normal breath sounds.   Abdominal:      General: Abdomen is flat.      Palpations: Abdomen is soft.      Tenderness: There is no abdominal tenderness.      Comments: G tube displaced, nothing inserted at insertion site    Skin:     General: Skin is warm and dry.      Capillary Refill: Capillary refill takes less than 2 seconds.   Neurological:      General: No focal deficit present.      Mental Status: She is alert and oriented to person, place, and time.   Psychiatric:         Mood and Affect: Mood normal.         Behavior: Behavior normal.           ED  Course, Procedures, & Data      Procedures          Results for orders placed or performed during the hospital encounter of 03/04/24   IR Procedure Note     Status: None    Narrative    Gabi West PA-C     3/4/2024  3:20 PM  [facility]    Procedure: IR Procedure Note    Date/Time: 3/4/2024 3:18 PM    Performed by: Gabi West PA-C  Authorized by: Gabi West PA-C  IR Fellow Physician:  Other(s) attending procedure: MIGUE Titus PA-C      UNIVERSAL PROTOCOL   Site Marked: NA  Prior Images Obtained and Reviewed:  Yes  Required items: Required blood products, implants, devices and special   equipment available    Patient identity confirmed:  Verbally with patient, arm band, provided   demographic data and hospital-assigned identification number  NA - No sedation, light sedation, or local anesthesia  Confirmation Checklist:  Patient's identity using two indicators, relevant   allergies, procedure was appropriate and matched the consent or emergent   situation and correct equipment/implants were available  Time out: Immediately prior to the procedure a time out was called    Universal Protocol: the Joint Commission Universal Protocol was followed    Preparation: Patient was prepped and draped in usual sterile fashion       ANESTHESIA    Anesthesia:  Local infiltration  Local Anesthetic:  Topical anesthetic  Anesthetic Total (mL):  3      SEDATION    Patient Sedated: No    Fluoroscopy Time: 0 minute(s)  See dictated procedure note for full details.  Findings: Gastrostomy tube dislodged and pulled out. New gastrostomy tube   replaced.     Specimens: none    Complications: None    Condition: Stable    Plan: Patient to resume normal cares per prior regimen.       PROCEDURE  Describe Procedure: Replacement of 18 Yi gastrostomy tube. Patient   tolerated procedure well. Resume cares per protocol.   Patient Tolerance:  Patient tolerated the procedure well with no  immediate   complications  Length of time physician/provider present for 1:1 monitoring during   sedation: 0   IR Gastrostomy Tube Change     Status: None    Narrative    DIAGNOSIS: Dislodged gastrostomy tube    PROCEDURE: Gastrostomy tube exchange    Impression    IMPRESSION: Completed fluoroscopy-guided exchange of gastrostomy tube.  New 18 Marshallese 3.0 cm gastrostomy tube ready for immediate use. 8cc of  sterile water inserted into balloon     PLAN: Patient should return in 9-12 months for routine exchange, or  sooner if necessary.      ----------    CLINICAL HISTORY: 62 y/o F s/p 18Fr G-tube placement in 2023 presents  to the ED for dislodged G-tube.     PERFORMED BY: Gabi West PA-C    CONSENT: Continuation of care    MEDICATIONS: None    DESCRIPTION: Lidocaine gel was used to anesthetize the tube tract.   New gastrostomy tube selected, prepared and inserted into existing  tract. Retention balloon filled with 8 mL saline.    COMPLICATIONS: No immediate concerns, the patient remained stable  throughout the procedure and tolerated it well.    ESTIMATED BLOOD LOSS: None    SPECIMENS: None    ADIN ENAMORADO         SYSTEM ID:  [id number]   Comprehensive metabolic panel     Status: Abnormal   Result Value Ref Range    Sodium 136 135 - 145 mmol/L    Potassium 4.1 3.4 - 5.3 mmol/L    Carbon Dioxide (CO2) 23 22 - 29 mmol/L    Anion Gap 11 7 - 15 mmol/L    Urea Nitrogen 37.4 (H) 8.0 - 23.0 mg/dL    Creatinine 0.71 0.51 - 0.95 mg/dL    GFR Estimate >90 >60 mL/min/1.73m2    Calcium 9.0 8.8 - 10.2 mg/dL    Chloride 102 98 - 107 mmol/L    Glucose 105 (H) 70 - 99 mg/dL    Alkaline Phosphatase 94 40 - 150 U/L    AST 18 0 - 45 U/L    ALT 10 0 - 50 U/L    Protein Total 6.7 6.4 - 8.3 g/dL    Albumin 3.5 3.5 - 5.2 g/dL    Bilirubin Total 0.2 <=1.2 mg/dL   INR     Status: Normal   Result Value Ref Range    INR 1.06 0.85 - 1.15   Lactic acid whole blood     Status: Normal   Result Value Ref Range    Lactic Acid 1.3 0.7 - 2.0 mmol/L    CBC with platelets and differential     Status: Abnormal   Result Value Ref Range    WBC Count 13.2 (H) 4.0 - 11.0 10e3/uL    RBC Count 2.83 (L) 3.80 - 5.20 10e6/uL    Hemoglobin 9.4 (L) 11.7 - 15.7 g/dL    Hematocrit 28.3 (L) 35.0 - 47.0 %     78 - 100 fL    MCH 33.2 (H) 26.5 - 33.0 pg    MCHC 33.2 31.5 - 36.5 g/dL    RDW 14.2 10.0 - 15.0 %    Platelet Count 314 150 - 450 10e3/uL    % Neutrophils 86 %    % Lymphocytes 7 %    % Monocytes 5 %    % Eosinophils 1 %    % Basophils 0 %    % Immature Granulocytes 1 %    NRBCs per 100 WBC 0 <1 /100    Absolute Neutrophils 11.4 (H) 1.6 - 8.3 10e3/uL    Absolute Lymphocytes 0.9 0.8 - 5.3 10e3/uL    Absolute Monocytes 0.7 0.0 - 1.3 10e3/uL    Absolute Eosinophils 0.1 0.0 - 0.7 10e3/uL    Absolute Basophils 0.0 0.0 - 0.2 10e3/uL    Absolute Immature Granulocytes 0.1 <=0.4 10e3/uL    Absolute NRBCs 0.0 10e3/uL   CBC with platelets differential     Status: Abnormal    Narrative    The following orders were created for panel order CBC with platelets differential.  Procedure                               Abnormality         Status                     ---------                               -----------         ------                     CBC with platelets and d...[[id number]]  Abnormal            Final result                 Please view results for these tests on the individual orders.   Results for orders placed or performed during the hospital encounter of 03/04/24   XR Abdomen 2 Views     Status: None    Narrative    ABDOMEN TWO-THREE VIEW  3/4/2024 12:01 PM     HISTORY: Constipated x 10 days; feeding tube dislodged.    COMPARISON: August 17, 2023    FINDINGS: Moderate  amount of stool. No free air. There are no air  filled distended loops of small bowel. The colon is not distended. The  lung bases are unremarkable.      Impression    IMPRESSION: Nonobstructed bowel gas pattern.     CHRISTIAN BAILEY MD         SYSTEM ID:  [id number]     Medications - No data to display  Labs  Ordered and Resulted from Time of ED Arrival to Time of ED Departure   COMPREHENSIVE METABOLIC PANEL - Abnormal       Result Value    Sodium 136      Potassium 4.1      Carbon Dioxide (CO2) 23      Anion Gap 11      Urea Nitrogen 37.4 (*)     Creatinine 0.71      GFR Estimate >90      Calcium 9.0      Chloride 102      Glucose 105 (*)     Alkaline Phosphatase 94      AST 18      ALT 10      Protein Total 6.7      Albumin 3.5      Bilirubin Total 0.2     CBC WITH PLATELETS AND DIFFERENTIAL - Abnormal    WBC Count 13.2 (*)     RBC Count 2.83 (*)     Hemoglobin 9.4 (*)     Hematocrit 28.3 (*)           MCH 33.2 (*)     MCHC 33.2      RDW 14.2      Platelet Count 314      % Neutrophils 86      % Lymphocytes 7      % Monocytes 5      % Eosinophils 1      % Basophils 0      % Immature Granulocytes 1      NRBCs per 100 WBC 0      Absolute Neutrophils 11.4 (*)     Absolute Lymphocytes 0.9      Absolute Monocytes 0.7      Absolute Eosinophils 0.1      Absolute Basophils 0.0      Absolute Immature Granulocytes 0.1      Absolute NRBCs 0.0     INR - Normal    INR 1.06     LACTIC ACID WHOLE BLOOD - Normal    Lactic Acid 1.3       IR Gastrostomy Tube Change   Final Result   IMPRESSION: Completed fluoroscopy-guided exchange of gastrostomy tube.   New 18 Czech 3.0 cm gastrostomy tube ready for immediate use. 8cc of   sterile water inserted into balloon       PLAN: Patient should return in 9-12 months for routine exchange, or   sooner if necessary.        ----------      CLINICAL HISTORY: 62 y/o F s/p 18Fr G-tube placement in 2023 presents   to the ED for dislodged G-tube.       PERFORMED BY: Gabi West PA-C      CONSENT: Continuation of care      MEDICATIONS: None      DESCRIPTION: Lidocaine gel was used to anesthetize the tube tract.    New gastrostomy tube selected, prepared and inserted into existing   tract. Retention balloon filled with 8 mL saline.      COMPLICATIONS: No immediate concerns, the patient remained stable    throughout the procedure and tolerated it well.      ESTIMATED BLOOD LOSS: None      SPECIMENS: None      ADIN ENAMORADO            SYSTEM ID:  [id number]             Critical care was not performed.     Medical Decision Making  The patient's presentation was of high complexity (an acute health issue posing potential threat to life or bodily function).    The patient's evaluation involved:  review of external note(s) from 1 sources (prior ED visit from this morning)  independent interpretation of testing performed by another health professional (abdominal x-ray performed at outside emergency department this morning)  discussion of management or test interpretation with another health professional (interventional radiology)    The patient's management necessitated moderate risk (prescription drug management including medications given in the ED) and high risk (a decision regarding emergency major procedure (Replacement of G-tube in IR)).    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    Discussed with interventional radiology who took the patient for G-tube replacement.  Plan was to have patient return to the emergency department following procedure in IR for bowel regiment as x-ray imaging from outside emergency department this morning showed large amount of stool without concern for obstruction.  However unfortunately due to capacity issues within the emergency department there was no room available for the patient to receive medications to help alleviate her constipation.  After waiting in the emergency department lobby for some time patient and her  requested discharge.  Will discharge patient home with recommendation she continue to use the senna as well as add  new prescriptions for MiraLAX and docusate suppository.  Discussed close return precautions for when she should be seen in the emergency department.  Patient and her significant other felt comfortable with this plan and patient was discharged from the emergency department.    I have reviewed the nursing notes. I have reviewed the findings, diagnosis, plan and need for follow up with the patient.    Discharge Medication List as of 3/4/2024  4:20 PM        START taking these medications    Details   bisacodyl (DULCOLAX) 10 MG suppository Place 1 suppository (10 mg) rectally daily as needed for constipation, Disp-7 suppository, R-0, E-Prescribe      !! polyethylene glycol (MIRALAX) 17 GM/Dose powder Take 17 g (1 Capful) by mouth 2 times daily for 30 days, Disp-527 g, R-0, E-Prescribe       !! - Potential duplicate medications found. Please discuss with provider.          Final diagnoses:   Gastrostomy tube dependent (H)   Dislodged gastrostomy tube       ELISABETH Gunter CNP   formerly [facility] EMERGENCY DEPARTMENT  3/4/2024     Craole Santos APRN CNP  03/04/24 [id number]

## 2024-03-04 NOTE — ED TRIAGE NOTES
history of squamous cell CA.   Dependent on feeding tube with gastrostomy tube.   G-tube fell out at 0930 this morning  No BM in 10 days       Triage Assessment (Adult)       Row Name 03/04/24 5437          Triage Assessment    Airway WDL WDL        Respiratory WDL    Respiratory WDL WDL        Skin Circulation/Temperature WDL    Skin Circulation/Temperature WDL WDL        Cardiac WDL    Cardiac WDL WDL        Peripheral/Neurovascular WDL    Peripheral Neurovascular WDL WDL        Cognitive/Neuro/Behavioral WDL    Cognitive/Neuro/Behavioral WDL WDL

## 2024-03-04 NOTE — CONSULTS
Patient with 18 Telugu gastrostomy tube placed by IR in 2023. In ED for dislodged tube. IR aware, will replace bedside in ED without imaging.

## 2024-03-04 NOTE — PROCEDURES
[facility]    Procedure: IR Procedure Note    Date/Time: 3/4/2024 3:18 PM    Performed by: Gabi West PA-C  Authorized by: Gabi West PA-C  IR Fellow Physician:  Other(s) attending procedure: MIGUE Titus PA-C      UNIVERSAL PROTOCOL   Site Marked: NA  Prior Images Obtained and Reviewed:  Yes  Required items: Required blood products, implants, devices and special equipment available    Patient identity confirmed:  Verbally with patient, arm band, provided demographic data and hospital-assigned identification number  NA - No sedation, light sedation, or local anesthesia  Confirmation Checklist:  Patient's identity using two indicators, relevant allergies, procedure was appropriate and matched the consent or emergent situation and correct equipment/implants were available  Time out: Immediately prior to the procedure a time out was called    Universal Protocol: the Joint Commission Universal Protocol was followed    Preparation: Patient was prepped and draped in usual sterile fashion       ANESTHESIA    Anesthesia:  Local infiltration  Local Anesthetic:  Topical anesthetic  Anesthetic Total (mL):  3      SEDATION    Patient Sedated: No    Fluoroscopy Time: 0 minute(s)  See dictated procedure note for full details.  Findings: Gastrostomy tube dislodged and pulled out. New gastrostomy tube replaced.     Specimens: none    Complications: None    Condition: Stable    Plan: Patient to resume normal cares per prior regimen.       PROCEDURE  Describe Procedure: Replacement of 18 Finnish gastrostomy tube. Patient tolerated procedure well. Resume cares per protocol.   Patient Tolerance:  Patient tolerated the procedure well with no immediate complications  Length of time physician/provider present for 1:1 monitoring during sedation: 0

## 2024-03-06 ENCOUNTER — MYC REFILL (OUTPATIENT)
Dept: ONCOLOGY | Facility: CLINIC | Age: 64
End: 2024-03-06
Payer: COMMERCIAL

## 2024-03-06 DIAGNOSIS — G89.3 CANCER RELATED PAIN: ICD-10-CM

## 2024-03-06 RX ORDER — OXYCODONE HCL 5 MG/5 ML
5 SOLUTION, ORAL ORAL EVERY 6 HOURS PRN
Qty: 500 ML | Refills: 0 | Status: SHIPPED | OUTPATIENT
Start: 2024-03-06 | End: 2024-04-08

## 2024-03-08 ENCOUNTER — APPOINTMENT (OUTPATIENT)
Dept: RADIATION THERAPY | Facility: OUTPATIENT CENTER | Age: 64
End: 2024-03-08
Payer: COMMERCIAL

## 2024-03-11 ENCOUNTER — OFFICE VISIT (OUTPATIENT)
Dept: RADIATION THERAPY | Facility: OUTPATIENT CENTER | Age: 64
End: 2024-03-11
Payer: COMMERCIAL

## 2024-03-11 ENCOUNTER — OFFICE VISIT (OUTPATIENT)
Dept: OTOLARYNGOLOGY | Facility: CLINIC | Age: 64
End: 2024-03-11
Payer: COMMERCIAL

## 2024-03-11 ENCOUNTER — APPOINTMENT (OUTPATIENT)
Dept: RADIATION THERAPY | Facility: OUTPATIENT CENTER | Age: 64
End: 2024-03-11
Payer: COMMERCIAL

## 2024-03-11 ENCOUNTER — THERAPY VISIT (OUTPATIENT)
Dept: SPEECH THERAPY | Facility: CLINIC | Age: 64
End: 2024-03-11
Payer: COMMERCIAL

## 2024-03-11 VITALS
BODY MASS INDEX: 15.52 KG/M2 | DIASTOLIC BLOOD PRESSURE: 66 MMHG | SYSTOLIC BLOOD PRESSURE: 102 MMHG | WEIGHT: 89 LBS | HEART RATE: 78 BPM

## 2024-03-11 VITALS
OXYGEN SATURATION: 99 % | DIASTOLIC BLOOD PRESSURE: 76 MMHG | BODY MASS INDEX: 15.23 KG/M2 | SYSTOLIC BLOOD PRESSURE: 116 MMHG | HEART RATE: 102 BPM | HEIGHT: 64 IN | WEIGHT: 89.2 LBS

## 2024-03-11 DIAGNOSIS — M89.9 LESION OF BONE OF THORACIC SPINE: ICD-10-CM

## 2024-03-11 DIAGNOSIS — C04.9 SQUAMOUS CELL CARCINOMA OF FLOOR OF MOUTH (H): ICD-10-CM

## 2024-03-11 DIAGNOSIS — C06.9 SQUAMOUS CELL CARCINOMA OF ORAL CAVITY (H): ICD-10-CM

## 2024-03-11 DIAGNOSIS — R13.12 DYSPHAGIA, OROPHARYNGEAL PHASE: Primary | ICD-10-CM

## 2024-03-11 DIAGNOSIS — R49.0 DYSPHONIA: Primary | ICD-10-CM

## 2024-03-11 PROCEDURE — 99213 OFFICE O/P EST LOW 20 MIN: CPT | Performed by: STUDENT IN AN ORGANIZED HEALTH CARE EDUCATION/TRAINING PROGRAM

## 2024-03-11 PROCEDURE — 99207 PR NO CHARGE LOS: CPT | Performed by: SPEECH-LANGUAGE PATHOLOGIST

## 2024-03-11 RX ORDER — DEXAMETHASONE 4 MG/1
4 TABLET ORAL 2 TIMES DAILY WITH MEALS
Qty: 30 TABLET | Refills: 0 | Status: SHIPPED | OUTPATIENT
Start: 2024-03-11 | End: 2024-04-12

## 2024-03-11 ASSESSMENT — PAIN SCALES - GENERAL: PAINLEVEL: NO PAIN (0)

## 2024-03-11 NOTE — LETTER
3/11/2024       RE: Adrianna Pereira  [id number] [address] [place] [id number]     Dear Colleague,    Thank you for referring your patient, Adrianna Pereira, to the [facility] [place] [facility]. Please see a copy of my visit note below.    Head and Neck Surgery  3/11/24    Mrs Pereira and her  return for follow up today. 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 She has undergone palliative RT to the spine and also has started systemic therapy.    She has had progressive plate exposure under her lip.    Physical Examination:  Alert, NAD  Exposed plate anterior under preserved lip.     A/P:  She unfortunately has multiple site of metastatic disease. I discussed with her that her treatment is palliative in nature.    In order to cover her plate, she would need an additional free flap.    She is currently undergoing her palliative treatment.     No plans for additional reconstruction at this time.    She was scheduled to see Dr Dao this week. We will reschedule this for 3 months.    Darien Thorne MD    25 minutes spent on the date of the encounter in chart review, patient visit, review of tests, documentation and/or discussion with other providers about the issues documented above.       Again, thank you for allowing me to participate in the care of your patient.      Sincerely,    Darien Thorne MD

## 2024-03-11 NOTE — PROGRESS NOTES
[facility]  SPECIALIZING IN BREAKTHROUGHS  Radiation Oncology    On Treatment Visit Note      Adrianna Pereira      Date: 3/11/2024   MRN: [id number]   : 1960  Diagnosis: metastatic head/neck cancer      Reason for Visit:  On Radiation Treatment Visit     Treatment Summary to Date  Treatment Site: thoracic spine Current Dose: 2250/2500 cGy Fractions: 9/10      Chemotherapy  Chemo concurrent with radx?: No    Subjective:   Doing okay from treatment.  Last week had issue with G-tube as well as constipation.  G-tube was replaced.  Having bowel movement with stool softener and milk of magnesia.  Pain seems to be better managed in the spine.  Currently taking oxycodone oral solution as needed.  Remains on steroid.    Nursing ROS:   Nutrition Alteration  Nutrition Note: all nutrition via peg  Skin  Skin Reaction: 0 - No changes        Cardiovascular  Respiratory effort: 1 - Normal - without distress  Gastrointestinal  GI Note: using mom, constipation resolved        Pain Assessment  Pain Note: pain better      Objective:   /66   Pulse 78   Wt 40.4 kg (89 lb)   BMI 15.52 kg/m      Labs:  CBC RESULTS:   Recent Labs   Lab Test 24  1458   WBC 13.2*   RBC 2.83*   HGB 9.4*   HCT 28.3*      MCH 33.2*   MCHC 33.2   RDW 14.2        ELECTROLYTES:  Recent Labs   Lab Test 24  1458      POTASSIUM 4.1   CHLORIDE 102   RACHAEL 9.0   CO2 23   BUN 37.4*   CR 0.71   *       Assessment:  Ms. 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  Final pathology demonstrated squamous cell carcinoma moderately differentiated, 4.1 cm in size originating from  floor mouth/ anterior tongue,  depth of invasion 29 mm, no LVSI, positive PNI, positive margin (left anterior lateral).  11 of 98 lymph nodes positive (4 cm largest deposit, positive  extracapsular disease present), pathologic T4N3b.   She underwent adjuvant chemoradiation therapy, completed on 12/5/2023.  The patient had early recurrence of disease including locally as well as distantly (spine, lung, soft tissue).  She is now undergoing palliative radiation therapy to thoracic spine.     Tolerating radiation therapy well.  All questions and concerns addressed.    Tolerating radiation therapy well.  All questions and concerns addressed.    Plan:   Continue current therapy.    Refill steroid.  Will plan to taper once radiation therapy is completed.  EOT tomorrow.  Return to clinic in 1 week, will likely plan to order MRI of the thoracic spine in 1 month time posttreatment.      Versa chart and setup information reviewed    Medication Review  Med list reviewed with patient?: Yes           Hernán Egan MD

## 2024-03-11 NOTE — PROGRESS NOTES
Patient seen today for brief allied health visit today in conjunction with ENT clinic visit. Pt set up to transfer SLP care [place]. Patient reports they are set up for appointment, but due to other health concerns have had to cancel. They are hoping when things calm down to establish care there with SLP team. Encouraged patient and spouse to reach out with any questions/concerns.    No charge for today's brief allied health visit.    MAIDA Bailey MA (music), CCC-SLP   Speech Language Pathologist  [facility] Trained Vocologist   [facility]  Dept. of Otolaryngology  Department of Rehabilitation Services  [address] [place] [id number]  Email: [email or web address]   Phone: [phone]  Pronouns: she/her/hers

## 2024-03-11 NOTE — LETTER
3/11/2024         RE: Adrianna Pereira  [address] [address] [place] [id number]        Dear Colleague,    Thank you for referring your patient, Adrianna Pereira, to the  [facility]. Please see a copy of my visit note below.    [facility]  SPECIALIZING IN BREAKTHROUGHS  Radiation Oncology    On Treatment Visit Note      Adrianna Pereira      Date: 3/11/2024   MRN: [id number]   : 1960  Diagnosis: metastatic head/neck cancer      Reason for Visit:  On Radiation Treatment Visit     Treatment Summary to Date  Treatment Site: thoracic spine Current Dose: 2250/2500 cGy Fractions: 9/10      Chemotherapy  Chemo concurrent with radx?: No    Subjective:   Doing okay from treatment.  Last week had issue with G-tube as well as constipation.  G-tube was replaced.  Having bowel movement with stool softener and milk of magnesia.  Pain seems to be better managed in the spine.  Currently taking oxycodone oral solution as needed.  Remains on steroid.    Nursing ROS:   Nutrition Alteration  Nutrition Note: all nutrition via peg  Skin  Skin Reaction: 0 - No changes        Cardiovascular  Respiratory effort: 1 - Normal - without distress  Gastrointestinal  GI Note: using mom, constipation resolved        Pain Assessment  Pain Note: pain better      Objective:   /66   Pulse 78   Wt 40.4 kg (89 lb)   BMI 15.52 kg/m      Labs:  CBC RESULTS:   Recent Labs   Lab Test 24  1458   WBC 13.2*   RBC 2.83*   HGB 9.4*   HCT 28.3*      MCH 33.2*   MCHC 33.2   RDW 14.2        ELECTROLYTES:  Recent Labs   Lab Test 24  1458      POTASSIUM 4.1   CHLORIDE 102   RACHAEL 9.0   CO2 23   BUN 37.4*   CR 0.71   *       Assessment:  Ms. 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  Final pathology demonstrated squamous cell carcinoma moderately  differentiated, 4.1 cm in size originating from  floor mouth/ anterior tongue,  depth of invasion 29 mm, no LVSI, positive PNI, positive margin (left anterior lateral).  11 of 98 lymph nodes positive (4 cm largest deposit, positive extracapsular disease present), pathologic T4N3b.   She underwent adjuvant chemoradiation therapy, completed on 12/5/2023.  The patient had early recurrence of disease including locally as well as distantly (spine, lung, soft tissue).  She is now undergoing palliative radiation therapy to thoracic spine.     Tolerating radiation therapy well.  All questions and concerns addressed.    Tolerating radiation therapy well.  All questions and concerns addressed.    Plan:   Continue current therapy.    Refill steroid.  Will plan to taper once radiation therapy is completed.  EOT tomorrow.  Return to clinic in 1 week, will likely plan to order MRI of the thoracic spine in 1 month time posttreatment.      Mosaiq chart and setup information reviewed    Medication Review  Med list reviewed with patient?: Yes           Hernán Egan MD

## 2024-03-12 ENCOUNTER — APPOINTMENT (OUTPATIENT)
Dept: RADIATION THERAPY | Facility: OUTPATIENT CENTER | Age: 64
End: 2024-03-12
Payer: COMMERCIAL

## 2024-03-12 ENCOUNTER — VIRTUAL VISIT (OUTPATIENT)
Dept: ONCOLOGY | Facility: CLINIC | Age: 64
End: 2024-03-12
Attending: INTERNAL MEDICINE
Payer: COMMERCIAL

## 2024-03-12 ENCOUNTER — LAB (OUTPATIENT)
Dept: INFUSION THERAPY | Facility: CLINIC | Age: 64
End: 2024-03-12
Attending: INTERNAL MEDICINE
Payer: COMMERCIAL

## 2024-03-12 ENCOUNTER — THERAPY VISIT (OUTPATIENT)
Dept: PHYSICAL THERAPY | Facility: CLINIC | Age: 64
End: 2024-03-12
Attending: RADIOLOGY
Payer: COMMERCIAL

## 2024-03-12 VITALS — SYSTOLIC BLOOD PRESSURE: 102 MMHG | DIASTOLIC BLOOD PRESSURE: 65 MMHG | TEMPERATURE: 96.8 F | HEART RATE: 85 BPM

## 2024-03-12 VITALS — BODY MASS INDEX: 15.77 KG/M2 | WEIGHT: 89 LBS | HEIGHT: 63 IN

## 2024-03-12 DIAGNOSIS — C78.00 MALIGNANT NEOPLASM METASTATIC TO LUNG, UNSPECIFIED LATERALITY (H): Primary | ICD-10-CM

## 2024-03-12 DIAGNOSIS — I89.0 LYMPHEDEMA: Primary | ICD-10-CM

## 2024-03-12 DIAGNOSIS — C79.51 MALIGNANT NEOPLASM METASTATIC TO BONE (H): Primary | ICD-10-CM

## 2024-03-12 DIAGNOSIS — C78.00 MALIGNANT NEOPLASM METASTATIC TO LUNG, UNSPECIFIED LATERALITY (H): ICD-10-CM

## 2024-03-12 DIAGNOSIS — C06.9 SQUAMOUS CELL CARCINOMA OF ORAL CAVITY (H): ICD-10-CM

## 2024-03-12 DIAGNOSIS — C79.51 MALIGNANT NEOPLASM METASTATIC TO BONE (H): ICD-10-CM

## 2024-03-12 LAB
ALBUMIN SERPL BCG-MCNC: 3.5 G/DL (ref 3.5–5.2)
ALP SERPL-CCNC: 88 U/L (ref 40–150)
ALT SERPL W P-5'-P-CCNC: 27 U/L (ref 0–50)
ANION GAP SERPL CALCULATED.3IONS-SCNC: 10 MMOL/L (ref 7–15)
AST SERPL W P-5'-P-CCNC: 22 U/L (ref 0–45)
BILIRUB SERPL-MCNC: <0.2 MG/DL
BUN SERPL-MCNC: 36.3 MG/DL (ref 8–23)
CALCIUM SERPL-MCNC: 8.6 MG/DL (ref 8.8–10.2)
CHLORIDE SERPL-SCNC: 98 MMOL/L (ref 98–107)
CREAT SERPL-MCNC: 0.69 MG/DL (ref 0.51–0.95)
DEPRECATED HCO3 PLAS-SCNC: 26 MMOL/L (ref 22–29)
EGFRCR SERPLBLD CKD-EPI 2021: >90 ML/MIN/1.73M2
GLUCOSE SERPL-MCNC: 97 MG/DL (ref 70–99)
POTASSIUM SERPL-SCNC: 4.1 MMOL/L (ref 3.4–5.3)
PROT SERPL-MCNC: 6.4 G/DL (ref 6.4–8.3)
SODIUM SERPL-SCNC: 134 MMOL/L (ref 135–145)
TSH SERPL DL<=0.005 MIU/L-ACNC: 0.61 UIU/ML (ref 0.3–4.2)

## 2024-03-12 PROCEDURE — 96413 CHEMO IV INFUSION 1 HR: CPT

## 2024-03-12 PROCEDURE — 97140 MANUAL THERAPY 1/> REGIONS: CPT | Mod: GP,CQ | Performed by: REHABILITATION PRACTITIONER

## 2024-03-12 PROCEDURE — 36591 DRAW BLOOD OFF VENOUS DEVICE: CPT | Performed by: INTERNAL MEDICINE

## 2024-03-12 PROCEDURE — 99214 OFFICE O/P EST MOD 30 MIN: CPT | Mod: 95 | Performed by: INTERNAL MEDICINE

## 2024-03-12 PROCEDURE — 250N000011 HC RX IP 250 OP 636: Performed by: INTERNAL MEDICINE

## 2024-03-12 PROCEDURE — 250N000011 HC RX IP 250 OP 636: Mod: JZ | Performed by: INTERNAL MEDICINE

## 2024-03-12 PROCEDURE — 258N000003 HC RX IP 258 OP 636: Performed by: INTERNAL MEDICINE

## 2024-03-12 PROCEDURE — 84443 ASSAY THYROID STIM HORMONE: CPT | Performed by: INTERNAL MEDICINE

## 2024-03-12 PROCEDURE — 80053 COMPREHEN METABOLIC PANEL: CPT | Performed by: INTERNAL MEDICINE

## 2024-03-12 RX ORDER — LORAZEPAM 2 MG/ML
0.5 INJECTION INTRAMUSCULAR EVERY 4 HOURS PRN
Status: CANCELLED | OUTPATIENT
Start: 2024-03-12

## 2024-03-12 RX ORDER — HEPARIN SODIUM,PORCINE 10 UNIT/ML
5-20 VIAL (ML) INTRAVENOUS DAILY PRN
Status: CANCELLED | OUTPATIENT
Start: 2024-03-12

## 2024-03-12 RX ORDER — EPINEPHRINE 1 MG/ML
0.3 INJECTION, SOLUTION, CONCENTRATE INTRAVENOUS EVERY 5 MIN PRN
Status: CANCELLED | OUTPATIENT
Start: 2024-03-12

## 2024-03-12 RX ORDER — ALBUTEROL SULFATE 0.83 MG/ML
2.5 SOLUTION RESPIRATORY (INHALATION)
Status: CANCELLED | OUTPATIENT
Start: 2024-03-12

## 2024-03-12 RX ORDER — MEPERIDINE HYDROCHLORIDE 25 MG/ML
25 INJECTION INTRAMUSCULAR; INTRAVENOUS; SUBCUTANEOUS EVERY 30 MIN PRN
Status: CANCELLED | OUTPATIENT
Start: 2024-03-12

## 2024-03-12 RX ORDER — METHYLPREDNISOLONE SODIUM SUCCINATE 125 MG/2ML
125 INJECTION, POWDER, LYOPHILIZED, FOR SOLUTION INTRAMUSCULAR; INTRAVENOUS
Status: CANCELLED
Start: 2024-03-12

## 2024-03-12 RX ORDER — HEPARIN SODIUM (PORCINE) LOCK FLUSH IV SOLN 100 UNIT/ML 100 UNIT/ML
5 SOLUTION INTRAVENOUS
Status: DISCONTINUED | OUTPATIENT
Start: 2024-03-12 | End: 2024-03-12 | Stop reason: HOSPADM

## 2024-03-12 RX ORDER — HEPARIN SODIUM (PORCINE) LOCK FLUSH IV SOLN 100 UNIT/ML 100 UNIT/ML
5 SOLUTION INTRAVENOUS
Status: CANCELLED | OUTPATIENT
Start: 2024-03-12

## 2024-03-12 RX ORDER — DIPHENHYDRAMINE HYDROCHLORIDE 50 MG/ML
50 INJECTION INTRAMUSCULAR; INTRAVENOUS
Status: CANCELLED
Start: 2024-03-12

## 2024-03-12 RX ORDER — ALBUTEROL SULFATE 90 UG/1
1-2 AEROSOL, METERED RESPIRATORY (INHALATION)
Status: CANCELLED
Start: 2024-03-12

## 2024-03-12 RX ADMIN — SODIUM CHLORIDE 250 ML: 9 INJECTION, SOLUTION INTRAVENOUS at 13:49

## 2024-03-12 RX ADMIN — SODIUM CHLORIDE 200 MG: 9 INJECTION, SOLUTION INTRAVENOUS at 14:25

## 2024-03-12 RX ADMIN — Medication 5 ML: at 15:05

## 2024-03-12 ASSESSMENT — PAIN SCALES - GENERAL: PAINLEVEL: MILD PAIN (3)

## 2024-03-12 NOTE — PROGRESS NOTES
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  Zoledronic acid metastatic bone prophylaxis scheduled next for 3/20/94083  Continue MOM 20 ml daily  Follow-up Marina Cronin NP 4/2/2024 for cycle #3; anticipate restaging CT chest at time of cycle #4    Time with patient including review, documentation, history, examination, coordination of care and counseling was 30 minutes.    Recent oncology review:  On 10/19/2023-12/5/2023, she received 6600 cGy/33 fractions with concurrent low-dose weekly cisplatin.    On 1/24/2024, she was seen in ENT follow-up by Dr. Tyshawn Dao  and complained of a several week history of back pain.  2/1/2024 CT cervical/thoracic spine imaging revealed new T3 compression fracture with hypodensity in the vertebral body extending into the left posterior elements suggesting presence of metastases and pathologic fracture.  A T4 posterior vertebral body lesion was also seen suspicious for metastatic disease as was new incidental lung nodules.    2/7/2024 MR thoracic spine confirmed the T3 compression fracture with extraosseous tumor involving the ventral epidural space with moderate to severe central spinal canal stenosis at T3.    2/15/2024 PET scan and CT soft tissue of the neck revealed a recurrent gluco-avid foci of the left hemimandible, numerous bilateral pulmonary and pleural-based metastases, hypermetabolic metastatic mediastinal and hilar lymphadenopathy, pathologic compression of T3 associated with additional metastatic disease involving T4, and hypermetabolic foci involving the left adductor musculature as well as gluco-avid skin thickening of the right upper thigh.      PD-L1 analysis from her August, 2023 original tumor biopsy revealed PD-L1 TPS and CPS of 70%.    2/21/2024 initiation of pembrolizumab was well-tolerated. Pain is slightly better at 3/10 on oxycodone 7.5 mg at bedtime. Constipation now controlled on maintenance MOM 20 ml daily.    CMP and TSH are satisfactory.    History of present illness:  In July, 2023 the patient presented to Dr. Darien Thorne for history of previous CO2 laser ablation for premalignant oral cavity lesions and more recent swelling of the lower lip and chin.    7/3/2023 left mandibular alveolus biopsy revealed an invasive keratinizing moderately differential squamous cell carcinoma.  7/13/2023 PET/dedicated CT revealed a 4.4 x 3.7 x 3.2 cm anterior oral cavity mass invading into the mandible with a separate gluco-avid nodule on the right mandibular buccal mucosa and bilateral level 1B, level 2 and L3 metastatic  lymphadenopathy without evidence of metastatic disease.    On 8/17/2023, she underwent segmental mandibulectomy, floor of mouth resection, anterior glossectomy, left fibular free flap, skin grafting, and tracheostomy revealing a pT4a, N3b tumor with positive left anterior lateral soft tissue margin, 11/98+ lymph nodes including STEFFEN and several bilateral lymph nodes consistent with involvement by low-grade B-cell neoplasm suggestive of CLL/SLL.    On 8/25/2023, head neck tumor conference recommended consideration of postoperative chemoradiation.  Postoperative course had been complicated by 8/24/2023 return to the operating room for debridement of multiple areas of skin necrosis and resuturing of neck incision and intraoral flap.    Past medical history:  Remarkable for past 1/2 ppd tobacco use (until 8/2023), former alcohol abuse for which she considers herself an alcoholic although she does not use alcohol now.    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    Medications:  Current Outpatient Medications   Medication    acetaminophen (TYLENOL) 325 MG tablet    chlorhexidine (PERIDEX) 0.12 % solution    dexAMETHasone (DECADRON) 4 MG tablet    ibuprofen (ADVIL/MOTRIN) 600 MG tablet    loperamide (IMODIUM) 1 MG/5ML liquid    magic mouthwash (ENTER INGREDIENTS IN COMMENTS) suspension    magnesium hydroxide (MILK OF MAGNESIA) 400 MG/5ML suspension    mineral oil-hydrophilic petrolatum (AQUAPHOR) external ointment    oxyCODONE (ROXICODONE) 5 MG/5ML solution    oxyCODONE (ROXICODONE) 5 MG/5ML solution    polyethylene glycol (MIRALAX) 17 g packet    polyethylene glycol (MIRALAX) 17 GM/Dose powder    senna-docusate (SENOKOT-S/PERICOLACE) 8.6-50 MG tablet     "sennosides (SENOKOT) 8.8 MG/5ML syrup     No current facility-administered medications for this visit.     Review of systems:  See above  Denies headache  Denies dyspnea, chest pain, pleurisy or hemoptysis  Denies nausea, vomiting    Allergies:  No Known Allergies      Physical examination:  Ht 1.6 m (5' 3\")   Wt 40.4 kg (89 lb)   BMI 15.77 kg/m      The patient is alert and oriented.    Rai Castillo MD          "

## 2024-03-12 NOTE — LETTER
3/12/2024         RE: Adrianna Pereira  [address] [address] [address]        Dear Colleague,    Thank you for referring your patient, Adrianna Pereira, to the [facility]. Please see a copy of my visit note below.    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  Zoledronic acid metastatic bone prophylaxis scheduled next for 3/20/21902  Continue MOM 20 ml daily  Follow-up Marina Cronin NP 4/2/2024 for cycle #3; anticipate restaging CT chest at time of cycle #4    Time with patient including review, documentation, history, examination,  coordination of care and counseling was 30 minutes.    Recent oncology review:  On 10/19/2023-12/5/2023, she received 6600 cGy/33 fractions with concurrent low-dose weekly cisplatin.    On 1/24/2024, she was seen in ENT follow-up by Dr. Tyshawn Dao and complained of a several week history of back pain.  2/1/2024 CT cervical/thoracic spine imaging revealed new T3 compression fracture with hypodensity in the vertebral body extending into the left posterior elements suggesting presence of metastases and pathologic fracture.  A T4 posterior vertebral body lesion was also seen suspicious for metastatic disease as was new incidental lung nodules.    2/7/2024 MR thoracic spine confirmed the T3 compression fracture with extraosseous tumor involving the ventral epidural space with moderate to severe central spinal canal stenosis at T3.    2/15/2024 PET scan and CT soft tissue of the neck revealed a recurrent gluco-avid foci of the left hemimandible, numerous bilateral pulmonary and pleural-based metastases, hypermetabolic metastatic mediastinal and hilar lymphadenopathy, pathologic compression of T3 associated with additional metastatic disease involving T4, and hypermetabolic foci involving the left adductor musculature as well as gluco-avid skin thickening of the right upper thigh.      PD-L1 analysis from her August, 2023 original tumor biopsy revealed PD-L1 TPS and CPS of 70%.    2/21/2024 initiation of pembrolizumab was well-tolerated. Pain is slightly better at 3/10 on oxycodone 7.5 mg at bedtime. Constipation now controlled on maintenance MOM 20 ml daily.    CMP and TSH are satisfactory.    History of present illness:  In July, 2023 the patient presented to Dr. Darien Thorne for history of previous CO2 laser ablation for premalignant oral cavity lesions and more recent swelling of the lower lip and chin.    7/3/2023 left mandibular alveolus biopsy revealed an invasive keratinizing moderately differential squamous  cell carcinoma.  7/13/2023 PET/dedicated CT revealed a 4.4 x 3.7 x 3.2 cm anterior oral cavity mass invading into the mandible with a separate gluco-avid nodule on the right mandibular buccal mucosa and bilateral level 1B, level 2 and L3 metastatic lymphadenopathy without evidence of metastatic disease.    On 8/17/2023, she underwent segmental mandibulectomy, floor of mouth resection, anterior glossectomy, left fibular free flap, skin grafting, and tracheostomy revealing a pT4a, N3b tumor with positive left anterior lateral soft tissue margin, 11/98+ lymph nodes including STEFFEN and several bilateral lymph nodes consistent with involvement by low-grade B-cell neoplasm suggestive of CLL/SLL.    On 8/25/2023, head neck tumor conference recommended consideration of postoperative chemoradiation.  Postoperative course had been complicated by 8/24/2023 return to the operating room for debridement of multiple areas of skin necrosis and resuturing of neck incision and intraoral flap.    Past medical history:  Remarkable for past 1/2 ppd tobacco use (until 8/2023), former alcohol abuse for which she considers herself an alcoholic although she does not use alcohol now.    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    Medications:  Current Outpatient Medications   Medication     acetaminophen (TYLENOL) 325 MG tablet     chlorhexidine (PERIDEX) 0.12 % solution     dexAMETHasone (DECADRON) 4 MG tablet     ibuprofen (ADVIL/MOTRIN) 600 MG tablet     loperamide (IMODIUM) 1 MG/5ML liquid     magic mouthwash (ENTER INGREDIENTS IN COMMENTS) suspension     magnesium hydroxide (MILK OF MAGNESIA) 400 MG/5ML suspension     mineral oil-hydrophilic petrolatum (AQUAPHOR)  "external ointment     oxyCODONE (ROXICODONE) 5 MG/5ML solution     oxyCODONE (ROXICODONE) 5 MG/5ML solution     polyethylene glycol (MIRALAX) 17 g packet     polyethylene glycol (MIRALAX) 17 GM/Dose powder     senna-docusate (SENOKOT-S/PERICOLACE) 8.6-50 MG tablet     sennosides (SENOKOT) 8.8 MG/5ML syrup     No current facility-administered medications for this visit.     Review of systems:  See above  Denies headache  Denies dyspnea, chest pain, pleurisy or hemoptysis  Denies nausea, vomiting    Allergies:  No Known Allergies      Physical examination:  Ht 1.6 m (5' 3\")   Wt 40.4 kg (89 lb)   BMI 15.77 kg/m      The patient is alert and oriented.    Rai Castillo MD            Virtual Visit Details    Type of service:  Video Visit   Video Start Time: {video visit start/end time for provider to select:804548}  Video End Time:{video visit start/end time for provider to select:340129}    Originating Location (pt. Location): {video visit patient location:416489::\"Home\"}  {PROVIDER LOCATION On-site should be selected for visits conducted from your clinic location or adjoining [facility] hospital, academic office, or other nearby [facility] building. Off-site should be selected for all other provider locations, including home:[address]}  Distant Location (provider location):  {virtual location provider:260013}  Platform used for Video Visit: {Virtual Visit Platforms:086967::\"[employer]\"}      Again, thank you for allowing me to participate in the care of your patient.        Sincerely,        Rai Castillo MD  "

## 2024-03-12 NOTE — LETTER
3/12/2024         RE: Adrianna Pereira  [id number] [address] [address]        Dear Colleague,    Thank you for referring your patient, Adrianna Pereira, to the [facility]. Please see a copy of my visit note below.    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  Zoledronic acid metastatic bone prophylaxis scheduled next for 3/20/48357  Continue MOM 20 ml daily  Follow-up Marina Cronin NP 4/2/2024 for cycle #3; anticipate restaging CT chest at time of cycle #4    Time with patient including review, documentation, history, examination,  coordination of care and counseling was 30 minutes.    Recent oncology review:  On 10/19/2023-12/5/2023, she received 6600 cGy/33 fractions with concurrent low-dose weekly cisplatin.    On 1/24/2024, she was seen in ENT follow-up by Dr. Tyshawn Dao and complained of a several week history of back pain.  2/1/2024 CT cervical/thoracic spine imaging revealed new T3 compression fracture with hypodensity in the vertebral body extending into the left posterior elements suggesting presence of metastases and pathologic fracture.  A T4 posterior vertebral body lesion was also seen suspicious for metastatic disease as was new incidental lung nodules.    2/7/2024 MR thoracic spine confirmed the T3 compression fracture with extraosseous tumor involving the ventral epidural space with moderate to severe central spinal canal stenosis at T3.    2/15/2024 PET scan and CT soft tissue of the neck revealed a recurrent gluco-avid foci of the left hemimandible, numerous bilateral pulmonary and pleural-based metastases, hypermetabolic metastatic mediastinal and hilar lymphadenopathy, pathologic compression of T3 associated with additional metastatic disease involving T4, and hypermetabolic foci involving the left adductor musculature as well as gluco-avid skin thickening of the right upper thigh.      PD-L1 analysis from her August, 2023 original tumor biopsy revealed PD-L1 TPS and CPS of 70%.    2/21/2024 initiation of pembrolizumab was well-tolerated. Pain is slightly better at 3/10 on oxycodone 7.5 mg at bedtime. Constipation now controlled on maintenance MOM 20 ml daily.    CMP and TSH are satisfactory.    History of present illness:  In July, 2023 the patient presented to Dr. Darien Thorne for history of previous CO2 laser ablation for premalignant oral cavity lesions and more recent swelling of the lower lip and chin.    7/3/2023 left mandibular alveolus biopsy revealed an invasive keratinizing moderately differential squamous  cell carcinoma.  7/13/2023 PET/dedicated CT revealed a 4.4 x 3.7 x 3.2 cm anterior oral cavity mass invading into the mandible with a separate gluco-avid nodule on the right mandibular buccal mucosa and bilateral level 1B, level 2 and L3 metastatic lymphadenopathy without evidence of metastatic disease.    On 8/17/2023, she underwent segmental mandibulectomy, floor of mouth resection, anterior glossectomy, left fibular free flap, skin grafting, and tracheostomy revealing a pT4a, N3b tumor with positive left anterior lateral soft tissue margin, 11/98+ lymph nodes including STEFFEN and several bilateral lymph nodes consistent with involvement by low-grade B-cell neoplasm suggestive of CLL/SLL.    On 8/25/2023, head neck tumor conference recommended consideration of postoperative chemoradiation.  Postoperative course had been complicated by 8/24/2023 return to the operating room for debridement of multiple areas of skin necrosis and resuturing of neck incision and intraoral flap.    Past medical history:  Remarkable for past 1/2 ppd tobacco use (until 8/2023), former alcohol abuse for which she considers herself an alcoholic although she does not use alcohol now.    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    Medications:  Current Outpatient Medications   Medication     acetaminophen (TYLENOL) 325 MG tablet     chlorhexidine (PERIDEX) 0.12 % solution     dexAMETHasone (DECADRON) 4 MG tablet     ibuprofen (ADVIL/MOTRIN) 600 MG tablet     loperamide (IMODIUM) 1 MG/5ML liquid     magic mouthwash (ENTER INGREDIENTS IN COMMENTS) suspension     magnesium hydroxide (MILK OF MAGNESIA) 400 MG/5ML suspension     mineral oil-hydrophilic petrolatum (AQUAPHOR)  "external ointment     oxyCODONE (ROXICODONE) 5 MG/5ML solution     oxyCODONE (ROXICODONE) 5 MG/5ML solution     polyethylene glycol (MIRALAX) 17 g packet     polyethylene glycol (MIRALAX) 17 GM/Dose powder     senna-docusate (SENOKOT-S/PERICOLACE) 8.6-50 MG tablet     sennosides (SENOKOT) 8.8 MG/5ML syrup     No current facility-administered medications for this visit.     Review of systems:  See above  Denies headache  Denies dyspnea, chest pain, pleurisy or hemoptysis  Denies nausea, vomiting    Allergies:  No Known Allergies      Physical examination:  Ht 1.6 m (5' 3\")   Wt 40.4 kg (89 lb)   BMI 15.77 kg/m      The patient is alert and oriented.    Rai Castillo MD            Virtual Visit Details    Type of service:  Video Visit   Video Start Time: {video visit start/end time for provider to select:180669}  Video End Time:{video visit start/end time for provider to select:823229}    Originating Location (pt. Location): {video visit patient location:054900::\"Home\"}  {PROVIDER LOCATION On-site should be selected for visits conducted from your clinic location or adjoining [facility] hospital, academic office, or other nearby [facility] building. Off-site should be selected for all other provider locations, including home:856792}  Distant Location (provider location):  {virtual location provider:760362}  Platform used for Video Visit: {Virtual Visit Platforms:135647::\"TaxJar\"}      Again, thank you for allowing me to participate in the care of your patient.        Sincerely,        Rai Castillo MD  "

## 2024-03-12 NOTE — PATIENT INSTRUCTIONS
1. Reduce dexamethasone to 4 mg every morning and 2 mg every evening for two weeks; then, reduce to 2 mg twice-a-day until reassessed.  2. Continue oxycodone and milk of magnesia as before  3. Zometa for 3/20/2024  4. Follow-up Marina Cronin NP 4/2/2024 for cycle #3 pembrolizumab with blood tests before appointment  5. Anticipate restaging CT chest at time of cycle #4 pembrolizumab

## 2024-03-12 NOTE — PROGRESS NOTES
Virtual Visit Details    Type of service:  Video Visit   Video Start Time:  1320  Video End Time: 1339    Originating Location (pt. Location): Other Clinic    Distant Location (provider location):  Off-site  Platform used for Video Visit: Zoom ([employer])

## 2024-03-12 NOTE — NURSING NOTE
Is the patient currently in the state of [place]? YES    Visit mode:VIDEO    If the visit is dropped, the patient can be reconnected by: VIDEO VISIT: Text to cell phone:   Telephone Information:   Mobile [phone]       Will anyone else be joining the visit? NO  (If patient encounters technical issues they should call [phone])    How would you like to obtain your AVS? MyChart    Are changes needed to the allergy or medication list? Pt declined med review    Reason for visit: GORDON MUSE

## 2024-03-12 NOTE — PROGRESS NOTES
Infusion Nursing Note:  Adrianna Pereira presents today for Keytruda.    Patient seen by provider today: Yes: video visit with Dr. Castillo   present during visit today: Not Applicable.    Note: N/A.      Intravenous Access:  Implanted Port.    Treatment Conditions:  Results reviewed, labs MET treatment parameters, ok to proceed with treatment.      Post Infusion Assessment:  Patient tolerated injection without incident.  Blood return noted pre and post infusion.  Access discontinued per protocol.       Discharge Plan:   AVS to patient via MYCHART.  Patient will return 4/2 for next appointment.   Patient discharged in stable condition accompanied by: .  Departure Mode: Ambulatory.      Leana Manriquez RN

## 2024-03-12 NOTE — PROGRESS NOTES
Head and Neck Surgery  3/11/24    Mrs Pereira and her  return for follow up today. 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 She has undergone palliative RT to the spine and also has started systemic therapy.    She has had progressive plate exposure under her lip.    Physical Examination:  Alert, NAD  Exposed plate anterior under preserved lip.     A/P:  She unfortunately has multiple site of metastatic disease. I discussed with her that her treatment is palliative in nature.    In order to cover her plate, she would need an additional free flap.    She is currently undergoing her palliative treatment.     No plans for additional reconstruction at this time.    She was scheduled to see Dr Dao this week. We will reschedule this for 3 months.    Darien Thorne MD    25 minutes spent on the date of the encounter in chart review, patient visit, review of tests, documentation and/or discussion with other providers about the issues documented above.

## 2024-03-13 ENCOUNTER — TELEPHONE (OUTPATIENT)
Dept: OTOLARYNGOLOGY | Facility: CLINIC | Age: 64
End: 2024-03-13

## 2024-03-19 ENCOUNTER — TELEPHONE (OUTPATIENT)
Dept: INTERVENTIONAL RADIOLOGY/VASCULAR | Facility: CLINIC | Age: 64
End: 2024-03-19
Payer: COMMERCIAL

## 2024-03-19 NOTE — TELEPHONE ENCOUNTER
I spoke with Duane today regarding Adrianna's G-tube. This morning Adrianna had some abdominal pain and experienced leaking from her tube. She is currently doing well and hasn't had any further leaking.     Duane has been given IR's contact number and will call if she experiences more difficulties. IR can complete a G-tube check if leaking continues.

## 2024-03-20 DIAGNOSIS — C06.9 SQUAMOUS CELL CARCINOMA OF ORAL CAVITY (H): ICD-10-CM

## 2024-03-20 DIAGNOSIS — C79.51 MALIGNANT NEOPLASM METASTATIC TO BONE (H): Primary | ICD-10-CM

## 2024-03-20 RX ORDER — ZOLEDRONIC ACID 0.04 MG/ML
4 INJECTION, SOLUTION INTRAVENOUS ONCE
Status: CANCELLED | OUTPATIENT
Start: 2024-03-20 | End: 2024-03-20

## 2024-03-20 RX ORDER — HEPARIN SODIUM (PORCINE) LOCK FLUSH IV SOLN 100 UNIT/ML 100 UNIT/ML
5 SOLUTION INTRAVENOUS
Status: CANCELLED | OUTPATIENT
Start: 2024-03-20

## 2024-03-20 RX ORDER — HEPARIN SODIUM,PORCINE 10 UNIT/ML
5-20 VIAL (ML) INTRAVENOUS DAILY PRN
Status: CANCELLED | OUTPATIENT
Start: 2024-03-20

## 2024-03-21 ENCOUNTER — HOSPITAL ENCOUNTER (OUTPATIENT)
Dept: INTERVENTIONAL RADIOLOGY/VASCULAR | Facility: CLINIC | Age: 64
Discharge: HOME | End: 2024-03-21
Attending: NURSE PRACTITIONER | Admitting: RADIOLOGY
Payer: COMMERCIAL

## 2024-03-21 ENCOUNTER — MYC MEDICAL ADVICE (OUTPATIENT)
Dept: INTERVENTIONAL RADIOLOGY/VASCULAR | Facility: CLINIC | Age: 64
End: 2024-03-21
Payer: COMMERCIAL

## 2024-03-21 ENCOUNTER — HOSPITAL ENCOUNTER (OUTPATIENT)
Facility: CLINIC | Age: 64
Discharge: HOME | End: 2024-03-21
Attending: RADIOLOGY
Payer: COMMERCIAL

## 2024-03-21 DIAGNOSIS — C06.9 SQUAMOUS CELL CARCINOMA OF ORAL CAVITY (H): Primary | ICD-10-CM

## 2024-03-21 DIAGNOSIS — C06.9 SQUAMOUS CELL CARCINOMA OF ORAL CAVITY (H): ICD-10-CM

## 2024-03-21 PROCEDURE — 49465 FLUORO EXAM OF G/COLON TUBE: CPT

## 2024-03-21 PROCEDURE — 250N000011 HC RX IP 250 OP 636

## 2024-03-21 RX ORDER — IOPAMIDOL 612 MG/ML
15 INJECTION, SOLUTION INTRATHECAL ONCE
Status: COMPLETED | OUTPATIENT
Start: 2024-03-21 | End: 2024-03-21

## 2024-03-21 RX ORDER — LIDOCAINE HYDROCHLORIDE 20 MG/ML
JELLY TOPICAL ONCE
Status: DISCONTINUED | OUTPATIENT
Start: 2024-03-21 | End: 2024-03-22 | Stop reason: HOSPADM

## 2024-03-21 RX ADMIN — IOPAMIDOL 5 ML: 612 INJECTION, SOLUTION INTRATHECAL at 14:05

## 2024-03-21 ASSESSMENT — ACTIVITIES OF DAILY LIVING (ADL)
ADLS_ACUITY_SCORE: 38

## 2024-03-21 NOTE — TELEPHONE ENCOUNTER
Duane called this morning reporting that Adrianna's G-tube has started leaking again after her initial issues on 3/19. She has copious amount of drainage and pain at the insertion site. She is scheduled at [facility] this afternoon for an exchange.

## 2024-03-21 NOTE — SEDATION DOCUMENTATION
Patient Name: Adrianna Pereira  Medical Record Number: [id number]  Today's Date: 3/21/2024     Procedure: Gastrostomy tube check  Proceduralist: LINNEA Lehman PA-C  Pathology present: n/a    Procedure Start: 1353  Procedure end: 1404  Sedation medications administered: n/a     Report given to: n/a  : n/a    Other Notes: Pt arrived to IR room 2 as outpatient. Pt positioned supine. Pt's tube was checked, no need to change tube today as it is in position. Pt discharged to home with her .

## 2024-03-22 ENCOUNTER — LAB (OUTPATIENT)
Dept: INFUSION THERAPY | Facility: CLINIC | Age: 64
End: 2024-03-22
Attending: NURSE PRACTITIONER
Payer: COMMERCIAL

## 2024-03-22 VITALS
DIASTOLIC BLOOD PRESSURE: 63 MMHG | WEIGHT: 85.7 LBS | BODY MASS INDEX: 14.63 KG/M2 | HEIGHT: 64 IN | TEMPERATURE: 98 F | SYSTOLIC BLOOD PRESSURE: 101 MMHG | HEART RATE: 91 BPM | OXYGEN SATURATION: 98 % | RESPIRATION RATE: 18 BRPM

## 2024-03-22 DIAGNOSIS — C06.9 SQUAMOUS CELL CARCINOMA OF ORAL CAVITY (H): ICD-10-CM

## 2024-03-22 DIAGNOSIS — D64.81 ANTINEOPLASTIC CHEMOTHERAPY INDUCED ANEMIA: Primary | ICD-10-CM

## 2024-03-22 DIAGNOSIS — C79.51 MALIGNANT NEOPLASM METASTATIC TO BONE (H): ICD-10-CM

## 2024-03-22 DIAGNOSIS — C06.9 SQUAMOUS CELL CARCINOMA OF ORAL CAVITY (H): Primary | ICD-10-CM

## 2024-03-22 DIAGNOSIS — T45.1X5A ANTINEOPLASTIC CHEMOTHERAPY INDUCED ANEMIA: Primary | ICD-10-CM

## 2024-03-22 LAB
BASOPHILS # BLD AUTO: 0 10E3/UL (ref 0–0.2)
BASOPHILS NFR BLD AUTO: 0 %
CALCIUM SERPL-MCNC: 8.9 MG/DL (ref 8.8–10.2)
CREAT SERPL-MCNC: 0.78 MG/DL (ref 0.51–0.95)
EGFRCR SERPLBLD CKD-EPI 2021: 85 ML/MIN/1.73M2
EOSINOPHIL # BLD AUTO: 0 10E3/UL (ref 0–0.7)
EOSINOPHIL NFR BLD AUTO: 0 %
ERYTHROCYTE [DISTWIDTH] IN BLOOD BY AUTOMATED COUNT: 14.8 % (ref 10–15)
HCT VFR BLD AUTO: 30.1 % (ref 35–47)
HGB BLD-MCNC: 9.8 G/DL (ref 11.7–15.7)
IMM GRANULOCYTES # BLD: 0.1 10E3/UL
IMM GRANULOCYTES NFR BLD: 1 %
LYMPHOCYTES # BLD AUTO: 1.2 10E3/UL (ref 0.8–5.3)
LYMPHOCYTES NFR BLD AUTO: 14 %
MCH RBC QN AUTO: 32.6 PG (ref 26.5–33)
MCHC RBC AUTO-ENTMCNC: 32.6 G/DL (ref 31.5–36.5)
MCV RBC AUTO: 100 FL (ref 78–100)
MONOCYTES # BLD AUTO: 0.5 10E3/UL (ref 0–1.3)
MONOCYTES NFR BLD AUTO: 6 %
NEUTROPHILS # BLD AUTO: 6.6 10E3/UL (ref 1.6–8.3)
NEUTROPHILS NFR BLD AUTO: 78 %
NRBC # BLD AUTO: 0 10E3/UL
NRBC BLD AUTO-RTO: 0 /100
PLATELET # BLD AUTO: 392 10E3/UL (ref 150–450)
RBC # BLD AUTO: 3.01 10E6/UL (ref 3.8–5.2)
WBC # BLD AUTO: 8.4 10E3/UL (ref 4–11)

## 2024-03-22 PROCEDURE — 36415 COLL VENOUS BLD VENIPUNCTURE: CPT | Performed by: NURSE PRACTITIONER

## 2024-03-22 PROCEDURE — 250N000011 HC RX IP 250 OP 636: Performed by: NURSE PRACTITIONER

## 2024-03-22 PROCEDURE — 96365 THER/PROPH/DIAG IV INF INIT: CPT

## 2024-03-22 PROCEDURE — 258N000003 HC RX IP 258 OP 636: Performed by: NURSE PRACTITIONER

## 2024-03-22 PROCEDURE — 85025 COMPLETE CBC W/AUTO DIFF WBC: CPT | Performed by: NURSE PRACTITIONER

## 2024-03-22 PROCEDURE — 82565 ASSAY OF CREATININE: CPT | Performed by: NURSE PRACTITIONER

## 2024-03-22 PROCEDURE — 82310 ASSAY OF CALCIUM: CPT | Performed by: NURSE PRACTITIONER

## 2024-03-22 RX ORDER — HEPARIN SODIUM (PORCINE) LOCK FLUSH IV SOLN 100 UNIT/ML 100 UNIT/ML
5 SOLUTION INTRAVENOUS
OUTPATIENT
Start: 2024-03-22

## 2024-03-22 RX ORDER — EPINEPHRINE 1 MG/ML
0.3 INJECTION, SOLUTION, CONCENTRATE INTRAVENOUS EVERY 5 MIN PRN
OUTPATIENT
Start: 2024-03-22

## 2024-03-22 RX ORDER — HEPARIN SODIUM (PORCINE) LOCK FLUSH IV SOLN 100 UNIT/ML 100 UNIT/ML
5 SOLUTION INTRAVENOUS
Status: DISCONTINUED | OUTPATIENT
Start: 2024-03-22 | End: 2024-03-22 | Stop reason: HOSPADM

## 2024-03-22 RX ORDER — HEPARIN SODIUM (PORCINE) LOCK FLUSH IV SOLN 100 UNIT/ML 100 UNIT/ML
5 SOLUTION INTRAVENOUS
Status: CANCELLED | OUTPATIENT
Start: 2024-03-22

## 2024-03-22 RX ORDER — DIPHENHYDRAMINE HYDROCHLORIDE 50 MG/ML
50 INJECTION INTRAMUSCULAR; INTRAVENOUS
Start: 2024-03-22

## 2024-03-22 RX ORDER — HEPARIN SODIUM,PORCINE 10 UNIT/ML
5-20 VIAL (ML) INTRAVENOUS DAILY PRN
OUTPATIENT
Start: 2024-03-22

## 2024-03-22 RX ADMIN — ZOLEDRONIC ACID 3.3 MG: 4 INJECTION, SOLUTION, CONCENTRATE INTRAVENOUS at 11:19

## 2024-03-22 RX ADMIN — SODIUM CHLORIDE 250 ML: 9 INJECTION, SOLUTION INTRAVENOUS at 10:56

## 2024-03-22 RX ADMIN — HEPARIN 5 ML: 100 SYRINGE at 11:45

## 2024-03-22 ASSESSMENT — ACTIVITIES OF DAILY LIVING (ADL)
ADLS_ACUITY_SCORE: 38

## 2024-03-22 ASSESSMENT — PAIN SCALES - GENERAL: PAINLEVEL: NO PAIN (0)

## 2024-03-22 NOTE — PROGRESS NOTES
Infusion Nursing Note:  Adrianna Pereira presents today for possible blood transfusion and monthly Zometa.    Patient seen by provider today: No   present during visit today: Not Applicable.    Note: Arrives ambulatory with . Lost roughly 4 lbs since last visit.  said her feeding tube had been leaking. They went in and had it fixed. Working better now. No other changes from last visit.      Intravenous Access:  Implanted Port.    Treatment Conditions:  Lab Results   Component Value Date    HGB 9.8 (L) 03/22/2024    WBC 8.4 03/22/2024    ANEU 2.2 12/04/2023    ANEUTAUTO 6.6 03/22/2024     03/22/2024     Calcium 8.9.    Results reviewed, labs MET treatment parameters for Zometa, ok to proceed with treatment. No blood tx needed today.      Post Infusion Assessment:  Patient tolerated infusion without incident.       Discharge Plan:   Patient discharged in stable condition accompanied by: .  Departure Mode: Ambulatory.      Kaylyn Lees RN

## 2024-03-23 ASSESSMENT — ACTIVITIES OF DAILY LIVING (ADL)
ADLS_ACUITY_SCORE: 38

## 2024-03-24 ASSESSMENT — ACTIVITIES OF DAILY LIVING (ADL)
ADLS_ACUITY_SCORE: 38

## 2024-03-25 ASSESSMENT — ACTIVITIES OF DAILY LIVING (ADL)
ADLS_ACUITY_SCORE: 38

## 2024-03-26 ENCOUNTER — DOCUMENTATION ONLY (OUTPATIENT)
Dept: RADIATION THERAPY | Facility: OUTPATIENT CENTER | Age: 64
End: 2024-03-26

## 2024-03-26 NOTE — PROGRESS NOTES
Radiotherapy Treatment Summary              PATIENT: Adrianna Pereira  MEDICAL RECORD NO: [id number]   : 1960    DIAGNOSIS: Squamous cell carcinoma of the oral cavity   INTENT OF RADIOTHERAPY: Palliative  PATHOLOGY: Squamous cell carcinoma moderately differentiated, 4.1 cm in size originating from  floor mouth/ anterior tongue,  depth of invasion 29 mm, no LVSI, positive PNI, positive margin (left anterior lateral).  11 of 98 lymph nodes positive (4 cm largest deposit, positive extracapsular disease present),                                 STAGE: pT4N3b  CONCURRENT SYSTEMIC THERAPY:   Keytruda (Dr Castillo)      ONCOLOGIC HISTORY:          Ms. 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  Final pathology demonstrated squamous cell carcinoma moderately differentiated, 4.1 cm in size originating from  floor mouth/ anterior tongue, depth of invasion 29 mm, no LVSI, positive PNI, positive margin (left anterior lateral).  11 of 98 lymph nodes positive (4 cm largest deposit, positive extracapsular disease present), pathologic T4N3b.   She underwent adjuvant chemoradiation therapy from 10/19/23 to 23 to head and neck, 6600 cGy in 33 fractions.  the patient underwent MRI of the T-spine on 2024.  Imaging demonstrated a presumed pathologic fracture of T3 with evidence of extraosseous tumor with involvement of the ventral epidural space contributing to spinal canal stenosis at the level 3.  There seem to also be abnormal extraosseous tumor with extension into the neuroforamina involving T2/T3 and T3/T4 levels.The patient will meet with neurosurgery team who discussed consideration of surgical options for her thoracic spine disease. The patient underwent PET scan on 2/15/2024.  Imaging demonstrated concern for local recurrence as well as several sites of distant disease.   Numerous hypermetabolic pulmonary and pleural-based nodules were noted.  There was noted increased activity in the left hemimandible near the mandibular foramen suggestive of perineural tumor spread along the inferior alveolar nerve.  Pathologic compression fracture of T3 and T4 were also noted.  Additionally there was a site of hypermetabolic activity in the left adductor musculature concerning for intramuscular tumor deposit.The patient met with Dr. Castillo of medical oncology team who discussed starting systemic therapy with Keytruda. She met with us on 2/19/24. We recommended proceeding with palliative radiation therapy to the spine that would allow us to address the spinal region at this time and not delay systemic therapy given widespread disease. Discussed possibility of readdressing the spine with surgical management down the road depending upon response or if any neurologic symptoms were to develop.     Radiation Treatment History  0/19/23 to 12/5/23: Head and neck, 6600 cGy in 33 fractions    SITE OF TREATMENT: Thoracic spine, T2 - T4    DATES  OF TREATMENT: 2/22/24 to 3/12/24    TOTAL DOSE OF TREATMENT: 2,500 cGy     DOSE PER FRACTION OF TREATMENT: 250 cGy x 10 fractions       COMMENT/TOXICITY:                  G-tube was replaced. Having bowel movement with stool softener and milk of magnesia. Pain in spine improved during radiation    PAIN MANAGEMENT:  Oxycodone oral solution prn                       FOLLOW UP PLAN: one week    CC  Patient Care Team:  Sukumar Hendricks MD as PCP - General (Family Medicine)  Darien Thorne MD as Assigned Surgical Provider  Sukumar Hendricks MD as Assigned PCP  Hernán Egan MD as MD (Radiation Oncology)  Rai Castillo MD as MD (Hematology & Oncology)  Aramis Giang AuD (Audiology)  Maya Mao RN as Specialty Care Coordinator (Hematology & Oncology)  Hernán Egan MD as Assigned Cancer Care Provider  Maya Mao RN as Specialty  Care Coordinator (Hematology & Oncology)  Leilani Gu MD as Assigned Neuroscience Provider       ADIN Herron  Department of Radiation Oncology  [facility]

## 2024-03-28 ENCOUNTER — OFFICE VISIT (OUTPATIENT)
Dept: RADIATION THERAPY | Facility: OUTPATIENT CENTER | Age: 64
End: 2024-03-28
Payer: COMMERCIAL

## 2024-03-28 VITALS
RESPIRATION RATE: 18 BRPM | WEIGHT: 83.6 LBS | HEART RATE: 97 BPM | DIASTOLIC BLOOD PRESSURE: 57 MMHG | SYSTOLIC BLOOD PRESSURE: 114 MMHG | BODY MASS INDEX: 14.58 KG/M2 | OXYGEN SATURATION: 99 %

## 2024-03-28 DIAGNOSIS — C06.9 SQUAMOUS CELL CARCINOMA OF ORAL CAVITY (H): Primary | ICD-10-CM

## 2024-03-28 NOTE — LETTER
3/28/2024         RE: Adrianna Pereira  [id number] [address] [place] [id number]        Dear Colleague,    Thank you for referring your patient, Adrianna Pereira, to the [facility]. Please see a copy of my visit note below.    Radiation Oncology Progress Note    HPI:Ms. 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  Final pathology demonstrated squamous cell carcinoma moderately differentiated, 4.1 cm in size originating from  floor mouth/ anterior tongue,  depth of invasion 29 mm, no LVSI, positive PNI, positive margin (left anterior lateral).  11 of 98 lymph nodes positive (4 cm largest deposit, positive extracapsular disease present), pathologic T4N3b.   She underwent adjuvant chemoradiation therapy, completed on 12/5/2023.  The patient had early recurrence of disease including locally as well as distantly (spine, lung, soft tissue).  She underwent palliative radiation therapy to thoracic spine.     Radiation Treatment  10/19/2023 to 12/5/2023: Head and neck, 6600 cGy in 33 fractions  2/22/24 to 3/12/24: Thoracic spine, T2 - T4, 2500 cGy in 10 fractions     The patient returns for follow-up.    She was started systemic treatment under the care of Dr. Castillo.  Tolerating treatment thus far.  She has noted improvement in thoracic spine pain.  Being tapered off of steroids by medical oncology team.  Nutrition continues to be through the tube.  Has had some leakage from the tube site, has been working with IR team.  Denies any focal neurologic change.  Continues to take oxycodone oral solution as needed 1-2 times per day.      Plan:  Continue palliative systemic therapy per medical oncology team.  Taper steroids per medical oncology team.  Plan to image T-spine in 2 weeks for follow-up.     Hernán Egan M.D.  Department of Radiation Oncology  [facility]  [place]

## 2024-03-28 NOTE — PROGRESS NOTES
Radiation Oncology Progress Note    HPI:Ms. 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  Final pathology demonstrated squamous cell carcinoma moderately differentiated, 4.1 cm in size originating from  floor mouth/ anterior tongue,  depth of invasion 29 mm, no LVSI, positive PNI, positive margin (left anterior lateral).  11 of 98 lymph nodes positive (4 cm largest deposit, positive extracapsular disease present), pathologic T4N3b.   She underwent adjuvant chemoradiation therapy, completed on 12/5/2023.  The patient had early recurrence of disease including locally as well as distantly (spine, lung, soft tissue).  She underwent palliative radiation therapy to thoracic spine.     Radiation Treatment  10/19/2023 to 12/5/2023: Head and neck, 6600 cGy in 33 fractions  2/22/24 to 3/12/24: Thoracic spine, T2 - T4, 2500 cGy in 10 fractions     The patient returns for follow-up.    She was started systemic treatment under the care of Dr. Castillo.  Tolerating treatment thus far.  She has noted improvement in thoracic spine pain.  Being tapered off of steroids by medical oncology team.  Nutrition continues to be through the tube.  Has had some leakage from the tube site, has been working with IR team.  Denies any focal neurologic change.  Continues to take oxycodone oral solution as needed 1-2 times per day.      Plan:  Continue palliative systemic therapy per medical oncology team.  Taper steroids per medical oncology team.  Plan to image T-spine in 2 weeks for follow-up.     Hernán Egan M.D.  Department of Radiation Oncology  [facility]

## 2024-03-28 NOTE — NURSING NOTE
"Oncology Rooming Note    March 28, 2024 2:49 PM   Adrianna Pereira is a 63 year old female who presents for:    Chief Complaint   Patient presents with    Radiation Therapy     return     Initial Vitals: /57   Pulse 97   Resp 18   Wt 37.9 kg (83 lb 9.6 oz)   SpO2 99%   BMI 14.58 kg/m   Estimated body mass index is 14.58 kg/m  as calculated from the following:    Height as of 3/22/24: 1.613 m (5' 3.5\").    Weight as of this encounter: 37.9 kg (83 lb 9.6 oz). Body surface area is 1.3 meters squared.  Data Unavailable Comment: Data Unavailable   No LMP recorded. Patient is postmenopausal.  Allergies reviewed: Yes  Medications reviewed: Yes    Medications: Medication refills not needed today.  Pharmacy name entered into EPIC:    [id number] - [place], [place] - [address] [place] [address] PHARMACY - [place], [place] - [address] [facility] PHARMACY [place], [place] - [address] [facility] [place] PHARMACY [place], [place] - [address] [facility]     Frailty Screening:   Is the patient here for a new oncology consult visit in cancer care? 2. No      Clinical concerns: patient returns for short term follow up after palliative T spine radiation - complete on 3/12/24.   She had pain prior to treatment. It is now gone.  She is tapering down on decadron. Currently at 2 mg in am and 2mg in afternoon.    Currently doing Keytruda infusions    MD was notified.      Juliann Hood RN             "

## 2024-04-02 ENCOUNTER — ONCOLOGY VISIT (OUTPATIENT)
Dept: ONCOLOGY | Facility: CLINIC | Age: 64
End: 2024-04-02
Attending: NURSE PRACTITIONER
Payer: COMMERCIAL

## 2024-04-02 ENCOUNTER — LAB (OUTPATIENT)
Dept: INFUSION THERAPY | Facility: CLINIC | Age: 64
End: 2024-04-02
Attending: NURSE PRACTITIONER
Payer: COMMERCIAL

## 2024-04-02 VITALS
HEIGHT: 64 IN | OXYGEN SATURATION: 98 % | TEMPERATURE: 97.6 F | DIASTOLIC BLOOD PRESSURE: 62 MMHG | SYSTOLIC BLOOD PRESSURE: 94 MMHG | BODY MASS INDEX: 14.51 KG/M2 | WEIGHT: 85 LBS | HEART RATE: 100 BPM | RESPIRATION RATE: 12 BRPM

## 2024-04-02 DIAGNOSIS — C78.00 MALIGNANT NEOPLASM METASTATIC TO LUNG, UNSPECIFIED LATERALITY (H): ICD-10-CM

## 2024-04-02 DIAGNOSIS — C79.51 MALIGNANT NEOPLASM METASTATIC TO BONE (H): Primary | ICD-10-CM

## 2024-04-02 DIAGNOSIS — C06.9 SQUAMOUS CELL CARCINOMA OF ORAL CAVITY (H): ICD-10-CM

## 2024-04-02 DIAGNOSIS — R79.89 ELEVATED SERUM CREATININE: ICD-10-CM

## 2024-04-02 DIAGNOSIS — C06.9 SQUAMOUS CELL CARCINOMA OF ORAL CAVITY (H): Primary | ICD-10-CM

## 2024-04-02 DIAGNOSIS — C79.51 MALIGNANT NEOPLASM METASTATIC TO BONE (H): ICD-10-CM

## 2024-04-02 DIAGNOSIS — R19.7 DIARRHEA, UNSPECIFIED TYPE: ICD-10-CM

## 2024-04-02 LAB
ALBUMIN SERPL BCG-MCNC: 3.5 G/DL (ref 3.5–5.2)
ALP SERPL-CCNC: 98 U/L (ref 40–150)
ALT SERPL W P-5'-P-CCNC: 23 U/L (ref 0–50)
ANION GAP SERPL CALCULATED.3IONS-SCNC: 12 MMOL/L (ref 7–15)
AST SERPL W P-5'-P-CCNC: 20 U/L (ref 0–45)
BILIRUB SERPL-MCNC: <0.2 MG/DL
BUN SERPL-MCNC: 36.2 MG/DL (ref 8–23)
CALCIUM SERPL-MCNC: 9.1 MG/DL (ref 8.8–10.2)
CHLORIDE SERPL-SCNC: 103 MMOL/L (ref 98–107)
CREAT SERPL-MCNC: 1.14 MG/DL (ref 0.51–0.95)
DEPRECATED HCO3 PLAS-SCNC: 24 MMOL/L (ref 22–29)
EGFRCR SERPLBLD CKD-EPI 2021: 54 ML/MIN/1.73M2
GLUCOSE SERPL-MCNC: 98 MG/DL (ref 70–99)
POTASSIUM SERPL-SCNC: 3.7 MMOL/L (ref 3.4–5.3)
PROT SERPL-MCNC: 6.7 G/DL (ref 6.4–8.3)
SODIUM SERPL-SCNC: 139 MMOL/L (ref 135–145)
TSH SERPL DL<=0.005 MIU/L-ACNC: 1.36 UIU/ML (ref 0.3–4.2)

## 2024-04-02 PROCEDURE — 36591 DRAW BLOOD OFF VENOUS DEVICE: CPT | Performed by: INTERNAL MEDICINE

## 2024-04-02 PROCEDURE — G2211 COMPLEX E/M VISIT ADD ON: HCPCS | Performed by: NURSE PRACTITIONER

## 2024-04-02 PROCEDURE — 250N000011 HC RX IP 250 OP 636: Mod: JZ | Performed by: NURSE PRACTITIONER

## 2024-04-02 PROCEDURE — 82247 BILIRUBIN TOTAL: CPT | Performed by: INTERNAL MEDICINE

## 2024-04-02 PROCEDURE — 258N000003 HC RX IP 258 OP 636: Performed by: NURSE PRACTITIONER

## 2024-04-02 PROCEDURE — 84443 ASSAY THYROID STIM HORMONE: CPT | Performed by: INTERNAL MEDICINE

## 2024-04-02 PROCEDURE — 96413 CHEMO IV INFUSION 1 HR: CPT

## 2024-04-02 PROCEDURE — 99215 OFFICE O/P EST HI 40 MIN: CPT | Performed by: NURSE PRACTITIONER

## 2024-04-02 PROCEDURE — G0463 HOSPITAL OUTPT CLINIC VISIT: HCPCS | Mod: 25 | Performed by: NURSE PRACTITIONER

## 2024-04-02 RX ORDER — ALBUTEROL SULFATE 0.83 MG/ML
2.5 SOLUTION RESPIRATORY (INHALATION)
Status: CANCELLED | OUTPATIENT
Start: 2024-04-02

## 2024-04-02 RX ORDER — MEPERIDINE HYDROCHLORIDE 25 MG/ML
25 INJECTION INTRAMUSCULAR; INTRAVENOUS; SUBCUTANEOUS EVERY 30 MIN PRN
Status: CANCELLED | OUTPATIENT
Start: 2024-04-02

## 2024-04-02 RX ORDER — METHYLPREDNISOLONE SODIUM SUCCINATE 125 MG/2ML
125 INJECTION, POWDER, LYOPHILIZED, FOR SOLUTION INTRAMUSCULAR; INTRAVENOUS
Status: CANCELLED
Start: 2024-04-02

## 2024-04-02 RX ORDER — HEPARIN SODIUM,PORCINE 10 UNIT/ML
5-20 VIAL (ML) INTRAVENOUS DAILY PRN
Status: CANCELLED | OUTPATIENT
Start: 2024-04-02

## 2024-04-02 RX ORDER — ALBUTEROL SULFATE 90 UG/1
1-2 AEROSOL, METERED RESPIRATORY (INHALATION)
Status: CANCELLED
Start: 2024-04-02

## 2024-04-02 RX ORDER — EPINEPHRINE 1 MG/ML
0.3 INJECTION, SOLUTION, CONCENTRATE INTRAVENOUS EVERY 5 MIN PRN
Status: CANCELLED | OUTPATIENT
Start: 2024-04-02

## 2024-04-02 RX ORDER — HEPARIN SODIUM (PORCINE) LOCK FLUSH IV SOLN 100 UNIT/ML 100 UNIT/ML
5 SOLUTION INTRAVENOUS
Status: CANCELLED | OUTPATIENT
Start: 2024-04-02

## 2024-04-02 RX ORDER — LORAZEPAM 2 MG/ML
0.5 INJECTION INTRAMUSCULAR EVERY 4 HOURS PRN
Status: CANCELLED | OUTPATIENT
Start: 2024-04-02

## 2024-04-02 RX ORDER — HEPARIN SODIUM (PORCINE) LOCK FLUSH IV SOLN 100 UNIT/ML 100 UNIT/ML
5 SOLUTION INTRAVENOUS
Status: DISCONTINUED | OUTPATIENT
Start: 2024-04-02 | End: 2024-04-02 | Stop reason: HOSPADM

## 2024-04-02 RX ORDER — DIPHENHYDRAMINE HYDROCHLORIDE 50 MG/ML
50 INJECTION INTRAMUSCULAR; INTRAVENOUS
Status: CANCELLED
Start: 2024-04-02

## 2024-04-02 RX ADMIN — Medication 5 ML: at 15:50

## 2024-04-02 RX ADMIN — SODIUM CHLORIDE 250 ML: 9 INJECTION, SOLUTION INTRAVENOUS at 15:09

## 2024-04-02 RX ADMIN — SODIUM CHLORIDE 200 MG: 9 INJECTION, SOLUTION INTRAVENOUS at 15:14

## 2024-04-02 ASSESSMENT — PAIN SCALES - GENERAL: PAINLEVEL: NO PAIN (0)

## 2024-04-02 NOTE — PROGRESS NOTES
"Oncology Rooming Note    April 2, 2024 2:18 PM   Adrianna Pereira is a 63 year old female who presents for:    Chief Complaint   Patient presents with    Oncology Clinic Visit     Malignant neoplasm metastatic to lung, unspecified laterality - Labs provider and infusion     Initial Vitals: BP 94/62 (BP Location: Left arm, Patient Position: Sitting, Cuff Size: Adult Small)   Pulse 100   Temp 97.6  F (36.4  C) (Tympanic)   Resp 12   Ht 1.613 m (5' 3.5\")   Wt 38.6 kg (85 lb)   SpO2 98%   BMI 14.82 kg/m   Estimated body mass index is 14.82 kg/m  as calculated from the following:    Height as of this encounter: 1.613 m (5' 3.5\").    Weight as of this encounter: 38.6 kg (85 lb). Body surface area is 1.32 meters squared.  No Pain (0) Comment: Data Unavailable   No LMP recorded. Patient is postmenopausal.  Allergies reviewed: Yes  Medications reviewed: Yes    Medications: Medication refills not needed today.  Pharmacy name entered into EPIC:    [id number] - [place], [place] - [address] [place] [address] PHARMACY - [place], [place] - [address] [facility] PHARMACY [place], [place] - [address] [facility] PHARMACY [place], [place] - [address] [facility]     Frailty Screening:   Is the patient here for a new oncology consult visit in cancer care? 2. No      Clinical concerns:  None      Tanya Philippe CMA              "

## 2024-04-02 NOTE — PROGRESS NOTES
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   Within a few months of completing, was found to have metastatic recurrence.     Completed palliative RT to thoracic spine 3/12 (3 weeks ago)  Tapered off dex 3 days ago, doing well.   Pain is resolved.     Has completed 2 cycles of pembrolizumab. Tolerating pretty well but has had recurrent diarrhea (3-4 loose stools) starting 1.5 weeks after each infusion lasting ~7 days, then resolves.    Started Zometa every 4 weeks for bone mets.    Labwork: Creatinine 1.14 (0.7-0.8), rest CMP WNL. TSH WNL.  Creatinine elevation likely related to the routine NSAID use and borderline hydration.    Plan:  -Proceed with C3 pembrolizumab  -Monitor diarrhea closely. Can use minimal antidiarrheals, imodium. If increases >4-5/day, call to update us. Will get stool cultures and may need to consider steroids for immunotherapy colitis.  -Monitor creatinine on immunotherapy to rule out autoimmune nephritis. For now, given mild elevation, follow and work on hydration. Minimize/avoid NSAIDs  -Change Zometa to every 6 weeks to align with [place] cycles and minimize extra appts for pt. However, will check with Dr. Thorne in ENT re: osteonecrosis risk with prior surgery, RT and now open progressive jaw wound and may need to consider holding. Will have to follow CrCl before next dose  -Return in 3 weeks with CT before cycle 4.  Establish with Dr. Friedell given complexity of her disease course  -Will have repeat T-spine MRI and follow-up with Dr. Egan next week    ADDENDUM:  Dr. Thorne states she is at very high risk for jaw osteonecrosis, so will hold Zometa at this time to reduce more complications.    Open oral/jaw wound  Having progressive plate exposure over lower lip and open wound at prior ENT resection site. Draining white-yellow drainage, no fevers. Reconstruction would require additional free flap, so not being pursued at this time.     Plan:  -Refer to Wound RN for mgmt  -Follow-up with Dr. Thorne in ENT  -I will check with Dr. Thorne on his thoughts regarding osteonecrosis risk on Zometa; may need to hold this  -High risk for infection    Cancer pain  Completed palliative RT 3 weeks ago.  Back pain is resolved. No longer requiring pain meds for this.   Tapered off dex earlier than planned on her own with no issues.     Having progressive mouth/lower lip pain related to progressive reconstruction failure/open wound.  Taking ibuprofen 600-1200 mg/day.   Taking oxycodone 5-7.5 mg twice/day.    Plan:  -Reduce/eliminate NSAID use with elevated creatinine  -Recommend Tylenol instead. Can increase oxycodone use through day as well    Nutrition/hydration  Dependant on feeding tube. Weight has declined some. Staying hydrated via PEG flushes, but having some diarrhea.    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   On observation.   ______________________________________________________________________________    History of Present Illness/ Interval History    Ms. Adrianna Pereira is a 63 year old who completed resection of floor of mouth cancer, followed by adjuvant chemoRT 12/2023. Unfortunately, shortly after this, she was found to have local and metastatic recurrence involving  mandible, lung, bone (T3-T4) and soft tissue that is PDL1 70%.    She started palliative treatments with pembrolizumab, returns for cycle 3.   Tolerating pembrolizumab generally well. Has had some recurrent diarrhea the last 2 cycles, from day 7-14; averaging 3-4 loose stools/day. No blood in stools. No fevers. No longer on laxatives.     Completed palliative RT to thoracic spine 3 weeks ago.  Was on dex taper, was down to 2 mg BID through last week and then stopped altogether 3-4 days ago. No side effects coming off.     Her back pain is resolved.   No longer taking pain med for this.     Mouth pain persists. Taking ibuprofen 600 mg once - twice day. Treating with oxycodone 5-7.5 mg twice daily. Having some progressive plate exposure her lip and open wound at prior ENT resection site. Draining white-yellow drainage, no fevers. Reconstruction would require additional free flap, so not being pursued at this time.     Oral communication is an ongoing issue.      Dependent on feeding tube for all nutrition/hydration and meds. Some weight loss. Feels well-hydrated.    ECOG Performance    1    Oncology History/Treatment  Diagnosis/Stage:   7/2023: [id number] Floor of mouth squamous cell cancer  -hx alcoholism (stopped all use) and smoking (quit 8/2023)  -hx premalignant oral cavity lesions s/p CO2 laser ablation with ENT  -presented with lower lip/chin swelling  -7/3/2023 left mandibular alveolus biopsy: invasive keratinizing moderately differential squamous cell carcinoma.   -PET: 4.4 x 3.7 x 3.2 cm anterior oral cavity mass invading into the mandible with a separate gluco-avid nodule on the right mandibular buccal mucosa and bilateral level 1B, level 2 and L3 metastatic lymphadenopathy without evidence of metastatic disease.   -8/17/2023 surgical path: pT4a, N3b tumor with positive left anterior lateral soft tissue margin, 11/98+ lymph nodes including STEFFEN (largest 4 cm) and several bilateral lymph nodes consistent with  involvement by low-grade B-cell neoplasm suggestive of CLL/SLL.     2/2024: Progression to stage IV mouth squamous cell cancer to bone and lung  -Within weeks of completing definitive treatment for her primary disease, noted severe/progressive back pain  -CT C/T spine: new T3 compression fracture with hypodensity in vertebral body extending into L posterior elements suggestive of met. T4 posterior vertebral body lesion also suspicious. New lung nodules also noted  -MRI T spine: T3 compression fracture with extraosseous tumor involving ventral epidural space with mod - severe central spinal canal stenosis at T3  -PET: recurrent avid foci L maikel-mandible, numerous bilateral pulmonary/pleural mets, met mediastinal/hilar adenopathy, T3, T4, L adductor musculature and R upper thigh skin thickening  -PDL1 TPS and CPS 70% from 8/2023 original tumor biopsy    Treatment:  Locally advanced disease  -8/17/2023: segmental mandibulectomy, floor of mouth resection, anterior glossectomy, left fibular free flap, skin grafting, and tracheostomy.   -Post-op course complicated by skin necrosis requiring OR debridment and resuturing of neck incision/intraoral flap    -10/20/2023 - 12/5/2023: completed adjuvant concurrent RT + weekly cisplatin (x 7 cycles)    Metastatic recurrence  -2/21/2024 - present: pembrolizumab.  -Zometa for bone mets    -2/26 - 3/12/2024: palliative RT to thoracic spine, 2500 cGy/10. Dex taper      Physical Exam    GENERAL: Alert and oriented to time place and person. Seated comfortably. In no distress.  Dysarthria.   accompanied.  HEENT: Open wound below lower lip with exposed plate. White/yellow purulent appearing drainage.  SHAE, EOMI. No erythema. No icterus.  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  No skin breakdown.  No evident adenopathy/masses.  HEART: RRR  CHEST: regular respiratory effort.  ABD: PEG in place. Non-distended  NEURO: No gross deficit noted.       Lab  Results    Recent Results (from the past 168 hour(s))   Comprehensive metabolic panel   Result Value Ref Range    Sodium 139 135 - 145 mmol/L    Potassium 3.7 3.4 - 5.3 mmol/L    Carbon Dioxide (CO2) 24 22 - 29 mmol/L    Anion Gap 12 7 - 15 mmol/L    Urea Nitrogen 36.2 (H) 8.0 - 23.0 mg/dL    Creatinine 1.14 (H) 0.51 - 0.95 mg/dL    GFR Estimate 54 (L) >60 mL/min/1.73m2    Calcium 9.1 8.8 - 10.2 mg/dL    Chloride 103 98 - 107 mmol/L    Glucose 98 70 - 99 mg/dL    Alkaline Phosphatase 98 40 - 150 U/L    AST 20 0 - 45 U/L    ALT 23 0 - 50 U/L    Protein Total 6.7 6.4 - 8.3 g/dL    Albumin 3.5 3.5 - 5.2 g/dL    Bilirubin Total <0.2 <=1.2 mg/dL   TSH with free T4 reflex   Result Value Ref Range    TSH 1.36 0.30 - 4.20 uIU/mL         Imaging    IR Gastrostomy Tube Check    Result Date: 3/27/2024  PRE-PROCEDURE DIAGNOSIS: 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) POST-PROCEDURE DIAGNOSIS: Same PROCEDURE: Gastrostomy tube check    IMPRESSION: Completed fluoroscopy-guided gastrostomy tube check with no exchange. Gastrostomy tube ready for immediate use. PLAN: Patient should return in 9-12 months for routine exchange, or sooner if necessary. ---------- CLINICAL HISTORY: Gastrostomy tube check requested due to leaking and pain at tube site since last exchange on 3/4/24. PERFORMED BY: Lisette Lehman PA-C CONSENT: Continuation of care MEDICATIONS: None NURSING: The patient was placed on continuous vital signs monitoring. Vital signs were monitored by nursing staff under my supervision. DESCRIPTION: Existing tube balloon was deflated (8 mL removed so no concern for deflated balloon) and filled with contrast/saline mixture. No evidence of ruptured balloon seen. Contrast flushed through tube revealed g-tube to be positioned well with no concerns for g-tube dysfunction nor defects in tubing. Balloon was over-inflated with 10 mL. COMPLICATIONS: No immediate concerns, the patient remained stable throughout the procedure and  tolerated it well. ESTIMATED BLOOD LOSS: None SPECIMENS: None AMAIRANI BALDERAS PA-C   SYSTEM ID:  [id number]    IR Gastrostomy Tube Change    Result Date: 3/4/2024  DIAGNOSIS: Dislodged gastrostomy tube PROCEDURE: Gastrostomy tube exchange    IMPRESSION: Completed fluoroscopy-guided exchange of gastrostomy tube. New 18 [place]ian 3.0 cm gastrostomy tube ready for immediate use. 8cc of sterile water inserted into balloon PLAN: Patient should return in 9-12 months for routine exchange, or sooner if necessary.  ---------- CLINICAL HISTORY: 64 y/o F s/p 18Fr G-tube placement in 2023 presents to the ED for dislodged G-tube. PERFORMED BY: Gabi West PA-C CONSENT: Continuation of care MEDICATIONS: None DESCRIPTION: Lidocaine gel was used to anesthetize the tube tract. New gastrostomy tube selected, prepared and inserted into existing tract. Retention balloon filled with 8 mL saline. COMPLICATIONS: No immediate concerns, the patient remained stable throughout the procedure and tolerated it well. ESTIMATED BLOOD LOSS: None SPECIMENS: None ADIN ENAMORADO   SYSTEM ID:  [id number]    XR Abdomen 2 Views    Result Date: 3/4/2024  ABDOMEN TWO-THREE VIEW  3/4/2024 12:01 PM HISTORY: Constipated x 10 days; feeding tube dislodged. COMPARISON: August 17, 2023 FINDINGS: Moderate  amount of stool. No free air. There are no air filled distended loops of small bowel. The colon is not distended. The lung bases are unremarkable.     IMPRESSION: Nonobstructed bowel gas pattern. CHRISTIAN BAILEY MD   SYSTEM ID:  [id number]     Billing  Total time 40 minutes, to include face to face visit, review of EMR, ordering, documentation and coordination of care on date of service.    complexity modifier for longitudinal care.         Signed by: Marina Cronin NP

## 2024-04-02 NOTE — PROGRESS NOTES
Infusion Nursing Note:  Adrianna Pereira presents today for C3D1 Keytruda.    Patient seen by provider today: Yes: Marina Cronin NP   present during visit today: Not Applicable.    Note: N/A.      Intravenous Access:  Implanted Port.    Treatment Conditions:  Results reviewed, labs MET treatment parameters, ok to proceed with treatment.      Post Infusion Assessment:  Patient tolerated infusion without incident.  Blood return noted pre and post infusion.  Site patent and intact, free from redness, edema or discomfort.  No evidence of extravasations.  Access discontinued per protocol.       Discharge Plan:   Patient discharged in stable condition accompanied by: .  Departure Mode: Ambulatory.      Ngozi Anderson RN

## 2024-04-02 NOTE — LETTER
4/2/2024         RE: Adrianna Pereira  [address] [address] [place] [id number]        Dear Colleague,    Thank you for referring your patient, Adrianna Pereira, to the [facility]. Please see a copy of my visit note below.    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   Within a few months of completing, was found to have metastatic recurrence.     Completed palliative RT to thoracic spine 3/12 (3 weeks ago)  Tapered off dex 3 days ago, doing well.   Pain is resolved.     Has completed 2 cycles of pembrolizumab. Tolerating pretty well but has had recurrent diarrhea (3-4 loose stools) starting 1.5 weeks after each infusion lasting ~7 days, then resolves.    Started Zometa every 4 weeks for bone mets.    Labwork: Creatinine 1.14 (0.7-0.8), rest CMP WNL. TSH WNL.  Creatinine elevation likely related to the routine NSAID use and borderline hydration.    Plan:  -Proceed with C3 pembrolizumab  -Monitor diarrhea closely. Can use minimal antidiarrheals, imodium. If increases >4-5/day, call to update us. Will get stool cultures and may need to consider steroids for immunotherapy colitis.  -Monitor creatinine on immunotherapy to rule out autoimmune nephritis. For now, given mild elevation, follow and work on hydration. Minimize/avoid NSAIDs  -Change Zometa to every 6 weeks to align with [place] cycles and minimize extra appts for pt.  However, will check with Dr. Thorne in ENT re: osteonecrosis risk with prior surgery, RT and now open progressive jaw wound and may need to consider holding. Will have to follow CrCl before next dose  -Return in 3 weeks with CT before cycle 4. Establish with Dr. Friedell given complexity of her disease course  -Will have repeat T-spine MRI and follow-up with Dr. Egan next week    Open oral/jaw wound  Having progressive plate exposure over lower lip and open wound at prior ENT resection site. Draining white-yellow drainage, no fevers. Reconstruction would require additional free flap, so not being pursued at this time.     Plan:  -Refer to Wound RN for mgmt  -Follow-up with Dr. Thorne in ENT  -I will check with Dr. Thorne on his thoughts regarding osteonecrosis risk on Zometa; may need to hold this  -High risk for infection    Cancer pain  Completed palliative RT 3 weeks ago.  Back pain is resolved. No longer requiring pain meds for this.   Tapered off dex earlier than planned on her own with no issues.     Having progressive mouth/lower lip pain related to progressive reconstruction failure/open wound.  Taking ibuprofen 600-1200 mg/day.   Taking oxycodone 5-7.5 mg twice/day.    Plan:  -Reduce/eliminate NSAID use with elevated creatinine  -Recommend Tylenol instead. Can increase oxycodone use through day as well    Nutrition/hydration  Dependant on feeding tube. Weight has declined some. Staying hydrated via PEG flushes, but having some diarrhea.    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   On observation.   ______________________________________________________________________________    History of Present Illness/ Interval History    Ms. Adrianna Pereira is a 63 year old who completed resection of floor of mouth cancer, followed by adjuvant chemoRT 12/2023.  Unfortunately, shortly after this, she was found to have local and metastatic recurrence involving mandible, lung, bone (T3-T4) and soft tissue that is PDL1 70%.    She started palliative treatments with pembrolizumab, returns for cycle 3.   Tolerating pembrolizumab generally well. Has had some recurrent diarrhea the last 2 cycles, from day 7-14; averaging 3-4 loose stools/day. No blood in stools. No fevers. No longer on laxatives.     Completed palliative RT to thoracic spine 3 weeks ago.  Was on dex taper, was down to 2 mg BID through last week and then stopped altogether 3-4 days ago. No side effects coming off.     Her back pain is resolved.   No longer taking pain med for this.     Mouth pain persists. Taking ibuprofen 600 mg once - twice day. Treating with oxycodone 5-7.5 mg twice daily. Having some progressive plate exposure her lip and open wound at prior ENT resection site. Draining white-yellow drainage, no fevers. Reconstruction would require additional free flap, so not being pursued at this time.     Oral communication is an ongoing issue.      Dependent on feeding tube for all nutrition/hydration and meds. Some weight loss. Feels well-hydrated.    ECOG Performance    1    Oncology History/Treatment  Diagnosis/Stage:   7/2023: [id number] Floor of mouth squamous cell cancer  -hx alcoholism (stopped all use) and smoking (quit 8/2023)  -hx premalignant oral cavity lesions s/p CO2 laser ablation with ENT  -presented with lower lip/chin swelling  -7/3/2023 left mandibular alveolus biopsy: invasive keratinizing moderately differential squamous cell carcinoma.   -PET: 4.4 x 3.7 x 3.2 cm anterior oral cavity mass invading into the mandible with a separate gluco-avid nodule on the right mandibular buccal mucosa and bilateral level 1B, level 2 and L3 metastatic lymphadenopathy without evidence of metastatic disease.   -8/17/2023 surgical path: pT4a, N3b tumor with positive left anterior lateral soft tissue margin,  11/98+ lymph nodes including STEFFEN (largest 4 cm) and several bilateral lymph nodes consistent with involvement by low-grade B-cell neoplasm suggestive of CLL/SLL.     2/2024: Progression to stage IV mouth squamous cell cancer to bone and lung  -Within weeks of completing definitive treatment for her primary disease, noted severe/progressive back pain  -CT C/T spine: new T3 compression fracture with hypodensity in vertebral body extending into L posterior elements suggestive of met. T4 posterior vertebral body lesion also suspicious. New lung nodules also noted  -MRI T spine: T3 compression fracture with extraosseous tumor involving ventral epidural space with mod - severe central spinal canal stenosis at T3  -PET: recurrent avid foci L maikel-mandible, numerous bilateral pulmonary/pleural mets, met mediastinal/hilar adenopathy, T3, T4, L adductor musculature and R upper thigh skin thickening  -PDL1 TPS and CPS 70% from 8/2023 original tumor biopsy    Treatment:  Locally advanced disease  -8/17/2023: segmental mandibulectomy, floor of mouth resection, anterior glossectomy, left fibular free flap, skin grafting, and tracheostomy.   -Post-op course complicated by skin necrosis requiring OR debridment and resuturing of neck incision/intraoral flap    -10/20/2023 - 12/5/2023: completed adjuvant concurrent RT + weekly cisplatin (x 7 cycles)    Metastatic recurrence  -2/21/2024 - present: pembrolizumab.  -Zometa for bone mets    -2/26 - 3/12/2024: palliative RT to thoracic spine, 2500 cGy/10. Dex taper      Physical Exam    GENERAL: Alert and oriented to time place and person. Seated comfortably. In no distress.  Dysarthria.   accompanied.  HEENT: Open wound below lower lip with exposed plate. White/yellow purulent appearing drainage.  SHAE, EOMI. No erythema. No icterus.  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  No skin breakdown.  No evident adenopathy/masses.  HEART: RRR  CHEST: regular  respiratory effort.  ABD: PEG in place. Non-distended  NEURO: No gross deficit noted.       Lab Results    Recent Results (from the past 168 hour(s))   Comprehensive metabolic panel   Result Value Ref Range    Sodium 139 135 - 145 mmol/L    Potassium 3.7 3.4 - 5.3 mmol/L    Carbon Dioxide (CO2) 24 22 - 29 mmol/L    Anion Gap 12 7 - 15 mmol/L    Urea Nitrogen 36.2 (H) 8.0 - 23.0 mg/dL    Creatinine 1.14 (H) 0.51 - 0.95 mg/dL    GFR Estimate 54 (L) >60 mL/min/1.73m2    Calcium 9.1 8.8 - 10.2 mg/dL    Chloride 103 98 - 107 mmol/L    Glucose 98 70 - 99 mg/dL    Alkaline Phosphatase 98 40 - 150 U/L    AST 20 0 - 45 U/L    ALT 23 0 - 50 U/L    Protein Total 6.7 6.4 - 8.3 g/dL    Albumin 3.5 3.5 - 5.2 g/dL    Bilirubin Total <0.2 <=1.2 mg/dL   TSH with free T4 reflex   Result Value Ref Range    TSH 1.36 0.30 - 4.20 uIU/mL         Imaging    IR Gastrostomy Tube Check    Result Date: 3/27/2024  PRE-PROCEDURE DIAGNOSIS: 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) POST-PROCEDURE DIAGNOSIS: Same PROCEDURE: Gastrostomy tube check    IMPRESSION: Completed fluoroscopy-guided gastrostomy tube check with no exchange. Gastrostomy tube ready for immediate use. PLAN: Patient should return in 9-12 months for routine exchange, or sooner if necessary. ---------- CLINICAL HISTORY: Gastrostomy tube check requested due to leaking and pain at tube site since last exchange on 3/4/24. PERFORMED BY: Lisette Lehman PA-C CONSENT: Continuation of care MEDICATIONS: None NURSING: The patient was placed on continuous vital signs monitoring. Vital signs were monitored by nursing staff under my supervision. DESCRIPTION: Existing tube balloon was deflated (8 mL removed so no concern for deflated balloon) and filled with contrast/saline mixture. No evidence of ruptured balloon seen. Contrast flushed through tube revealed g-tube to be positioned well with no concerns for g-tube dysfunction nor defects in tubing. Balloon was over-inflated with 10 mL.  COMPLICATIONS: No immediate concerns, the patient remained stable throughout the procedure and tolerated it well. ESTIMATED BLOOD LOSS: None SPECIMENS: None AMAIRANI BALDERAS PA-C   SYSTEM ID:  [id number]    IR Gastrostomy Tube Change    Result Date: 3/4/2024  DIAGNOSIS: Dislodged gastrostomy tube PROCEDURE: Gastrostomy tube exchange    IMPRESSION: Completed fluoroscopy-guided exchange of gastrostomy tube. New 18 Macedonian 3.0 cm gastrostomy tube ready for immediate use. 8cc of sterile water inserted into balloon PLAN: Patient should return in 9-12 months for routine exchange, or sooner if necessary.  ---------- CLINICAL HISTORY: 62 y/o F s/p 18Fr G-tube placement in 2023 presents to the ED for dislodged G-tube. PERFORMED BY: Gabi West PA-C CONSENT: Continuation of care MEDICATIONS: None DESCRIPTION: Lidocaine gel was used to anesthetize the tube tract. New gastrostomy tube selected, prepared and inserted into existing tract. Retention balloon filled with 8 mL saline. COMPLICATIONS: No immediate concerns, the patient remained stable throughout the procedure and tolerated it well. ESTIMATED BLOOD LOSS: None SPECIMENS: None ADIN ENAMORADO   SYSTEM ID:  [id number]    XR Abdomen 2 Views    Result Date: 3/4/2024  ABDOMEN TWO-THREE VIEW  3/4/2024 12:01 PM HISTORY: Constipated x 10 days; feeding tube dislodged. COMPARISON: August 17, 2023 FINDINGS: Moderate  amount of stool. No free air. There are no air filled distended loops of small bowel. The colon is not distended. The lung bases are unremarkable.     IMPRESSION: Nonobstructed bowel gas pattern. CHRISTIAN BAILEY MD   SYSTEM ID:  [id number]     Billing  Total time 40 minutes, to include face to face visit, review of EMR, ordering, documentation and coordination of care on date of service.    complexity modifier for longitudinal care.         Signed by: Marina Cronin NP      Oncology Rooming Note    April 2, 2024 2:18 PM   Adrianna Pereira is a 63 year old female who presents  "for:    Chief Complaint   Patient presents with     Oncology Clinic Visit     Malignant neoplasm metastatic to lung, unspecified laterality - Labs provider and infusion     Initial Vitals: BP 94/62 (BP Location: Left arm, Patient Position: Sitting, Cuff Size: Adult Small)   Pulse 100   Temp 97.6  F (36.4  C) (Tympanic)   Resp 12   Ht 1.613 m (5' 3.5\")   Wt 38.6 kg (85 lb)   SpO2 98%   BMI 14.82 kg/m   Estimated body mass index is 14.82 kg/m  as calculated from the following:    Height as of this encounter: 1.613 m (5' 3.5\").    Weight as of this encounter: 38.6 kg (85 lb). Body surface area is 1.32 meters squared.  No Pain (0) Comment: Data Unavailable   No LMP recorded. Patient is postmenopausal.  Allergies reviewed: Yes  Medications reviewed: Yes    Medications: Medication refills not needed today.  Pharmacy name entered into EPIC:    [id number] - [place], [place] - [address] [place] [place] [address] PHARMACY - [place], [place] - [address] [place] [address] PHARMACY [place], [place] - [address] [facility] [place], [place] - [address] [place] [address]     Frailty Screening:   Is the patient here for a new oncology consult visit in cancer care? 2. No      Clinical concerns:  None      Tanya Philippe CMA                Again, thank you for allowing me to participate in the care of your patient.        Sincerely,        Marina Cronin NP  "

## 2024-04-08 ENCOUNTER — MYC REFILL (OUTPATIENT)
Dept: ONCOLOGY | Facility: CLINIC | Age: 64
End: 2024-04-08
Payer: COMMERCIAL

## 2024-04-08 DIAGNOSIS — G89.3 CANCER RELATED PAIN: ICD-10-CM

## 2024-04-08 RX ORDER — OXYCODONE HCL 5 MG/5 ML
5 SOLUTION, ORAL ORAL EVERY 6 HOURS PRN
Qty: 500 ML | Refills: 0 | Status: SHIPPED | OUTPATIENT
Start: 2024-04-08 | End: 2024-04-12

## 2024-04-10 ENCOUNTER — INFUSION THERAPY VISIT (OUTPATIENT)
Dept: INFUSION THERAPY | Facility: CLINIC | Age: 64
End: 2024-04-10
Attending: RADIOLOGY
Payer: COMMERCIAL

## 2024-04-10 ENCOUNTER — HOSPITAL ENCOUNTER (OUTPATIENT)
Dept: MRI IMAGING | Facility: CLINIC | Age: 64
Discharge: HOME | End: 2024-04-10
Attending: RADIOLOGY
Payer: COMMERCIAL

## 2024-04-10 DIAGNOSIS — C06.9 SQUAMOUS CELL CARCINOMA OF ORAL CAVITY (H): Primary | ICD-10-CM

## 2024-04-10 DIAGNOSIS — C06.9 SQUAMOUS CELL CARCINOMA OF ORAL CAVITY (H): ICD-10-CM

## 2024-04-10 PROCEDURE — 255N000002 HC RX 255 OP 636: Performed by: RADIOLOGY

## 2024-04-10 PROCEDURE — A9585 GADOBUTROL INJECTION: HCPCS | Performed by: RADIOLOGY

## 2024-04-10 PROCEDURE — 72157 MRI CHEST SPINE W/O & W/DYE: CPT

## 2024-04-10 PROCEDURE — 250N000011 HC RX IP 250 OP 636: Performed by: INTERNAL MEDICINE

## 2024-04-10 RX ORDER — HEPARIN SODIUM (PORCINE) LOCK FLUSH IV SOLN 100 UNIT/ML 100 UNIT/ML
5 SOLUTION INTRAVENOUS
Status: CANCELLED | OUTPATIENT
Start: 2024-04-10

## 2024-04-10 RX ORDER — GADOBUTROL 604.72 MG/ML
7.5 INJECTION INTRAVENOUS ONCE
Status: COMPLETED | OUTPATIENT
Start: 2024-04-10 | End: 2024-04-10

## 2024-04-10 RX ORDER — HEPARIN SODIUM (PORCINE) LOCK FLUSH IV SOLN 100 UNIT/ML 100 UNIT/ML
5 SOLUTION INTRAVENOUS
Status: DISCONTINUED | OUTPATIENT
Start: 2024-04-10 | End: 2024-04-10 | Stop reason: HOSPADM

## 2024-04-10 RX ADMIN — GADOBUTROL 4 ML: 604.72 INJECTION INTRAVENOUS at 09:32

## 2024-04-10 RX ADMIN — HEPARIN 5 ML: 100 SYRINGE at 10:08

## 2024-04-10 NOTE — PROGRESS NOTES
Here for port access prior to CT.  Pt ret'd w/  post imaging for de-access.  Tolerated procedure well and discharged in stable condition.

## 2024-04-12 ENCOUNTER — OFFICE VISIT (OUTPATIENT)
Dept: RADIATION THERAPY | Facility: OUTPATIENT CENTER | Age: 64
End: 2024-04-12
Payer: COMMERCIAL

## 2024-04-12 VITALS
OXYGEN SATURATION: 98 % | RESPIRATION RATE: 16 BRPM | BODY MASS INDEX: 14.68 KG/M2 | WEIGHT: 84.2 LBS | HEART RATE: 100 BPM | DIASTOLIC BLOOD PRESSURE: 59 MMHG | SYSTOLIC BLOOD PRESSURE: 103 MMHG

## 2024-04-12 DIAGNOSIS — C06.9 SQUAMOUS CELL CARCINOMA OF ORAL CAVITY (H): Primary | ICD-10-CM

## 2024-04-12 ASSESSMENT — PAIN SCALES - GENERAL: PAINLEVEL: NO PAIN (0)

## 2024-04-12 NOTE — LETTER
4/12/2024         RE: Adrianna Pereira  [address] [address] [place] [id number]        Dear Colleague,    Thank you for referring your patient, Adrianna Pereira, to the [facility]. Please see a copy of my visit note below.    Radiation Oncology Progress Note    HPI:Ms. 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  Final pathology demonstrated squamous cell carcinoma moderately differentiated, 4.1 cm in size originating from  floor mouth/ anterior tongue,  depth of invasion 29 mm, no LVSI, positive PNI, positive margin (left anterior lateral).  11 of 98 lymph nodes positive (4 cm largest deposit, positive extracapsular disease present), pathologic T4N3b.   She underwent adjuvant chemoradiation therapy, completed on 12/5/2023.  The patient had early recurrence of disease including locally as well as distantly (spine, lung, soft tissue).  She underwent palliative radiation therapy to thoracic spine.     Radiation Treatment  10/19/2023 to 12/5/2023: Head and neck, 6600 cGy in 33 fractions  2/22/24 to 3/12/24: Thoracic spine, T2 - T4, 2500 cGy in 10 fractions     The patient returns for follow-up.    MRI of the thoracic spine on 4/10/2024 to my review demonstrated overall improvement in spinal canal stenosis in the treated region.  No evidence of cord compression was noted.    Clinically, the patient has noted improvement in her pain.  She is tapered off steroid.  Using oxycodone as needed in the daytime and at night for pain.    She has started systemic treatment under the care of Dr. Castillo.  Tolerating treatment thus far.  Nutrition continues to be through the tube.  Denies any focal neurologic change.  Continues to have exposed hardware and open wound.  Will see wound care team next week.  ENT team has discussed the past potential surgery but deferred in the  past due to extent of surgery that will likely be required.      Plan:  Continue palliative systemic therapy per medical oncology team.    Agree with follow-up with wound care team regarding wound management for exposed hardware.  Continue follow-up with ENT team.  I discussed with patient possibility of reexploring surgical options if exposed hardware/wound continues to worsen.  Return to clinic in 1 to 2 weeks.    Hernán Egan M.D.  Department of Radiation Oncology  [facility]

## 2024-04-12 NOTE — NURSING NOTE
FOLLOW-UP VISIT    Patient Name: Adrianna Pereira      : 1960     Age: 63 year old        ______________________________________________________________________________     Chief Complaint   Patient presents with    Radiation Therapy     Return appointment with Dr. Egan      /59   Pulse 100   Resp 16   Wt 38.2 kg (84 lb 3.2 oz)   SpO2 98%   BMI 14.68 kg/m       Date Radiation Completed: 10/19/24 to 2023: Head and neck, 6600 cGy in 33 fractions  24 to 3/12/24: Thoracic spine T2-T4, 2500 cGy in 10 fractions    Pain  Denies    Meds  Current Med List Reviewed: Yes  Medication Note:     Labs  Other Labs: N/A    Imaging  MRI: Thoracic spine 4/10/24    Skin: Warm  Dry  Intact  Energy Level: baseline    Other Appointments:     Date  Med/Onc: Marina Cronin NP  ENT: Dr. Dao  24     Other Notes: Next CT Neck, CT CAP 24, next Keytruda infusion 24. Follow up with Dr. Egan on 24.    Kelly Ortega RN

## 2024-04-12 NOTE — PROGRESS NOTES
Radiation Oncology Progress Note    HPI:Ms. 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  Final pathology demonstrated squamous cell carcinoma moderately differentiated, 4.1 cm in size originating from  floor mouth/ anterior tongue,  depth of invasion 29 mm, no LVSI, positive PNI, positive margin (left anterior lateral).  11 of 98 lymph nodes positive (4 cm largest deposit, positive extracapsular disease present), pathologic T4N3b.   She underwent adjuvant chemoradiation therapy, completed on 12/5/2023.  The patient had early recurrence of disease including locally as well as distantly (spine, lung, soft tissue).  She underwent palliative radiation therapy to thoracic spine.     Radiation Treatment  10/19/2023 to 12/5/2023: Head and neck, 6600 cGy in 33 fractions  2/22/24 to 3/12/24: Thoracic spine, T2 - T4, 2500 cGy in 10 fractions     The patient returns for follow-up.    MRI of the thoracic spine on 4/10/2024 to my review demonstrated overall improvement in spinal canal stenosis in the treated region.  No evidence of cord compression was noted.    Clinically, the patient has noted improvement in her pain.  She is tapered off steroid.  Using oxycodone as needed in the daytime and at night for pain.    She has started systemic treatment under the care of Dr. Castillo.  Tolerating treatment thus far.  Nutrition continues to be through the tube.  Denies any focal neurologic change.  Continues to have exposed hardware and open wound.  Will see wound care team next week.  ENT team has discussed the past potential surgery but deferred in the past due to extent of surgery that will likely be required.      Plan:  Continue palliative systemic therapy per medical oncology team.    Agree with follow-up with wound care team regarding wound management for exposed hardware.  Continue  follow-up with ENT team.  I discussed with patient possibility of reexploring surgical options if exposed hardware/wound continues to worsen.  Return to clinic in 1 to 2 weeks.    Hernán Egan M.D.  Department of Radiation Oncology  [facility]

## 2024-04-16 PROBLEM — I89.0 LYMPHEDEMA: Status: RESOLVED | Noted: 2023-11-01 | Resolved: 2024-04-16

## 2024-04-16 NOTE — PROGRESS NOTES
DISCHARGE  Reason for Discharge: Patient has failed to schedule further appointments.  Patient last seen at our OP lymphedema clinic on 3/12/24 and cancelled remaining appts due to not feeling well.  Patient will be discharged at this time as pt doesn't have any further appts, is now scheduled to see [facility] due to wounds and exposed hardware in mouth and also pt has already received and using the H&N Flexitouch compression pump to help manage her H&N lymphedema longterm.    Equipment Issued: H&N Flexitouch compression pump    Discharge Plan: Patient to continue home program.    Referring Provider:  Hernán Egan MD           03/12/24 0500   Appointment Info   Signing clinician's name / credentials Elaina Magdaleno PTA/CLT   Visits Used 11 [facility] [place]   Medical Diagnosis squamous cell carcinoma of oral cavity & lymphedema   PT Tx Diagnosis H&N lymphedema with scarring and significant myofascial tightness   Other pertinent information current radiation; last one on 12/5/23 then will transfer lymphedema cares to [place]   Progress Note/Certification   Onset of illness/injury or Date of Surgery 10/18/23  (date of referral)   Therapy Frequency 2x/week   Predicted Duration x12 weeks   Progress Note Due Date 01/24/24   Progress Note Completed Date 11/01/23   Supervision   PT Assistant Visit Number 1       Present No   GOALS   PT Goals 2;3;4;5   PT Goal 1   Goal Identifier stg   Goal Description pt to have at least 1 hour weartime tolerance to H&N Eisenberg pack compression garment to decrease H&N lymphedema and related fibrosis   Rationale to maximize safety and independence with self cares;to maximize safety and independence with performance of ADLs and functional tasks   Target Date 11/15/23   Date Met 11/30/23   PT Goal 2   Goal Identifier stg   Goal Description pt and/or  to be independent with self-MLD of H&N to decrease lymphedema and related fibrosis to increase ROM  and ease during functional activity/ADL   Rationale to maximize safety and independence with performance of ADLs and functional tasks;to maximize safety and independence with self cares   Goal Progress Pt receptive to instruction and education,  has been present one time at this location, also receptive to education and instruction. Pt making progress, understanding principles better.   Target Date 11/15/23   PT Goal 3   Goal Identifier ltg   Goal Description once appropriate, pt and  to be independent with donning, doffing and care of H&N compression garment for long term H&N lymphedema management for maintenance   Goal Progress Pt reports she and  have been consistently using her H&N compression garment, comfortable in use and care of it for reduction in her edema. Pt understanding of education in longer wear times to better reduce her swelling.   Target Date 01/24/24   PT Goal 4   Goal Identifier ltg   Goal Description pt to be able to demonstrate 45 degrees of R and L cervical rotation for ease, independence and safety during driving and ADL   Rationale to maximize safety and independence with performance of ADLs and functional tasks;to maximize safety and independence with self cares;to maximize safety and independence with transportation   Goal Progress Pt demos 50 deg R & L cervical rotation, allowing for greater vision in driving. Pt has just started driving short distances.   Target Date 01/24/24   Date Met 12/14/23   PT Goal 5   Goal Identifier ltg   Goal Description pt to be independent with longterm H&N lymphedema management via HEP, skin cares, self-MLD, self-MFR and compression garment wear/use   Target Date 01/24/24   Subjective Report   Subjective Report Mets to spine, tub feeding came out last week and they could not put it back in at the ER here so [facility]   Objective Measures   Objective Measures Objective Measure 1   Objective Measure 1   Objective  Measure cervical girth   Details see flow sheet   Manual Therapy   Manual Therapy: Mobilization, MFR, MLD, friction massage minutes ([id number]) 55   Manual Therapy 1 - Details cervical girth measurements, With pt in supine: bilateral axillary LN, AAA, R & L sides above scars including anterior neck, R/L jaw, lower and mid cheeks routed towards pre auricular nodes and opening between inferior earlobes and above R/L ears; reclear bilateral axillary LN, avoided lower lip due to metal bar showing and drainage coming from lower lip, pt and spouse instructed to hold off on using pump due to fragile tissue lower lip and jaw and metal bar is visible possibly due to necrtic tissue, poor healing of tissue   Skilled Intervention CDT; MLD   Patient Response/Progress softening of thickness to B jaw's and cheeks   Plan   Home program Eisenberg pack wear of 1hour/day minimum; daily pump; daily self-MFR stretching (R and L cervical  rotation and cervical extension   Plan for next session STM/MFR, H&N measurements, garment when needed   Total Session Time   Timed Code Treatment Minutes 55   Total Treatment Time (sum of timed and untimed services) 55

## 2024-04-17 ENCOUNTER — HOSPITAL ENCOUNTER (OUTPATIENT)
Dept: CT IMAGING | Facility: CLINIC | Age: 64
Discharge: HOME | End: 2024-04-17
Attending: NURSE PRACTITIONER
Payer: COMMERCIAL

## 2024-04-17 DIAGNOSIS — C06.9 SQUAMOUS CELL CARCINOMA OF ORAL CAVITY (H): ICD-10-CM

## 2024-04-17 DIAGNOSIS — C79.51 MALIGNANT NEOPLASM METASTATIC TO BONE (H): ICD-10-CM

## 2024-04-17 DIAGNOSIS — C78.00 MALIGNANT NEOPLASM METASTATIC TO LUNG, UNSPECIFIED LATERALITY (H): ICD-10-CM

## 2024-04-17 PROCEDURE — 250N000009 HC RX 250: Performed by: RADIOLOGY

## 2024-04-17 PROCEDURE — 250N000011 HC RX IP 250 OP 636: Performed by: NURSE PRACTITIONER

## 2024-04-17 PROCEDURE — 250N000011 HC RX IP 250 OP 636: Performed by: RADIOLOGY

## 2024-04-17 PROCEDURE — 71260 CT THORAX DX C+: CPT

## 2024-04-17 PROCEDURE — 70491 CT SOFT TISSUE NECK W/DYE: CPT

## 2024-04-17 RX ORDER — HEPARIN SODIUM (PORCINE) LOCK FLUSH IV SOLN 100 UNIT/ML 100 UNIT/ML
500 SOLUTION INTRAVENOUS ONCE
Status: COMPLETED | OUTPATIENT
Start: 2024-04-17 | End: 2024-04-17

## 2024-04-17 RX ORDER — IOPAMIDOL 755 MG/ML
115 INJECTION, SOLUTION INTRAVASCULAR ONCE
Status: COMPLETED | OUTPATIENT
Start: 2024-04-17 | End: 2024-04-17

## 2024-04-17 RX ADMIN — SODIUM CHLORIDE 80 ML: 9 INJECTION, SOLUTION INTRAVENOUS at 12:17

## 2024-04-17 RX ADMIN — Medication 500 UNITS: at 12:33

## 2024-04-17 RX ADMIN — IOPAMIDOL 115 ML: 755 INJECTION, SOLUTION INTRAVENOUS at 12:17

## 2024-04-18 ENCOUNTER — OFFICE VISIT (OUTPATIENT)
Dept: VASCULAR SURGERY | Facility: CLINIC | Age: 64
End: 2024-04-18
Attending: NURSE PRACTITIONER
Payer: COMMERCIAL

## 2024-04-18 VITALS — DIASTOLIC BLOOD PRESSURE: 66 MMHG | OXYGEN SATURATION: 98 % | HEART RATE: 105 BPM | SYSTOLIC BLOOD PRESSURE: 95 MMHG

## 2024-04-18 DIAGNOSIS — C79.51 MALIGNANT NEOPLASM METASTATIC TO BONE (H): ICD-10-CM

## 2024-04-18 DIAGNOSIS — C78.00 MALIGNANT NEOPLASM METASTATIC TO LUNG, UNSPECIFIED LATERALITY (H): ICD-10-CM

## 2024-04-18 DIAGNOSIS — C06.9 SQUAMOUS CELL CARCINOMA OF ORAL CAVITY (H): ICD-10-CM

## 2024-04-18 PROCEDURE — 99215 OFFICE O/P EST HI 40 MIN: CPT | Performed by: FAMILY MEDICINE

## 2024-04-18 PROCEDURE — G0463 HOSPITAL OUTPT CLINIC VISIT: HCPCS | Performed by: FAMILY MEDICINE

## 2024-04-18 RX ORDER — METRONIDAZOLE 500 MG/1
500 TABLET ORAL DAILY
Qty: 30 TABLET | Refills: 0 | Status: SHIPPED | OUTPATIENT
Start: 2024-04-18 | End: 2024-05-18

## 2024-04-18 ASSESSMENT — PAIN SCALES - GENERAL: PAINLEVEL: MODERATE PAIN (5)

## 2024-04-18 NOTE — PATIENT INSTRUCTIONS
Wound Care Instructions    Daily: cleanse the wound with vashe (you can purchase vashe on [employer] or in store at [employer]). You can do a 5-10 minute soak using gauze.     Apply crushed up flagyl powder daily to the jaw- this will help with any odor.    You can try using a feminine pad (without wings) taped to the inside of a mask to help catch the drainage.    It is ok to get your wound wet in the bath or shower      If for some reason you are not able to get your dressing(s) changed as outlined above (due to illness, lack of supplies, lack of help) please do the following: remove old, soiled dressings; wash the wounds with saline; pat dry; apply ABD pad or other absorbant pad and secure with rolled gauze; avoid tape directly on your skin; Call the clinic as soon as possible to let us know what the current issues are in receiving wound care [phone].      SEEK MEDICAL CARE IF:  You have an increase in swelling, pain, or redness around the wound.  You have an increase in the amount of pus coming from the wound.  There is a bad smell coming from the wound.  The wound appears to be worsening/enlarging  You have a fever greater than 101.5 F      It is ok to continue current wound care treatment/products for the next 2-3 days until new wound care supplies are ordered and arrive. If longer than this please contact our office at [phone].    If you have a 2 layer or 4 layer compression wrap on these are safe to have on for ONLY 7 days. If for some reason you are not able to get the wrap(s) changed (due to illness; lack of supplies, lack of help, lack of transportation) please do the following: unwrap the old 2 or 4 layer compression wrap; avoid using scissors as you could cut your skin and cause wounds; use tubular compression when available. Call to reschedule your home care or clinic visit appointment as soon as possible.    Please NOTE: if you are 15 minutes late to your clinic appointment you will have to be  rescheduled. Please call our clinic as soon as possible if you know you will not be able to get to your appointment at [phone].    If you fail to show up to 3 scheduled clinic appointments you will be dismissed from our clinic.              We want to hear from you!  In the next few weeks, you should receive a call or email to complete a survey about your visit at [facility]. Please help us improve your appointment experience by letting us know how we did today. We strive to make your experience good and value any ways in which we could do better.      We value your input and suggestions.    Thank you for choosing the [facility]!

## 2024-04-18 NOTE — PROGRESS NOTES
Wound Clinic Note          Visit date: 04/18/2024       Cheif Complaint:     Adrianna Pereira is a 63 year old  female had concerns including Wound Check.  She has a jaw wound.      HISTORY OF PRESENT ILLNESS:    Adrianna Pereira reports the ulcer has been present since December 2023.  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  She underwent a complete resection of the tumor with hardware implantation to reconstruct the jaw.  In December the hardware became exposed and the area has opened up quite a bit more since then.  She has previously received radiation therapy in this area.  She is accompanied to the wound clinic for her initial evaluation by her  and they both provide portions of the history.  They report that they believe there is a small area of cancer remaining in the jaw area and there are several areas of metastasis throughout the body.  She is currently receiving immune therapy.      She does not currently wear a bandage on the area and just covers the area with a mask.  She uses Kleenex to dab the drainage away throughout the day.  She does notice an odor from the area.      The pateint denies fevers or chills.  They report the pain from the wound has been 5/10 and has remained about the same recently.      All of her nutritional needs are met with tube feeding.      The patient denies a history of diabetes, smoking or chronic steroid use.         The patient has not had any symptoms of infection relating to the wound recently and is not currently on antibiotics.       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       Surgical Hx:   Past Surgical History:   Procedure Laterality Date    DISSECTION RADICAL NECK MODIFIED Bilateral  8/17/2023    Procedure: Bilateral modified radical neck dissection;  Surgeon: Tyshawn Dao MD;  Location: [facility]    ENT SURGERY      oral biopsy    EXCISE LESION LIP N/A 8/17/2023    Procedure: Chin resection;  Surgeon: Tyshawn Dao MD;  Location: [facility] OR    GLOSSECTOMY PARTIAL N/A 8/17/2023    Procedure: GLOSSECTOMY, PARTIAL;  Surgeon: Tyshawn Dao MD;  Location: [facility] OR    GRAFT BONE FREE VASCULARIZED FROM LOWER EXTREMITY Left 8/17/2023    Procedure: Left fibula free flap;  Surgeon: Darien Thorne MD;  Location: [facility]    GRAFT FREE VASCULARIZED (LOCATION) Left 8/17/2023    Procedure: anterolateral thigh free flap;  Surgeon: Darien Thorne MD;  Location: [facility]    GRAFT SKIN SPLIT THICKNESS FROM EXTREMITY Left 8/17/2023    Procedure: Split thickness skin graft from left thigh;  Surgeon: Darien Thorne MD;  Location: [facility] OR    GYN SURGERY      tubal ligation    INSERT PORT VASCULAR ACCESS N/A 10/18/2023    Procedure: INSERTION, VASCULAR ACCESS PORT, Right Internal Jugular;  Surgeon: Sheldon Lim MD;  Location: [facility]    IR GASTROSTOMY TUBE CHANGE  3/4/2024    IR GASTROSTOMY TUBE PERCUTANEOUS PLCMNT  8/23/2023    IRRIGATION AND DEBRIDEMENT ORAL, COMBINED N/A 8/24/2023    Procedure: EXAM UNDER ANESTHESIA, ORAL CAVITY, IRRIGATION AND DEBRIDEMENT, ORAL CAVITY, neck dissection;  Surgeon: Darien Thorne MD;  Location: [facility]    IRRIGATION AND DEBRIDEMENT ORAL, COMBINED N/A 8/29/2023    Procedure: EXCISIONAL DEBRIDEMENT NECK, IRRIGATION OF ORAL CAVITY;  Surgeon: Darien Thorne MD;  Location: [facility]    MANDIBULECTOMY TOTAL N/A 8/17/2023    Procedure: segmental mandibulectomy;  Surgeon: Tyshawn Dao MD;  Location: [facility]    TRACHEOSTOMY N/A 8/17/2023    Procedure: Tracheostomy placement, nasogastric tube placement;  Surgeon: Tyshawn Dao MD;  Location: [facility]          Allergies:  No Known Allergies           Medication History:    Current Outpatient  Medications   Medication Sig Dispense Refill    acetaminophen (TYLENOL) 325 MG tablet Take 2 tablets (650 mg) by mouth every 4 hours as needed for mild pain 50 tablet 0    chlorhexidine (PERIDEX) 0.12 % solution Take 15 mLs by mouth 3 times daily 473 mL 1    ibuprofen (ADVIL/MOTRIN) 600 MG tablet Take 1 tablet (600 mg) by mouth every 6 hours as needed for moderate pain 90 tablet 0    loperamide (IMODIUM) 1 MG/5ML liquid Take 5 mLs (1 mg) by mouth 4 times daily as needed for diarrhea 118 mL 0    magic mouthwash (ENTER INGREDIENTS IN COMMENTS) suspension Swish, gargle, and spit one to two teaspoonfuls every six hours as needed. 300 mL 3    magnesium hydroxide (MILK OF MAGNESIA) 400 MG/5ML suspension Take 5 mLs by mouth daily as needed for heartburn      metroNIDAZOLE (FLAGYL) 500 MG tablet Take 1 tablet (500 mg) by mouth daily for 30 days 30 tablet 0    mineral oil-hydrophilic petrolatum (AQUAPHOR) external ointment Apply topically every 8 hours 198 g 3    oxyCODONE (ROXICODONE) 5 MG/5ML solution Take 5 mLs (5 mg) by mouth every 6 hours as needed for severe pain 100 mL 0    sennosides (SENOKOT) 8.8 MG/5ML syrup 5 mLs by Oral or G tube route 2 times daily 236 mL 2     No current facility-administered medications for this visit.         Tobacco History:  reports that she quit smoking about 8 months ago. Her smoking use included cigarettes. She has a 40 pack-year smoking history. She has been exposed to tobacco smoke. She has never used smokeless tobacco.       REVIEW OF SYMPTOMS:   The review of systems was negative except as noted in the HPI.           PHYSICAL EXAMINATION:     BP 95/66   Pulse 105   SpO2 98%            GENERAL: The patient overall appears well and is no acute distress.   HEAD: normocephalic   EYES: Sclera and conjunctiva clear   NECK: no obvious masses   LUNGS: breathing is unlabored.   EXTREMITIES: No clubbing, cyanosis or edema   SKIN: No rashes or other abnormalities except as noted under the  "Wound section below.   NEUROLOGICAL: normal motor and sensory function       WOUND/ulcer: The wound appears healthy with no sign of infection.   Wound bed: granulation tissue  Periwound: healthy intact skin  There is exposed hardware all throughout the base of the wound with what appears to be some residual exposed jawbone.  As noted above there is no sign of cellulitis.      Also see below for wound details:     Circumferential volume measures:             No data to display                Ulceration(s)/Wound(s):   Please see the media tab under the chart review for pictures of the wounds.  Nursing staff removed dressings and cleansed wound.    VASC Wound bottom lip (Active)   Pre Size Length 4 04/18/24 1200   Pre Size Width 10 04/18/24 1200   Pre Size Depth 0.3 04/18/24 1200   Pre Total Sq cm 40 04/18/24 1200   Description entire jaw is necrotic slough 04/18/24 1200           No results for input(s): \"HGBA1C\", \"A1C\", \"EAG\" in the last 83747 hours.    Invalid input(s): \"ZVHRQQSMK2R\"       Recent Labs   Lab Test 04/02/24  1345 03/12/24  1303 03/04/24  1458   ALBUMIN 3.5 3.5 3.5              No sharp debridement performed today.                  ASSESSMENT:   This is a 63 year old  female with a cancer related jaw wound.          PLAN:   I had a fairly long discussion with the patient and her  about what we can do to help in this situation and what we can do.  We can help control the drainage with absorbent bandages and we can offer strategies for dealing with the odor.  We cannot help the wound heal because there is residual cancer in the area and there is exposed hardware here.  The residual cancer would need to be effectively treated or excised and the colonized hardware will need to be removed.  Any exposed hardware is presumed to be colonized.  I have strongly encouraged them to meet with their ENT doctor again to discussed the definitive treatment here or the patient should choose a palliative/hospice " approach.  To address the odor I recommend that they use a Flagyl powder to the area once a day, they were in agreement with this and I have entered an order and sent to their pharmacy.  We will also give them information about ordering with Vashe solution which they can use to soak the area for 10 minutes once a day to decrease bacterial growth.  We will also give them some ideas about other absorbent bandages they can use to collect the drainage here to prevent it from dripping down her chin.  I reiterated to the patient and her  that there is nothing we can do with bandages to help this wound heal unless the cancer is completely removed and the colonized hardware is removed.  I have explained to the patient the importance of protein intake to wound healing.  I have explained that increasing protein intake will speed wound healing.  We discussed several types of food that are high in protein and the wound care nurse gave the patient a handout that summarizes this information.  In addition to further speed wound healing I have encouraged the patient to take a protein supplement.   The patient will return to the wound clinic if there are further wound related difficulties in the future.        45 minutes spent on the date of the encounter doing chart review, history and exam, documentation and further activities per the note, this time excludes any procedure time      Tiago Marvin MD  04/18/2024   1:31 PM   [facility]/Wound  [phone]    This note was electronically signed by Tiago Marvin MD

## 2024-04-19 NOTE — PROGRESS NOTES
[facility] Hematology and Oncology Outpatient Progress Note    Patient: Adrianna Pereira  MRN: [id number]  Date of Service: Apr 22, 2024          Reason for Visit    Metastatic floor of mouth cancer to bone, lung, soft tissue    Primary Oncologist: Formerly, Dr. 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   Unfortunately, within just a few months of completing, was found to have metastatic recurrence.     Completed palliative RT to thoracic spine 6 weeks ago.  Tapered off dex and doing well. Back pain is resolved.     Has completed 3 cycles of pembrolizumab. Tolerating well. After cycle 2, had grade 1 diarrhea but this did not recur with her last cycle so less worrisome for colitis.    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     Labwork: Creatinine 0.64, rest CMP WNL. TSH WNL.. Hgb 9.6 (stable), plat 508, WBC WNL.  Creatinine improved after stopping NSAIDs.    CT neck/chest/abd/pelvis scan is showing overall stable disease. Oral mass stable; progressive erosive ulcerative lesion R submental/sublingual to mouth of floor/inferior tongue is correlative with site of open fistula/wound inferior to lower lip on exam. Some lung nodules improved and some slightly increased in size/new but remain small. Some improvement in med/hilar nodes. Stable T3 compression fracture with some improvement in soft tissue extension. No new bone mets.     MRI T spine was improved after RT per Dr. Egan's review.     Overall, stable with possibly some slight progression which may be too early to gauge effectiveness on  immunotherapy.     Plan:  -Continue with C4 pembrolizumab.   -Continue another 3 cycles and reassess with CT in late June. If having further progression, would likely deem unresponsive at that time. I'm not sure if she is a candidate for additional chemo given her PS, complications, etc, but will reassess those options when clear progression noted.  -Return every 3 week cycle with Marina/Dr. Friedell.  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    Open oral/jaw wound  Having progressive plate exposure over lower lip and open wound secondary to residual/recurrent tumor at prior ENT resection site. Draining white-yellow drainage, no fevers. Reconstruction would require additional free flap, so not being pursued at this time after discussion with ENT surgeon Dr. Thorne. I'm not sure if additional palliative RT is feasible/would be deemed helpful, but will see Dr. Egan in a few weeks so defer to him.    Met with Wound Care MD.   Supportive managemtn interventions to help with drainage, odor and decreasing bacterial growth were provided.     Plan:  -Pt to follow [facility] recs for mgmt and return PRN.  -High risk for infection, monitor  -Follow-up with Dr. Thorne in ENT if desiring further exploration of more definitive surgical options, but in context of metastatic cancer he previously felt risk> benefit unless she had some longer term stabiltiy  -Not sure there is a role for any further palliative RT to this location, risks may be >benefit. She can discuss with Dr. Egan.    Cancer pain  Completed palliative RT to T spine 6 weeks ago.  Back pain is resolved. No longer requiring pain meds for this.   Tapered off dex earlier than planned on her own with no issues.     Having progressive mouth/lower lip pain related to progressive reconstruction failure/open wound.  Stopped NSAID use due to elevated creatinine. Managing well with Tylenol 500 mg 3/day and oxycodone 5-.7.5 mg twice/day.     Plan:  -Continue  avoiding NSAIDs  -Continue Tylenol instead, can take up to 3000 mg/day. Continue oxycodone use through day as well  -Referral to Palliative Care if not well-controlled in future, pt wants to minimize number of appts for now.     Nutrition/hydration  Dependant on feeding tube. Weight stable for now. Staying hydrated via PEG flushes, but having some diarrhea.    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   On observation.   ______________________________________________________________________________    History of Present Illness/ Interval History    Ms. Adrianna Pereira is a 63 year old who completed resection of floor of mouth cancer, followed by adjuvant chemoRT 12/2023. Unfortunately, shortly after this, she was found to have local and metastatic recurrence involving mandible, lung, bone (T3-T4) and soft tissue that is PDL1 70%.    She started palliative treatments with pembrolizumab, returns with updated CT scan ahead of cycle 4.   Tolerating pembrolizumab generally well.  Had reported looser stools last cycle, but now pretty regualr (loose/soft 1-2/day) No blood in stools. No fevers. No longer on laxatives.     Completed palliative RT to thoracic spine 6 weeks ago. Tapered off dex a month ago.   Her back pain is resolved.   No longer taking pain med for this.     Mouth pain persists. Stopped ibuprofen, managing with Tylenol and oxycodone 5-7.5 mg twice daily.   Pain has been progressive due to plate exposure her lip and open wound at prior ENT resection site below lower lip. Draining white-yellow drainage, no fevers. Assessed by ENT surgeon and reconstruction would require additional free flap, so not being pursued at this time. Saw [facility] Clinic for mgmt recs    Oral communication is an ongoing issue.      Dependent on feeding tube for all nutrition/hydration and meds. Some weight loss. Feels  well-hydrated.    ECOG Performance    1    Oncology History/Treatment  Diagnosis/Stage:   7/2023: [id number] Floor of mouth squamous cell cancer  -hx alcoholism (stopped all use) and smoking (quit 8/2023)  -hx premalignant oral cavity lesions s/p CO2 laser ablation with ENT  -presented with lower lip/chin swelling  -7/3/2023 left mandibular alveolus biopsy: invasive keratinizing moderately differential squamous cell carcinoma.   -PET: 4.4 x 3.7 x 3.2 cm anterior oral cavity mass invading into the mandible with a separate gluco-avid nodule on the right mandibular buccal mucosa and bilateral level 1B, level 2 and L3 metastatic lymphadenopathy without evidence of metastatic disease.   -8/17/2023 surgical path: pT4a, N3b tumor with positive left anterior lateral soft tissue margin, 11/98+ lymph nodes including STEFFEN (largest 4 cm) and several bilateral lymph nodes consistent with involvement by low-grade B-cell neoplasm suggestive of CLL/SLL.     2/2024: Progression to stage IV mouth squamous cell cancer to bone and lung  -Within weeks of completing definitive treatment for her primary disease, noted severe/progressive back pain  -CT C/T spine: new T3 compression fracture with hypodensity in vertebral body extending into L posterior elements suggestive of met. T4 posterior vertebral body lesion also suspicious. New lung nodules also noted  -MRI T spine: T3 compression fracture with extraosseous tumor involving ventral epidural space with mod - severe central spinal canal stenosis at T3  -PET: recurrent avid foci L maikel-mandible, numerous bilateral pulmonary/pleural mets, met mediastinal/hilar adenopathy, T3, T4, L adductor musculature and R upper thigh skin thickening  -PDL1 TPS and CPS 70% from 8/2023 original tumor biopsy    Treatment:  Locally advanced disease  -8/17/2023: segmental mandibulectomy, floor of mouth resection, anterior glossectomy, left fibular free flap, skin grafting, and tracheostomy.   -Post-op course  complicated by skin necrosis requiring OR debridment and resuturing of neck incision/intraoral flap    -10/20/2023 - 12/5/2023: completed adjuvant concurrent RT (6600 cGy/33) + weekly cisplatin (x 7 cycles)    Metastatic recurrence  -2/21/2024 - present: pembrolizumab.  -Zometa for bone mets, later held for jaw osteonecrosis risk    -2/26 - 3/12/2024: palliative RT to thoracic spine, 2500 cGy/10. Dex taper      Physical Exam    GENERAL: Alert and oriented to time place and person. Seated comfortably. In no distress.  Dysarthria.   accompanied.  HEENT: Open wound below lower lip with exposed plate. White/yellow purulent appearing drainage.  SHAE, EOMI. No erythema. No icterus.  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  No skin breakdown.  No evident adenopathy/masses.  HEART: RRR  CHEST: regular respiratory effort.  ABD: PEG in place. Non-distended  NEURO: No gross deficit noted.       Lab Results    Recent Results (from the past 168 hour(s))   Comprehensive metabolic panel   Result Value Ref Range    Sodium 137 135 - 145 mmol/L    Potassium 4.2 3.4 - 5.3 mmol/L    Carbon Dioxide (CO2) 24 22 - 29 mmol/L    Anion Gap 11 7 - 15 mmol/L    Urea Nitrogen 20.5 8.0 - 23.0 mg/dL    Creatinine 0.64 0.51 - 0.95 mg/dL    GFR Estimate >90 >60 mL/min/1.73m2    Calcium 8.6 (L) 8.8 - 10.2 mg/dL    Chloride 102 98 - 107 mmol/L    Glucose 92 70 - 99 mg/dL    Alkaline Phosphatase 108 40 - 150 U/L    AST 31 0 - 45 U/L    ALT 24 0 - 50 U/L    Protein Total 6.3 (L) 6.4 - 8.3 g/dL    Albumin 3.3 (L) 3.5 - 5.2 g/dL    Bilirubin Total 0.2 <=1.2 mg/dL   TSH with free T4 reflex   Result Value Ref Range    TSH 1.14 0.30 - 4.20 uIU/mL   CBC with platelets and differential   Result Value Ref Range    WBC Count 10.6 4.0 - 11.0 10e3/uL    RBC Count 2.97 (L) 3.80 - 5.20 10e6/uL    Hemoglobin 9.6 (L) 11.7 - 15.7 g/dL    Hematocrit 29.9 (L) 35.0 - 47.0 %     (H) 78 - 100 fL    MCH 32.3 26.5 - 33.0 pg    MCHC  32.1 31.5 - 36.5 g/dL    RDW 14.5 10.0 - 15.0 %    Platelet Count 506 (H) 150 - 450 10e3/uL    % Neutrophils 77 %    % Lymphocytes 12 %    % Monocytes 8 %    % Eosinophils 1 %    % Basophils 0 %    % Immature Granulocytes 1 %    NRBCs per 100 WBC 0 <1 /100    Absolute Neutrophils 8.2 1.6 - 8.3 10e3/uL    Absolute Lymphocytes 1.3 0.8 - 5.3 10e3/uL    Absolute Monocytes 0.8 0.0 - 1.3 10e3/uL    Absolute Eosinophils 0.1 0.0 - 0.7 10e3/uL    Absolute Basophils 0.0 0.0 - 0.2 10e3/uL    Absolute Immature Granulocytes 0.1 <=0.4 10e3/uL    Absolute NRBCs 0.0 10e3/uL           Imaging    CT Chest/Abdomen/Pelvis w Contrast    Result Date: 4/17/2024  CT CHEST/ABDOMEN/PELVIS WITH CONTRAST 4/17/2024 12:40 PM CLINICAL HISTORY: Metastatic floor of mouth cancer to lung, bone; assess response to immunotherapy. Malignant neoplasm metastatic to bone (H). Malignant neoplasm metastatic to lung, unspecified laterality (H). 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 TECHNIQUE: CT scan of the chest, abdomen, and pelvis was performed following injection of IV contrast. Multiplanar reformats were obtained. Dose reduction techniques were used. CONTRAST: 75 mL Isovue 370 COMPARISON: 2/15/2024, 7/13/2023 FINDINGS: LUNGS AND PLEURA: Multiple irregular pulmonary nodules are seen bilaterally, some which have resolved while others are stable, mildly decreased in size, mildly increased in size, and/or new to comparison. The largest previously seen hypermetabolic pulmonary nodule in the right upper lobe measuring 9 x 7 mm and in the left lower lobe measuring 16 x 13 mm on the most recent prior PET exam from 2/15/2024 are not seen on today's exam. Suspected pleuroparenchymal scarring is seen in the region of the left lower lobe hypermetabolic pulmonary nodule with mild retraction of the adjacent fissure (4-180). Overall, those nodules demonstrating the greatest area of FDG uptake on comparison have shown the greatest improvement. Other irregular  pulmonary nodules are as follows: - New irregular subpleural nodularity in the lateral right upper lobe measuring 7 x 6 mm (4-89). - Mild decrease in size of irregular subpleural pulmonary nodule in the right lower lobe measuring 7 x 6 mm (4-146), previously 9 x 9 mm. - Mild interval decrease in size of irregular pulmonary nodule in the anterior-inferior left lower lobe measuring 12 x 7 mm (4-223), previously 20 x 19 mm. - Similar-appearing irregular pulmonary nodule in the posterior right lower lobe measuring 7 x 6 mm (4-182), previously 6 x 6 mm. -Similar to slight interval enlargement in a previously seen hypermetabolic pleural-based mass in the posterior inferior right lower lung measuring 13 x 8 mm (3-145), previously 12 x 7 mm. Additional irregular opacities in the posteromedial superior segment of the left lower lobe as well as the posteromedial left lower lobe base are noted, which could represent atelectasis, infiltrate, and/or malignancy. Upper lobe predominant mild centrilobular and paraseptal emphysema and interlobular septal thickening are noted. No pleural fluid. Large airways are patent. MEDIASTINUM/AXILLAE: A right chest wall Port-A-Cath is present with the tip in the SVC. Heart size is within normal limits. Thoracic aorta is normal in course and caliber. No significant pericardial fluid. Moderate to severe coronary artery atherosclerosis. No enlarged thoracic lymph nodes by CT size criteria. There are, however, several prominent bilateral axillary lymph nodes measuring up to 8 mm in short axis, corresponding to previously seen hypermetabolic hilar lymph nodes, which are similar to decreased in size to comparison. There has been mild interval decrease in size of previously seen hypermetabolic soft tissue in the AP window, which is less defined on today's study (3-56). No new or enlarging thoracic lymph nodes. There is heterogeneity of the thyroid gland with multiple small low-density thyroid nodules  suspected. If further evaluation is desired, recommend dedicated thyroid ultrasound. HEPATOBILIARY: A tiny subcentimeter low-density observation is seen in the liver near the gallbladder fossa, which is unchanged to comparison and likely represents a cyst or hemangioma. This can be monitored on future surveillance imaging. No signs of cholelithiasis or acute cholecystitis. No significant bile duct dilation. PANCREAS: Normal. SPLEEN: Normal. ADRENAL GLANDS: Normal. KIDNEYS/BLADDER: There is similar-appearing thickening of the adrenal glands, left greater than right, without discrete nodule or mass, which may represent hypertrophy and/or adenomatous change. Metastatic disease is thought less likely given lack of increased FDG uptake on prior PET imaging. Recommend attention on surveillance imaging. BOWEL: A percutaneous gastrostomy tube is present with the bulb in the region of the gastric pylorus. No signs of bowel obstruction or inflammation. PELVIC ORGANS: Normal. ADDITIONAL FINDINGS: Severe atherosclerosis of the abdominal aorta and iliac arteries. No aneurysmal dilation. MUSCULOSKELETAL: There is high-grade compression fracture deformity involving the T3 vertebral body, corresponding to the previously described pathologic fracture. Previously described extraosseous soft tissue extension associated with this lesion is not as well seen today and appears mildly improved. No acute aggressive-appearing lytic or blastic osseous lesions. Multilevel hypertrophic degenerative changes of the spine. Generalized osteopenia. Scoliotic curvature of the spine. Previously described hypermetabolic lesion involving the left adductor muscle in the proximal thigh is not seen, possibly below the field of view.     IMPRESSION: 1.  Findings suggestive of mixed response to therapy. 2.  Numerous irregular pulmonary nodules, several of which have resolved while others are stable, mildly decreased in size, mildly increased in size, and/or  new to comparison from 2/15/2024. 3.  Similar-appearing small pleural-based metastatic lesion involving the posterior inferior right lower lung. 4.  Mild interval improvement in previously seen mediastinal and bilateral hilar lymphadenopathy. No new or enlarging thoracic lymph nodes. 5.  High-grade compression fracture deformity of T3, corresponding to the previously described pathologic fracture with apparent mild improvement in previously seen extraosseous soft tissue extension. No new or progressive osseous disease. 6.  Nonacute findings as above. CARLOS POTTER MD   SYSTEM ID:  [id number]    CT Soft Tissue Neck w Contrast    Result Date: 4/17/2024  CT SCAN OF THE NECK WITH CONTRAST  4/17/2024 12:41 PM HISTORY: Metastatic floor of mouth cancer to lung, bone; assess response to immunotherapy. Malignant neoplasm metastatic to bone (H). Malignant neoplasm metastatic to lung, unspecified laterality (H). 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 TECHNIQUE: Axial CT images of the neck following administration of intravenous contrast with reformations. Radiation dose for this scan was reduced using automated exposure control, adjustment of the mA and/or kV according to patient size, or iterative reconstruction technique. 45 mL Isovue 370 IV. COMPARISON: CT/PET/CT of the neck 2/15/2024. MRI thoracic spine 4/10/2024. FINDINGS: Again seen is an ill-defined area of osteolytic change involving the region of the left mandibular angle/ramus. This area demonstrated prominent FDG avidity on the previous PET/CT exam, concerning for malignancy/metastatic deposit. This does not appear significantly changed compared to the most recent PET/CT exam, accounting for differences in technique. Changes of mandibulectomy with resection of the mandibular symphysis, parasymphyseal regions, and partial resection of the mandibular bodies bilaterally, as before, with reconstruction. The reconstruction bone flap appears similar to the prior  examination. Anterior plate and screw fixation hardware along the mandible bilaterally, as before. No definite findings of hardware failure. There appears to be probably some fusion of the native left mandible with the left mandibular flap. On the right, there is persistent linear lucency between the reconstruction bone flap and the native right mandible. This appears unchanged. In the interim, new prominent ulceration along the right posterior inferior margin of the right parasymphyseal mandibular graft (series 4 image 52, series 5 image 40), extending through the skin and superficial soft tissues through the expected location of the floor of mouth to the level of the inferior oral tongue. Curvilinear soft tissue density noted marginating the area of this ulcerated cavity, nonspecific. Correlation with direct inspection is recommended. Tissue sampling can be considered as warranted. The area of deepest ulceration in this area measures up to approximately 11 mm craniocaudal. There is prominent fatty atrophy of the tongue musculature and floor of mouth musculature. Fat graft along the inferior/superficial aspect of the floor of mouth otherwise appears similar to the prior examination. Mild amorphus soft tissue attenuation/submucosal edema along the base of the tongue, submucosal oropharynx/parapharyngeal regions and involving the supraglottic larynx, likely due to posttreatment changes. Mild focal narrowing of the subglottic larynx/cervical trachea at the level of the thyroid gland, unchanged. No definite pathologic cervical lymphadenopathy by size or morphology criteria. Scattered amorphous fat stranding along the fascial planes of the upper neck and in the subcutaneous tissues, presumably due to posttreatment changes, as before. Marked atrophy or absence of the bilateral submandibular glands, as before. Volume loss of the bilateral parotid glands, as before. Subcentimeter hypodense nodules in the thyroid gland  without aggressive features, not necessarily requiring follow-up. Amorphous soft tissue density in the bilateral carotid spaces, likely due to posttreatment change. Mild to moderate versus moderate focal stenosis at the origins of the bilateral internal carotid arteries due to atherosclerotic disease. Otherwise, the bilateral cervical carotid and vertebral arteries appear to be grossly patent. The bilateral internal jugular veins are patent. A right-sided Port-A-Cath is in place extending through the lower aspect of the right internal jugular vein into the superior vena cava, incompletely evaluated. Mild paraseptal emphysematous change/fibrosis in the right lung apex. Unchanged severe presumed pathologic compression fracture of the T3 vertebral body with retropulsion of the posterior aspect of the vertebral body into the ventral spinal canal. Unchanged presumed pathologic T4 compression deformity with mild to moderate vertebral height loss. Exaggerated segmental kyphosis centered at the T3 level. Multilevel degenerative changes are noted in the cervical spine.     IMPRESSION: 1. No significant interval change in the ill-defined osteolytic process centered in the native left mandibular angle/ramus which demonstrated hypermetabolism on the previous PET/CT, concerning for malignancy. 2. Progressive nonspecific ulceration extending from the skin surface through the right submental/sublingual region to the level of the floor of mouth/inferior oral tongue region, positioned posterior to the right mandibular bone graft in this patient who is status post partial mandibulectomy with reconstruction. Please correlate clinically with direct inspection; tissue sampling can be considered, as clinically warranted. 3. No definite pathologic cervical lymphadenopathy. 4. Unchanged presumed pathologic severe T3 compression/burst fracture and mild to moderate T4 compression fracture. 5. Other posttreatment and chronic findings, as  described. Please see the body of the report for additional details. CHRISTIAN KRAUSE MD   SYSTEM ID:  [id number]    MR Thoracic Spine w/o & w Contrast    Result Date: 4/10/2024  MRI OF THE THORACIC SPINE WITHOUT AND WITH CONTRAST 4/10/2024 9:59 AM HISTORY: 63 year-old with metastatic oral cavity cancer status post palliative RT to thoracic spine. 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 TECHNIQUE: Multiplanar, multisequence MRI images of the thoracic spine were acquired without and with 4 mL Gadavist gadolinium IV contrast. COMPARISON: Thoracic spine MRI 2/7/2024.     IMPRESSION: Sigmoid curvature of the thoracic spine with left convex curvature centered at T5 and right convex curvature centered at T10 again noted. Presumed pathologic burst compression fracture deformity of T3 again noted with retropulsion of bony fragments into the spinal canal. There has been an interval increase in the area of metastatic disease in the T4 vertebral body since the comparison study. Vertebral body heights, contours and marrow signal otherwise normal throughout the thoracic spine. No significant posterior disc bulges or herniations. Mild spinal canal narrowing at T3 due to retropulsion of bony fragments into the spinal canal. No other spinal canal narrowing of the thoracic spine. Moderate neural foraminal stenosis on the left at T3-T4 due to bony deformity caused by the T3 compression fracture. No other significant neural foraminal narrowing of the thoracic spine. CRISTELA ROBLES MD   SYSTEM ID:  [id number]     Billing  Total time 40 minutes, to include face to face visit, review of EMR, ordering, documentation and coordination of care on date of service.    complexity modifier for longitudinal care.         Signed by: Marina Cronin NP

## 2024-04-22 ENCOUNTER — INFUSION THERAPY VISIT (OUTPATIENT)
Dept: INFUSION THERAPY | Facility: CLINIC | Age: 64
End: 2024-04-22
Attending: NURSE PRACTITIONER
Payer: COMMERCIAL

## 2024-04-22 ENCOUNTER — ONCOLOGY VISIT (OUTPATIENT)
Dept: ONCOLOGY | Facility: CLINIC | Age: 64
End: 2024-04-22
Attending: NURSE PRACTITIONER
Payer: COMMERCIAL

## 2024-04-22 ENCOUNTER — OFFICE VISIT (OUTPATIENT)
Dept: RADIATION THERAPY | Facility: OUTPATIENT CENTER | Age: 64
End: 2024-04-22
Payer: COMMERCIAL

## 2024-04-22 ENCOUNTER — TELEPHONE (OUTPATIENT)
Dept: OTOLARYNGOLOGY | Facility: CLINIC | Age: 64
End: 2024-04-22

## 2024-04-22 VITALS — HEART RATE: 111 BPM | DIASTOLIC BLOOD PRESSURE: 54 MMHG | SYSTOLIC BLOOD PRESSURE: 89 MMHG

## 2024-04-22 VITALS
HEART RATE: 108 BPM | OXYGEN SATURATION: 99 % | DIASTOLIC BLOOD PRESSURE: 59 MMHG | SYSTOLIC BLOOD PRESSURE: 90 MMHG | RESPIRATION RATE: 16 BRPM | BODY MASS INDEX: 15.31 KG/M2 | WEIGHT: 87.8 LBS

## 2024-04-22 VITALS
BODY MASS INDEX: 14.68 KG/M2 | TEMPERATURE: 97.5 F | OXYGEN SATURATION: 98 % | HEIGHT: 64 IN | DIASTOLIC BLOOD PRESSURE: 62 MMHG | RESPIRATION RATE: 12 BRPM | WEIGHT: 86 LBS | HEART RATE: 105 BPM | SYSTOLIC BLOOD PRESSURE: 105 MMHG

## 2024-04-22 DIAGNOSIS — T45.1X5A ANTINEOPLASTIC CHEMOTHERAPY INDUCED ANEMIA: ICD-10-CM

## 2024-04-22 DIAGNOSIS — C06.9 SQUAMOUS CELL CARCINOMA OF ORAL CAVITY (H): ICD-10-CM

## 2024-04-22 DIAGNOSIS — C79.51 MALIGNANT NEOPLASM METASTATIC TO BONE (H): ICD-10-CM

## 2024-04-22 DIAGNOSIS — C78.00 MALIGNANT NEOPLASM METASTATIC TO LUNG, UNSPECIFIED LATERALITY (H): ICD-10-CM

## 2024-04-22 DIAGNOSIS — C06.9 SQUAMOUS CELL CARCINOMA OF ORAL CAVITY (H): Primary | ICD-10-CM

## 2024-04-22 DIAGNOSIS — D64.81 ANTINEOPLASTIC CHEMOTHERAPY INDUCED ANEMIA: ICD-10-CM

## 2024-04-22 DIAGNOSIS — C78.00 MALIGNANT NEOPLASM METASTATIC TO LUNG, UNSPECIFIED LATERALITY (H): Primary | ICD-10-CM

## 2024-04-22 DIAGNOSIS — C79.51 MALIGNANT NEOPLASM METASTATIC TO BONE (H): Primary | ICD-10-CM

## 2024-04-22 LAB
ALBUMIN SERPL BCG-MCNC: 3.3 G/DL (ref 3.5–5.2)
ALP SERPL-CCNC: 108 U/L (ref 40–150)
ALT SERPL W P-5'-P-CCNC: 24 U/L (ref 0–50)
ANION GAP SERPL CALCULATED.3IONS-SCNC: 11 MMOL/L (ref 7–15)
AST SERPL W P-5'-P-CCNC: 31 U/L (ref 0–45)
BASOPHILS # BLD AUTO: 0 10E3/UL (ref 0–0.2)
BASOPHILS NFR BLD AUTO: 0 %
BILIRUB SERPL-MCNC: 0.2 MG/DL
BUN SERPL-MCNC: 20.5 MG/DL (ref 8–23)
CALCIUM SERPL-MCNC: 8.6 MG/DL (ref 8.8–10.2)
CHLORIDE SERPL-SCNC: 102 MMOL/L (ref 98–107)
CREAT SERPL-MCNC: 0.64 MG/DL (ref 0.51–0.95)
DEPRECATED HCO3 PLAS-SCNC: 24 MMOL/L (ref 22–29)
EGFRCR SERPLBLD CKD-EPI 2021: >90 ML/MIN/1.73M2
EOSINOPHIL # BLD AUTO: 0.1 10E3/UL (ref 0–0.7)
EOSINOPHIL NFR BLD AUTO: 1 %
ERYTHROCYTE [DISTWIDTH] IN BLOOD BY AUTOMATED COUNT: 14.5 % (ref 10–15)
GLUCOSE SERPL-MCNC: 92 MG/DL (ref 70–99)
HCT VFR BLD AUTO: 29.9 % (ref 35–47)
HGB BLD-MCNC: 9.6 G/DL (ref 11.7–15.7)
IMM GRANULOCYTES # BLD: 0.1 10E3/UL
IMM GRANULOCYTES NFR BLD: 1 %
LYMPHOCYTES # BLD AUTO: 1.3 10E3/UL (ref 0.8–5.3)
LYMPHOCYTES NFR BLD AUTO: 12 %
MCH RBC QN AUTO: 32.3 PG (ref 26.5–33)
MCHC RBC AUTO-ENTMCNC: 32.1 G/DL (ref 31.5–36.5)
MCV RBC AUTO: 101 FL (ref 78–100)
MONOCYTES # BLD AUTO: 0.8 10E3/UL (ref 0–1.3)
MONOCYTES NFR BLD AUTO: 8 %
NEUTROPHILS # BLD AUTO: 8.2 10E3/UL (ref 1.6–8.3)
NEUTROPHILS NFR BLD AUTO: 77 %
NRBC # BLD AUTO: 0 10E3/UL
NRBC BLD AUTO-RTO: 0 /100
PLATELET # BLD AUTO: 506 10E3/UL (ref 150–450)
POTASSIUM SERPL-SCNC: 4.2 MMOL/L (ref 3.4–5.3)
PROT SERPL-MCNC: 6.3 G/DL (ref 6.4–8.3)
RBC # BLD AUTO: 2.97 10E6/UL (ref 3.8–5.2)
SODIUM SERPL-SCNC: 137 MMOL/L (ref 135–145)
TSH SERPL DL<=0.005 MIU/L-ACNC: 1.14 UIU/ML (ref 0.3–4.2)
WBC # BLD AUTO: 10.6 10E3/UL (ref 4–11)

## 2024-04-22 PROCEDURE — 250N000011 HC RX IP 250 OP 636: Performed by: NURSE PRACTITIONER

## 2024-04-22 PROCEDURE — G0463 HOSPITAL OUTPT CLINIC VISIT: HCPCS | Performed by: NURSE PRACTITIONER

## 2024-04-22 PROCEDURE — 99215 OFFICE O/P EST HI 40 MIN: CPT | Performed by: NURSE PRACTITIONER

## 2024-04-22 PROCEDURE — 258N000003 HC RX IP 258 OP 636: Performed by: NURSE PRACTITIONER

## 2024-04-22 PROCEDURE — 85041 AUTOMATED RBC COUNT: CPT | Performed by: NURSE PRACTITIONER

## 2024-04-22 PROCEDURE — 82247 BILIRUBIN TOTAL: CPT | Performed by: NURSE PRACTITIONER

## 2024-04-22 PROCEDURE — G2211 COMPLEX E/M VISIT ADD ON: HCPCS | Performed by: NURSE PRACTITIONER

## 2024-04-22 PROCEDURE — 96413 CHEMO IV INFUSION 1 HR: CPT

## 2024-04-22 PROCEDURE — 36415 COLL VENOUS BLD VENIPUNCTURE: CPT

## 2024-04-22 PROCEDURE — 84443 ASSAY THYROID STIM HORMONE: CPT | Performed by: NURSE PRACTITIONER

## 2024-04-22 RX ORDER — METHYLPREDNISOLONE SODIUM SUCCINATE 125 MG/2ML
125 INJECTION, POWDER, LYOPHILIZED, FOR SOLUTION INTRAMUSCULAR; INTRAVENOUS
Status: CANCELLED
Start: 2024-04-22

## 2024-04-22 RX ORDER — HEPARIN SODIUM,PORCINE 10 UNIT/ML
5-20 VIAL (ML) INTRAVENOUS DAILY PRN
OUTPATIENT
Start: 2024-04-22

## 2024-04-22 RX ORDER — EPINEPHRINE 1 MG/ML
0.3 INJECTION, SOLUTION, CONCENTRATE INTRAVENOUS EVERY 5 MIN PRN
Status: CANCELLED | OUTPATIENT
Start: 2024-04-22

## 2024-04-22 RX ORDER — HEPARIN SODIUM (PORCINE) LOCK FLUSH IV SOLN 100 UNIT/ML 100 UNIT/ML
5 SOLUTION INTRAVENOUS
Status: DISCONTINUED | OUTPATIENT
Start: 2024-04-22 | End: 2024-04-22 | Stop reason: HOSPADM

## 2024-04-22 RX ORDER — DIPHENHYDRAMINE HYDROCHLORIDE 50 MG/ML
50 INJECTION INTRAMUSCULAR; INTRAVENOUS
Start: 2024-04-22

## 2024-04-22 RX ORDER — MEPERIDINE HYDROCHLORIDE 25 MG/ML
25 INJECTION INTRAMUSCULAR; INTRAVENOUS; SUBCUTANEOUS EVERY 30 MIN PRN
Status: CANCELLED | OUTPATIENT
Start: 2024-04-22

## 2024-04-22 RX ORDER — EPINEPHRINE 1 MG/ML
0.3 INJECTION, SOLUTION, CONCENTRATE INTRAVENOUS EVERY 5 MIN PRN
OUTPATIENT
Start: 2024-04-22

## 2024-04-22 RX ORDER — ALBUTEROL SULFATE 90 UG/1
1-2 AEROSOL, METERED RESPIRATORY (INHALATION)
Status: CANCELLED
Start: 2024-04-22

## 2024-04-22 RX ORDER — ALBUTEROL SULFATE 0.83 MG/ML
2.5 SOLUTION RESPIRATORY (INHALATION)
Status: CANCELLED | OUTPATIENT
Start: 2024-04-22

## 2024-04-22 RX ORDER — HEPARIN SODIUM,PORCINE 10 UNIT/ML
5-20 VIAL (ML) INTRAVENOUS DAILY PRN
Status: CANCELLED | OUTPATIENT
Start: 2024-04-22

## 2024-04-22 RX ORDER — LORAZEPAM 2 MG/ML
0.5 INJECTION INTRAMUSCULAR EVERY 4 HOURS PRN
Status: CANCELLED | OUTPATIENT
Start: 2024-04-22

## 2024-04-22 RX ORDER — DIPHENHYDRAMINE HYDROCHLORIDE 50 MG/ML
50 INJECTION INTRAMUSCULAR; INTRAVENOUS
Status: CANCELLED
Start: 2024-04-22

## 2024-04-22 RX ORDER — HEPARIN SODIUM (PORCINE) LOCK FLUSH IV SOLN 100 UNIT/ML 100 UNIT/ML
5 SOLUTION INTRAVENOUS
Status: CANCELLED | OUTPATIENT
Start: 2024-04-22

## 2024-04-22 RX ORDER — HEPARIN SODIUM (PORCINE) LOCK FLUSH IV SOLN 100 UNIT/ML 100 UNIT/ML
5 SOLUTION INTRAVENOUS
OUTPATIENT
Start: 2024-04-22

## 2024-04-22 RX ADMIN — Medication 5 ML: at 12:19

## 2024-04-22 RX ADMIN — SODIUM CHLORIDE 250 ML: 9 INJECTION, SOLUTION INTRAVENOUS at 11:35

## 2024-04-22 RX ADMIN — SODIUM CHLORIDE 200 MG: 9 INJECTION, SOLUTION INTRAVENOUS at 11:35

## 2024-04-22 ASSESSMENT — PAIN SCALES - GENERAL: PAINLEVEL: NO PAIN (0)

## 2024-04-22 NOTE — NURSING NOTE
"Oncology Rooming Note    April 22, 2024 9:23 AM   Adrianna Pereira is a 63 year old female who presents for:    Chief Complaint   Patient presents with    Radiation Therapy     Return visit, head/neck cancer     Initial Vitals: BP 90/59 (BP Location: Left arm, Cuff Size: Adult Small)   Pulse 108   Resp 16   Wt 44.4 kg (97 lb 12.8 oz)   SpO2 99%   BMI 17.05 kg/m   Estimated body mass index is 17.05 kg/m  as calculated from the following:    Height as of 4/2/24: 1.613 m (5' 3.5\").    Weight as of this encounter: 44.4 kg (97 lb 12.8 oz). Body surface area is 1.41 meters squared.  Data Unavailable Comment: Data Unavailable   No LMP recorded. Patient is postmenopausal.  Allergies reviewed: Yes  Medications reviewed: Yes    Medications: Medication refills not needed today.  Pharmacy name entered into EPIC:    [id number] - [place], [place] - [address] [place] [address] [facility] - [place], [place] - [address] [facility] [place], [place] - [address] [facility] [place], [place] - [address] [facility]     Frailty Screening:   Is the patient here for a new oncology consult visit in cancer care? 2. No      Clinical concerns: follow up today with Dr. Egan    Patient returns today feeling better, weight improved, no new issues  Pain manageable - takes oxycodone 2x per day, bowels ok  Nutrition - NPO, all nutrition via peg, 2-2.5 cartons per day (goal is 3 cartons)  Wound care - met with wound care, \"it's too late for them to do anything\" per Dr. Tiago Tim/MD wound care at [facility]  Working with med onc - Marina Cronin, NP -  tolerating immunotherapy well, no surgery scheduled at this time, will hope to get good results with healing/med onc      Ashlee Cao RN                "

## 2024-04-22 NOTE — LETTER
4/22/2024         RE: Adrianna Pereira  [address] [address] [place] [id number]        Dear Colleague,    Thank you for referring your patient, Adrianna Pereira, to the [facility] [place]. Please see a copy of my visit note below.    [facility] Hematology and Oncology Outpatient Progress Note    Patient: Adrianna Pereira  MRN: [id number]  Date of Service: Apr 22, 2024          Reason for Visit    Metastatic floor of mouth cancer to bone, lung, soft tissue    Primary Oncologist: Formerly, Dr. 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   Unfortunately, within just a few months of completing, was found to have metastatic recurrence.     Completed palliative RT to thoracic spine 6 weeks ago.  Tapered off dex and doing well. Back pain is resolved.     Has completed 3 cycles of pembrolizumab. Tolerating well. After cycle 2, had grade 1 diarrhea but this did not recur with her last cycle so less worrisome for colitis.    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     Labwork: Creatinine 0.64, rest CMP WNL. TSH WNL.. Hgb 9.6 (stable), plat 508, WBC WNL.  Creatinine improved after stopping NSAIDs.    CT neck/chest/abd/pelvis scan is showing overall stable disease. Oral mass stable; progressive erosive ulcerative lesion R submental/sublingual to mouth of floor/inferior tongue is correlative with site of open fistula/wound inferior to lower lip on exam. Some lung nodules improved and some slightly increased in size/new but remain small. Some improvement in med/hilar nodes. Stable T3 compression  fracture with some improvement in soft tissue extension. No new bone mets.     MRI T spine was improved after RT per Dr. Egan's review.     Overall, stable with possibly some slight progression which may be too early to gauge effectiveness on immunotherapy.     Plan:  -Continue with C4 pembrolizumab.   -Continue another 3 cycles and reassess with CT in late June. If having further progression, would likely deem unresponsive at that time. I'm not sure if she is a candidate for additional chemo given her PS, complications, etc, but will reassess those options when clear progression noted.  -Return every 3 week cycle with Marina/Dr. Friedell.  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    Open oral/jaw wound  Having progressive plate exposure over lower lip and open wound secondary to residual/recurrent tumor at prior ENT resection site. Draining white-yellow drainage, no fevers. Reconstruction would require additional free flap, so not being pursued at this time after discussion with ENT surgeon Dr. Thorne. I'm not sure if additional palliative RT is feasible/would be deemed helpful, but will see Dr. Egan in a few weeks so defer to him.    Met with Wound Care MD.   Supportive managemtn interventions to help with drainage, odor and decreasing bacterial growth were provided.     Plan:  -Pt to follow [facility]ound Clinic recs for mgmt and return PRN.  -High risk for infection, monitor  -Follow-up with Dr. Thorne in ENT if desiring further exploration of more definitive surgical options, but in context of metastatic cancer he previously felt risk> benefit unless she had some longer term stabiltiy  -Not sure there is a role for any further palliative RT to this location, risks may be >benefit. She can discuss with Dr. Egan.    Cancer pain  Completed palliative RT to T spine 6 weeks ago.  Back pain is resolved. No longer requiring pain meds for this.   Tapered off dex earlier than planned on her own with no issues.      Having progressive mouth/lower lip pain related to progressive reconstruction failure/open wound.  Stopped NSAID use due to elevated creatinine. Managing well with Tylenol 500 mg 3/day and oxycodone 5-.7.5 mg twice/day.     Plan:  -Continue avoiding NSAIDs  -Continue Tylenol instead, can take up to 3000 mg/day. Continue oxycodone use through day as well  -Referral to Palliative Care if not well-controlled in future, pt wants to minimize number of appts for now.     Nutrition/hydration  Dependant on feeding tube. Weight stable for now. Staying hydrated via PEG flushes, but having some diarrhea.    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   On observation.   ______________________________________________________________________________    History of Present Illness/ Interval History    Ms. Adrianna Pereira is a 63 year old who completed resection of floor of mouth cancer, followed by adjuvant chemoRT 12/2023. Unfortunately, shortly after this, she was found to have local and metastatic recurrence involving mandible, lung, bone (T3-T4) and soft tissue that is PDL1 70%.    She started palliative treatments with pembrolizumab, returns with updated CT scan ahead of cycle 4.   Tolerating pembrolizumab generally well.  Had reported looser stools last cycle, but now pretty regualr (loose/soft 1-2/day) No blood in stools. No fevers. No longer on laxatives.     Completed palliative RT to thoracic spine 6 weeks ago. Tapered off dex a month ago.   Her back pain is resolved.   No longer taking pain med for this.     Mouth pain persists. Stopped ibuprofen, managing with Tylenol and oxycodone 5-7.5 mg twice daily.   Pain has been progressive due to plate exposure her lip and open wound at prior ENT resection site below lower lip. Draining white-yellow drainage, no fevers. Assessed by ENT surgeon and  reconstruction would require additional free flap, so not being pursued at this time. Saw Wound Clinic for mgmt recs    Oral communication is an ongoing issue.      Dependent on feeding tube for all nutrition/hydration and meds. Some weight loss. Feels well-hydrated.    ECOG Performance    1    Oncology History/Treatment  Diagnosis/Stage:   7/2023: [id number] Floor of mouth squamous cell cancer  -hx alcoholism (stopped all use) and smoking (quit 8/2023)  -hx premalignant oral cavity lesions s/p CO2 laser ablation with ENT  -presented with lower lip/chin swelling  -7/3/2023 left mandibular alveolus biopsy: invasive keratinizing moderately differential squamous cell carcinoma.   -PET: 4.4 x 3.7 x 3.2 cm anterior oral cavity mass invading into the mandible with a separate gluco-avid nodule on the right mandibular buccal mucosa and bilateral level 1B, level 2 and L3 metastatic lymphadenopathy without evidence of metastatic disease.   -8/17/2023 surgical path: pT4a, N3b tumor with positive left anterior lateral soft tissue margin, 11/98+ lymph nodes including STEFFEN (largest 4 cm) and several bilateral lymph nodes consistent with involvement by low-grade B-cell neoplasm suggestive of CLL/SLL.     2/2024: Progression to stage IV mouth squamous cell cancer to bone and lung  -Within weeks of completing definitive treatment for her primary disease, noted severe/progressive back pain  -CT C/T spine: new T3 compression fracture with hypodensity in vertebral body extending into L posterior elements suggestive of met. T4 posterior vertebral body lesion also suspicious. New lung nodules also noted  -MRI T spine: T3 compression fracture with extraosseous tumor involving ventral epidural space with mod - severe central spinal canal stenosis at T3  -PET: recurrent avid foci L maikel-mandible, numerous bilateral pulmonary/pleural mets, met mediastinal/hilar adenopathy, T3, T4, L adductor musculature and R upper thigh skin thickening  -PDL1  TPS and CPS 70% from 8/2023 original tumor biopsy    Treatment:  Locally advanced disease  -8/17/2023: segmental mandibulectomy, floor of mouth resection, anterior glossectomy, left fibular free flap, skin grafting, and tracheostomy.   -Post-op course complicated by skin necrosis requiring OR debridment and resuturing of neck incision/intraoral flap    -10/20/2023 - 12/5/2023: completed adjuvant concurrent RT (6600 cGy/33) + weekly cisplatin (x 7 cycles)    Metastatic recurrence  -2/21/2024 - present: pembrolizumab.  -Zometa for bone mets, later held for jaw osteonecrosis risk    -2/26 - 3/12/2024: palliative RT to thoracic spine, 2500 cGy/10. Dex taper      Physical Exam    GENERAL: Alert and oriented to time place and person. Seated comfortably. In no distress.  Dysarthria.   accompanied.  HEENT: Open wound below lower lip with exposed plate. White/yellow purulent appearing drainage.  SHAE, EOMI. No erythema. No icterus.  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  No skin breakdown.  No evident adenopathy/masses.  HEART: RRR  CHEST: regular respiratory effort.  ABD: PEG in place. Non-distended  NEURO: No gross deficit noted.       Lab Results    Recent Results (from the past 168 hour(s))   Comprehensive metabolic panel   Result Value Ref Range    Sodium 137 135 - 145 mmol/L    Potassium 4.2 3.4 - 5.3 mmol/L    Carbon Dioxide (CO2) 24 22 - 29 mmol/L    Anion Gap 11 7 - 15 mmol/L    Urea Nitrogen 20.5 8.0 - 23.0 mg/dL    Creatinine 0.64 0.51 - 0.95 mg/dL    GFR Estimate >90 >60 mL/min/1.73m2    Calcium 8.6 (L) 8.8 - 10.2 mg/dL    Chloride 102 98 - 107 mmol/L    Glucose 92 70 - 99 mg/dL    Alkaline Phosphatase 108 40 - 150 U/L    AST 31 0 - 45 U/L    ALT 24 0 - 50 U/L    Protein Total 6.3 (L) 6.4 - 8.3 g/dL    Albumin 3.3 (L) 3.5 - 5.2 g/dL    Bilirubin Total 0.2 <=1.2 mg/dL   TSH with free T4 reflex   Result Value Ref Range    TSH 1.14 0.30 - 4.20 uIU/mL   CBC with platelets and  differential   Result Value Ref Range    WBC Count 10.6 4.0 - 11.0 10e3/uL    RBC Count 2.97 (L) 3.80 - 5.20 10e6/uL    Hemoglobin 9.6 (L) 11.7 - 15.7 g/dL    Hematocrit 29.9 (L) 35.0 - 47.0 %     (H) 78 - 100 fL    MCH 32.3 26.5 - 33.0 pg    MCHC 32.1 31.5 - 36.5 g/dL    RDW 14.5 10.0 - 15.0 %    Platelet Count 506 (H) 150 - 450 10e3/uL    % Neutrophils 77 %    % Lymphocytes 12 %    % Monocytes 8 %    % Eosinophils 1 %    % Basophils 0 %    % Immature Granulocytes 1 %    NRBCs per 100 WBC 0 <1 /100    Absolute Neutrophils 8.2 1.6 - 8.3 10e3/uL    Absolute Lymphocytes 1.3 0.8 - 5.3 10e3/uL    Absolute Monocytes 0.8 0.0 - 1.3 10e3/uL    Absolute Eosinophils 0.1 0.0 - 0.7 10e3/uL    Absolute Basophils 0.0 0.0 - 0.2 10e3/uL    Absolute Immature Granulocytes 0.1 <=0.4 10e3/uL    Absolute NRBCs 0.0 10e3/uL           Imaging    CT Chest/Abdomen/Pelvis w Contrast    Result Date: 4/17/2024  CT CHEST/ABDOMEN/PELVIS WITH CONTRAST 4/17/2024 12:40 PM CLINICAL HISTORY: Metastatic floor of mouth cancer to lung, bone; assess response to immunotherapy. Malignant neoplasm metastatic to bone (H). Malignant neoplasm metastatic to lung, unspecified laterality (H). 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 TECHNIQUE: CT scan of the chest, abdomen, and pelvis was performed following injection of IV contrast. Multiplanar reformats were obtained. Dose reduction techniques were used. CONTRAST: 75 mL Isovue 370 COMPARISON: 2/15/2024, 7/13/2023 FINDINGS: LUNGS AND PLEURA: Multiple irregular pulmonary nodules are seen bilaterally, some which have resolved while others are stable, mildly decreased in size, mildly increased in size, and/or new to comparison. The largest previously seen hypermetabolic pulmonary nodule in the right upper lobe measuring 9 x 7 mm and in the left lower lobe measuring 16 x 13 mm on the most recent prior PET exam from 2/15/2024 are not seen on today's exam. Suspected pleuroparenchymal scarring is seen in the  region of the left lower lobe hypermetabolic pulmonary nodule with mild retraction of the adjacent fissure (4-180). Overall, those nodules demonstrating the greatest area of FDG uptake on comparison have shown the greatest improvement. Other irregular pulmonary nodules are as follows: - New irregular subpleural nodularity in the lateral right upper lobe measuring 7 x 6 mm (4-89). - Mild decrease in size of irregular subpleural pulmonary nodule in the right lower lobe measuring 7 x 6 mm (4-146), previously 9 x 9 mm. - Mild interval decrease in size of irregular pulmonary nodule in the anterior-inferior left lower lobe measuring 12 x 7 mm (4-223), previously 20 x 19 mm. - Similar-appearing irregular pulmonary nodule in the posterior right lower lobe measuring 7 x 6 mm (4-182), previously 6 x 6 mm. -Similar to slight interval enlargement in a previously seen hypermetabolic pleural-based mass in the posterior inferior right lower lung measuring 13 x 8 mm (3-145), previously 12 x 7 mm. Additional irregular opacities in the posteromedial superior segment of the left lower lobe as well as the posteromedial left lower lobe base are noted, which could represent atelectasis, infiltrate, and/or malignancy. Upper lobe predominant mild centrilobular and paraseptal emphysema and interlobular septal thickening are noted. No pleural fluid. Large airways are patent. MEDIASTINUM/AXILLAE: A right chest wall Port-A-Cath is present with the tip in the SVC. Heart size is within normal limits. Thoracic aorta is normal in course and caliber. No significant pericardial fluid. Moderate to severe coronary artery atherosclerosis. No enlarged thoracic lymph nodes by CT size criteria. There are, however, several prominent bilateral axillary lymph nodes measuring up to 8 mm in short axis, corresponding to previously seen hypermetabolic hilar lymph nodes, which are similar to decreased in size to comparison. There has been mild interval decrease  in size of previously seen hypermetabolic soft tissue in the AP window, which is less defined on today's study (3-56). No new or enlarging thoracic lymph nodes. There is heterogeneity of the thyroid gland with multiple small low-density thyroid nodules suspected. If further evaluation is desired, recommend dedicated thyroid ultrasound. HEPATOBILIARY: A tiny subcentimeter low-density observation is seen in the liver near the gallbladder fossa, which is unchanged to comparison and likely represents a cyst or hemangioma. This can be monitored on future surveillance imaging. No signs of cholelithiasis or acute cholecystitis. No significant bile duct dilation. PANCREAS: Normal. SPLEEN: Normal. ADRENAL GLANDS: Normal. KIDNEYS/BLADDER: There is similar-appearing thickening of the adrenal glands, left greater than right, without discrete nodule or mass, which may represent hypertrophy and/or adenomatous change. Metastatic disease is thought less likely given lack of increased FDG uptake on prior PET imaging. Recommend attention on surveillance imaging. BOWEL: A percutaneous gastrostomy tube is present with the bulb in the region of the gastric pylorus. No signs of bowel obstruction or inflammation. PELVIC ORGANS: Normal. ADDITIONAL FINDINGS: Severe atherosclerosis of the abdominal aorta and iliac arteries. No aneurysmal dilation. MUSCULOSKELETAL: There is high-grade compression fracture deformity involving the T3 vertebral body, corresponding to the previously described pathologic fracture. Previously described extraosseous soft tissue extension associated with this lesion is not as well seen today and appears mildly improved. No acute aggressive-appearing lytic or blastic osseous lesions. Multilevel hypertrophic degenerative changes of the spine. Generalized osteopenia. Scoliotic curvature of the spine. Previously described hypermetabolic lesion involving the left adductor muscle in the proximal thigh is not seen, possibly  below the field of view.     IMPRESSION: 1.  Findings suggestive of mixed response to therapy. 2.  Numerous irregular pulmonary nodules, several of which have resolved while others are stable, mildly decreased in size, mildly increased in size, and/or new to comparison from 2/15/2024. 3.  Similar-appearing small pleural-based metastatic lesion involving the posterior inferior right lower lung. 4.  Mild interval improvement in previously seen mediastinal and bilateral hilar lymphadenopathy. No new or enlarging thoracic lymph nodes. 5.  High-grade compression fracture deformity of T3, corresponding to the previously described pathologic fracture with apparent mild improvement in previously seen extraosseous soft tissue extension. No new or progressive osseous disease. 6.  Nonacute findings as above. CARLOS POTTER MD   SYSTEM ID:  [id number]    CT Soft Tissue Neck w Contrast    Result Date: 4/17/2024  CT SCAN OF THE NECK WITH CONTRAST  4/17/2024 12:41 PM HISTORY: Metastatic floor of mouth cancer to lung, bone; assess response to immunotherapy. Malignant neoplasm metastatic to bone (H). Malignant neoplasm metastatic to lung, unspecified laterality (H). 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 TECHNIQUE: Axial CT images of the neck following administration of intravenous contrast with reformations. Radiation dose for this scan was reduced using automated exposure control, adjustment of the mA and/or kV according to patient size, or iterative reconstruction technique. 45 mL Isovue 370 IV. COMPARISON: CT/PET/CT of the neck 2/15/2024. MRI thoracic spine 4/10/2024. FINDINGS: Again seen is an ill-defined area of osteolytic change involving the region of the left mandibular angle/ramus. This area demonstrated prominent FDG avidity on the previous PET/CT exam, concerning for malignancy/metastatic deposit. This does not appear significantly changed compared to the most recent PET/CT exam, accounting for differences in  technique. Changes of mandibulectomy with resection of the mandibular symphysis, parasymphyseal regions, and partial resection of the mandibular bodies bilaterally, as before, with reconstruction. The reconstruction bone flap appears similar to the prior examination. Anterior plate and screw fixation hardware along the mandible bilaterally, as before. No definite findings of hardware failure. There appears to be probably some fusion of the native left mandible with the left mandibular flap. On the right, there is persistent linear lucency between the reconstruction bone flap and the native right mandible. This appears unchanged. In the interim, new prominent ulceration along the right posterior inferior margin of the right parasymphyseal mandibular graft (series 4 image 52, series 5 image 40), extending through the skin and superficial soft tissues through the expected location of the floor of mouth to the level of the inferior oral tongue. Curvilinear soft tissue density noted marginating the area of this ulcerated cavity, nonspecific. Correlation with direct inspection is recommended. Tissue sampling can be considered as warranted. The area of deepest ulceration in this area measures up to approximately 11 mm craniocaudal. There is prominent fatty atrophy of the tongue musculature and floor of mouth musculature. Fat graft along the inferior/superficial aspect of the floor of mouth otherwise appears similar to the prior examination. Mild amorphus soft tissue attenuation/submucosal edema along the base of the tongue, submucosal oropharynx/parapharyngeal regions and involving the supraglottic larynx, likely due to posttreatment changes. Mild focal narrowing of the subglottic larynx/cervical trachea at the level of the thyroid gland, unchanged. No definite pathologic cervical lymphadenopathy by size or morphology criteria. Scattered amorphous fat stranding along the fascial planes of the upper neck and in the  subcutaneous tissues, presumably due to posttreatment changes, as before. Marked atrophy or absence of the bilateral submandibular glands, as before. Volume loss of the bilateral parotid glands, as before. Subcentimeter hypodense nodules in the thyroid gland without aggressive features, not necessarily requiring follow-up. Amorphous soft tissue density in the bilateral carotid spaces, likely due to posttreatment change. Mild to moderate versus moderate focal stenosis at the origins of the bilateral internal carotid arteries due to atherosclerotic disease. Otherwise, the bilateral cervical carotid and vertebral arteries appear to be grossly patent. The bilateral internal jugular veins are patent. A right-sided Port-A-Cath is in place extending through the lower aspect of the right internal jugular vein into the superior vena cava, incompletely evaluated. Mild paraseptal emphysematous change/fibrosis in the right lung apex. Unchanged severe presumed pathologic compression fracture of the T3 vertebral body with retropulsion of the posterior aspect of the vertebral body into the ventral spinal canal. Unchanged presumed pathologic T4 compression deformity with mild to moderate vertebral height loss. Exaggerated segmental kyphosis centered at the T3 level. Multilevel degenerative changes are noted in the cervical spine.     IMPRESSION: 1. No significant interval change in the ill-defined osteolytic process centered in the native left mandibular angle/ramus which demonstrated hypermetabolism on the previous PET/CT, concerning for malignancy. 2. Progressive nonspecific ulceration extending from the skin surface through the right submental/sublingual region to the level of the floor of mouth/inferior oral tongue region, positioned posterior to the right mandibular bone graft in this patient who is status post partial mandibulectomy with reconstruction. Please correlate clinically with direct inspection; tissue sampling can  be considered, as clinically warranted. 3. No definite pathologic cervical lymphadenopathy. 4. Unchanged presumed pathologic severe T3 compression/burst fracture and mild to moderate T4 compression fracture. 5. Other posttreatment and chronic findings, as described. Please see the body of the report for additional details. CHRISTIAN KRAUSE MD   SYSTEM ID:  [id number]    MR Thoracic Spine w/o & w Contrast    Result Date: 4/10/2024  MRI OF THE THORACIC SPINE WITHOUT AND WITH CONTRAST 4/10/2024 9:59 AM HISTORY: 63 year-old with metastatic oral cavity cancer status post palliative RT to thoracic spine. 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 TECHNIQUE: Multiplanar, multisequence MRI images of the thoracic spine were acquired without and with 4 mL Gadavist gadolinium IV contrast. COMPARISON: Thoracic spine MRI 2/7/2024.     IMPRESSION: Sigmoid curvature of the thoracic spine with left convex curvature centered at T5 and right convex curvature centered at T10 again noted. Presumed pathologic burst compression fracture deformity of T3 again noted with retropulsion of bony fragments into the spinal canal. There has been an interval increase in the area of metastatic disease in the T4 vertebral body since the comparison study. Vertebral body heights, contours and marrow signal otherwise normal throughout the thoracic spine. No significant posterior disc bulges or herniations. Mild spinal canal narrowing at T3 due to retropulsion of bony fragments into the spinal canal. No other spinal canal narrowing of the thoracic spine. Moderate neural foraminal stenosis on the left at T3-T4 due to bony deformity caused by the T3 compression fracture. No other significant neural foraminal narrowing of the thoracic spine. CRISTELA ROBLES MD   SYSTEM ID:  [id number]     Billing  Total time 40 minutes, to include face to face visit, review of EMR, ordering, documentation and coordination of care on date of service.    complexity modifier  "for longitudinal care.         Signed by: Marina Cronin NP      Oncology Rooming Note    April 22, 2024 10:27 AM   Adrianna Pereira is a 63 year old female who presents for:    Chief Complaint   Patient presents with     Oncology Clinic Visit     Initial Vitals: /62 (BP Location: Left arm, Patient Position: Sitting, Cuff Size: Adult Small)   Pulse 105   Temp 97.5  F (36.4  C) (Tympanic)   Resp 12   Ht 1.613 m (5' 3.5\")   Wt 39 kg (86 lb)   SpO2 98%   BMI 15.00 kg/m   Estimated body mass index is 15 kg/m  as calculated from the following:    Height as of this encounter: 1.613 m (5' 3.5\").    Weight as of this encounter: 39 kg (86 lb). Body surface area is 1.32 meters squared.  No Pain (0) Comment: Data Unavailable   No LMP recorded. Patient is postmenopausal.  Allergies reviewed: Yes  Medications reviewed: Yes    Medications: Medication refills not needed today.  Pharmacy name entered into EPIC:    [id number] - [place], [place] - [address] [place] [address] [facility]ARMACY - [place] [place] [facility] [address] [facility] [place] [place] - [address] [place] [address] PHARMACY [place], [place] - [address] [place] [address]     Frailty Screening:   Is the patient here for a new oncology consult visit in cancer care? 2. No      Clinical concerns:  None      Tanya Philippe CMA                Again, thank you for allowing me to participate in the care of your patient.        Sincerely,        Marina Cronin NP  "

## 2024-04-22 NOTE — PROGRESS NOTES
"Oncology Rooming Note    April 22, 2024 10:27 AM   Adrianna Pereira is a 63 year old female who presents for:    Chief Complaint   Patient presents with    Oncology Clinic Visit     Initial Vitals: /62 (BP Location: Left arm, Patient Position: Sitting, Cuff Size: Adult Small)   Pulse 105   Temp 97.5  F (36.4  C) (Tympanic)   Resp 12   Ht 1.613 m (5' 3.5\")   Wt 39 kg (86 lb)   SpO2 98%   BMI 15.00 kg/m   Estimated body mass index is 15 kg/m  as calculated from the following:    Height as of this encounter: 1.613 m (5' 3.5\").    Weight as of this encounter: 39 kg (86 lb). Body surface area is 1.32 meters squared.  No Pain (0) Comment: Data Unavailable   No LMP recorded. Patient is postmenopausal.  Allergies reviewed: Yes  Medications reviewed: Yes    Medications: Medication refills not needed today.  Pharmacy name entered into EPIC:    [id number] - [place], [place] - [address] [place] [address] PHARMACY - [place], [place] - [address] [facility] PHARMACY [place], [place] - [address] [facility] PHARMACY [place], [place] - [address] [facility]     Frailty Screening:   Is the patient here for a new oncology consult visit in cancer care? 2. No      Clinical concerns:  None      Tanya Philippe CMA              "

## 2024-04-22 NOTE — LETTER
4/22/2024         RE: Adrianna Pereira  [address] [address] [place] [id number]        Dear Colleague,    Thank you for referring your patient, Adrianna Pereira, to the [facility] RAD[facility]ATION [facility]Y [facility]. Please see a copy of my visit note below.    Radiation Oncology Progress Note    HPI:Ms. 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  Final pathology demonstrated squamous cell carcinoma moderately differentiated, 4.1 cm in size originating from  floor mouth/ anterior tongue,  depth of invasion 29 mm, no LVSI, positive PNI, positive margin (left anterior lateral).  11 of 98 lymph nodes positive (4 cm largest deposit, positive extracapsular disease present), pathologic T4N3b.   She underwent adjuvant chemoradiation therapy, completed on 12/5/2023.  The patient had early recurrence of disease including locally as well as distantly (spine, lung, soft tissue).  She underwent palliative radiation therapy to thoracic spine.     Radiation Treatment  10/19/2023 to 12/5/2023: Head and neck, 6600 cGy in 33 fractions  2/22/24 to 3/12/24: Thoracic spine, T2 - T4, 2500 cGy in 10 fractions     The patient returns for follow-up.    MRI of the thoracic spine on 4/10/2024 to my review demonstrated overall improvement in spinal canal stenosis in the treated region.  No evidence of cord compression was noted.    Restaging scans with CT scan of the neck and chest abdomen pelvis on 4/17/2024 demonstrated overall mixed response in the lung.  There was noted no significant change in the mass in the left mandibular angle/ramus as well as noted progressive ulceration to the right submental sublingual region, overall consistent with residual head neck cancer disease burden.    Clinically, the patient has noted improvement in her thoracic spine pain.  She is tapered off steroid.  Using oxycodone as  needed in the daytime and at night for pain. Pain from her head and neck cancer burden is overall managed.     She has started systemic treatment under the care of medical oncology team.  Tolerating treatment thus far.  Nutrition continues to be through the tube.  Denies any focal neurologic change.  Continues to have exposed hardware and open wound.  Has seen wound care team with limited options. ENT team has discussed the past potential surgery but deferred in the past due to extent of surgery that will likely be required.      Plan:  Continue palliative systemic therapy per medical oncology team.    Patient has seen wound care team regarding wound management for exposed hardware.  I would recommend follow-up with ENT team.  I re-discussed with patient possibility of reexploring surgical options given exposed hardware/wound continues to worsen.  Return to clinic in 2-3 weeks.    Hernán Egan M.D.  Department of Radiation Oncology  [facility]

## 2024-04-22 NOTE — PROGRESS NOTES
Radiation Oncology Progress Note    HPI:Ms. 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  Final pathology demonstrated squamous cell carcinoma moderately differentiated, 4.1 cm in size originating from  floor mouth/ anterior tongue,  depth of invasion 29 mm, no LVSI, positive PNI, positive margin (left anterior lateral).  11 of 98 lymph nodes positive (4 cm largest deposit, positive extracapsular disease present), pathologic T4N3b.   She underwent adjuvant chemoradiation therapy, completed on 12/5/2023.  The patient had early recurrence of disease including locally as well as distantly (spine, lung, soft tissue).  She underwent palliative radiation therapy to thoracic spine.     Radiation Treatment  10/19/2023 to 12/5/2023: Head and neck, 6600 cGy in 33 fractions  2/22/24 to 3/12/24: Thoracic spine, T2 - T4, 2500 cGy in 10 fractions     The patient returns for follow-up.    MRI of the thoracic spine on 4/10/2024 to my review demonstrated overall improvement in spinal canal stenosis in the treated region.  No evidence of cord compression was noted.    Restaging scans with CT scan of the neck and chest abdomen pelvis on 4/17/2024 demonstrated overall mixed response in the lung.  There was noted no significant change in the mass in the left mandibular angle/ramus as well as noted progressive ulceration to the right submental sublingual region, overall consistent with residual head neck cancer disease burden.    Clinically, the patient has noted improvement in her thoracic spine pain.  She is tapered off steroid.  Using oxycodone as needed in the daytime and at night for pain. Pain from her head and neck cancer burden is overall managed.     She has started systemic treatment under the care of medical oncology team.  Tolerating treatment thus far.  Nutrition continues to  be through the tube.  Denies any focal neurologic change.  Continues to have exposed hardware and open wound.  Has seen wound care team with limited options. ENT team has discussed the past potential surgery but deferred in the past due to extent of surgery that will likely be required.      Plan:  Continue palliative systemic therapy per medical oncology team.    Patient has seen wound care team regarding wound management for exposed hardware.  I would recommend follow-up with ENT team.  I re-discussed with patient possibility of reexploring surgical options given exposed hardware/wound continues to worsen.  Return to clinic in 2-3 weeks.    Hernán Egan M.D.  Department of Radiation Oncology  [facility]

## 2024-04-22 NOTE — PROGRESS NOTES
Infusion Nursing Note:  Adrianna Pereira presents today for C4D1 Keytruda.    Patient seen by provider today: Yes: Marina Cronin NP   present during visit today: Not Applicable.    Note: N/A.      Intravenous Access:  Labs drawn without difficulty.  Implanted Port.    Treatment Conditions:  Lab Results   Component Value Date    HGB 9.6 (L) 04/22/2024    WBC 10.6 04/22/2024    ANEU 2.2 12/04/2023    ANEUTAUTO 8.2 04/22/2024     (H) 04/22/2024        Lab Results   Component Value Date     04/22/2024    POTASSIUM 4.2 04/22/2024    MAG 1.9 12/15/2023    CR 0.64 04/22/2024    RACHAEL 8.6 (L) 04/22/2024    BILITOTAL 0.2 04/22/2024    ALBUMIN 3.3 (L) 04/22/2024    ALT 24 04/22/2024    AST 31 04/22/2024       Results reviewed, labs MET treatment parameters, ok to proceed with treatment.      Post Infusion Assessment:  Patient tolerated infusion without incident.  Blood return noted pre and post infusion.  Site patent and intact, free from redness, edema or discomfort.  No evidence of extravasations.  Access discontinued per protocol.       Discharge Plan:   Patient discharged in stable condition accompanied by: .  Departure Mode: Ambulatory.  Pt to return on 5/14/24 at 10:00 am for pac labs followed by AMARILYS Farris RN

## 2024-05-13 DIAGNOSIS — G89.3 CANCER RELATED PAIN: ICD-10-CM

## 2024-05-13 RX ORDER — OXYCODONE HCL 5 MG/5 ML
5 SOLUTION, ORAL ORAL EVERY 6 HOURS PRN
Qty: 300 ML | Refills: 0 | Status: SHIPPED | OUTPATIENT
Start: 2024-05-13 | End: 2024-06-05

## 2024-05-14 ENCOUNTER — LAB (OUTPATIENT)
Dept: INFUSION THERAPY | Facility: CLINIC | Age: 64
End: 2024-05-14
Attending: NURSE PRACTITIONER
Payer: COMMERCIAL

## 2024-05-14 ENCOUNTER — ONCOLOGY VISIT (OUTPATIENT)
Dept: ONCOLOGY | Facility: CLINIC | Age: 64
End: 2024-05-14
Attending: INTERNAL MEDICINE
Payer: COMMERCIAL

## 2024-05-14 ENCOUNTER — OFFICE VISIT (OUTPATIENT)
Dept: RADIATION THERAPY | Facility: OUTPATIENT CENTER | Age: 64
End: 2024-05-14
Payer: COMMERCIAL

## 2024-05-14 VITALS
SYSTOLIC BLOOD PRESSURE: 85 MMHG | DIASTOLIC BLOOD PRESSURE: 58 MMHG | HEART RATE: 101 BPM | WEIGHT: 85.4 LBS | RESPIRATION RATE: 18 BRPM | OXYGEN SATURATION: 98 % | BODY MASS INDEX: 14.89 KG/M2

## 2024-05-14 VITALS
TEMPERATURE: 97.1 F | SYSTOLIC BLOOD PRESSURE: 95 MMHG | HEART RATE: 89 BPM | OXYGEN SATURATION: 99 % | WEIGHT: 85.3 LBS | DIASTOLIC BLOOD PRESSURE: 61 MMHG | HEIGHT: 63 IN | BODY MASS INDEX: 15.11 KG/M2

## 2024-05-14 VITALS — HEART RATE: 80 BPM | SYSTOLIC BLOOD PRESSURE: 97 MMHG | DIASTOLIC BLOOD PRESSURE: 64 MMHG

## 2024-05-14 DIAGNOSIS — C78.00 MALIGNANT NEOPLASM METASTATIC TO LUNG, UNSPECIFIED LATERALITY (H): Primary | ICD-10-CM

## 2024-05-14 DIAGNOSIS — C06.9 SQUAMOUS CELL CARCINOMA OF ORAL CAVITY (H): Primary | ICD-10-CM

## 2024-05-14 DIAGNOSIS — C79.51 MALIGNANT NEOPLASM METASTATIC TO BONE (H): Primary | ICD-10-CM

## 2024-05-14 DIAGNOSIS — C79.51 MALIGNANT NEOPLASM METASTATIC TO BONE (H): ICD-10-CM

## 2024-05-14 DIAGNOSIS — C06.9 SQUAMOUS CELL CARCINOMA OF ORAL CAVITY (H): ICD-10-CM

## 2024-05-14 DIAGNOSIS — C78.00 MALIGNANT NEOPLASM METASTATIC TO LUNG, UNSPECIFIED LATERALITY (H): ICD-10-CM

## 2024-05-14 LAB
ALBUMIN SERPL BCG-MCNC: 3.3 G/DL (ref 3.5–5.2)
ALP SERPL-CCNC: 112 U/L (ref 40–150)
ALT SERPL W P-5'-P-CCNC: 9 U/L (ref 0–50)
ANION GAP SERPL CALCULATED.3IONS-SCNC: 9 MMOL/L (ref 7–15)
AST SERPL W P-5'-P-CCNC: 15 U/L (ref 0–45)
BILIRUB SERPL-MCNC: <0.2 MG/DL
BUN SERPL-MCNC: 20.5 MG/DL (ref 8–23)
CALCIUM SERPL-MCNC: 8.9 MG/DL (ref 8.8–10.2)
CHLORIDE SERPL-SCNC: 106 MMOL/L (ref 98–107)
CREAT SERPL-MCNC: 0.62 MG/DL (ref 0.51–0.95)
DEPRECATED HCO3 PLAS-SCNC: 23 MMOL/L (ref 22–29)
EGFRCR SERPLBLD CKD-EPI 2021: >90 ML/MIN/1.73M2
GLUCOSE SERPL-MCNC: 96 MG/DL (ref 70–99)
POTASSIUM SERPL-SCNC: 4.7 MMOL/L (ref 3.4–5.3)
PROT SERPL-MCNC: 6 G/DL (ref 6.4–8.3)
SODIUM SERPL-SCNC: 138 MMOL/L (ref 135–145)
TSH SERPL DL<=0.005 MIU/L-ACNC: 0.71 UIU/ML (ref 0.3–4.2)

## 2024-05-14 PROCEDURE — G2211 COMPLEX E/M VISIT ADD ON: HCPCS | Performed by: NURSE PRACTITIONER

## 2024-05-14 PROCEDURE — 99214 OFFICE O/P EST MOD 30 MIN: CPT | Performed by: NURSE PRACTITIONER

## 2024-05-14 PROCEDURE — 258N000003 HC RX IP 258 OP 636: Performed by: NURSE PRACTITIONER

## 2024-05-14 PROCEDURE — 250N000011 HC RX IP 250 OP 636: Mod: JZ | Performed by: NURSE PRACTITIONER

## 2024-05-14 PROCEDURE — 36591 DRAW BLOOD OFF VENOUS DEVICE: CPT | Performed by: NURSE PRACTITIONER

## 2024-05-14 PROCEDURE — 96413 CHEMO IV INFUSION 1 HR: CPT

## 2024-05-14 PROCEDURE — G0463 HOSPITAL OUTPT CLINIC VISIT: HCPCS | Performed by: NURSE PRACTITIONER

## 2024-05-14 PROCEDURE — 84443 ASSAY THYROID STIM HORMONE: CPT | Performed by: NURSE PRACTITIONER

## 2024-05-14 PROCEDURE — 82040 ASSAY OF SERUM ALBUMIN: CPT | Performed by: NURSE PRACTITIONER

## 2024-05-14 RX ORDER — LORAZEPAM 2 MG/ML
0.5 INJECTION INTRAMUSCULAR EVERY 4 HOURS PRN
Status: CANCELLED | OUTPATIENT
Start: 2024-05-14

## 2024-05-14 RX ORDER — HEPARIN SODIUM (PORCINE) LOCK FLUSH IV SOLN 100 UNIT/ML 100 UNIT/ML
5 SOLUTION INTRAVENOUS
Status: DISCONTINUED | OUTPATIENT
Start: 2024-05-14 | End: 2024-05-14 | Stop reason: HOSPADM

## 2024-05-14 RX ORDER — ALBUTEROL SULFATE 90 UG/1
1-2 AEROSOL, METERED RESPIRATORY (INHALATION)
Status: CANCELLED
Start: 2024-05-14

## 2024-05-14 RX ORDER — EPINEPHRINE 1 MG/ML
0.3 INJECTION, SOLUTION, CONCENTRATE INTRAVENOUS EVERY 5 MIN PRN
Status: CANCELLED | OUTPATIENT
Start: 2024-05-14

## 2024-05-14 RX ORDER — METHYLPREDNISOLONE SODIUM SUCCINATE 125 MG/2ML
125 INJECTION, POWDER, LYOPHILIZED, FOR SOLUTION INTRAMUSCULAR; INTRAVENOUS
Status: CANCELLED
Start: 2024-05-14

## 2024-05-14 RX ORDER — HEPARIN SODIUM,PORCINE 10 UNIT/ML
5-20 VIAL (ML) INTRAVENOUS DAILY PRN
Status: CANCELLED | OUTPATIENT
Start: 2024-05-14

## 2024-05-14 RX ORDER — MEPERIDINE HYDROCHLORIDE 25 MG/ML
25 INJECTION INTRAMUSCULAR; INTRAVENOUS; SUBCUTANEOUS EVERY 30 MIN PRN
Status: CANCELLED | OUTPATIENT
Start: 2024-05-14

## 2024-05-14 RX ORDER — ALBUTEROL SULFATE 0.83 MG/ML
2.5 SOLUTION RESPIRATORY (INHALATION)
Status: CANCELLED | OUTPATIENT
Start: 2024-05-14

## 2024-05-14 RX ORDER — HEPARIN SODIUM (PORCINE) LOCK FLUSH IV SOLN 100 UNIT/ML 100 UNIT/ML
5 SOLUTION INTRAVENOUS
Status: CANCELLED | OUTPATIENT
Start: 2024-05-14

## 2024-05-14 RX ORDER — DIPHENHYDRAMINE HYDROCHLORIDE 50 MG/ML
50 INJECTION INTRAMUSCULAR; INTRAVENOUS
Status: CANCELLED
Start: 2024-05-14

## 2024-05-14 RX ADMIN — Medication 5 ML: at 13:38

## 2024-05-14 RX ADMIN — SODIUM CHLORIDE 200 MG: 9 INJECTION, SOLUTION INTRAVENOUS at 13:03

## 2024-05-14 RX ADMIN — SODIUM CHLORIDE 250 ML: 9 INJECTION, SOLUTION INTRAVENOUS at 13:03

## 2024-05-14 ASSESSMENT — PAIN SCALES - GENERAL: PAINLEVEL: NO PAIN (0)

## 2024-05-14 NOTE — PROGRESS NOTES
"Oncology Rooming Note    May 14, 2024 11:38 AM   Adrianna Pereira is a 63 year old female who presents for:    Chief Complaint   Patient presents with    Oncology Clinic Visit     Malignant neoplasm metastatic to bone - labs, provider, infusion     Initial Vitals: BP 95/61 (BP Location: Left arm, Patient Position: Sitting, Cuff Size: Adult Small)   Pulse 89   Temp 97.1  F (36.2  C) (Tympanic)   Ht 1.6 m (5' 3\")   Wt 38.7 kg (85 lb 4.8 oz)   SpO2 99%   BMI 15.11 kg/m   Estimated body mass index is 15.11 kg/m  as calculated from the following:    Height as of this encounter: 1.6 m (5' 3\").    Weight as of this encounter: 38.7 kg (85 lb 4.8 oz). Body surface area is 1.31 meters squared.  No Pain (0) Comment: Data Unavailable   No LMP recorded. Patient is postmenopausal.  Allergies reviewed: Yes  Medications reviewed: Yes    Medications: Medication refills not needed today.  Pharmacy name entered into EPIC:    [id number] - [place], [place] - [address] [place], [place] [address]ING PHARMACY - [place], [place] - [address] [facility] PHARMACY [place], [place] - [address] [facility] PHARMACY [place], [place] - [address] [place] [address]     Frailty Screening:   Is the patient here for a new oncology consult visit in cancer care? 2. No      Clinical concerns: None today.       Leana Lloyd MA              "

## 2024-05-14 NOTE — LETTER
5/14/2024         RE: Adrianna Pereira  [address] [address] [place] [id number]        Dear Colleague,    Thank you for referring your patient, Adrianna Pereira, to the [facility] RADIATION THERAPY [facility]. Please see a copy of my visit note below.    Radiation Oncology Progress Note    HPI:Ms. 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  Final pathology demonstrated squamous cell carcinoma moderately differentiated, 4.1 cm in size originating from  floor mouth/ anterior tongue,  depth of invasion 29 mm, no LVSI, positive PNI, positive margin (left anterior lateral).  11 of 98 lymph nodes positive (4 cm largest deposit, positive extracapsular disease present), pathologic T4N3b.   She underwent adjuvant chemoradiation therapy, completed on 12/5/2023.  The patient had early recurrence of disease including locally as well as distantly (spine, lung, soft tissue).  She underwent palliative radiation therapy to thoracic spine.     Radiation Treatment  10/19/2023 to 12/5/2023: Head and neck, 6600 cGy in 33 fractions  2/22/24 to 3/12/24: Thoracic spine, T2 - T4, 2500 cGy in 10 fractions     The patient returns for follow-up.    MRI of the thoracic spine on 4/10/2024 to my review demonstrated overall improvement in spinal canal stenosis in the treated region.  No evidence of cord compression was noted.    Restaging scans with CT scan of the neck and chest abdomen pelvis on 4/17/2024 demonstrated overall mixed response in the lung.  There was noted no significant change in the mass in the left mandibular angle/ramus as well as noted progressive ulceration to the right submental sublingual region, overall consistent with residual head neck cancer disease burden.    Clinically, the patient has noted improvement in her thoracic spine pain.  She is tapered off steroid.  Using oxycodone as  needed in the daytime and at night for pain. Pain from her head and neck cancer burden is overall managed.     She has started systemic treatment under the care of medical oncology team.  Tolerating treatment thus far.  Nutrition continues to be through the tube.  Denies any focal neurologic change.  Continues to have exposed hardware and open wound.  Has seen wound care team with limited options. ENT team has discussed the past potential surgery but deferred in the past due to extent of surgery that will likely be required.      Plan:  Continue palliative systemic therapy per medical oncology team.    Patient has seen wound care team regarding wound management for exposed hardware.  I would recommend follow-up with ENT team.  I re-discussed with patient possibility of reexploring surgical options given exposed hardware/wound continues to worsen.  Return to clinic in 3-4 weeks.    Hernán Egan M.D.  Department of Radiation Oncology  [facility]

## 2024-05-14 NOTE — PROGRESS NOTES
Infusion Nursing Note:  Adrianna Pereira presents today for Keytruda.    Patient seen by provider today: Yes: Marina Cronin   present during visit today: Not Applicable.    Note: N/A.    Intravenous Access:  Implanted Port.    Treatment Conditions:  Lab Results   Component Value Date     05/14/2024    POTASSIUM 4.7 05/14/2024    MAG 1.9 12/15/2023    CR 0.62 05/14/2024    RACHAEL 8.9 05/14/2024    BILITOTAL <0.2 05/14/2024    ALBUMIN 3.3 (L) 05/14/2024    ALT 9 05/14/2024    AST 15 05/14/2024       Results reviewed, labs MET treatment parameters, ok to proceed with treatment.    Post Infusion Assessment:  Patient tolerated infusion without incident.  Blood return noted pre and post infusion.  Site patent and intact, free from redness, edema or discomfort.  No evidence of extravasations.  Access discontinued per protocol.     Discharge Plan:   Patient discharged in stable condition accompanied by: self.  Departure Mode: Ambulatory.    Reynaldo Ibrahim RN

## 2024-05-14 NOTE — PROGRESS NOTES
Radiation Oncology Progress Note    HPI:Ms. 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  Final pathology demonstrated squamous cell carcinoma moderately differentiated, 4.1 cm in size originating from  floor mouth/ anterior tongue,  depth of invasion 29 mm, no LVSI, positive PNI, positive margin (left anterior lateral).  11 of 98 lymph nodes positive (4 cm largest deposit, positive extracapsular disease present), pathologic T4N3b.   She underwent adjuvant chemoradiation therapy, completed on 12/5/2023.  The patient had early recurrence of disease including locally as well as distantly (spine, lung, soft tissue).  She underwent palliative radiation therapy to thoracic spine.     Radiation Treatment  10/19/2023 to 12/5/2023: Head and neck, 6600 cGy in 33 fractions  2/22/24 to 3/12/24: Thoracic spine, T2 - T4, 2500 cGy in 10 fractions     The patient returns for follow-up.    MRI of the thoracic spine on 4/10/2024 to my review demonstrated overall improvement in spinal canal stenosis in the treated region.  No evidence of cord compression was noted.    Restaging scans with CT scan of the neck and chest abdomen pelvis on 4/17/2024 demonstrated overall mixed response in the lung.  There was noted no significant change in the mass in the left mandibular angle/ramus as well as noted progressive ulceration to the right submental sublingual region, overall consistent with residual head neck cancer disease burden.    Clinically, the patient has noted improvement in her thoracic spine pain.  She is tapered off steroid.  Using oxycodone as needed in the daytime and at night for pain. Pain from her head and neck cancer burden is overall managed.     She has started systemic treatment under the care of medical oncology team.  Tolerating treatment thus far.  Nutrition continues to  be through the tube.  Denies any focal neurologic change.  Continues to have exposed hardware and open wound.  Has seen wound care team with limited options. ENT team has discussed the past potential surgery but deferred in the past due to extent of surgery that will likely be required.      Plan:  Continue palliative systemic therapy per medical oncology team.    Patient has seen wound care team regarding wound management for exposed hardware.  I would recommend follow-up with ENT team.  I re-discussed with patient possibility of reexploring surgical options given exposed hardware/wound continues to worsen.  Return to clinic in 3-4 weeks.    Hernán Egan M.D.  Department of Radiation Oncology  [facility]

## 2024-05-14 NOTE — LETTER
5/14/2024         RE: Adrianna Pereira  [address] [address] [place] [id number]        Dear Colleague,    Thank you for referring your patient, Adrianna Pereira, to the [facility] [place]. Please see a copy of my visit note below.    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 Unfortunately, within just a few months of completing, was found to have metastatic recurrence.     Has now completed 4 cycles of pembrolizumab. Tolerating well.   CT after cycle 3 showed overall stable disease with slight increase in a few lung mets. Since generally stable and the change in lung mets could be pseudo-progression early into immunotherapy, continued with pembrolizumab.     Labwork: CMP WNL. TSH WNL.    Ongoing anemia since completing chemoRT. Hgb stable 9.6 late April.    Plan:  -Continue with C5 pembrolizumab.   -Return every 3 weeks ahead of each cycle.   -Repeat CT in late June, following C6 and visit with Dr. Friedell at that time. If having further progression, would likely deem unresponsive at that time. I'm not sure if she is a candidate for additional chemo given her PS, complications, etc, but will reassess those options when clear progression noted.  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  -Monitor CBC every 2-3 mths    Open oral/jaw wound  Plate exposure over lower  lip and open wound secondary to residual/recurrent tumor at prior ENT resection site. Draining white-yellow drainage, which seems to be lessening. No fevers. Reconstruction would require additional free flap, so not being pursued at this time after discussion with ENT surgeon Dr. Thorne. I'm not sure if additional palliative RT is feasible/would be deemed helpful, but will see Dr. Egan in a few weeks so defer to him.    Met with Wound Care MD.   Supportive management interventions to help with drainage, odor and decreasing bacterial growth were provided.     Plan:  -Pt to follow Wound Clinic recs for mgmt and return PRN.  -High risk for infection, monitor  -Follow-up with Dr. Thorne in ENT if desiring further exploration of more definitive surgical options, but in context of metastatic cancer he previously felt risk> benefit unless she had some longer term stabiltiy  -Not sure there is a role for any further palliative RT to this location, risks may be >benefit. She can discuss with Dr. Egan.    Cancer pain  Completed palliative RT to T spine a few months ago. Back pain is resolved. No longer requiring pain meds for this.     Having progressive mouth/lower lip pain related to progressive reconstruction failure/open wound. Stopped NSAID use due to elevated creatinine. Managing well with Tylenol 500 mg 3/day and oxycodone 5-.7.5 mg twice/day.     Plan:  -Continue avoiding NSAIDs  -Continue Tylenol instead, can take up to 3000 mg/day. Continue oxycodone use through day as well  -Referral to Palliative Care if not well-controlled in future, pt wants to minimize number of appts for now.     Nutrition/hydration  Dependant on feeding tube. Weight stable for now. Staying hydrated via PEG flushes, but having some diarrhea.    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   On observation.    ______________________________________________________________________________    History of Present Illness/ Interval History    Ms. Adrianna Pereira is a 63 year old who completed resection of floor of mouth cancer, followed by adjuvant chemoRT 12/2023. Unfortunately, shortly after this, she was found to have local and metastatic recurrence involving mandible, lung, bone (T3-T4) and soft tissue that is PDL1 70%.    She started palliative treatments with pembrolizumab in February.  Tolerating pembrolizumab generally well. No diarrhea. No dyspnea.    Her back pain resolved after palliative RT to T-spine.  No longer taking pain med for this.     Mouth pain persists but may be improving some. Stopped ibuprofen, managing with Tylenol and oxycodone 5-7.5 mg twice daily.  Pain has been progressive due to plate exposure her lip and open wound at prior ENT resection site below lower lip. Draining white-yellow drainage, amount of drainage is decreasing. No fevers. Assessed by ENT surgeon and reconstruction would require additional free flap, so not being pursued at this time. Saw Wound Clinic for mgmt recs    Oral communication is an ongoing issue.      Dependent on feeding tube for all nutrition/hydration and meds. Some weight loss. Feels well-hydrated.    ECOG Performance    1    Oncology History/Treatment  Diagnosis/Stage:   7/2023: [id number] Floor of mouth squamous cell cancer  -hx alcoholism (stopped all use) and smoking (quit 8/2023)  -hx premalignant oral cavity lesions s/p CO2 laser ablation with ENT  -presented with lower lip/chin swelling  -7/3/2023 left mandibular alveolus biopsy: invasive keratinizing moderately differential squamous cell carcinoma.   -PET: 4.4 x 3.7 x 3.2 cm anterior oral cavity mass invading into the mandible with a separate gluco-avid nodule on the right mandibular buccal mucosa and bilateral level 1B, level 2 and L3 metastatic lymphadenopathy without evidence of metastatic disease.    -8/17/2023 surgical path: pT4a, N3b tumor with positive left anterior lateral soft tissue margin, 11/98+ lymph nodes including STEFFEN (largest 4 cm) and several bilateral lymph nodes consistent with involvement by low-grade B-cell neoplasm suggestive of CLL/SLL.     2/2024: Progression to stage IV mouth squamous cell cancer to bone and lung  -Within weeks of completing definitive treatment for her primary disease, noted severe/progressive back pain  -CT C/T spine: new T3 compression fracture with hypodensity in vertebral body extending into L posterior elements suggestive of met. T4 posterior vertebral body lesion also suspicious. New lung nodules also noted  -MRI T spine: T3 compression fracture with extraosseous tumor involving ventral epidural space with mod - severe central spinal canal stenosis at T3  -PET: recurrent avid foci L maikel-mandible, numerous bilateral pulmonary/pleural mets, met mediastinal/hilar adenopathy, T3, T4, L adductor musculature and R upper thigh skin thickening  -PDL1 TPS and CPS 70% from 8/2023 original tumor biopsy    Treatment:  Locally advanced disease  -8/17/2023: segmental mandibulectomy, floor of mouth resection, anterior glossectomy, left fibular free flap, skin grafting, and tracheostomy.   -Post-op course complicated by skin necrosis requiring OR debridment and resuturing of neck incision/intraoral flap    -10/20/2023 - 12/5/2023: completed adjuvant concurrent RT (6600 cGy/33) + weekly cisplatin (x 7 cycles)    Metastatic recurrence  -2/21/2024 - present: pembrolizumab.  -Zometa started x 1 dose for bone mets, later held for jaw osteonecrosis risk    -2/26 - 3/12/2024: palliative RT to thoracic spine, 2500 cGy/10. Dex taper      Physical Exam    GENERAL: Alert and oriented to time place and person. Seated comfortably. In no distress.  Dysarthria.   accompanied.  HEENT: Open wound below lower lip with exposed plate. Scant white/yellow purulent appearing odorous  drainage.  SHAE, EOMI. No erythema. No icterus.  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  No skin breakdown.  No evident adenopathy/masses.  HEART: RRR  CHEST: regular respiratory effort.  ABD: PEG in place. Non-distended  NEURO: No gross deficit noted.       Lab Results    Recent Results (from the past 168 hour(s))   Comprehensive metabolic panel   Result Value Ref Range    Sodium 138 135 - 145 mmol/L    Potassium 4.7 3.4 - 5.3 mmol/L    Carbon Dioxide (CO2) 23 22 - 29 mmol/L    Anion Gap 9 7 - 15 mmol/L    Urea Nitrogen 20.5 8.0 - 23.0 mg/dL    Creatinine 0.62 0.51 - 0.95 mg/dL    GFR Estimate >90 >60 mL/min/1.73m2    Calcium 8.9 8.8 - 10.2 mg/dL    Chloride 106 98 - 107 mmol/L    Glucose 96 70 - 99 mg/dL    Alkaline Phosphatase 112 40 - 150 U/L    AST 15 0 - 45 U/L    ALT 9 0 - 50 U/L    Protein Total 6.0 (L) 6.4 - 8.3 g/dL    Albumin 3.3 (L) 3.5 - 5.2 g/dL    Bilirubin Total <0.2 <=1.2 mg/dL   TSH with free T4 reflex   Result Value Ref Range    TSH 0.71 0.30 - 4.20 uIU/mL           Imaging    CT Chest/Abdomen/Pelvis w Contrast    Result Date: 4/17/2024  CT CHEST/ABDOMEN/PELVIS WITH CONTRAST 4/17/2024 12:40 PM CLINICAL HISTORY: Metastatic floor of mouth cancer to lung, bone; assess response to immunotherapy. Malignant neoplasm metastatic to bone (H). Malignant neoplasm metastatic to lung, unspecified laterality (H). 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 TECHNIQUE: CT scan of the chest, abdomen, and pelvis was performed following injection of IV contrast. Multiplanar reformats were obtained. Dose reduction techniques were used. CONTRAST: 75 mL Isovue 370 COMPARISON: 2/15/2024, 7/13/2023 FINDINGS: LUNGS AND PLEURA: Multiple irregular pulmonary nodules are seen bilaterally, some which have resolved while others are stable, mildly decreased in size, mildly increased in size, and/or new to comparison. The largest previously seen hypermetabolic pulmonary nodule in the right upper lobe  measuring 9 x 7 mm and in the left lower lobe measuring 16 x 13 mm on the most recent prior PET exam from 2/15/2024 are not seen on today's exam. Suspected pleuroparenchymal scarring is seen in the region of the left lower lobe hypermetabolic pulmonary nodule with mild retraction of the adjacent fissure (4-180). Overall, those nodules demonstrating the greatest area of FDG uptake on comparison have shown the greatest improvement. Other irregular pulmonary nodules are as follows: - New irregular subpleural nodularity in the lateral right upper lobe measuring 7 x 6 mm (4-89). - Mild decrease in size of irregular subpleural pulmonary nodule in the right lower lobe measuring 7 x 6 mm (4-146), previously 9 x 9 mm. - Mild interval decrease in size of irregular pulmonary nodule in the anterior-inferior left lower lobe measuring 12 x 7 mm (4-223), previously 20 x 19 mm. - Similar-appearing irregular pulmonary nodule in the posterior right lower lobe measuring 7 x 6 mm (4-182), previously 6 x 6 mm. -Similar to slight interval enlargement in a previously seen hypermetabolic pleural-based mass in the posterior inferior right lower lung measuring 13 x 8 mm (3-145), previously 12 x 7 mm. Additional irregular opacities in the posteromedial superior segment of the left lower lobe as well as the posteromedial left lower lobe base are noted, which could represent atelectasis, infiltrate, and/or malignancy. Upper lobe predominant mild centrilobular and paraseptal emphysema and interlobular septal thickening are noted. No pleural fluid. Large airways are patent. MEDIASTINUM/AXILLAE: A right chest wall Port-A-Cath is present with the tip in the SVC. Heart size is within normal limits. Thoracic aorta is normal in course and caliber. No significant pericardial fluid. Moderate to severe coronary artery atherosclerosis. No enlarged thoracic lymph nodes by CT size criteria. There are, however, several prominent bilateral axillary lymph  nodes measuring up to 8 mm in short axis, corresponding to previously seen hypermetabolic hilar lymph nodes, which are similar to decreased in size to comparison. There has been mild interval decrease in size of previously seen hypermetabolic soft tissue in the AP window, which is less defined on today's study (3-56). No new or enlarging thoracic lymph nodes. There is heterogeneity of the thyroid gland with multiple small low-density thyroid nodules suspected. If further evaluation is desired, recommend dedicated thyroid ultrasound. HEPATOBILIARY: A tiny subcentimeter low-density observation is seen in the liver near the gallbladder fossa, which is unchanged to comparison and likely represents a cyst or hemangioma. This can be monitored on future surveillance imaging. No signs of cholelithiasis or acute cholecystitis. No significant bile duct dilation. PANCREAS: Normal. SPLEEN: Normal. ADRENAL GLANDS: Normal. KIDNEYS/BLADDER: There is similar-appearing thickening of the adrenal glands, left greater than right, without discrete nodule or mass, which may represent hypertrophy and/or adenomatous change. Metastatic disease is thought less likely given lack of increased FDG uptake on prior PET imaging. Recommend attention on surveillance imaging. BOWEL: A percutaneous gastrostomy tube is present with the bulb in the region of the gastric pylorus. No signs of bowel obstruction or inflammation. PELVIC ORGANS: Normal. ADDITIONAL FINDINGS: Severe atherosclerosis of the abdominal aorta and iliac arteries. No aneurysmal dilation. MUSCULOSKELETAL: There is high-grade compression fracture deformity involving the T3 vertebral body, corresponding to the previously described pathologic fracture. Previously described extraosseous soft tissue extension associated with this lesion is not as well seen today and appears mildly improved. No acute aggressive-appearing lytic or blastic osseous lesions. Multilevel hypertrophic degenerative  changes of the spine. Generalized osteopenia. Scoliotic curvature of the spine. Previously described hypermetabolic lesion involving the left adductor muscle in the proximal thigh is not seen, possibly below the field of view.     IMPRESSION: 1.  Findings suggestive of mixed response to therapy. 2.  Numerous irregular pulmonary nodules, several of which have resolved while others are stable, mildly decreased in size, mildly increased in size, and/or new to comparison from 2/15/2024. 3.  Similar-appearing small pleural-based metastatic lesion involving the posterior inferior right lower lung. 4.  Mild interval improvement in previously seen mediastinal and bilateral hilar lymphadenopathy. No new or enlarging thoracic lymph nodes. 5.  High-grade compression fracture deformity of T3, corresponding to the previously described pathologic fracture with apparent mild improvement in previously seen extraosseous soft tissue extension. No new or progressive osseous disease. 6.  Nonacute findings as above. CARLOS POTTER MD   SYSTEM ID:  [id number]    CT Soft Tissue Neck w Contrast    Result Date: 4/17/2024  CT SCAN OF THE NECK WITH CONTRAST  4/17/2024 12:41 PM HISTORY: Metastatic floor of mouth cancer to lung, bone; assess response to immunotherapy. Malignant neoplasm metastatic to bone (H). Malignant neoplasm metastatic to lung, unspecified laterality (H). 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 TECHNIQUE: Axial CT images of the neck following administration of intravenous contrast with reformations. Radiation dose for this scan was reduced using automated exposure control, adjustment of the mA and/or kV according to patient size, or iterative reconstruction technique. 45 mL Isovue 370 IV. COMPARISON: CT/PET/CT of the neck 2/15/2024. MRI thoracic spine 4/10/2024. FINDINGS: Again seen is an ill-defined area of osteolytic change involving the region of the left mandibular angle/ramus. This area demonstrated prominent FDG  avidity on the previous PET/CT exam, concerning for malignancy/metastatic deposit. This does not appear significantly changed compared to the most recent PET/CT exam, accounting for differences in technique. Changes of mandibulectomy with resection of the mandibular symphysis, parasymphyseal regions, and partial resection of the mandibular bodies bilaterally, as before, with reconstruction. The reconstruction bone flap appears similar to the prior examination. Anterior plate and screw fixation hardware along the mandible bilaterally, as before. No definite findings of hardware failure. There appears to be probably some fusion of the native left mandible with the left mandibular flap. On the right, there is persistent linear lucency between the reconstruction bone flap and the native right mandible. This appears unchanged. In the interim, new prominent ulceration along the right posterior inferior margin of the right parasymphyseal mandibular graft (series 4 image 52, series 5 image 40), extending through the skin and superficial soft tissues through the expected location of the floor of mouth to the level of the inferior oral tongue. Curvilinear soft tissue density noted marginating the area of this ulcerated cavity, nonspecific. Correlation with direct inspection is recommended. Tissue sampling can be considered as warranted. The area of deepest ulceration in this area measures up to approximately 11 mm craniocaudal. There is prominent fatty atrophy of the tongue musculature and floor of mouth musculature. Fat graft along the inferior/superficial aspect of the floor of mouth otherwise appears similar to the prior examination. Mild amorphus soft tissue attenuation/submucosal edema along the base of the tongue, submucosal oropharynx/parapharyngeal regions and involving the supraglottic larynx, likely due to posttreatment changes. Mild focal narrowing of the subglottic larynx/cervical trachea at the level of the  thyroid gland, unchanged. No definite pathologic cervical lymphadenopathy by size or morphology criteria. Scattered amorphous fat stranding along the fascial planes of the upper neck and in the subcutaneous tissues, presumably due to posttreatment changes, as before. Marked atrophy or absence of the bilateral submandibular glands, as before. Volume loss of the bilateral parotid glands, as before. Subcentimeter hypodense nodules in the thyroid gland without aggressive features, not necessarily requiring follow-up. Amorphous soft tissue density in the bilateral carotid spaces, likely due to posttreatment change. Mild to moderate versus moderate focal stenosis at the origins of the bilateral internal carotid arteries due to atherosclerotic disease. Otherwise, the bilateral cervical carotid and vertebral arteries appear to be grossly patent. The bilateral internal jugular veins are patent. A right-sided Port-A-Cath is in place extending through the lower aspect of the right internal jugular vein into the superior vena cava, incompletely evaluated. Mild paraseptal emphysematous change/fibrosis in the right lung apex. Unchanged severe presumed pathologic compression fracture of the T3 vertebral body with retropulsion of the posterior aspect of the vertebral body into the ventral spinal canal. Unchanged presumed pathologic T4 compression deformity with mild to moderate vertebral height loss. Exaggerated segmental kyphosis centered at the T3 level. Multilevel degenerative changes are noted in the cervical spine.     IMPRESSION: 1. No significant interval change in the ill-defined osteolytic process centered in the native left mandibular angle/ramus which demonstrated hypermetabolism on the previous PET/CT, concerning for malignancy. 2. Progressive nonspecific ulceration extending from the skin surface through the right submental/sublingual region to the level of the floor of mouth/inferior oral tongue region, positioned  "posterior to the right mandibular bone graft in this patient who is status post partial mandibulectomy with reconstruction. Please correlate clinically with direct inspection; tissue sampling can be considered, as clinically warranted. 3. No definite pathologic cervical lymphadenopathy. 4. Unchanged presumed pathologic severe T3 compression/burst fracture and mild to moderate T4 compression fracture. 5. Other posttreatment and chronic findings, as described. Please see the body of the report for additional details. CHRISTIAN KRAUSE MD   SYSTEM ID:  [id number]     Billing  Total time 30 minutes, to include face to face visit, review of EMR, ordering, documentation and coordination of care on date of service.    complexity modifier for longitudinal care.         Signed by: Marina Cronin NP      Oncology Rooming Note    May 14, 2024 11:38 AM   Adrianna Pereira is a 63 year old female who presents for:    Chief Complaint   Patient presents with     Oncology Clinic Visit     Malignant neoplasm metastatic to bone - labs, provider, infusion     Initial Vitals: BP 95/61 (BP Location: Left arm, Patient Position: Sitting, Cuff Size: Adult Small)   Pulse 89   Temp 97.1  F (36.2  C) (Tympanic)   Ht 1.6 m (5' 3\")   Wt 38.7 kg (85 lb 4.8 oz)   SpO2 99%   BMI 15.11 kg/m   Estimated body mass index is 15.11 kg/m  as calculated from the following:    Height as of this encounter: 1.6 m (5' 3\").    Weight as of this encounter: 38.7 kg (85 lb 4.8 oz). Body surface area is 1.31 meters squared.  No Pain (0) Comment: Data Unavailable   No LMP recorded. Patient is postmenopausal.  Allergies reviewed: Yes  Medications reviewed: Yes    Medications: Medication refills not needed today.  Pharmacy name entered into EPIC:    [id number] - [place], [place] - [address] [place] [address] PHARMACY - [place], [place] - [address]  [facility] - [place], [place] - [address] [facility] [place] - " [place], [place] - [address] [place] [address]     Frailty Screening:   Is the patient here for a new oncology consult visit in cancer care? 2. No      Clinical concerns: None today.       Leana Lloyd MA                Again, thank you for allowing me to participate in the care of your patient.        Sincerely,        Marina Cronin NP

## 2024-05-14 NOTE — PROGRESS NOTES
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 Unfortunately, within just a few months of completing, was found to have metastatic recurrence.     Has now completed 4 cycles of pembrolizumab. Tolerating well.   CT after cycle 3 showed overall stable disease with slight increase in a few lung mets. Since generally stable and the change in lung mets could be pseudo-progression early into immunotherapy, continued with pembrolizumab.     Labwork: CMP WNL. TSH WNL.    Ongoing anemia since completing chemoRT. Hgb stable 9.6 late April.    Plan:  -Continue with C5 pembrolizumab.   -Return every 3 weeks ahead of each cycle.   -Repeat CT in late June, following C6 and visit with Dr. Friedell at that time. If having further progression, would likely deem unresponsive at that time. I'm not sure if she is a candidate for additional chemo given her PS, complications, etc, but will reassess those options when clear progression noted.  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  -Monitor CBC every 2-3 mths    Open oral/jaw wound  Plate exposure over lower lip and open wound secondary to residual/recurrent tumor at prior ENT resection site. Draining white-yellow drainage, which seems to be lessening. No fevers. Reconstruction would require additional free flap, so not being pursued at this time  after discussion with ENT surgeon Dr. Thorne. I'm not sure if additional palliative RT is feasible/would be deemed helpful, but will see Dr. Egan in a few weeks so defer to him.    Met with Wound Care MD.   Supportive management interventions to help with drainage, odor and decreasing bacterial growth were provided.     Plan:  -Pt to follow Wound Clinic recs for mgmt and return PRN.  -High risk for infection, monitor  -Follow-up with Dr. Thorne in ENT if desiring further exploration of more definitive surgical options, but in context of metastatic cancer he previously felt risk> benefit unless she had some longer term stabiltiy  -Not sure there is a role for any further palliative RT to this location, risks may be >benefit. She can discuss with Dr. Egan.    Cancer pain  Completed palliative RT to T spine a few months ago. Back pain is resolved. No longer requiring pain meds for this.     Having progressive mouth/lower lip pain related to progressive reconstruction failure/open wound. Stopped NSAID use due to elevated creatinine. Managing well with Tylenol 500 mg 3/day and oxycodone 5-.7.5 mg twice/day.     Plan:  -Continue avoiding NSAIDs  -Continue Tylenol instead, can take up to 3000 mg/day. Continue oxycodone use through day as well  -Referral to Palliative Care if not well-controlled in future, pt wants to minimize number of appts for now.     Nutrition/hydration  Dependant on feeding tube. Weight stable for now. Staying hydrated via PEG flushes, but having some diarrhea.    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   On observation.   ______________________________________________________________________________    History of Present Illness/ Interval History    Ms. Adrianna Pereira is a 63 year old who completed resection of floor of mouth cancer, followed by adjuvant chemoRT 12/2023.  Unfortunately, shortly after this, she was found to have local and metastatic recurrence involving mandible, lung, bone (T3-T4) and soft tissue that is PDL1 70%.    She started palliative treatments with pembrolizumab in February.  Tolerating pembrolizumab generally well. No diarrhea. No dyspnea.    Her back pain resolved after palliative RT to T-spine.  No longer taking pain med for this.     Mouth pain persists but may be improving some. Stopped ibuprofen, managing with Tylenol and oxycodone 5-7.5 mg twice daily.  Pain has been progressive due to plate exposure her lip and open wound at prior ENT resection site below lower lip. Draining white-yellow drainage, amount of drainage is decreasing. No fevers. Assessed by ENT surgeon and reconstruction would require additional free flap, so not being pursued at this time. Saw Wound Clinic for mgmt recs    Oral communication is an ongoing issue.      Dependent on feeding tube for all nutrition/hydration and meds. Some weight loss. Feels well-hydrated.    ECOG Performance    1    Oncology History/Treatment  Diagnosis/Stage:   7/2023: [id number] Floor of mouth squamous cell cancer  -hx alcoholism (stopped all use) and smoking (quit 8/2023)  -hx premalignant oral cavity lesions s/p CO2 laser ablation with ENT  -presented with lower lip/chin swelling  -7/3/2023 left mandibular alveolus biopsy: invasive keratinizing moderately differential squamous cell carcinoma.   -PET: 4.4 x 3.7 x 3.2 cm anterior oral cavity mass invading into the mandible with a separate gluco-avid nodule on the right mandibular buccal mucosa and bilateral level 1B, level 2 and L3 metastatic lymphadenopathy without evidence of metastatic disease.   -8/17/2023 surgical path: pT4a, N3b tumor with positive left anterior lateral soft tissue margin, 11/98+ lymph nodes including STEFFEN (largest 4 cm) and several bilateral lymph nodes consistent with involvement by low-grade B-cell neoplasm suggestive of CLL/SLL.      2/2024: Progression to stage IV mouth squamous cell cancer to bone and lung  -Within weeks of completing definitive treatment for her primary disease, noted severe/progressive back pain  -CT C/T spine: new T3 compression fracture with hypodensity in vertebral body extending into L posterior elements suggestive of met. T4 posterior vertebral body lesion also suspicious. New lung nodules also noted  -MRI T spine: T3 compression fracture with extraosseous tumor involving ventral epidural space with mod - severe central spinal canal stenosis at T3  -PET: recurrent avid foci L maikel-mandible, numerous bilateral pulmonary/pleural mets, met mediastinal/hilar adenopathy, T3, T4, L adductor musculature and R upper thigh skin thickening  -PDL1 TPS and CPS 70% from 8/2023 original tumor biopsy    Treatment:  Locally advanced disease  -8/17/2023: segmental mandibulectomy, floor of mouth resection, anterior glossectomy, left fibular free flap, skin grafting, and tracheostomy.   -Post-op course complicated by skin necrosis requiring OR debridment and resuturing of neck incision/intraoral flap    -10/20/2023 - 12/5/2023: completed adjuvant concurrent RT (6600 cGy/33) + weekly cisplatin (x 7 cycles)    Metastatic recurrence  -2/21/2024 - present: pembrolizumab.  -Zometa started x 1 dose for bone mets, later held for jaw osteonecrosis risk    -2/26 - 3/12/2024: palliative RT to thoracic spine, 2500 cGy/10. Dex taper      Physical Exam    GENERAL: Alert and oriented to time place and person. Seated comfortably. In no distress.  Dysarthria.   accompanied.  HEENT: Open wound below lower lip with exposed plate. Scant white/yellow purulent appearing odorous drainage.  SHAE, EOMI. No erythema. No icterus.  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  No skin breakdown.  No evident adenopathy/masses.  HEART: RRR  CHEST: regular respiratory effort.  ABD: PEG in place. Non-distended  NEURO: No gross deficit  noted.       Lab Results    Recent Results (from the past 168 hour(s))   Comprehensive metabolic panel   Result Value Ref Range    Sodium 138 135 - 145 mmol/L    Potassium 4.7 3.4 - 5.3 mmol/L    Carbon Dioxide (CO2) 23 22 - 29 mmol/L    Anion Gap 9 7 - 15 mmol/L    Urea Nitrogen 20.5 8.0 - 23.0 mg/dL    Creatinine 0.62 0.51 - 0.95 mg/dL    GFR Estimate >90 >60 mL/min/1.73m2    Calcium 8.9 8.8 - 10.2 mg/dL    Chloride 106 98 - 107 mmol/L    Glucose 96 70 - 99 mg/dL    Alkaline Phosphatase 112 40 - 150 U/L    AST 15 0 - 45 U/L    ALT 9 0 - 50 U/L    Protein Total 6.0 (L) 6.4 - 8.3 g/dL    Albumin 3.3 (L) 3.5 - 5.2 g/dL    Bilirubin Total <0.2 <=1.2 mg/dL   TSH with free T4 reflex   Result Value Ref Range    TSH 0.71 0.30 - 4.20 uIU/mL           Imaging    CT Chest/Abdomen/Pelvis w Contrast    Result Date: 4/17/2024  CT CHEST/ABDOMEN/PELVIS WITH CONTRAST 4/17/2024 12:40 PM CLINICAL HISTORY: Metastatic floor of mouth cancer to lung, bone; assess response to immunotherapy. Malignant neoplasm metastatic to bone (H). Malignant neoplasm metastatic to lung, unspecified laterality (H). 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 TECHNIQUE: CT scan of the chest, abdomen, and pelvis was performed following injection of IV contrast. Multiplanar reformats were obtained. Dose reduction techniques were used. CONTRAST: 75 mL Isovue 370 COMPARISON: 2/15/2024, 7/13/2023 FINDINGS: LUNGS AND PLEURA: Multiple irregular pulmonary nodules are seen bilaterally, some which have resolved while others are stable, mildly decreased in size, mildly increased in size, and/or new to comparison. The largest previously seen hypermetabolic pulmonary nodule in the right upper lobe measuring 9 x 7 mm and in the left lower lobe measuring 16 x 13 mm on the most recent prior PET exam from 2/15/2024 are not seen on today's exam. Suspected pleuroparenchymal scarring is seen in the region of the left lower lobe hypermetabolic pulmonary nodule with mild  retraction of the adjacent fissure (4-180). Overall, those nodules demonstrating the greatest area of FDG uptake on comparison have shown the greatest improvement. Other irregular pulmonary nodules are as follows: - New irregular subpleural nodularity in the lateral right upper lobe measuring 7 x 6 mm (4-89). - Mild decrease in size of irregular subpleural pulmonary nodule in the right lower lobe measuring 7 x 6 mm (4-146), previously 9 x 9 mm. - Mild interval decrease in size of irregular pulmonary nodule in the anterior-inferior left lower lobe measuring 12 x 7 mm (4-223), previously 20 x 19 mm. - Similar-appearing irregular pulmonary nodule in the posterior right lower lobe measuring 7 x 6 mm (4-182), previously 6 x 6 mm. -Similar to slight interval enlargement in a previously seen hypermetabolic pleural-based mass in the posterior inferior right lower lung measuring 13 x 8 mm (3-145), previously 12 x 7 mm. Additional irregular opacities in the posteromedial superior segment of the left lower lobe as well as the posteromedial left lower lobe base are noted, which could represent atelectasis, infiltrate, and/or malignancy. Upper lobe predominant mild centrilobular and paraseptal emphysema and interlobular septal thickening are noted. No pleural fluid. Large airways are patent. MEDIASTINUM/AXILLAE: A right chest wall Port-A-Cath is present with the tip in the SVC. Heart size is within normal limits. Thoracic aorta is normal in course and caliber. No significant pericardial fluid. Moderate to severe coronary artery atherosclerosis. No enlarged thoracic lymph nodes by CT size criteria. There are, however, several prominent bilateral axillary lymph nodes measuring up to 8 mm in short axis, corresponding to previously seen hypermetabolic hilar lymph nodes, which are similar to decreased in size to comparison. There has been mild interval decrease in size of previously seen hypermetabolic soft tissue in the AP window,  which is less defined on today's study (3-56). No new or enlarging thoracic lymph nodes. There is heterogeneity of the thyroid gland with multiple small low-density thyroid nodules suspected. If further evaluation is desired, recommend dedicated thyroid ultrasound. HEPATOBILIARY: A tiny subcentimeter low-density observation is seen in the liver near the gallbladder fossa, which is unchanged to comparison and likely represents a cyst or hemangioma. This can be monitored on future surveillance imaging. No signs of cholelithiasis or acute cholecystitis. No significant bile duct dilation. PANCREAS: Normal. SPLEEN: Normal. ADRENAL GLANDS: Normal. KIDNEYS/BLADDER: There is similar-appearing thickening of the adrenal glands, left greater than right, without discrete nodule or mass, which may represent hypertrophy and/or adenomatous change. Metastatic disease is thought less likely given lack of increased FDG uptake on prior PET imaging. Recommend attention on surveillance imaging. BOWEL: A percutaneous gastrostomy tube is present with the bulb in the region of the gastric pylorus. No signs of bowel obstruction or inflammation. PELVIC ORGANS: Normal. ADDITIONAL FINDINGS: Severe atherosclerosis of the abdominal aorta and iliac arteries. No aneurysmal dilation. MUSCULOSKELETAL: There is high-grade compression fracture deformity involving the T3 vertebral body, corresponding to the previously described pathologic fracture. Previously described extraosseous soft tissue extension associated with this lesion is not as well seen today and appears mildly improved. No acute aggressive-appearing lytic or blastic osseous lesions. Multilevel hypertrophic degenerative changes of the spine. Generalized osteopenia. Scoliotic curvature of the spine. Previously described hypermetabolic lesion involving the left adductor muscle in the proximal thigh is not seen, possibly below the field of view.     IMPRESSION: 1.  Findings suggestive of  mixed response to therapy. 2.  Numerous irregular pulmonary nodules, several of which have resolved while others are stable, mildly decreased in size, mildly increased in size, and/or new to comparison from 2/15/2024. 3.  Similar-appearing small pleural-based metastatic lesion involving the posterior inferior right lower lung. 4.  Mild interval improvement in previously seen mediastinal and bilateral hilar lymphadenopathy. No new or enlarging thoracic lymph nodes. 5.  High-grade compression fracture deformity of T3, corresponding to the previously described pathologic fracture with apparent mild improvement in previously seen extraosseous soft tissue extension. No new or progressive osseous disease. 6.  Nonacute findings as above. CARLOS POTTER MD   SYSTEM ID:  [id number]    CT Soft Tissue Neck w Contrast    Result Date: 4/17/2024  CT SCAN OF THE NECK WITH CONTRAST  4/17/2024 12:41 PM HISTORY: Metastatic floor of mouth cancer to lung, bone; assess response to immunotherapy. Malignant neoplasm metastatic to bone (H). Malignant neoplasm metastatic to lung, unspecified laterality (H). 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 TECHNIQUE: Axial CT images of the neck following administration of intravenous contrast with reformations. Radiation dose for this scan was reduced using automated exposure control, adjustment of the mA and/or kV according to patient size, or iterative reconstruction technique. 45 mL Isovue 370 IV. COMPARISON: CT/PET/CT of the neck 2/15/2024. MRI thoracic spine 4/10/2024. FINDINGS: Again seen is an ill-defined area of osteolytic change involving the region of the left mandibular angle/ramus. This area demonstrated prominent FDG avidity on the previous PET/CT exam, concerning for malignancy/metastatic deposit. This does not appear significantly changed compared to the most recent PET/CT exam, accounting for differences in technique. Changes of mandibulectomy with resection of the mandibular  symphysis, parasymphyseal regions, and partial resection of the mandibular bodies bilaterally, as before, with reconstruction. The reconstruction bone flap appears similar to the prior examination. Anterior plate and screw fixation hardware along the mandible bilaterally, as before. No definite findings of hardware failure. There appears to be probably some fusion of the native left mandible with the left mandibular flap. On the right, there is persistent linear lucency between the reconstruction bone flap and the native right mandible. This appears unchanged. In the interim, new prominent ulceration along the right posterior inferior margin of the right parasymphyseal mandibular graft (series 4 image 52, series 5 image 40), extending through the skin and superficial soft tissues through the expected location of the floor of mouth to the level of the inferior oral tongue. Curvilinear soft tissue density noted marginating the area of this ulcerated cavity, nonspecific. Correlation with direct inspection is recommended. Tissue sampling can be considered as warranted. The area of deepest ulceration in this area measures up to approximately 11 mm craniocaudal. There is prominent fatty atrophy of the tongue musculature and floor of mouth musculature. Fat graft along the inferior/superficial aspect of the floor of mouth otherwise appears similar to the prior examination. Mild amorphus soft tissue attenuation/submucosal edema along the base of the tongue, submucosal oropharynx/parapharyngeal regions and involving the supraglottic larynx, likely due to posttreatment changes. Mild focal narrowing of the subglottic larynx/cervical trachea at the level of the thyroid gland, unchanged. No definite pathologic cervical lymphadenopathy by size or morphology criteria. Scattered amorphous fat stranding along the fascial planes of the upper neck and in the subcutaneous tissues, presumably due to posttreatment changes, as before.  Marked atrophy or absence of the bilateral submandibular glands, as before. Volume loss of the bilateral parotid glands, as before. Subcentimeter hypodense nodules in the thyroid gland without aggressive features, not necessarily requiring follow-up. Amorphous soft tissue density in the bilateral carotid spaces, likely due to posttreatment change. Mild to moderate versus moderate focal stenosis at the origins of the bilateral internal carotid arteries due to atherosclerotic disease. Otherwise, the bilateral cervical carotid and vertebral arteries appear to be grossly patent. The bilateral internal jugular veins are patent. A right-sided Port-A-Cath is in place extending through the lower aspect of the right internal jugular vein into the superior vena cava, incompletely evaluated. Mild paraseptal emphysematous change/fibrosis in the right lung apex. Unchanged severe presumed pathologic compression fracture of the T3 vertebral body with retropulsion of the posterior aspect of the vertebral body into the ventral spinal canal. Unchanged presumed pathologic T4 compression deformity with mild to moderate vertebral height loss. Exaggerated segmental kyphosis centered at the T3 level. Multilevel degenerative changes are noted in the cervical spine.     IMPRESSION: 1. No significant interval change in the ill-defined osteolytic process centered in the native left mandibular angle/ramus which demonstrated hypermetabolism on the previous PET/CT, concerning for malignancy. 2. Progressive nonspecific ulceration extending from the skin surface through the right submental/sublingual region to the level of the floor of mouth/inferior oral tongue region, positioned posterior to the right mandibular bone graft in this patient who is status post partial mandibulectomy with reconstruction. Please correlate clinically with direct inspection; tissue sampling can be considered, as clinically warranted. 3. No definite pathologic cervical  lymphadenopathy. 4. Unchanged presumed pathologic severe T3 compression/burst fracture and mild to moderate T4 compression fracture. 5. Other posttreatment and chronic findings, as described. Please see the body of the report for additional details. CHRISTIAN KRAUSE MD   SYSTEM ID:  [id number]     Billing  Total time 30 minutes, to include face to face visit, review of EMR, ordering, documentation and coordination of care on date of service.    complexity modifier for longitudinal care.         Signed by: Marina Cronin NP

## 2024-05-14 NOTE — NURSING NOTE
"Oncology Rooming Note    May 14, 2024 9:22 AM   Adrianna Pereira is a 63 year old female who presents for:    No chief complaint on file.    Initial Vitals: BP (!) 85/58 (BP Location: Left arm, Patient Position: Sitting, Cuff Size: Adult Regular)   Pulse 101   Resp 18   Wt 38.7 kg (85 lb 6.4 oz)   SpO2 98%   BMI 14.89 kg/m   Estimated body mass index is 14.89 kg/m  as calculated from the following:    Height as of 4/22/24: 1.613 m (5' 3.5\").    Weight as of this encounter: 38.7 kg (85 lb 6.4 oz). Body surface area is 1.32 meters squared.  Data Unavailable Comment: Data Unavailable   No LMP recorded. Patient is postmenopausal.  Allergies reviewed: Yes  Medications reviewed: Yes    Medications: Medication refills not needed today.  Pharmacy name entered into EPIC:    [id number] - [place], [place] - [address] [place] [address] PHARMACY - [place], [place] - [address] [place] [address] [facility] [place] [place] [facility] [address] [place] [address] PHARMACY [place], [place] - [address] [facility]     Frailty Screening:   Is the patient here for a new oncology consult visit in cancer care? 2. No      Clinical concerns: follow up today with Dr. Julisa Cao, LUCAS              "

## 2024-05-29 ENCOUNTER — MYC REFILL (OUTPATIENT)
Dept: ONCOLOGY | Facility: CLINIC | Age: 64
End: 2024-05-29
Payer: COMMERCIAL

## 2024-05-29 DIAGNOSIS — Z48.89 ENCOUNTER FOR POSTOPERATIVE CARE: ICD-10-CM

## 2024-05-30 RX ORDER — CHLORHEXIDINE GLUCONATE ORAL RINSE 1.2 MG/ML
15 SOLUTION DENTAL 3 TIMES DAILY
Qty: 473 ML | Refills: 1 | Status: SHIPPED | OUTPATIENT
Start: 2024-05-30

## 2024-06-05 ENCOUNTER — INFUSION THERAPY VISIT (OUTPATIENT)
Dept: INFUSION THERAPY | Facility: CLINIC | Age: 64
End: 2024-06-05
Attending: NURSE PRACTITIONER
Payer: COMMERCIAL

## 2024-06-05 ENCOUNTER — LAB (OUTPATIENT)
Dept: INFUSION THERAPY | Facility: CLINIC | Age: 64
End: 2024-06-05
Attending: INTERNAL MEDICINE
Payer: COMMERCIAL

## 2024-06-05 ENCOUNTER — OFFICE VISIT (OUTPATIENT)
Dept: RADIATION THERAPY | Facility: OUTPATIENT CENTER | Age: 64
End: 2024-06-05
Payer: COMMERCIAL

## 2024-06-05 ENCOUNTER — ONCOLOGY VISIT (OUTPATIENT)
Dept: ONCOLOGY | Facility: CLINIC | Age: 64
End: 2024-06-05
Attending: INTERNAL MEDICINE
Payer: COMMERCIAL

## 2024-06-05 VITALS
BODY MASS INDEX: 14.53 KG/M2 | HEIGHT: 63 IN | HEART RATE: 92 BPM | DIASTOLIC BLOOD PRESSURE: 61 MMHG | WEIGHT: 82 LBS | OXYGEN SATURATION: 97 % | SYSTOLIC BLOOD PRESSURE: 94 MMHG | TEMPERATURE: 97.6 F | RESPIRATION RATE: 12 BRPM

## 2024-06-05 VITALS — DIASTOLIC BLOOD PRESSURE: 71 MMHG | RESPIRATION RATE: 16 BRPM | SYSTOLIC BLOOD PRESSURE: 92 MMHG | HEART RATE: 74 BPM

## 2024-06-05 VITALS
SYSTOLIC BLOOD PRESSURE: 96 MMHG | WEIGHT: 83 LBS | DIASTOLIC BLOOD PRESSURE: 58 MMHG | HEART RATE: 90 BPM | BODY MASS INDEX: 14.7 KG/M2 | RESPIRATION RATE: 16 BRPM | OXYGEN SATURATION: 98 %

## 2024-06-05 DIAGNOSIS — C78.00 MALIGNANT NEOPLASM METASTATIC TO LUNG, UNSPECIFIED LATERALITY (H): ICD-10-CM

## 2024-06-05 DIAGNOSIS — G89.3 CANCER RELATED PAIN: ICD-10-CM

## 2024-06-05 DIAGNOSIS — C79.51 MALIGNANT NEOPLASM METASTATIC TO BONE (H): ICD-10-CM

## 2024-06-05 DIAGNOSIS — C06.9 SQUAMOUS CELL CARCINOMA OF ORAL CAVITY (H): ICD-10-CM

## 2024-06-05 DIAGNOSIS — C79.51 MALIGNANT NEOPLASM METASTATIC TO BONE (H): Primary | ICD-10-CM

## 2024-06-05 DIAGNOSIS — C78.00 MALIGNANT NEOPLASM METASTATIC TO LUNG, UNSPECIFIED LATERALITY (H): Primary | ICD-10-CM

## 2024-06-05 DIAGNOSIS — K12.2 ORAL INFECTION: Primary | ICD-10-CM

## 2024-06-05 LAB
ALBUMIN SERPL BCG-MCNC: 3 G/DL (ref 3.5–5.2)
ALP SERPL-CCNC: 112 U/L (ref 40–150)
ALT SERPL W P-5'-P-CCNC: 9 U/L (ref 0–50)
ANION GAP SERPL CALCULATED.3IONS-SCNC: 11 MMOL/L (ref 7–15)
AST SERPL W P-5'-P-CCNC: 15 U/L (ref 0–45)
BILIRUB SERPL-MCNC: <0.2 MG/DL
BUN SERPL-MCNC: 23 MG/DL (ref 8–23)
CALCIUM SERPL-MCNC: 8.5 MG/DL (ref 8.8–10.2)
CHLORIDE SERPL-SCNC: 105 MMOL/L (ref 98–107)
CREAT SERPL-MCNC: 0.73 MG/DL (ref 0.51–0.95)
DEPRECATED HCO3 PLAS-SCNC: 21 MMOL/L (ref 22–29)
EGFRCR SERPLBLD CKD-EPI 2021: >90 ML/MIN/1.73M2
GLUCOSE SERPL-MCNC: 66 MG/DL (ref 70–99)
POTASSIUM SERPL-SCNC: 4.6 MMOL/L (ref 3.4–5.3)
PROT SERPL-MCNC: 5.9 G/DL (ref 6.4–8.3)
SODIUM SERPL-SCNC: 137 MMOL/L (ref 135–145)
TSH SERPL DL<=0.005 MIU/L-ACNC: 1.14 UIU/ML (ref 0.3–4.2)

## 2024-06-05 PROCEDURE — 250N000011 HC RX IP 250 OP 636: Mod: JZ | Performed by: INTERNAL MEDICINE

## 2024-06-05 PROCEDURE — 99214 OFFICE O/P EST MOD 30 MIN: CPT | Performed by: INTERNAL MEDICINE

## 2024-06-05 PROCEDURE — 84443 ASSAY THYROID STIM HORMONE: CPT | Performed by: NURSE PRACTITIONER

## 2024-06-05 PROCEDURE — 258N000003 HC RX IP 258 OP 636: Performed by: INTERNAL MEDICINE

## 2024-06-05 PROCEDURE — 96413 CHEMO IV INFUSION 1 HR: CPT

## 2024-06-05 PROCEDURE — 36415 COLL VENOUS BLD VENIPUNCTURE: CPT | Performed by: NURSE PRACTITIONER

## 2024-06-05 PROCEDURE — G0463 HOSPITAL OUTPT CLINIC VISIT: HCPCS | Performed by: INTERNAL MEDICINE

## 2024-06-05 PROCEDURE — 80053 COMPREHEN METABOLIC PANEL: CPT | Performed by: NURSE PRACTITIONER

## 2024-06-05 RX ORDER — HEPARIN SODIUM,PORCINE 10 UNIT/ML
5-20 VIAL (ML) INTRAVENOUS DAILY PRN
Status: CANCELLED | OUTPATIENT
Start: 2024-06-05

## 2024-06-05 RX ORDER — METHYLPREDNISOLONE SODIUM SUCCINATE 125 MG/2ML
125 INJECTION, POWDER, LYOPHILIZED, FOR SOLUTION INTRAMUSCULAR; INTRAVENOUS
Status: CANCELLED
Start: 2024-06-05

## 2024-06-05 RX ORDER — HEPARIN SODIUM (PORCINE) LOCK FLUSH IV SOLN 100 UNIT/ML 100 UNIT/ML
5 SOLUTION INTRAVENOUS
Status: DISCONTINUED | OUTPATIENT
Start: 2024-06-05 | End: 2024-06-05 | Stop reason: HOSPADM

## 2024-06-05 RX ORDER — AMOXICILLIN AND CLAVULANATE POTASSIUM 400; 57 MG/5ML; MG/5ML
875 POWDER, FOR SUSPENSION ORAL 2 TIMES DAILY
Qty: 153.16 ML | Refills: 0 | Status: SHIPPED | OUTPATIENT
Start: 2024-06-05 | End: 2024-06-12

## 2024-06-05 RX ORDER — DIPHENHYDRAMINE HYDROCHLORIDE 50 MG/ML
50 INJECTION INTRAMUSCULAR; INTRAVENOUS
Status: CANCELLED
Start: 2024-06-05

## 2024-06-05 RX ORDER — ALBUTEROL SULFATE 0.83 MG/ML
2.5 SOLUTION RESPIRATORY (INHALATION)
Status: CANCELLED | OUTPATIENT
Start: 2024-06-05

## 2024-06-05 RX ORDER — EPINEPHRINE 1 MG/ML
0.3 INJECTION, SOLUTION, CONCENTRATE INTRAVENOUS EVERY 5 MIN PRN
Status: CANCELLED | OUTPATIENT
Start: 2024-06-05

## 2024-06-05 RX ORDER — ALBUTEROL SULFATE 90 UG/1
1-2 AEROSOL, METERED RESPIRATORY (INHALATION)
Status: CANCELLED
Start: 2024-06-05

## 2024-06-05 RX ORDER — OXYCODONE HCL 5 MG/5 ML
5 SOLUTION, ORAL ORAL EVERY 6 HOURS PRN
Qty: 300 ML | Refills: 0 | Status: SHIPPED | OUTPATIENT
Start: 2024-06-05 | End: 2024-06-14

## 2024-06-05 RX ORDER — HEPARIN SODIUM (PORCINE) LOCK FLUSH IV SOLN 100 UNIT/ML 100 UNIT/ML
5 SOLUTION INTRAVENOUS
Status: CANCELLED | OUTPATIENT
Start: 2024-06-05

## 2024-06-05 RX ORDER — LORAZEPAM 2 MG/ML
0.5 INJECTION INTRAMUSCULAR EVERY 4 HOURS PRN
Status: CANCELLED | OUTPATIENT
Start: 2024-06-05

## 2024-06-05 RX ORDER — HEPARIN SODIUM (PORCINE) LOCK FLUSH IV SOLN 100 UNIT/ML 100 UNIT/ML
SOLUTION INTRAVENOUS
Status: DISCONTINUED
Start: 2024-06-05 | End: 2024-06-05 | Stop reason: HOSPADM

## 2024-06-05 RX ORDER — MEPERIDINE HYDROCHLORIDE 25 MG/ML
25 INJECTION INTRAMUSCULAR; INTRAVENOUS; SUBCUTANEOUS EVERY 30 MIN PRN
Status: CANCELLED | OUTPATIENT
Start: 2024-06-05

## 2024-06-05 RX ADMIN — SODIUM CHLORIDE 200 MG: 9 INJECTION, SOLUTION INTRAVENOUS at 11:46

## 2024-06-05 RX ADMIN — Medication 5 ML: at 12:18

## 2024-06-05 RX ADMIN — SODIUM CHLORIDE 250 ML: 9 INJECTION, SOLUTION INTRAVENOUS at 11:24

## 2024-06-05 ASSESSMENT — PAIN SCALES - GENERAL: PAINLEVEL: MODERATE PAIN (4)

## 2024-06-05 NOTE — PROGRESS NOTES
Infusion Nursing Note:  Adrianna Pereira presents today for C6D1 Keytruda.    Patient seen by provider today: Yes: Dr. Friedell MD   present during visit today: Not Applicable.    Note:N/A      Intravenous Access:  Implanted Port.    Treatment Conditions:  Lab Results   Component Value Date     06/05/2024    POTASSIUM 4.6 06/05/2024    MAG 1.9 12/15/2023    CR 0.73 06/05/2024    RACHAEL 8.5 (L) 06/05/2024    BILITOTAL <0.2 06/05/2024    ALBUMIN 3.0 (L) 06/05/2024    ALT 9 06/05/2024    AST 15 06/05/2024       Results reviewed, labs MET treatment parameters, ok to proceed with treatment.      Post Infusion Assessment:  Patient tolerated infusion without incident.  Blood return noted pre and post infusion.  Site patent and intact, free from redness, edema or discomfort.  No evidence of extravasations.  Access discontinued per protocol.       Discharge Plan:   Discharge instructions reviewed with: Patient.  Patient and/or family verbalized understanding of discharge instructions and all questions answered.  Patient discharged in stable condition accompanied by: .  Departure Mode: Ambulatory.      Dianna Osuna RN

## 2024-06-05 NOTE — PROGRESS NOTES
"Oncology Rooming Note    June 5, 2024 10:33 AM   Adrianna Pereira is a 63 year old female who presents for:    Chief Complaint   Patient presents with    Oncology Clinic Visit     Malignant neoplasm metastatic to lung, unspecified laterality - Labs provider and infusion     Initial Vitals: BP 94/61 (BP Location: Left arm, Patient Position: Sitting, Cuff Size: Adult Small)   Pulse 92   Temp 97.6  F (36.4  C) (Tympanic)   Resp 12   Ht 1.6 m (5' 3\")   Wt 37.2 kg (82 lb)   SpO2 97%   BMI 14.53 kg/m   Estimated body mass index is 14.53 kg/m  as calculated from the following:    Height as of this encounter: 1.6 m (5' 3\").    Weight as of this encounter: 37.2 kg (82 lb). Body surface area is 1.29 meters squared.  Moderate Pain (4) Comment: Data Unavailable   No LMP recorded. Patient is postmenopausal.  Allergies reviewed: Yes  Medications reviewed: Yes    Medications: MEDICATION REFILLS NEEDED TODAY. Provider was notified.  Pharmacy name entered into EPIC:    [id number] - [place], [place] - [address] [place] [address] PHARMACY - [place], [place] - [address] [facility] [place], [place] [address] [facility] [place], [place] -  [facility]     Frailty Screening:   Is the patient here for a new oncology consult visit in cancer care? 2. No      Clinical concerns:  None      Tanya Philippe CMA            "

## 2024-06-05 NOTE — PROGRESS NOTES
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 Friedell, M.D.      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 Unfortunately, within just a few months of completing, was found to have metastatic recurrence.     Radiation Treatment  10/19/2023 to 12/5/2023: Head and neck, 6600 cGy in 33 fractions  2/22/24 to 3/12/24: Thoracic spine, T2 - T4, 2500 cGy in 10 fractions    Has now completed 5 cycles of pembrolizumab. Tolerating well.   CT after cycle 3 showed overall stable disease with slight increase in a few lung mets. Since generally stable and the change in lung mets could be pseudo-progression early into immunotherapy, continued with pembrolizumab.     Labwork: CMP WNL. TSH WNL.    Ongoing anemia since completing chemoRT. Hgb stable 9.6 late April.    Plan:  -Continue with C6 pembrolizumab.   -Return every 3 weeks ahead of each cycle.   -Repeat CT in late June, following C6 and visit with deon at that time. If having further progression, would likely deem unresponsive at that time. I'm not sure if she is a candidate for additional chemo given her PS, complications, etc, but will reassess those options when clear progression noted.  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  -Monitor CBC every visit    Open oral/jaw wound  Plate exposure over lower lip and open wound secondary to residual/recurrent tumor at prior ENT resection site. Draining white-yellow drainage,  which seems to be lessening. No fevers. Reconstruction would require additional free flap, so not being pursued at this time after discussion with ENT surgeon Dr. Thorne. I'm not sure if additional palliative RT is feasible/would be deemed helpful, but will see Dr. Egan in a few weeks so defer to him.    Met with Wound Care MD.   Supportive management interventions to help with drainage, odor and decreasing bacterial growth were provided.     Plan:  -Pt to follow [facility]ound Clinic recs for mgmt and return PRN.  -High risk for infection, monitor  -Follow-up with Dr. Thorne in ENT if desiring further exploration of more definitive surgical options, but in context of metastatic cancer he previously felt risk> benefit unless she had some longer term stabiltiy  -Not sure there is a role for any further palliative RT to this location, risks may be >benefit. She can discuss with Dr. Egan.    Cancer pain  Completed palliative RT to T spine a few months ago. Back pain is resolved. No longer requiring pain meds for this.     Having progressive mouth/lower lip pain related to progressive reconstruction failure/open wound. Stopped NSAID use due to elevated creatinine. Managing well with Tylenol 500 mg 3/day and oxycodone 5-.7.5 mg twice/day.     Plan:  -Continue avoiding NSAIDs  -Continue Tylenol instead, can take up to 3000 mg/day. Continue oxycodone use through day as well  -Referral to Palliative Care if not well-controlled in future, pt wants to minimize number of appts for now.     Nutrition/hydration  Dependant on feeding tube. Weight down 3 pounds since last visit. Staying hydrated via PEG flushes, but having some diarrhea.  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   On observation.    ______________________________________________________________________________    History of Present Illness/ Interval History    Ms. Adrianna Pereira is a 63 year old who completed resection of floor of mouth cancer, followed by adjuvant chemoRT 12/2023. Unfortunately, shortly after this, she was found to have local and metastatic recurrence involving mandible, lung, bone (T3-T4) and soft tissue that is PDL1 70%.    She started palliative treatments with pembrolizumab in February.  Tolerating pembrolizumab generally well. No diarrhea. No dyspnea.    Her back pain resolved after palliative RT to T-spine.  No longer taking pain med for this.     Mouth pain persists .. Stopped ibuprofen, managing with Tylenol and oxycodone 5-7.5 mg twice daily.  Pain has been progressive due to plate exposure her lip and open wound at prior ENT resection site below lower lip. Draining white-yellow drainage, amount of drainage is decreasing. No fevers. Assessed by ENT surgeon and reconstruction would require additional free flap, so not being pursued at this time. Saw Wound Clinic for mgmt recs    Oral communication is an ongoing issue.      D    ECOG Performance    1    Oncology History/Treatment  Diagnosis/Stage:   7/2023: [id number] Floor of mouth squamous cell cancer  -hx alcoholism (stopped all use) and smoking (quit 8/2023)  -hx premalignant oral cavity lesions s/p CO2 laser ablation with ENT  -presented with lower lip/chin swelling  -7/3/2023 left mandibular alveolus biopsy: invasive keratinizing moderately differential squamous cell carcinoma.   -PET: 4.4 x 3.7 x 3.2 cm anterior oral cavity mass invading into the mandible with a separate gluco-avid nodule on the right mandibular buccal mucosa and bilateral level 1B, level 2 and L3 metastatic lymphadenopathy without evidence of metastatic disease.   -8/17/2023 surgical path: pT4a, N3b tumor with positive left anterior lateral soft tissue margin, 11/98+ lymph nodes including STEFFEN  "(largest 4 cm) and several bilateral lymph nodes consistent with involvement by low-grade B-cell neoplasm suggestive of CLL/SLL.     2/2024: Progression to stage IV mouth squamous cell cancer to bone and lung  -Within weeks of completing definitive treatment for her primary disease, noted severe/progressive back pain  -CT C/T spine: new T3 compression fracture with hypodensity in vertebral body extending into L posterior elements suggestive of met. T4 posterior vertebral body lesion also suspicious. New lung nodules also noted  -MRI T spine: T3 compression fracture with extraosseous tumor involving ventral epidural space with mod - severe central spinal canal stenosis at T3  -PET: recurrent avid foci L maikel-mandible, numerous bilateral pulmonary/pleural mets, met mediastinal/hilar adenopathy, T3, T4, L adductor musculature and R upper thigh skin thickening  -PDL1 TPS and CPS 70% from 8/2023 original tumor biopsy    Treatment:  Locally advanced disease  -8/17/2023: segmental mandibulectomy, floor of mouth resection, anterior glossectomy, left fibular free flap, skin grafting, and tracheostomy.   -Post-op course complicated by skin necrosis requiring OR debridment and resuturing of neck incision/intraoral flap    -10/20/2023 - 12/5/2023: completed adjuvant concurrent RT (6600 cGy/33) + weekly cisplatin (x 7 cycles)    Metastatic recurrence  -2/21/2024 - present: pembrolizumab.  -Zometa started x 1 dose for bone mets, later held for jaw osteonecrosis risk    -2/26 - 3/12/2024: palliative RT to thoracic spine, 2500 cGy/10. Dex taper      Physical Exam  BP 94/61 (BP Location: Left arm, Patient Position: Sitting, Cuff Size: Adult Small)   Pulse 92   Temp 97.6  F (36.4  C) (Tympanic)   Resp 12   Ht 1.6 m (5' 3\")   Wt 37.2 kg (82 lb)   SpO2 97%   BMI 14.53 kg/m        GENERAL: Alert and oriented to time place and person. Seated comfortably. In no distress.  Dysarthria.   accompanied.  HEENT: Open wound below " lower lip with exposed plate. Scant white/yellow purulent appearing odorous drainage.  SHAE, EOMI. No erythema. No icterus.  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  No skin breakdown.  No evident adenopathy/masses.  HEART: RRR  CHEST: regular respiratory effort. Lungs normal to percussion and auscultation  ABD: PEG in place. Non-distended  NEURO: No gross deficit noted.       Lab Results    Recent Results (from the past 168 hour(s))   Comprehensive metabolic panel   Result Value Ref Range    Sodium 137 135 - 145 mmol/L    Potassium 4.6 3.4 - 5.3 mmol/L    Carbon Dioxide (CO2) 21 (L) 22 - 29 mmol/L    Anion Gap 11 7 - 15 mmol/L    Urea Nitrogen 23.0 8.0 - 23.0 mg/dL    Creatinine 0.73 0.51 - 0.95 mg/dL    GFR Estimate >90 >60 mL/min/1.73m2    Calcium 8.5 (L) 8.8 - 10.2 mg/dL    Chloride 105 98 - 107 mmol/L    Glucose 66 (L) 70 - 99 mg/dL    Alkaline Phosphatase 112 40 - 150 U/L    AST 15 0 - 45 U/L    ALT 9 0 - 50 U/L    Protein Total 5.9 (L) 6.4 - 8.3 g/dL    Albumin 3.0 (L) 3.5 - 5.2 g/dL    Bilirubin Total <0.2 <=1.2 mg/dL   TSH with free T4 reflex   Result Value Ref Range    TSH 1.14 0.30 - 4.20 uIU/mL               I spent 30 minutes on the patient's visit today.  This included preparation for the visit, face-to-face time with the patient and documentation following the visit.  It did not include teaching or procedure time.    Signed by: Peter E. Friedell, MD

## 2024-06-05 NOTE — LETTER
6/5/2024      Adrianna Pereira  [id number] [address] [place] [id number]      Dear Colleague,    Thank you for referring your patient, Adrianna Pereira, to the [facility]. Please see a copy of my visit note below.    Radiation Oncology Progress Note    HPI:Ms. 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  Final pathology demonstrated squamous cell carcinoma moderately differentiated, 4.1 cm in size originating from  floor mouth/ anterior tongue,  depth of invasion 29 mm, no LVSI, positive PNI, positive margin (left anterior lateral).  11 of 98 lymph nodes positive (4 cm largest deposit, positive extracapsular disease present), pathologic T4N3b.   She underwent adjuvant chemoradiation therapy, completed on 12/5/2023.  The patient had early recurrence of disease including locally as well as distantly (spine, lung, soft tissue).  She underwent palliative radiation therapy to thoracic spine.     Radiation Treatment  10/19/2023 to 12/5/2023: Head and neck, 6600 cGy in 33 fractions  2/22/24 to 3/12/24: Thoracic spine, T2 - T4, 2500 cGy in 10 fractions     The patient returns for follow-up.    MRI of the thoracic spine on 4/10/2024 to my review demonstrated overall improvement in spinal canal stenosis in the treated region.  No evidence of cord compression was noted.    Restaging scans with CT scan of the neck and chest abdomen pelvis on 4/17/2024 demonstrated overall mixed response in the lung.  There was noted no significant change in the mass in the left mandibular angle/ramus as well as noted progressive ulceration to the right submental sublingual region, overall consistent with residual head neck cancer disease burden.    Clinically, the patient has noted improvement in her thoracic spine pain.  She is tapered off steroid.  Using oxycodone as needed in the  daytime and at night for pain. Pain from her head and neck cancer burden is overall managed.     She has started systemic treatment under the care of medical oncology team.  Tolerating treatment thus far.  Nutrition continues to be through the tube.  Denies any focal neurologic change.  Continues to have exposed hardware and open wound.  Has seen wound care team with limited options. ENT team has discussed the past potential surgery but deferred in the past due to extent of surgery that will likely be required.      Plan:  Continue palliative systemic therapy per medical oncology team.    Patient has seen wound care team regarding wound management for exposed hardware.  I would recommend follow-up with ENT team.  I re-discussed with patient possibility of reexploring surgical options given exposed hardware/wound continues to worsen.  Return to clinic in 3-4 weeks.        Hernán Egan M.D.  Department of Radiation Oncology  [facility]

## 2024-06-05 NOTE — PROGRESS NOTES
Radiation Oncology Progress Note    HPI:Ms. 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  Final pathology demonstrated squamous cell carcinoma moderately differentiated, 4.1 cm in size originating from  floor mouth/ anterior tongue,  depth of invasion 29 mm, no LVSI, positive PNI, positive margin (left anterior lateral).  11 of 98 lymph nodes positive (4 cm largest deposit, positive extracapsular disease present), pathologic T4N3b.   She underwent adjuvant chemoradiation therapy, completed on 12/5/2023.  The patient had early recurrence of disease including locally as well as distantly (spine, lung, soft tissue).  She underwent palliative radiation therapy to thoracic spine.     Radiation Treatment  10/19/2023 to 12/5/2023: Head and neck, 6600 cGy in 33 fractions  2/22/24 to 3/12/24: Thoracic spine, T2 - T4, 2500 cGy in 10 fractions     The patient returns for follow-up.    MRI of the thoracic spine on 4/10/2024 to my review demonstrated overall improvement in spinal canal stenosis in the treated region.  No evidence of cord compression was noted.    Restaging scans with CT scan of the neck and chest abdomen pelvis on 4/17/2024 demonstrated overall mixed response in the lung.  There was noted no significant change in the mass in the left mandibular angle/ramus as well as noted progressive ulceration to the right submental sublingual region, overall consistent with residual head neck cancer disease burden.    Clinically, the patient has noted improvement in her thoracic spine pain.  She is tapered off steroid.  Using oxycodone as needed in the daytime and at night for pain. Pain from her head and neck cancer burden is overall managed.     She has started systemic treatment under the care of medical oncology team.  Tolerating treatment thus far.  Nutrition continues to  be through the tube.  Denies any focal neurologic change.  Continues to have exposed hardware and open wound.  Has seen wound care team with limited options. ENT team has discussed the past potential surgery but deferred in the past due to extent of surgery that will likely be required.      Plan:  Continue palliative systemic therapy per medical oncology team.    Patient has seen wound care team regarding wound management for exposed hardware.  I would recommend follow-up with ENT team.  I re-discussed with patient possibility of reexploring surgical options given exposed hardware/wound continues to worsen.  Return to clinic in 3-4 weeks.        Hernán Egan M.D.  Department of Radiation Oncology  [facility]

## 2024-06-05 NOTE — LETTER
"6/5/2024      Adrianna Pereira  [address] [facility] [address]      Dear Colleague,    Thank you for referring your patient, Adrianna Pereira, to the [facility]. Please see a copy of my visit note below.    Oncology Rooming Note    June 5, 2024 10:33 AM   Adrianna Pereira is a 63 year old female who presents for:    Chief Complaint   Patient presents with     Oncology Clinic Visit     Malignant neoplasm metastatic to lung, unspecified laterality - Labs provider and infusion     Initial Vitals: BP 94/61 (BP Location: Left arm, Patient Position: Sitting, Cuff Size: Adult Small)   Pulse 92   Temp 97.6  F (36.4  C) (Tympanic)   Resp 12   Ht 1.6 m (5' 3\")   Wt 37.2 kg (82 lb)   SpO2 97%   BMI 14.53 kg/m   Estimated body mass index is 14.53 kg/m  as calculated from the following:    Height as of this encounter: 1.6 m (5' 3\").    Weight as of this encounter: 37.2 kg (82 lb). Body surface area is 1.29 meters squared.  Moderate Pain (4) Comment: Data Unavailable   No LMP recorded. Patient is postmenopausal.  Allergies reviewed: Yes  Medications reviewed: Yes    Medications: MEDICATION REFILLS NEEDED TODAY. Provider was notified.  Pharmacy name entered into EPIC:    [id number] - [place], [place] - [address] [place] [address] PHARMACY - [place], [place] - [address] [facility] PHARMACY [place], [place] - [address] [facility] PHARMACY [place], [place] - [address] [facility]     Frailty Screening:   Is the patient here for a new oncology consult visit in cancer care? 2. 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 Friedell, M.D.      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 Unfortunately, within just a few months of completing, was found to have metastatic recurrence.     Radiation Treatment  10/19/2023 to 12/5/2023: Head and neck, 6600 cGy in 33 fractions  2/22/24 to 3/12/24: Thoracic spine, T2 - T4, 2500 cGy in 10 fractions    Has now completed 5 cycles of pembrolizumab. Tolerating well.   CT after cycle 3 showed overall stable disease with slight increase in a few lung mets. Since generally stable and the change in lung mets could be pseudo-progression early into immunotherapy, continued with pembrolizumab.     Labwork: CMP WNL. TSH WNL.    Ongoing anemia since completing chemoRT. Hgb stable 9.6 late April.    Plan:  -Continue with C6 pembrolizumab.   -Return every 3 weeks ahead of each cycle.   -Repeat CT in late June, following C6 and visit with deon at that time. If having further progression, would likely deem unresponsive at that time. I'm not sure if she is a candidate for additional chemo given her PS, complications, etc, but will reassess those options when clear progression noted.  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  -Monitor CBC every visit    Open oral/jaw wound  Plate exposure over lower lip and open wound secondary to residual/recurrent tumor at prior ENT resection site. Draining white-yellow drainage, which seems to be lessening. No fevers. Reconstruction would require additional free flap, so not being pursued at this time after discussion with ENT surgeon Dr. Thorne. I'm not sure if additional palliative RT is feasible/would be deemed helpful, but will see Dr. Egan in a few weeks so defer to him.    Met with Wound Care MD.    Supportive management interventions to help with drainage, odor and decreasing bacterial growth were provided.     Plan:  -Pt to follow Wound Clinic recs for mgmt and return PRN.  -High risk for infection, monitor  -Follow-up with Dr. Thorne in ENT if desiring further exploration of more definitive surgical options, but in context of metastatic cancer he previously felt risk> benefit unless she had some longer term stabiltiy  -Not sure there is a role for any further palliative RT to this location, risks may be >benefit. She can discuss with Dr. Egan.    Cancer pain  Completed palliative RT to T spine a few months ago. Back pain is resolved. No longer requiring pain meds for this.     Having progressive mouth/lower lip pain related to progressive reconstruction failure/open wound. Stopped NSAID use due to elevated creatinine. Managing well with Tylenol 500 mg 3/day and oxycodone 5-.7.5 mg twice/day.     Plan:  -Continue avoiding NSAIDs  -Continue Tylenol instead, can take up to 3000 mg/day. Continue oxycodone use through day as well  -Referral to Palliative Care if not well-controlled in future, pt wants to minimize number of appts for now.     Nutrition/hydration  Dependant on feeding tube. Weight down 3 pounds since last visit. Staying hydrated via PEG flushes, but having some diarrhea.  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   On observation.   ______________________________________________________________________________    History of Present Illness/ Interval History    Ms. Adrianna Pereira is a 63 year old who completed resection of floor of mouth cancer, followed by adjuvant chemoRT 12/2023. Unfortunately, shortly after this, she was found to have local and metastatic recurrence involving mandible, lung, bone  (T3-T4) and soft tissue that is PDL1 70%.    She started palliative treatments with pembrolizumab in February.  Tolerating pembrolizumab generally well. No diarrhea. No dyspnea.    Her back pain resolved after palliative RT to T-spine.  No longer taking pain med for this.     Mouth pain persists .. Stopped ibuprofen, managing with Tylenol and oxycodone 5-7.5 mg twice daily.  Pain has been progressive due to plate exposure her lip and open wound at prior ENT resection site below lower lip. Draining white-yellow drainage, amount of drainage is decreasing. No fevers. Assessed by ENT surgeon and reconstruction would require additional free flap, so not being pursued at this time. Saw Wound Clinic for mgmt recs    Oral communication is an ongoing issue.      D    ECOG Performance    1    Oncology History/Treatment  Diagnosis/Stage:   7/2023: [id number] Floor of mouth squamous cell cancer  -hx alcoholism (stopped all use) and smoking (quit 8/2023)  -hx premalignant oral cavity lesions s/p CO2 laser ablation with ENT  -presented with lower lip/chin swelling  -7/3/2023 left mandibular alveolus biopsy: invasive keratinizing moderately differential squamous cell carcinoma.   -PET: 4.4 x 3.7 x 3.2 cm anterior oral cavity mass invading into the mandible with a separate gluco-avid nodule on the right mandibular buccal mucosa and bilateral level 1B, level 2 and L3 metastatic lymphadenopathy without evidence of metastatic disease.   -8/17/2023 surgical path: pT4a, N3b tumor with positive left anterior lateral soft tissue margin, 11/98+ lymph nodes including STEFFEN (largest 4 cm) and several bilateral lymph nodes consistent with involvement by low-grade B-cell neoplasm suggestive of CLL/SLL.     2/2024: Progression to stage IV mouth squamous cell cancer to bone and lung  -Within weeks of completing definitive treatment for her primary disease, noted severe/progressive back pain  -CT C/T spine: new T3 compression fracture with  "hypodensity in vertebral body extending into L posterior elements suggestive of met. T4 posterior vertebral body lesion also suspicious. New lung nodules also noted  -MRI T spine: T3 compression fracture with extraosseous tumor involving ventral epidural space with mod - severe central spinal canal stenosis at T3  -PET: recurrent avid foci L maikel-mandible, numerous bilateral pulmonary/pleural mets, met mediastinal/hilar adenopathy, T3, T4, L adductor musculature and R upper thigh skin thickening  -PDL1 TPS and CPS 70% from 8/2023 original tumor biopsy    Treatment:  Locally advanced disease  -8/17/2023: segmental mandibulectomy, floor of mouth resection, anterior glossectomy, left fibular free flap, skin grafting, and tracheostomy.   -Post-op course complicated by skin necrosis requiring OR debridment and resuturing of neck incision/intraoral flap    -10/20/2023 - 12/5/2023: completed adjuvant concurrent RT (6600 cGy/33) + weekly cisplatin (x 7 cycles)    Metastatic recurrence  -2/21/2024 - present: pembrolizumab.  -Zometa started x 1 dose for bone mets, later held for jaw osteonecrosis risk    -2/26 - 3/12/2024: palliative RT to thoracic spine, 2500 cGy/10. Dex taper      Physical Exam  BP 94/61 (BP Location: Left arm, Patient Position: Sitting, Cuff Size: Adult Small)   Pulse 92   Temp 97.6  F (36.4  C) (Tympanic)   Resp 12   Ht 1.6 m (5' 3\")   Wt 37.2 kg (82 lb)   SpO2 97%   BMI 14.53 kg/m        GENERAL: Alert and oriented to time place and person. Seated comfortably. In no distress.  Dysarthria.   accompanied.  HEENT: Open wound below lower lip with exposed plate. Scant white/yellow purulent appearing odorous drainage.  SHAE, EOMI. No erythema. No icterus.  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  No skin breakdown.  No evident adenopathy/masses.  HEART: RRR  CHEST: regular respiratory effort. Lungs normal to percussion and auscultation  ABD: PEG in place. " Non-distended  NEURO: No gross deficit noted.       Lab Results    Recent Results (from the past 168 hour(s))   Comprehensive metabolic panel   Result Value Ref Range    Sodium 137 135 - 145 mmol/L    Potassium 4.6 3.4 - 5.3 mmol/L    Carbon Dioxide (CO2) 21 (L) 22 - 29 mmol/L    Anion Gap 11 7 - 15 mmol/L    Urea Nitrogen 23.0 8.0 - 23.0 mg/dL    Creatinine 0.73 0.51 - 0.95 mg/dL    GFR Estimate >90 >60 mL/min/1.73m2    Calcium 8.5 (L) 8.8 - 10.2 mg/dL    Chloride 105 98 - 107 mmol/L    Glucose 66 (L) 70 - 99 mg/dL    Alkaline Phosphatase 112 40 - 150 U/L    AST 15 0 - 45 U/L    ALT 9 0 - 50 U/L    Protein Total 5.9 (L) 6.4 - 8.3 g/dL    Albumin 3.0 (L) 3.5 - 5.2 g/dL    Bilirubin Total <0.2 <=1.2 mg/dL   TSH with free T4 reflex   Result Value Ref Range    TSH 1.14 0.30 - 4.20 uIU/mL               I spent 30 minutes on the patient's visit today.  This included preparation for the visit, face-to-face time with the patient and documentation following the visit.  It did not include teaching or procedure time.    Signed by: Peter E. Friedell, MD                Again, thank you for allowing me to participate in the care of your patient.        Sincerely,        Peter E. Friedell, MD

## 2024-06-05 NOTE — NURSING NOTE
"Oncology Rooming Note    June 5, 2024 1:26 PM   Adrianna Pereira is a 63 year old female who presents for:    Chief Complaint   Patient presents with    Radiation Therapy     Return visit with Dr. Egan     Initial Vitals: BP 96/58 (BP Location: Left arm, Cuff Size: Adult Small)   Pulse 90   Resp 16   Wt 37.6 kg (83 lb)   SpO2 98%   BMI 14.70 kg/m   Estimated body mass index is 14.7 kg/m  as calculated from the following:    Height as of an earlier encounter on 6/5/24: 1.6 m (5' 3\").    Weight as of this encounter: 37.6 kg (83 lb). Body surface area is 1.29 meters squared.  Data Unavailable Comment: Data Unavailable   No LMP recorded. Patient is postmenopausal.  Allergies reviewed: Yes  Medications reviewed: Yes    Medications: Medication refills not needed today.  Pharmacy name entered into EPIC:    [id number] - [place], [place] - [address] [place] [address] PHARMACY - [place] [place] [facility] [place] [place] - [address] [place] [address] PHARMACY [place], [place] -  [facility]     Frailty Screening:   Is the patient here for a new oncology consult visit in cancer care? 2. No      Clinical concerns: follow up today with Dr. Julisa Cao, LUCAS              "

## 2024-06-12 ENCOUNTER — OFFICE VISIT (OUTPATIENT)
Dept: OTOLARYNGOLOGY | Facility: CLINIC | Age: 64
End: 2024-06-12
Payer: COMMERCIAL

## 2024-06-12 VITALS
HEART RATE: 111 BPM | WEIGHT: 83.3 LBS | HEIGHT: 63 IN | SYSTOLIC BLOOD PRESSURE: 91 MMHG | BODY MASS INDEX: 14.76 KG/M2 | DIASTOLIC BLOOD PRESSURE: 62 MMHG | OXYGEN SATURATION: 94 %

## 2024-06-12 DIAGNOSIS — C04.9 SCC (SQUAMOUS CELL CARCINOMA OF FLOOR OF MOUTH) (H): Primary | ICD-10-CM

## 2024-06-12 PROCEDURE — 99213 OFFICE O/P EST LOW 20 MIN: CPT | Performed by: OTOLARYNGOLOGY

## 2024-06-12 ASSESSMENT — PAIN SCALES - GENERAL: PAINLEVEL: NO PAIN (0)

## 2024-06-12 NOTE — PROGRESS NOTES
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  The patient presented with disease involving the mental skin.  Dr. Harrington did a fibula and anterolateral thigh free tissue reconstruction.  The patient did have some hardware exposed prior to radiation and then completed her chemoradiation therapy with Dr. Egan on December 5, 2023.     Interval history: Overall, patient has no complaints. Patient continues to use feeding tube for nutrition. Patient has completed palliative radiotherapy on 3/12/13. She notes improvement in thoracic spine pain.  CT neck and chest scan on 4/17/24 revealed no significant change in the mass in the left mandibular angle/ramus as well as noted progressive ulceration to the right submental sublingual region, overall consistent with residual head neck cancer disease burden. She denies any pain in the head and neck region. Underwent antibiotics for some neck warmth that resolved issues. Recently saw wound care team with limited options. Patient refuses surgery at this time.     The patient has been using her feeding tube for all nutrition, she does note that she has been having back pain between her shoulder blades for several weeks, the etiology is not yet clear.  She denies any lumps or bumps in her neck.  They do think that the hardware exposure might have expanded slightly during radiation.     Physical examination: The patient is well-appearing and in no distress.  Examination of the oral cavity reveals she has significant loss of bulk given the partial glossectomy, the free flap both internally and externally has healed in nicely. large area of plate exposed near the junction of the skin paddle with the left lower lip  she has a area of exposed  bone and plate a Palpation of the oral cavity and of the neck does not reveal any abnormalities.     Impression:  1.  Exposed hardware/non viable fibula in the setting of distant mets     Plan:  1. The patient does not desire any surgery at this time, will plan for video visit in 3 months

## 2024-06-12 NOTE — LETTER
6/12/2024       RE: Adrianna Pereira  [address] [address] [place] [id number]       Dear Colleague,      Thank you for referring your patient, Adrianna Pereira, to the [facility] [place] [facility]. Please see a copy of my visit note below.    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  The patient presented with disease involving the mental skin.  Dr. Harrington did a fibula and anterolateral thigh free tissue reconstruction.  The patient did have some hardware exposed prior to radiation and then completed her chemoradiation therapy with Dr. Egan on December 5, 2023.     Interval history: Overall, patient has no complaints. Patient continues to use feeding tube for nutrition. Patient has completed palliative radiotherapy on 3/12/13. She notes improvement in thoracic spine pain.  CT neck and chest scan on 4/17/24 revealed no significant change in the mass in the left mandibular angle/ramus as well as noted progressive ulceration to the right submental sublingual region, overall consistent with residual head neck cancer disease burden. She denies any pain in the head and neck region. Underwent antibiotics for some neck warmth that resolved issues. Recently saw wound care team with limited options. Patient refuses surgery at this time.     The patient has been using her feeding tube for all nutrition, she does note that she has been having back pain between her shoulder blades for several weeks, the etiology is not yet clear.  She denies any lumps or bumps in her neck.  They do think that the hardware exposure might have expanded slightly during radiation.     Physical examination: The patient is  well-appearing and in no distress.  Examination of the oral cavity reveals she has significant loss of bulk given the partial glossectomy, the free flap both internally and externally has healed in nicely. large area of plate exposed near the junction of the skin paddle with the left lower lip  she has a area of exposed bone and plate a Palpation of the oral cavity and of the neck does not reveal any abnormalities.     Impression:  1.  Exposed hardware/non viable fibula in the setting of distant mets     Plan:  1. The patient does not desire any surgery at this time, will plan for video visit in 3 months           Again, thank you for allowing me to participate in the care of your patient.      Sincerely,    Tyshawn Dao MD

## 2024-06-13 ENCOUNTER — TELEPHONE (OUTPATIENT)
Dept: OTOLARYNGOLOGY | Facility: CLINIC | Age: 64
End: 2024-06-13
Payer: COMMERCIAL

## 2024-06-13 NOTE — TELEPHONE ENCOUNTER
Patient confirmed scheduled appointment:  Date: 9/18  Time: 2:45pm  Visit type: Return ENT   Provider: Dr. Dao  Location: Virtual  Testing/imaging:   Additional notes:

## 2024-06-13 NOTE — PATIENT INSTRUCTIONS
1. Please follow-up in clinic in 3 months via video visit  2. Please call the ENT clinic with any questions,concerns, new or worsening symptoms.    -Clinic number is [phone]   - La's direct line (Dr. Dao's nurse) [phone]

## 2024-06-14 ENCOUNTER — MYC REFILL (OUTPATIENT)
Dept: ONCOLOGY | Facility: CLINIC | Age: 64
End: 2024-06-14
Payer: COMMERCIAL

## 2024-06-14 DIAGNOSIS — G89.3 CANCER RELATED PAIN: ICD-10-CM

## 2024-06-14 RX ORDER — OXYCODONE HCL 5 MG/5 ML
5 SOLUTION, ORAL ORAL EVERY 6 HOURS PRN
Qty: 300 ML | Refills: 0 | Status: SHIPPED | OUTPATIENT
Start: 2024-06-14 | End: 2024-07-03

## 2024-06-24 ENCOUNTER — HOSPITAL ENCOUNTER (OUTPATIENT)
Dept: CT IMAGING | Facility: CLINIC | Age: 64
Discharge: HOME | End: 2024-06-24
Attending: NURSE PRACTITIONER
Payer: COMMERCIAL

## 2024-06-24 ENCOUNTER — INFUSION THERAPY VISIT (OUTPATIENT)
Dept: INFUSION THERAPY | Facility: CLINIC | Age: 64
End: 2024-06-24
Attending: RADIOLOGY
Payer: COMMERCIAL

## 2024-06-24 DIAGNOSIS — C79.51 MALIGNANT NEOPLASM METASTATIC TO BONE (H): ICD-10-CM

## 2024-06-24 DIAGNOSIS — C06.9 SQUAMOUS CELL CARCINOMA OF ORAL CAVITY (H): ICD-10-CM

## 2024-06-24 DIAGNOSIS — C78.00 MALIGNANT NEOPLASM METASTATIC TO LUNG, UNSPECIFIED LATERALITY (H): ICD-10-CM

## 2024-06-24 DIAGNOSIS — C78.00 MALIGNANT NEOPLASM METASTATIC TO LUNG, UNSPECIFIED LATERALITY (H): Primary | ICD-10-CM

## 2024-06-24 PROCEDURE — 70491 CT SOFT TISSUE NECK W/DYE: CPT

## 2024-06-24 PROCEDURE — 250N000011 HC RX IP 250 OP 636: Performed by: NURSE PRACTITIONER

## 2024-06-24 PROCEDURE — 71260 CT THORAX DX C+: CPT

## 2024-06-24 PROCEDURE — 250N000009 HC RX 250: Performed by: NURSE PRACTITIONER

## 2024-06-24 PROCEDURE — 250N000011 HC RX IP 250 OP 636: Mod: JZ | Performed by: NURSE PRACTITIONER

## 2024-06-24 RX ORDER — HEPARIN SODIUM (PORCINE) LOCK FLUSH IV SOLN 100 UNIT/ML 100 UNIT/ML
5 SOLUTION INTRAVENOUS
OUTPATIENT
Start: 2024-06-24

## 2024-06-24 RX ORDER — IOPAMIDOL 755 MG/ML
500 INJECTION, SOLUTION INTRAVASCULAR ONCE
Status: COMPLETED | OUTPATIENT
Start: 2024-06-24 | End: 2024-06-24

## 2024-06-24 RX ORDER — HEPARIN SODIUM (PORCINE) LOCK FLUSH IV SOLN 100 UNIT/ML 100 UNIT/ML
5 SOLUTION INTRAVENOUS
Status: DISCONTINUED | OUTPATIENT
Start: 2024-06-24 | End: 2024-06-25 | Stop reason: HOSPADM

## 2024-06-24 RX ORDER — HEPARIN SODIUM,PORCINE 10 UNIT/ML
5-20 VIAL (ML) INTRAVENOUS DAILY PRN
OUTPATIENT
Start: 2024-06-24

## 2024-06-24 RX ADMIN — SODIUM CHLORIDE 60 ML: 9 INJECTION, SOLUTION INTRAVENOUS at 14:06

## 2024-06-24 RX ADMIN — HEPARIN 5 ML: 100 SYRINGE at 14:26

## 2024-06-24 RX ADMIN — IOPAMIDOL 80 ML: 755 INJECTION, SOLUTION INTRAVENOUS at 14:08

## 2024-06-25 LAB — RADIOLOGIST FLAGS: ABNORMAL

## 2024-06-25 NOTE — PROGRESS NOTES
Infusion Nursing Note:  Adrianna Pereira presents today for po9rt access for CT.    Patient seen by provider today: No   present during visit today: Not Applicable.    Note: Pt port accessed and flushed several times, unable to obtain blood return.  Pt asked to sit on edge of chair and lean forward, tucking chin down.  Blood return obtained and pt sent to CT.  Above information given to CT staff and they were able to obtain blood return as well with above situation of repositioning patient.      Intravenous Access:  Implanted Port.    Treatment Conditions:  Not Applicable.      Post Infusion Assessment:  Patient tolerated port access well.  without incident.  Site patent and intact, free from redness, edema or discomfort.  No evidence of extravasations.  Access discontinued per protocol.       Discharge Plan:   Discharge instructions reviewed with: Patient.  Patient discharged in stable condition accompanied by: .  Departure Mode: Ambulatory.      Keisha aKpadia RN

## 2024-06-26 ENCOUNTER — INFUSION THERAPY VISIT (OUTPATIENT)
Dept: INFUSION THERAPY | Facility: CLINIC | Age: 64
End: 2024-06-26
Attending: NURSE PRACTITIONER
Payer: COMMERCIAL

## 2024-06-26 ENCOUNTER — OFFICE VISIT (OUTPATIENT)
Dept: RADIATION THERAPY | Facility: OUTPATIENT CENTER | Age: 64
End: 2024-06-26
Payer: COMMERCIAL

## 2024-06-26 ENCOUNTER — ONCOLOGY VISIT (OUTPATIENT)
Dept: ONCOLOGY | Facility: CLINIC | Age: 64
End: 2024-06-26
Attending: INTERNAL MEDICINE
Payer: COMMERCIAL

## 2024-06-26 ENCOUNTER — LAB (OUTPATIENT)
Dept: INFUSION THERAPY | Facility: CLINIC | Age: 64
End: 2024-06-26
Attending: INTERNAL MEDICINE
Payer: COMMERCIAL

## 2024-06-26 VITALS
BODY MASS INDEX: 14.85 KG/M2 | WEIGHT: 83.8 LBS | HEIGHT: 63 IN | DIASTOLIC BLOOD PRESSURE: 66 MMHG | TEMPERATURE: 97.8 F | OXYGEN SATURATION: 99 % | SYSTOLIC BLOOD PRESSURE: 102 MMHG | HEART RATE: 92 BPM

## 2024-06-26 VITALS — WEIGHT: 83.78 LBS | BODY MASS INDEX: 14.84 KG/M2

## 2024-06-26 DIAGNOSIS — C06.9 SQUAMOUS CELL CARCINOMA OF ORAL CAVITY (H): ICD-10-CM

## 2024-06-26 DIAGNOSIS — C79.51 MALIGNANT NEOPLASM METASTATIC TO BONE (H): ICD-10-CM

## 2024-06-26 DIAGNOSIS — C79.51 MALIGNANT NEOPLASM METASTATIC TO BONE (H): Primary | ICD-10-CM

## 2024-06-26 DIAGNOSIS — C78.00 MALIGNANT NEOPLASM METASTATIC TO LUNG, UNSPECIFIED LATERALITY (H): ICD-10-CM

## 2024-06-26 DIAGNOSIS — C06.9 SQUAMOUS CELL CARCINOMA OF ORAL CAVITY (H): Primary | ICD-10-CM

## 2024-06-26 DIAGNOSIS — C78.00 MALIGNANT NEOPLASM METASTATIC TO LUNG, UNSPECIFIED LATERALITY (H): Primary | ICD-10-CM

## 2024-06-26 LAB
ALBUMIN SERPL BCG-MCNC: 2.9 G/DL (ref 3.5–5.2)
ALP SERPL-CCNC: 104 U/L (ref 40–150)
ALT SERPL W P-5'-P-CCNC: 12 U/L (ref 0–50)
ANION GAP SERPL CALCULATED.3IONS-SCNC: 11 MMOL/L (ref 7–15)
AST SERPL W P-5'-P-CCNC: 15 U/L (ref 0–45)
BASOPHILS # BLD AUTO: 0.1 10E3/UL (ref 0–0.2)
BASOPHILS NFR BLD AUTO: 1 %
BILIRUB SERPL-MCNC: <0.2 MG/DL
BUN SERPL-MCNC: 21.9 MG/DL (ref 8–23)
CALCIUM SERPL-MCNC: 8.6 MG/DL (ref 8.8–10.2)
CHLORIDE SERPL-SCNC: 106 MMOL/L (ref 98–107)
CREAT SERPL-MCNC: 0.67 MG/DL (ref 0.51–0.95)
DEPRECATED HCO3 PLAS-SCNC: 20 MMOL/L (ref 22–29)
EGFRCR SERPLBLD CKD-EPI 2021: >90 ML/MIN/1.73M2
EOSINOPHIL # BLD AUTO: 0.2 10E3/UL (ref 0–0.7)
EOSINOPHIL NFR BLD AUTO: 2 %
ERYTHROCYTE [DISTWIDTH] IN BLOOD BY AUTOMATED COUNT: 14.3 % (ref 10–15)
GLUCOSE SERPL-MCNC: 78 MG/DL (ref 70–99)
HCT VFR BLD AUTO: 32.3 % (ref 35–47)
HGB BLD-MCNC: 10.7 G/DL (ref 11.7–15.7)
IMM GRANULOCYTES # BLD: 0.1 10E3/UL
IMM GRANULOCYTES NFR BLD: 1 %
LYMPHOCYTES # BLD AUTO: 1.6 10E3/UL (ref 0.8–5.3)
LYMPHOCYTES NFR BLD AUTO: 17 %
MCH RBC QN AUTO: 33.2 PG (ref 26.5–33)
MCHC RBC AUTO-ENTMCNC: 33.1 G/DL (ref 31.5–36.5)
MCV RBC AUTO: 100 FL (ref 78–100)
MONOCYTES # BLD AUTO: 0.7 10E3/UL (ref 0–1.3)
MONOCYTES NFR BLD AUTO: 7 %
NEUTROPHILS # BLD AUTO: 6.7 10E3/UL (ref 1.6–8.3)
NEUTROPHILS NFR BLD AUTO: 73 %
NRBC # BLD AUTO: 0 10E3/UL
NRBC BLD AUTO-RTO: 0 /100
PLATELET # BLD AUTO: 378 10E3/UL (ref 150–450)
POTASSIUM SERPL-SCNC: 4.7 MMOL/L (ref 3.4–5.3)
PROT SERPL-MCNC: 5.9 G/DL (ref 6.4–8.3)
RBC # BLD AUTO: 3.22 10E6/UL (ref 3.8–5.2)
SODIUM SERPL-SCNC: 137 MMOL/L (ref 135–145)
TSH SERPL DL<=0.005 MIU/L-ACNC: 1.54 UIU/ML (ref 0.3–4.2)
WBC # BLD AUTO: 9.2 10E3/UL (ref 4–11)

## 2024-06-26 PROCEDURE — 36591 DRAW BLOOD OFF VENOUS DEVICE: CPT

## 2024-06-26 PROCEDURE — 250N000011 HC RX IP 250 OP 636: Mod: JZ | Performed by: INTERNAL MEDICINE

## 2024-06-26 PROCEDURE — 258N000003 HC RX IP 258 OP 636: Performed by: INTERNAL MEDICINE

## 2024-06-26 PROCEDURE — 85025 COMPLETE CBC W/AUTO DIFF WBC: CPT | Performed by: INTERNAL MEDICINE

## 2024-06-26 PROCEDURE — G0463 HOSPITAL OUTPT CLINIC VISIT: HCPCS | Performed by: INTERNAL MEDICINE

## 2024-06-26 PROCEDURE — 84443 ASSAY THYROID STIM HORMONE: CPT | Performed by: NURSE PRACTITIONER

## 2024-06-26 PROCEDURE — 96413 CHEMO IV INFUSION 1 HR: CPT

## 2024-06-26 PROCEDURE — 80053 COMPREHEN METABOLIC PANEL: CPT | Performed by: NURSE PRACTITIONER

## 2024-06-26 PROCEDURE — 99214 OFFICE O/P EST MOD 30 MIN: CPT | Performed by: INTERNAL MEDICINE

## 2024-06-26 RX ORDER — ALBUTEROL SULFATE 0.83 MG/ML
2.5 SOLUTION RESPIRATORY (INHALATION)
Status: CANCELLED | OUTPATIENT
Start: 2024-06-26

## 2024-06-26 RX ORDER — HEPARIN SODIUM (PORCINE) LOCK FLUSH IV SOLN 100 UNIT/ML 100 UNIT/ML
5 SOLUTION INTRAVENOUS
Status: DISCONTINUED | OUTPATIENT
Start: 2024-06-26 | End: 2024-06-26 | Stop reason: HOSPADM

## 2024-06-26 RX ORDER — LORAZEPAM 2 MG/ML
0.5 INJECTION INTRAMUSCULAR EVERY 4 HOURS PRN
Status: CANCELLED | OUTPATIENT
Start: 2024-06-26

## 2024-06-26 RX ORDER — DIPHENHYDRAMINE HYDROCHLORIDE 50 MG/ML
50 INJECTION INTRAMUSCULAR; INTRAVENOUS
Status: CANCELLED
Start: 2024-06-26

## 2024-06-26 RX ORDER — EPINEPHRINE 1 MG/ML
0.3 INJECTION, SOLUTION, CONCENTRATE INTRAVENOUS EVERY 5 MIN PRN
Status: CANCELLED | OUTPATIENT
Start: 2024-06-26

## 2024-06-26 RX ORDER — METHYLPREDNISOLONE SODIUM SUCCINATE 125 MG/2ML
125 INJECTION, POWDER, LYOPHILIZED, FOR SOLUTION INTRAMUSCULAR; INTRAVENOUS
Status: CANCELLED
Start: 2024-06-26

## 2024-06-26 RX ORDER — ALBUTEROL SULFATE 90 UG/1
1-2 AEROSOL, METERED RESPIRATORY (INHALATION)
Status: CANCELLED
Start: 2024-06-26

## 2024-06-26 RX ORDER — MEPERIDINE HYDROCHLORIDE 25 MG/ML
25 INJECTION INTRAMUSCULAR; INTRAVENOUS; SUBCUTANEOUS EVERY 30 MIN PRN
Status: CANCELLED | OUTPATIENT
Start: 2024-06-26

## 2024-06-26 RX ORDER — HEPARIN SODIUM,PORCINE 10 UNIT/ML
5-20 VIAL (ML) INTRAVENOUS DAILY PRN
Status: CANCELLED | OUTPATIENT
Start: 2024-06-26

## 2024-06-26 RX ORDER — HEPARIN SODIUM (PORCINE) LOCK FLUSH IV SOLN 100 UNIT/ML 100 UNIT/ML
5 SOLUTION INTRAVENOUS
Status: CANCELLED | OUTPATIENT
Start: 2024-06-26

## 2024-06-26 RX ADMIN — Medication 5 ML: at 12:28

## 2024-06-26 RX ADMIN — SODIUM CHLORIDE 200 MG: 9 INJECTION, SOLUTION INTRAVENOUS at 11:50

## 2024-06-26 RX ADMIN — SODIUM CHLORIDE 250 ML: 9 INJECTION, SOLUTION INTRAVENOUS at 11:30

## 2024-06-26 ASSESSMENT — PAIN SCALES - GENERAL: PAINLEVEL: NO PAIN (0)

## 2024-06-26 NOTE — PROGRESS NOTES
Infusion Nursing Note:  Adrianna Pereira presents today for C7D1 Keytruda.    Patient seen by provider today: Yes: Dr. Friedell   present during visit today: Not Applicable.    Note: N/A.      Intravenous Access:  Implanted Port.    Treatment Conditions:  Results reviewed, labs MET treatment parameters, ok to proceed with treatment.      Post Infusion Assessment:  Patient tolerated infusion without incident.  Blood return noted pre and post infusion.  Site patent and intact, free from redness, edema or discomfort.  No evidence of extravasations.  Access discontinued per protocol.       Discharge Plan:   Patient discharged in stable condition accompanied by: .  Departure Mode: Ambulatory.      Ngozi Anderson RN

## 2024-06-26 NOTE — NURSING NOTE
"FOLLOW-UP VISIT    Patient Name: Adrianna Pereira      : 1960     Age: 63 year old        ______________________________________________________________________________     Chief Complaint   Patient presents with    Radiation Therapy     return     Wt 38 kg (83 lb 12.4 oz)   BMI 14.84 kg/m       Date Radiation Completed: 23  H&N                                               3/12/24   Tspine    Pain  Managed, not bothering her at the moment.    Meds  Current Med List Reviewed: Yes  Medication Note:   no medication refills needed    Labs  TSH   Date Value Ref Range Status   2024 1.54 0.30 - 4.20 uIU/mL Final   ]    Imaging  CT: 24    Skin: jaw/mandible with known wound - pt wears mask to cover wound  Oral Products: does a little water with a syringe orally. No other foods/drinks orally. Pt aware of dry mouth products  Mucositis: No  Dry mouth: Yes: pt aware of ways to manage  Dental evaluation:   PEG tube: Yes: fully relies on PEG for all nutrition/hydration  Speech therapy: No  Lymphedema: No  Energy Level:\"I'm feeling pretty good - if I didn't have this wound on my jaw I'd be great\"  Appetite: does scheduled nutrition via peg  Intake: PEG  Weight:    Wt Readings from Last 5 Encounters:   24 38 kg (83 lb 12.4 oz)   24 38 kg (83 lb 12.8 oz)   24 37.8 kg (83 lb 4.8 oz)   24 37.6 kg (83 lb)   24 37.2 kg (82 lb)       Appointments:     DATE  Oncologist: Dr. Friedell, Erin Sly    ENT:  Dr. Dao    Primary:      Other Notes:   "

## 2024-06-26 NOTE — PROGRESS NOTES
"Oncology Rooming Note    June 26, 2024 10:28 AM   Adrianna Pereira is a 63 year old female who presents for:    Chief Complaint   Patient presents with    Oncology Clinic Visit     Malignant neoplasm metastatic to bone - labs, provider, infusion     Initial Vitals: /66 (BP Location: Right arm, Patient Position: Sitting, Cuff Size: Adult Regular)   Pulse 92   Temp 97.8  F (36.6  C) (Tympanic)   Ht 1.6 m (5' 3\")   Wt 38 kg (83 lb 12.8 oz)   SpO2 99%   BMI 14.84 kg/m   Estimated body mass index is 14.84 kg/m  as calculated from the following:    Height as of this encounter: 1.6 m (5' 3\").    Weight as of this encounter: 38 kg (83 lb 12.8 oz). Body surface area is 1.3 meters squared.  No Pain (0) Comment: Data Unavailable   No LMP recorded. Patient is postmenopausal.  Allergies reviewed: Yes  Medications reviewed: Yes    Medications: Medication refills not needed today.  Pharmacy name entered into EPIC:    [id number] - [place], [place] - [address] [place] [address] PHARMACY - [place], [place] - [address] [facility] PHARMACY [place], [place] - [address] [facility] PHARMACY [place], [place] - [address] [place] [address]     Frailty Screening:   Is the patient here for a new oncology consult visit in cancer care? 2. No      Clinical concerns: None today.      Leana Lloyd MA            "

## 2024-06-26 NOTE — PROGRESS NOTES
Radiation Oncology Progress Note    HPI:Ms. 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  Final pathology demonstrated squamous cell carcinoma moderately differentiated, 4.1 cm in size originating from  floor mouth/ anterior tongue,  depth of invasion 29 mm, no LVSI, positive PNI, positive margin (left anterior lateral).  11 of 98 lymph nodes positive (4 cm largest deposit, positive extracapsular disease present), pathologic T4N3b.   She underwent adjuvant chemoradiation therapy, completed on 12/5/2023.  The patient had early recurrence of disease including locally as well as distantly (spine, lung, soft tissue).  She underwent palliative radiation therapy to thoracic spine.     Radiation Treatment  10/19/2023 to 12/5/2023: Head and neck, 6600 cGy in 33 fractions  2/22/24 to 3/12/24: Thoracic spine, T2 - T4, 2500 cGy in 10 fractions     The patient returns for follow-up.    MRI of the thoracic spine on 4/10/2024 to my review demonstrated overall improvement in spinal canal stenosis in the treated region.  No evidence of cord compression was noted.    Restaging scans with CT scan of the neck and chest abdomen pelvis on 6/24/24  demonstrated overall mixed response in the lung.  There was noted no significant change in the mass in the left mandibular angle/ramus but noted improvement in the ulceration to the right submental sublingual region.    Clinically, the patient has noted improvement in her thoracic spine pain.  She is tapered off steroid.  Using oxycodone as needed in the daytime and at night for pain. Pain from her head and neck cancer burden is overall managed.     She has started systemic treatment under the care of medical oncology team.  Tolerating treatment thus far.  Nutrition continues to be through the tube.  Denies any focal neurologic change.  Continues  to have exposed hardware and open wound. Wound drainage is less per patient.  ENT team has discussed the past potential surgery but deferred in the past due to extent of surgery that will likely be required.      Plan:  Re-staging CTCAP demonstrated [place] in head neck region and thorax. No clear POD was noted.    Continue palliative systemic therapy and oncologic surveillance per medical oncology team.    2.   RTC in 3 months.        Hernán Egan M.D.  Department of Radiation Oncology  [facility]

## 2024-06-26 NOTE — PROGRESS NOTES
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 Unfortunately, within just a few months of completing, was found to have metastatic recurrence.     Radiation Treatment  10/19/2023 to 12/5/2023: Head and neck, 6600 cGy in 33 fractions  2/22/24 to 3/12/24: Thoracic spine, T2 - T4, 2500 cGy in 10 fractions    Has now completed 6 cycles of pembrolizumab. Tolerating well.   CT after cycle 6 showed overall stable disease with slight increase in a few lung mets. Since generally stable and the change in lung mets could be pseudo-progression early into immunotherapy, continued with pembrolizumab.     Labwork: CMP albumen 2.9. TSH WNL.    Ongoing anemia since completing chemoRT. Hgb stable 910.7 today    Plan:  -Continue with C6 pembrolizumab.   -Return every 3 weeks ahead of each cycle.   -recent Ct scan shows mostly stable or improved disease.  There are some new tumor nodules that are indeterminate.  Continue to monitor with scans about every 4 cycles.   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  -Monitor CBC every visit    Open oral/jaw wound  Plate exposure over lower lip and open wound secondary to residual/recurrent tumor at prior ENT resection site. Draining white-yellow drainage, which seems to be lessening. No fevers.  Reconstruction would require additional free flap, so not being pursued at this time after discussion with ENT surgeon Dr. Thorne. Seen by Dr. Egan who has deferred additional radiation.    Met with Wound Care MD.   Supportive management interventions to help with drainage, odor and decreasing bacterial growth were provided.     Plan:  -Pt to follow Wound Clinic recs for mgmt and return PRN.  -High risk for infection, monitor  -Follow-up with Dr. Thorne in ENT if desiring further exploration of more definitive surgical options, but in context of metastatic cancer he previously felt risk> benefit unless she had some longer term stabiltiy      Cancer pain  Completed palliative RT to T spine a few months ago. Back pain is resolved. No longer requiring pain meds for this.     Having progressive mouth/lower lip pain related to progressive reconstruction failure/open wound. Stopped NSAID use due to elevated creatinine. Managing well with Tylenol 500 mg 3/day and oxycodone 5-.7.5 mg twice/day.     Plan:  -Continue avoiding NSAIDs  -Continue Tylenol instead, can take up to 3000 mg/day. Continue oxycodone use through day as well  -Referral to Palliative Care if not well-controlled in future, pt wants to minimize number of appts for now.     Nutrition/hydration  Dependant on feeding tube. Weight down 3 pounds since last visit. Staying hydrated via PEG flushes,   There is leakage around the tube and balloon has migrated into the first portion of the duodenum.  Will have nutrition reevaluate.      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   On observation.   ______________________________________________________________________________    History of Present Illness/ Interval History    Ms. Adrianna Pereira is a 63 year old who completed resection of floor of mouth cancer, followed by adjuvant chemoRT 12/2023. Unfortunately, shortly after this, she was found to  have local and metastatic recurrence involving mandible, lung, bone (T3-T4) and soft tissue that is PDL1 70%.    She started palliative treatments with pembrolizumab in February.  Tolerating pembrolizumab generally well. No diarrhea. No dyspnea.    Her back pain resolved after palliative RT to T-spine.  No longer taking pain med for this.     Mouth pain persists .. Stopped ibuprofen, managing with Tylenol and oxycodone 5-7.5 mg twice daily.  Pain has been progressive due to plate exposure her lip and open wound at prior ENT resection site below lower lip. Draining white-yellow drainage, amount of drainage is decreasing. No fevers. Assessed by ENT surgeon and reconstruction would require additional free flap, so not being pursued at this time. Saw Wound Clinic for mgmt recs    Oral communication is an ongoing issue.      D    ECOG Performance    1    Oncology History/Treatment  Diagnosis/Stage:   7/2023: [id number] Floor of mouth squamous cell cancer  -hx alcoholism (stopped all use) and smoking (quit 8/2023)  -hx premalignant oral cavity lesions s/p CO2 laser ablation with ENT  -presented with lower lip/chin swelling  -7/3/2023 left mandibular alveolus biopsy: invasive keratinizing moderately differential squamous cell carcinoma.   -PET: 4.4 x 3.7 x 3.2 cm anterior oral cavity mass invading into the mandible with a separate gluco-avid nodule on the right mandibular buccal mucosa and bilateral level 1B, level 2 and L3 metastatic lymphadenopathy without evidence of metastatic disease.   -8/17/2023 surgical path: pT4a, N3b tumor with positive left anterior lateral soft tissue margin, 11/98+ lymph nodes including STEFFEN (largest 4 cm) and several bilateral lymph nodes consistent with involvement by low-grade B-cell neoplasm suggestive of CLL/SLL.     2/2024: Progression to stage IV mouth squamous cell cancer to bone and lung  -Within weeks of completing definitive treatment for her primary disease, noted severe/progressive  "back pain  -CT C/T spine: new T3 compression fracture with hypodensity in vertebral body extending into L posterior elements suggestive of met. T4 posterior vertebral body lesion also suspicious. New lung nodules also noted  -MRI T spine: T3 compression fracture with extraosseous tumor involving ventral epidural space with mod - severe central spinal canal stenosis at T3  -PET: recurrent avid foci L maikel-mandible, numerous bilateral pulmonary/pleural mets, met mediastinal/hilar adenopathy, T3, T4, L adductor musculature and R upper thigh skin thickening  -PDL1 TPS and CPS 70% from 8/2023 original tumor biopsy    Treatment:  Locally advanced disease  -8/17/2023: segmental mandibulectomy, floor of mouth resection, anterior glossectomy, left fibular free flap, skin grafting, and tracheostomy.   -Post-op course complicated by skin necrosis requiring OR debridment and resuturing of neck incision/intraoral flap    -10/20/2023 - 12/5/2023: completed adjuvant concurrent RT (6600 cGy/33) + weekly cisplatin (x 7 cycles)    Metastatic recurrence  -2/21/2024 - present: pembrolizumab.  -Zometa started x 1 dose for bone mets, later held for jaw osteonecrosis risk    -2/26 - 3/12/2024: palliative RT to thoracic spine, 2500 cGy/10. Dex taper      Physical Exam  /66 (BP Location: Right arm, Patient Position: Sitting, Cuff Size: Adult Regular)   Pulse 92   Temp 97.8  F (36.6  C) (Tympanic)   Ht 1.6 m (5' 3\")   Wt 38 kg (83 lb 12.8 oz)   SpO2 99%   BMI 14.84 kg/m        GENERAL: Alert and oriented to time place and person. Seated comfortably. In no distress.  Dysarthria.   accompanied.  HEENT: Open wound below lower lip with exposed plate. Scant white/yellow purulent appearing odorous drainage.  SHAE, EOMI. No erythema. No icterus.  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  No skin breakdown.  No evident adenopathy/masses.  HEART: RRR  CHEST: regular respiratory effort. Lungs normal to " percussion and auscultation  ABD: PEG in place. Non-distended  NEURO: No gross deficit noted.       Lab Results    Recent Results (from the past 168 hour(s))   CT Chest/Abdomen/Pelvis w Contrast   Result Value Ref Range    Radiologist flags Gastrostomy tube balloon is located in the first (Urgent)    Comprehensive metabolic panel   Result Value Ref Range    Sodium 137 135 - 145 mmol/L    Potassium 4.7 3.4 - 5.3 mmol/L    Carbon Dioxide (CO2) 20 (L) 22 - 29 mmol/L    Anion Gap 11 7 - 15 mmol/L    Urea Nitrogen 21.9 8.0 - 23.0 mg/dL    Creatinine 0.67 0.51 - 0.95 mg/dL    GFR Estimate >90 >60 mL/min/1.73m2    Calcium 8.6 (L) 8.8 - 10.2 mg/dL    Chloride 106 98 - 107 mmol/L    Glucose 78 70 - 99 mg/dL    Alkaline Phosphatase 104 40 - 150 U/L    AST 15 0 - 45 U/L    ALT 12 0 - 50 U/L    Protein Total 5.9 (L) 6.4 - 8.3 g/dL    Albumin 2.9 (L) 3.5 - 5.2 g/dL    Bilirubin Total <0.2 <=1.2 mg/dL   TSH with free T4 reflex   Result Value Ref Range    TSH 1.54 0.30 - 4.20 uIU/mL   CBC with platelets and differential   Result Value Ref Range    WBC Count 9.2 4.0 - 11.0 10e3/uL    RBC Count 3.22 (L) 3.80 - 5.20 10e6/uL    Hemoglobin 10.7 (L) 11.7 - 15.7 g/dL    Hematocrit 32.3 (L) 35.0 - 47.0 %     78 - 100 fL    MCH 33.2 (H) 26.5 - 33.0 pg    MCHC 33.1 31.5 - 36.5 g/dL    RDW 14.3 10.0 - 15.0 %    Platelet Count 378 150 - 450 10e3/uL    % Neutrophils 73 %    % Lymphocytes 17 %    % Monocytes 7 %    % Eosinophils 2 %    % Basophils 1 %    % Immature Granulocytes 1 %    NRBCs per 100 WBC 0 <1 /100    Absolute Neutrophils 6.7 1.6 - 8.3 10e3/uL    Absolute Lymphocytes 1.6 0.8 - 5.3 10e3/uL    Absolute Monocytes 0.7 0.0 - 1.3 10e3/uL    Absolute Eosinophils 0.2 0.0 - 0.7 10e3/uL    Absolute Basophils 0.1 0.0 - 0.2 10e3/uL    Absolute Immature Granulocytes 0.1 <=0.4 10e3/uL    Absolute NRBCs 0.0 10e3/uL               I spent 35 minutes on the patient's visit today.  This included preparation for the visit, face-to-face time  with the patient and documentation following the visit.  It did not include teaching or procedure time.    Signed by: Peter E. Friedell, MD

## 2024-06-26 NOTE — LETTER
6/26/2024      Adrianna Pereira  [id number] [address] [place] [id number]      Dear Colleague,    Thank you for referring your patient, Adrianna Pereira, to the [facility]. Please see a copy of my visit note below.    Radiation Oncology Progress Note    HPI:Ms. 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  Final pathology demonstrated squamous cell carcinoma moderately differentiated, 4.1 cm in size originating from  floor mouth/ anterior tongue,  depth of invasion 29 mm, no LVSI, positive PNI, positive margin (left anterior lateral).  11 of 98 lymph nodes positive (4 cm largest deposit, positive extracapsular disease present), pathologic T4N3b.   She underwent adjuvant chemoradiation therapy, completed on 12/5/2023.  The patient had early recurrence of disease including locally as well as distantly (spine, lung, soft tissue).  She underwent palliative radiation therapy to thoracic spine.     Radiation Treatment  10/19/2023 to 12/5/2023: Head and neck, 6600 cGy in 33 fractions  2/22/24 to 3/12/24: Thoracic spine, T2 - T4, 2500 cGy in 10 fractions     The patient returns for follow-up.    MRI of the thoracic spine on 4/10/2024 to my review demonstrated overall improvement in spinal canal stenosis in the treated region.  No evidence of cord compression was noted.    Restaging scans with CT scan of the neck and chest abdomen pelvis on 6/24/24  demonstrated overall mixed response in the lung.  There was noted no significant change in the mass in the left mandibular angle/ramus but noted improvement in the ulceration to the right submental sublingual region.    Clinically, the patient has noted improvement in her thoracic spine pain.  She is tapered off steroid.  Using oxycodone as needed in the daytime and at night for pain. Pain from her head and neck cancer  burden is overall managed.     She has started systemic treatment under the care of medical oncology team.  Tolerating treatment thus far.  Nutrition continues to be through the tube.  Denies any focal neurologic change.  Continues to have exposed hardware and open wound. Wound drainage is less per patient.  ENT team has discussed the past potential surgery but deferred in the past due to extent of surgery that will likely be required.      Plan:  Re-staging CTCAP demonstrated [place] in head neck region and thorax. No clear POD was noted.    Continue palliative systemic therapy and oncologic surveillance per medical oncology team.    2.   RTC in 3 months.        Hernán Egan M.D.  Department of Radiation Oncology  [facility]

## 2024-06-26 NOTE — LETTER
"6/26/2024      Adiranna Pereira  [address] [address] [place] [id number]      Dear Colleague,    Thank you for referring your patient, Adrianna Pereira, to the [facility]CER [facility]. Please see a copy of my visit note below.    Oncology Rooming Note    June 26, 2024 10:28 AM   Adrianna Pereira is a 63 year old female who presents for:    Chief Complaint   Patient presents with     Oncology Clinic Visit     Malignant neoplasm metastatic to bone - labs, provider, infusion     Initial Vitals: /66 (BP Location: Right arm, Patient Position: Sitting, Cuff Size: Adult Regular)   Pulse 92   Temp 97.8  F (36.6  C) (Tympanic)   Ht 1.6 m (5' 3\")   Wt 38 kg (83 lb 12.8 oz)   SpO2 99%   BMI 14.84 kg/m   Estimated body mass index is 14.84 kg/m  as calculated from the following:    Height as of this encounter: 1.6 m (5' 3\").    Weight as of this encounter: 38 kg (83 lb 12.8 oz). Body surface area is 1.3 meters squared.  No Pain (0) Comment: Data Unavailable   No LMP recorded. Patient is postmenopausal.  Allergies reviewed: Yes  Medications reviewed: Yes    Medications: Medication refills not needed today.  Pharmacy name entered into EPIC:    [id number] - [place], [place] - [address] [place] [address] PHARMACY - [place], [place] - [address] [facility] [place] - [place], [place] - [address] [facility] [place], [place] - [address] [facility]     Frailty Screening:   Is the patient here for a new oncology consult visit in cancer care? 2. No      Clinical concerns: None today.      Leana Lloyd MA              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 Unfortunately, within just a few months of completing, was found to have metastatic recurrence.     Radiation Treatment  10/19/2023 to 12/5/2023: Head and neck, 6600 cGy in 33 fractions  2/22/24 to 3/12/24: Thoracic spine, T2 - T4, 2500 cGy in 10 fractions    Has now completed 6 cycles of pembrolizumab. Tolerating well.   CT after cycle 6 showed overall stable disease with slight increase in a few lung mets. Since generally stable and the change in lung mets could be pseudo-progression early into immunotherapy, continued with pembrolizumab.     Labwork: CMP albumen 2.9. TSH WNL.    Ongoing anemia since completing chemoRT. Hgb stable 910.7 today    Plan:  -Continue with C6 pembrolizumab.   -Return every 3 weeks ahead of each cycle.   -recent Ct scan shows mostly stable or improved disease.  There are some new tumor nodules that are indeterminate.  Continue to monitor with scans about every 4 cycles.   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  -Monitor CBC every visit    Open oral/jaw wound  Plate exposure over lower lip and open wound secondary to residual/recurrent tumor at prior ENT resection site. Draining white-yellow drainage, which seems to be lessening. No fevers. Reconstruction would require additional free flap, so not being pursued at this time after discussion with ENT surgeon Dr. Thorne. Seen by Dr. Egan who has deferred additional radiation.    Met with Wound Care MD.   Supportive management interventions to help with drainage, odor and decreasing bacterial growth were provided.     Plan:  -Pt to follow Wound  Clinic recs for mgmt and return PRN.  -High risk for infection, monitor  -Follow-up with Dr. Thorne in ENT if desiring further exploration of more definitive surgical options, but in context of metastatic cancer he previously felt risk> benefit unless she had some longer term stabiltiy      Cancer pain  Completed palliative RT to T spine a few months ago. Back pain is resolved. No longer requiring pain meds for this.     Having progressive mouth/lower lip pain related to progressive reconstruction failure/open wound. Stopped NSAID use due to elevated creatinine. Managing well with Tylenol 500 mg 3/day and oxycodone 5-.7.5 mg twice/day.     Plan:  -Continue avoiding NSAIDs  -Continue Tylenol instead, can take up to 3000 mg/day. Continue oxycodone use through day as well  -Referral to Palliative Care if not well-controlled in future, pt wants to minimize number of appts for now.     Nutrition/hydration  Dependant on feeding tube. Weight down 3 pounds since last visit. Staying hydrated via PEG flushes,   There is leakage around the tube and balloon has migrated into the first portion of the duodenum.  Will have nutrition reevaluate.      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   On observation.   ______________________________________________________________________________    History of Present Illness/ Interval History    Ms. Adrianna Pereira is a 63 year old who completed resection of floor of mouth cancer, followed by adjuvant chemoRT 12/2023. Unfortunately, shortly after this, she was found to have local and metastatic recurrence involving mandible, lung, bone (T3-T4) and soft tissue that is PDL1 70%.    She started palliative treatments with pembrolizumab in February.  Tolerating pembrolizumab generally well. No diarrhea. No dyspnea.    Her back pain resolved after palliative RT to T-spine.  No longer taking pain med for this.     Mouth pain  persists .. Stopped ibuprofen, managing with Tylenol and oxycodone 5-7.5 mg twice daily.  Pain has been progressive due to plate exposure her lip and open wound at prior ENT resection site below lower lip. Draining white-yellow drainage, amount of drainage is decreasing. No fevers. Assessed by ENT surgeon and reconstruction would require additional free flap, so not being pursued at this time. Saw Wound Clinic for mgmt recs    Oral communication is an ongoing issue.      D    ECOG Performance    1    Oncology History/Treatment  Diagnosis/Stage:   7/2023: [id number] Floor of mouth squamous cell cancer  -hx alcoholism (stopped all use) and smoking (quit 8/2023)  -hx premalignant oral cavity lesions s/p CO2 laser ablation with ENT  -presented with lower lip/chin swelling  -7/3/2023 left mandibular alveolus biopsy: invasive keratinizing moderately differential squamous cell carcinoma.   -PET: 4.4 x 3.7 x 3.2 cm anterior oral cavity mass invading into the mandible with a separate gluco-avid nodule on the right mandibular buccal mucosa and bilateral level 1B, level 2 and L3 metastatic lymphadenopathy without evidence of metastatic disease.   -8/17/2023 surgical path: pT4a, N3b tumor with positive left anterior lateral soft tissue margin, 11/98+ lymph nodes including STEFFEN (largest 4 cm) and several bilateral lymph nodes consistent with involvement by low-grade B-cell neoplasm suggestive of CLL/SLL.     2/2024: Progression to stage IV mouth squamous cell cancer to bone and lung  -Within weeks of completing definitive treatment for her primary disease, noted severe/progressive back pain  -CT C/T spine: new T3 compression fracture with hypodensity in vertebral body extending into L posterior elements suggestive of met. T4 posterior vertebral body lesion also suspicious. New lung nodules also noted  -MRI T spine: T3 compression fracture with extraosseous tumor involving ventral epidural space with mod - severe central spinal  "canal stenosis at T3  -PET: recurrent avid foci L maikel-mandible, numerous bilateral pulmonary/pleural mets, met mediastinal/hilar adenopathy, T3, T4, L adductor musculature and R upper thigh skin thickening  -PDL1 TPS and CPS 70% from 8/2023 original tumor biopsy    Treatment:  Locally advanced disease  -8/17/2023: segmental mandibulectomy, floor of mouth resection, anterior glossectomy, left fibular free flap, skin grafting, and tracheostomy.   -Post-op course complicated by skin necrosis requiring OR debridment and resuturing of neck incision/intraoral flap    -10/20/2023 - 12/5/2023: completed adjuvant concurrent RT (6600 cGy/33) + weekly cisplatin (x 7 cycles)    Metastatic recurrence  -2/21/2024 - present: pembrolizumab.  -Zometa started x 1 dose for bone mets, later held for jaw osteonecrosis risk    -2/26 - 3/12/2024: palliative RT to thoracic spine, 2500 cGy/10. Dex taper      Physical Exam  /66 (BP Location: Right arm, Patient Position: Sitting, Cuff Size: Adult Regular)   Pulse 92   Temp 97.8  F (36.6  C) (Tympanic)   Ht 1.6 m (5' 3\")   Wt 38 kg (83 lb 12.8 oz)   SpO2 99%   BMI 14.84 kg/m        GENERAL: Alert and oriented to time place and person. Seated comfortably. In no distress.  Dysarthria.   accompanied.  HEENT: Open wound below lower lip with exposed plate. Scant white/yellow purulent appearing odorous drainage.  SHAE, EOMI. No erythema. No icterus.  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  No skin breakdown.  No evident adenopathy/masses.  HEART: RRR  CHEST: regular respiratory effort. Lungs normal to percussion and auscultation  ABD: PEG in place. Non-distended  NEURO: No gross deficit noted.       Lab Results    Recent Results (from the past 168 hour(s))   CT Chest/Abdomen/Pelvis w Contrast   Result Value Ref Range    Radiologist flags Gastrostomy tube balloon is located in the first (Urgent)    Comprehensive metabolic panel   Result Value Ref Range "    Sodium 137 135 - 145 mmol/L    Potassium 4.7 3.4 - 5.3 mmol/L    Carbon Dioxide (CO2) 20 (L) 22 - 29 mmol/L    Anion Gap 11 7 - 15 mmol/L    Urea Nitrogen 21.9 8.0 - 23.0 mg/dL    Creatinine 0.67 0.51 - 0.95 mg/dL    GFR Estimate >90 >60 mL/min/1.73m2    Calcium 8.6 (L) 8.8 - 10.2 mg/dL    Chloride 106 98 - 107 mmol/L    Glucose 78 70 - 99 mg/dL    Alkaline Phosphatase 104 40 - 150 U/L    AST 15 0 - 45 U/L    ALT 12 0 - 50 U/L    Protein Total 5.9 (L) 6.4 - 8.3 g/dL    Albumin 2.9 (L) 3.5 - 5.2 g/dL    Bilirubin Total <0.2 <=1.2 mg/dL   TSH with free T4 reflex   Result Value Ref Range    TSH 1.54 0.30 - 4.20 uIU/mL   CBC with platelets and differential   Result Value Ref Range    WBC Count 9.2 4.0 - 11.0 10e3/uL    RBC Count 3.22 (L) 3.80 - 5.20 10e6/uL    Hemoglobin 10.7 (L) 11.7 - 15.7 g/dL    Hematocrit 32.3 (L) 35.0 - 47.0 %     78 - 100 fL    MCH 33.2 (H) 26.5 - 33.0 pg    MCHC 33.1 31.5 - 36.5 g/dL    RDW 14.3 10.0 - 15.0 %    Platelet Count 378 150 - 450 10e3/uL    % Neutrophils 73 %    % Lymphocytes 17 %    % Monocytes 7 %    % Eosinophils 2 %    % Basophils 1 %    % Immature Granulocytes 1 %    NRBCs per 100 WBC 0 <1 /100    Absolute Neutrophils 6.7 1.6 - 8.3 10e3/uL    Absolute Lymphocytes 1.6 0.8 - 5.3 10e3/uL    Absolute Monocytes 0.7 0.0 - 1.3 10e3/uL    Absolute Eosinophils 0.2 0.0 - 0.7 10e3/uL    Absolute Basophils 0.1 0.0 - 0.2 10e3/uL    Absolute Immature Granulocytes 0.1 <=0.4 10e3/uL    Absolute NRBCs 0.0 10e3/uL               I spent 35 minutes on the patient's visit today.  This included preparation for the visit, face-to-face time with the patient and documentation following the visit.  It did not include teaching or procedure time.    Signed by: Peter E. Friedell, MD                Again, thank you for allowing me to participate in the care of your patient.        Sincerely,        Peter E. Friedell, MD

## 2024-07-03 ENCOUNTER — MYC REFILL (OUTPATIENT)
Dept: ONCOLOGY | Facility: CLINIC | Age: 64
End: 2024-07-03
Payer: COMMERCIAL

## 2024-07-03 DIAGNOSIS — G89.3 CANCER RELATED PAIN: ICD-10-CM

## 2024-07-03 RX ORDER — OXYCODONE HCL 5 MG/5 ML
5 SOLUTION, ORAL ORAL EVERY 6 HOURS PRN
Qty: 300 ML | Refills: 0 | Status: SHIPPED | OUTPATIENT
Start: 2024-07-03 | End: 2024-07-23

## 2024-07-18 ENCOUNTER — LAB (OUTPATIENT)
Dept: INFUSION THERAPY | Facility: CLINIC | Age: 64
End: 2024-07-18
Attending: NURSE PRACTITIONER
Payer: COMMERCIAL

## 2024-07-18 ENCOUNTER — ONCOLOGY VISIT (OUTPATIENT)
Dept: ONCOLOGY | Facility: CLINIC | Age: 64
End: 2024-07-18
Attending: INTERNAL MEDICINE
Payer: COMMERCIAL

## 2024-07-18 VITALS
BODY MASS INDEX: 14.26 KG/M2 | WEIGHT: 80.5 LBS | DIASTOLIC BLOOD PRESSURE: 73 MMHG | OXYGEN SATURATION: 97 % | HEART RATE: 106 BPM | TEMPERATURE: 97.9 F | SYSTOLIC BLOOD PRESSURE: 100 MMHG

## 2024-07-18 VITALS — SYSTOLIC BLOOD PRESSURE: 102 MMHG | DIASTOLIC BLOOD PRESSURE: 68 MMHG

## 2024-07-18 DIAGNOSIS — Z93.1 S/P PERCUTANEOUS ENDOSCOPIC GASTROSTOMY (PEG) TUBE PLACEMENT (H): ICD-10-CM

## 2024-07-18 DIAGNOSIS — C78.00 MALIGNANT NEOPLASM METASTATIC TO LUNG, UNSPECIFIED LATERALITY (H): ICD-10-CM

## 2024-07-18 DIAGNOSIS — C79.51 MALIGNANT NEOPLASM METASTATIC TO BONE (H): ICD-10-CM

## 2024-07-18 DIAGNOSIS — C78.00 MALIGNANT NEOPLASM METASTATIC TO LUNG, UNSPECIFIED LATERALITY (H): Primary | ICD-10-CM

## 2024-07-18 DIAGNOSIS — C06.9 SQUAMOUS CELL CARCINOMA OF ORAL CAVITY (H): ICD-10-CM

## 2024-07-18 DIAGNOSIS — C79.51 MALIGNANT NEOPLASM METASTATIC TO BONE (H): Primary | ICD-10-CM

## 2024-07-18 DIAGNOSIS — C06.9 SQUAMOUS CELL CARCINOMA OF ORAL CAVITY (H): Primary | ICD-10-CM

## 2024-07-18 LAB
ALBUMIN SERPL BCG-MCNC: 3 G/DL (ref 3.5–5.2)
ALP SERPL-CCNC: 110 U/L (ref 40–150)
ALT SERPL W P-5'-P-CCNC: 19 U/L (ref 0–50)
ANION GAP SERPL CALCULATED.3IONS-SCNC: 8 MMOL/L (ref 7–15)
AST SERPL W P-5'-P-CCNC: 19 U/L (ref 0–45)
BASOPHILS # BLD AUTO: 0.1 10E3/UL (ref 0–0.2)
BASOPHILS NFR BLD AUTO: 1 %
BILIRUB SERPL-MCNC: <0.2 MG/DL
BUN SERPL-MCNC: 26.8 MG/DL (ref 8–23)
CALCIUM SERPL-MCNC: 8.7 MG/DL (ref 8.8–10.4)
CHLORIDE SERPL-SCNC: 106 MMOL/L (ref 98–107)
CREAT SERPL-MCNC: 0.66 MG/DL (ref 0.51–0.95)
EGFRCR SERPLBLD CKD-EPI 2021: >90 ML/MIN/1.73M2
EOSINOPHIL # BLD AUTO: 0.1 10E3/UL (ref 0–0.7)
EOSINOPHIL NFR BLD AUTO: 1 %
ERYTHROCYTE [DISTWIDTH] IN BLOOD BY AUTOMATED COUNT: 14 % (ref 10–15)
GLUCOSE SERPL-MCNC: 64 MG/DL (ref 70–99)
HCO3 SERPL-SCNC: 24 MMOL/L (ref 22–29)
HCT VFR BLD AUTO: 35 % (ref 35–47)
HGB BLD-MCNC: 11.8 G/DL (ref 11.7–15.7)
IMM GRANULOCYTES # BLD: 0.1 10E3/UL
IMM GRANULOCYTES NFR BLD: 1 %
LYMPHOCYTES # BLD AUTO: 1.3 10E3/UL (ref 0.8–5.3)
LYMPHOCYTES NFR BLD AUTO: 12 %
MCH RBC QN AUTO: 33.3 PG (ref 26.5–33)
MCHC RBC AUTO-ENTMCNC: 33.7 G/DL (ref 31.5–36.5)
MCV RBC AUTO: 99 FL (ref 78–100)
MONOCYTES # BLD AUTO: 0.8 10E3/UL (ref 0–1.3)
MONOCYTES NFR BLD AUTO: 7 %
NEUTROPHILS # BLD AUTO: 8.7 10E3/UL (ref 1.6–8.3)
NEUTROPHILS NFR BLD AUTO: 79 %
NRBC # BLD AUTO: 0 10E3/UL
NRBC BLD AUTO-RTO: 0 /100
PLATELET # BLD AUTO: 406 10E3/UL (ref 150–450)
POTASSIUM SERPL-SCNC: 4.3 MMOL/L (ref 3.4–5.3)
PROT SERPL-MCNC: 6.1 G/DL (ref 6.4–8.3)
RBC # BLD AUTO: 3.54 10E6/UL (ref 3.8–5.2)
SODIUM SERPL-SCNC: 138 MMOL/L (ref 135–145)
TSH SERPL DL<=0.005 MIU/L-ACNC: 1.04 UIU/ML (ref 0.3–4.2)
WBC # BLD AUTO: 11 10E3/UL (ref 4–11)

## 2024-07-18 PROCEDURE — 258N000003 HC RX IP 258 OP 636: Performed by: NURSE PRACTITIONER

## 2024-07-18 PROCEDURE — 84443 ASSAY THYROID STIM HORMONE: CPT | Performed by: INTERNAL MEDICINE

## 2024-07-18 PROCEDURE — 250N000011 HC RX IP 250 OP 636: Performed by: NURSE PRACTITIONER

## 2024-07-18 PROCEDURE — 96413 CHEMO IV INFUSION 1 HR: CPT

## 2024-07-18 PROCEDURE — 36415 COLL VENOUS BLD VENIPUNCTURE: CPT

## 2024-07-18 PROCEDURE — 85049 AUTOMATED PLATELET COUNT: CPT | Performed by: NURSE PRACTITIONER

## 2024-07-18 PROCEDURE — 99214 OFFICE O/P EST MOD 30 MIN: CPT | Performed by: NURSE PRACTITIONER

## 2024-07-18 PROCEDURE — 80053 COMPREHEN METABOLIC PANEL: CPT | Performed by: INTERNAL MEDICINE

## 2024-07-18 PROCEDURE — 96375 TX/PRO/DX INJ NEW DRUG ADDON: CPT

## 2024-07-18 PROCEDURE — G0463 HOSPITAL OUTPT CLINIC VISIT: HCPCS | Performed by: NURSE PRACTITIONER

## 2024-07-18 PROCEDURE — 36415 COLL VENOUS BLD VENIPUNCTURE: CPT | Performed by: INTERNAL MEDICINE

## 2024-07-18 PROCEDURE — G2211 COMPLEX E/M VISIT ADD ON: HCPCS | Performed by: NURSE PRACTITIONER

## 2024-07-18 RX ORDER — HEPARIN SODIUM (PORCINE) LOCK FLUSH IV SOLN 100 UNIT/ML 100 UNIT/ML
5 SOLUTION INTRAVENOUS
Status: CANCELLED | OUTPATIENT
Start: 2024-07-18

## 2024-07-18 RX ORDER — LORAZEPAM 2 MG/ML
0.5 INJECTION INTRAMUSCULAR EVERY 4 HOURS PRN
Status: CANCELLED | OUTPATIENT
Start: 2024-07-18

## 2024-07-18 RX ORDER — EPINEPHRINE 1 MG/ML
0.3 INJECTION, SOLUTION, CONCENTRATE INTRAVENOUS EVERY 5 MIN PRN
Status: CANCELLED | OUTPATIENT
Start: 2024-07-18

## 2024-07-18 RX ORDER — ALBUTEROL SULFATE 90 UG/1
1-2 AEROSOL, METERED RESPIRATORY (INHALATION)
Status: CANCELLED
Start: 2024-07-18

## 2024-07-18 RX ORDER — METHYLPREDNISOLONE SODIUM SUCCINATE 125 MG/2ML
125 INJECTION, POWDER, LYOPHILIZED, FOR SOLUTION INTRAMUSCULAR; INTRAVENOUS
Status: CANCELLED
Start: 2024-07-18

## 2024-07-18 RX ORDER — MEPERIDINE HYDROCHLORIDE 25 MG/ML
25 INJECTION INTRAMUSCULAR; INTRAVENOUS; SUBCUTANEOUS EVERY 30 MIN PRN
Status: CANCELLED | OUTPATIENT
Start: 2024-07-18

## 2024-07-18 RX ORDER — DIPHENHYDRAMINE HYDROCHLORIDE 50 MG/ML
50 INJECTION INTRAMUSCULAR; INTRAVENOUS
Status: CANCELLED
Start: 2024-07-18

## 2024-07-18 RX ORDER — HEPARIN SODIUM (PORCINE) LOCK FLUSH IV SOLN 100 UNIT/ML 100 UNIT/ML
5 SOLUTION INTRAVENOUS
Status: DISCONTINUED | OUTPATIENT
Start: 2024-07-18 | End: 2024-07-18 | Stop reason: HOSPADM

## 2024-07-18 RX ORDER — HEPARIN SODIUM,PORCINE 10 UNIT/ML
5-20 VIAL (ML) INTRAVENOUS DAILY PRN
Status: DISCONTINUED | OUTPATIENT
Start: 2024-07-18 | End: 2024-07-18 | Stop reason: HOSPADM

## 2024-07-18 RX ORDER — HEPARIN SODIUM (PORCINE) LOCK FLUSH IV SOLN 100 UNIT/ML 100 UNIT/ML
SOLUTION INTRAVENOUS
Status: DISPENSED
Start: 2024-07-18 | End: 2024-07-18

## 2024-07-18 RX ORDER — HEPARIN SODIUM,PORCINE 10 UNIT/ML
5-20 VIAL (ML) INTRAVENOUS DAILY PRN
Status: CANCELLED | OUTPATIENT
Start: 2024-07-18

## 2024-07-18 RX ORDER — ALBUTEROL SULFATE 0.83 MG/ML
2.5 SOLUTION RESPIRATORY (INHALATION)
Status: CANCELLED | OUTPATIENT
Start: 2024-07-18

## 2024-07-18 RX ADMIN — SODIUM CHLORIDE 250 ML: 9 INJECTION, SOLUTION INTRAVENOUS at 11:37

## 2024-07-18 RX ADMIN — SODIUM CHLORIDE 200 MG: 9 INJECTION, SOLUTION INTRAVENOUS at 11:37

## 2024-07-18 RX ADMIN — ALTEPLASE 2 MG: 2.2 INJECTION, POWDER, LYOPHILIZED, FOR SOLUTION INTRAVENOUS at 10:08

## 2024-07-18 ASSESSMENT — PAIN SCALES - GENERAL: PAINLEVEL: NO PAIN (0)

## 2024-07-18 NOTE — LETTER
7/18/2024      Adrianna Pereira  [address] [address] [place] [id number]      Dear Colleague,    Thank you for referring your patient, Adrianna Pereira, to the [facility] [place]. Please see a copy of my visit note below.    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 Unfortunately, within just a few months of completing, was found to have metastatic recurrence.     Radiation Treatment  10/19/2023 to 12/5/2023: Head and neck, 6600 cGy in 33 fractions  2/22/24 to 3/12/24: Thoracic spine, T2 - T4, 2500 cGy in 10 fractions    Now, on palliative pembrolizumab. Has been on 5 months. Tolerating well.     CT in June shows stable to slightly improved disease in head/neck and stable/improved lung nodules; however some new patchy opacities bilaterally (could be inflammatory/infectious). No symptoms of pneumonitis, but need to follow clinically with risk of immunotherapy pneumonitis.     Labwork: CBC: hgb now WNL, rest CBC WNL. CMP: WNL. TSH pending    Ongoing anemia since completing chemoRT. Hgb now back to WNL.    Plan:  -Continue with C8 pembrolizumab.   -Return every 3 weeks ahead of each cycle.   -CBC every 3 months.   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    Open oral/jaw wound  Plate exposure over lower lip and open wound secondary to residual/recurrent tumor at prior  ENT resection site. Draining white-yellow drainage, which seems to be lessening. No fevers. Reconstruction would require additional free flap, so not being pursued at this time after discussion with ENT surgeon Dr. Thorne. Seen by Dr. Egan who has deferred additional radiation.    Met with Wound Care MD.   Supportive management interventions to help with drainage, odor and decreasing bacterial growth were provided.     Plan:  -Pt to follow Wound Clinic recs for mgmt and return PRN.  -High risk for infection, monitor  -Follow-up with Dr. Thorne in ENT if desiring further exploration of more definitive surgical options, but in context of metastatic cancer he previously felt risk> benefit unless she had some longer term stabiltiy  -No further RT recommended at this time, but Dr. Egan will follow every 3 mths to reassess    Cancer pain  Completed palliative RT to T spine 3/2024. Back pain is resolved. No longer requiring pain meds for this.     Mouth/lower lip pain related to progressive reconstruction failure/open wound. Stopped NSAID use due to elevated creatinine. Managing well with Tylenol 500 mg 3/day and oxycodone 5-.7.5 mg twice/day.     Plan:  -Continue avoiding NSAIDs  -Continue Tylenol instead, can take up to 3000 mg/day. Continue oxycodone use through day as well  -Referral to Palliative Care if not well-controlled in future, pt wants to minimize number of appts for now.     Nutrition/hydration  Dependant on feeding tube. Weight down a bit over last few months.  There is leakage around the tube if she tries to do a full feeding, so is not able to meet caloric goals with mild weight loss.   Per last 2 CT scans, the balloon has migrated into the first portion of the duodenum.      Plan:  -Reach out to Nutrition/surgery team to assess placement and may need replacement. She had PEG placed at [facility], but would like follow-up/intervention done in [place]    Latent low-gr B cell lymphoproliferative disorder  (CLL/SLL)  Bilateral cervical LN and bone marrow involvement.   On observation.   ______________________________________________________________________________    History of Present Illness/ Interval History    Ms. Adrianna Pereira is a 63 year old who completed resection of floor of mouth cancer, followed by adjuvant chemoRT 12/2023. Unfortunately, shortly after this, she was found to have local and metastatic recurrence involving mandible, lung, bone (T3-T4) and soft tissue that is PDL1 70%.    She started palliative treatments with pembrolizumab in February.  Tolerating pembrolizumab generally well. No diarrhea. No new dyspnea/cough.     Her back pain resolved after palliative RT to T-spine.  No longer taking pain med for this.     Mouth pain due to plate exposure of lip/open wound at prior ENT resection site due to local recurrence persists. Managing with Tylenol and oxycodone 5-7.5 mg twice daily. Draining white-yellow drainage, amount of drainage is decreasing. No fevers. Assessed by ENT surgeon and reconstruction would require additional free flap, so not being pursued at this time. Saw Wound Clinic for mgmt recs.    Oral communication is an ongoing issue.    All nutrition/hydration via PEG. Tolerating feedings well. No nausea. Occasional mild diarrhea.   Leakage around site if she tries to do a full feeding, so is not meeting feeding goals with mild weight loss.       ECOG Performance    1    Oncology History/Treatment  Diagnosis/Stage:   7/2023: [id number] Floor of mouth squamous cell cancer  -hx alcoholism (stopped all use) and smoking (quit 8/2023)  -hx premalignant oral cavity lesions s/p CO2 laser ablation with ENT  -presented with lower lip/chin swelling  -7/3/2023 left mandibular alveolus biopsy: invasive keratinizing moderately differential squamous cell carcinoma.   -PET: 4.4 x 3.7 x 3.2 cm anterior oral cavity mass invading into the mandible with a separate gluco-avid nodule on the right mandibular  buccal mucosa and bilateral level 1B, level 2 and L3 metastatic lymphadenopathy without evidence of metastatic disease.   -8/17/2023 surgical path: pT4a, N3b tumor with positive left anterior lateral soft tissue margin, 11/98+ lymph nodes including STEFFEN (largest 4 cm) and several bilateral lymph nodes consistent with involvement by low-grade B-cell neoplasm suggestive of CLL/SLL.     2/2024: Progression to stage IV mouth squamous cell cancer to bone and lung  -Within weeks of completing definitive treatment for her primary disease, noted severe/progressive back pain  -CT C/T spine: new T3 compression fracture with hypodensity in vertebral body extending into L posterior elements suggestive of met. T4 posterior vertebral body lesion also suspicious. New lung nodules also noted  -MRI T spine: T3 compression fracture with extraosseous tumor involving ventral epidural space with mod - severe central spinal canal stenosis at T3  -PET: recurrent avid foci L maikel-mandible, numerous bilateral pulmonary/pleural mets, met mediastinal/hilar adenopathy, T3, T4, L adductor musculature and R upper thigh skin thickening  -PDL1 TPS and CPS 70% from 8/2023 original tumor biopsy    Treatment:  Locally advanced disease  -8/17/2023: segmental mandibulectomy, floor of mouth resection, anterior glossectomy, left fibular free flap, skin grafting, and tracheostomy.   -Post-op course complicated by skin necrosis requiring OR debridment and resuturing of neck incision/intraoral flap    -10/20/2023 - 12/5/2023: completed adjuvant concurrent RT (6600 cGy/33) + weekly cisplatin (x 7 cycles)    Metastatic recurrence  -2/21/2024 - present: pembrolizumab.  -Zometa started x 1 dose for bone mets, later held for jaw osteonecrosis risk    -2/26 - 3/12/2024: palliative RT to thoracic spine, 2500 cGy/10. Dex taper      Physical Exam  /73 (BP Location: Left arm, Patient Position: Sitting, Cuff Size: Adult Small)   Pulse 106   Temp 97.9  F (36.6   C) (Tympanic)   Wt 36.5 kg (80 lb 8 oz)   SpO2 97%   BMI 14.26 kg/m        GENERAL: Alert and oriented to time place and person. Seated comfortably. In no distress.  Dysarthria.   accompanied.  HEENT: Open wound below lower lip with exposed plate. Scant white/yellow appearing odorous drainage.  SHAE, EOMI. No erythema. No icterus.  NECK: Post-RT fibrosis. No evident adenopathy/masses.  HEART: RRR  CHEST: regular respiratory effort. Lungs normal to percussion and auscultation  ABD: PEG in place. Non-distended  NEURO: No gross deficit noted.     Imaging    6/26/2024 CT n/c/a/p: reviewed in A/P. Overall stable/response.     Lab Results    Recent Results (from the past 168 hour(s))   Comprehensive metabolic panel   Result Value Ref Range    Sodium 138 135 - 145 mmol/L    Potassium 4.3 3.4 - 5.3 mmol/L    Carbon Dioxide (CO2) 24 22 - 29 mmol/L    Anion Gap 8 7 - 15 mmol/L    Urea Nitrogen 26.8 (H) 8.0 - 23.0 mg/dL    Creatinine 0.66 0.51 - 0.95 mg/dL    GFR Estimate >90 >60 mL/min/1.73m2    Calcium 8.7 (L) 8.8 - 10.4 mg/dL    Chloride 106 98 - 107 mmol/L    Glucose 64 (L) 70 - 99 mg/dL    Alkaline Phosphatase 110 40 - 150 U/L    AST 19 0 - 45 U/L    ALT 19 0 - 50 U/L    Protein Total 6.1 (L) 6.4 - 8.3 g/dL    Albumin 3.0 (L) 3.5 - 5.2 g/dL    Bilirubin Total <0.2 <=1.2 mg/dL   TSH with free T4 reflex   Result Value Ref Range    TSH 1.04 0.30 - 4.20 uIU/mL   CBC with platelets and differential   Result Value Ref Range    WBC Count 11.0 4.0 - 11.0 10e3/uL    RBC Count 3.54 (L) 3.80 - 5.20 10e6/uL    Hemoglobin 11.8 11.7 - 15.7 g/dL    Hematocrit 35.0 35.0 - 47.0 %    MCV 99 78 - 100 fL    MCH 33.3 (H) 26.5 - 33.0 pg    MCHC 33.7 31.5 - 36.5 g/dL    RDW 14.0 10.0 - 15.0 %    Platelet Count 406 150 - 450 10e3/uL    % Neutrophils 79 %    % Lymphocytes 12 %    % Monocytes 7 %    % Eosinophils 1 %    % Basophils 1 %    % Immature Granulocytes 1 %    NRBCs per 100 WBC 0 <1 /100    Absolute Neutrophils 8.7 (H) 1.6 -  "8.3 10e3/uL    Absolute Lymphocytes 1.3 0.8 - 5.3 10e3/uL    Absolute Monocytes 0.8 0.0 - 1.3 10e3/uL    Absolute Eosinophils 0.1 0.0 - 0.7 10e3/uL    Absolute Basophils 0.1 0.0 - 0.2 10e3/uL    Absolute Immature Granulocytes 0.1 <=0.4 10e3/uL    Absolute NRBCs 0.0 10e3/uL       Total time 35 minutes, to include face to face visit, review of EMR, ordering, documentation and coordination of care on date of service.    complexity modifier for longitudinal care.       Signed by: Marina Cronin NP                Oncology Rooming Note    July 18, 2024 10:16 AM   Adrianna Pereira is a 63 year old female who presents for:    Chief Complaint   Patient presents with     Oncology Clinic Visit     Malignant neoplasm metastatic to bone - labs, provider, infusion     Initial Vitals: /73 (BP Location: Left arm, Patient Position: Sitting, Cuff Size: Adult Small)   Pulse 106   Temp 97.9  F (36.6  C) (Tympanic)   Wt 36.5 kg (80 lb 8 oz)   SpO2 97%   BMI 14.26 kg/m   Estimated body mass index is 14.26 kg/m  as calculated from the following:    Height as of 6/26/24: 1.6 m (5' 3\").    Weight as of this encounter: 36.5 kg (80 lb 8 oz). Body surface area is 1.27 meters squared.  No Pain (0) Comment: Data Unavailable   No LMP recorded. Patient is postmenopausal.  Allergies reviewed: Yes  Medications reviewed: Yes    Medications: Medication refills not needed today.  Pharmacy name entered into EPIC:    [id number] - [place], [place] - [address] [place], [place] [address] - [place], [place] - [address]  [facility] [place], [place] - [address] [facility] [place], [place] - [address] [facility]     Frailty Screening:   Is the patient here for a new oncology consult visit in cancer care? 2. No      Clinical concerns: None today       Leana Lloyd MA                Again, thank you for allowing me to participate in the care of your patient.        Sincerely,        Marina HUNTER" Mehrdad, NP

## 2024-07-18 NOTE — PROGRESS NOTES
Infusion Nursing Note:  Adrianna Pereira presents today for Keytruda.    Patient seen by provider today: Yes: MICHELLE   present during visit today: Not Applicable.    Note: N/A.      Intravenous Access:  Implanted Port.    Treatment Conditions:  Results reviewed, labs MET treatment parameters, ok to proceed with treatment.      Post Infusion Assessment:  Patient tolerated infusion without incident.  Blood return noted pre and post infusion.  Site patent and intact, free from redness, edema or discomfort.  No evidence of extravasations.  Access discontinued per protocol.       Discharge Plan:   Discharge instructions reviewed with: Patient.  Patient discharged in stable condition accompanied by: self.  Departure Mode: Ambulatory.      Yakelin Gilman RN

## 2024-07-18 NOTE — PROGRESS NOTES
Unable to draw labs from port. Peripheral labs drawn. TPA placed in port.  After wait time, was able to to draw back blood.

## 2024-07-18 NOTE — PROGRESS NOTES
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 Unfortunately, within just a few months of completing, was found to have metastatic recurrence.     Radiation Treatment  10/19/2023 to 12/5/2023: Head and neck, 6600 cGy in 33 fractions  2/22/24 to 3/12/24: Thoracic spine, T2 - T4, 2500 cGy in 10 fractions    Now, on palliative pembrolizumab. Has been on 5 months. Tolerating well.     CT in June shows stable to slightly improved disease in head/neck and stable/improved lung nodules; however some new patchy opacities bilaterally (could be inflammatory/infectious). No symptoms of pneumonitis, but need to follow clinically with risk of immunotherapy pneumonitis.     Labwork: CBC: hgb now WNL, rest CBC WNL. CMP: WNL. TSH pending    Ongoing anemia since completing chemoRT. Hgb now back to WNL.    Plan:  -Continue with C8 pembrolizumab.   -Return every 3 weeks ahead of each cycle.   -CBC every 3 months.   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    Open oral/jaw wound  Plate exposure over lower lip and open wound secondary to residual/recurrent tumor at prior ENT resection site. Draining white-yellow drainage, which seems to be lessening. No fevers. Reconstruction would require additional free flap, so not being pursued at this time after discussion with ENT surgeon Dr. Thorne. Seen by  Dr. Egan who has deferred additional radiation.    Met with Wound Care MD.   Supportive management interventions to help with drainage, odor and decreasing bacterial growth were provided.     Plan:  -Pt to follow Wound Clinic recs for mgmt and return PRN.  -High risk for infection, monitor  -Follow-up with Dr. Thorne in ENT if desiring further exploration of more definitive surgical options, but in context of metastatic cancer he previously felt risk> benefit unless she had some longer term stabiltiy  -No further RT recommended at this time, but Dr. Egan will follow every 3 mths to reassess    Cancer pain  Completed palliative RT to T spine 3/2024. Back pain is resolved. No longer requiring pain meds for this.     Mouth/lower lip pain related to progressive reconstruction failure/open wound. Stopped NSAID use due to elevated creatinine. Managing well with Tylenol 500 mg 3/day and oxycodone 5-.7.5 mg twice/day.     Plan:  -Continue avoiding NSAIDs  -Continue Tylenol instead, can take up to 3000 mg/day. Continue oxycodone use through day as well  -Referral to Palliative Care if not well-controlled in future, pt wants to minimize number of appts for now.     Nutrition/hydration  Dependant on feeding tube. Weight down a bit over last few months.  There is leakage around the tube if she tries to do a full feeding, so is not able to meet caloric goals with mild weight loss.   Per last 2 CT scans, the balloon has migrated into the first portion of the duodenum.      Plan:  -Reach out to Nutrition/surgery team to assess placement and may need replacement. 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   On observation.   ______________________________________________________________________________    History of Present Illness/ Interval History    Ms. Adrianna Pereira is a 63  year old who completed resection of floor of mouth cancer, followed by adjuvant chemoRT 12/2023. Unfortunately, shortly after this, she was found to have local and metastatic recurrence involving mandible, lung, bone (T3-T4) and soft tissue that is PDL1 70%.    She started palliative treatments with pembrolizumab in February.  Tolerating pembrolizumab generally well. No diarrhea. No new dyspnea/cough.     Her back pain resolved after palliative RT to T-spine.  No longer taking pain med for this.     Mouth pain due to plate exposure of lip/open wound at prior ENT resection site due to local recurrence persists. Managing with Tylenol and oxycodone 5-7.5 mg twice daily. Draining white-yellow drainage, amount of drainage is decreasing. No fevers. Assessed by ENT surgeon and reconstruction would require additional free flap, so not being pursued at this time. Saw Wound Clinic for mgmt recs.    Oral communication is an ongoing issue.    All nutrition/hydration via PEG. Tolerating feedings well. No nausea. Occasional mild diarrhea.   Leakage around site if she tries to do a full feeding, so is not meeting feeding goals with mild weight loss.       ECOG Performance    1    Oncology History/Treatment  Diagnosis/Stage:   7/2023: [id number] Floor of mouth squamous cell cancer  -hx alcoholism (stopped all use) and smoking (quit 8/2023)  -hx premalignant oral cavity lesions s/p CO2 laser ablation with ENT  -presented with lower lip/chin swelling  -7/3/2023 left mandibular alveolus biopsy: invasive keratinizing moderately differential squamous cell carcinoma.   -PET: 4.4 x 3.7 x 3.2 cm anterior oral cavity mass invading into the mandible with a separate gluco-avid nodule on the right mandibular buccal mucosa and bilateral level 1B, level 2 and L3 metastatic lymphadenopathy without evidence of metastatic disease.   -8/17/2023 surgical path: pT4a, N3b tumor with positive left anterior lateral soft tissue margin, 11/98+ lymph nodes  including STEFFEN (largest 4 cm) and several bilateral lymph nodes consistent with involvement by low-grade B-cell neoplasm suggestive of CLL/SLL.     2/2024: Progression to stage IV mouth squamous cell cancer to bone and lung  -Within weeks of completing definitive treatment for her primary disease, noted severe/progressive back pain  -CT C/T spine: new T3 compression fracture with hypodensity in vertebral body extending into L posterior elements suggestive of met. T4 posterior vertebral body lesion also suspicious. New lung nodules also noted  -MRI T spine: T3 compression fracture with extraosseous tumor involving ventral epidural space with mod - severe central spinal canal stenosis at T3  -PET: recurrent avid foci L maikel-mandible, numerous bilateral pulmonary/pleural mets, met mediastinal/hilar adenopathy, T3, T4, L adductor musculature and R upper thigh skin thickening  -PDL1 TPS and CPS 70% from 8/2023 original tumor biopsy    Treatment:  Locally advanced disease  -8/17/2023: segmental mandibulectomy, floor of mouth resection, anterior glossectomy, left fibular free flap, skin grafting, and tracheostomy.   -Post-op course complicated by skin necrosis requiring OR debridment and resuturing of neck incision/intraoral flap    -10/20/2023 - 12/5/2023: completed adjuvant concurrent RT (6600 cGy/33) + weekly cisplatin (x 7 cycles)    Metastatic recurrence  -2/21/2024 - present: pembrolizumab.  -Zometa started x 1 dose for bone mets, later held for jaw osteonecrosis risk    -2/26 - 3/12/2024: palliative RT to thoracic spine, 2500 cGy/10. Dex taper      Physical Exam  /73 (BP Location: Left arm, Patient Position: Sitting, Cuff Size: Adult Small)   Pulse 106   Temp 97.9  F (36.6  C) (Tympanic)   Wt 36.5 kg (80 lb 8 oz)   SpO2 97%   BMI 14.26 kg/m        GENERAL: Alert and oriented to time place and person. Seated comfortably. In no distress.  Dysarthria.   accompanied.  HEENT: Open wound below lower lip  with exposed plate. Scant white/yellow appearing odorous drainage.  SAHE, EOMI. No erythema. No icterus.  NECK: Post-RT fibrosis. No evident adenopathy/masses.  HEART: RRR  CHEST: regular respiratory effort. Lungs normal to percussion and auscultation  ABD: PEG in place. Non-distended  NEURO: No gross deficit noted.     Imaging    6/26/2024 CT n/c/a/p: reviewed in A/P. Overall stable/response.     Lab Results    Recent Results (from the past 168 hour(s))   Comprehensive metabolic panel   Result Value Ref Range    Sodium 138 135 - 145 mmol/L    Potassium 4.3 3.4 - 5.3 mmol/L    Carbon Dioxide (CO2) 24 22 - 29 mmol/L    Anion Gap 8 7 - 15 mmol/L    Urea Nitrogen 26.8 (H) 8.0 - 23.0 mg/dL    Creatinine 0.66 0.51 - 0.95 mg/dL    GFR Estimate >90 >60 mL/min/1.73m2    Calcium 8.7 (L) 8.8 - 10.4 mg/dL    Chloride 106 98 - 107 mmol/L    Glucose 64 (L) 70 - 99 mg/dL    Alkaline Phosphatase 110 40 - 150 U/L    AST 19 0 - 45 U/L    ALT 19 0 - 50 U/L    Protein Total 6.1 (L) 6.4 - 8.3 g/dL    Albumin 3.0 (L) 3.5 - 5.2 g/dL    Bilirubin Total <0.2 <=1.2 mg/dL   TSH with free T4 reflex   Result Value Ref Range    TSH 1.04 0.30 - 4.20 uIU/mL   CBC with platelets and differential   Result Value Ref Range    WBC Count 11.0 4.0 - 11.0 10e3/uL    RBC Count 3.54 (L) 3.80 - 5.20 10e6/uL    Hemoglobin 11.8 11.7 - 15.7 g/dL    Hematocrit 35.0 35.0 - 47.0 %    MCV 99 78 - 100 fL    MCH 33.3 (H) 26.5 - 33.0 pg    MCHC 33.7 31.5 - 36.5 g/dL    RDW 14.0 10.0 - 15.0 %    Platelet Count 406 150 - 450 10e3/uL    % Neutrophils 79 %    % Lymphocytes 12 %    % Monocytes 7 %    % Eosinophils 1 %    % Basophils 1 %    % Immature Granulocytes 1 %    NRBCs per 100 WBC 0 <1 /100    Absolute Neutrophils 8.7 (H) 1.6 - 8.3 10e3/uL    Absolute Lymphocytes 1.3 0.8 - 5.3 10e3/uL    Absolute Monocytes 0.8 0.0 - 1.3 10e3/uL    Absolute Eosinophils 0.1 0.0 - 0.7 10e3/uL    Absolute Basophils 0.1 0.0 - 0.2 10e3/uL    Absolute Immature Granulocytes 0.1 <=0.4  10e3/uL    Absolute NRBCs 0.0 10e3/uL       Total time 35 minutes, to include face to face visit, review of EMR, ordering, documentation and coordination of care on date of service.    complexity modifier for longitudinal care.       Signed by: Marina Cronin, NP

## 2024-07-18 NOTE — PROGRESS NOTES
"Oncology Rooming Note    July 18, 2024 10:16 AM   Adrianna Pereira is a 63 year old female who presents for:    Chief Complaint   Patient presents with    Oncology Clinic Visit     Malignant neoplasm metastatic to bone - labs, provider, infusion     Initial Vitals: /73 (BP Location: Left arm, Patient Position: Sitting, Cuff Size: Adult Small)   Pulse 106   Temp 97.9  F (36.6  C) (Tympanic)   Wt 36.5 kg (80 lb 8 oz)   SpO2 97%   BMI 14.26 kg/m   Estimated body mass index is 14.26 kg/m  as calculated from the following:    Height as of 6/26/24: 1.6 m (5' 3\").    Weight as of this encounter: 36.5 kg (80 lb 8 oz). Body surface area is 1.27 meters squared.  No Pain (0) Comment: Data Unavailable   No LMP recorded. Patient is postmenopausal.  Allergies reviewed: Yes  Medications reviewed: Yes    Medications: Medication refills not needed today.  Pharmacy name entered into EPIC:    [id number] - [place], [place] - [address] [place] [address] PHARMACY - [place], [place] - [address] [facility] [place], [place] - [address] [facility] [place] [place] [facility]     Frailty Screening:   Is the patient here for a new oncology consult visit in cancer care? 2. No      Clinical concerns: None today       Leana Lloyd MA              "

## 2024-07-23 ENCOUNTER — MYC REFILL (OUTPATIENT)
Dept: ONCOLOGY | Facility: CLINIC | Age: 64
End: 2024-07-23
Payer: COMMERCIAL

## 2024-07-23 DIAGNOSIS — G89.3 CANCER RELATED PAIN: ICD-10-CM

## 2024-07-23 RX ORDER — OXYCODONE HCL 5 MG/5 ML
5 SOLUTION, ORAL ORAL EVERY 6 HOURS PRN
Qty: 300 ML | Refills: 0 | Status: SHIPPED | OUTPATIENT
Start: 2024-07-23 | End: 2024-08-11

## 2024-08-01 ENCOUNTER — OFFICE VISIT (OUTPATIENT)
Dept: SURGERY | Facility: CLINIC | Age: 64
End: 2024-08-01
Attending: NURSE PRACTITIONER
Payer: COMMERCIAL

## 2024-08-01 VITALS
WEIGHT: 80 LBS | BODY MASS INDEX: 14.18 KG/M2 | HEART RATE: 126 BPM | TEMPERATURE: 97.5 F | HEIGHT: 63 IN | OXYGEN SATURATION: 98 %

## 2024-08-01 DIAGNOSIS — C06.9 SQUAMOUS CELL CARCINOMA OF ORAL CAVITY (H): ICD-10-CM

## 2024-08-01 DIAGNOSIS — Z93.1 S/P PERCUTANEOUS ENDOSCOPIC GASTROSTOMY (PEG) TUBE PLACEMENT (H): ICD-10-CM

## 2024-08-01 DIAGNOSIS — C79.51 MALIGNANT NEOPLASM METASTATIC TO BONE (H): ICD-10-CM

## 2024-08-01 DIAGNOSIS — Z93.1 GASTROSTOMY IN PLACE (H): Primary | ICD-10-CM

## 2024-08-01 DIAGNOSIS — C78.00 MALIGNANT NEOPLASM METASTATIC TO LUNG, UNSPECIFIED LATERALITY (H): ICD-10-CM

## 2024-08-01 PROCEDURE — 43762 RPLC GTUBE NO REVJ TRC: CPT | Performed by: SURGERY

## 2024-08-01 ASSESSMENT — PAIN SCALES - GENERAL: PAINLEVEL: NO PAIN (0)

## 2024-08-01 NOTE — PROGRESS NOTES
Patient presents for gastrostomy replacement.  Last done 4.5 months prior with interventional radiology.  No issues with use, pain, or drainage.    Reviewed CT, shows balloon post pyloric (likely due to peristalsis).    18 Fr gastrostomy at 6 cm at skin, freely pulls back to approximately 2 cm.    After verbal consent, balloon was checked on new 18 Fr gastrostomy.  Old gastrostomy removed intact without difficulty.  New gastrostomy advanced.  Gastric contents freely flowed into tube.  Balloon inflated and withdrawn to 3 cm.  Bolster freely spins, loose at skin.  Tolerated without issue.    RTC 3-6 months for replacement.    Lg Doherty, DO on 8/1/2024 at 11:08 AM

## 2024-08-01 NOTE — LETTER
8/1/2024      Adrianna Pereira  [address] [address] [place] [id number]      Dear Colleague,    Thank you for referring your patient, Adrianna Pereira, to the [facility]. Please see a copy of my visit note below.    Patient presents for gastrostomy replacement.  Last done 4.5 months prior with interventional radiology.  No issues with use, pain, or drainage.    Reviewed CT, shows balloon post pyloric (likely due to peristalsis).    18 Fr gastrostomy at 6 cm at skin, freely pulls back to approximately 2 cm.    After verbal consent, balloon was checked on new 18 Fr gastrostomy.  Old gastrostomy removed intact without difficulty.  New gastrostomy advanced.  Gastric contents freely flowed into tube.  Balloon inflated and withdrawn to 3 cm.  Bolster freely spins, loose at skin.  Tolerated without issue.    RTC 3-6 months for replacement.    Lg Doherty,  on 8/1/2024 at 11:08 AM      Again, thank you for allowing me to participate in the care of your patient.        Sincerely,        Lg Doherty DO

## 2024-08-02 ENCOUNTER — PATIENT OUTREACH (OUTPATIENT)
Dept: ONCOLOGY | Facility: CLINIC | Age: 64
End: 2024-08-02
Payer: COMMERCIAL

## 2024-08-08 ENCOUNTER — INFUSION THERAPY VISIT (OUTPATIENT)
Dept: INFUSION THERAPY | Facility: CLINIC | Age: 64
End: 2024-08-08
Attending: NURSE PRACTITIONER
Payer: COMMERCIAL

## 2024-08-08 ENCOUNTER — ONCOLOGY VISIT (OUTPATIENT)
Dept: ONCOLOGY | Facility: CLINIC | Age: 64
End: 2024-08-08
Attending: NURSE PRACTITIONER
Payer: COMMERCIAL

## 2024-08-08 VITALS
WEIGHT: 81 LBS | OXYGEN SATURATION: 96 % | BODY MASS INDEX: 14.35 KG/M2 | DIASTOLIC BLOOD PRESSURE: 65 MMHG | HEART RATE: 92 BPM | RESPIRATION RATE: 12 BRPM | SYSTOLIC BLOOD PRESSURE: 105 MMHG | HEIGHT: 63 IN | TEMPERATURE: 97.5 F

## 2024-08-08 VITALS — SYSTOLIC BLOOD PRESSURE: 99 MMHG | HEART RATE: 84 BPM | DIASTOLIC BLOOD PRESSURE: 62 MMHG

## 2024-08-08 DIAGNOSIS — C78.00 MALIGNANT NEOPLASM METASTATIC TO LUNG, UNSPECIFIED LATERALITY (H): ICD-10-CM

## 2024-08-08 DIAGNOSIS — C06.9 SQUAMOUS CELL CARCINOMA OF ORAL CAVITY (H): ICD-10-CM

## 2024-08-08 DIAGNOSIS — C79.51 MALIGNANT NEOPLASM METASTATIC TO BONE (H): Primary | ICD-10-CM

## 2024-08-08 DIAGNOSIS — C06.9 SQUAMOUS CELL CARCINOMA OF ORAL CAVITY (H): Primary | ICD-10-CM

## 2024-08-08 DIAGNOSIS — C79.51 MALIGNANT NEOPLASM METASTATIC TO BONE (H): ICD-10-CM

## 2024-08-08 LAB
ALBUMIN SERPL BCG-MCNC: 3.4 G/DL (ref 3.5–5.2)
ALP SERPL-CCNC: 95 U/L (ref 40–150)
ALT SERPL W P-5'-P-CCNC: 36 U/L (ref 0–50)
ANION GAP SERPL CALCULATED.3IONS-SCNC: 10 MMOL/L (ref 7–15)
AST SERPL W P-5'-P-CCNC: 24 U/L (ref 0–45)
BASOPHILS # BLD AUTO: 0 10E3/UL (ref 0–0.2)
BASOPHILS NFR BLD AUTO: 0 %
BILIRUB SERPL-MCNC: 0.2 MG/DL
BUN SERPL-MCNC: 39.3 MG/DL (ref 8–23)
CALCIUM SERPL-MCNC: 9.8 MG/DL (ref 8.8–10.4)
CHLORIDE SERPL-SCNC: 104 MMOL/L (ref 98–107)
CREAT SERPL-MCNC: 0.82 MG/DL (ref 0.51–0.95)
EGFRCR SERPLBLD CKD-EPI 2021: 80 ML/MIN/1.73M2
EOSINOPHIL # BLD AUTO: 0.3 10E3/UL (ref 0–0.7)
EOSINOPHIL NFR BLD AUTO: 4 %
ERYTHROCYTE [DISTWIDTH] IN BLOOD BY AUTOMATED COUNT: 13.9 % (ref 10–15)
GLUCOSE SERPL-MCNC: 103 MG/DL (ref 70–99)
HCO3 SERPL-SCNC: 26 MMOL/L (ref 22–29)
HCT VFR BLD AUTO: 32.3 % (ref 35–47)
HGB BLD-MCNC: 10.9 G/DL (ref 11.7–15.7)
IMM GRANULOCYTES # BLD: 0 10E3/UL
IMM GRANULOCYTES NFR BLD: 0 %
LYMPHOCYTES # BLD AUTO: 1.6 10E3/UL (ref 0.8–5.3)
LYMPHOCYTES NFR BLD AUTO: 18 %
MCH RBC QN AUTO: 34.4 PG (ref 26.5–33)
MCHC RBC AUTO-ENTMCNC: 33.7 G/DL (ref 31.5–36.5)
MCV RBC AUTO: 102 FL (ref 78–100)
MONOCYTES # BLD AUTO: 0.8 10E3/UL (ref 0–1.3)
MONOCYTES NFR BLD AUTO: 9 %
NEUTROPHILS # BLD AUTO: 6.2 10E3/UL (ref 1.6–8.3)
NEUTROPHILS NFR BLD AUTO: 69 %
NRBC # BLD AUTO: 0 10E3/UL
NRBC BLD AUTO-RTO: 0 /100
PLATELET # BLD AUTO: 327 10E3/UL (ref 150–450)
POTASSIUM SERPL-SCNC: 4.4 MMOL/L (ref 3.4–5.3)
PROT SERPL-MCNC: 6.6 G/DL (ref 6.4–8.3)
RBC # BLD AUTO: 3.17 10E6/UL (ref 3.8–5.2)
SODIUM SERPL-SCNC: 140 MMOL/L (ref 135–145)
TSH SERPL DL<=0.005 MIU/L-ACNC: 1.05 UIU/ML (ref 0.3–4.2)
WBC # BLD AUTO: 9.1 10E3/UL (ref 4–11)

## 2024-08-08 PROCEDURE — 84443 ASSAY THYROID STIM HORMONE: CPT | Performed by: INTERNAL MEDICINE

## 2024-08-08 PROCEDURE — 85025 COMPLETE CBC W/AUTO DIFF WBC: CPT | Performed by: INTERNAL MEDICINE

## 2024-08-08 PROCEDURE — 258N000003 HC RX IP 258 OP 636: Performed by: NURSE PRACTITIONER

## 2024-08-08 PROCEDURE — 250N000011 HC RX IP 250 OP 636: Performed by: NURSE PRACTITIONER

## 2024-08-08 PROCEDURE — G2211 COMPLEX E/M VISIT ADD ON: HCPCS | Performed by: NURSE PRACTITIONER

## 2024-08-08 PROCEDURE — 96413 CHEMO IV INFUSION 1 HR: CPT

## 2024-08-08 PROCEDURE — 99213 OFFICE O/P EST LOW 20 MIN: CPT | Performed by: NURSE PRACTITIONER

## 2024-08-08 PROCEDURE — 82565 ASSAY OF CREATININE: CPT | Performed by: INTERNAL MEDICINE

## 2024-08-08 PROCEDURE — 36415 COLL VENOUS BLD VENIPUNCTURE: CPT | Performed by: NURSE PRACTITIONER

## 2024-08-08 PROCEDURE — G0463 HOSPITAL OUTPT CLINIC VISIT: HCPCS | Performed by: NURSE PRACTITIONER

## 2024-08-08 RX ORDER — ALBUTEROL SULFATE 0.83 MG/ML
2.5 SOLUTION RESPIRATORY (INHALATION)
Status: CANCELLED | OUTPATIENT
Start: 2024-08-08

## 2024-08-08 RX ORDER — ALBUTEROL SULFATE 90 UG/1
1-2 AEROSOL, METERED RESPIRATORY (INHALATION)
Status: CANCELLED
Start: 2024-08-08

## 2024-08-08 RX ORDER — HEPARIN SODIUM (PORCINE) LOCK FLUSH IV SOLN 100 UNIT/ML 100 UNIT/ML
5 SOLUTION INTRAVENOUS
Status: CANCELLED | OUTPATIENT
Start: 2024-08-08

## 2024-08-08 RX ORDER — LORAZEPAM 2 MG/ML
0.5 INJECTION INTRAMUSCULAR EVERY 4 HOURS PRN
Status: CANCELLED | OUTPATIENT
Start: 2024-08-08

## 2024-08-08 RX ORDER — EPINEPHRINE 1 MG/ML
0.3 INJECTION, SOLUTION, CONCENTRATE INTRAVENOUS EVERY 5 MIN PRN
Status: CANCELLED | OUTPATIENT
Start: 2024-08-08

## 2024-08-08 RX ORDER — HEPARIN SODIUM,PORCINE 10 UNIT/ML
5-20 VIAL (ML) INTRAVENOUS DAILY PRN
Status: CANCELLED | OUTPATIENT
Start: 2024-08-08

## 2024-08-08 RX ORDER — HEPARIN SODIUM (PORCINE) LOCK FLUSH IV SOLN 100 UNIT/ML 100 UNIT/ML
5 SOLUTION INTRAVENOUS
Status: DISCONTINUED | OUTPATIENT
Start: 2024-08-08 | End: 2024-08-08 | Stop reason: HOSPADM

## 2024-08-08 RX ORDER — METHYLPREDNISOLONE SODIUM SUCCINATE 125 MG/2ML
125 INJECTION, POWDER, LYOPHILIZED, FOR SOLUTION INTRAMUSCULAR; INTRAVENOUS
Status: CANCELLED
Start: 2024-08-08

## 2024-08-08 RX ORDER — DIPHENHYDRAMINE HYDROCHLORIDE 50 MG/ML
50 INJECTION INTRAMUSCULAR; INTRAVENOUS
Status: CANCELLED
Start: 2024-08-08

## 2024-08-08 RX ORDER — MEPERIDINE HYDROCHLORIDE 25 MG/ML
25 INJECTION INTRAMUSCULAR; INTRAVENOUS; SUBCUTANEOUS EVERY 30 MIN PRN
Status: CANCELLED | OUTPATIENT
Start: 2024-08-08

## 2024-08-08 RX ADMIN — Medication 5 ML: at 11:33

## 2024-08-08 RX ADMIN — SODIUM CHLORIDE 250 ML: 9 INJECTION, SOLUTION INTRAVENOUS at 10:38

## 2024-08-08 RX ADMIN — SODIUM CHLORIDE 200 MG: 9 INJECTION, SOLUTION INTRAVENOUS at 10:52

## 2024-08-08 ASSESSMENT — PAIN SCALES - GENERAL: PAINLEVEL: NO PAIN (0)

## 2024-08-08 NOTE — PROGRESS NOTES
"Oncology Rooming Note    August 8, 2024 10:08 AM   Adrianna Pereira is a 63 year old female who presents for:    Chief Complaint   Patient presents with    Oncology Clinic Visit     Squamous cell carcinoma of oral cavity - Labs provider and infusion     Initial Vitals: /65 (BP Location: Right arm, Patient Position: Sitting, Cuff Size: Adult Small)   Pulse 92   Temp 97.5  F (36.4  C) (Tympanic)   Resp 12   Ht 1.6 m (5' 3\")   Wt 36.7 kg (81 lb)   SpO2 96%   BMI 14.35 kg/m   Estimated body mass index is 14.35 kg/m  as calculated from the following:    Height as of this encounter: 1.6 m (5' 3\").    Weight as of this encounter: 36.7 kg (81 lb). Body surface area is 1.28 meters squared.  No Pain (0) Comment: Data Unavailable   No LMP recorded. Patient is postmenopausal.  Allergies reviewed: Yes  Medications reviewed: Yes    Medications: Medication refills not needed today.  Pharmacy name entered into EPIC:    [id number] - [place], [place] - [address] [place] [address] [facility]ARMACY - [place], [place] [facility] [address] [facility] [place] [place] - [address] [facility]AC[facility] [place], [place] - [address] [facility]     Frailty Screening:   Is the patient here for a new oncology consult visit in cancer care? 2. No      Clinical concerns:  None      Tanya Philippe CMA              "

## 2024-08-08 NOTE — LETTER
8/8/2024      Adrianna Pereira  [id number] [address] [place] [id number]      Dear Colleague,    Thank you for referring your patient, Adrianna Pereira, to the [facility] [place]. Please see a copy of my visit note below.    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 Unfortunately, within just a few months of completing, was found to have metastatic recurrence.     Radiation Treatment  10/19/2023 to 12/5/2023: Head and neck, 6600 cGy in 33 fractions  2/22/24 to 3/12/24: Thoracic spine, T2 - T4, 2500 cGy in 10 fractions    Now, on palliative pembrolizumab. Has been on 6 months. Tolerating well.     CT in June shows stable to slightly improved disease in head/neck and stable/improved lung nodules; however some new patchy opacities bilaterally (could be inflammatory/infectious). No symptoms of pneumonitis, but need to follow clinically with risk of immunotherapy pneumonitis.     Labwork: CBC: hgb 10.9, rest CBC WNL. CMP: WNL. TSH pending.    Plan:  -Continue with C9 pembrolizumab.   -Return every 3 weeks ahead of each cycle.   -CBC every 3 months.   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    Open oral/jaw wound  Plate exposure + open wound over lower lip secondary to residual/recurrent tumor at prior ENT resection + adjuvant RT site. Draining lessening. No fevers.  Reconstruction would require additional free flap, so not being pursued at this time, in setting of widely metastatic disease, after discussion with ENT surgeon Dr. Thorne. Dr. Egan did not recommend additional radiation.    Met with Wound Care MD.   Supportive management interventions to help with drainage, odor and decreasing bacterial growth were provided.     Plan:  -Pt to follow Wound Clinic recs for mgmt and return PRN.  -High risk for infection, monitor  -Follow-up with Dr. Thorne in ENT in September  -No further RT recommended at this time, but Dr. Egan following every 3 mths to reassess    Cancer pain  Completed palliative RT to T spine 3/2024. Back pain is resolved. No longer requiring pain meds for this.     Mouth/lower lip pain related to progressive reconstruction failure/open wound. Stopped NSAID use due to elevated creatinine with improvement, uses ibuprofen on occasion. Primarily managing well with Tylenol 500 mg 3/day and oxycodone 5-.7.5 mg twice/day.     Plan:  -Continue avoiding/minimizing NSAIDs  -Favor Tylenol instead, can take up to 3000 mg/day. Continue oxycodone use through day as well  -Referral to Palliative Care if not well-controlled in future, pt wants to minimize number of appts for now.     Nutrition/hydration  Dependant on feeding tube.   Last PEG had migrated into proximal duodenum. This was recently replaced/repositioned.   She's tolerating feedings better since repositioning and regaining a bit of weight.     Plan:  -Working with Dietician as needed.   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   On observation.   ______________________________________________________________________________    History of Present Illness/ Interval History    Ms. Adrianna Pereira is a 63 year old who completed resection of floor of mouth cancer,  followed by adjuvant chemoRT 12/2023. Unfortunately, shortly after this, she was found to have local and metastatic recurrence involving mandible, lung, bone (T3-T4) and soft tissue that is PDL1 70%.    She started palliative treatments with pembrolizumab in February (6 mths ago).  Tolerating pembrolizumab generally well. No diarrhea. No new dyspnea/cough.     Her back pain resolved after palliative RT to T-spine.  No longer taking pain med for this.     Mouth pain due to plate exposure of lip/open wound at prior ENT resection site due to local recurrence persists and stable. Managing with Tylenol and oxycodone 5-7.5 mg twice daily. Amount of drainage is decreasing. No fevers. Assessed by ENT surgeon and reconstruction would require additional free flap, so not being pursued at this time. Saw Wound Clinic for mgmt recs.    Oral communication is an ongoing issue.    All nutrition/hydration via PEG. Had PEG exchanged in last few week.   Tolerating feedings better after new PEG placed.  No nausea. Occasional mild diarrhea.       ECOG Performance    1    Oncology History/Treatment  Diagnosis/Stage:   7/2023: [id number] Floor of mouth squamous cell cancer  -hx alcoholism (stopped all use) and smoking (quit 8/2023)  -hx premalignant oral cavity lesions s/p CO2 laser ablation with ENT  -presented with lower lip/chin swelling  -7/3/2023 left mandibular alveolus biopsy: invasive keratinizing moderately differential squamous cell carcinoma.   -PET: 4.4 x 3.7 x 3.2 cm anterior oral cavity mass invading into the mandible with a separate gluco-avid nodule on the right mandibular buccal mucosa and bilateral level 1B, level 2 and L3 metastatic lymphadenopathy without evidence of metastatic disease.   -8/17/2023 surgical path: pT4a, N3b tumor with positive left anterior lateral soft tissue margin, 11/98+ lymph nodes including STEFFEN (largest 4 cm) and several bilateral lymph nodes consistent with involvement by low-grade B-cell  "neoplasm suggestive of CLL/SLL.     2/2024: Progression to stage IV mouth squamous cell cancer to bone and lung  -Within weeks of completing definitive treatment for her primary disease, noted severe/progressive back pain  -CT C/T spine: new T3 compression fracture with hypodensity in vertebral body extending into L posterior elements suggestive of met. T4 posterior vertebral body lesion also suspicious. New lung nodules also noted  -MRI T spine: T3 compression fracture with extraosseous tumor involving ventral epidural space with mod - severe central spinal canal stenosis at T3  -PET: recurrent avid foci L maikel-mandible, numerous bilateral pulmonary/pleural mets, met mediastinal/hilar adenopathy, T3, T4, L adductor musculature and R upper thigh skin thickening  -PDL1 TPS and CPS 70% from 8/2023 original tumor biopsy    Treatment:  Locally advanced disease  -8/17/2023: segmental mandibulectomy, floor of mouth resection, anterior glossectomy, left fibular free flap, skin grafting, and tracheostomy.   -Post-op course complicated by skin necrosis requiring OR debridment and resuturing of neck incision/intraoral flap    -10/20/2023 - 12/5/2023: completed adjuvant concurrent RT (6600 cGy/33) + weekly cisplatin (x 7 cycles)    Metastatic recurrence  -2/21/2024 - present: pembrolizumab.  -Zometa started x 1 dose for bone mets, later held for jaw osteonecrosis risk    -2/26 - 3/12/2024: palliative RT to thoracic spine, 2500 cGy/10. Dex taper      Physical Exam  /65 (BP Location: Right arm, Patient Position: Sitting, Cuff Size: Adult Small)   Pulse 92   Temp 97.5  F (36.4  C) (Tympanic)   Resp 12   Ht 1.6 m (5' 3\")   Wt 36.7 kg (81 lb)   SpO2 96%   BMI 14.35 kg/m        GENERAL: Alert and oriented to time place and person. Seated comfortably. In no distress.  Dysarthria.   accompanied.  HEENT: Open wound below lower lip with exposed plate. Scant white/yellow appearing odorous drainage.  SHAE, EOMI. No " erythema. No icterus.  NECK: Post-RT fibrosis. No evident adenopathy/masses.  HEART: RRR  CHEST: regular respiratory effort. Lungs normal to percussion and auscultation  ABD: PEG in place. Non-distended  NEURO: No gross deficit noted.     Imaging    6/26/2024 CT n/c/a/p: reviewed in A/P. Overall stable/response.     Lab Results    Recent Results (from the past 168 hour(s))   Comprehensive metabolic panel   Result Value Ref Range    Sodium 140 135 - 145 mmol/L    Potassium 4.4 3.4 - 5.3 mmol/L    Carbon Dioxide (CO2) 26 22 - 29 mmol/L    Anion Gap 10 7 - 15 mmol/L    Urea Nitrogen 39.3 (H) 8.0 - 23.0 mg/dL    Creatinine 0.82 0.51 - 0.95 mg/dL    GFR Estimate 80 >60 mL/min/1.73m2    Calcium 9.8 8.8 - 10.4 mg/dL    Chloride 104 98 - 107 mmol/L    Glucose 103 (H) 70 - 99 mg/dL    Alkaline Phosphatase 95 40 - 150 U/L    AST 24 0 - 45 U/L    ALT 36 0 - 50 U/L    Protein Total 6.6 6.4 - 8.3 g/dL    Albumin 3.4 (L) 3.5 - 5.2 g/dL    Bilirubin Total 0.2 <=1.2 mg/dL   CBC with platelets and differential   Result Value Ref Range    WBC Count 9.1 4.0 - 11.0 10e3/uL    RBC Count 3.17 (L) 3.80 - 5.20 10e6/uL    Hemoglobin 10.9 (L) 11.7 - 15.7 g/dL    Hematocrit 32.3 (L) 35.0 - 47.0 %     (H) 78 - 100 fL    MCH 34.4 (H) 26.5 - 33.0 pg    MCHC 33.7 31.5 - 36.5 g/dL    RDW 13.9 10.0 - 15.0 %    Platelet Count 327 150 - 450 10e3/uL    % Neutrophils 69 %    % Lymphocytes 18 %    % Monocytes 9 %    % Eosinophils 4 %    % Basophils 0 %    % Immature Granulocytes 0 %    NRBCs per 100 WBC 0 <1 /100    Absolute Neutrophils 6.2 1.6 - 8.3 10e3/uL    Absolute Lymphocytes 1.6 0.8 - 5.3 10e3/uL    Absolute Monocytes 0.8 0.0 - 1.3 10e3/uL    Absolute Eosinophils 0.3 0.0 - 0.7 10e3/uL    Absolute Basophils 0.0 0.0 - 0.2 10e3/uL    Absolute Immature Granulocytes 0.0 <=0.4 10e3/uL    Absolute NRBCs 0.0 10e3/uL         Total time 25 minutes, to include face to face visit, review of EMR, ordering, documentation and coordination of care on date  "of service.    complexity modifier for longitudinal care.       Signed by: Marina Cronin NP        Oncology Rooming Note    August 8, 2024 10:08 AM   Adrianna Pereira is a 63 year old female who presents for:    Chief Complaint   Patient presents with     Oncology Clinic Visit     Squamous cell carcinoma of oral cavity - Labs provider and infusion     Initial Vitals: /65 (BP Location: Right arm, Patient Position: Sitting, Cuff Size: Adult Small)   Pulse 92   Temp 97.5  F (36.4  C) (Tympanic)   Resp 12   Ht 1.6 m (5' 3\")   Wt 36.7 kg (81 lb)   SpO2 96%   BMI 14.35 kg/m   Estimated body mass index is 14.35 kg/m  as calculated from the following:    Height as of this encounter: 1.6 m (5' 3\").    Weight as of this encounter: 36.7 kg (81 lb). Body surface area is 1.28 meters squared.  No Pain (0) Comment: Data Unavailable   No LMP recorded. Patient is postmenopausal.  Allergies reviewed: Yes  Medications reviewed: Yes    Medications: Medication refills not needed today.  Pharmacy name entered into EPIC:    [id number] - [place], [place] - [address] [place] [address] PHARMACY - [place], [place] - [address] [facility] PHARMACY [place], [place] - [address] [place] [address] PHARMACY [place], [place] - [address] [place] [address]     Frailty Screening:   Is the patient here for a new oncology consult visit in cancer care? 2. No      Clinical concerns:  None      Tanya Philippe [facility]                Again, thank you for allowing me to participate in the care of your patient.        Sincerely,        Marina Cronin NP  "

## 2024-08-08 NOTE — PROGRESS NOTES
Infusion Nursing Note:  Adrianna Pereira presents today for C9D1 Keytruda.    Patient seen by provider today: Yes   present during visit today: Not Applicable.    Note: N/A.      Intravenous Access:  Implanted Port.    Treatment Conditions:  Lab Results   Component Value Date     08/08/2024    POTASSIUM 4.4 08/08/2024    MAG 1.9 12/15/2023    CR 0.82 08/08/2024    RACHAEL 9.8 08/08/2024    BILITOTAL 0.2 08/08/2024    ALBUMIN 3.4 (L) 08/08/2024    ALT 36 08/08/2024    AST 24 08/08/2024       Results reviewed, labs MET treatment parameters, ok to proceed with treatment.      Post Infusion Assessment:  Patient tolerated infusion without incident.  Site patent and intact, free from redness, edema or discomfort.  No evidence of extravasations.  Access discontinued per protocol.       Discharge Plan:   Patient discharged in stable condition accompanied by: self.  Departure Mode: Ambulatory.      Andreas Lee RN

## 2024-08-08 NOTE — PROGRESS NOTES
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 Unfortunately, within just a few months of completing, was found to have metastatic recurrence.     Radiation Treatment  10/19/2023 to 12/5/2023: Head and neck, 6600 cGy in 33 fractions  2/22/24 to 3/12/24: Thoracic spine, T2 - T4, 2500 cGy in 10 fractions    Now, on palliative pembrolizumab. Has been on 6 months. Tolerating well.     CT in June shows stable to slightly improved disease in head/neck and stable/improved lung nodules; however some new patchy opacities bilaterally (could be inflammatory/infectious). No symptoms of pneumonitis, but need to follow clinically with risk of immunotherapy pneumonitis.     Labwork: CBC: hgb 10.9, rest CBC WNL. CMP: WNL. TSH pending.    Plan:  -Continue with C9 pembrolizumab.   -Return every 3 weeks ahead of each cycle.   -CBC every 3 months.   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    Open oral/jaw wound  Plate exposure + open wound over lower lip secondary to residual/recurrent tumor at prior ENT resection + adjuvant RT site. Draining lessening. No fevers. Reconstruction would require additional free flap, so not being pursued at this time, in setting of widely metastatic disease, after discussion with ENT surgeon Dr. Thorne. Dr. Egan did not recommend additional radiation.    Met with  Wound Care MD.   Supportive management interventions to help with drainage, odor and decreasing bacterial growth were provided.     Plan:  -Pt to follow Wound Clinic recs for mgmt and return PRN.  -High risk for infection, monitor  -Follow-up with Dr. Thorne in ENT in September  -No further RT recommended at this time, but Dr. Egan following every 3 mths to reassess    Cancer pain  Completed palliative RT to T spine 3/2024. Back pain is resolved. No longer requiring pain meds for this.     Mouth/lower lip pain related to progressive reconstruction failure/open wound. Stopped NSAID use due to elevated creatinine with improvement, uses ibuprofen on occasion. Primarily managing well with Tylenol 500 mg 3/day and oxycodone 5-.7.5 mg twice/day.     Plan:  -Continue avoiding/minimizing NSAIDs  -Favor Tylenol instead, can take up to 3000 mg/day. Continue oxycodone use through day as well  -Referral to Palliative Care if not well-controlled in future, pt wants to minimize number of appts for now.     Nutrition/hydration  Dependant on feeding tube.   Last PEG had migrated into proximal duodenum. This was recently replaced/repositioned.   She's tolerating feedings better since repositioning and regaining a bit of weight.     Plan:  -Working with Dietician as needed.   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   On observation.   ______________________________________________________________________________    History of Present Illness/ Interval History    Ms. Adrianna Pereira is a 63 year old who completed resection of floor of mouth cancer, followed by adjuvant chemoRT 12/2023. Unfortunately, shortly after this, she was found to have local and metastatic recurrence involving mandible, lung, bone (T3-T4) and soft tissue that is PDL1 70%.    She started palliative  treatments with pembrolizumab in February (6 mths ago).  Tolerating pembrolizumab generally well. No diarrhea. No new dyspnea/cough.     Her back pain resolved after palliative RT to T-spine.  No longer taking pain med for this.     Mouth pain due to plate exposure of lip/open wound at prior ENT resection site due to local recurrence persists and stable. Managing with Tylenol and oxycodone 5-7.5 mg twice daily. Amount of drainage is decreasing. No fevers. Assessed by ENT surgeon and reconstruction would require additional free flap, so not being pursued at this time. Saw Wound Clinic for mgmt recs.    Oral communication is an ongoing issue.    All nutrition/hydration via PEG. Had PEG exchanged in last few week.   Tolerating feedings better after new PEG placed.  No nausea. Occasional mild diarrhea.       ECOG Performance    1    Oncology History/Treatment  Diagnosis/Stage:   7/2023: [id number] Floor of mouth squamous cell cancer  -hx alcoholism (stopped all use) and smoking (quit 8/2023)  -hx premalignant oral cavity lesions s/p CO2 laser ablation with ENT  -presented with lower lip/chin swelling  -7/3/2023 left mandibular alveolus biopsy: invasive keratinizing moderately differential squamous cell carcinoma.   -PET: 4.4 x 3.7 x 3.2 cm anterior oral cavity mass invading into the mandible with a separate gluco-avid nodule on the right mandibular buccal mucosa and bilateral level 1B, level 2 and L3 metastatic lymphadenopathy without evidence of metastatic disease.   -8/17/2023 surgical path: pT4a, N3b tumor with positive left anterior lateral soft tissue margin, 11/98+ lymph nodes including STEFFEN (largest 4 cm) and several bilateral lymph nodes consistent with involvement by low-grade B-cell neoplasm suggestive of CLL/SLL.     2/2024: Progression to stage IV mouth squamous cell cancer to bone and lung  -Within weeks of completing definitive treatment for her primary disease, noted severe/progressive back pain  -CT C/T  "spine: new T3 compression fracture with hypodensity in vertebral body extending into L posterior elements suggestive of met. T4 posterior vertebral body lesion also suspicious. New lung nodules also noted  -MRI T spine: T3 compression fracture with extraosseous tumor involving ventral epidural space with mod - severe central spinal canal stenosis at T3  -PET: recurrent avid foci L maikel-mandible, numerous bilateral pulmonary/pleural mets, met mediastinal/hilar adenopathy, T3, T4, L adductor musculature and R upper thigh skin thickening  -PDL1 TPS and CPS 70% from 8/2023 original tumor biopsy    Treatment:  Locally advanced disease  -8/17/2023: segmental mandibulectomy, floor of mouth resection, anterior glossectomy, left fibular free flap, skin grafting, and tracheostomy.   -Post-op course complicated by skin necrosis requiring OR debridment and resuturing of neck incision/intraoral flap    -10/20/2023 - 12/5/2023: completed adjuvant concurrent RT (6600 cGy/33) + weekly cisplatin (x 7 cycles)    Metastatic recurrence  -2/21/2024 - present: pembrolizumab.  -Zometa started x 1 dose for bone mets, later held for jaw osteonecrosis risk    -2/26 - 3/12/2024: palliative RT to thoracic spine, 2500 cGy/10. Dex taper      Physical Exam  /65 (BP Location: Right arm, Patient Position: Sitting, Cuff Size: Adult Small)   Pulse 92   Temp 97.5  F (36.4  C) (Tympanic)   Resp 12   Ht 1.6 m (5' 3\")   Wt 36.7 kg (81 lb)   SpO2 96%   BMI 14.35 kg/m        GENERAL: Alert and oriented to time place and person. Seated comfortably. In no distress.  Dysarthria.   accompanied.  HEENT: Open wound below lower lip with exposed plate. Scant white/yellow appearing odorous drainage.  SHAE, EOMI. No erythema. No icterus.  NECK: Post-RT fibrosis. No evident adenopathy/masses.  HEART: RRR  CHEST: regular respiratory effort. Lungs normal to percussion and auscultation  ABD: PEG in place. Non-distended  NEURO: No gross deficit " noted.     Imaging    6/26/2024 CT n/c/a/p: reviewed in A/P. Overall stable/response.     Lab Results    Recent Results (from the past 168 hour(s))   Comprehensive metabolic panel   Result Value Ref Range    Sodium 140 135 - 145 mmol/L    Potassium 4.4 3.4 - 5.3 mmol/L    Carbon Dioxide (CO2) 26 22 - 29 mmol/L    Anion Gap 10 7 - 15 mmol/L    Urea Nitrogen 39.3 (H) 8.0 - 23.0 mg/dL    Creatinine 0.82 0.51 - 0.95 mg/dL    GFR Estimate 80 >60 mL/min/1.73m2    Calcium 9.8 8.8 - 10.4 mg/dL    Chloride 104 98 - 107 mmol/L    Glucose 103 (H) 70 - 99 mg/dL    Alkaline Phosphatase 95 40 - 150 U/L    AST 24 0 - 45 U/L    ALT 36 0 - 50 U/L    Protein Total 6.6 6.4 - 8.3 g/dL    Albumin 3.4 (L) 3.5 - 5.2 g/dL    Bilirubin Total 0.2 <=1.2 mg/dL   CBC with platelets and differential   Result Value Ref Range    WBC Count 9.1 4.0 - 11.0 10e3/uL    RBC Count 3.17 (L) 3.80 - 5.20 10e6/uL    Hemoglobin 10.9 (L) 11.7 - 15.7 g/dL    Hematocrit 32.3 (L) 35.0 - 47.0 %     (H) 78 - 100 fL    MCH 34.4 (H) 26.5 - 33.0 pg    MCHC 33.7 31.5 - 36.5 g/dL    RDW 13.9 10.0 - 15.0 %    Platelet Count 327 150 - 450 10e3/uL    % Neutrophils 69 %    % Lymphocytes 18 %    % Monocytes 9 %    % Eosinophils 4 %    % Basophils 0 %    % Immature Granulocytes 0 %    NRBCs per 100 WBC 0 <1 /100    Absolute Neutrophils 6.2 1.6 - 8.3 10e3/uL    Absolute Lymphocytes 1.6 0.8 - 5.3 10e3/uL    Absolute Monocytes 0.8 0.0 - 1.3 10e3/uL    Absolute Eosinophils 0.3 0.0 - 0.7 10e3/uL    Absolute Basophils 0.0 0.0 - 0.2 10e3/uL    Absolute Immature Granulocytes 0.0 <=0.4 10e3/uL    Absolute NRBCs 0.0 10e3/uL         Total time 25 minutes, to include face to face visit, review of EMR, ordering, documentation and coordination of care on date of service.    complexity modifier for longitudinal care.       Signed by: Marina Cronin, NP

## 2024-08-11 ENCOUNTER — MYC REFILL (OUTPATIENT)
Dept: ONCOLOGY | Facility: CLINIC | Age: 64
End: 2024-08-11
Payer: COMMERCIAL

## 2024-08-11 ENCOUNTER — MYC REFILL (OUTPATIENT)
Dept: FAMILY MEDICINE | Facility: CLINIC | Age: 64
End: 2024-08-11
Payer: COMMERCIAL

## 2024-08-11 DIAGNOSIS — G89.3 CANCER RELATED PAIN: ICD-10-CM

## 2024-08-11 DIAGNOSIS — C06.9 SQUAMOUS CELL CARCINOMA OF ORAL CAVITY (H): ICD-10-CM

## 2024-08-12 RX ORDER — OXYCODONE HCL 5 MG/5 ML
5 SOLUTION, ORAL ORAL EVERY 6 HOURS PRN
Qty: 300 ML | Refills: 0 | Status: SHIPPED | OUTPATIENT
Start: 2024-08-12 | End: 2024-08-26

## 2024-08-13 RX ORDER — IBUPROFEN 600 MG/1
600 TABLET, FILM COATED ORAL EVERY 6 HOURS PRN
Qty: 90 TABLET | Refills: 0 | Status: SHIPPED | OUTPATIENT
Start: 2024-08-13

## 2024-08-26 ENCOUNTER — MYC REFILL (OUTPATIENT)
Dept: ONCOLOGY | Facility: CLINIC | Age: 64
End: 2024-08-26
Payer: COMMERCIAL

## 2024-08-26 DIAGNOSIS — K12.2 ORAL INFECTION: Primary | ICD-10-CM

## 2024-08-26 DIAGNOSIS — G89.3 CANCER RELATED PAIN: ICD-10-CM

## 2024-08-26 RX ORDER — OXYCODONE HCL 5 MG/5 ML
5 SOLUTION, ORAL ORAL EVERY 6 HOURS PRN
Qty: 300 ML | Refills: 0 | Status: SHIPPED | OUTPATIENT
Start: 2024-08-26 | End: 2024-09-13

## 2024-08-26 RX ORDER — AMOXICILLIN AND CLAVULANATE POTASSIUM 400; 57 MG/5ML; MG/5ML
875 POWDER, FOR SUSPENSION ORAL 2 TIMES DAILY
Qty: 153.16 ML | Refills: 0 | Status: SHIPPED | OUTPATIENT
Start: 2024-08-26 | End: 2024-09-02

## 2024-08-29 ENCOUNTER — LAB (OUTPATIENT)
Dept: INFUSION THERAPY | Facility: CLINIC | Age: 64
End: 2024-08-29
Attending: INTERNAL MEDICINE
Payer: COMMERCIAL

## 2024-08-29 ENCOUNTER — ONCOLOGY VISIT (OUTPATIENT)
Dept: ONCOLOGY | Facility: CLINIC | Age: 64
End: 2024-08-29
Attending: NURSE PRACTITIONER
Payer: COMMERCIAL

## 2024-08-29 VITALS
RESPIRATION RATE: 11 BRPM | DIASTOLIC BLOOD PRESSURE: 58 MMHG | TEMPERATURE: 97.1 F | SYSTOLIC BLOOD PRESSURE: 88 MMHG | HEIGHT: 63 IN | WEIGHT: 83.5 LBS | BODY MASS INDEX: 14.79 KG/M2 | HEART RATE: 90 BPM | OXYGEN SATURATION: 97 %

## 2024-08-29 VITALS — DIASTOLIC BLOOD PRESSURE: 57 MMHG | SYSTOLIC BLOOD PRESSURE: 84 MMHG

## 2024-08-29 DIAGNOSIS — C06.9 SQUAMOUS CELL CARCINOMA OF ORAL CAVITY (H): Primary | ICD-10-CM

## 2024-08-29 DIAGNOSIS — C79.51 MALIGNANT NEOPLASM METASTATIC TO BONE (H): ICD-10-CM

## 2024-08-29 DIAGNOSIS — C06.9 SQUAMOUS CELL CARCINOMA OF ORAL CAVITY (H): ICD-10-CM

## 2024-08-29 DIAGNOSIS — C79.51 MALIGNANT NEOPLASM METASTATIC TO BONE (H): Primary | ICD-10-CM

## 2024-08-29 DIAGNOSIS — C78.00 MALIGNANT NEOPLASM METASTATIC TO LUNG, UNSPECIFIED LATERALITY (H): ICD-10-CM

## 2024-08-29 DIAGNOSIS — C78.00 MALIGNANT NEOPLASM METASTATIC TO LUNG, UNSPECIFIED LATERALITY (H): Primary | ICD-10-CM

## 2024-08-29 LAB
ALBUMIN SERPL BCG-MCNC: 3.7 G/DL (ref 3.5–5.2)
ALP SERPL-CCNC: 87 U/L (ref 40–150)
ALT SERPL W P-5'-P-CCNC: 19 U/L (ref 0–50)
ANION GAP SERPL CALCULATED.3IONS-SCNC: 8 MMOL/L (ref 7–15)
AST SERPL W P-5'-P-CCNC: 19 U/L (ref 0–45)
BILIRUB SERPL-MCNC: <0.2 MG/DL
BUN SERPL-MCNC: 34.2 MG/DL (ref 8–23)
CALCIUM SERPL-MCNC: 9.2 MG/DL (ref 8.8–10.4)
CHLORIDE SERPL-SCNC: 106 MMOL/L (ref 98–107)
CREAT SERPL-MCNC: 0.7 MG/DL (ref 0.51–0.95)
EGFRCR SERPLBLD CKD-EPI 2021: >90 ML/MIN/1.73M2
GLUCOSE SERPL-MCNC: 145 MG/DL (ref 70–99)
HCO3 SERPL-SCNC: 25 MMOL/L (ref 22–29)
POTASSIUM SERPL-SCNC: 4.5 MMOL/L (ref 3.4–5.3)
PROT SERPL-MCNC: 6.6 G/DL (ref 6.4–8.3)
SODIUM SERPL-SCNC: 139 MMOL/L (ref 135–145)
TSH SERPL DL<=0.005 MIU/L-ACNC: 0.91 UIU/ML (ref 0.3–4.2)

## 2024-08-29 PROCEDURE — 250N000011 HC RX IP 250 OP 636: Performed by: NURSE PRACTITIONER

## 2024-08-29 PROCEDURE — 258N000003 HC RX IP 258 OP 636: Performed by: NURSE PRACTITIONER

## 2024-08-29 PROCEDURE — G2211 COMPLEX E/M VISIT ADD ON: HCPCS | Performed by: NURSE PRACTITIONER

## 2024-08-29 PROCEDURE — 36415 COLL VENOUS BLD VENIPUNCTURE: CPT | Performed by: NURSE PRACTITIONER

## 2024-08-29 PROCEDURE — G0463 HOSPITAL OUTPT CLINIC VISIT: HCPCS | Performed by: NURSE PRACTITIONER

## 2024-08-29 PROCEDURE — 99214 OFFICE O/P EST MOD 30 MIN: CPT | Performed by: NURSE PRACTITIONER

## 2024-08-29 PROCEDURE — 96413 CHEMO IV INFUSION 1 HR: CPT

## 2024-08-29 PROCEDURE — 84443 ASSAY THYROID STIM HORMONE: CPT | Performed by: NURSE PRACTITIONER

## 2024-08-29 PROCEDURE — 82040 ASSAY OF SERUM ALBUMIN: CPT | Performed by: NURSE PRACTITIONER

## 2024-08-29 RX ORDER — MEPERIDINE HYDROCHLORIDE 25 MG/ML
25 INJECTION INTRAMUSCULAR; INTRAVENOUS; SUBCUTANEOUS EVERY 30 MIN PRN
Status: CANCELLED | OUTPATIENT
Start: 2024-08-29

## 2024-08-29 RX ORDER — ALBUTEROL SULFATE 0.83 MG/ML
2.5 SOLUTION RESPIRATORY (INHALATION)
Status: CANCELLED | OUTPATIENT
Start: 2024-08-29

## 2024-08-29 RX ORDER — DIPHENHYDRAMINE HYDROCHLORIDE 50 MG/ML
50 INJECTION INTRAMUSCULAR; INTRAVENOUS
Status: CANCELLED
Start: 2024-08-29

## 2024-08-29 RX ORDER — LORAZEPAM 2 MG/ML
0.5 INJECTION INTRAMUSCULAR EVERY 4 HOURS PRN
Status: CANCELLED | OUTPATIENT
Start: 2024-08-29

## 2024-08-29 RX ORDER — HEPARIN SODIUM (PORCINE) LOCK FLUSH IV SOLN 100 UNIT/ML 100 UNIT/ML
5 SOLUTION INTRAVENOUS
Status: CANCELLED | OUTPATIENT
Start: 2024-08-29

## 2024-08-29 RX ORDER — METHYLPREDNISOLONE SODIUM SUCCINATE 125 MG/2ML
125 INJECTION, POWDER, LYOPHILIZED, FOR SOLUTION INTRAMUSCULAR; INTRAVENOUS
Status: CANCELLED
Start: 2024-08-29

## 2024-08-29 RX ORDER — HEPARIN SODIUM,PORCINE 10 UNIT/ML
5-20 VIAL (ML) INTRAVENOUS DAILY PRN
Status: CANCELLED | OUTPATIENT
Start: 2024-08-29

## 2024-08-29 RX ORDER — HEPARIN SODIUM (PORCINE) LOCK FLUSH IV SOLN 100 UNIT/ML 100 UNIT/ML
5 SOLUTION INTRAVENOUS
Status: DISCONTINUED | OUTPATIENT
Start: 2024-08-29 | End: 2024-08-29 | Stop reason: HOSPADM

## 2024-08-29 RX ORDER — EPINEPHRINE 1 MG/ML
0.3 INJECTION, SOLUTION, CONCENTRATE INTRAVENOUS EVERY 5 MIN PRN
Status: CANCELLED | OUTPATIENT
Start: 2024-08-29

## 2024-08-29 RX ORDER — ALBUTEROL SULFATE 90 UG/1
1-2 AEROSOL, METERED RESPIRATORY (INHALATION)
Status: CANCELLED
Start: 2024-08-29

## 2024-08-29 RX ADMIN — Medication 5 ML: at 12:03

## 2024-08-29 RX ADMIN — SODIUM CHLORIDE 200 MG: 9 INJECTION, SOLUTION INTRAVENOUS at 11:29

## 2024-08-29 RX ADMIN — SODIUM CHLORIDE 250 ML: 9 INJECTION, SOLUTION INTRAVENOUS at 11:28

## 2024-08-29 ASSESSMENT — PAIN SCALES - GENERAL: PAINLEVEL: NO PAIN (0)

## 2024-08-29 NOTE — PROGRESS NOTES
PAC labs drawn without difficulty via site protocol. Patient tolerated well.    TIARA JOYNER RN on 8/29/2024 at 11:43 AM

## 2024-08-29 NOTE — PROGRESS NOTES
"Oncology Rooming Note    August 29, 2024 10:06 AM   Adrianna Pereira is a 63 year old female who presents for:    Chief Complaint   Patient presents with    Oncology Clinic Visit     Initial Vitals: BP (!) 88/58 (BP Location: Left arm, Patient Position: Sitting, Cuff Size: Adult Small)   Pulse 90   Temp 97.1  F (36.2  C) (Tympanic)   Resp 11   Ht 1.6 m (5' 3\")   Wt 37.9 kg (83 lb 8 oz)   SpO2 97%   BMI 14.79 kg/m   Estimated body mass index is 14.79 kg/m  as calculated from the following:    Height as of this encounter: 1.6 m (5' 3\").    Weight as of this encounter: 37.9 kg (83 lb 8 oz). Body surface area is 1.3 meters squared.  No Pain (0) Comment: Data Unavailable   No LMP recorded. Patient is postmenopausal.  Allergies reviewed: Yes  Medications reviewed: Yes    Medications: Medication refills not needed today.  Pharmacy name entered into EPIC:    [id number] - [place], [place] - [address] [place] [address] [facility]ARMACY - [place] [place] [facility] [place] [place] - [address] [facility]ARMACY [place], [place] - [address] [facility]     Frailty Screening:   Is the patient here for a new oncology consult visit in cancer care? 2. No      Clinical concerns: None today.       Leana Lloyd MA              "

## 2024-08-29 NOTE — PROGRESS NOTES
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 Unfortunately, within just a few months of completing, was found to have metastatic recurrence.     Radiation Treatment  10/19/2023 to 12/5/2023: Head and neck, 6600 cGy in 33 fractions  2/22/24 to 3/12/24: Thoracic spine, T2 - T4, 2500 cGy in 10 fractions    Now, on palliative pembrolizumab. Has been on 6.5 months. Tolerating well.     CT in June shows stable to slightly improved disease in head/neck and stable/improved lung nodules; however some new patchy opacities bilaterally (could be inflammatory/infectious). No symptoms of pneumonitis, but need to follow clinically with risk of immunotherapy pneumonitis.     Labwork:  CMP: WNL. TSH WNL.    Plan:  -Continue with C10 pembrolizumab.   -Return every 3 weeks ahead of each cycle.   -CBC every 3 months.   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    Open oral/jaw wound  Plate exposure + open wound over lower lip secondary to residual/recurrent tumor at prior ENT resection + adjuvant RT site. Draining lessening. No fevers. Reconstruction would require additional free flap, so not being pursued at this time, in setting of widely metastatic disease, after discussion with ENT surgeon Dr. Thorne. Dr. Egan did not recommend additional radiation.    Met with Wound Care MD.   Supportive  management interventions to help with drainage, odor and decreasing bacterial growth were provided.     She's had more pain and drainage from left lower mouth/chin wound in last week. Started antibiotics this week.     Plan:  -Pt to follow Wound Clinic recs for mgmt and return PRN.  -High risk for infection, on antibiotics currently for increased pain/drainage. Follow response.   -Follow-up with Dr. Thorne in ENT in September  -No further RT recommended at this time, but Dr. Egan following every 3 mths to reassess    Cancer pain  Completed palliative RT to T spine 3/2024. Back pain is resolved. No longer requiring pain meds for this.     Mouth/lower lip pain related to progressive reconstruction failure/open wound. Stopped NSAID use due to elevated creatinine with improvement, uses ibuprofen on occasion. Primarily managing well with Tylenol 500 mg 3/day and oxycodone 7.5 mg twice/day.     Plan:  -Continue avoiding/minimizing NSAIDs  -Favor Tylenol instead, can take up to 3000 mg/day. Continue oxycodone use through day as well  -Consider long-acting pain med (likley in patch form since NPO) if oxycodone use increases  -Referral to Palliative Care if not well-controlled in future, pt wants to minimize number of appts for now.     Nutrition/hydration  Dependant on feeding tube.   Last PEG had migrated into proximal duodenum. This was recently replaced/repositioned.   She's tolerating feedings better since repositioning and regaining a bit of weight.     Plan:  -Working with Dietician as needed.   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   On observation.   ______________________________________________________________________________    History of Present Illness/ Interval History    Ms. Adrianna Pereira is a 63 year old who completed resection of floor of mouth  cancer, followed by adjuvant chemoRT 12/2023. Unfortunately, shortly after this, she was found to have local and metastatic recurrence involving mandible, lung, bone (T3-T4) and soft tissue that is PDL1 70%.    She started palliative treatments with pembrolizumab in February (6.5 mths ago).  Tolerating pembrolizumab generally well. No diarrhea. No new dyspnea/cough.     Her back pain resolved after palliative RT to T-spine.  No longer taking pain med for this.     Mouth pain due to plate exposure of lip/open wound at prior ENT resection site due to local recurrence persists. She's been having a bit more pain to touch along lower mouth/chin with increased drainage. No fevers/chills. Dr. Egan started her on antibiotics this week for possible infection. Managing with Tylenol and oxycodone 7.5 mg BID.  Assessed by ENT surgeon and reconstruction would require additional free flap, so not being pursued at this time. Saw Wound Clinic for mgmt recs.    Oral communication is an ongoing issue.    All nutrition/hydration via PEG.   Stable/improving weight.      ECOG Performance    1    Oncology History/Treatment  Diagnosis/Stage:   7/2023: [id number] Floor of mouth squamous cell cancer  -hx alcoholism (stopped all use) and smoking (quit 8/2023)  -hx premalignant oral cavity lesions s/p CO2 laser ablation with ENT  -presented with lower lip/chin swelling  -7/3/2023 left mandibular alveolus biopsy: invasive keratinizing moderately differential squamous cell carcinoma.   -PET: 4.4 x 3.7 x 3.2 cm anterior oral cavity mass invading into the mandible with a separate gluco-avid nodule on the right mandibular buccal mucosa and bilateral level 1B, level 2 and L3 metastatic lymphadenopathy without evidence of metastatic disease.   -8/17/2023 surgical path: pT4a, N3b tumor with positive left anterior lateral soft tissue margin, 11/98+ lymph nodes including STEFFEN (largest 4 cm) and several bilateral lymph nodes consistent with involvement by  "low-grade B-cell neoplasm suggestive of CLL/SLL.     2/2024: Progression to stage IV mouth squamous cell cancer to bone and lung  -Within weeks of completing definitive treatment for her primary disease, noted severe/progressive back pain  -CT C/T spine: new T3 compression fracture with hypodensity in vertebral body extending into L posterior elements suggestive of met. T4 posterior vertebral body lesion also suspicious. New lung nodules also noted  -MRI T spine: T3 compression fracture with extraosseous tumor involving ventral epidural space with mod - severe central spinal canal stenosis at T3  -PET: recurrent avid foci L maikel-mandible, numerous bilateral pulmonary/pleural mets, met mediastinal/hilar adenopathy, T3, T4, L adductor musculature and R upper thigh skin thickening  -PDL1 TPS and CPS 70% from 8/2023 original tumor biopsy    Treatment:  Locally advanced disease  -8/17/2023: segmental mandibulectomy, floor of mouth resection, anterior glossectomy, left fibular free flap, skin grafting, and tracheostomy.   -Post-op course complicated by skin necrosis requiring OR debridment and resuturing of neck incision/intraoral flap    -10/20/2023 - 12/5/2023: completed adjuvant concurrent RT (6600 cGy/33) + weekly cisplatin (x 7 cycles)    Metastatic recurrence  -2/21/2024 - present: pembrolizumab.  -Zometa started x 1 dose for bone mets, later held for jaw osteonecrosis risk    -2/26 - 3/12/2024: palliative RT to thoracic spine, 2500 cGy/10. Dex taper      Physical Exam  BP (!) 88/58 (BP Location: Left arm, Patient Position: Sitting, Cuff Size: Adult Small)   Pulse 90   Temp 97.1  F (36.2  C) (Tympanic)   Resp 11   Ht 1.6 m (5' 3\")   Wt 37.9 kg (83 lb 8 oz)   SpO2 97%   BMI 14.79 kg/m        GENERAL: Alert and oriented to time place and person. Seated comfortably. In no distress.  Dysarthria.   accompanied.  HEENT: Open wound below lower lip with exposed plate. Scant white/yellow appearing odorous " drainage.  SHAE, EOMI. No erythema. No icterus.  NECK: Post-RT fibrosis. No evident adenopathy/masses.  HEART: RRR  CHEST: regular respiratory effort. Lungs normal to percussion and auscultation  ABD: PEG in place. Non-distended  NEURO: No gross deficit noted.     Imaging    6/26/2024 CT n/c/a/p: reviewed in A/P. Overall stable/response.     Lab Results    Recent Results (from the past 168 hour(s))   Comprehensive metabolic panel   Result Value Ref Range    Sodium 139 135 - 145 mmol/L    Potassium 4.5 3.4 - 5.3 mmol/L    Carbon Dioxide (CO2) 25 22 - 29 mmol/L    Anion Gap 8 7 - 15 mmol/L    Urea Nitrogen 34.2 (H) 8.0 - 23.0 mg/dL    Creatinine 0.70 0.51 - 0.95 mg/dL    GFR Estimate >90 >60 mL/min/1.73m2    Calcium 9.2 8.8 - 10.4 mg/dL    Chloride 106 98 - 107 mmol/L    Glucose 145 (H) 70 - 99 mg/dL    Alkaline Phosphatase 87 40 - 150 U/L    AST 19 0 - 45 U/L    ALT 19 0 - 50 U/L    Protein Total 6.6 6.4 - 8.3 g/dL    Albumin 3.7 3.5 - 5.2 g/dL    Bilirubin Total <0.2 <=1.2 mg/dL   TSH with free T4 reflex   Result Value Ref Range    TSH 0.91 0.30 - 4.20 uIU/mL         Total time 30 minutes, to include face to face visit, review of EMR, ordering, documentation and coordination of care on date of service.    complexity modifier for longitudinal care.       Signed by: Marina Cronin NP

## 2024-08-29 NOTE — LETTER
8/29/2024      Adrianna Pereira  [address] [address] [place] [id number]      Dear Colleague,    Thank you for referring your patient, Adrianna Pereira, to the [facility] [place]. Please see a copy of my visit note below.    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 Unfortunately, within just a few months of completing, was found to have metastatic recurrence.     Radiation Treatment  10/19/2023 to 12/5/2023: Head and neck, 6600 cGy in 33 fractions  2/22/24 to 3/12/24: Thoracic spine, T2 - T4, 2500 cGy in 10 fractions    Now, on palliative pembrolizumab. Has been on 6.5 months. Tolerating well.     CT in June shows stable to slightly improved disease in head/neck and stable/improved lung nodules; however some new patchy opacities bilaterally (could be inflammatory/infectious). No symptoms of pneumonitis, but need to follow clinically with risk of immunotherapy pneumonitis.     Labwork:  CMP: WNL. TSH WNL.    Plan:  -Continue with C10 pembrolizumab.   -Return every 3 weeks ahead of each cycle.   -CBC every 3 months.   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    Open oral/jaw wound  Plate exposure + open wound over lower lip secondary to residual/recurrent tumor at prior ENT resection + adjuvant RT site. Draining lessening. No fevers. Reconstruction would require  additional free flap, so not being pursued at this time, in setting of widely metastatic disease, after discussion with ENT surgeon Dr. Thorne. Dr. Egan did not recommend additional radiation.    Met with Wound Care MD.   Supportive management interventions to help with drainage, odor and decreasing bacterial growth were provided.     She's had more pain and drainage from left lower mouth/chin wound in last week. Started antibiotics this week.     Plan:  -Pt to follow Wound Clinic recs for mgmt and return PRN.  -High risk for infection, on antibiotics currently for increased pain/drainage. Follow response.   -Follow-up with Dr. Thorne in ENT in September  -No further RT recommended at this time, but Dr. Egan following every 3 mths to reassess    Cancer pain  Completed palliative RT to T spine 3/2024. Back pain is resolved. No longer requiring pain meds for this.     Mouth/lower lip pain related to progressive reconstruction failure/open wound. Stopped NSAID use due to elevated creatinine with improvement, uses ibuprofen on occasion. Primarily managing well with Tylenol 500 mg 3/day and oxycodone 7.5 mg twice/day.     Plan:  -Continue avoiding/minimizing NSAIDs  -Favor Tylenol instead, can take up to 3000 mg/day. Continue oxycodone use through day as well  -Consider long-acting pain med (likley in patch form since NPO) if oxycodone use increases  -Referral to Palliative Care if not well-controlled in future, pt wants to minimize number of appts for now.     Nutrition/hydration  Dependant on feeding tube.   Last PEG had migrated into proximal duodenum. This was recently replaced/repositioned.   She's tolerating feedings better since repositioning and regaining a bit of weight.     Plan:  -Working with Dietician as needed.   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   On observation.   ______________________________________________________________________________    History of Present Illness/ Interval History    Ms. Adrianna Pereira is a 63 year old who completed resection of floor of mouth cancer, followed by adjuvant chemoRT 12/2023. Unfortunately, shortly after this, she was found to have local and metastatic recurrence involving mandible, lung, bone (T3-T4) and soft tissue that is PDL1 70%.    She started palliative treatments with pembrolizumab in February (6.5 mths ago).  Tolerating pembrolizumab generally well. No diarrhea. No new dyspnea/cough.     Her back pain resolved after palliative RT to T-spine.  No longer taking pain med for this.     Mouth pain due to plate exposure of lip/open wound at prior ENT resection site due to local recurrence persists. She's been having a bit more pain to touch along lower mouth/chin with increased drainage. No fevers/chills. Dr. Egan started her on antibiotics this week for possible infection. Managing with Tylenol and oxycodone 7.5 mg BID.  Assessed by ENT surgeon and reconstruction would require additional free flap, so not being pursued at this time. Saw Wound Clinic for mgmt recs.    Oral communication is an ongoing issue.    All nutrition/hydration via PEG.   Stable/improving weight.      ECOG Performance    1    Oncology History/Treatment  Diagnosis/Stage:   7/2023: [id number] Floor of mouth squamous cell cancer  -hx alcoholism (stopped all use) and smoking (quit 8/2023)  -hx premalignant oral cavity lesions s/p CO2 laser ablation with ENT  -presented with lower lip/chin swelling  -7/3/2023 left mandibular alveolus biopsy: invasive keratinizing moderately differential squamous cell carcinoma.   -PET: 4.4 x 3.7 x 3.2 cm anterior oral cavity mass invading into the mandible with a separate gluco-avid nodule on the right mandibular buccal mucosa and bilateral level 1B, level 2 and L3 metastatic lymphadenopathy without  "evidence of metastatic disease.   -8/17/2023 surgical path: pT4a, N3b tumor with positive left anterior lateral soft tissue margin, 11/98+ lymph nodes including STEFFEN (largest 4 cm) and several bilateral lymph nodes consistent with involvement by low-grade B-cell neoplasm suggestive of CLL/SLL.     2/2024: Progression to stage IV mouth squamous cell cancer to bone and lung  -Within weeks of completing definitive treatment for her primary disease, noted severe/progressive back pain  -CT C/T spine: new T3 compression fracture with hypodensity in vertebral body extending into L posterior elements suggestive of met. T4 posterior vertebral body lesion also suspicious. New lung nodules also noted  -MRI T spine: T3 compression fracture with extraosseous tumor involving ventral epidural space with mod - severe central spinal canal stenosis at T3  -PET: recurrent avid foci L maikel-mandible, numerous bilateral pulmonary/pleural mets, met mediastinal/hilar adenopathy, T3, T4, L adductor musculature and R upper thigh skin thickening  -PDL1 TPS and CPS 70% from 8/2023 original tumor biopsy    Treatment:  Locally advanced disease  -8/17/2023: segmental mandibulectomy, floor of mouth resection, anterior glossectomy, left fibular free flap, skin grafting, and tracheostomy.   -Post-op course complicated by skin necrosis requiring OR debridment and resuturing of neck incision/intraoral flap    -10/20/2023 - 12/5/2023: completed adjuvant concurrent RT (6600 cGy/33) + weekly cisplatin (x 7 cycles)    Metastatic recurrence  -2/21/2024 - present: pembrolizumab.  -Zometa started x 1 dose for bone mets, later held for jaw osteonecrosis risk    -2/26 - 3/12/2024: palliative RT to thoracic spine, 2500 cGy/10. Dex taper      Physical Exam  BP (!) 88/58 (BP Location: Left arm, Patient Position: Sitting, Cuff Size: Adult Small)   Pulse 90   Temp 97.1  F (36.2  C) (Tympanic)   Resp 11   Ht 1.6 m (5' 3\")   Wt 37.9 kg (83 lb 8 oz)   SpO2 97%   " "BMI 14.79 kg/m        GENERAL: Alert and oriented to time place and person. Seated comfortably. In no distress.  Dysarthria.   accompanied.  HEENT: Open wound below lower lip with exposed plate. Scant white/yellow appearing odorous drainage.  SHAE, EOMI. No erythema. No icterus.  NECK: Post-RT fibrosis. No evident adenopathy/masses.  HEART: RRR  CHEST: regular respiratory effort. Lungs normal to percussion and auscultation  ABD: PEG in place. Non-distended  NEURO: No gross deficit noted.     Imaging    6/26/2024 CT n/c/a/p: reviewed in A/P. Overall stable/response.     Lab Results    Recent Results (from the past 168 hour(s))   Comprehensive metabolic panel   Result Value Ref Range    Sodium 139 135 - 145 mmol/L    Potassium 4.5 3.4 - 5.3 mmol/L    Carbon Dioxide (CO2) 25 22 - 29 mmol/L    Anion Gap 8 7 - 15 mmol/L    Urea Nitrogen 34.2 (H) 8.0 - 23.0 mg/dL    Creatinine 0.70 0.51 - 0.95 mg/dL    GFR Estimate >90 >60 mL/min/1.73m2    Calcium 9.2 8.8 - 10.4 mg/dL    Chloride 106 98 - 107 mmol/L    Glucose 145 (H) 70 - 99 mg/dL    Alkaline Phosphatase 87 40 - 150 U/L    AST 19 0 - 45 U/L    ALT 19 0 - 50 U/L    Protein Total 6.6 6.4 - 8.3 g/dL    Albumin 3.7 3.5 - 5.2 g/dL    Bilirubin Total <0.2 <=1.2 mg/dL   TSH with free T4 reflex   Result Value Ref Range    TSH 0.91 0.30 - 4.20 uIU/mL         Total time 30 minutes, to include face to face visit, review of EMR, ordering, documentation and coordination of care on date of service.    complexity modifier for longitudinal care.       Signed by: Marina Cronin NP        Oncology Rooming Note    August 29, 2024 10:06 AM   Adrianna Pereira is a 63 year old female who presents for:    Chief Complaint   Patient presents with     Oncology Clinic Visit     Initial Vitals: BP (!) 88/58 (BP Location: Left arm, Patient Position: Sitting, Cuff Size: Adult Small)   Pulse 90   Temp 97.1  F (36.2  C) (Tympanic)   Resp 11   Ht 1.6 m (5' 3\")   Wt 37.9 kg (83 lb 8 oz)   " "SpO2 97%   BMI 14.79 kg/m   Estimated body mass index is 14.79 kg/m  as calculated from the following:    Height as of this encounter: 1.6 m (5' 3\").    Weight as of this encounter: 37.9 kg (83 lb 8 oz). Body surface area is 1.3 meters squared.  No Pain (0) Comment: Data Unavailable   No LMP recorded. Patient is postmenopausal.  Allergies reviewed: Yes  Medications reviewed: Yes    Medications: Medication refills not needed today.  Pharmacy name entered into EPIC:    [id number] - [place], [place] - [address] [place], [place] [address] - [place], [place] - [address] [facility] [place], [place] - [address] [facility] [place], [place] - [address] [facility]     Frailty Screening:   Is the patient here for a new oncology consult visit in cancer care? 2. No      Clinical concerns: None today.       Leana Lloyd MA                Again, thank you for allowing me to participate in the care of your patient.        Sincerely,        Marina Cronin NP  "

## 2024-08-29 NOTE — PROGRESS NOTES
Infusion Nursing Note:  Adrianna Pereira presents today for Keytruda.    Patient seen by provider today: Yes: NP Marina Cronin therefore assessment deferred   present during visit today: Not Applicable.    Note: N/A.      Intravenous Access:  Implanted Port.    Treatment Conditions:  Lab Results   Component Value Date     08/29/2024    POTASSIUM 4.5 08/29/2024    MAG 1.9 12/15/2023    CR 0.70 08/29/2024    RACHAEL 9.2 08/29/2024    BILITOTAL <0.2 08/29/2024    ALBUMIN 3.7 08/29/2024    ALT 19 08/29/2024    AST 19 08/29/2024       Results reviewed, labs MET treatment parameters, ok to proceed with treatment.      Post Infusion Assessment:  Patient tolerated infusion without incident.  Blood return noted pre and post infusion.  Site patent and intact, free from redness, edema or discomfort.  No evidence of extravasations.  Access discontinued per protocol.       Discharge Plan:   Copy of AVS reviewed with patient and/or family.  Patient will return 9/19/24 for next appointment.  Patient discharged in stable condition accompanied by: self and .  Departure Mode: Ambulatory.      TIARA JOYNER RN

## 2024-09-05 ENCOUNTER — ENROLLMENT (OUTPATIENT)
Dept: HOME HEALTH SERVICES | Facility: HOME HEALTH | Age: 64
End: 2024-09-05
Payer: COMMERCIAL

## 2024-09-13 ENCOUNTER — MYC REFILL (OUTPATIENT)
Dept: ONCOLOGY | Facility: CLINIC | Age: 64
End: 2024-09-13
Payer: COMMERCIAL

## 2024-09-13 DIAGNOSIS — G89.3 CANCER RELATED PAIN: ICD-10-CM

## 2024-09-13 RX ORDER — OXYCODONE HCL 5 MG/5 ML
5 SOLUTION, ORAL ORAL EVERY 6 HOURS PRN
Qty: 500 ML | Refills: 0 | Status: SHIPPED | OUTPATIENT
Start: 2024-09-13

## 2024-09-18 ENCOUNTER — VIRTUAL VISIT (OUTPATIENT)
Dept: OTOLARYNGOLOGY | Facility: CLINIC | Age: 64
End: 2024-09-18
Payer: COMMERCIAL

## 2024-09-18 VITALS — HEIGHT: 64 IN | BODY MASS INDEX: 14.33 KG/M2

## 2024-09-18 DIAGNOSIS — C04.9 SCC (SQUAMOUS CELL CARCINOMA OF FLOOR OF MOUTH) (H): Primary | ICD-10-CM

## 2024-09-18 PROCEDURE — 99212 OFFICE O/P EST SF 10 MIN: CPT | Mod: 95 | Performed by: OTOLARYNGOLOGY

## 2024-09-18 ASSESSMENT — PAIN SCALES - GENERAL: PAINLEVEL: NO PAIN (0)

## 2024-09-18 NOTE — LETTER
9/18/2024       RE: Adrianna Pereira  [id number] [address] [place] [id number]     Dear Colleague,    Thank you for referring your patient, Adrianna Pereira, to the [facility] [place] at [facility]. Please see a copy of my visit note below.    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 The patient presented with disease involving the mental skin. Dr. Harrington did a fibula and anterolateral thigh free tissue reconstruction. The patient did have some hardware exposed prior to radiation and then completed her chemoradiation therapy with Dr. Egan on December 5, 2023.       Today's encounter was conducted via video visit but the patient's camera was not working well so it was effectively a phone visit.  She states she is doing well currently she continues to have significant exposed necrotic bone and plate but is not interested in do anything about this because it would require a large surgery.  She does not have any back pain or pain in her mouth and in general says that she is doing quite well.  She says she had a great summer and otherwise has no complaints.    I suggested she check in with me in the next 3 to 6 months.    Tyshawn Dao M.D.        Again, thank you for allowing me to participate in the care of your patient.      Sincerely,    Tyshawn Dao MD

## 2024-09-18 NOTE — PROGRESS NOTES
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 Unfortunately, within just a few months of completing, was found to have metastatic recurrence.     Radiation Treatment  10/19/2023 to 12/5/2023: Head and neck, 6600 cGy in 33 fractions  2/22/24 to 3/12/24: Thoracic spine, T2 - T4, 2500 cGy in 10 fractions    Now, on palliative pembrolizumab. Has been on 7 months. Tolerating well.     CT in June showed stable to slightly improved disease in head/neck and stable/improved lung nodules; however some new patchy opacities bilaterally (could be inflammatory/infectious). No symptoms of pneumonitis, but need to follow clinically with risk of immunotherapy pneumonitis.     Labwork:  CMP: WNL. TSH WNL. CBC: hgb 10.6 (stable), rest CBC WNL.    Plan:  -Continue with C11 pembrolizumab.   -Return in 3 weeks with Repeat CT neck, chest, abd, pelvis ahead of next cycle  -If responding, given her tolerance, may be able to consider changing to 400 mg every 6 week dosing or can start seeing provider every other 3-week cycle (every 6 weeks)  -CBC every 3 months.   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    Open oral/jaw wound  Plate exposure + open wound over lower lip secondary to residual/recurrent tumor at prior ENT resection + adjuvant RT site. Draining lessening. No fevers. Reconstruction would require additional free flap, so not being pursued at this time, in setting of widely metastatic  disease, after discussion with ENT surgeon Dr. Thorne. Dr. Egan did not recommend additional radiation.    Met with Wound Care MD.   Supportive management interventions to help with drainage, odor and decreasing bacterial growth were provided.     She had more pain and drainage from left lower mouth/chin wound last month and completed antibiotics. This is improved.     Plan:  -Pt to follow Wound Clinic recs for mgmt and return PRN.  -High risk for infection, completed antibiotics a few weeks ago  -Follow-up with Dr. Thorne in ENT every 3-6 months  -No further RT recommended at this time, but Dr. Egan following every 3 mths to reassess    Cancer pain  Completed palliative RT to T spine 3/2024. Back pain is resolved. No longer requiring pain meds for this.     Mouth/lower lip pain related to progressive reconstruction failure/open wound. Stopped NSAID use due to elevated creatinine with improvement, uses ibuprofen on occasion. Primarily managing well with Tylenol 500 mg 3/day and oxycodone 7.5 mg twice/day.     Plan:  -Continue avoiding/minimizing NSAIDs  -Favor Tylenol instead, can take up to 3000 mg/day. Continue oxycodone use through day as well  -Consider long-acting pain med (likley in patch form since NPO) if oxycodone use increases  -Referral to Palliative Care if not well-controlled in future, pt wants to minimize number of appts for now.     Nutrition/hydration  Dependant on feeding tube.   Last PEG had migrated into proximal duodenum. This was recently replaced/repositioned.   She's tolerating feedings better since repositioning and regaining a bit of weight.     Plan:  -Working with Dietician as needed.   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   On observation.    ______________________________________________________________________________    History of Present Illness/ Interval History    Ms. Adrianna Pereira is a 64 year old who completed resection of floor of mouth cancer, followed by adjuvant chemoRT 12/2023. Unfortunately, shortly after this, she was found to have local and metastatic recurrence involving mandible, lung, bone (T3-T4) and soft tissue that is PDL1 70%.    She started palliative treatments with pembrolizumab in February (7 mths ago).  Tolerating pembrolizumab generally well. No diarrhea. No new dyspnea/cough.     Jaw/mouth pain due to plate exposure of lip/open wound at prior ENT resection site due to local recurrence persists. She completed antibiotics a few weeks ago for increased drainage, improved. Managing with Tylenol and oxycodone 7.5 mg BID.  Assessed by ENT surgeon and reconstruction would require additional free flap, so not being pursued at this time. Saw Wound Clinic for mgmt recs.    No new sites of pain.    All nutrition/hydration via PEG.   Gaining weight.       ECOG Performance    0-1    Oncology History/Treatment  Diagnosis/Stage:   7/2023: [id number] Floor of mouth squamous cell cancer  -hx alcoholism (stopped all use) and smoking (quit 8/2023)  -hx premalignant oral cavity lesions s/p CO2 laser ablation with ENT  -presented with lower lip/chin swelling  -7/3/2023 left mandibular alveolus biopsy: invasive keratinizing moderately differential squamous cell carcinoma.   -PET: 4.4 x 3.7 x 3.2 cm anterior oral cavity mass invading into the mandible with a separate gluco-avid nodule on the right mandibular buccal mucosa and bilateral level 1B, level 2 and L3 metastatic lymphadenopathy without evidence of metastatic disease.   -8/17/2023 surgical path: pT4a, N3b tumor with positive left anterior lateral soft tissue margin, 11/98+ lymph nodes including STEFFEN (largest 4 cm) and several bilateral lymph nodes consistent with involvement by low-grade  "B-cell neoplasm suggestive of CLL/SLL.     2/2024: Progression to stage IV mouth squamous cell cancer to bone and lung  -Within weeks of completing definitive treatment for her primary disease, noted severe/progressive back pain  -CT C/T spine: new T3 compression fracture with hypodensity in vertebral body extending into L posterior elements suggestive of met. T4 posterior vertebral body lesion also suspicious. New lung nodules also noted  -MRI T spine: T3 compression fracture with extraosseous tumor involving ventral epidural space with mod - severe central spinal canal stenosis at T3  -PET: recurrent avid foci L maikel-mandible, numerous bilateral pulmonary/pleural mets, met mediastinal/hilar adenopathy, T3, T4, L adductor musculature and R upper thigh skin thickening  -PDL1 TPS and CPS 70% from 8/2023 original tumor biopsy    Treatment:  Locally advanced disease  -8/17/2023: segmental mandibulectomy, floor of mouth resection, anterior glossectomy, left fibular free flap, skin grafting, and tracheostomy.   -Post-op course complicated by skin necrosis requiring OR debridment and resuturing of neck incision/intraoral flap    -10/20/2023 - 12/5/2023: completed adjuvant concurrent RT (6600 cGy/33) + weekly cisplatin (x 7 cycles)    Metastatic recurrence  -2/21/2024 - present: pembrolizumab.  -Zometa started x 1 dose for bone mets, later held for jaw osteonecrosis risk    -2/26 - 3/12/2024: palliative RT to thoracic spine, 2500 cGy/10. Dex taper      Physical Exam  BP 99/56 (BP Location: Right arm, Patient Position: Sitting, Cuff Size: Adult Small)   Pulse 75   Temp 97.6  F (36.4  C) (Tympanic)   Resp 16   Ht 1.6 m (5' 3\")   Wt 38.6 kg (85 lb)   SpO2 98%   BMI 15.06 kg/m      GENERAL: Alert and oriented to time place and person. Seated comfortably. In no distress.  Dysarthria.   accompanied.  HEENT: Open wound below lower lip with exposed plate. Scant white/yellow appearing odorous drainage.  SHAE, EOMI. " No erythema. No icterus.  NECK: Post-RT fibrosis. No evident adenopathy/masses.  HEART: RRR  CHEST: regular respiratory effort. Lungs normal to percussion and auscultation  ABD: PEG in place. Non-distended  NEURO: No gross deficit noted.     Imaging    6/26/2024 CT n/c/a/p: reviewed in A/P. Overall stable/response.     Lab Results    Recent Results (from the past 168 hour(s))   Comprehensive metabolic panel   Result Value Ref Range    Sodium 140 135 - 145 mmol/L    Potassium 4.2 3.4 - 5.3 mmol/L    Carbon Dioxide (CO2) 25 22 - 29 mmol/L    Anion Gap 11 7 - 15 mmol/L    Urea Nitrogen 50.0 (H) 8.0 - 23.0 mg/dL    Creatinine 0.81 0.51 - 0.95 mg/dL    GFR Estimate 81 >60 mL/min/1.73m2    Calcium 9.5 8.8 - 10.4 mg/dL    Chloride 104 98 - 107 mmol/L    Glucose 144 (H) 70 - 99 mg/dL    Alkaline Phosphatase 86 40 - 150 U/L    AST 25 0 - 45 U/L    ALT 33 0 - 50 U/L    Protein Total 6.9 6.4 - 8.3 g/dL    Albumin 3.9 3.5 - 5.2 g/dL    Bilirubin Total 0.2 <=1.2 mg/dL   TSH with free T4 reflex   Result Value Ref Range    TSH 1.29 0.30 - 4.20 uIU/mL   CBC with platelets and differential   Result Value Ref Range    WBC Count 8.1 4.0 - 11.0 10e3/uL    RBC Count 3.05 (L) 3.80 - 5.20 10e6/uL    Hemoglobin 10.6 (L) 11.7 - 15.7 g/dL    Hematocrit 31.4 (L) 35.0 - 47.0 %     (H) 78 - 100 fL    MCH 34.8 (H) 26.5 - 33.0 pg    MCHC 33.8 31.5 - 36.5 g/dL    RDW 12.8 10.0 - 15.0 %    Platelet Count 263 150 - 450 10e3/uL    % Neutrophils 71 %    % Lymphocytes 16 %    % Monocytes 6 %    % Eosinophils 6 %    % Basophils 0 %    % Immature Granulocytes 0 %    NRBCs per 100 WBC 0 <1 /100    Absolute Neutrophils 5.8 1.6 - 8.3 10e3/uL    Absolute Lymphocytes 1.3 0.8 - 5.3 10e3/uL    Absolute Monocytes 0.5 0.0 - 1.3 10e3/uL    Absolute Eosinophils 0.5 0.0 - 0.7 10e3/uL    Absolute Basophils 0.0 0.0 - 0.2 10e3/uL    Absolute Immature Granulocytes 0.0 <=0.4 10e3/uL    Absolute NRBCs 0.0 10e3/uL         Total time 30 minutes, to include face to  face visit, review of EMR, ordering, documentation and coordination of care on date of service.    complexity modifier for longitudinal care.       Signed by: Marina Cronin, NP

## 2024-09-19 ENCOUNTER — LAB (OUTPATIENT)
Dept: INFUSION THERAPY | Facility: CLINIC | Age: 64
End: 2024-09-19
Attending: INTERNAL MEDICINE
Payer: COMMERCIAL

## 2024-09-19 ENCOUNTER — ONCOLOGY VISIT (OUTPATIENT)
Dept: ONCOLOGY | Facility: CLINIC | Age: 64
End: 2024-09-19
Attending: INTERNAL MEDICINE
Payer: COMMERCIAL

## 2024-09-19 VITALS
SYSTOLIC BLOOD PRESSURE: 99 MMHG | OXYGEN SATURATION: 98 % | TEMPERATURE: 97.6 F | HEART RATE: 75 BPM | HEIGHT: 63 IN | DIASTOLIC BLOOD PRESSURE: 56 MMHG | BODY MASS INDEX: 15.06 KG/M2 | WEIGHT: 85 LBS | RESPIRATION RATE: 16 BRPM

## 2024-09-19 VITALS — RESPIRATION RATE: 16 BRPM | DIASTOLIC BLOOD PRESSURE: 58 MMHG | HEART RATE: 76 BPM | SYSTOLIC BLOOD PRESSURE: 92 MMHG

## 2024-09-19 DIAGNOSIS — C78.00 MALIGNANT NEOPLASM METASTATIC TO LUNG, UNSPECIFIED LATERALITY (H): Primary | ICD-10-CM

## 2024-09-19 DIAGNOSIS — C06.9 SQUAMOUS CELL CARCINOMA OF ORAL CAVITY (H): ICD-10-CM

## 2024-09-19 DIAGNOSIS — C79.51 MALIGNANT NEOPLASM METASTATIC TO BONE (H): ICD-10-CM

## 2024-09-19 DIAGNOSIS — C79.51 MALIGNANT NEOPLASM METASTATIC TO BONE (H): Primary | ICD-10-CM

## 2024-09-19 DIAGNOSIS — C78.00 MALIGNANT NEOPLASM METASTATIC TO LUNG, UNSPECIFIED LATERALITY (H): ICD-10-CM

## 2024-09-19 DIAGNOSIS — C06.9 SQUAMOUS CELL CARCINOMA OF ORAL CAVITY (H): Primary | ICD-10-CM

## 2024-09-19 LAB
ALBUMIN SERPL BCG-MCNC: 3.9 G/DL (ref 3.5–5.2)
ALP SERPL-CCNC: 86 U/L (ref 40–150)
ALT SERPL W P-5'-P-CCNC: 33 U/L (ref 0–50)
ANION GAP SERPL CALCULATED.3IONS-SCNC: 11 MMOL/L (ref 7–15)
AST SERPL W P-5'-P-CCNC: 25 U/L (ref 0–45)
BASOPHILS # BLD AUTO: 0 10E3/UL (ref 0–0.2)
BASOPHILS NFR BLD AUTO: 0 %
BILIRUB SERPL-MCNC: 0.2 MG/DL
BUN SERPL-MCNC: 50 MG/DL (ref 8–23)
CALCIUM SERPL-MCNC: 9.5 MG/DL (ref 8.8–10.4)
CHLORIDE SERPL-SCNC: 104 MMOL/L (ref 98–107)
CREAT SERPL-MCNC: 0.81 MG/DL (ref 0.51–0.95)
EGFRCR SERPLBLD CKD-EPI 2021: 81 ML/MIN/1.73M2
EOSINOPHIL # BLD AUTO: 0.5 10E3/UL (ref 0–0.7)
EOSINOPHIL NFR BLD AUTO: 6 %
ERYTHROCYTE [DISTWIDTH] IN BLOOD BY AUTOMATED COUNT: 12.8 % (ref 10–15)
GLUCOSE SERPL-MCNC: 144 MG/DL (ref 70–99)
HCO3 SERPL-SCNC: 25 MMOL/L (ref 22–29)
HCT VFR BLD AUTO: 31.4 % (ref 35–47)
HGB BLD-MCNC: 10.6 G/DL (ref 11.7–15.7)
IMM GRANULOCYTES # BLD: 0 10E3/UL
IMM GRANULOCYTES NFR BLD: 0 %
LYMPHOCYTES # BLD AUTO: 1.3 10E3/UL (ref 0.8–5.3)
LYMPHOCYTES NFR BLD AUTO: 16 %
MCH RBC QN AUTO: 34.8 PG (ref 26.5–33)
MCHC RBC AUTO-ENTMCNC: 33.8 G/DL (ref 31.5–36.5)
MCV RBC AUTO: 103 FL (ref 78–100)
MONOCYTES # BLD AUTO: 0.5 10E3/UL (ref 0–1.3)
MONOCYTES NFR BLD AUTO: 6 %
NEUTROPHILS # BLD AUTO: 5.8 10E3/UL (ref 1.6–8.3)
NEUTROPHILS NFR BLD AUTO: 71 %
NRBC # BLD AUTO: 0 10E3/UL
NRBC BLD AUTO-RTO: 0 /100
PLATELET # BLD AUTO: 263 10E3/UL (ref 150–450)
POTASSIUM SERPL-SCNC: 4.2 MMOL/L (ref 3.4–5.3)
PROT SERPL-MCNC: 6.9 G/DL (ref 6.4–8.3)
RBC # BLD AUTO: 3.05 10E6/UL (ref 3.8–5.2)
SODIUM SERPL-SCNC: 140 MMOL/L (ref 135–145)
TSH SERPL DL<=0.005 MIU/L-ACNC: 1.29 UIU/ML (ref 0.3–4.2)
WBC # BLD AUTO: 8.1 10E3/UL (ref 4–11)

## 2024-09-19 PROCEDURE — 85025 COMPLETE CBC W/AUTO DIFF WBC: CPT | Performed by: NURSE PRACTITIONER

## 2024-09-19 PROCEDURE — 36591 DRAW BLOOD OFF VENOUS DEVICE: CPT

## 2024-09-19 PROCEDURE — G2211 COMPLEX E/M VISIT ADD ON: HCPCS | Performed by: NURSE PRACTITIONER

## 2024-09-19 PROCEDURE — 80053 COMPREHEN METABOLIC PANEL: CPT | Performed by: NURSE PRACTITIONER

## 2024-09-19 PROCEDURE — 99214 OFFICE O/P EST MOD 30 MIN: CPT | Performed by: NURSE PRACTITIONER

## 2024-09-19 PROCEDURE — 84443 ASSAY THYROID STIM HORMONE: CPT | Performed by: NURSE PRACTITIONER

## 2024-09-19 PROCEDURE — 96413 CHEMO IV INFUSION 1 HR: CPT

## 2024-09-19 PROCEDURE — 258N000003 HC RX IP 258 OP 636: Performed by: NURSE PRACTITIONER

## 2024-09-19 PROCEDURE — 250N000011 HC RX IP 250 OP 636: Performed by: NURSE PRACTITIONER

## 2024-09-19 PROCEDURE — G0463 HOSPITAL OUTPT CLINIC VISIT: HCPCS | Mod: 25 | Performed by: NURSE PRACTITIONER

## 2024-09-19 RX ORDER — HEPARIN SODIUM (PORCINE) LOCK FLUSH IV SOLN 100 UNIT/ML 100 UNIT/ML
5 SOLUTION INTRAVENOUS
Status: CANCELLED | OUTPATIENT
Start: 2024-09-19

## 2024-09-19 RX ORDER — HEPARIN SODIUM (PORCINE) LOCK FLUSH IV SOLN 100 UNIT/ML 100 UNIT/ML
5 SOLUTION INTRAVENOUS
Status: DISCONTINUED | OUTPATIENT
Start: 2024-09-19 | End: 2024-09-19 | Stop reason: HOSPADM

## 2024-09-19 RX ORDER — DIPHENHYDRAMINE HYDROCHLORIDE 50 MG/ML
50 INJECTION INTRAMUSCULAR; INTRAVENOUS
Status: CANCELLED
Start: 2024-09-19

## 2024-09-19 RX ORDER — ALBUTEROL SULFATE 0.83 MG/ML
2.5 SOLUTION RESPIRATORY (INHALATION)
Status: CANCELLED | OUTPATIENT
Start: 2024-09-19

## 2024-09-19 RX ORDER — HEPARIN SODIUM,PORCINE 10 UNIT/ML
5-20 VIAL (ML) INTRAVENOUS DAILY PRN
Status: CANCELLED | OUTPATIENT
Start: 2024-09-19

## 2024-09-19 RX ORDER — METHYLPREDNISOLONE SODIUM SUCCINATE 125 MG/2ML
125 INJECTION, POWDER, LYOPHILIZED, FOR SOLUTION INTRAMUSCULAR; INTRAVENOUS
Status: CANCELLED
Start: 2024-09-19

## 2024-09-19 RX ORDER — LORAZEPAM 2 MG/ML
0.5 INJECTION INTRAMUSCULAR EVERY 4 HOURS PRN
Status: CANCELLED | OUTPATIENT
Start: 2024-09-19

## 2024-09-19 RX ORDER — ALBUTEROL SULFATE 90 UG/1
1-2 AEROSOL, METERED RESPIRATORY (INHALATION)
Status: CANCELLED
Start: 2024-09-19

## 2024-09-19 RX ORDER — MEPERIDINE HYDROCHLORIDE 25 MG/ML
25 INJECTION INTRAMUSCULAR; INTRAVENOUS; SUBCUTANEOUS EVERY 30 MIN PRN
Status: CANCELLED | OUTPATIENT
Start: 2024-09-19

## 2024-09-19 RX ORDER — EPINEPHRINE 1 MG/ML
0.3 INJECTION, SOLUTION, CONCENTRATE INTRAVENOUS EVERY 5 MIN PRN
Status: CANCELLED | OUTPATIENT
Start: 2024-09-19

## 2024-09-19 RX ADMIN — SODIUM CHLORIDE 250 ML: 9 INJECTION, SOLUTION INTRAVENOUS at 10:42

## 2024-09-19 RX ADMIN — Medication 5 ML: at 11:17

## 2024-09-19 RX ADMIN — SODIUM CHLORIDE 200 MG: 9 INJECTION, SOLUTION INTRAVENOUS at 10:40

## 2024-09-19 ASSESSMENT — PAIN SCALES - GENERAL: PAINLEVEL: NO PAIN (0)

## 2024-09-19 NOTE — PROGRESS NOTES
Infusion Nursing Note:  Adrianna Pereira presents today for Keytruda C11D1.    Patient seen by provider today: Yes: Marina Guerrero NP; therefore, assessment deferred   present during visit today: Not Applicable.    Note: N/A.    Intravenous Access:  Implanted Port.    Treatment Conditions:  Lab Results   Component Value Date     09/19/2024    POTASSIUM 4.2 09/19/2024    MAG 1.9 12/15/2023    CR 0.81 09/19/2024    RACHAEL 9.5 09/19/2024    BILITOTAL 0.2 09/19/2024    ALBUMIN 3.9 09/19/2024    ALT 33 09/19/2024    AST 25 09/19/2024   Results reviewed, labs MET treatment parameters, ok to proceed with treatment.    Post Infusion Assessment:  Patient tolerated infusion without incident.  Blood return noted pre and post infusion.  Site patent and intact, free from redness, edema or discomfort.  No evidence of extravasations.  Access discontinued per protocol.     Discharge Plan:   Discharge instructions reviewed with: Patient.  Patient and/or family verbalized understanding of discharge instructions and all questions answered.  AVS to patient via [employer].  Patient will return 10/9/2024 for next appointment.   Patient discharged in stable condition accompanied by: self.  Departure Mode: Ambulatory.    [place]zon RN

## 2024-09-19 NOTE — PROGRESS NOTES
"Oncology Rooming Note    September 19, 2024 9:42 AM   Adrianna Pereira is a 64 year old female who presents for:    Chief Complaint   Patient presents with    Oncology Clinic Visit     Malignant neoplasm metastatic to lung, unspecified laterality - Labs provider and infusion     Initial Vitals: BP 99/56 (BP Location: Right arm, Patient Position: Sitting, Cuff Size: Adult Small)   Pulse 75   Temp 97.6  F (36.4  C) (Tympanic)   Resp 16   Ht 1.6 m (5' 3\")   Wt 38.6 kg (85 lb)   SpO2 98%   BMI 15.06 kg/m   Estimated body mass index is 15.06 kg/m  as calculated from the following:    Height as of this encounter: 1.6 m (5' 3\").    Weight as of this encounter: 38.6 kg (85 lb). Body surface area is 1.31 meters squared.  No Pain (0) Comment: Data Unavailable   No LMP recorded. Patient is postmenopausal.  Allergies reviewed: Yes  Medications reviewed: Yes    Medications: Medication refills not needed today.  Pharmacy name entered into EPIC:    [id number] - [place], [place] - [address] [place] [address] [facility]ACY - [place], [place] - [address] [facility] [place], [place] - [address] [facility] [place], [place] - [address] [place] [address]     Frailty Screening:   Is the patient here for a new oncology consult visit in cancer care? 2. No      Clinical concerns:  None at this time.       Tanya Philippe, [facility]              "

## 2024-09-19 NOTE — LETTER
9/19/2024      Adrianna Pereira  [address] [address] [place] [id number]      Dear Colleague,    Thank you for referring your patient, Adrianna Pereira, to the [facility]. Please see a copy of my visit note below.    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 Unfortunately, within just a few months of completing, was found to have metastatic recurrence.     Radiation Treatment  10/19/2023 to 12/5/2023: Head and neck, 6600 cGy in 33 fractions  2/22/24 to 3/12/24: Thoracic spine, T2 - T4, 2500 cGy in 10 fractions    Now, on palliative pembrolizumab. Has been on 7 months. Tolerating well.     CT in June showed stable to slightly improved disease in head/neck and stable/improved lung nodules; however some new patchy opacities bilaterally (could be inflammatory/infectious). No symptoms of pneumonitis, but need to follow clinically with risk of immunotherapy pneumonitis.     Labwork:  CMP: WNL. TSH WNL. CBC: hgb 10.6 (stable), rest CBC WNL.    Plan:  -Continue with C11 pembrolizumab.   -Return in 3 weeks with Repeat CT neck, chest, abd, pelvis ahead of next cycle  -If responding, given her tolerance, may be able to consider changing to 400 mg every 6 week dosing or can start seeing provider every other 3-week cycle (every 6 weeks)  -CBC every 3 months.   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    Open oral/jaw wound  Plate exposure + open wound over lower lip  secondary to residual/recurrent tumor at prior ENT resection + adjuvant RT site. Draining lessening. No fevers. Reconstruction would require additional free flap, so not being pursued at this time, in setting of widely metastatic disease, after discussion with ENT surgeon Dr. Thorne. Dr. Egan did not recommend additional radiation.    Met with Wound Care MD.   Supportive management interventions to help with drainage, odor and decreasing bacterial growth were provided.     She had more pain and drainage from left lower mouth/chin wound last month and completed antibiotics. This is improved.     Plan:  -Pt to follow [facility]ound Clinic recs for mgmt and return PRN.  -High risk for infection, completed antibiotics a few weeks ago  -Follow-up with Dr. Thorne in ENT every 3-6 months  -No further RT recommended at this time, but Dr. Egan following every 3 mths to reassess    Cancer pain  Completed palliative RT to T spine 3/2024. Back pain is resolved. No longer requiring pain meds for this.     Mouth/lower lip pain related to progressive reconstruction failure/open wound. Stopped NSAID use due to elevated creatinine with improvement, uses ibuprofen on occasion. Primarily managing well with Tylenol 500 mg 3/day and oxycodone 7.5 mg twice/day.     Plan:  -Continue avoiding/minimizing NSAIDs  -Favor Tylenol instead, can take up to 3000 mg/day. Continue oxycodone use through day as well  -Consider long-acting pain med (likley in patch form since NPO) if oxycodone use increases  -Referral to Palliative Care if not well-controlled in future, pt wants to minimize number of appts for now.     Nutrition/hydration  Dependant on feeding tube.   Last PEG had migrated into proximal duodenum. This was recently replaced/repositioned.   She's tolerating feedings better since repositioning and regaining a bit of weight.     Plan:  -Working with Dietician as needed.   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   On observation.   ______________________________________________________________________________    History of Present Illness/ Interval History    Ms. Adrianna Pereira is a 64 year old who completed resection of floor of mouth cancer, followed by adjuvant chemoRT 12/2023. Unfortunately, shortly after this, she was found to have local and metastatic recurrence involving mandible, lung, bone (T3-T4) and soft tissue that is PDL1 70%.    She started palliative treatments with pembrolizumab in February (7 mths ago).  Tolerating pembrolizumab generally well. No diarrhea. No new dyspnea/cough.     Jaw/mouth pain due to plate exposure of lip/open wound at prior ENT resection site due to local recurrence persists. She completed antibiotics a few weeks ago for increased drainage, improved. Managing with Tylenol and oxycodone 7.5 mg BID.  Assessed by ENT surgeon and reconstruction would require additional free flap, so not being pursued at this time. Saw Wound Clinic for mgmt recs.    No new sites of pain.    All nutrition/hydration via PEG.   Gaining weight.       ECOG Performance    0-1    Oncology History/Treatment  Diagnosis/Stage:   7/2023: [id number] Floor of mouth squamous cell cancer  -hx alcoholism (stopped all use) and smoking (quit 8/2023)  -hx premalignant oral cavity lesions s/p CO2 laser ablation with ENT  -presented with lower lip/chin swelling  -7/3/2023 left mandibular alveolus biopsy: invasive keratinizing moderately differential squamous cell carcinoma.   -PET: 4.4 x 3.7 x 3.2 cm anterior oral cavity mass invading into the mandible with a separate gluco-avid nodule on the right mandibular buccal mucosa and bilateral level 1B, level 2 and L3 metastatic lymphadenopathy without evidence of metastatic disease.   -8/17/2023 surgical path: pT4a, N3b tumor with positive left anterior lateral  "soft tissue margin, 11/98+ lymph nodes including STEFFEN (largest 4 cm) and several bilateral lymph nodes consistent with involvement by low-grade B-cell neoplasm suggestive of CLL/SLL.     2/2024: Progression to stage IV mouth squamous cell cancer to bone and lung  -Within weeks of completing definitive treatment for her primary disease, noted severe/progressive back pain  -CT C/T spine: new T3 compression fracture with hypodensity in vertebral body extending into L posterior elements suggestive of met. T4 posterior vertebral body lesion also suspicious. New lung nodules also noted  -MRI T spine: T3 compression fracture with extraosseous tumor involving ventral epidural space with mod - severe central spinal canal stenosis at T3  -PET: recurrent avid foci L maikel-mandible, numerous bilateral pulmonary/pleural mets, met mediastinal/hilar adenopathy, T3, T4, L adductor musculature and R upper thigh skin thickening  -PDL1 TPS and CPS 70% from 8/2023 original tumor biopsy    Treatment:  Locally advanced disease  -8/17/2023: segmental mandibulectomy, floor of mouth resection, anterior glossectomy, left fibular free flap, skin grafting, and tracheostomy.   -Post-op course complicated by skin necrosis requiring OR debridment and resuturing of neck incision/intraoral flap    -10/20/2023 - 12/5/2023: completed adjuvant concurrent RT (6600 cGy/33) + weekly cisplatin (x 7 cycles)    Metastatic recurrence  -2/21/2024 - present: pembrolizumab.  -Zometa started x 1 dose for bone mets, later held for jaw osteonecrosis risk    -2/26 - 3/12/2024: palliative RT to thoracic spine, 2500 cGy/10. Dex taper      Physical Exam  BP 99/56 (BP Location: Right arm, Patient Position: Sitting, Cuff Size: Adult Small)   Pulse 75   Temp 97.6  F (36.4  C) (Tympanic)   Resp 16   Ht 1.6 m (5' 3\")   Wt 38.6 kg (85 lb)   SpO2 98%   BMI 15.06 kg/m      GENERAL: Alert and oriented to time place and person. Seated comfortably. In no distress.  " Dysarthria.   accompanied.  HEENT: Open wound below lower lip with exposed plate. Scant white/yellow appearing odorous drainage.  SHAE, EOMI. No erythema. No icterus.  NECK: Post-RT fibrosis. No evident adenopathy/masses.  HEART: RRR  CHEST: regular respiratory effort. Lungs normal to percussion and auscultation  ABD: PEG in place. Non-distended  NEURO: No gross deficit noted.     Imaging    6/26/2024 CT n/c/a/p: reviewed in A/P. Overall stable/response.     Lab Results    Recent Results (from the past 168 hour(s))   Comprehensive metabolic panel   Result Value Ref Range    Sodium 140 135 - 145 mmol/L    Potassium 4.2 3.4 - 5.3 mmol/L    Carbon Dioxide (CO2) 25 22 - 29 mmol/L    Anion Gap 11 7 - 15 mmol/L    Urea Nitrogen 50.0 (H) 8.0 - 23.0 mg/dL    Creatinine 0.81 0.51 - 0.95 mg/dL    GFR Estimate 81 >60 mL/min/1.73m2    Calcium 9.5 8.8 - 10.4 mg/dL    Chloride 104 98 - 107 mmol/L    Glucose 144 (H) 70 - 99 mg/dL    Alkaline Phosphatase 86 40 - 150 U/L    AST 25 0 - 45 U/L    ALT 33 0 - 50 U/L    Protein Total 6.9 6.4 - 8.3 g/dL    Albumin 3.9 3.5 - 5.2 g/dL    Bilirubin Total 0.2 <=1.2 mg/dL   TSH with free T4 reflex   Result Value Ref Range    TSH 1.29 0.30 - 4.20 uIU/mL   CBC with platelets and differential   Result Value Ref Range    WBC Count 8.1 4.0 - 11.0 10e3/uL    RBC Count 3.05 (L) 3.80 - 5.20 10e6/uL    Hemoglobin 10.6 (L) 11.7 - 15.7 g/dL    Hematocrit 31.4 (L) 35.0 - 47.0 %     (H) 78 - 100 fL    MCH 34.8 (H) 26.5 - 33.0 pg    MCHC 33.8 31.5 - 36.5 g/dL    RDW 12.8 10.0 - 15.0 %    Platelet Count 263 150 - 450 10e3/uL    % Neutrophils 71 %    % Lymphocytes 16 %    % Monocytes 6 %    % Eosinophils 6 %    % Basophils 0 %    % Immature Granulocytes 0 %    NRBCs per 100 WBC 0 <1 /100    Absolute Neutrophils 5.8 1.6 - 8.3 10e3/uL    Absolute Lymphocytes 1.3 0.8 - 5.3 10e3/uL    Absolute Monocytes 0.5 0.0 - 1.3 10e3/uL    Absolute Eosinophils 0.5 0.0 - 0.7 10e3/uL    Absolute Basophils 0.0  "0.0 - 0.2 10e3/uL    Absolute Immature Granulocytes 0.0 <=0.4 10e3/uL    Absolute NRBCs 0.0 10e3/uL         Total time 30 minutes, to include face to face visit, review of EMR, ordering, documentation and coordination of care on date of service.    complexity modifier for longitudinal care.       Signed by: Marina Cronin NP        Oncology Rooming Note    September 19, 2024 9:42 AM   Adrianna Pereira is a 64 year old female who presents for:    Chief Complaint   Patient presents with     Oncology Clinic Visit     Malignant neoplasm metastatic to lung, unspecified laterality - Labs provider and infusion     Initial Vitals: BP 99/56 (BP Location: Right arm, Patient Position: Sitting, Cuff Size: Adult Small)   Pulse 75   Temp 97.6  F (36.4  C) (Tympanic)   Resp 16   Ht 1.6 m (5' 3\")   Wt 38.6 kg (85 lb)   SpO2 98%   BMI 15.06 kg/m   Estimated body mass index is 15.06 kg/m  as calculated from the following:    Height as of this encounter: 1.6 m (5' 3\").    Weight as of this encounter: 38.6 kg (85 lb). Body surface area is 1.31 meters squared.  No Pain (0) Comment: Data Unavailable   No LMP recorded. Patient is postmenopausal.  Allergies reviewed: Yes  Medications reviewed: Yes    Medications: Medication refills not needed today.  Pharmacy name entered into EPIC:    [id number] - [place], [place] - [address] [place] [address] PHARMACY - [place], [place] - [address] [place] [address] PHARM[address] [facility] [place], [place] - [address] [place] [address]     Frailty Screening:   Is the patient here for a new oncology consult visit in cancer care? 2. No      Clinical concerns:  None at this time.       Tanya Philippe CMA                Again, thank you for allowing me to participate in the care of your patient.        Sincerely,        Marina Cronin NP  "

## 2024-09-26 ENCOUNTER — APPOINTMENT (OUTPATIENT)
Dept: GENERAL RADIOLOGY | Facility: CLINIC | Age: 64
End: 2024-09-26
Payer: COMMERCIAL

## 2024-09-26 ENCOUNTER — HOSPITAL ENCOUNTER (EMERGENCY)
Facility: CLINIC | Age: 64
Discharge: HOME | End: 2024-09-26
Payer: COMMERCIAL

## 2024-09-26 VITALS
HEART RATE: 93 BPM | TEMPERATURE: 97.9 F | RESPIRATION RATE: 16 BRPM | SYSTOLIC BLOOD PRESSURE: 126 MMHG | HEIGHT: 64 IN | DIASTOLIC BLOOD PRESSURE: 70 MMHG | BODY MASS INDEX: 14.51 KG/M2 | WEIGHT: 85 LBS | OXYGEN SATURATION: 96 %

## 2024-09-26 DIAGNOSIS — T85.528A DISLODGED GASTROSTOMY TUBE: Primary | ICD-10-CM

## 2024-09-26 PROCEDURE — 43762 RPLC GTUBE NO REVJ TRC: CPT

## 2024-09-26 PROCEDURE — 99283 EMERGENCY DEPT VISIT LOW MDM: CPT

## 2024-09-26 PROCEDURE — 74019 RADEX ABDOMEN 2 VIEWS: CPT

## 2024-09-26 ASSESSMENT — COLUMBIA-SUICIDE SEVERITY RATING SCALE - C-SSRS
6. HAVE YOU EVER DONE ANYTHING, STARTED TO DO ANYTHING, OR PREPARED TO DO ANYTHING TO END YOUR LIFE?: NO
1. IN THE PAST MONTH, HAVE YOU WISHED YOU WERE DEAD OR WISHED YOU COULD GO TO SLEEP AND NOT WAKE UP?: NO
2. HAVE YOU ACTUALLY HAD ANY THOUGHTS OF KILLING YOURSELF IN THE PAST MONTH?: NO

## 2024-09-26 ASSESSMENT — ACTIVITIES OF DAILY LIVING (ADL)
ADLS_ACUITY_SCORE: 38

## 2024-09-26 NOTE — ED TRIAGE NOTES
Patient states went to bathroom this morning and g-tube had fallen out into her lap. States this happened back in February and they were able to replace in ED [place]. Her last routine change was 2 months ago with Dr. Doherty.      Triage Assessment (Adult)       Row Name 09/26/24 0988          Triage Assessment    Airway WDL WDL        Respiratory WDL    Respiratory WDL WDL        Skin Circulation/Temperature WDL    Skin Circulation/Temperature WDL WDL        Cardiac WDL    Cardiac WDL WDL        Peripheral/Neurovascular WDL    Peripheral Neurovascular WDL WDL        Cognitive/Neuro/Behavioral WDL    Cognitive/Neuro/Behavioral WDL WDL

## 2024-09-26 NOTE — ED PROVIDER NOTES
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    History obtained from patient and .  Removed around 430 or so this morning.  Unsure exactly what happened.  Otherwise feeling well and has no symptoms including no fevers, abdominal pain, vomiting.    They report G-tube only, not GJ.  I did review progress note 8/1/2024, Dr. Doherty, gastrostomy tube replaced at that time with no report of GJ.  Plan at that time was for placement every 3 to 6 months.    Review of Systems:  As in HPI above    /70   Pulse 93   Temp 97.9  F (36.6  C) (Oral)   Resp 16   Ht 1.626 m (5' 4\")   Wt 38.6 kg (85 lb)   SpO2 96%   BMI 14.59 kg/m        Physical Exam  Constitutional: laying in hospital bed, alert, oriented, and in no apparent distress  HEENT: normocephalic, atraumatic  Neck: no stridor  Cardiovascular: regular rate and rhythm  Pulmonary: breathing comfortably on room air  Abdominal: Soft, nontender, nondistended, G-tube site appears mature, scant drainage without surrounding erythema or purulence  Extremities/MSK: no peripheral edema  Skin: warm, dry  Neurologic: moves all four extremities spontaneously  Psychiatric: calm, appropriate      MDM:  Patient is a 64 year old female with above history presenting for evaluation of accidental G-tube removal.    Vitals normal. Exam reassuring, nontender abdomen.    Presenting with straightforward G-tube movement.  Not GJ, no need for IR replacement.  Family did, with spare 18 Gambian gastrostomy.  I attempted replacement in the usual fashion with some difficulty advancing despite pressure.  I spoke with Dr. Xie with surgery who " recommended trialing Golden catheter insertion.  I subsequently was able to advance 14, then 16, then 18 Cymraes Golden catheters to dilate tract.  Patient tolerated well.  Ultimately able to insert fresh 18 Cymraes gastrostomy without difficulty.  Unable to aspirate any gastric contents, planning for plain film to visualize placement.    Disposition likely discharge pending successful replacement . Remainder of ED course below.    ED COURSE:  ED Course as of 09/26/24 1435   Thu Sep 26, 2024   1221 Still having difficulty visualized correct replacement.  Unable to aspirate, though patient has not eaten in nearly 24 hours at this point.  Patient agreeable with Gastrografin study.   1244 Family wanting to go home without gastrografin study. They have done feeding at bedside and have no concerns. Discussed return with any new or developing symptoms. Patient and  agreeable.     [facility]    Procedure: Gastrostomy tube replacement    Date/Time: 9/26/2024 2:39 PM    Performed by: Thom Hughes MD  Authorized by: Thom Hughes MD    Risks, benefits and alternatives discussed.      PROCEDURE  Describe Procedure: Inserted with progressive Golden dilation of tract as above.  Patient tolerated well, no pain or discomfort.  8cc saline infused into balloon, able to retract against skin with good resistance of the balloon with retraction.  Patient Tolerance:  Patient tolerated the procedure well with no immediate complications      Encounter Diagnoses:  Final diagnoses:   Dislodged gastrostomy tube       Final disposition: discharge    Thom Hughes MD  9/26/2024  2:35 PM   Emergency Medicine  [facility]      Thom Hughes MD  09/26/24 1968

## 2024-09-26 NOTE — DISCHARGE INSTRUCTIONS
You were seen in the emergency department today for dislodged G-tube. We replaced the G-tube and did tests including x-ray that showed that the tube is in the correct spot.     Please follow up with your primary doctor as needed for ongoing evaluation and management of your symptoms.    Return to the emergency department with new or worsening symptoms that you find concerning.

## 2024-10-01 DIAGNOSIS — B37.0 CANDIDIASIS OF MOUTH: Primary | ICD-10-CM

## 2024-10-01 RX ORDER — FLUCONAZOLE 40 MG/ML
100 POWDER, FOR SUSPENSION ORAL DAILY
Qty: 25 ML | Refills: 0 | Status: SHIPPED | OUTPATIENT
Start: 2024-10-01 | End: 2024-10-11

## 2024-10-07 ENCOUNTER — LAB (OUTPATIENT)
Dept: INFUSION THERAPY | Facility: CLINIC | Age: 64
End: 2024-10-07
Attending: INTERNAL MEDICINE
Payer: COMMERCIAL

## 2024-10-07 ENCOUNTER — HOSPITAL ENCOUNTER (OUTPATIENT)
Dept: CT IMAGING | Facility: CLINIC | Age: 64
Discharge: HOME | End: 2024-10-07
Attending: NURSE PRACTITIONER
Payer: COMMERCIAL

## 2024-10-07 DIAGNOSIS — C06.9 SQUAMOUS CELL CARCINOMA OF ORAL CAVITY (H): ICD-10-CM

## 2024-10-07 DIAGNOSIS — C79.51 MALIGNANT NEOPLASM METASTATIC TO BONE (H): ICD-10-CM

## 2024-10-07 DIAGNOSIS — C79.51 MALIGNANT NEOPLASM METASTATIC TO BONE (H): Primary | ICD-10-CM

## 2024-10-07 DIAGNOSIS — C78.00 MALIGNANT NEOPLASM METASTATIC TO LUNG, UNSPECIFIED LATERALITY (H): ICD-10-CM

## 2024-10-07 LAB
ALBUMIN SERPL BCG-MCNC: 4.1 G/DL (ref 3.5–5.2)
ALP SERPL-CCNC: 117 U/L (ref 40–150)
ALT SERPL W P-5'-P-CCNC: 29 U/L (ref 0–50)
ANION GAP SERPL CALCULATED.3IONS-SCNC: 12 MMOL/L (ref 7–15)
AST SERPL W P-5'-P-CCNC: 22 U/L (ref 0–45)
BASOPHILS # BLD AUTO: 0 10E3/UL (ref 0–0.2)
BASOPHILS NFR BLD AUTO: 0 %
BILIRUB SERPL-MCNC: 0.2 MG/DL
BUN SERPL-MCNC: 54.3 MG/DL (ref 8–23)
CALCIUM SERPL-MCNC: 9.9 MG/DL (ref 8.8–10.4)
CHLORIDE SERPL-SCNC: 107 MMOL/L (ref 98–107)
CREAT SERPL-MCNC: 1.07 MG/DL (ref 0.51–0.95)
EGFRCR SERPLBLD CKD-EPI 2021: 58 ML/MIN/1.73M2
EOSINOPHIL # BLD AUTO: 0.5 10E3/UL (ref 0–0.7)
EOSINOPHIL NFR BLD AUTO: 5 %
ERYTHROCYTE [DISTWIDTH] IN BLOOD BY AUTOMATED COUNT: 12.7 % (ref 10–15)
GLUCOSE SERPL-MCNC: 101 MG/DL (ref 70–99)
HCO3 SERPL-SCNC: 21 MMOL/L (ref 22–29)
HCT VFR BLD AUTO: 33.4 % (ref 35–47)
HGB BLD-MCNC: 11.1 G/DL (ref 11.7–15.7)
IMM GRANULOCYTES # BLD: 0.1 10E3/UL
IMM GRANULOCYTES NFR BLD: 1 %
LYMPHOCYTES # BLD AUTO: 1.8 10E3/UL (ref 0.8–5.3)
LYMPHOCYTES NFR BLD AUTO: 17 %
MCH RBC QN AUTO: 33.1 PG (ref 26.5–33)
MCHC RBC AUTO-ENTMCNC: 33.2 G/DL (ref 31.5–36.5)
MCV RBC AUTO: 100 FL (ref 78–100)
MONOCYTES # BLD AUTO: 0.7 10E3/UL (ref 0–1.3)
MONOCYTES NFR BLD AUTO: 7 %
NEUTROPHILS # BLD AUTO: 7 10E3/UL (ref 1.6–8.3)
NEUTROPHILS NFR BLD AUTO: 69 %
NRBC # BLD AUTO: 0 10E3/UL
NRBC BLD AUTO-RTO: 0 /100
PLATELET # BLD AUTO: 296 10E3/UL (ref 150–450)
POTASSIUM SERPL-SCNC: 4.3 MMOL/L (ref 3.4–5.3)
PROT SERPL-MCNC: 7.3 G/DL (ref 6.4–8.3)
RBC # BLD AUTO: 3.35 10E6/UL (ref 3.8–5.2)
SODIUM SERPL-SCNC: 140 MMOL/L (ref 135–145)
TSH SERPL DL<=0.005 MIU/L-ACNC: 1.67 UIU/ML (ref 0.3–4.2)
WBC # BLD AUTO: 10.1 10E3/UL (ref 4–11)

## 2024-10-07 PROCEDURE — 85025 COMPLETE CBC W/AUTO DIFF WBC: CPT | Performed by: NURSE PRACTITIONER

## 2024-10-07 PROCEDURE — 70491 CT SOFT TISSUE NECK W/DYE: CPT

## 2024-10-07 PROCEDURE — 36591 DRAW BLOOD OFF VENOUS DEVICE: CPT | Performed by: NURSE PRACTITIONER

## 2024-10-07 PROCEDURE — 84443 ASSAY THYROID STIM HORMONE: CPT | Performed by: NURSE PRACTITIONER

## 2024-10-07 PROCEDURE — 250N000011 HC RX IP 250 OP 636: Performed by: INTERNAL MEDICINE

## 2024-10-07 PROCEDURE — 74177 CT ABD & PELVIS W/CONTRAST: CPT

## 2024-10-07 PROCEDURE — 250N000011 HC RX IP 250 OP 636: Performed by: NURSE PRACTITIONER

## 2024-10-07 PROCEDURE — 80053 COMPREHEN METABOLIC PANEL: CPT | Performed by: NURSE PRACTITIONER

## 2024-10-07 RX ORDER — HEPARIN SODIUM (PORCINE) LOCK FLUSH IV SOLN 100 UNIT/ML 100 UNIT/ML
5 SOLUTION INTRAVENOUS
Status: DISCONTINUED | OUTPATIENT
Start: 2024-10-07 | End: 2024-10-07 | Stop reason: HOSPADM

## 2024-10-07 RX ORDER — HEPARIN SODIUM (PORCINE) LOCK FLUSH IV SOLN 100 UNIT/ML 100 UNIT/ML
5 SOLUTION INTRAVENOUS
OUTPATIENT
Start: 2024-10-07

## 2024-10-07 RX ORDER — IOPAMIDOL 755 MG/ML
500 INJECTION, SOLUTION INTRAVASCULAR ONCE
Status: COMPLETED | OUTPATIENT
Start: 2024-10-07 | End: 2024-10-07

## 2024-10-07 RX ADMIN — HEPARIN 5 ML: 100 SYRINGE at 10:36

## 2024-10-07 RX ADMIN — IOPAMIDOL 98 ML: 755 INJECTION, SOLUTION INTRAVENOUS at 10:16

## 2024-10-07 NOTE — PROGRESS NOTES
Infusion Nursing Note:  Adrianna Pereira presents today for Port access for CT/Port labs for treatment on 10/9.    Patient seen by provider today: No   present during visit today: Not Applicable.    Note: N/A.      Intravenous Access:  Implanted Port.   Lab draw site R chest wall, Needle type Power, Gauge 19,3/4in.  Labs drawn without difficulty.    Post CT Assessment:  Blood return noted pre and post CT scan.  Site patent and intact, free from redness, edema or discomfort.  No evidence of extravasations.  Access discontinued per protocol.       Discharge Plan:   AVS to patient via MYCHART.  Patient has treatment scheduled on 10/9 @ [facility].  Patient discharged in stable condition accompanied by: .  Departure Mode: Ambulatory.      Judit Joe RN

## 2024-10-08 ENCOUNTER — HOME INFUSION (OUTPATIENT)
Dept: HOME HEALTH SERVICES | Facility: HOME HEALTH | Age: 64
End: 2024-10-08
Payer: COMMERCIAL

## 2024-10-08 DIAGNOSIS — C04.9 MALIGNANT NEOPLASM OF FLOOR OF MOUTH, UNSPECIFIED (H): ICD-10-CM

## 2024-10-08 DIAGNOSIS — E43 UNSPECIFIED SEVERE PROTEIN-CALORIE MALNUTRITION (H): Primary | ICD-10-CM

## 2024-10-08 RX ORDER — AMINO AC/PROTEIN HYDR/WHEY PRO 10G-100/30
45 LIQUID (ML) ORAL 2 TIMES DAILY
Qty: 2700 ML | Refills: 11 | Status: ACTIVE | OUTPATIENT
Start: 2024-10-23 | End: 2025-10-23

## 2024-10-09 ENCOUNTER — ONCOLOGY VISIT (OUTPATIENT)
Dept: ONCOLOGY | Facility: CLINIC | Age: 64
End: 2024-10-09
Attending: INTERNAL MEDICINE
Payer: COMMERCIAL

## 2024-10-09 ENCOUNTER — INFUSION THERAPY VISIT (OUTPATIENT)
Dept: INFUSION THERAPY | Facility: CLINIC | Age: 64
End: 2024-10-09
Attending: INTERNAL MEDICINE
Payer: COMMERCIAL

## 2024-10-09 ENCOUNTER — OFFICE VISIT (OUTPATIENT)
Dept: RADIATION THERAPY | Facility: OUTPATIENT CENTER | Age: 64
End: 2024-10-09
Payer: COMMERCIAL

## 2024-10-09 VITALS
DIASTOLIC BLOOD PRESSURE: 64 MMHG | SYSTOLIC BLOOD PRESSURE: 119 MMHG | OXYGEN SATURATION: 99 % | RESPIRATION RATE: 16 BRPM | HEART RATE: 84 BPM | BODY MASS INDEX: 14.52 KG/M2 | WEIGHT: 84.6 LBS

## 2024-10-09 VITALS
RESPIRATION RATE: 12 BRPM | TEMPERATURE: 97.3 F | HEIGHT: 64 IN | WEIGHT: 83 LBS | OXYGEN SATURATION: 98 % | DIASTOLIC BLOOD PRESSURE: 70 MMHG | BODY MASS INDEX: 14.17 KG/M2 | HEART RATE: 90 BPM | SYSTOLIC BLOOD PRESSURE: 112 MMHG

## 2024-10-09 DIAGNOSIS — C06.9 SQUAMOUS CELL CARCINOMA OF ORAL CAVITY (H): Primary | ICD-10-CM

## 2024-10-09 DIAGNOSIS — C06.9 SQUAMOUS CELL CARCINOMA OF ORAL CAVITY (H): ICD-10-CM

## 2024-10-09 DIAGNOSIS — C79.51 MALIGNANT NEOPLASM METASTATIC TO BONE (H): Primary | ICD-10-CM

## 2024-10-09 DIAGNOSIS — C78.00 MALIGNANT NEOPLASM METASTATIC TO LUNG, UNSPECIFIED LATERALITY (H): ICD-10-CM

## 2024-10-09 PROCEDURE — 250N000011 HC RX IP 250 OP 636: Mod: JZ | Performed by: INTERNAL MEDICINE

## 2024-10-09 PROCEDURE — G0463 HOSPITAL OUTPT CLINIC VISIT: HCPCS | Performed by: INTERNAL MEDICINE

## 2024-10-09 PROCEDURE — 96413 CHEMO IV INFUSION 1 HR: CPT

## 2024-10-09 PROCEDURE — 258N000003 HC RX IP 258 OP 636: Performed by: INTERNAL MEDICINE

## 2024-10-09 PROCEDURE — 99215 OFFICE O/P EST HI 40 MIN: CPT | Performed by: INTERNAL MEDICINE

## 2024-10-09 RX ORDER — METHYLPREDNISOLONE SODIUM SUCCINATE 125 MG/2ML
125 INJECTION INTRAMUSCULAR; INTRAVENOUS
Status: CANCELLED
Start: 2024-10-10

## 2024-10-09 RX ORDER — ALBUTEROL SULFATE 90 UG/1
1-2 INHALANT RESPIRATORY (INHALATION)
Status: CANCELLED
Start: 2024-10-10

## 2024-10-09 RX ORDER — HEPARIN SODIUM,PORCINE 10 UNIT/ML
5-20 VIAL (ML) INTRAVENOUS DAILY PRN
Status: CANCELLED | OUTPATIENT
Start: 2024-10-10

## 2024-10-09 RX ORDER — LORAZEPAM 2 MG/ML
0.5 INJECTION INTRAMUSCULAR EVERY 4 HOURS PRN
Status: CANCELLED | OUTPATIENT
Start: 2024-10-10

## 2024-10-09 RX ORDER — EPINEPHRINE 1 MG/ML
0.3 INJECTION, SOLUTION, CONCENTRATE INTRAVENOUS EVERY 5 MIN PRN
Status: CANCELLED | OUTPATIENT
Start: 2024-10-10

## 2024-10-09 RX ORDER — DIPHENHYDRAMINE HYDROCHLORIDE 50 MG/ML
50 INJECTION INTRAMUSCULAR; INTRAVENOUS
Status: CANCELLED
Start: 2024-10-10

## 2024-10-09 RX ORDER — HEPARIN SODIUM (PORCINE) LOCK FLUSH IV SOLN 100 UNIT/ML 100 UNIT/ML
5 SOLUTION INTRAVENOUS
Status: DISCONTINUED | OUTPATIENT
Start: 2024-10-09 | End: 2024-10-09 | Stop reason: HOSPADM

## 2024-10-09 RX ORDER — ALBUTEROL SULFATE 0.83 MG/ML
2.5 SOLUTION RESPIRATORY (INHALATION)
Status: CANCELLED | OUTPATIENT
Start: 2024-10-10

## 2024-10-09 RX ORDER — HEPARIN SODIUM (PORCINE) LOCK FLUSH IV SOLN 100 UNIT/ML 100 UNIT/ML
5 SOLUTION INTRAVENOUS
Status: CANCELLED | OUTPATIENT
Start: 2024-10-10

## 2024-10-09 RX ORDER — MEPERIDINE HYDROCHLORIDE 25 MG/ML
25 INJECTION INTRAMUSCULAR; INTRAVENOUS; SUBCUTANEOUS EVERY 30 MIN PRN
Status: CANCELLED | OUTPATIENT
Start: 2024-10-10

## 2024-10-09 RX ADMIN — Medication 5 ML: at 11:55

## 2024-10-09 RX ADMIN — SODIUM CHLORIDE 200 MG: 9 INJECTION, SOLUTION INTRAVENOUS at 11:16

## 2024-10-09 ASSESSMENT — PAIN SCALES - GENERAL
PAINLEVEL: NO PAIN (0)
PAINLEVEL: NO PAIN (0)

## 2024-10-09 NOTE — LETTER
"10/9/2024      Adrianna Pereira  [address] [address] [place] [id number]      Dear Colleague,    Thank you for referring your patient, Adrianna Pereira, to the [facility]. Please see a copy of my visit note below.    Oncology Rooming Note    October 9, 2024 10:11 AM   Adrianna Pereira is a 64 year old female who presents for:    Chief Complaint   Patient presents with     Oncology Clinic Visit     Malignant neoplasm metastatic to lung, unspecified laterality - Provider and infusion     Initial Vitals: /70 (BP Location: Left arm, Patient Position: Sitting, Cuff Size: Adult Small)   Pulse 90   Temp 97.3  F (36.3  C) (Tympanic)   Resp 12   Ht 1.626 m (5' 4\")   Wt 37.6 kg (83 lb)   SpO2 98%   BMI 14.25 kg/m   Estimated body mass index is 14.25 kg/m  as calculated from the following:    Height as of this encounter: 1.626 m (5' 4\").    Weight as of this encounter: 37.6 kg (83 lb). Body surface area is 1.3 meters squared.  No Pain (0) Comment: Data Unavailable   No LMP recorded. Patient is postmenopausal.  Allergies reviewed: Yes  Medications reviewed: Yes    Medications: Medication refills not needed today.  Pharmacy name entered into EPIC:    [id number] - [place], [place] - [address] [place] [address] PHARMACY - [place], [place] - [address] [facility] PHARMACY [place], [place] - [address] [facility] PHARMACY [place], [place] - 9 [facility]     Frailty Screening:   Is the patient here for a new oncology consult visit in cancer care? 2. No      Clinical concerns:  None today.      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 Unfortunately, within just a few months of completing, was found to have metastatic recurrence.     Radiation Treatment  10/19/2023 to 12/5/2023: Head and neck, 6600 cGy in 33 fractions  2/22/24 to 3/12/24: Thoracic spine, T2 - T4, 2500 cGy in 10 fractions    Now, on palliative pembrolizumab. Starting 4/22/2024. Tolerating well.     CT in June showed stable to slightly improved disease in head/neck and stable/improved lung nodules; however some new patchy opacities bilaterally (could be inflammatory/infectious). No symptoms of pneumonitis, but need to follow clinically with risk of immunotherapy pneumonitis.     CT 10/07/2024 sows enlarging pulmonary nodules.  Plan continue immunotherapy for now, but obtain short interval PET scan to assess progression    Labwork:  CMP: WNL. TSH WNL. CBC: hgb 11.1 (stable), rest CBC WNL.    Plan:  -Continue with C12 pembrolizumab.   -Return in 3 weeks for cycle 13  -If responding, given her tolerance, may be able to consider changing to 400 mg every 6 week dosing or can start seeing provider every other 3-week cycle (every 6 weeks)  -CBC every 3 months.   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 Treatment plan discontinued    Open oral/jaw wound  Plate exposure + open wound over lower lip secondary to residual/recurrent tumor at prior ENT resection + adjuvant RT site. Draining lessening. No fevers. Reconstruction would require additional free flap, so not being pursued at this time, in setting of widely metastatic disease, after discussion with ENT surgeon Dr. Thorne. Dr. Egan did not recommend additional radiation.    Met with Wound Care MD.   Supportive management interventions to help with drainage, odor and decreasing bacterial growth were provided.     She had  more pain and drainage from left lower mouth/chin wound last month and completed antibiotics. This is improved.     Plan:  -Pt to follow Wound Clinic recs for mgmt and return PRN.  -High risk for infection, completed antibiotics a few weeks ago  -Follow-up with Dr. Thorne in ENT every 3-6 months  -No further RT recommended at this time, but Dr. Egan following every 3 mths to reassess  - Plan short term PET Ctfollow up in 8 weeks to re-assess pulmonary nodules and skeletal lesions.     Cancer pain  Completed palliative RT to T spine 3/2024. Back pain is resolved. No longer requiring pain meds for this.     Mouth/lower lip pain related to progressive reconstruction failure/open wound. Stopped NSAID use due to elevated creatinine with improvement, uses ibuprofen on occasion. Primarily managing well with Tylenol 500 mg 3/day and oxycodone 7.5 mg twice/day.     Plan:  -Continue avoiding/minimizing NSAIDs  -Favor Tylenol instead, can take up to 3000 mg/day. Continue oxycodone use through day as well  -Consider long-acting pain med (likley in patch form since NPO) if oxycodone use increases  -Referral to Palliative Care if not well-controlled in future, pt wants to minimize number of appts for now.     Nutrition/hydration  Dependant on feeding tube.   Last PEG had migrated into proximal duodenum. This was recently replaced/repositioned.  Tube again in duodenum.  She will confer with Dr. Doherty to reposition  She's tolerating feedings better since repositioning and regaining a bit of weight.     Plan:  -Working with Dietician as needed.   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   On observation.   ______________________________________________________________________________    History of Present Illness/ Interval History    Ms. Adrianna Pereira is a 64 year old who  completed resection of floor of mouth cancer, followed by adjuvant chemoRT 12/2023. Unfortunately, shortly after this, she was found to have local and metastatic recurrence involving mandible, lung, bone (T3-T4) and soft tissue that is PDL1 70%.    She started palliative treatments with pembrolizumab in February (7 mths ago).  Tolerating pembrolizumab generally well. No diarrhea. No new dyspnea/cough.     Jaw/mouth pain due to plate exposure of lip/open wound at prior ENT resection site due to local recurrence persists. She completed antibiotics a few weeks ago for increased drainage, improved. Managing with Tylenol and oxycodone 7.5 mg BID.  Assessed by ENT surgeon and reconstruction would require additional free flap, so not being pursued at this time. Saw Wound Clinic for mgmt recs.    No new sites of pain.    All nutrition/hydration via PEG.   Losing weight now because of tube in duodenum restricts flow      ECOG Performance    0-1    Oncology History/Treatment  Diagnosis/Stage:   7/2023: [id number] Floor of mouth squamous cell cancer  -hx alcoholism (stopped all use) and smoking (quit 8/2023)  -hx premalignant oral cavity lesions s/p CO2 laser ablation with ENT  -presented with lower lip/chin swelling  -7/3/2023 left mandibular alveolus biopsy: invasive keratinizing moderately differential squamous cell carcinoma.   -PET: 4.4 x 3.7 x 3.2 cm anterior oral cavity mass invading into the mandible with a separate gluco-avid nodule on the right mandibular buccal mucosa and bilateral level 1B, level 2 and L3 metastatic lymphadenopathy without evidence of metastatic disease.   -8/17/2023 surgical path: pT4a, N3b tumor with positive left anterior lateral soft tissue margin, 11/98+ lymph nodes including STEFFEN (largest 4 cm) and several bilateral lymph nodes consistent with involvement by low-grade B-cell neoplasm suggestive of CLL/SLL.     2/2024: Progression to stage IV mouth squamous cell cancer to bone and lung  -Within  "weeks of completing definitive treatment for her primary disease, noted severe/progressive back pain  -CT C/T spine: new T3 compression fracture with hypodensity in vertebral body extending into L posterior elements suggestive of met. T4 posterior vertebral body lesion also suspicious. New lung nodules also noted  -MRI T spine: T3 compression fracture with extraosseous tumor involving ventral epidural space with mod - severe central spinal canal stenosis at T3  -PET: recurrent avid foci L maikel-mandible, numerous bilateral pulmonary/pleural mets, met mediastinal/hilar adenopathy, T3, T4, L adductor musculature and R upper thigh skin thickening  -PDL1 TPS and CPS 70% from 8/2023 original tumor biopsy    Treatment:  Locally advanced disease  -8/17/2023: segmental mandibulectomy, floor of mouth resection, anterior glossectomy, left fibular free flap, skin grafting, and tracheostomy.   -Post-op course complicated by skin necrosis requiring OR debridment and resuturing of neck incision/intraoral flap    -10/20/2023 - 12/5/2023: completed adjuvant concurrent RT (6600 cGy/33) + weekly cisplatin (x 7 cycles)    Metastatic recurrence  -2/21/2024 - present: pembrolizumab.  -Zometa started x 1 dose for bone mets, later held for jaw osteonecrosis risk    -2/26 - 3/12/2024: palliative RT to thoracic spine, 2500 cGy/10. Dex taper      Physical Exam  /70 (BP Location: Left arm, Patient Position: Sitting, Cuff Size: Adult Small)   Pulse 90   Temp 97.3  F (36.3  C) (Tympanic)   Resp 12   Ht 1.626 m (5' 4\")   Wt 37.6 kg (83 lb)   SpO2 98%   BMI 14.25 kg/m      GENERAL APPEARANCE: 64-year-old woman in no apparent distress.  HEAD: Atraumatic; normocephalic; without lesions.  EYES: Conjunctiva, corneas and eyelids normal; pupils equal, round, reactive to light; No Icterus.  MOUTH/OROPHARYNX: Open wound to mandible with hardware in place..  NECK: Supple with no nodes.  LUNGS:  Clear to auscultation and percussion with no " extra sounds.  HEART: Regular rhythm and rate; S1 and S2 normal; no murmurs noted.  ABDOMEN: Soft; no masses or tenderness, no hepatosplenomegaly.  NEUROLOGIC: Alert and oriented.  No obvious focal findings.  EXTREMITIES: No cyanosis, or edema.  SKIN: No abnormal bruising or bleeding. No suspicious lesions noted on exposed skin.  PSYCHIATRIC: Mental status normal; no apparent psychiatric issues      Imaging    6/26/2024 CT n/c/a/p: reviewed in A/P. Overall stable/response.     Lab Results    Recent Results (from the past 168 hour(s))   Comprehensive metabolic panel   Result Value Ref Range    Sodium 140 135 - 145 mmol/L    Potassium 4.3 3.4 - 5.3 mmol/L    Carbon Dioxide (CO2) 21 (L) 22 - 29 mmol/L    Anion Gap 12 7 - 15 mmol/L    Urea Nitrogen 54.3 (H) 8.0 - 23.0 mg/dL    Creatinine 1.07 (H) 0.51 - 0.95 mg/dL    GFR Estimate 58 (L) >60 mL/min/1.73m2    Calcium 9.9 8.8 - 10.4 mg/dL    Chloride 107 98 - 107 mmol/L    Glucose 101 (H) 70 - 99 mg/dL    Alkaline Phosphatase 117 40 - 150 U/L    AST 22 0 - 45 U/L    ALT 29 0 - 50 U/L    Protein Total 7.3 6.4 - 8.3 g/dL    Albumin 4.1 3.5 - 5.2 g/dL    Bilirubin Total 0.2 <=1.2 mg/dL   TSH with free T4 reflex   Result Value Ref Range    TSH 1.67 0.30 - 4.20 uIU/mL   CBC with platelets and differential   Result Value Ref Range    WBC Count 10.1 4.0 - 11.0 10e3/uL    RBC Count 3.35 (L) 3.80 - 5.20 10e6/uL    Hemoglobin 11.1 (L) 11.7 - 15.7 g/dL    Hematocrit 33.4 (L) 35.0 - 47.0 %     78 - 100 fL    MCH 33.1 (H) 26.5 - 33.0 pg    MCHC 33.2 31.5 - 36.5 g/dL    RDW 12.7 10.0 - 15.0 %    Platelet Count 296 150 - 450 10e3/uL    % Neutrophils 69 %    % Lymphocytes 17 %    % Monocytes 7 %    % Eosinophils 5 %    % Basophils 0 %    % Immature Granulocytes 1 %    NRBCs per 100 WBC 0 <1 /100    Absolute Neutrophils 7.0 1.6 - 8.3 10e3/uL    Absolute Lymphocytes 1.8 0.8 - 5.3 10e3/uL    Absolute Monocytes 0.7 0.0 - 1.3 10e3/uL    Absolute Eosinophils 0.5 0.0 - 0.7 10e3/uL     Absolute Basophils 0.0 0.0 - 0.2 10e3/uL    Absolute Immature Granulocytes 0.1 <=0.4 10e3/uL    Absolute NRBCs 0.0 10e3/uL           I spent 45 minutes on the patient's visit today.  This included preparation for the visit, face-to-face time with the patient and documentation following the visit.  It did not include teaching or procedure time.    Signed by: Peter E. Friedell, MD          Again, thank you for allowing me to participate in the care of your patient.        Sincerely,        Peter E. Friedell, MD

## 2024-10-09 NOTE — LETTER
10/9/2024      Adrianna Pereira  [address] [address] [place] [id number]      Dear Colleague,    Thank you for referring your patient, Adrianna Pereira, to the [facility] RAD[facility]ATION THERAPY CLINIC. Please see a copy of my visit note below.    Radiation Oncology Progress Note    HPI:Ms. Pereira is a 64 year old female with a diagnosis of squamous cell carcinoma of the oral cavity status post segmental mandibulectomy, anterior glossectomy,  lymph node dissection and reconstruction.  Final pathology demonstrated squamous cell carcinoma moderately differentiated, 4.1 cm in size originating from  floor mouth/ anterior tongue,  depth of invasion 29 mm, no LVSI, positive PNI, positive margin (left anterior lateral).  11 of 98 lymph nodes positive (4 cm largest deposit, positive extracapsular disease present), pathologic T4N3b.   She underwent adjuvant chemoradiation therapy, completed on 12/5/2023.  The patient had early recurrence of disease including locally as well as distantly (spine, lung, soft tissue).  She underwent palliative radiation therapy to thoracic spine.     Radiation Treatment  10/19/2023 to 12/5/2023: Head and neck, 6600 cGy in 33 fractions  2/22/24 to 3/12/24: Thoracic spine, T2 - T4, 2500 cGy in 10 fractions     The patient returns for follow-up.    MRI of the thoracic spine on 4/10/2024 to my review demonstrated overall improvement in spinal canal stenosis in the treated region.  No evidence of cord compression was noted.    Restaging scans with CT scan of the neck and chest abdomen pelvis on 10/7/24  demonstrated overall stable to slight progression in the thorax and possibly in the head and neck region.  No clear new sites of disease were noted.    Clinically, the patient has noted improvement in her thoracic spine pain.  Noticing some increasing pain in the head and neck region.  Using oxycodone as needed in the daytime and at night for pain.  Feels pain is currently managed.    She has continued systemic treatment  under the care of medical oncology team.  Tolerating treatment thus far.  Nutrition continues to be through the tube.  Denies any focal neurologic change.  Continues to have exposed hardware and open wound. Wound drainage is less per patient.  ENT team has discussed the past potential surgery but deferred in the past due to extent of surgery that will likely be required.      Plan:  Re-staging scans demonstrated overall stable to slightly slight progression of disease in the thorax and head neck region.  Continue palliative systemic therapy and oncologic surveillance per medical oncology team.  Current plan is to reimage in 2 months with PET scan to help guide next steps if progression of disease is clearly established.  3.   RTC in 2 months.      30 minutes spent on the date of the encounter doing chart review, review of outside records, review of test results, interpretation of tests, patient visit, documentation, discussion, and plan.      Hernán Egan M.D.  Department of Radiation Oncology  [facility]              Again, thank you for allowing me to participate in the care of your patient.        Sincerely,        Hernán Egan MD

## 2024-10-09 NOTE — NURSING NOTE
"Oncology Rooming Note    October 9, 2024 2:34 PM   Adrianna Pereira is a 64 year old female who presents for:    Chief Complaint   Patient presents with    Radiation Therapy     Return appointment with Dr. Egan      Initial Vitals: /64   Pulse 84   Resp 16   Wt 38.4 kg (84 lb 9.6 oz)   SpO2 99%   BMI 14.52 kg/m   Estimated body mass index is 14.52 kg/m  as calculated from the following:    Height as of an earlier encounter on 10/9/24: 1.626 m (5' 4\").    Weight as of this encounter: 38.4 kg (84 lb 9.6 oz). Body surface area is 1.32 meters squared.  No Pain (0) Comment: Data Unavailable   No LMP recorded. Patient is postmenopausal.  Allergies reviewed: Yes  Medications reviewed: Yes    Medications: Medication refills not needed today.  Pharmacy name entered into EPIC:    [id number] - [place], [place] - [address] [place] [address] PHARMACY - [place], [place] [facility] [address] [facility] [place] [place] - [address] [place] [address] PHARMACY [place], [place] - [address] [facility]     Frailty Screening:   Is the patient here for a new oncology consult visit in cancer care? 2. No      Clinical concerns: Return appointment, review recent imaging.  Dr. Egan was notified.      Kelly Ortega RN              "

## 2024-10-09 NOTE — PROGRESS NOTES
Radiation Oncology Progress Note    HPI:Ms. Pereira is a 64 year old female with a diagnosis of squamous cell carcinoma of the oral cavity status post segmental mandibulectomy, anterior glossectomy,  lymph node dissection and reconstruction.  Final pathology demonstrated squamous cell carcinoma moderately differentiated, 4.1 cm in size originating from  floor mouth/ anterior tongue,  depth of invasion 29 mm, no LVSI, positive PNI, positive margin (left anterior lateral).  11 of 98 lymph nodes positive (4 cm largest deposit, positive extracapsular disease present), pathologic T4N3b.   She underwent adjuvant chemoradiation therapy, completed on 12/5/2023.  The patient had early recurrence of disease including locally as well as distantly (spine, lung, soft tissue).  She underwent palliative radiation therapy to thoracic spine.     Radiation Treatment  10/19/2023 to 12/5/2023: Head and neck, 6600 cGy in 33 fractions  2/22/24 to 3/12/24: Thoracic spine, T2 - T4, 2500 cGy in 10 fractions     The patient returns for follow-up.    MRI of the thoracic spine on 4/10/2024 to my review demonstrated overall improvement in spinal canal stenosis in the treated region.  No evidence of cord compression was noted.    Restaging scans with CT scan of the neck and chest abdomen pelvis on 10/7/24  demonstrated overall stable to slight progression in the thorax and possibly in the head and neck region.  No clear new sites of disease were noted.    Clinically, the patient has noted improvement in her thoracic spine pain.  Noticing some increasing pain in the head and neck region.  Using oxycodone as needed in the daytime and at night for pain.  Feels pain is currently managed.    She has continued systemic treatment under the care of medical oncology team.  Tolerating treatment thus far.  Nutrition continues to be through the tube.  Denies any focal neurologic change.  Continues to have exposed hardware and open wound. Wound drainage is less  per patient.  ENT team has discussed the past potential surgery but deferred in the past due to extent of surgery that will likely be required.      Plan:  Re-staging scans demonstrated overall stable to slightly slight progression of disease in the thorax and head neck region.  Continue palliative systemic therapy and oncologic surveillance per medical oncology team.  Current plan is to reimage in 2 months with PET scan to help guide next steps if progression of disease is clearly established.  3.   RTC in 2 months.      30 minutes spent on the date of the encounter doing chart review, review of outside records, review of test results, interpretation of tests, patient visit, documentation, discussion, and plan.      Hernán Egan M.D.  Department of Radiation Oncology  [facility]

## 2024-10-09 NOTE — PROGRESS NOTES
"Oncology Rooming Note    October 9, 2024 10:11 AM   Adrianna Pereira is a 64 year old female who presents for:    Chief Complaint   Patient presents with    Oncology Clinic Visit     Malignant neoplasm metastatic to lung, unspecified laterality - Provider and infusion     Initial Vitals: /70 (BP Location: Left arm, Patient Position: Sitting, Cuff Size: Adult Small)   Pulse 90   Temp 97.3  F (36.3  C) (Tympanic)   Resp 12   Ht 1.626 m (5' 4\")   Wt 37.6 kg (83 lb)   SpO2 98%   BMI 14.25 kg/m   Estimated body mass index is 14.25 kg/m  as calculated from the following:    Height as of this encounter: 1.626 m (5' 4\").    Weight as of this encounter: 37.6 kg (83 lb). Body surface area is 1.3 meters squared.  No Pain (0) Comment: Data Unavailable   No LMP recorded. Patient is postmenopausal.  Allergies reviewed: Yes  Medications reviewed: Yes    Medications: Medication refills not needed today.  Pharmacy name entered into EPIC:    [id number] - [place], [place] - [address] [place], [place] [address]ING PHARMACY - [place], [place] - [address] [facility] PHARMACY [place], [place] - [address] [facility] PHARMACY [place], [place] - [address] [place] [address]     Frailty Screening:   Is the patient here for a new oncology consult visit in cancer care? 2. No      Clinical concerns:  None today.      Tanya Philippe CMA            "

## 2024-10-09 NOTE — PROGRESS NOTES
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 Unfortunately, within just a few months of completing, was found to have metastatic recurrence.     Radiation Treatment  10/19/2023 to 12/5/2023: Head and neck, 6600 cGy in 33 fractions  2/22/24 to 3/12/24: Thoracic spine, T2 - T4, 2500 cGy in 10 fractions    Now, on palliative pembrolizumab. Starting 4/22/2024. Tolerating well.     CT in June showed stable to slightly improved disease in head/neck and stable/improved lung nodules; however some new patchy opacities bilaterally (could be inflammatory/infectious). No symptoms of pneumonitis, but need to follow clinically with risk of immunotherapy pneumonitis.     CT 10/07/2024 sows enlarging pulmonary nodules.  Plan continue immunotherapy for now, but obtain short interval PET scan to assess progression    Labwork:  CMP: WNL. TSH WNL. CBC: hgb 11.1 (stable), rest CBC WNL.    Plan:  -Continue with C12 pembrolizumab.   -Return in 3 weeks for cycle 13  -If responding, given her tolerance, may be able to consider changing to 400 mg every 6 week dosing or can start seeing provider every other 3-week cycle (every 6 weeks)  -CBC every 3 months.   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 Treatment plan discontinued    Open oral/jaw wound  Plate exposure + open wound over lower lip secondary to residual/recurrent tumor at prior ENT resection + adjuvant RT site. Draining lessening. No fevers. Reconstruction would require  additional free flap, so not being pursued at this time, in setting of widely metastatic disease, after discussion with ENT surgeon Dr. Thorne. Dr. Egan did not recommend additional radiation.    Met with Wound Care MD.   Supportive management interventions to help with drainage, odor and decreasing bacterial growth were provided.     She had more pain and drainage from left lower mouth/chin wound last month and completed antibiotics. This is improved.     Plan:  -Pt to follow Wound Clinic recs for mgmt and return PRN.  -High risk for infection, completed antibiotics a few weeks ago  -Follow-up with Dr. Thorne in ENT every 3-6 months  -No further RT recommended at this time, but Dr. Egan following every 3 mths to reassess  - Plan short term PET Ctfollow up in 8 weeks to re-assess pulmonary nodules and skeletal lesions.     Cancer pain  Completed palliative RT to T spine 3/2024. Back pain is resolved. No longer requiring pain meds for this.     Mouth/lower lip pain related to progressive reconstruction failure/open wound. Stopped NSAID use due to elevated creatinine with improvement, uses ibuprofen on occasion. Primarily managing well with Tylenol 500 mg 3/day and oxycodone 7.5 mg twice/day.     Plan:  -Continue avoiding/minimizing NSAIDs  -Favor Tylenol instead, can take up to 3000 mg/day. Continue oxycodone use through day as well  -Consider long-acting pain med (likley in patch form since NPO) if oxycodone use increases  -Referral to Palliative Care if not well-controlled in future, pt wants to minimize number of appts for now.     Nutrition/hydration  Dependant on feeding tube.   Last PEG had migrated into proximal duodenum. This was recently replaced/repositioned.  Tube again in duodenum.  She will confer with Dr. Doherty to reposition  She's tolerating feedings better since repositioning and regaining a bit of weight.     Plan:  -Working with Dietician as needed.   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   On observation.   ______________________________________________________________________________    History of Present Illness/ Interval History    Ms. Adrianna Pereira is a 64 year old who completed resection of floor of mouth cancer, followed by adjuvant chemoRT 12/2023. Unfortunately, shortly after this, she was found to have local and metastatic recurrence involving mandible, lung, bone (T3-T4) and soft tissue that is PDL1 70%.    She started palliative treatments with pembrolizumab in February (7 mths ago).  Tolerating pembrolizumab generally well. No diarrhea. No new dyspnea/cough.     Jaw/mouth pain due to plate exposure of lip/open wound at prior ENT resection site due to local recurrence persists. She completed antibiotics a few weeks ago for increased drainage, improved. Managing with Tylenol and oxycodone 7.5 mg BID.  Assessed by ENT surgeon and reconstruction would require additional free flap, so not being pursued at this time. Saw Wound Clinic for mgmt recs.    No new sites of pain.    All nutrition/hydration via PEG.   Losing weight now because of tube in duodenum restricts flow      ECOG Performance    0-1    Oncology History/Treatment  Diagnosis/Stage:   7/2023: [id number] Floor of mouth squamous cell cancer  -hx alcoholism (stopped all use) and smoking (quit 8/2023)  -hx premalignant oral cavity lesions s/p CO2 laser ablation with ENT  -presented with lower lip/chin swelling  -7/3/2023 left mandibular alveolus biopsy: invasive keratinizing moderately differential squamous cell carcinoma.   -PET: 4.4 x 3.7 x 3.2 cm anterior oral cavity mass invading into the mandible with a separate gluco-avid nodule on the right mandibular buccal mucosa and bilateral level 1B, level 2 and L3 metastatic lymphadenopathy without evidence of metastatic disease.  "  -8/17/2023 surgical path: pT4a, N3b tumor with positive left anterior lateral soft tissue margin, 11/98+ lymph nodes including STEFFEN (largest 4 cm) and several bilateral lymph nodes consistent with involvement by low-grade B-cell neoplasm suggestive of CLL/SLL.     2/2024: Progression to stage IV mouth squamous cell cancer to bone and lung  -Within weeks of completing definitive treatment for her primary disease, noted severe/progressive back pain  -CT C/T spine: new T3 compression fracture with hypodensity in vertebral body extending into L posterior elements suggestive of met. T4 posterior vertebral body lesion also suspicious. New lung nodules also noted  -MRI T spine: T3 compression fracture with extraosseous tumor involving ventral epidural space with mod - severe central spinal canal stenosis at T3  -PET: recurrent avid foci L maikel-mandible, numerous bilateral pulmonary/pleural mets, met mediastinal/hilar adenopathy, T3, T4, L adductor musculature and R upper thigh skin thickening  -PDL1 TPS and CPS 70% from 8/2023 original tumor biopsy    Treatment:  Locally advanced disease  -8/17/2023: segmental mandibulectomy, floor of mouth resection, anterior glossectomy, left fibular free flap, skin grafting, and tracheostomy.   -Post-op course complicated by skin necrosis requiring OR debridment and resuturing of neck incision/intraoral flap    -10/20/2023 - 12/5/2023: completed adjuvant concurrent RT (6600 cGy/33) + weekly cisplatin (x 7 cycles)    Metastatic recurrence  -2/21/2024 - present: pembrolizumab.  -Zometa started x 1 dose for bone mets, later held for jaw osteonecrosis risk    -2/26 - 3/12/2024: palliative RT to thoracic spine, 2500 cGy/10. Dex taper      Physical Exam  /70 (BP Location: Left arm, Patient Position: Sitting, Cuff Size: Adult Small)   Pulse 90   Temp 97.3  F (36.3  C) (Tympanic)   Resp 12   Ht 1.626 m (5' 4\")   Wt 37.6 kg (83 lb)   SpO2 98%   BMI 14.25 kg/m      GENERAL " APPEARANCE: 64-year-old woman in no apparent distress.  HEAD: Atraumatic; normocephalic; without lesions.  EYES: Conjunctiva, corneas and eyelids normal; pupils equal, round, reactive to light; No Icterus.  MOUTH/OROPHARYNX: Open wound to mandible with hardware in place..  NECK: Supple with no nodes.  LUNGS:  Clear to auscultation and percussion with no extra sounds.  HEART: Regular rhythm and rate; S1 and S2 normal; no murmurs noted.  ABDOMEN: Soft; no masses or tenderness, no hepatosplenomegaly.  NEUROLOGIC: Alert and oriented.  No obvious focal findings.  EXTREMITIES: No cyanosis, or edema.  SKIN: No abnormal bruising or bleeding. No suspicious lesions noted on exposed skin.  PSYCHIATRIC: Mental status normal; no apparent psychiatric issues      Imaging    6/26/2024 CT n/c/a/p: reviewed in A/P. Overall stable/response.     Lab Results    Recent Results (from the past 168 hour(s))   Comprehensive metabolic panel   Result Value Ref Range    Sodium 140 135 - 145 mmol/L    Potassium 4.3 3.4 - 5.3 mmol/L    Carbon Dioxide (CO2) 21 (L) 22 - 29 mmol/L    Anion Gap 12 7 - 15 mmol/L    Urea Nitrogen 54.3 (H) 8.0 - 23.0 mg/dL    Creatinine 1.07 (H) 0.51 - 0.95 mg/dL    GFR Estimate 58 (L) >60 mL/min/1.73m2    Calcium 9.9 8.8 - 10.4 mg/dL    Chloride 107 98 - 107 mmol/L    Glucose 101 (H) 70 - 99 mg/dL    Alkaline Phosphatase 117 40 - 150 U/L    AST 22 0 - 45 U/L    ALT 29 0 - 50 U/L    Protein Total 7.3 6.4 - 8.3 g/dL    Albumin 4.1 3.5 - 5.2 g/dL    Bilirubin Total 0.2 <=1.2 mg/dL   TSH with free T4 reflex   Result Value Ref Range    TSH 1.67 0.30 - 4.20 uIU/mL   CBC with platelets and differential   Result Value Ref Range    WBC Count 10.1 4.0 - 11.0 10e3/uL    RBC Count 3.35 (L) 3.80 - 5.20 10e6/uL    Hemoglobin 11.1 (L) 11.7 - 15.7 g/dL    Hematocrit 33.4 (L) 35.0 - 47.0 %     78 - 100 fL    MCH 33.1 (H) 26.5 - 33.0 pg    MCHC 33.2 31.5 - 36.5 g/dL    RDW 12.7 10.0 - 15.0 %    Platelet Count 296 150 - 450  10e3/uL    % Neutrophils 69 %    % Lymphocytes 17 %    % Monocytes 7 %    % Eosinophils 5 %    % Basophils 0 %    % Immature Granulocytes 1 %    NRBCs per 100 WBC 0 <1 /100    Absolute Neutrophils 7.0 1.6 - 8.3 10e3/uL    Absolute Lymphocytes 1.8 0.8 - 5.3 10e3/uL    Absolute Monocytes 0.7 0.0 - 1.3 10e3/uL    Absolute Eosinophils 0.5 0.0 - 0.7 10e3/uL    Absolute Basophils 0.0 0.0 - 0.2 10e3/uL    Absolute Immature Granulocytes 0.1 <=0.4 10e3/uL    Absolute NRBCs 0.0 10e3/uL           I spent 45 minutes on the patient's visit today.  This included preparation for the visit, face-to-face time with the patient and documentation following the visit.  It did not include teaching or procedure time.    Signed by: Peter E. Friedell, MD

## 2024-10-09 NOTE — PROGRESS NOTES
Infusion Nursing Note:  Adrianna Pereira presents today for Keytruda  Patient seen by provider today: Yes, Dr. Friedell.   present during visit today: Not Applicable.    Note: N/A.      Intravenous Access:  Implanted Port.    Treatment Conditions:  Results reviewed, labs MET treatment parameters, ok to proceed with treatment.      Post Infusion Assessment:  Patient tolerated infusion without incident.  Access discontinued per protocol.       Discharge Plan:   Pt will return on 10/30 for next treatment  Patient discharged in stable condition accompanied by: .      Leana Manriquez RN

## 2024-10-10 ENCOUNTER — OFFICE VISIT (OUTPATIENT)
Dept: SURGERY | Facility: CLINIC | Age: 64
End: 2024-10-10
Payer: COMMERCIAL

## 2024-10-10 VITALS
DIASTOLIC BLOOD PRESSURE: 58 MMHG | TEMPERATURE: 97.5 F | WEIGHT: 82.8 LBS | HEART RATE: 93 BPM | SYSTOLIC BLOOD PRESSURE: 88 MMHG | OXYGEN SATURATION: 97 % | BODY MASS INDEX: 14.14 KG/M2 | HEIGHT: 64 IN

## 2024-10-10 DIAGNOSIS — K94.23 GASTROSTOMY TUBE DYSFUNCTION (H): Primary | ICD-10-CM

## 2024-10-10 PROCEDURE — 99212 OFFICE O/P EST SF 10 MIN: CPT | Performed by: SURGERY

## 2024-10-10 ASSESSMENT — PAIN SCALES - GENERAL: PAINLEVEL: NO PAIN (0)

## 2024-10-10 NOTE — PROGRESS NOTES
"Patient had G tube replaced by ED recently.  Since that time, notes she is unable to bolus feed as she was able.  No drainage around site.  Denies abdominal pain.   notes tube is deeper than prior, similar to when she was having issues prior.    BP (!) 88/58   Pulse 93   Temp 97.5  F (36.4  C) (Tympanic)   Ht 1.626 m (5' 4\")   Wt 37.6 kg (82 lb 12.8 oz)   SpO2 97%   BMI 14.21 kg/m      Exam:   Alert, appropriate  Abdomen: Soft, non-tender, no R/R/G.  Gastrostomy at 6 cm at bolster, loose to skin.  Scant surrounding erythema    1. Gastrostomy tube dysfunction (H)        Plan:     CT Abd/Pelvis from 10/7/2024 reviewed personally, gastrostomy is currently past the pylorus, contributing to inability to bolus feed gastrostomy.  Given CT findings, gastrostomy study unnecessary.  With consent, adjusted bolster back to 3.0 cm.  No tension on bolster.  Dressing placed.  RTC PRN.    Lg Doherty,  on 10/10/2024 at 10:04 AM    "

## 2024-10-10 NOTE — LETTER
"10/10/2024      Adrianna Pereira  [address] [address] [place] [id number]      Dear Colleague,    Thank you for referring your patient, Adrianna Pereira, to the [facility]. Please see a copy of my visit note below.    Patient had G tube replaced by ED recently.  Since that time, notes she is unable to bolus feed as she was able.  No drainage around site.  Denies abdominal pain.   notes tube is deeper than prior, similar to when she was having issues prior.    BP (!) 88/58   Pulse 93   Temp 97.5  F (36.4  C) (Tympanic)   Ht 1.626 m (5' 4\")   Wt 37.6 kg (82 lb 12.8 oz)   SpO2 97%   BMI 14.21 kg/m      Exam:   Alert, appropriate  Abdomen: Soft, non-tender, no R/R/G.  Gastrostomy at 6 cm at bolster, loose to skin.  Scant surrounding erythema    1. Gastrostomy tube dysfunction (H)        Plan:     CT Abd/Pelvis from 10/7/2024 reviewed personally, gastrostomy is currently past the pylorus, contributing to inability to bolus feed gastrostomy.  Given CT findings, gastrostomy study unnecessary.  With consent, adjusted bolster back to 3.0 cm.  No tension on bolster.  Dressing placed.  RTC PRN.    Lg Doherty,  on 10/10/2024 at 10:04 AM      Again, thank you for allowing me to participate in the care of your patient.        Sincerely,        Lg Doherty, DO  "

## 2024-10-13 ENCOUNTER — MYC REFILL (OUTPATIENT)
Dept: ONCOLOGY | Facility: CLINIC | Age: 64
End: 2024-10-13
Payer: COMMERCIAL

## 2024-10-13 DIAGNOSIS — G89.3 CANCER RELATED PAIN: ICD-10-CM

## 2024-10-14 RX ORDER — OXYCODONE HCL 5 MG/5 ML
5 SOLUTION, ORAL ORAL EVERY 6 HOURS PRN
Qty: 500 ML | Refills: 0 | Status: SHIPPED | OUTPATIENT
Start: 2024-10-14 | End: 2024-11-08

## 2024-10-30 ENCOUNTER — MYC REFILL (OUTPATIENT)
Dept: ONCOLOGY | Facility: CLINIC | Age: 64
End: 2024-10-30

## 2024-10-30 ENCOUNTER — LAB (OUTPATIENT)
Dept: INFUSION THERAPY | Facility: CLINIC | Age: 64
End: 2024-10-30
Attending: INTERNAL MEDICINE
Payer: COMMERCIAL

## 2024-10-30 ENCOUNTER — ONCOLOGY VISIT (OUTPATIENT)
Dept: ONCOLOGY | Facility: CLINIC | Age: 64
End: 2024-10-30
Attending: NURSE PRACTITIONER
Payer: COMMERCIAL

## 2024-10-30 VITALS
RESPIRATION RATE: 16 BRPM | TEMPERATURE: 97.6 F | HEIGHT: 64 IN | BODY MASS INDEX: 14.51 KG/M2 | WEIGHT: 85 LBS | OXYGEN SATURATION: 97 % | HEART RATE: 86 BPM | SYSTOLIC BLOOD PRESSURE: 106 MMHG | DIASTOLIC BLOOD PRESSURE: 66 MMHG

## 2024-10-30 VITALS — SYSTOLIC BLOOD PRESSURE: 100 MMHG | DIASTOLIC BLOOD PRESSURE: 60 MMHG | HEART RATE: 71 BPM

## 2024-10-30 DIAGNOSIS — C78.00 MALIGNANT NEOPLASM METASTATIC TO LUNG, UNSPECIFIED LATERALITY (H): ICD-10-CM

## 2024-10-30 DIAGNOSIS — Z48.89 ENCOUNTER FOR POSTOPERATIVE CARE: ICD-10-CM

## 2024-10-30 DIAGNOSIS — C79.51 MALIGNANT NEOPLASM METASTATIC TO BONE (H): Primary | ICD-10-CM

## 2024-10-30 DIAGNOSIS — C06.9 SQUAMOUS CELL CARCINOMA OF ORAL CAVITY (H): ICD-10-CM

## 2024-10-30 DIAGNOSIS — C79.51 MALIGNANT NEOPLASM METASTATIC TO BONE (H): ICD-10-CM

## 2024-10-30 DIAGNOSIS — M27.2 INFECTION OF MANDIBLE: ICD-10-CM

## 2024-10-30 DIAGNOSIS — C06.9 SQUAMOUS CELL CARCINOMA OF ORAL CAVITY (H): Primary | ICD-10-CM

## 2024-10-30 LAB
ALBUMIN SERPL BCG-MCNC: 4 G/DL (ref 3.5–5.2)
ALP SERPL-CCNC: 91 U/L (ref 40–150)
ALT SERPL W P-5'-P-CCNC: 29 U/L (ref 0–50)
ANION GAP SERPL CALCULATED.3IONS-SCNC: 12 MMOL/L (ref 7–15)
AST SERPL W P-5'-P-CCNC: 25 U/L (ref 0–45)
BASOPHILS # BLD AUTO: 0.1 10E3/UL (ref 0–0.2)
BASOPHILS NFR BLD AUTO: 1 %
BILIRUB SERPL-MCNC: 0.2 MG/DL
BUN SERPL-MCNC: 52.1 MG/DL (ref 8–23)
CALCIUM SERPL-MCNC: 9.9 MG/DL (ref 8.8–10.4)
CHLORIDE SERPL-SCNC: 105 MMOL/L (ref 98–107)
CREAT SERPL-MCNC: 0.95 MG/DL (ref 0.51–0.95)
EGFRCR SERPLBLD CKD-EPI 2021: 67 ML/MIN/1.73M2
EOSINOPHIL # BLD AUTO: 0.4 10E3/UL (ref 0–0.7)
EOSINOPHIL NFR BLD AUTO: 4 %
ERYTHROCYTE [DISTWIDTH] IN BLOOD BY AUTOMATED COUNT: 12.5 % (ref 10–15)
GLUCOSE SERPL-MCNC: 164 MG/DL (ref 70–99)
HCO3 SERPL-SCNC: 25 MMOL/L (ref 22–29)
HCT VFR BLD AUTO: 30.5 % (ref 35–47)
HGB BLD-MCNC: 10.2 G/DL (ref 11.7–15.7)
IMM GRANULOCYTES # BLD: 0 10E3/UL
IMM GRANULOCYTES NFR BLD: 0 %
LYMPHOCYTES # BLD AUTO: 1.5 10E3/UL (ref 0.8–5.3)
LYMPHOCYTES NFR BLD AUTO: 15 %
MCH RBC QN AUTO: 34.2 PG (ref 26.5–33)
MCHC RBC AUTO-ENTMCNC: 33.4 G/DL (ref 31.5–36.5)
MCV RBC AUTO: 102 FL (ref 78–100)
MONOCYTES # BLD AUTO: 0.6 10E3/UL (ref 0–1.3)
MONOCYTES NFR BLD AUTO: 6 %
NEUTROPHILS # BLD AUTO: 7.2 10E3/UL (ref 1.6–8.3)
NEUTROPHILS NFR BLD AUTO: 73 %
NRBC # BLD AUTO: 0 10E3/UL
NRBC BLD AUTO-RTO: 0 /100
PLATELET # BLD AUTO: 332 10E3/UL (ref 150–450)
POTASSIUM SERPL-SCNC: 4.3 MMOL/L (ref 3.4–5.3)
PROT SERPL-MCNC: 7.2 G/DL (ref 6.4–8.3)
RBC # BLD AUTO: 2.98 10E6/UL (ref 3.8–5.2)
SODIUM SERPL-SCNC: 142 MMOL/L (ref 135–145)
TSH SERPL DL<=0.005 MIU/L-ACNC: 1.01 UIU/ML (ref 0.3–4.2)
WBC # BLD AUTO: 9.8 10E3/UL (ref 4–11)

## 2024-10-30 PROCEDURE — 36591 DRAW BLOOD OFF VENOUS DEVICE: CPT

## 2024-10-30 PROCEDURE — 85025 COMPLETE CBC W/AUTO DIFF WBC: CPT | Performed by: INTERNAL MEDICINE

## 2024-10-30 PROCEDURE — 84443 ASSAY THYROID STIM HORMONE: CPT | Performed by: INTERNAL MEDICINE

## 2024-10-30 PROCEDURE — G2211 COMPLEX E/M VISIT ADD ON: HCPCS | Performed by: NURSE PRACTITIONER

## 2024-10-30 PROCEDURE — 80053 COMPREHEN METABOLIC PANEL: CPT | Performed by: INTERNAL MEDICINE

## 2024-10-30 PROCEDURE — G0463 HOSPITAL OUTPT CLINIC VISIT: HCPCS | Performed by: NURSE PRACTITIONER

## 2024-10-30 PROCEDURE — 96413 CHEMO IV INFUSION 1 HR: CPT

## 2024-10-30 PROCEDURE — 250N000011 HC RX IP 250 OP 636: Performed by: NURSE PRACTITIONER

## 2024-10-30 PROCEDURE — 99214 OFFICE O/P EST MOD 30 MIN: CPT | Performed by: NURSE PRACTITIONER

## 2024-10-30 PROCEDURE — 258N000003 HC RX IP 258 OP 636: Performed by: NURSE PRACTITIONER

## 2024-10-30 RX ORDER — DIPHENHYDRAMINE HYDROCHLORIDE 50 MG/ML
25 INJECTION INTRAMUSCULAR; INTRAVENOUS
Status: CANCELLED
Start: 2024-10-30

## 2024-10-30 RX ORDER — CHLORHEXIDINE GLUCONATE ORAL RINSE 1.2 MG/ML
15 SOLUTION DENTAL 3 TIMES DAILY
Qty: 473 ML | Refills: 1 | Status: SHIPPED | OUTPATIENT
Start: 2024-10-30

## 2024-10-30 RX ORDER — HEPARIN SODIUM (PORCINE) LOCK FLUSH IV SOLN 100 UNIT/ML 100 UNIT/ML
5 SOLUTION INTRAVENOUS
Status: DISCONTINUED | OUTPATIENT
Start: 2024-10-30 | End: 2024-10-30 | Stop reason: HOSPADM

## 2024-10-30 RX ORDER — ALBUTEROL SULFATE 0.83 MG/ML
2.5 SOLUTION RESPIRATORY (INHALATION)
Status: CANCELLED | OUTPATIENT
Start: 2024-10-30

## 2024-10-30 RX ORDER — EPINEPHRINE 1 MG/ML
0.3 INJECTION, SOLUTION, CONCENTRATE INTRAVENOUS EVERY 5 MIN PRN
Status: CANCELLED | OUTPATIENT
Start: 2024-10-30

## 2024-10-30 RX ORDER — DIPHENHYDRAMINE HYDROCHLORIDE 50 MG/ML
50 INJECTION INTRAMUSCULAR; INTRAVENOUS
Status: CANCELLED
Start: 2024-10-30

## 2024-10-30 RX ORDER — HEPARIN SODIUM (PORCINE) LOCK FLUSH IV SOLN 100 UNIT/ML 100 UNIT/ML
5 SOLUTION INTRAVENOUS
Status: CANCELLED | OUTPATIENT
Start: 2024-10-30

## 2024-10-30 RX ORDER — HEPARIN SODIUM,PORCINE 10 UNIT/ML
5-20 VIAL (ML) INTRAVENOUS DAILY PRN
Status: CANCELLED | OUTPATIENT
Start: 2024-10-30

## 2024-10-30 RX ORDER — MEPERIDINE HYDROCHLORIDE 25 MG/ML
25 INJECTION INTRAMUSCULAR; INTRAVENOUS; SUBCUTANEOUS
Status: CANCELLED | OUTPATIENT
Start: 2024-10-30

## 2024-10-30 RX ORDER — AMOXICILLIN AND CLAVULANATE POTASSIUM 400; 57 MG/5ML; MG/5ML
875 POWDER, FOR SUSPENSION ORAL 2 TIMES DAILY
Qty: 218.8 ML | Refills: 0 | Status: SHIPPED | OUTPATIENT
Start: 2024-10-30 | End: 2024-11-09

## 2024-10-30 RX ORDER — METHYLPREDNISOLONE SODIUM SUCCINATE 40 MG/ML
40 INJECTION INTRAMUSCULAR; INTRAVENOUS
Status: CANCELLED
Start: 2024-10-30

## 2024-10-30 RX ORDER — ALBUTEROL SULFATE 90 UG/1
1-2 INHALANT RESPIRATORY (INHALATION)
Status: CANCELLED
Start: 2024-10-30

## 2024-10-30 RX ORDER — LORAZEPAM 2 MG/ML
0.5 INJECTION INTRAMUSCULAR EVERY 4 HOURS PRN
Status: CANCELLED | OUTPATIENT
Start: 2024-10-30

## 2024-10-30 RX ADMIN — SODIUM CHLORIDE 200 MG: 9 INJECTION, SOLUTION INTRAVENOUS at 11:29

## 2024-10-30 RX ADMIN — Medication 5 ML: at 12:00

## 2024-10-30 RX ADMIN — SODIUM CHLORIDE 50 ML: 9 INJECTION, SOLUTION INTRAVENOUS at 11:30

## 2024-10-30 ASSESSMENT — PAIN SCALES - GENERAL: PAINLEVEL_OUTOF10: MODERATE PAIN (4)

## 2024-10-30 NOTE — PROGRESS NOTES
[facility] Hematology and Oncology Outpatient Progress Note    Patient: Adrianna Pereira  MRN: [id number]  Date of Service: Oct 30, 2024          Reason for Visit    Recurrent, metastatic floor of mouth cancer to bone, lung, soft tissue    Primary Oncologist: Dr. 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 Unfortunately, within just a few months of completing, was found to have metastatic recurrence.     Radiation Treatment  10/19/2023 to 12/5/2023: Head and neck, 6600 cGy in 33 fractions  2/22/24 to 3/12/24: Thoracic spine, T2 - T4, 2500 cGy in 10 fractions    Now, on palliative pembrolizumab. Has been on 8 months. Tolerating well.     CT in early October showed overall stable disease with very slight progression in a few lung mets. Recommendation was to continue pembrolizumab and reassess with short interval PET scan in 2 mths.    Labwork:  CMP: WNL. TSH WNL. CBC: hgb 10.2 (stable), rest CBC WNL.    Plan:  -Continue with C13 pembrolizumab.   -Return in 3 weeks with Marina C14  -In 6 weeks, update PET scan and see Dr. 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    Open oral/jaw wound  Recurrent local infections  Plate exposure + open wound over lower lip secondary to residual/recurrent tumor at prior ENT resection + adjuvant RT site. Draining lessening. No fevers. Reconstruction would require additional free flap, so not being pursued at this time, in setting of widely metastatic disease, after discussion with ENT surgeon Dr. Thorne. Dr. Egan did not  recommend additional radiation.    Met with Wound Care MD.   Supportive management interventions to help with drainage, odor and decreasing bacterial growth were provided.     She has recurrent local infections along jaw, responsive to Augmentin (every 3 mths or so). She has symptoms consistent with recurrent infection this week (warmth, increased pain and drainage). No systemic symptoms.     Plan:  -Augmentin x 10 days for local jaw/wound infection  -Pt to follow Wound Clinic recs for mgmt and return PRN.  -Follow-up with Dr. Thorne in ENT every 3-6 months  -No further RT recommended at this time, but Dr. Egan following every 3 mths to reassess    Cancer pain  Completed palliative RT to T spine 3/2024. Back pain is resolved. No longer requiring pain meds for this.     Mouth/lower lip pain related to progressive reconstruction failure/open wound. Primarily managing well with Tylenol 500 mg 3/day and oxycodone 8-9 mg twice/day.     Plan:  -Continue avoiding/minimizing NSAIDs due to prior creatinine elevation on this  -Continue Tylenol, can take up to 3000 mg/day. Continue oxycodone use through day as well  -Consider long-acting pain med (likley in patch form since NPO) if oxycodone use increases  -Referral to Palliative Care if not well-controlled in future, pt wants to minimize number of appts for now.     Nutrition/hydration  Dependant on feeding tube.   Last PEG had migrated into proximal duodenum. This was recently replaced/repositioned.   She's tolerating feedings better since repositioning and regaining a bit of weight.     Plan:  -Working with Dietician as needed.   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   On observation.   ______________________________________________________________________________    History of Present Illness/ Interval  History    Ms. Adrianna Pereira is a 64 year old who completed resection of floor of mouth cancer, followed by adjuvant chemoRT 12/2023. Unfortunately, shortly after this, she was found to have local and metastatic recurrence involving mandible, lung, bone (T3-T4) and soft tissue that is PDL1 70%.     She started palliative treatments with pembrolizumab in February (8 mths ago).  Tolerating pembrolizumab generally well. No diarrhea. No new dyspnea/cough.     Jaw/mouth pain due to plate exposure of lip/open wound at prior ENT resection site due to local recurrence persists. Managing with Tylenol and oxycodone 8-9 mg BID.  Assessed by ENT surgeon and reconstruction would require additional free flap, so not being pursued at this time. Saw Wound Clinic for mgmt recs. She has been treated with Augmentin periodically (every 3-4 months) for possible secondary infection (more pain and more drainage) with improvement. Over last few weeks, notes more pain, warmth and drainage. No fevers/chills.     No new sites of pain.    All nutrition/hydration via PEG.   Stable weight.      ECOG Performance    0-1    Oncology History/Treatment  Diagnosis/Stage:   7/2023: [id number] Floor of mouth squamous cell cancer  -hx alcoholism (stopped all use) and smoking (quit 8/2023)  -hx premalignant oral cavity lesions s/p CO2 laser ablation with ENT  -presented with lower lip/chin swelling  -7/3/2023 left mandibular alveolus biopsy: invasive keratinizing moderately differential squamous cell carcinoma.   -PET: 4.4 x 3.7 x 3.2 cm anterior oral cavity mass invading into the mandible with a separate gluco-avid nodule on the right mandibular buccal mucosa and bilateral level 1B, level 2 and L3 metastatic lymphadenopathy without evidence of metastatic disease.   -8/17/2023 surgical path: pT4a, N3b tumor with positive left anterior lateral soft tissue margin, 11/98+ lymph nodes including STEFFEN (largest 4 cm) and several bilateral lymph nodes consistent  with involvement by low-grade B-cell neoplasm suggestive of CLL/SLL.     2/2024: Progression to stage IV mouth squamous cell cancer to bone and lung  -Within weeks of completing definitive treatment for her primary disease, noted severe/progressive back pain  -CT C/T spine: new T3 compression fracture with hypodensity in vertebral body extending into L posterior elements suggestive of met. T4 posterior vertebral body lesion also suspicious. New lung nodules also noted  -MRI T spine: T3 compression fracture with extraosseous tumor involving ventral epidural space with mod - severe central spinal canal stenosis at T3  -PET: recurrent avid foci L maikel-mandible, numerous bilateral pulmonary/pleural mets, met mediastinal/hilar adenopathy, T3, T4, L adductor musculature and R upper thigh skin thickening  -PDL1 TPS and CPS 70% from 8/2023 original tumor biopsy    Treatment:  Locally advanced disease  -8/17/2023: segmental mandibulectomy, floor of mouth resection, anterior glossectomy, left fibular free flap, skin grafting, and tracheostomy.   -Post-op course complicated by skin necrosis requiring OR debridment and resuturing of neck incision/intraoral flap    -10/20/2023 - 12/5/2023: completed adjuvant concurrent RT (6600 cGy/33) + weekly cisplatin (x 7 cycles)    Metastatic recurrence  -2/21/2024 - present: pembrolizumab.  -Zometa started x 1 dose for bone mets, later held for jaw osteonecrosis risk    -2/26 - 3/12/2024: palliative RT to thoracic spine, 2500 cGy/10. Dex taper      Physical Exam  There were no vitals taken for this visit.    GENERAL: Alert and oriented to time place and person. Seated comfortably. In no distress.  Dysarthria.   accompanied.  HEENT: Open wound below lower lip with exposed plate. Scant white/yellow appearing odorous drainage. Slight swelling/warmth left upper neck/mandibular area.  SHAE, EOMI. No erythema. No icterus.  NECK: Post-RT fibrosis. No evident adenopathy/masses.  HEART:  RRR  CHEST: regular respiratory effort. Lungs normal to percussion and auscultation  ABD: PEG in place. Non-distended  NEURO: No gross deficit noted.     Imaging    10/7/2024 C neck + chest: stable disease + post-treatment changes in head/neck. Very slight progression of a few small lung mets. Stable bone mets. No new mets.    Lab Results    No results found for this or any previous visit (from the past week).      Total time 30 minutes, to include face to face visit, review of EMR, ordering, documentation and coordination of care on date of service.    complexity modifier for longitudinal care.       Signed by: Marina Cronin NP

## 2024-10-30 NOTE — PROGRESS NOTES
Infusion Nursing Note:  Adrianna Pereira presents today for Keytruda C13D1.    Patient seen by provider today: Yes: Marina Cronin NP; therefore, assessment deferred   present during visit today: Not Applicable.    Note: N/A.    Intravenous Access:  Implanted Port.    Treatment Conditions:  Lab Results   Component Value Date     10/30/2024    POTASSIUM 4.3 10/30/2024    MAG 1.9 12/15/2023    CR 0.95 10/30/2024    RACHAEL 9.9 10/30/2024    BILITOTAL 0.2 10/30/2024    ALBUMIN 4.0 10/30/2024    ALT 29 10/30/2024    AST 25 10/30/2024   Results reviewed, labs MET treatment parameters, ok to proceed with treatment.    Post Infusion Assessment:  Patient tolerated infusion without incident.  Blood return noted pre and post infusion.  Site patent and intact, free from redness, edema or discomfort.  No evidence of extravasations.  Access discontinued per protocol.     Discharge Plan:   Discharge instructions reviewed with: Patient.  Patient and/or family verbalized understanding of discharge instructions and all questions answered.  AVS to patient via [facility].  Patient will return 11/20/2024 for next appointment.   Patient discharged in stable condition accompanied by: self.  Departure Mode: Ambulatory.    La Monzon RN

## 2024-10-30 NOTE — PROGRESS NOTES
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    Weight as of this encounter: 38.6 kg (85 lb). Body surface area is 1.32 meters squared.  Moderate Pain (4) Comment: Data Unavailable   No LMP recorded. Patient is postmenopausal.  Allergies reviewed: Yes  Medications reviewed: Yes    Medications: Medication refills not needed today.  Pharmacy name entered into [facility]: [facility] [address]     Frailty Screening:   Is the patient here for a new oncology consult visit in cancer care? 2. No      Clinical concerns:  None today.      Tanya Philippe CMA              "

## 2024-10-30 NOTE — LETTER
10/30/2024      Adrianna Pereira  [address] [address] [place] [id number]      Dear Colleague,    Thank you for referring your patient, Adrianna Pereira, to the [facility] [place]. Please see a copy of my visit note below.    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 Unfortunately, within just a few months of completing, was found to have metastatic recurrence.     Radiation Treatment  10/19/2023 to 12/5/2023: Head and neck, 6600 cGy in 33 fractions  2/22/24 to 3/12/24: Thoracic spine, T2 - T4, 2500 cGy in 10 fractions    Now, on palliative pembrolizumab. Has been on 8 months. Tolerating well.     CT in early October showed overall stable disease with very slight progression in a few lung mets. Recommendation was to continue pembrolizumab and reassess with short interval PET scan in 2 mths.    Labwork:  CMP: WNL. TSH WNL. CBC: hgb 10.2 (stable), rest CBC WNL.    Plan:  -Continue with C13 pembrolizumab.   -Return in 3 weeks with BLAINE Gray  -In 6 weeks, update PET scan and see Dr. 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    Open oral/jaw wound  Recurrent local infections  Plate exposure + open wound over lower lip secondary to residual/recurrent tumor at prior ENT resection + adjuvant  RT site. Draining lessening. No fevers. Reconstruction would require additional free flap, so not being pursued at this time, in setting of widely metastatic disease, after discussion with ENT surgeon Dr. Thorne. Dr. Egan did not recommend additional radiation.    Met with Wound Care MD.   Supportive management interventions to help with drainage, odor and decreasing bacterial growth were provided.     She has recurrent local infections along jaw, responsive to Augmentin (every 3 mths or so). She has symptoms consistent with recurrent infection this week (warmth, increased pain and drainage). No systemic symptoms.     Plan:  -Augmentin x 10 days for local jaw/wound infection  -Pt to follow Wound Clinic recs for mgmt and return PRN.  -Follow-up with Dr. Thorne in ENT every 3-6 months  -No further RT recommended at this time, but Dr. Egan following every 3 mths to reassess    Cancer pain  Completed palliative RT to T spine 3/2024. Back pain is resolved. No longer requiring pain meds for this.     Mouth/lower lip pain related to progressive reconstruction failure/open wound. Primarily managing well with Tylenol 500 mg 3/day and oxycodone 8-9 mg twice/day.     Plan:  -Continue avoiding/minimizing NSAIDs due to prior creatinine elevation on this  -Continue Tylenol, can take up to 3000 mg/day. Continue oxycodone use through day as well  -Consider long-acting pain med (likley in patch form since NPO) if oxycodone use increases  -Referral to Palliative Care if not well-controlled in future, pt wants to minimize number of appts for now.     Nutrition/hydration  Dependant on feeding tube.   Last PEG had migrated into proximal duodenum. This was recently replaced/repositioned.   She's tolerating feedings better since repositioning and regaining a bit of weight.     Plan:  -Working with Dietician as needed.   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   On observation.   ______________________________________________________________________________    History of Present Illness/ Interval History    Ms. Adrianna Pereira is a 64 year old who completed resection of floor of mouth cancer, followed by adjuvant chemoRT 12/2023. Unfortunately, shortly after this, she was found to have local and metastatic recurrence involving mandible, lung, bone (T3-T4) and soft tissue that is PDL1 70%.     She started palliative treatments with pembrolizumab in February (8 mths ago).  Tolerating pembrolizumab generally well. No diarrhea. No new dyspnea/cough.     Jaw/mouth pain due to plate exposure of lip/open wound at prior ENT resection site due to local recurrence persists. Managing with Tylenol and oxycodone 8-9 mg BID.  Assessed by ENT surgeon and reconstruction would require additional free flap, so not being pursued at this time. Saw Wound Clinic for mgmt recs. She has been treated with Augmentin periodically (every 3-4 months) for possible secondary infection (more pain and more drainage) with improvement. Over last few weeks, notes more pain, warmth and drainage. No fevers/chills.     No new sites of pain.    All nutrition/hydration via PEG.   Stable weight.      ECOG Performance    0-1    Oncology History/Treatment  Diagnosis/Stage:   7/2023: [id number] Floor of mouth squamous cell cancer  -hx alcoholism (stopped all use) and smoking (quit 8/2023)  -hx premalignant oral cavity lesions s/p CO2 laser ablation with ENT  -presented with lower lip/chin swelling  -7/3/2023 left mandibular alveolus biopsy: invasive keratinizing moderately differential squamous cell carcinoma.   -PET: 4.4 x 3.7 x 3.2 cm anterior oral cavity mass invading into the mandible with a separate gluco-avid nodule on the right mandibular buccal mucosa and bilateral level 1B, level 2 and L3 metastatic lymphadenopathy  without evidence of metastatic disease.   -8/17/2023 surgical path: pT4a, N3b tumor with positive left anterior lateral soft tissue margin, 11/98+ lymph nodes including STEFFEN (largest 4 cm) and several bilateral lymph nodes consistent with involvement by low-grade B-cell neoplasm suggestive of CLL/SLL.     2/2024: Progression to stage IV mouth squamous cell cancer to bone and lung  -Within weeks of completing definitive treatment for her primary disease, noted severe/progressive back pain  -CT C/T spine: new T3 compression fracture with hypodensity in vertebral body extending into L posterior elements suggestive of met. T4 posterior vertebral body lesion also suspicious. New lung nodules also noted  -MRI T spine: T3 compression fracture with extraosseous tumor involving ventral epidural space with mod - severe central spinal canal stenosis at T3  -PET: recurrent avid foci L maikel-mandible, numerous bilateral pulmonary/pleural mets, met mediastinal/hilar adenopathy, T3, T4, L adductor musculature and R upper thigh skin thickening  -PDL1 TPS and CPS 70% from 8/2023 original tumor biopsy    Treatment:  Locally advanced disease  -8/17/2023: segmental mandibulectomy, floor of mouth resection, anterior glossectomy, left fibular free flap, skin grafting, and tracheostomy.   -Post-op course complicated by skin necrosis requiring OR debridment and resuturing of neck incision/intraoral flap    -10/20/2023 - 12/5/2023: completed adjuvant concurrent RT (6600 cGy/33) + weekly cisplatin (x 7 cycles)    Metastatic recurrence  -2/21/2024 - present: pembrolizumab.  -Zometa started x 1 dose for bone mets, later held for jaw osteonecrosis risk    -2/26 - 3/12/2024: palliative RT to thoracic spine, 2500 cGy/10. Dex taper      Physical Exam  There were no vitals taken for this visit.    GENERAL: Alert and oriented to time place and person. Seated comfortably. In no distress.  Dysarthria.   accompanied.  HEENT: Open wound below lower  "lip with exposed plate. Scant white/yellow appearing odorous drainage. Slight swelling/warmth left upper neck/mandibular area.  SHAE, EOMI. No erythema. No icterus.  NECK: Post-RT fibrosis. No evident adenopathy/masses.  HEART: RRR  CHEST: regular respiratory effort. Lungs normal to percussion and auscultation  ABD: PEG in place. Non-distended  NEURO: No gross deficit noted.     Imaging    10/7/2024 C neck + chest: stable disease + post-treatment changes in head/neck. Very slight progression of a few small lung mets. Stable bone mets. No new mets.    Lab Results    No results found for this or any previous visit (from the past week).      Total time 30 minutes, to include face to face visit, review of EMR, ordering, documentation and coordination of care on date of service.    complexity modifier for longitudinal care.       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    Weight as of this encounter: 38.6 kg (85 lb). Body surface area is 1.32 meters squared.  Moderate Pain (4) Comment: Data Unavailable   No LMP recorded. Patient is postmenopausal.  Allergies reviewed: Yes  Medications reviewed: Yes    Medications: Medication refills not needed today.  Pharmacy name entered into [facility]: [facility] [address]     Frailty Screening:   Is the patient here for a new oncology consult " visit in cancer care? 2. No      Clinical concerns:  None today.      Tanya Philippe, CMA                Again, thank you for allowing me to participate in the care of your patient.        Sincerely,        Marina Cronin, NP

## 2024-11-02 ENCOUNTER — HOME INFUSION (OUTPATIENT)
Dept: HOME HEALTH SERVICES | Facility: HOME HEALTH | Age: 64
End: 2024-11-02
Payer: COMMERCIAL

## 2024-11-08 ENCOUNTER — HOME INFUSION BILLING (OUTPATIENT)
Dept: HOME HEALTH SERVICES | Facility: HOME HEALTH | Age: 64
End: 2024-11-08
Payer: COMMERCIAL

## 2024-11-08 ENCOUNTER — MYC REFILL (OUTPATIENT)
Dept: ONCOLOGY | Facility: CLINIC | Age: 64
End: 2024-11-08
Payer: COMMERCIAL

## 2024-11-08 DIAGNOSIS — G89.3 CANCER RELATED PAIN: ICD-10-CM

## 2024-11-08 PROCEDURE — A6403 STERILE GAUZE>16 <= 48 SQ IN: HCPCS

## 2024-11-08 PROCEDURE — S9341 HIT ENTERAL GRAV DIEM: HCPCS

## 2024-11-08 PROCEDURE — B4152 EF CALORIE DENSE>/=1.5KCAL: HCPCS

## 2024-11-08 PROCEDURE — A4450 NON-WATERPROOF TAPE: HCPCS

## 2024-11-08 RX ORDER — OXYCODONE HCL 5 MG/5 ML
5 SOLUTION, ORAL ORAL EVERY 6 HOURS PRN
Qty: 500 ML | Refills: 0 | Status: SHIPPED | OUTPATIENT
Start: 2024-11-08

## 2024-11-09 PROCEDURE — S9341 HIT ENTERAL GRAV DIEM: HCPCS

## 2024-11-10 PROCEDURE — S9341 HIT ENTERAL GRAV DIEM: HCPCS

## 2024-11-11 PROCEDURE — S9341 HIT ENTERAL GRAV DIEM: HCPCS

## 2024-11-12 PROCEDURE — S9341 HIT ENTERAL GRAV DIEM: HCPCS

## 2024-11-13 PROCEDURE — S9341 HIT ENTERAL GRAV DIEM: HCPCS

## 2024-11-14 PROCEDURE — S9341 HIT ENTERAL GRAV DIEM: HCPCS

## 2024-11-15 PROCEDURE — S9341 HIT ENTERAL GRAV DIEM: HCPCS

## 2024-11-16 PROCEDURE — S9341 HIT ENTERAL GRAV DIEM: HCPCS

## 2024-11-17 PROCEDURE — S9341 HIT ENTERAL GRAV DIEM: HCPCS

## 2024-11-18 PROCEDURE — S9341 HIT ENTERAL GRAV DIEM: HCPCS

## 2024-11-18 RX ORDER — EPINEPHRINE 1 MG/ML
0.3 INJECTION, SOLUTION, CONCENTRATE INTRAVENOUS EVERY 5 MIN PRN
Status: CANCELLED | OUTPATIENT
Start: 2024-11-20

## 2024-11-18 RX ORDER — LORAZEPAM 2 MG/ML
0.5 INJECTION INTRAMUSCULAR EVERY 4 HOURS PRN
Status: CANCELLED | OUTPATIENT
Start: 2024-11-20

## 2024-11-18 RX ORDER — DIPHENHYDRAMINE HYDROCHLORIDE 50 MG/ML
50 INJECTION INTRAMUSCULAR; INTRAVENOUS
Status: CANCELLED
Start: 2024-11-20

## 2024-11-18 RX ORDER — HEPARIN SODIUM (PORCINE) LOCK FLUSH IV SOLN 100 UNIT/ML 100 UNIT/ML
5 SOLUTION INTRAVENOUS
Status: CANCELLED | OUTPATIENT
Start: 2024-11-20

## 2024-11-18 RX ORDER — DIPHENHYDRAMINE HYDROCHLORIDE 50 MG/ML
25 INJECTION INTRAMUSCULAR; INTRAVENOUS
Status: CANCELLED
Start: 2024-11-20

## 2024-11-18 RX ORDER — METHYLPREDNISOLONE SODIUM SUCCINATE 40 MG/ML
40 INJECTION INTRAMUSCULAR; INTRAVENOUS
Status: CANCELLED
Start: 2024-11-20

## 2024-11-18 RX ORDER — MEPERIDINE HYDROCHLORIDE 25 MG/ML
25 INJECTION INTRAMUSCULAR; INTRAVENOUS; SUBCUTANEOUS
Status: CANCELLED | OUTPATIENT
Start: 2024-11-20

## 2024-11-18 RX ORDER — HEPARIN SODIUM,PORCINE 10 UNIT/ML
5-20 VIAL (ML) INTRAVENOUS DAILY PRN
Status: CANCELLED | OUTPATIENT
Start: 2024-11-20

## 2024-11-18 RX ORDER — ALBUTEROL SULFATE 90 UG/1
1-2 INHALANT RESPIRATORY (INHALATION)
Status: CANCELLED
Start: 2024-11-20

## 2024-11-18 RX ORDER — ALBUTEROL SULFATE 0.83 MG/ML
2.5 SOLUTION RESPIRATORY (INHALATION)
Status: CANCELLED | OUTPATIENT
Start: 2024-11-20

## 2024-11-19 PROCEDURE — S9341 HIT ENTERAL GRAV DIEM: HCPCS

## 2024-11-20 ENCOUNTER — INFUSION THERAPY VISIT (OUTPATIENT)
Dept: INFUSION THERAPY | Facility: CLINIC | Age: 64
End: 2024-11-20
Attending: NURSE PRACTITIONER
Payer: COMMERCIAL

## 2024-11-20 ENCOUNTER — ONCOLOGY VISIT (OUTPATIENT)
Dept: ONCOLOGY | Facility: CLINIC | Age: 64
End: 2024-11-20
Attending: NURSE PRACTITIONER
Payer: COMMERCIAL

## 2024-11-20 VITALS
SYSTOLIC BLOOD PRESSURE: 115 MMHG | HEIGHT: 64 IN | OXYGEN SATURATION: 99 % | DIASTOLIC BLOOD PRESSURE: 69 MMHG | TEMPERATURE: 97.4 F | WEIGHT: 85 LBS | RESPIRATION RATE: 12 BRPM | HEART RATE: 76 BPM | BODY MASS INDEX: 14.51 KG/M2

## 2024-11-20 DIAGNOSIS — C79.51 MALIGNANT NEOPLASM METASTATIC TO BONE (H): ICD-10-CM

## 2024-11-20 DIAGNOSIS — C06.9 SQUAMOUS CELL CARCINOMA OF ORAL CAVITY (H): Primary | ICD-10-CM

## 2024-11-20 DIAGNOSIS — C78.00 MALIGNANT NEOPLASM METASTATIC TO LUNG, UNSPECIFIED LATERALITY (H): ICD-10-CM

## 2024-11-20 DIAGNOSIS — C06.9 SQUAMOUS CELL CARCINOMA OF ORAL CAVITY (H): ICD-10-CM

## 2024-11-20 DIAGNOSIS — C79.51 MALIGNANT NEOPLASM METASTATIC TO BONE (H): Primary | ICD-10-CM

## 2024-11-20 LAB
ALBUMIN SERPL BCG-MCNC: 4.1 G/DL (ref 3.5–5.2)
ALP SERPL-CCNC: 94 U/L (ref 40–150)
ALT SERPL W P-5'-P-CCNC: 73 U/L (ref 0–50)
ANION GAP SERPL CALCULATED.3IONS-SCNC: 10 MMOL/L (ref 7–15)
AST SERPL W P-5'-P-CCNC: 44 U/L (ref 0–45)
BASOPHILS # BLD AUTO: 0 10E3/UL (ref 0–0.2)
BASOPHILS NFR BLD AUTO: 0 %
BILIRUB SERPL-MCNC: 0.2 MG/DL
BUN SERPL-MCNC: 44.3 MG/DL (ref 8–23)
CALCIUM SERPL-MCNC: 9.9 MG/DL (ref 8.8–10.4)
CHLORIDE SERPL-SCNC: 104 MMOL/L (ref 98–107)
CREAT SERPL-MCNC: 0.77 MG/DL (ref 0.51–0.95)
EGFRCR SERPLBLD CKD-EPI 2021: 86 ML/MIN/1.73M2
EOSINOPHIL # BLD AUTO: 0.4 10E3/UL (ref 0–0.7)
EOSINOPHIL NFR BLD AUTO: 6 %
ERYTHROCYTE [DISTWIDTH] IN BLOOD BY AUTOMATED COUNT: 13.1 % (ref 10–15)
GLUCOSE SERPL-MCNC: 168 MG/DL (ref 70–99)
HCO3 SERPL-SCNC: 25 MMOL/L (ref 22–29)
HCT VFR BLD AUTO: 30.9 % (ref 35–47)
HGB BLD-MCNC: 10.4 G/DL (ref 11.7–15.7)
IMM GRANULOCYTES # BLD: 0 10E3/UL
IMM GRANULOCYTES NFR BLD: 0 %
LYMPHOCYTES # BLD AUTO: 1.2 10E3/UL (ref 0.8–5.3)
LYMPHOCYTES NFR BLD AUTO: 17 %
MCH RBC QN AUTO: 34 PG (ref 26.5–33)
MCHC RBC AUTO-ENTMCNC: 33.7 G/DL (ref 31.5–36.5)
MCV RBC AUTO: 101 FL (ref 78–100)
MONOCYTES # BLD AUTO: 0.4 10E3/UL (ref 0–1.3)
MONOCYTES NFR BLD AUTO: 6 %
NEUTROPHILS # BLD AUTO: 4.9 10E3/UL (ref 1.6–8.3)
NEUTROPHILS NFR BLD AUTO: 71 %
NRBC # BLD AUTO: 0 10E3/UL
NRBC BLD AUTO-RTO: 0 /100
PLATELET # BLD AUTO: 237 10E3/UL (ref 150–450)
POTASSIUM SERPL-SCNC: 4.4 MMOL/L (ref 3.4–5.3)
PROT SERPL-MCNC: 6.9 G/DL (ref 6.4–8.3)
RBC # BLD AUTO: 3.06 10E6/UL (ref 3.8–5.2)
SODIUM SERPL-SCNC: 139 MMOL/L (ref 135–145)
TSH SERPL DL<=0.005 MIU/L-ACNC: 0.88 UIU/ML (ref 0.3–4.2)
WBC # BLD AUTO: 6.9 10E3/UL (ref 4–11)

## 2024-11-20 PROCEDURE — S9341 HIT ENTERAL GRAV DIEM: HCPCS

## 2024-11-20 PROCEDURE — 80053 COMPREHEN METABOLIC PANEL: CPT | Performed by: INTERNAL MEDICINE

## 2024-11-20 PROCEDURE — 258N000003 HC RX IP 258 OP 636: Performed by: NURSE PRACTITIONER

## 2024-11-20 PROCEDURE — G0463 HOSPITAL OUTPT CLINIC VISIT: HCPCS | Performed by: NURSE PRACTITIONER

## 2024-11-20 PROCEDURE — 85018 HEMOGLOBIN: CPT | Performed by: INTERNAL MEDICINE

## 2024-11-20 PROCEDURE — 85004 AUTOMATED DIFF WBC COUNT: CPT | Performed by: INTERNAL MEDICINE

## 2024-11-20 PROCEDURE — 84443 ASSAY THYROID STIM HORMONE: CPT | Performed by: INTERNAL MEDICINE

## 2024-11-20 PROCEDURE — 250N000011 HC RX IP 250 OP 636: Performed by: NURSE PRACTITIONER

## 2024-11-20 PROCEDURE — 36415 COLL VENOUS BLD VENIPUNCTURE: CPT

## 2024-11-20 RX ORDER — HEPARIN SODIUM (PORCINE) LOCK FLUSH IV SOLN 100 UNIT/ML 100 UNIT/ML
SOLUTION INTRAVENOUS
Status: COMPLETED
Start: 2024-11-20 | End: 2024-11-20

## 2024-11-20 RX ADMIN — SODIUM CHLORIDE 200 MG: 9 INJECTION, SOLUTION INTRAVENOUS at 11:25

## 2024-11-20 RX ADMIN — Medication: at 12:15

## 2024-11-20 ASSESSMENT — PAIN SCALES - GENERAL: PAINLEVEL_OUTOF10: NO PAIN (0)

## 2024-11-20 NOTE — PROGRESS NOTES
Infusion Nursing Note:  Adrianna Pereira presents today for Keytruda.    Patient seen by provider today: Yes: Marina Cronin NP   present during visit today: Not Applicable.    Note: N/A.      Intravenous Access:  Implanted Port.    Treatment Conditions:  Results reviewed, labs MET treatment parameters, ok to proceed with treatment.      Post Infusion Assessment:  Patient tolerated infusion without incident.  Blood return noted pre and post infusion.  No evidence of extravasations.  Access discontinued per protocol.       Discharge Plan:   Patient and/or family verbalized understanding of discharge instructions and all questions answered.  AVS to patient via [employer].  Patient will return 12/11 for next appointment.   Patient discharged in stable condition accompanied by: spouse  Departure Mode: Ambulatory.      Leana Manriquez RN

## 2024-11-20 NOTE — PROGRESS NOTES
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    Weight as of this encounter: 38.6 kg (85 lb). Body surface area is 1.32 meters squared.  No Pain (0) Comment: Data Unavailable   No LMP recorded. Patient is postmenopausal.  Allergies reviewed: Yes  Medications reviewed: Yes    Medications: Medication refills not needed today.  Pharmacy name entered into [facility]: [facility] [address]     Frailty Screening:   Is the patient here for a new oncology consult visit in cancer care? 2. No      Clinical concerns:  None today.      Tanya Philippe CMA              "

## 2024-11-20 NOTE — LETTER
11/20/2024      Adrianna Pereira  [address] [address] [place] [id number]      Dear Colleague,    Thank you for referring your patient, Adrianna Pereira, to the [facility] [place]. Please see a copy of my visit note below.    [facility] Hematology and Oncology Outpatient Progress Note    Patient: Adrianna Pereira  MRN: [id number]  Date of Service: Nov 20, 2024          Reason for Visit    Recurrent, metastatic floor of mouth cancer to bone, lung, soft tissue    Primary Oncologist: Dr. 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 Unfortunately, within just a few months of completing, was found to have metastatic recurrence.     Radiation Treatment  10/19/2023 to 12/5/2023: Head and neck, 6600 cGy in 33 fractions  2/22/24 to 3/12/24: Thoracic spine, T2 - T4, 2500 cGy in 10 fractions    Now, on palliative pembrolizumab. Has been on 9 months. Tolerating well.     CT in early October showed overall stable disease with very slight progression in a few lung mets. Recommendation was to continue pembrolizumab and reassess with short interval PET scan in 2 mths.    Labwork:  CMP: ALT mildly elevated (73), rest LFTs WNL. TSH WNL. CBC: hgb 10.4 (stable), rest CBC WNL.    Plan:  -Continue with C14 pembrolizumab.   -Return in 3 weeks with PET scan and see Dr. Friedell ahead of C15  -Monitor ALT elevation/rest of LFTs  -If responding, given her tolerance, could consider changing to 400 mg every 6 week dosing or can start seeing provider every other 3-week cycle (every 6 weeks). I updated treatment plan for next cycle to reflect this and ensure insurance approves this dosing schedule.   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    Open  oral/jaw wound  Recurrent local infections  Plate exposure + open wound over lower lip secondary to residual/recurrent tumor at prior ENT resection + adjuvant RT site. Draining lessening. No fevers. Reconstruction would require additional free flap, so not being pursued at this time, in setting of widely metastatic disease, after discussion with ENT surgeon Dr. Thorne. Dr. Egan did not recommend additional radiation.    Met with Wound Care MD.   Supportive management interventions to help with drainage, odor and decreasing bacterial growth were provided.     She has recurrent local infections along jaw, responsive to Augmentin (every 3 mths or so). She completed Augmentin a few weeks ago for recurrent symptoms of warmth, increased pain and drainage; these symptoms are resolved.    Plan:  -Pt to follow [facility]ound Clinic recs for mgmt and return PRN.  -Follow-up with Dr. Thorne in ENT every 3-6 months  -No further RT recommended at this time, but Dr. Egan following every 3 mths to reassess  -Repeat Augmentin as needed for recurrent local jaw/wound infection (has been requiring about every 3 mths)    Cancer pain  Completed palliative RT to T spine 3/2024. Back pain is resolved. No longer requiring pain meds for this.     Mouth/lower lip pain related to progressive reconstruction failure/open wound. Primarily managing well with Tylenol 500 mg 3/day and oxycodone 8-9 mg twice/day.     Plan:  -Continue avoiding/minimizing NSAIDs due to prior creatinine elevation on this  -Continue Tylenol, can take up to 3000 mg/day. Continue oxycodone use through day as well  -Consider long-acting pain med (likley in patch form since NPO) if oxycodone use increases  -Referral to Palliative Care if not well-controlled in future, pt wants to minimize number of appts for now.     Nutrition/hydration  Dependant on feeding tube.   Last PEG had migrated into proximal duodenum. This was recently replaced/repositioned.   She's tolerating feedings  better since repositioning and regaining a bit of weight.     Plan:  -Working with Dietician as needed.   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   On observation.   ______________________________________________________________________________    History of Present Illness/ Interval History    Ms. Adrianna Pereira is a 64 year old who completed resection of floor of mouth cancer, followed by adjuvant chemoRT 12/2023. Unfortunately, shortly after this, she was found to have local and metastatic recurrence involving mandible, lung, bone (T3-T4) and soft tissue that is PDL1 70%.     She started palliative treatments with pembrolizumab in February (9 mths ago).  Tolerating pembrolizumab generally well. No diarrhea. No new dyspnea/cough.     Jaw/mouth pain due to plate exposure of lip/open wound at prior ENT resection site due to local recurrence persists. Managing with Tylenol and oxycodone 8-9 mg BID.  Assessed by ENT surgeon and reconstruction would require additional free flap, so not being pursued at this time. Has seen Wound Clinic for mgmt recs. She has been treated with Augmentin periodically (every 3-4 months) for possible secondary infection (more pain and more drainage) with improvement. Treated 3 weeks ago for increased  pain, warmth and drainage and symptoms are now resolved.     No new sites of pain.    All nutrition/hydration via PEG.   Stable weight.      ECOG Performance    0-1    Oncology History/Treatment  Diagnosis/Stage:   7/2023: [id number] Floor of mouth squamous cell cancer  -hx alcoholism (stopped all use) and smoking (quit 8/2023)  -hx premalignant oral cavity lesions s/p CO2 laser ablation with ENT  -presented with lower lip/chin swelling  -7/3/2023 left mandibular alveolus biopsy: invasive keratinizing moderately differential squamous cell  carcinoma.   -PET: 4.4 x 3.7 x 3.2 cm anterior oral cavity mass invading into the mandible with a separate gluco-avid nodule on the right mandibular buccal mucosa and bilateral level 1B, level 2 and L3 metastatic lymphadenopathy without evidence of metastatic disease.   -8/17/2023 surgical path: pT4a, N3b tumor with positive left anterior lateral soft tissue margin, 11/98+ lymph nodes including STEFFEN (largest 4 cm) and several bilateral lymph nodes consistent with involvement by low-grade B-cell neoplasm suggestive of CLL/SLL.     2/2024: Progression to stage IV mouth squamous cell cancer to bone and lung  -Within weeks of completing definitive treatment for her primary disease, noted severe/progressive back pain  -CT C/T spine: new T3 compression fracture with hypodensity in vertebral body extending into L posterior elements suggestive of met. T4 posterior vertebral body lesion also suspicious. New lung nodules also noted  -MRI T spine: T3 compression fracture with extraosseous tumor involving ventral epidural space with mod - severe central spinal canal stenosis at T3  -PET: recurrent avid foci L maikel-mandible, numerous bilateral pulmonary/pleural mets, met mediastinal/hilar adenopathy, T3, T4, L adductor musculature and R upper thigh skin thickening  -PDL1 TPS and CPS 70% from 8/2023 original tumor biopsy    Treatment:  Locally advanced disease  -8/17/2023: segmental mandibulectomy, floor of mouth resection, anterior glossectomy, left fibular free flap, skin grafting, and tracheostomy.   -Post-op course complicated by skin necrosis requiring OR debridment and resuturing of neck incision/intraoral flap    -10/20/2023 - 12/5/2023: completed adjuvant concurrent RT (6600 cGy/33) + weekly cisplatin (x 7 cycles)    Metastatic recurrence  -2/21/2024 - present: pembrolizumab.  -Zometa started x 1 dose for bone mets, later held for jaw osteonecrosis risk    -2/26 - 3/12/2024: palliative RT to thoracic spine, 2500 cGy/10.  "Dex taper      Physical Exam  /69 (BP Location: Left arm, Patient Position: Left side, Cuff Size: Adult Small)   Pulse 76   Temp 97.4  F (36.3  C) (Tympanic)   Resp 12   Ht 1.626 m (5' 4\")   Wt 38.6 kg (85 lb)   SpO2 99%   BMI 14.59 kg/m      GENERAL: Alert and oriented to time place and person. Seated comfortably. In no distress.  Dysarthria.   accompanied.  HEENT: Open wound below lower lip with exposed plate. Scant white/yellow appearing odorous drainage. Prior warmth and erythema over left upper neck/mandibular area noted on last exam is resolved, persistent lymphedema.  SHAE, EOMI. No erythema. No icterus.  NECK: Post-RT fibrosis. No evident adenopathy/masses.  HEART: RRR  CHEST: regular respiratory effort. Lungs normal to percussion and auscultation  ABD: PEG in place. Non-distended  NEURO: No gross deficit noted.     Imaging    10/7/2024 C neck + chest: stable disease + post-treatment changes in head/neck. Very slight progression of a few small lung mets. Stable bone mets. No new mets.    Lab Results    Recent Results (from the past week)   Comprehensive metabolic panel   Result Value Ref Range    Sodium 139 135 - 145 mmol/L    Potassium 4.4 3.4 - 5.3 mmol/L    Carbon Dioxide (CO2) 25 22 - 29 mmol/L    Anion Gap 10 7 - 15 mmol/L    Urea Nitrogen 44.3 (H) 8.0 - 23.0 mg/dL    Creatinine 0.77 0.51 - 0.95 mg/dL    GFR Estimate 86 >60 mL/min/1.73m2    Calcium 9.9 8.8 - 10.4 mg/dL    Chloride 104 98 - 107 mmol/L    Glucose 168 (H) 70 - 99 mg/dL    Alkaline Phosphatase 94 40 - 150 U/L    AST 44 0 - 45 U/L    ALT 73 (H) 0 - 50 U/L    Protein Total 6.9 6.4 - 8.3 g/dL    Albumin 4.1 3.5 - 5.2 g/dL    Bilirubin Total 0.2 <=1.2 mg/dL   TSH with free T4 reflex   Result Value Ref Range    TSH 0.88 0.30 - 4.20 uIU/mL   CBC with platelets and differential   Result Value Ref Range    WBC Count 6.9 4.0 - 11.0 10e3/uL    RBC Count 3.06 (L) 3.80 - 5.20 10e6/uL    Hemoglobin 10.4 (L) 11.7 - 15.7 g/dL    " "Hematocrit 30.9 (L) 35.0 - 47.0 %     (H) 78 - 100 fL    MCH 34.0 (H) 26.5 - 33.0 pg    MCHC 33.7 31.5 - 36.5 g/dL    RDW 13.1 10.0 - 15.0 %    Platelet Count 237 150 - 450 10e3/uL    % Neutrophils 71 %    % Lymphocytes 17 %    % Monocytes 6 %    % Eosinophils 6 %    % Basophils 0 %    % Immature Granulocytes 0 %    NRBCs per 100 WBC 0 <1 /100    Absolute Neutrophils 4.9 1.6 - 8.3 10e3/uL    Absolute Lymphocytes 1.2 0.8 - 5.3 10e3/uL    Absolute Monocytes 0.4 0.0 - 1.3 10e3/uL    Absolute Eosinophils 0.4 0.0 - 0.7 10e3/uL    Absolute Basophils 0.0 0.0 - 0.2 10e3/uL    Absolute Immature Granulocytes 0.0 <=0.4 10e3/uL    Absolute NRBCs 0.0 10e3/uL         Total time 30 minutes, to include face to face visit, review of EMR, ordering, documentation and coordination of care on date of service.    complexity modifier for longitudinal care.       Signed by: Marina Cronin NP        Oncology Rooming Note    November 20, 2024 10:17 AM   Adrianna Pereira is a 64 year old female who presents for:    Chief Complaint   Patient presents with     Oncology Clinic Visit     Squamous cell carcinoma of oral cavity - Labs provider and infusion     Initial Vitals: /69 (BP Location: Left arm, Patient Position: Left side, Cuff Size: Adult Small)   Pulse 76   Temp 97.4  F (36.3  C) (Tympanic)   Resp 12   Ht 1.626 m (5' 4\")   Wt 38.6 kg (85 lb)   SpO2 99%   BMI 14.59 kg/m   Estimated body mass index is 14.59 kg/m  as calculated from the following:    Height as of this encounter: 1.626 m (5' 4\").    Weight as of this encounter: 38.6 kg (85 lb). Body surface area is 1.32 meters squared.  No Pain (0) Comment: Data Unavailable   No LMP recorded. Patient is postmenopausal.  Allergies reviewed: Yes  Medications reviewed: Yes    Medications: Medication refills not needed today.  Pharmacy name entered into [employer]: [address]     Frailty Screening:   Is the patient here for a new " oncology consult visit in cancer care? 2. No      Clinical concerns:  None today.      Tanya Philippe, HELEN                Again, thank you for allowing me to participate in the care of your patient.        Sincerely,        Marina Cronin, NP

## 2024-11-20 NOTE — PROGRESS NOTES
[facility] Hematology and Oncology Outpatient Progress Note    Patient: Adrianna Pereira  MRN: [id number]  Date of Service: Nov 20, 2024          Reason for Visit    Recurrent, metastatic floor of mouth cancer to bone, lung, soft tissue    Primary Oncologist: Dr. 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 Unfortunately, within just a few months of completing, was found to have metastatic recurrence.     Radiation Treatment  10/19/2023 to 12/5/2023: Head and neck, 6600 cGy in 33 fractions  2/22/24 to 3/12/24: Thoracic spine, T2 - T4, 2500 cGy in 10 fractions    Now, on palliative pembrolizumab. Has been on 9 months. Tolerating well.     CT in early October showed overall stable disease with very slight progression in a few lung mets. Recommendation was to continue pembrolizumab and reassess with short interval PET scan in 2 mths.    Labwork:  CMP: ALT mildly elevated (73), rest LFTs WNL. TSH WNL. CBC: hgb 10.4 (stable), rest CBC WNL.    Plan:  -Continue with C14 pembrolizumab.   -Return in 3 weeks with PET scan and see Dr. Friedell ahead of C15  -Monitor ALT elevation/rest of LFTs  -If responding, given her tolerance, could consider changing to 400 mg every 6 week dosing or can start seeing provider every other 3-week cycle (every 6 weeks). I updated treatment plan for next cycle to reflect this and ensure insurance approves this dosing schedule.   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    Open oral/jaw wound  Recurrent local infections  Plate exposure + open wound over lower lip secondary to residual/recurrent tumor at prior ENT resection + adjuvant RT site. Draining lessening. No fevers. Reconstruction would require  additional free flap, so not being pursued at this time, in setting of widely metastatic disease, after discussion with ENT surgeon Dr. Thorne. Dr. Egan did not recommend additional radiation.    Met with Wound Care MD.   Supportive management interventions to help with drainage, odor and decreasing bacterial growth were provided.     She has recurrent local infections along jaw, responsive to Augmentin (every 3 mths or so). She completed Augmentin a few weeks ago for recurrent symptoms of warmth, increased pain and drainage; these symptoms are resolved.    Plan:  -Pt to follow Wound Clinic recs for mgmt and return PRN.  -Follow-up with Dr. Thorne in ENT every 3-6 months  -No further RT recommended at this time, but Dr. Egan following every 3 mths to reassess  -Repeat Augmentin as needed for recurrent local jaw/wound infection (has been requiring about every 3 mths)    Cancer pain  Completed palliative RT to T spine 3/2024. Back pain is resolved. No longer requiring pain meds for this.     Mouth/lower lip pain related to progressive reconstruction failure/open wound. Primarily managing well with Tylenol 500 mg 3/day and oxycodone 8-9 mg twice/day.     Plan:  -Continue avoiding/minimizing NSAIDs due to prior creatinine elevation on this  -Continue Tylenol, can take up to 3000 mg/day. Continue oxycodone use through day as well  -Consider long-acting pain med (likley in patch form since NPO) if oxycodone use increases  -Referral to Palliative Care if not well-controlled in future, pt wants to minimize number of appts for now.     Nutrition/hydration  Dependant on feeding tube.   Last PEG had migrated into proximal duodenum. This was recently replaced/repositioned.   She's tolerating feedings better since repositioning and regaining a bit of weight.     Plan:  -Working with Dietician as needed.   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   On observation.   ______________________________________________________________________________    History of Present Illness/ Interval History    Ms. Adrianna Pereira is a 64 year old who completed resection of floor of mouth cancer, followed by adjuvant chemoRT 12/2023. Unfortunately, shortly after this, she was found to have local and metastatic recurrence involving mandible, lung, bone (T3-T4) and soft tissue that is PDL1 70%.     She started palliative treatments with pembrolizumab in February (9 mths ago).  Tolerating pembrolizumab generally well. No diarrhea. No new dyspnea/cough.     Jaw/mouth pain due to plate exposure of lip/open wound at prior ENT resection site due to local recurrence persists. Managing with Tylenol and oxycodone 8-9 mg BID.  Assessed by ENT surgeon and reconstruction would require additional free flap, so not being pursued at this time. Has seen Wound Clinic for mgmt recs. She has been treated with Augmentin periodically (every 3-4 months) for possible secondary infection (more pain and more drainage) with improvement. Treated 3 weeks ago for increased  pain, warmth and drainage and symptoms are now resolved.     No new sites of pain.    All nutrition/hydration via PEG.   Stable weight.      ECOG Performance    0-1    Oncology History/Treatment  Diagnosis/Stage:   7/2023: [id number] Floor of mouth squamous cell cancer  -hx alcoholism (stopped all use) and smoking (quit 8/2023)  -hx premalignant oral cavity lesions s/p CO2 laser ablation with ENT  -presented with lower lip/chin swelling  -7/3/2023 left mandibular alveolus biopsy: invasive keratinizing moderately differential squamous cell carcinoma.   -PET: 4.4 x 3.7 x 3.2 cm anterior oral cavity mass invading into the mandible with a separate gluco-avid nodule on the right mandibular buccal mucosa and bilateral level 1B, level 2 and L3 metastatic  "lymphadenopathy without evidence of metastatic disease.   -8/17/2023 surgical path: pT4a, N3b tumor with positive left anterior lateral soft tissue margin, 11/98+ lymph nodes including STEFFEN (largest 4 cm) and several bilateral lymph nodes consistent with involvement by low-grade B-cell neoplasm suggestive of CLL/SLL.     2/2024: Progression to stage IV mouth squamous cell cancer to bone and lung  -Within weeks of completing definitive treatment for her primary disease, noted severe/progressive back pain  -CT C/T spine: new T3 compression fracture with hypodensity in vertebral body extending into L posterior elements suggestive of met. T4 posterior vertebral body lesion also suspicious. New lung nodules also noted  -MRI T spine: T3 compression fracture with extraosseous tumor involving ventral epidural space with mod - severe central spinal canal stenosis at T3  -PET: recurrent avid foci L maikel-mandible, numerous bilateral pulmonary/pleural mets, met mediastinal/hilar adenopathy, T3, T4, L adductor musculature and R upper thigh skin thickening  -PDL1 TPS and CPS 70% from 8/2023 original tumor biopsy    Treatment:  Locally advanced disease  -8/17/2023: segmental mandibulectomy, floor of mouth resection, anterior glossectomy, left fibular free flap, skin grafting, and tracheostomy.   -Post-op course complicated by skin necrosis requiring OR debridment and resuturing of neck incision/intraoral flap    -10/20/2023 - 12/5/2023: completed adjuvant concurrent RT (6600 cGy/33) + weekly cisplatin (x 7 cycles)    Metastatic recurrence  -2/21/2024 - present: pembrolizumab.  -Zometa started x 1 dose for bone mets, later held for jaw osteonecrosis risk    -2/26 - 3/12/2024: palliative RT to thoracic spine, 2500 cGy/10. Dex taper      Physical Exam  /69 (BP Location: Left arm, Patient Position: Left side, Cuff Size: Adult Small)   Pulse 76   Temp 97.4  F (36.3  C) (Tympanic)   Resp 12   Ht 1.626 m (5' 4\")   Wt 38.6 kg " (85 lb)   SpO2 99%   BMI 14.59 kg/m      GENERAL: Alert and oriented to time place and person. Seated comfortably. In no distress.  Dysarthria.   accompanied.  HEENT: Open wound below lower lip with exposed plate. Scant white/yellow appearing odorous drainage. Prior warmth and erythema over left upper neck/mandibular area noted on last exam is resolved, persistent lymphedema.  SHAE, EOMI. No erythema. No icterus.  NECK: Post-RT fibrosis. No evident adenopathy/masses.  HEART: RRR  CHEST: regular respiratory effort. Lungs normal to percussion and auscultation  ABD: PEG in place. Non-distended  NEURO: No gross deficit noted.     Imaging    10/7/2024 C neck + chest: stable disease + post-treatment changes in head/neck. Very slight progression of a few small lung mets. Stable bone mets. No new mets.    Lab Results    Recent Results (from the past week)   Comprehensive metabolic panel   Result Value Ref Range    Sodium 139 135 - 145 mmol/L    Potassium 4.4 3.4 - 5.3 mmol/L    Carbon Dioxide (CO2) 25 22 - 29 mmol/L    Anion Gap 10 7 - 15 mmol/L    Urea Nitrogen 44.3 (H) 8.0 - 23.0 mg/dL    Creatinine 0.77 0.51 - 0.95 mg/dL    GFR Estimate 86 >60 mL/min/1.73m2    Calcium 9.9 8.8 - 10.4 mg/dL    Chloride 104 98 - 107 mmol/L    Glucose 168 (H) 70 - 99 mg/dL    Alkaline Phosphatase 94 40 - 150 U/L    AST 44 0 - 45 U/L    ALT 73 (H) 0 - 50 U/L    Protein Total 6.9 6.4 - 8.3 g/dL    Albumin 4.1 3.5 - 5.2 g/dL    Bilirubin Total 0.2 <=1.2 mg/dL   TSH with free T4 reflex   Result Value Ref Range    TSH 0.88 0.30 - 4.20 uIU/mL   CBC with platelets and differential   Result Value Ref Range    WBC Count 6.9 4.0 - 11.0 10e3/uL    RBC Count 3.06 (L) 3.80 - 5.20 10e6/uL    Hemoglobin 10.4 (L) 11.7 - 15.7 g/dL    Hematocrit 30.9 (L) 35.0 - 47.0 %     (H) 78 - 100 fL    MCH 34.0 (H) 26.5 - 33.0 pg    MCHC 33.7 31.5 - 36.5 g/dL    RDW 13.1 10.0 - 15.0 %    Platelet Count 237 150 - 450 10e3/uL    % Neutrophils 71 %    %  Lymphocytes 17 %    % Monocytes 6 %    % Eosinophils 6 %    % Basophils 0 %    % Immature Granulocytes 0 %    NRBCs per 100 WBC 0 <1 /100    Absolute Neutrophils 4.9 1.6 - 8.3 10e3/uL    Absolute Lymphocytes 1.2 0.8 - 5.3 10e3/uL    Absolute Monocytes 0.4 0.0 - 1.3 10e3/uL    Absolute Eosinophils 0.4 0.0 - 0.7 10e3/uL    Absolute Basophils 0.0 0.0 - 0.2 10e3/uL    Absolute Immature Granulocytes 0.0 <=0.4 10e3/uL    Absolute NRBCs 0.0 10e3/uL         Total time 30 minutes, to include face to face visit, review of EMR, ordering, documentation and coordination of care on date of service.    complexity modifier for longitudinal care.       Signed by: Marina Cronin NP

## 2024-11-21 PROCEDURE — S9341 HIT ENTERAL GRAV DIEM: HCPCS

## 2024-11-22 PROCEDURE — S9341 HIT ENTERAL GRAV DIEM: HCPCS

## 2024-11-23 PROCEDURE — S9341 HIT ENTERAL GRAV DIEM: HCPCS

## 2024-11-24 PROCEDURE — S9341 HIT ENTERAL GRAV DIEM: HCPCS

## 2024-11-25 PROCEDURE — S9341 HIT ENTERAL GRAV DIEM: HCPCS

## 2024-11-26 PROCEDURE — S9341 HIT ENTERAL GRAV DIEM: HCPCS

## 2024-11-27 PROCEDURE — S9341 HIT ENTERAL GRAV DIEM: HCPCS

## 2024-11-28 PROCEDURE — S9341 HIT ENTERAL GRAV DIEM: HCPCS

## 2024-11-29 PROCEDURE — S9341 HIT ENTERAL GRAV DIEM: HCPCS

## 2024-11-30 PROCEDURE — S9341 HIT ENTERAL GRAV DIEM: HCPCS

## 2024-12-01 PROCEDURE — B4152 EF CALORIE DENSE>/=1.5KCAL: HCPCS

## 2024-12-01 PROCEDURE — S9341 HIT ENTERAL GRAV DIEM: HCPCS

## 2024-12-02 ENCOUNTER — MYC REFILL (OUTPATIENT)
Dept: ONCOLOGY | Facility: CLINIC | Age: 64
End: 2024-12-02
Payer: COMMERCIAL

## 2024-12-02 ENCOUNTER — MYC REFILL (OUTPATIENT)
Dept: FAMILY MEDICINE | Facility: CLINIC | Age: 64
End: 2024-12-02
Payer: COMMERCIAL

## 2024-12-02 DIAGNOSIS — G89.3 CANCER RELATED PAIN: ICD-10-CM

## 2024-12-02 DIAGNOSIS — C06.9 SQUAMOUS CELL CARCINOMA OF ORAL CAVITY (H): ICD-10-CM

## 2024-12-02 PROCEDURE — S9341 HIT ENTERAL GRAV DIEM: HCPCS

## 2024-12-02 RX ORDER — OXYCODONE HCL 5 MG/5 ML
5 SOLUTION, ORAL ORAL EVERY 6 HOURS PRN
Qty: 500 ML | Refills: 0 | Status: SHIPPED | OUTPATIENT
Start: 2024-12-02

## 2024-12-03 PROCEDURE — S9341 HIT ENTERAL GRAV DIEM: HCPCS

## 2024-12-04 PROCEDURE — S9341 HIT ENTERAL GRAV DIEM: HCPCS

## 2024-12-05 ENCOUNTER — ANCILLARY ORDERS (OUTPATIENT)
Dept: ONCOLOGY | Facility: CLINIC | Age: 64
End: 2024-12-05

## 2024-12-05 DIAGNOSIS — C79.51 MALIGNANT NEOPLASM METASTATIC TO BONE (H): Primary | ICD-10-CM

## 2024-12-05 DIAGNOSIS — C78.00 MALIGNANT NEOPLASM METASTATIC TO LUNG, UNSPECIFIED LATERALITY (H): ICD-10-CM

## 2024-12-05 DIAGNOSIS — C06.9 SQUAMOUS CELL CARCINOMA OF ORAL CAVITY (H): ICD-10-CM

## 2024-12-05 PROCEDURE — S9341 HIT ENTERAL GRAV DIEM: HCPCS

## 2024-12-05 RX ORDER — IBUPROFEN 600 MG/1
600 TABLET, FILM COATED ORAL EVERY 6 HOURS PRN
Qty: 90 TABLET | Refills: 0 | Status: SHIPPED | OUTPATIENT
Start: 2024-12-05

## 2024-12-05 NOTE — NURSING NOTE
"Chief Complaint   Patient presents with    RECHECK     2 week post op      Blood pressure 121/67, pulse 112, temperature 98.7  F (37.1  C), height 1.626 m (5' 4\"), weight 49.4 kg (109 lb), SpO2 99 %, not currently breastfeeding.  Manjeet Salamanca LPN    "
PA/NP...

## 2024-12-06 PROCEDURE — S9341 HIT ENTERAL GRAV DIEM: HCPCS

## 2024-12-07 ENCOUNTER — MYC MEDICAL ADVICE (OUTPATIENT)
Dept: ONCOLOGY | Facility: CLINIC | Age: 64
End: 2024-12-07
Payer: COMMERCIAL

## 2024-12-07 PROCEDURE — S9341 HIT ENTERAL GRAV DIEM: HCPCS

## 2024-12-09 DIAGNOSIS — M27.2 INFECTION OF MANDIBLE: ICD-10-CM

## 2024-12-09 DIAGNOSIS — C06.9 SQUAMOUS CELL CARCINOMA OF ORAL CAVITY (H): ICD-10-CM

## 2024-12-09 RX ORDER — AMOXICILLIN AND CLAVULANATE POTASSIUM 400; 57 MG/5ML; MG/5ML
875 POWDER, FOR SUSPENSION ORAL 2 TIMES DAILY
Qty: 218.8 ML | Refills: 0 | Status: SHIPPED | OUTPATIENT
Start: 2024-12-09 | End: 2024-12-11

## 2024-12-11 ENCOUNTER — INFUSION THERAPY VISIT (OUTPATIENT)
Dept: INFUSION THERAPY | Facility: CLINIC | Age: 64
End: 2024-12-11
Attending: INTERNAL MEDICINE
Payer: COMMERCIAL

## 2024-12-11 ENCOUNTER — ONCOLOGY VISIT (OUTPATIENT)
Dept: ONCOLOGY | Facility: CLINIC | Age: 64
End: 2024-12-11
Attending: NURSE PRACTITIONER
Payer: COMMERCIAL

## 2024-12-11 VITALS
WEIGHT: 87 LBS | BODY MASS INDEX: 14.85 KG/M2 | TEMPERATURE: 97.5 F | OXYGEN SATURATION: 97 % | SYSTOLIC BLOOD PRESSURE: 102 MMHG | HEIGHT: 64 IN | RESPIRATION RATE: 16 BRPM | HEART RATE: 76 BPM | DIASTOLIC BLOOD PRESSURE: 62 MMHG

## 2024-12-11 VITALS — DIASTOLIC BLOOD PRESSURE: 64 MMHG | SYSTOLIC BLOOD PRESSURE: 95 MMHG | HEART RATE: 69 BPM

## 2024-12-11 DIAGNOSIS — C79.51 MALIGNANT NEOPLASM METASTATIC TO BONE (H): Primary | ICD-10-CM

## 2024-12-11 DIAGNOSIS — C06.9 SQUAMOUS CELL CARCINOMA OF ORAL CAVITY (H): ICD-10-CM

## 2024-12-11 DIAGNOSIS — C78.00 MALIGNANT NEOPLASM METASTATIC TO LUNG, UNSPECIFIED LATERALITY (H): ICD-10-CM

## 2024-12-11 DIAGNOSIS — C06.9 SQUAMOUS CELL CARCINOMA OF ORAL CAVITY (H): Primary | ICD-10-CM

## 2024-12-11 DIAGNOSIS — C79.51 MALIGNANT NEOPLASM METASTATIC TO BONE (H): ICD-10-CM

## 2024-12-11 DIAGNOSIS — M27.2 INFECTION OF MANDIBLE: ICD-10-CM

## 2024-12-11 LAB
ALBUMIN SERPL BCG-MCNC: 4 G/DL (ref 3.5–5.2)
ALP SERPL-CCNC: 93 U/L (ref 40–150)
ALT SERPL W P-5'-P-CCNC: 37 U/L (ref 0–50)
ANION GAP SERPL CALCULATED.3IONS-SCNC: 11 MMOL/L (ref 7–15)
AST SERPL W P-5'-P-CCNC: 24 U/L (ref 0–45)
BASOPHILS # BLD AUTO: 0 10E3/UL (ref 0–0.2)
BASOPHILS NFR BLD AUTO: 1 %
BILIRUB SERPL-MCNC: <0.2 MG/DL
BUN SERPL-MCNC: 65.1 MG/DL (ref 8–23)
CALCIUM SERPL-MCNC: 9.8 MG/DL (ref 8.8–10.4)
CHLORIDE SERPL-SCNC: 103 MMOL/L (ref 98–107)
CREAT SERPL-MCNC: 1.04 MG/DL (ref 0.51–0.95)
EGFRCR SERPLBLD CKD-EPI 2021: 60 ML/MIN/1.73M2
EOSINOPHIL # BLD AUTO: 0.5 10E3/UL (ref 0–0.7)
EOSINOPHIL NFR BLD AUTO: 7 %
ERYTHROCYTE [DISTWIDTH] IN BLOOD BY AUTOMATED COUNT: 12.7 % (ref 10–15)
GLUCOSE SERPL-MCNC: 142 MG/DL (ref 70–99)
HCO3 SERPL-SCNC: 27 MMOL/L (ref 22–29)
HCT VFR BLD AUTO: 31.2 % (ref 35–47)
HGB BLD-MCNC: 10.1 G/DL (ref 11.7–15.7)
IMM GRANULOCYTES # BLD: 0 10E3/UL
IMM GRANULOCYTES NFR BLD: 0 %
LYMPHOCYTES # BLD AUTO: 1.5 10E3/UL (ref 0.8–5.3)
LYMPHOCYTES NFR BLD AUTO: 20 %
MCH RBC QN AUTO: 33.4 PG (ref 26.5–33)
MCHC RBC AUTO-ENTMCNC: 32.4 G/DL (ref 31.5–36.5)
MCV RBC AUTO: 103 FL (ref 78–100)
MONOCYTES # BLD AUTO: 0.7 10E3/UL (ref 0–1.3)
MONOCYTES NFR BLD AUTO: 9 %
NEUTROPHILS # BLD AUTO: 4.8 10E3/UL (ref 1.6–8.3)
NEUTROPHILS NFR BLD AUTO: 64 %
NRBC # BLD AUTO: 0 10E3/UL
NRBC BLD AUTO-RTO: 0 /100
PLATELET # BLD AUTO: 231 10E3/UL (ref 150–450)
POTASSIUM SERPL-SCNC: 4.6 MMOL/L (ref 3.4–5.3)
PROT SERPL-MCNC: 6.9 G/DL (ref 6.4–8.3)
RBC # BLD AUTO: 3.02 10E6/UL (ref 3.8–5.2)
SODIUM SERPL-SCNC: 141 MMOL/L (ref 135–145)
TSH SERPL DL<=0.005 MIU/L-ACNC: 2.54 UIU/ML (ref 0.3–4.2)
WBC # BLD AUTO: 7.6 10E3/UL (ref 4–11)

## 2024-12-11 PROCEDURE — 250N000011 HC RX IP 250 OP 636: Performed by: INTERNAL MEDICINE

## 2024-12-11 PROCEDURE — 85041 AUTOMATED RBC COUNT: CPT | Performed by: INTERNAL MEDICINE

## 2024-12-11 PROCEDURE — 258N000003 HC RX IP 258 OP 636: Performed by: INTERNAL MEDICINE

## 2024-12-11 PROCEDURE — G2211 COMPLEX E/M VISIT ADD ON: HCPCS | Performed by: INTERNAL MEDICINE

## 2024-12-11 PROCEDURE — 96413 CHEMO IV INFUSION 1 HR: CPT

## 2024-12-11 PROCEDURE — 82310 ASSAY OF CALCIUM: CPT | Performed by: NURSE PRACTITIONER

## 2024-12-11 PROCEDURE — 84443 ASSAY THYROID STIM HORMONE: CPT | Performed by: INTERNAL MEDICINE

## 2024-12-11 PROCEDURE — G0463 HOSPITAL OUTPT CLINIC VISIT: HCPCS | Performed by: INTERNAL MEDICINE

## 2024-12-11 PROCEDURE — 99215 OFFICE O/P EST HI 40 MIN: CPT | Performed by: INTERNAL MEDICINE

## 2024-12-11 PROCEDURE — 84450 TRANSFERASE (AST) (SGOT): CPT | Performed by: NURSE PRACTITIONER

## 2024-12-11 PROCEDURE — 85004 AUTOMATED DIFF WBC COUNT: CPT | Performed by: INTERNAL MEDICINE

## 2024-12-11 PROCEDURE — 36591 DRAW BLOOD OFF VENOUS DEVICE: CPT

## 2024-12-11 RX ORDER — HEPARIN SODIUM,PORCINE 10 UNIT/ML
5-20 VIAL (ML) INTRAVENOUS DAILY PRN
Status: CANCELLED | OUTPATIENT
Start: 2024-12-11

## 2024-12-11 RX ORDER — HEPARIN SODIUM (PORCINE) LOCK FLUSH IV SOLN 100 UNIT/ML 100 UNIT/ML
5 SOLUTION INTRAVENOUS
Status: DISCONTINUED | OUTPATIENT
Start: 2024-12-11 | End: 2024-12-11 | Stop reason: HOSPADM

## 2024-12-11 RX ORDER — HEPARIN SODIUM (PORCINE) LOCK FLUSH IV SOLN 100 UNIT/ML 100 UNIT/ML
5 SOLUTION INTRAVENOUS
Status: CANCELLED | OUTPATIENT
Start: 2024-12-11

## 2024-12-11 RX ORDER — DIPHENHYDRAMINE HYDROCHLORIDE 50 MG/ML
50 INJECTION INTRAMUSCULAR; INTRAVENOUS
Status: CANCELLED
Start: 2024-12-11

## 2024-12-11 RX ORDER — EPINEPHRINE 1 MG/ML
0.3 INJECTION, SOLUTION, CONCENTRATE INTRAVENOUS EVERY 5 MIN PRN
Status: CANCELLED | OUTPATIENT
Start: 2024-12-11

## 2024-12-11 RX ORDER — METHYLPREDNISOLONE SODIUM SUCCINATE 40 MG/ML
40 INJECTION INTRAMUSCULAR; INTRAVENOUS
Status: CANCELLED
Start: 2024-12-11

## 2024-12-11 RX ORDER — AMOXICILLIN AND CLAVULANATE POTASSIUM 400; 57 MG/5ML; MG/5ML
875 POWDER, FOR SUSPENSION ORAL 2 TIMES DAILY
Qty: 218.8 ML | Refills: 1 | Status: SHIPPED | OUTPATIENT
Start: 2024-12-11

## 2024-12-11 RX ORDER — DIPHENHYDRAMINE HYDROCHLORIDE 50 MG/ML
25 INJECTION INTRAMUSCULAR; INTRAVENOUS
Status: CANCELLED
Start: 2024-12-11

## 2024-12-11 RX ORDER — MEPERIDINE HYDROCHLORIDE 25 MG/ML
25 INJECTION INTRAMUSCULAR; INTRAVENOUS; SUBCUTANEOUS
Status: CANCELLED | OUTPATIENT
Start: 2024-12-11

## 2024-12-11 RX ORDER — ALBUTEROL SULFATE 90 UG/1
1-2 INHALANT RESPIRATORY (INHALATION)
Status: CANCELLED
Start: 2024-12-11

## 2024-12-11 RX ORDER — LORAZEPAM 2 MG/ML
0.5 INJECTION INTRAMUSCULAR EVERY 4 HOURS PRN
Status: CANCELLED | OUTPATIENT
Start: 2024-12-11

## 2024-12-11 RX ORDER — ALBUTEROL SULFATE 0.83 MG/ML
2.5 SOLUTION RESPIRATORY (INHALATION)
Status: CANCELLED | OUTPATIENT
Start: 2024-12-11

## 2024-12-11 RX ADMIN — SODIUM CHLORIDE 400 MG: 9 INJECTION, SOLUTION INTRAVENOUS at 14:08

## 2024-12-11 RX ADMIN — SODIUM CHLORIDE 50 ML: 9 INJECTION, SOLUTION INTRAVENOUS at 14:06

## 2024-12-11 RX ADMIN — HEPARIN 5 ML: 100 SYRINGE at 14:41

## 2024-12-11 ASSESSMENT — PAIN SCALES - GENERAL: PAINLEVEL_OUTOF10: MODERATE PAIN (4)

## 2024-12-11 NOTE — PROGRESS NOTES
Infusion Nursing Note:  Adrianna Pereira presents today for Keytruda.    Patient seen by provider today: Yes: Dr. Friedell   present during visit today: Not Applicable.    Note: N/A.    Intravenous Access:  Implanted Port.    Treatment Conditions:  Lab Results   Component Value Date     12/11/2024    POTASSIUM 4.6 12/11/2024    MAG 1.9 12/15/2023    CR 1.04 (H) 12/11/2024    RACHAEL 9.8 12/11/2024    BILITOTAL <0.2 12/11/2024    ALBUMIN 4.0 12/11/2024    ALT 37 12/11/2024    AST 24 12/11/2024       Results reviewed, labs MET treatment parameters, ok to proceed with treatment.    Post Infusion Assessment:  Patient tolerated infusion without incident.  Blood return noted pre and post infusion.  Site patent and intact, free from redness, edema or discomfort.  No evidence of extravasations.  Access discontinued per protocol.     Discharge Plan:   Patient and/or family verbalized understanding of discharge instructions and all questions answered.  Patient discharged in stable condition accompanied by: self.  Departure Mode: Ambulatory.    Reynaldo Ibrahim RN

## 2024-12-11 NOTE — PROGRESS NOTES
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     Initial Vitals: /62 (BP Location: Left arm, Patient Position: Sitting, Cuff Size: Adult Small)   Pulse 76   Temp 97.5  F (36.4  C) (Tympanic)   Resp 16   Ht 1.626 m (5' 4\")   Wt 39.5 kg (87 lb)   SpO2 97%   BMI 14.93 kg/m   Estimated body mass index is 14.93 kg/m  as calculated from the following:    Height as of this encounter: 1.626 m (5' 4\").    Weight as of this encounter: 39.5 kg (87 lb). Body surface area is 1.34 meters squared.  Moderate Pain (4) Comment: Data Unavailable   No LMP recorded. Patient is postmenopausal.  Allergies reviewed: Yes  Medications reviewed: Yes    Medications: Medication refills not needed today.  Pharmacy name entered into [facility]: [facility] [address]     Frailty Screening:   Is the patient here for a new oncology consult visit in cancer care? 2. No      Clinical concerns:  None today.      Tanya Philippe CMA            "

## 2024-12-11 NOTE — LETTER
"12/11/2024      Adrianna Pereira  [id number] [address] [place] [id number]      Dear Colleague,    Thank you for referring your patient, Adrianna ePreira, to the [facility]. Please see a copy of my visit note below.    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     Initial Vitals: /62 (BP Location: Left arm, Patient Position: Sitting, Cuff Size: Adult Small)   Pulse 76   Temp 97.5  F (36.4  C) (Tympanic)   Resp 16   Ht 1.626 m (5' 4\")   Wt 39.5 kg (87 lb)   SpO2 97%   BMI 14.93 kg/m   Estimated body mass index is 14.93 kg/m  as calculated from the following:    Height as of this encounter: 1.626 m (5' 4\").    Weight as of this encounter: 39.5 kg (87 lb). Body surface area is 1.34 meters squared.  Moderate Pain (4) Comment: Data Unavailable   No LMP recorded. Patient is postmenopausal.  Allergies reviewed: Yes  Medications reviewed: Yes    Medications: Medication refills not needed today.  Pharmacy name entered into [facility]: [facility] [address]     Frailty Screening:   Is the patient here for a new oncology consult visit in cancer care? 2. No      Clinical concerns:  None today.      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 Unfortunately, within just a few months of completing, was found to have metastatic recurrence.     Radiation Treatment  10/19/2023 to 12/5/2023: Head and neck, 6600 cGy in 33 fractions  2/22/24 to 3/12/24: Thoracic spine, T2 - T4, 2500 cGy in 10 fractions    Now, on palliative pembrolizumab. Has been on 9 months. Tolerating well.     CT in early October showed overall stable disease with very slight progression in a few lung mets. Recommendation was to continue pembrolizumab and reassess with short interval PET scan in 2 mths.    Labwork:  CMP: ALT mildly elevated (73), rest LFTs WNL. TSH WNL. CBC: hgb 10.4 (stable), rest CBC WNL.    Plan:  -Continue with C15 pembrolizumab.   -Return in 6 weeks for cycle  16  -Monitor ALT elevation/rest of LFTs  -If responding, given her tolerance, could consider changing to 400 mg every 6 week dosing or can start seeing provider every other 3-week cycle (every 6 weeks). I updated treatment plan for next cycle to reflect this and ensure insurance approves this dosing schedule.   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    Open oral/jaw wound  Recurrent local infections  Plate exposure + open wound over lower lip secondary to residual/recurrent tumor at prior ENT resection + adjuvant RT site. Draining lessening. No fevers. Reconstruction would require additional free flap, so not being pursued at this time, in setting of widely metastatic disease, after discussion with ENT surgeon Dr. Thorne. Dr. Egan did not recommend additional radiation.    Met with Wound Care MD.   Supportive management interventions to help with drainage, odor and decreasing bacterial growth were provided.     She has recurrent local infections along jaw, responsive to Augmentin (every 3 mths or so). She completed Augmentin a few weeks ago for recurrent symptoms of warmth, increased pain and drainage; these symptoms are resolved.    Plan:  -Pt to follow  Wound Clinic recs for mgmt and return PRN.  -Follow-up with Dr. Thorne in ENT every 3-6 months  -No further RT recommended at this time, but Dr. Egan following every 3 mths to reassess  -Repeat Augmentin as needed for recurrent local jaw/wound infection (has been requiring about every 3 mths)    Cancer pain  Completed palliative RT to T spine 3/2024. Back pain is resolved. No longer requiring pain meds for this.     Mouth/lower lip pain related to progressive reconstruction failure/open wound. Primarily managing well with Tylenol 500 mg 3/day and oxycodone 8-9 mg twice/day.     Plan:  -Continue avoiding/minimizing NSAIDs due to prior creatinine elevation on this  -Continue Tylenol, can take up to 3000 mg/day. Continue oxycodone use through day as well  -Consider long-acting pain med (likley in patch form since NPO) if oxycodone use increases  -Referral to Palliative Care if not well-controlled in future, pt wants to minimize number of appts for now.     Nutrition/hydration  Dependant on feeding tube.   Last PEG had migrated into proximal duodenum. This was recently replaced/repositioned.   She's tolerating feedings better since repositioning and regaining a bit of weight.     Plan:  -Working with Dietician as needed.   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   On observation.   ______________________________________________________________________________    History of Present Illness/ Interval History    Ms. Adrianna Pereira is a 64 year old who completed resection of floor of mouth cancer, followed by adjuvant chemoRT 12/2023. Unfortunately, shortly after this, she was found to have local and metastatic recurrence involving mandible, lung, bone (T3-T4) and soft tissue that is PDL1 70%.     She started palliative treatments with pembrolizumab in February (9  mths ago).  Tolerating pembrolizumab generally well. No diarrhea. No new dyspnea/cough.     Jaw/mouth pain due to plate exposure of lip/open wound at prior ENT resection site due to local recurrence persists. Managing with Tylenol and oxycodone 8-9 mg BID.  Assessed by ENT surgeon and reconstruction would require additional free flap, so not being pursued at this time. Has seen Wound Clinic for mgmt recs. She has been treated with Augmentin periodically (every 3-4 months) for possible secondary infection (more pain and more drainage) with improvement. Treated 3 weeks ago for increased  pain, warmth and drainage and symptoms are now resolved.   12/11/2024 restarting Augmentin for above symptoms    No new sites of pain.    All nutrition/hydration via PEG.   Stable weight.      ECOG Performance    0-1    Oncology History/Treatment  Diagnosis/Stage:   7/2023: [id number] Floor of mouth squamous cell cancer  -hx alcoholism (stopped all use) and smoking (quit 8/2023)  -hx premalignant oral cavity lesions s/p CO2 laser ablation with ENT  -presented with lower lip/chin swelling  -7/3/2023 left mandibular alveolus biopsy: invasive keratinizing moderately differential squamous cell carcinoma.   -PET: 4.4 x 3.7 x 3.2 cm anterior oral cavity mass invading into the mandible with a separate gluco-avid nodule on the right mandibular buccal mucosa and bilateral level 1B, level 2 and L3 metastatic lymphadenopathy without evidence of metastatic disease.   -8/17/2023 surgical path: pT4a, N3b tumor with positive left anterior lateral soft tissue margin, 11/98+ lymph nodes including STEFFEN (largest 4 cm) and several bilateral lymph nodes consistent with involvement by low-grade B-cell neoplasm suggestive of CLL/SLL.     2/2024: Progression to stage IV mouth squamous cell cancer to bone and lung  -Within weeks of completing definitive treatment for her primary disease, noted severe/progressive back pain  -CT C/T spine: new T3 compression  "fracture with hypodensity in vertebral body extending into L posterior elements suggestive of met. T4 posterior vertebral body lesion also suspicious. New lung nodules also noted  -MRI T spine: T3 compression fracture with extraosseous tumor involving ventral epidural space with mod - severe central spinal canal stenosis at T3  -PET: recurrent avid foci L maikel-mandible, numerous bilateral pulmonary/pleural mets, met mediastinal/hilar adenopathy, T3, T4, L adductor musculature and R upper thigh skin thickening  -PDL1 TPS and CPS 70% from 8/2023 original tumor biopsy    Treatment:  Locally advanced disease  -8/17/2023: segmental mandibulectomy, floor of mouth resection, anterior glossectomy, left fibular free flap, skin grafting, and tracheostomy.   -Post-op course complicated by skin necrosis requiring OR debridment and resuturing of neck incision/intraoral flap    -10/20/2023 - 12/5/2023: completed adjuvant concurrent RT (6600 cGy/33) + weekly cisplatin (x 7 cycles)    Metastatic recurrence  -2/21/2024 - present: pembrolizumab. Changed to 400mg dosing 1 6 weeks 12/11/2024  -Zometa started x 1 dose for bone mets, later held for jaw osteonecrosis risk    -2/26 - 3/12/2024: palliative RT to thoracic spine, 2500 cGy/10. Dex taper      Physical Exam  /62 (BP Location: Left arm, Patient Position: Sitting, Cuff Size: Adult Small)   Pulse 76   Temp 97.5  F (36.4  C) (Tympanic)   Resp 16   Ht 1.626 m (5' 4\")   Wt 39.5 kg (87 lb)   SpO2 97%   BMI 14.93 kg/m      GENERAL: Alert and oriented to time place and person. Seated comfortably. In no distress.  Dysarthria.   accompanied.  HEENT: Open wound below lower lip with exposed plate. Scant white/yellow appearing odorous drainage. Warmth and swelling in the area of the left maxilla  Augmentin has been prescribd.   persistent lymphedema.  SHAE, EOMI. No erythema. No icterus.  NECK: Post-RT fibrosis. No evident adenopathy/masses.  HEART: RRR  CHEST: regular " respiratory effort. Lungs normal to percussion and auscultation  ABD: PEG in place. Non-distended  NEURO: No gross deficit noted.     Imaging    12/5/2024  C neck + pet chest abdomen pelvis : stable disease + post-treatment changes in head/neck. Some resolution no areas of progression.  . Stable bone mets. No new mets.    Lab Results    Recent Results (from the past week)   CBC with platelets and differential   Result Value Ref Range    WBC Count 7.6 4.0 - 11.0 10e3/uL    RBC Count 3.02 (L) 3.80 - 5.20 10e6/uL    Hemoglobin 10.1 (L) 11.7 - 15.7 g/dL    Hematocrit 31.2 (L) 35.0 - 47.0 %     (H) 78 - 100 fL    MCH 33.4 (H) 26.5 - 33.0 pg    MCHC 32.4 31.5 - 36.5 g/dL    RDW 12.7 10.0 - 15.0 %    Platelet Count 231 150 - 450 10e3/uL    % Neutrophils 64 %    % Lymphocytes 20 %    % Monocytes 9 %    % Eosinophils 7 %    % Basophils 1 %    % Immature Granulocytes 0 %    NRBCs per 100 WBC 0 <1 /100    Absolute Neutrophils 4.8 1.6 - 8.3 10e3/uL    Absolute Lymphocytes 1.5 0.8 - 5.3 10e3/uL    Absolute Monocytes 0.7 0.0 - 1.3 10e3/uL    Absolute Eosinophils 0.5 0.0 - 0.7 10e3/uL    Absolute Basophils 0.0 0.0 - 0.2 10e3/uL    Absolute Immature Granulocytes 0.0 <=0.4 10e3/uL    Absolute NRBCs 0.0 10e3/uL         I spent 42 minutes on the patient's visit today.  This included preparation for the visit, face-to-face time with the patient and documentation following the visit.  It did not include teaching or procedure time.    Signed by: Peter E. Friedell, MD  .       Signed by: Peter E. Friedell, MD        Again, thank you for allowing me to participate in the care of your patient.        Sincerely,        Peter E. Friedell, MD

## 2024-12-11 NOTE — PROGRESS NOTES
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 Unfortunately, within just a few months of completing, was found to have metastatic recurrence.     Radiation Treatment  10/19/2023 to 12/5/2023: Head and neck, 6600 cGy in 33 fractions  2/22/24 to 3/12/24: Thoracic spine, T2 - T4, 2500 cGy in 10 fractions    Now, on palliative pembrolizumab. Has been on 9 months. Tolerating well.     CT in early October showed overall stable disease with very slight progression in a few lung mets. Recommendation was to continue pembrolizumab and reassess with short interval PET scan in 2 mths.    Labwork:  CMP: ALT mildly elevated (73), rest LFTs WNL. TSH WNL. CBC: hgb 10.4 (stable), rest CBC WNL.    Plan:  -Continue with C15 pembrolizumab.   -Return in 6 weeks for cycle  16  -Monitor ALT elevation/rest of LFTs  -If responding, given her tolerance, could consider changing to 400 mg every 6 week dosing or can start seeing provider every other 3-week cycle (every 6 weeks). I updated treatment plan for next cycle to reflect this and ensure insurance approves this dosing schedule.   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    Open oral/jaw wound  Recurrent local infections  Plate exposure + open wound over lower lip secondary to residual/recurrent tumor at prior ENT resection + adjuvant RT site. Draining lessening. No fevers. Reconstruction would require additional free flap, so not being pursued  at this time, in setting of widely metastatic disease, after discussion with ENT surgeon Dr. Thorne. Dr. Egan did not recommend additional radiation.    Met with Wound Care MD.   Supportive management interventions to help with drainage, odor and decreasing bacterial growth were provided.     She has recurrent local infections along jaw, responsive to Augmentin (every 3 mths or so). She completed Augmentin a few weeks ago for recurrent symptoms of warmth, increased pain and drainage; these symptoms are resolved.    Plan:  -Pt to follow Wound Clinic recs for mgmt and return PRN.  -Follow-up with Dr. Thorne in ENT every 3-6 months  -No further RT recommended at this time, but Dr. Egan following every 3 mths to reassess  -Repeat Augmentin as needed for recurrent local jaw/wound infection (has been requiring about every 3 mths)    Cancer pain  Completed palliative RT to T spine 3/2024. Back pain is resolved. No longer requiring pain meds for this.     Mouth/lower lip pain related to progressive reconstruction failure/open wound. Primarily managing well with Tylenol 500 mg 3/day and oxycodone 8-9 mg twice/day.     Plan:  -Continue avoiding/minimizing NSAIDs due to prior creatinine elevation on this  -Continue Tylenol, can take up to 3000 mg/day. Continue oxycodone use through day as well  -Consider long-acting pain med (likley in patch form since NPO) if oxycodone use increases  -Referral to Palliative Care if not well-controlled in future, pt wants to minimize number of appts for now.     Nutrition/hydration  Dependant on feeding tube.   Last PEG had migrated into proximal duodenum. This was recently replaced/repositioned.   She's tolerating feedings better since repositioning and regaining a bit of weight.     Plan:  -Working with Dietician as needed.   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   On observation.   ______________________________________________________________________________    History of Present Illness/ Interval History    Ms. Adrianna Pereira is a 64 year old who completed resection of floor of mouth cancer, followed by adjuvant chemoRT 12/2023. Unfortunately, shortly after this, she was found to have local and metastatic recurrence involving mandible, lung, bone (T3-T4) and soft tissue that is PDL1 70%.     She started palliative treatments with pembrolizumab in February (9 mths ago).  Tolerating pembrolizumab generally well. No diarrhea. No new dyspnea/cough.     Jaw/mouth pain due to plate exposure of lip/open wound at prior ENT resection site due to local recurrence persists. Managing with Tylenol and oxycodone 8-9 mg BID.  Assessed by ENT surgeon and reconstruction would require additional free flap, so not being pursued at this time. Has seen Wound Clinic for mgmt recs. She has been treated with Augmentin periodically (every 3-4 months) for possible secondary infection (more pain and more drainage) with improvement. Treated 3 weeks ago for increased  pain, warmth and drainage and symptoms are now resolved.   12/11/2024 restarting Augmentin for above symptoms    No new sites of pain.    All nutrition/hydration via PEG.   Stable weight.      ECOG Performance    0-1    Oncology History/Treatment  Diagnosis/Stage:   7/2023: [id number] Floor of mouth squamous cell cancer  -hx alcoholism (stopped all use) and smoking (quit 8/2023)  -hx premalignant oral cavity lesions s/p CO2 laser ablation with ENT  -presented with lower lip/chin swelling  -7/3/2023 left mandibular alveolus biopsy: invasive keratinizing moderately differential squamous cell carcinoma.   -PET: 4.4 x 3.7 x 3.2 cm anterior oral cavity mass invading into the mandible with a separate gluco-avid nodule on the right mandibular buccal mucosa and bilateral level 1B, level 2 and L3 metastatic  lymphadenopathy without evidence of metastatic disease.   -8/17/2023 surgical path: pT4a, N3b tumor with positive left anterior lateral soft tissue margin, 11/98+ lymph nodes including STEFFEN (largest 4 cm) and several bilateral lymph nodes consistent with involvement by low-grade B-cell neoplasm suggestive of CLL/SLL.     2/2024: Progression to stage IV mouth squamous cell cancer to bone and lung  -Within weeks of completing definitive treatment for her primary disease, noted severe/progressive back pain  -CT C/T spine: new T3 compression fracture with hypodensity in vertebral body extending into L posterior elements suggestive of met. T4 posterior vertebral body lesion also suspicious. New lung nodules also noted  -MRI T spine: T3 compression fracture with extraosseous tumor involving ventral epidural space with mod - severe central spinal canal stenosis at T3  -PET: recurrent avid foci L maikel-mandible, numerous bilateral pulmonary/pleural mets, met mediastinal/hilar adenopathy, T3, T4, L adductor musculature and R upper thigh skin thickening  -PDL1 TPS and CPS 70% from 8/2023 original tumor biopsy    Treatment:  Locally advanced disease  -8/17/2023: segmental mandibulectomy, floor of mouth resection, anterior glossectomy, left fibular free flap, skin grafting, and tracheostomy.   -Post-op course complicated by skin necrosis requiring OR debridment and resuturing of neck incision/intraoral flap    -10/20/2023 - 12/5/2023: completed adjuvant concurrent RT (6600 cGy/33) + weekly cisplatin (x 7 cycles)    Metastatic recurrence  -2/21/2024 - present: pembrolizumab. Changed to 400mg dosing 1 6 weeks 12/11/2024  -Zometa started x 1 dose for bone mets, later held for jaw osteonecrosis risk    -2/26 - 3/12/2024: palliative RT to thoracic spine, 2500 cGy/10. Dex taper      Physical Exam  /62 (BP Location: Left arm, Patient Position: Sitting, Cuff Size: Adult Small)   Pulse 76   Temp 97.5  F (36.4  C) (Tympanic)    "Resp 16   Ht 1.626 m (5' 4\")   Wt 39.5 kg (87 lb)   SpO2 97%   BMI 14.93 kg/m      GENERAL: Alert and oriented to time place and person. Seated comfortably. In no distress.  Dysarthria.   accompanied.  HEENT: Open wound below lower lip with exposed plate. Scant white/yellow appearing odorous drainage. Warmth and swelling in the area of the left maxilla  Augmentin has been prescribd.   persistent lymphedema.  SHAE, EOMI. No erythema. No icterus.  NECK: Post-RT fibrosis. No evident adenopathy/masses.  HEART: RRR  CHEST: regular respiratory effort. Lungs normal to percussion and auscultation  ABD: PEG in place. Non-distended  NEURO: No gross deficit noted.     Imaging    12/5/2024  C neck + pet chest abdomen pelvis : stable disease + post-treatment changes in head/neck. Some resolution no areas of progression.  . Stable bone mets. No new mets.    Lab Results    Recent Results (from the past week)   CBC with platelets and differential   Result Value Ref Range    WBC Count 7.6 4.0 - 11.0 10e3/uL    RBC Count 3.02 (L) 3.80 - 5.20 10e6/uL    Hemoglobin 10.1 (L) 11.7 - 15.7 g/dL    Hematocrit 31.2 (L) 35.0 - 47.0 %     (H) 78 - 100 fL    MCH 33.4 (H) 26.5 - 33.0 pg    MCHC 32.4 31.5 - 36.5 g/dL    RDW 12.7 10.0 - 15.0 %    Platelet Count 231 150 - 450 10e3/uL    % Neutrophils 64 %    % Lymphocytes 20 %    % Monocytes 9 %    % Eosinophils 7 %    % Basophils 1 %    % Immature Granulocytes 0 %    NRBCs per 100 WBC 0 <1 /100    Absolute Neutrophils 4.8 1.6 - 8.3 10e3/uL    Absolute Lymphocytes 1.5 0.8 - 5.3 10e3/uL    Absolute Monocytes 0.7 0.0 - 1.3 10e3/uL    Absolute Eosinophils 0.5 0.0 - 0.7 10e3/uL    Absolute Basophils 0.0 0.0 - 0.2 10e3/uL    Absolute Immature Granulocytes 0.0 <=0.4 10e3/uL    Absolute NRBCs 0.0 10e3/uL         I spent 42 minutes on the patient's visit today.  This included preparation for the visit, face-to-face time with the patient and documentation following the visit.  It did not " include teaching or procedure time.    Signed by: Peter E. Friedell, MD  .       Signed by: Peter E. Friedell, MD

## 2024-12-23 ENCOUNTER — MYC REFILL (OUTPATIENT)
Dept: ONCOLOGY | Facility: CLINIC | Age: 64
End: 2024-12-23
Payer: COMMERCIAL

## 2024-12-23 DIAGNOSIS — G89.3 CANCER RELATED PAIN: ICD-10-CM

## 2024-12-23 RX ORDER — OXYCODONE HCL 5 MG/5 ML
5 SOLUTION, ORAL ORAL EVERY 6 HOURS PRN
Qty: 500 ML | Refills: 0 | Status: SHIPPED | OUTPATIENT
Start: 2024-12-23

## 2025-01-05 ENCOUNTER — HEALTH MAINTENANCE LETTER (OUTPATIENT)
Age: 65
End: 2025-01-05

## 2025-01-15 RX ORDER — HEPARIN SODIUM (PORCINE) LOCK FLUSH IV SOLN 100 UNIT/ML 100 UNIT/ML
5 SOLUTION INTRAVENOUS
OUTPATIENT
Start: 2025-01-15

## 2025-01-17 ENCOUNTER — HOME INFUSION (OUTPATIENT)
Dept: HOME HEALTH SERVICES | Facility: HOME HEALTH | Age: 65
End: 2025-01-17
Payer: COMMERCIAL

## 2025-01-17 DIAGNOSIS — E43 UNSPECIFIED SEVERE PROTEIN-CALORIE MALNUTRITION: ICD-10-CM

## 2025-01-17 DIAGNOSIS — C04.9 MALIGNANT NEOPLASM OF FLOOR OF MOUTH, UNSPECIFIED (H): ICD-10-CM

## 2025-01-19 ENCOUNTER — MYC REFILL (OUTPATIENT)
Dept: ONCOLOGY | Facility: CLINIC | Age: 65
End: 2025-01-19
Payer: COMMERCIAL

## 2025-01-19 DIAGNOSIS — G89.3 CANCER RELATED PAIN: ICD-10-CM

## 2025-01-21 ENCOUNTER — INFUSION THERAPY VISIT (OUTPATIENT)
Dept: INFUSION THERAPY | Facility: CLINIC | Age: 65
End: 2025-01-21
Attending: INTERNAL MEDICINE
Payer: COMMERCIAL

## 2025-01-21 ENCOUNTER — INFUSION THERAPY VISIT (OUTPATIENT)
Dept: INFUSION THERAPY | Facility: CLINIC | Age: 65
End: 2025-01-21
Attending: NURSE PRACTITIONER
Payer: COMMERCIAL

## 2025-01-21 ENCOUNTER — ONCOLOGY VISIT (OUTPATIENT)
Dept: ONCOLOGY | Facility: CLINIC | Age: 65
End: 2025-01-21
Attending: INTERNAL MEDICINE
Payer: COMMERCIAL

## 2025-01-21 ENCOUNTER — OFFICE VISIT (OUTPATIENT)
Dept: SURGERY | Facility: CLINIC | Age: 65
End: 2025-01-21
Payer: COMMERCIAL

## 2025-01-21 VITALS
TEMPERATURE: 97 F | WEIGHT: 89.8 LBS | DIASTOLIC BLOOD PRESSURE: 72 MMHG | BODY MASS INDEX: 15.33 KG/M2 | HEIGHT: 64 IN | HEART RATE: 102 BPM | SYSTOLIC BLOOD PRESSURE: 121 MMHG

## 2025-01-21 VITALS
OXYGEN SATURATION: 98 % | HEART RATE: 78 BPM | TEMPERATURE: 97.6 F | SYSTOLIC BLOOD PRESSURE: 106 MMHG | HEIGHT: 64 IN | RESPIRATION RATE: 17 BRPM | DIASTOLIC BLOOD PRESSURE: 55 MMHG | BODY MASS INDEX: 15.3 KG/M2 | WEIGHT: 89.6 LBS

## 2025-01-21 VITALS — DIASTOLIC BLOOD PRESSURE: 61 MMHG | HEART RATE: 67 BPM | SYSTOLIC BLOOD PRESSURE: 99 MMHG

## 2025-01-21 DIAGNOSIS — C06.9 SQUAMOUS CELL CARCINOMA OF ORAL CAVITY (H): ICD-10-CM

## 2025-01-21 DIAGNOSIS — C78.00 MALIGNANT NEOPLASM METASTATIC TO LUNG, UNSPECIFIED LATERALITY (H): ICD-10-CM

## 2025-01-21 DIAGNOSIS — C79.51 MALIGNANT NEOPLASM METASTATIC TO BONE (H): Primary | ICD-10-CM

## 2025-01-21 DIAGNOSIS — C06.9 SQUAMOUS CELL CARCINOMA OF ORAL CAVITY (H): Primary | ICD-10-CM

## 2025-01-21 DIAGNOSIS — Z93.1 GASTROSTOMY IN PLACE (H): Primary | ICD-10-CM

## 2025-01-21 DIAGNOSIS — C79.51 MALIGNANT NEOPLASM METASTATIC TO BONE (H): ICD-10-CM

## 2025-01-21 DIAGNOSIS — Z79.899 ENCOUNTER FOR LONG-TERM CURRENT USE OF HIGH RISK MEDICATION: ICD-10-CM

## 2025-01-21 LAB
ALBUMIN SERPL BCG-MCNC: 3.9 G/DL (ref 3.5–5.2)
ALP SERPL-CCNC: 74 U/L (ref 40–150)
ALT SERPL W P-5'-P-CCNC: 24 U/L (ref 0–50)
ANION GAP SERPL CALCULATED.3IONS-SCNC: 10 MMOL/L (ref 7–15)
AST SERPL W P-5'-P-CCNC: 22 U/L (ref 0–45)
BASOPHILS # BLD AUTO: 0 10E3/UL (ref 0–0.2)
BASOPHILS NFR BLD AUTO: 0 %
BILIRUB SERPL-MCNC: <0.2 MG/DL
BUN SERPL-MCNC: 44.3 MG/DL (ref 8–23)
CALCIUM SERPL-MCNC: 9.6 MG/DL (ref 8.8–10.4)
CHLORIDE SERPL-SCNC: 107 MMOL/L (ref 98–107)
CREAT SERPL-MCNC: 0.85 MG/DL (ref 0.51–0.95)
EGFRCR SERPLBLD CKD-EPI 2021: 76 ML/MIN/1.73M2
EOSINOPHIL # BLD AUTO: 0.4 10E3/UL (ref 0–0.7)
EOSINOPHIL NFR BLD AUTO: 5 %
ERYTHROCYTE [DISTWIDTH] IN BLOOD BY AUTOMATED COUNT: 12.6 % (ref 10–15)
GLUCOSE SERPL-MCNC: 101 MG/DL (ref 70–99)
HCO3 SERPL-SCNC: 24 MMOL/L (ref 22–29)
HCT VFR BLD AUTO: 32.2 % (ref 35–47)
HGB BLD-MCNC: 10.4 G/DL (ref 11.7–15.7)
IMM GRANULOCYTES # BLD: 0 10E3/UL
IMM GRANULOCYTES NFR BLD: 0 %
LYMPHOCYTES # BLD AUTO: 1.3 10E3/UL (ref 0.8–5.3)
LYMPHOCYTES NFR BLD AUTO: 15 %
MCH RBC QN AUTO: 33.7 PG (ref 26.5–33)
MCHC RBC AUTO-ENTMCNC: 32.3 G/DL (ref 31.5–36.5)
MCV RBC AUTO: 104 FL (ref 78–100)
MONOCYTES # BLD AUTO: 0.7 10E3/UL (ref 0–1.3)
MONOCYTES NFR BLD AUTO: 8 %
NEUTROPHILS # BLD AUTO: 6.2 10E3/UL (ref 1.6–8.3)
NEUTROPHILS NFR BLD AUTO: 72 %
NRBC # BLD AUTO: 0 10E3/UL
NRBC BLD AUTO-RTO: 0 /100
PLATELET # BLD AUTO: 271 10E3/UL (ref 150–450)
POTASSIUM SERPL-SCNC: 4.7 MMOL/L (ref 3.4–5.3)
PROT SERPL-MCNC: 6.6 G/DL (ref 6.4–8.3)
RBC # BLD AUTO: 3.09 10E6/UL (ref 3.8–5.2)
SODIUM SERPL-SCNC: 141 MMOL/L (ref 135–145)
TSH SERPL DL<=0.005 MIU/L-ACNC: 1.03 UIU/ML (ref 0.3–4.2)
WBC # BLD AUTO: 8.6 10E3/UL (ref 4–11)

## 2025-01-21 PROCEDURE — 82040 ASSAY OF SERUM ALBUMIN: CPT | Performed by: NURSE PRACTITIONER

## 2025-01-21 PROCEDURE — G2211 COMPLEX E/M VISIT ADD ON: HCPCS | Performed by: NURSE PRACTITIONER

## 2025-01-21 PROCEDURE — 36591 DRAW BLOOD OFF VENOUS DEVICE: CPT | Performed by: NURSE PRACTITIONER

## 2025-01-21 PROCEDURE — 258N000003 HC RX IP 258 OP 636: Performed by: NURSE PRACTITIONER

## 2025-01-21 PROCEDURE — 99214 OFFICE O/P EST MOD 30 MIN: CPT | Performed by: NURSE PRACTITIONER

## 2025-01-21 PROCEDURE — 84443 ASSAY THYROID STIM HORMONE: CPT | Performed by: NURSE PRACTITIONER

## 2025-01-21 PROCEDURE — 250N000011 HC RX IP 250 OP 636: Performed by: NURSE PRACTITIONER

## 2025-01-21 PROCEDURE — 85025 COMPLETE CBC W/AUTO DIFF WBC: CPT | Performed by: NURSE PRACTITIONER

## 2025-01-21 PROCEDURE — G0463 HOSPITAL OUTPT CLINIC VISIT: HCPCS | Performed by: NURSE PRACTITIONER

## 2025-01-21 PROCEDURE — 96413 CHEMO IV INFUSION 1 HR: CPT

## 2025-01-21 PROCEDURE — 84155 ASSAY OF PROTEIN SERUM: CPT | Performed by: NURSE PRACTITIONER

## 2025-01-21 PROCEDURE — 43762 RPLC GTUBE NO REVJ TRC: CPT | Performed by: SURGERY

## 2025-01-21 RX ORDER — EPINEPHRINE 1 MG/ML
0.3 INJECTION, SOLUTION, CONCENTRATE INTRAVENOUS EVERY 5 MIN PRN
Status: CANCELLED | OUTPATIENT
Start: 2025-01-21

## 2025-01-21 RX ORDER — HEPARIN SODIUM,PORCINE 10 UNIT/ML
5-20 VIAL (ML) INTRAVENOUS DAILY PRN
Status: CANCELLED | OUTPATIENT
Start: 2025-01-21

## 2025-01-21 RX ORDER — ALBUTEROL SULFATE 0.83 MG/ML
2.5 SOLUTION RESPIRATORY (INHALATION)
Status: CANCELLED | OUTPATIENT
Start: 2025-01-21

## 2025-01-21 RX ORDER — DIPHENHYDRAMINE HYDROCHLORIDE 50 MG/ML
50 INJECTION INTRAMUSCULAR; INTRAVENOUS
Status: CANCELLED
Start: 2025-01-21

## 2025-01-21 RX ORDER — MEPERIDINE HYDROCHLORIDE 25 MG/ML
25 INJECTION INTRAMUSCULAR; INTRAVENOUS; SUBCUTANEOUS
Status: CANCELLED | OUTPATIENT
Start: 2025-01-21

## 2025-01-21 RX ORDER — OXYCODONE HCL 5 MG/5 ML
5 SOLUTION, ORAL ORAL EVERY 6 HOURS PRN
Qty: 500 ML | Refills: 0 | Status: SHIPPED | OUTPATIENT
Start: 2025-01-21

## 2025-01-21 RX ORDER — HEPARIN SODIUM (PORCINE) LOCK FLUSH IV SOLN 100 UNIT/ML 100 UNIT/ML
5 SOLUTION INTRAVENOUS
Status: CANCELLED | OUTPATIENT
Start: 2025-01-21

## 2025-01-21 RX ORDER — METHYLPREDNISOLONE SODIUM SUCCINATE 40 MG/ML
40 INJECTION INTRAMUSCULAR; INTRAVENOUS
Status: CANCELLED
Start: 2025-01-21

## 2025-01-21 RX ORDER — DIPHENHYDRAMINE HYDROCHLORIDE 50 MG/ML
25 INJECTION INTRAMUSCULAR; INTRAVENOUS
Status: CANCELLED
Start: 2025-01-21

## 2025-01-21 RX ORDER — HEPARIN SODIUM (PORCINE) LOCK FLUSH IV SOLN 100 UNIT/ML 100 UNIT/ML
5 SOLUTION INTRAVENOUS
Status: DISCONTINUED | OUTPATIENT
Start: 2025-01-21 | End: 2025-01-21 | Stop reason: HOSPADM

## 2025-01-21 RX ORDER — ALBUTEROL SULFATE 90 UG/1
1-2 INHALANT RESPIRATORY (INHALATION)
Status: CANCELLED
Start: 2025-01-21

## 2025-01-21 RX ORDER — LORAZEPAM 2 MG/ML
0.5 INJECTION INTRAMUSCULAR EVERY 4 HOURS PRN
Status: CANCELLED | OUTPATIENT
Start: 2025-01-21

## 2025-01-21 RX ADMIN — SODIUM CHLORIDE 400 MG: 9 INJECTION, SOLUTION INTRAVENOUS at 11:28

## 2025-01-21 RX ADMIN — Medication 5 ML: at 12:02

## 2025-01-21 RX ADMIN — SODIUM CHLORIDE 250 ML: 9 INJECTION, SOLUTION INTRAVENOUS at 11:28

## 2025-01-21 ASSESSMENT — PAIN SCALES - GENERAL: PAINLEVEL_OUTOF10: NO PAIN (0)

## 2025-01-21 NOTE — NURSING NOTE
"Initial /72 (BP Location: Right arm, Patient Position: Sitting, Cuff Size: Child)   Pulse 102   Temp 97  F (36.1  C) (Tympanic)   Ht 1.626 m (5' 4\")   Wt 40.7 kg (89 lb 12.8 oz)   BMI 15.41 kg/m   Estimated body mass index is 15.41 kg/m  as calculated from the following:    Height as of this encounter: 1.626 m (5' 4\").    Weight as of this encounter: 40.7 kg (89 lb 12.8 oz). .  Patricia Aguilar MA    "

## 2025-01-21 NOTE — LETTER
1/21/2025      Adrianna Pereira  [address] [address] [place] [id number]      Dear Colleague,    Thank you for referring your patient, Adrianna Pereira, to the [facility]. Please see a copy of my visit note below.    Patient presents for gastrostomy exchange.  Tube examined, per  it fell out on it's own last week.  Using for all enteral needs.      After verbal consent, new gastrostomy provided by patient assessed, balloon intact.  No saline in prior balloon.  The old tube was removed intact without difficulty and new tube passed without resistance.  Gastric contents immediately visible in gastrostomy.  Balloon inflated.  Bolster approximated to skin at 3.0 cm, spins freely.  Dressing applied.    Discussed converting to PEDRO-Key if desired next time.  Patient is interested.  3.0 cm or 2.5 cm 18 Fr would be appropriate.    Lg Doherty DO on 1/21/2025 at 9:39 AM      Again, thank you for allowing me to participate in the care of your patient.        Sincerely,        Lg Doherty DO    Electronically signed

## 2025-01-21 NOTE — PROGRESS NOTES
Patient presents for gastrostomy exchange.  Tube examined, per  it fell out on it's own last week.  Using for all enteral needs.      After verbal consent, new gastrostomy provided by patient assessed, balloon intact.  No saline in prior balloon.  The old tube was removed intact without difficulty and new tube passed without resistance.  Gastric contents immediately visible in gastrostomy.  Balloon inflated.  Bolster approximated to skin at 3.0 cm, spins freely.  Dressing applied.    Discussed converting to PEDRO-Key if desired next time.  Patient is interested.  3.0 cm or 2.5 cm 18 Fr would be appropriate.    Lg Doherty, DO on 1/21/2025 at 9:39 AM

## 2025-01-21 NOTE — PROGRESS NOTES
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 Unfortunately, within just a few months of completing, was found to have metastatic recurrence.     Radiation Treatment  10/19/2023 to 12/5/2023: Head and neck, 6600 cGy in 33 fractions  2/22/24 to 3/12/24: Thoracic spine, T2 - T4, 2500 cGy in 10 fractions    Now, on palliative pembrolizumab. Has been on 9 months. Tolerating well.     12/2024 PET/CT showed continued DC. No sites of progression.    No clinical progression noted today.     Labwork:  CMP:  WNL. TSH WNL. CBC: hgb 10.4 (stable). ,, rest CBC WNL.    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  -Repeat PET/CT every 4 mths (next due in April)    Open oral/jaw wound  Recurrent local infections  Plate exposure + open wound over lower lip secondary to residual/recurrent tumor at prior ENT resection + adjuvant RT site. Draining lessening. No fevers. Reconstruction would require additional free flap, so not being pursued at this time, in setting of widely metastatic disease, after discussion with ENT surgeon Dr. Thorne. Dr. Egan did not recommend additional radiation.    Met with Wound Care MD.   Supportive management interventions to help with drainage, odor and decreasing bacterial growth were provided.     She has recurrent local  infections along jaw, responsive to Augmentin (every 3 mths or so). She is on an Augmentin course now for recurrent symptoms of warmth, increased pain and drainage; these symptoms are resolving.    Plan:  -Pt to follow Wound Clinic recs for mgmt and return PRN.  -Follow-up with Dr. Thorne in ENT every 3-6 months  -No further RT recommended at this time, but Dr. Egan following every 3 mths to reassess  -Repeat Augmentin as needed for recurrent local jaw/wound infection (has been requiring about every 3 mths)    Cancer pain  Completed palliative RT to T spine 3/2024. Back pain is resolved. No longer requiring pain meds for this.     Mouth/lower lip pain related to progressive reconstruction failure/open wound. Primarily managing well with Tylenol 500 mg 3/day and oxycodone 10 mg twice/day.     Plan:  -Continue avoiding/minimizing NSAIDs due to prior creatinine elevation on this  -Continue Tylenol, can take up to 3000 mg/day. Continue oxycodone use through day as well  -Consider long-acting pain med (likley in patch form since NPO) if oxycodone use increases  -Referral to Palliative Care if not well-controlled in future, pt wants to minimize number of appts for now.     Nutrition/hydration  Dependant on feeding tube.   Last PEG had migrated into proximal duodenum. This was recently replaced/repositioned.   She's tolerating feedings better since repositioning and regaining a bit of weight.     Plan:  -Working with Dietician as needed.   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   On observation.   ______________________________________________________________________________    History of Present Illness/ Interval History    Ms. Adrianna Pereira is a 64 year old who completed resection of floor of mouth cancer, followed by adjuvant chemoRT 12/2023. Unfortunately, shortly  after this, she was found to have local and metastatic recurrence involving mandible, lung, bone (T3-T4) and soft tissue that is PDL1 70%.     She started palliative treatments with pembrolizumab in February, 2024 (11 mths ago).  Tolerating pembrolizumab generally well. No diarrhea. No new dyspnea/cough.     Jaw/mouth pain due to plate exposure of lip/open wound at prior ENT resection site due to local recurrence persists. Managing with Tylenol and oxycodone 8-9 mg BID.  Assessed by ENT surgeon and reconstruction would require additional free flap, so not being pursued at this time. Has seen Wound Clinic for mgmt recs. She has been treated with Augmentin periodically for possible secondary infection (more pain and more drainage) with improvement. On course now, symptoms respond.     No new sites of pain.    All nutrition/hydration via PEG.   Stable weight.      ECOG Performance    0-1    Oncology History/Treatment  Diagnosis/Stage:   7/2023: [id number] Floor of mouth squamous cell cancer  -hx alcoholism (stopped all use) and smoking (quit 8/2023)  -hx premalignant oral cavity lesions s/p CO2 laser ablation with ENT  -presented with lower lip/chin swelling  -7/3/2023 left mandibular alveolus biopsy: invasive keratinizing moderately differential squamous cell carcinoma.   -PET: 4.4 x 3.7 x 3.2 cm anterior oral cavity mass invading into the mandible with a separate gluco-avid nodule on the right mandibular buccal mucosa and bilateral level 1B, level 2 and L3 metastatic lymphadenopathy without evidence of metastatic disease.   -8/17/2023 surgical path: pT4a, N3b tumor with positive left anterior lateral soft tissue margin, 11/98+ lymph nodes including STEFFEN (largest 4 cm) and several bilateral lymph nodes consistent with involvement by low-grade B-cell neoplasm suggestive of CLL/SLL.     2/2024: Progression to stage IV mouth squamous cell cancer to bone and lung  -Within weeks of completing definitive treatment for her  "primary disease, noted severe/progressive back pain  -CT C/T spine: new T3 compression fracture with hypodensity in vertebral body extending into L posterior elements suggestive of met. T4 posterior vertebral body lesion also suspicious. New lung nodules also noted  -MRI T spine: T3 compression fracture with extraosseous tumor involving ventral epidural space with mod - severe central spinal canal stenosis at T3  -PET: recurrent avid foci L maikel-mandible, numerous bilateral pulmonary/pleural mets, met mediastinal/hilar adenopathy, T3, T4, L adductor musculature and R upper thigh skin thickening  -PDL1 TPS and CPS 70% from 8/2023 original tumor biopsy    Treatment:  Locally advanced disease  -8/17/2023: segmental mandibulectomy, floor of mouth resection, anterior glossectomy, left fibular free flap, skin grafting, and tracheostomy.   -Post-op course complicated by skin necrosis requiring OR debridment and resuturing of neck incision/intraoral flap    -10/20/2023 - 12/5/2023: completed adjuvant concurrent RT (6600 cGy/33) + weekly cisplatin (x 7 cycles)    Metastatic recurrence  -2/21/2024 - present: pembrolizumab. Changed to 400mg dosing 1 6 weeks 12/11/2024  -Zometa started x 1 dose for bone mets, later held for jaw osteonecrosis risk    -2/26 - 3/12/2024: palliative RT to thoracic spine, 2500 cGy/10. Dex taper      Physical Exam  /55 (BP Location: Left arm, Patient Position: Sitting, Cuff Size: Adult Small)   Pulse 78   Temp 97.6  F (36.4  C) (Tympanic)   Resp 17   Ht 1.626 m (5' 4\")   Wt 40.6 kg (89 lb 9.6 oz)   SpO2 98%   BMI 15.38 kg/m      GENERAL: Alert and oriented to time place and person. Seated comfortably. In no distress.  Dysarthria.   accompanied.  HEENT: Open wound below lower lip with exposed plate. Scant white/yellow appearing odorous drainage. Persistent lymphedema of mandible area, no redness.  SHAE, EOMI. No erythema. No icterus.  NECK: Post-RT fibrosis. No evident " adenopathy/masses.  HEART: RRR  CHEST: regular respiratory effort. Lungs normal to percussion and auscultation  ABD: PEG in place. Non-distended  NEURO: No gross deficit noted.     Imaging    12/5/2024  C neck + pet chest abdomen pelvis : stable disease + post-treatment changes in head/neck. Some resolution no areas of progression.  Stable bone mets. No new mets.    Lab Results    Recent Results (from the past week)   CBC with platelets and differential   Result Value Ref Range    WBC Count 8.6 4.0 - 11.0 10e3/uL    RBC Count 3.09 (L) 3.80 - 5.20 10e6/uL    Hemoglobin 10.4 (L) 11.7 - 15.7 g/dL    Hematocrit 32.2 (L) 35.0 - 47.0 %     (H) 78 - 100 fL    MCH 33.7 (H) 26.5 - 33.0 pg    MCHC 32.3 31.5 - 36.5 g/dL    RDW 12.6 10.0 - 15.0 %    Platelet Count 271 150 - 450 10e3/uL    % Neutrophils 72 %    % Lymphocytes 15 %    % Monocytes 8 %    % Eosinophils 5 %    % Basophils 0 %    % Immature Granulocytes 0 %    NRBCs per 100 WBC 0 <1 /100    Absolute Neutrophils 6.2 1.6 - 8.3 10e3/uL    Absolute Lymphocytes 1.3 0.8 - 5.3 10e3/uL    Absolute Monocytes 0.7 0.0 - 1.3 10e3/uL    Absolute Eosinophils 0.4 0.0 - 0.7 10e3/uL    Absolute Basophils 0.0 0.0 - 0.2 10e3/uL    Absolute Immature Granulocytes 0.0 <=0.4 10e3/uL    Absolute NRBCs 0.0 10e3/uL         Total time 30 minutes, to include face to face visit, review of EMR, ordering, documentation and coordination of care on date of service.    complexity modifier for longitudinal care.     Signed by: Marina Cronin NP  .       Signed by: Marina Cronin NP

## 2025-01-21 NOTE — PROGRESS NOTES
Infusion Nursing Note:  Adrianna Pereira presents today for C16D1 Keytruda.    Patient seen by provider today: Yes: Marina Cronin NP   present during visit today: Not Applicable.    Note: N/A.      Intravenous Access:  Implanted Port.    Treatment Conditions:  Results reviewed, labs MET treatment parameters, ok to proceed with treatment.      Post Infusion Assessment:  Patient tolerated infusion without incident.  Blood return noted pre and post infusion.  Site patent and intact, free from redness, edema or discomfort.  No evidence of extravasations.  Access discontinued per protocol.       Discharge Plan:   Patient discharged in stable condition accompanied by: .  Departure Mode: Ambulatory.      Ngozi Anderson RN

## 2025-01-21 NOTE — LETTER
"1/21/2025      Adrianna Pereira  [address] [address] [place] [id number]      Dear Colleague,    Thank you for referring your patient, Adrianna Pereira, to the [facility]. Please see a copy of my visit note below.    Oncology Rooming Note    January 21, 2025 10:15 AM   Adrianna Pereira is a 64 year old female who presents for:    Chief Complaint   Patient presents with     Oncology Clinic Visit     Recurrent, metastatic floor of mouth cancer to bone, lung, soft tissue - labs, provider, infusion     Initial Vitals: There were no vitals taken for this visit. Estimated body mass index is 15.41 kg/m  as calculated from the following:    Height as of an earlier encounter on 1/21/25: 1.626 m (5' 4\").    Weight as of an earlier encounter on 1/21/25: 40.7 kg (89 lb 12.8 oz). There is no height or weight on file to calculate BSA.  Data Unavailable Comment: Data Unavailable   No LMP recorded. Patient is postmenopausal.  Allergies reviewed: Yes  Medications reviewed: Yes    Medications: Medication refills not needed today.  Pharmacy name entered into [employer]: [facility] [address]     Frailty Screening:   Is the patient here for a new oncology consult visit in cancer care? 2. No      Clinical concerns: None today.        Leana Lloyd MA              [facility] Hematology and Oncology Outpatient Progress Note    Patient: Adrianna Pereira  MRN: [id number]  Date of Service: Jan 21, 2025          Reason for Visit    Recurrent, metastatic floor of mouth cancer to bone, lung, soft tissue    Primary Oncologist: Dr. 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 Unfortunately, within just a few months of completing, " was found to have metastatic recurrence.     Radiation Treatment  10/19/2023 to 12/5/2023: Head and neck, 6600 cGy in 33 fractions  2/22/24 to 3/12/24: Thoracic spine, T2 - T4, 2500 cGy in 10 fractions    Now, on palliative pembrolizumab. Has been on 9 months. Tolerating well.     12/2024 PET/CT showed continued CO. No sites of progression.    No clinical progression noted today.     Labwork:  CMP:  WNL. TSH WNL. CBC: hgb 10.4 (stable). ,, rest CBC WNL.    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  -Repeat PET/CT every 4 mths (next due in April)    Open oral/jaw wound  Recurrent local infections  Plate exposure + open wound over lower lip secondary to residual/recurrent tumor at prior ENT resection + adjuvant RT site. Draining lessening. No fevers. Reconstruction would require additional free flap, so not being pursued at this time, in setting of widely metastatic disease, after discussion with ENT surgeon Dr. Thorne. Dr. Egan did not recommend additional radiation.    Met with Wound Care MD.   Supportive management interventions to help with drainage, odor and decreasing bacterial growth were provided.     She has recurrent local infections along jaw, responsive to Augmentin (every 3 mths or so). She is on an Augmentin course now for recurrent symptoms of warmth, increased pain and drainage; these symptoms are resolving.    Plan:  -Pt to follow Wound Clinic recs for mgmt and return PRN.  -Follow-up with Dr. Thorne in ENT every 3-6 months  -No further RT recommended at this time, but Dr. Egan following every 3 mths to reassess  -Repeat Augmentin as needed for recurrent local jaw/wound infection (has been requiring about every 3 mths)    Cancer pain  Completed palliative RT to T spine 3/2024. Back pain is resolved. No longer requiring pain meds for this.      Mouth/lower lip pain related to progressive reconstruction failure/open wound. Primarily managing well with Tylenol 500 mg 3/day and oxycodone 10 mg twice/day.     Plan:  -Continue avoiding/minimizing NSAIDs due to prior creatinine elevation on this  -Continue Tylenol, can take up to 3000 mg/day. Continue oxycodone use through day as well  -Consider long-acting pain med (likley in patch form since NPO) if oxycodone use increases  -Referral to Palliative Care if not well-controlled in future, pt wants to minimize number of appts for now.     Nutrition/hydration  Dependant on feeding tube.   Last PEG had migrated into proximal duodenum. This was recently replaced/repositioned.   She's tolerating feedings better since repositioning and regaining a bit of weight.     Plan:  -Working with Dietician as needed.   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   On observation.   ______________________________________________________________________________    History of Present Illness/ Interval History    Ms. Adrianna Pereira is a 64 year old who completed resection of floor of mouth cancer, followed by adjuvant chemoRT 12/2023. Unfortunately, shortly after this, she was found to have local and metastatic recurrence involving mandible, lung, bone (T3-T4) and soft tissue that is PDL1 70%.     She started palliative treatments with pembrolizumab in February, 2024 (11 mths ago).  Tolerating pembrolizumab generally well. No diarrhea. No new dyspnea/cough.     Jaw/mouth pain due to plate exposure of lip/open wound at prior ENT resection site due to local recurrence persists. Managing with Tylenol and oxycodone 8-9 mg BID.  Assessed by ENT surgeon and reconstruction would require additional free flap, so not being pursued at this time. Has seen Wound Clinic for mgmt recs. She has  been treated with Augmentin periodically for possible secondary infection (more pain and more drainage) with improvement. On course now, symptoms respond.     No new sites of pain.    All nutrition/hydration via PEG.   Stable weight.      ECOG Performance    0-1    Oncology History/Treatment  Diagnosis/Stage:   7/2023: [id number] Floor of mouth squamous cell cancer  -hx alcoholism (stopped all use) and smoking (quit 8/2023)  -hx premalignant oral cavity lesions s/p CO2 laser ablation with ENT  -presented with lower lip/chin swelling  -7/3/2023 left mandibular alveolus biopsy: invasive keratinizing moderately differential squamous cell carcinoma.   -PET: 4.4 x 3.7 x 3.2 cm anterior oral cavity mass invading into the mandible with a separate gluco-avid nodule on the right mandibular buccal mucosa and bilateral level 1B, level 2 and L3 metastatic lymphadenopathy without evidence of metastatic disease.   -8/17/2023 surgical path: pT4a, N3b tumor with positive left anterior lateral soft tissue margin, 11/98+ lymph nodes including STEFFEN (largest 4 cm) and several bilateral lymph nodes consistent with involvement by low-grade B-cell neoplasm suggestive of CLL/SLL.     2/2024: Progression to stage IV mouth squamous cell cancer to bone and lung  -Within weeks of completing definitive treatment for her primary disease, noted severe/progressive back pain  -CT C/T spine: new T3 compression fracture with hypodensity in vertebral body extending into L posterior elements suggestive of met. T4 posterior vertebral body lesion also suspicious. New lung nodules also noted  -MRI T spine: T3 compression fracture with extraosseous tumor involving ventral epidural space with mod - severe central spinal canal stenosis at T3  -PET: recurrent avid foci L maikel-mandible, numerous bilateral pulmonary/pleural mets, met mediastinal/hilar adenopathy, T3, T4, L adductor musculature and R upper thigh skin thickening  -PDL1 TPS and CPS 70% from 8/2023  "original tumor biopsy    Treatment:  Locally advanced disease  -8/17/2023: segmental mandibulectomy, floor of mouth resection, anterior glossectomy, left fibular free flap, skin grafting, and tracheostomy.   -Post-op course complicated by skin necrosis requiring OR debridment and resuturing of neck incision/intraoral flap    -10/20/2023 - 12/5/2023: completed adjuvant concurrent RT (6600 cGy/33) + weekly cisplatin (x 7 cycles)    Metastatic recurrence  -2/21/2024 - present: pembrolizumab. Changed to 400mg dosing 1 6 weeks 12/11/2024  -Zometa started x 1 dose for bone mets, later held for jaw osteonecrosis risk    -2/26 - 3/12/2024: palliative RT to thoracic spine, 2500 cGy/10. Dex taper      Physical Exam  /55 (BP Location: Left arm, Patient Position: Sitting, Cuff Size: Adult Small)   Pulse 78   Temp 97.6  F (36.4  C) (Tympanic)   Resp 17   Ht 1.626 m (5' 4\")   Wt 40.6 kg (89 lb 9.6 oz)   SpO2 98%   BMI 15.38 kg/m      GENERAL: Alert and oriented to time place and person. Seated comfortably. In no distress.  Dysarthria.   accompanied.  HEENT: Open wound below lower lip with exposed plate. Scant white/yellow appearing odorous drainage. Persistent lymphedema of mandible area, no redness.  SHAE, EOMI. No erythema. No icterus.  NECK: Post-RT fibrosis. No evident adenopathy/masses.  HEART: RRR  CHEST: regular respiratory effort. Lungs normal to percussion and auscultation  ABD: PEG in place. Non-distended  NEURO: No gross deficit noted.     Imaging    12/5/2024  C neck + pet chest abdomen pelvis : stable disease + post-treatment changes in head/neck. Some resolution no areas of progression.  Stable bone mets. No new mets.    Lab Results    Recent Results (from the past week)   CBC with platelets and differential   Result Value Ref Range    WBC Count 8.6 4.0 - 11.0 10e3/uL    RBC Count 3.09 (L) 3.80 - 5.20 10e6/uL    Hemoglobin 10.4 (L) 11.7 - 15.7 g/dL    Hematocrit 32.2 (L) 35.0 - 47.0 %     " (H) 78 - 100 fL    MCH 33.7 (H) 26.5 - 33.0 pg    MCHC 32.3 31.5 - 36.5 g/dL    RDW 12.6 10.0 - 15.0 %    Platelet Count 271 150 - 450 10e3/uL    % Neutrophils 72 %    % Lymphocytes 15 %    % Monocytes 8 %    % Eosinophils 5 %    % Basophils 0 %    % Immature Granulocytes 0 %    NRBCs per 100 WBC 0 <1 /100    Absolute Neutrophils 6.2 1.6 - 8.3 10e3/uL    Absolute Lymphocytes 1.3 0.8 - 5.3 10e3/uL    Absolute Monocytes 0.7 0.0 - 1.3 10e3/uL    Absolute Eosinophils 0.4 0.0 - 0.7 10e3/uL    Absolute Basophils 0.0 0.0 - 0.2 10e3/uL    Absolute Immature Granulocytes 0.0 <=0.4 10e3/uL    Absolute NRBCs 0.0 10e3/uL         Total time 30 minutes, to include face to face visit, review of EMR, ordering, documentation and coordination of care on date of service.    complexity modifier for longitudinal care.     Signed by: Marina Cronin NP  .       Signed by: Marina Cronin NP        Again, thank you for allowing me to participate in the care of your patient.        Sincerely,        Marina Cronin NP    Electronically signed

## 2025-01-21 NOTE — PROGRESS NOTES
"Oncology Rooming Note    January 21, 2025 10:15 AM   Adrianna Pereira is a 64 year old female who presents for:    Chief Complaint   Patient presents with    Oncology Clinic Visit     Recurrent, metastatic floor of mouth cancer to bone, lung, soft tissue - labs, provider, infusion     Initial Vitals: There were no vitals taken for this visit. Estimated body mass index is 15.41 kg/m  as calculated from the following:    Height as of an earlier encounter on 1/21/25: 1.626 m (5' 4\").    Weight as of an earlier encounter on 1/21/25: 40.7 kg (89 lb 12.8 oz). There is no height or weight on file to calculate BSA.  Data Unavailable Comment: Data Unavailable   No LMP recorded. Patient is postmenopausal.  Allergies reviewed: Yes  Medications reviewed: Yes    Medications: Medication refills not needed today.  Pharmacy name entered into [facility]: [facility] [address]     Frailty Screening:   Is the patient here for a new oncology consult visit in cancer care? 2. No      Clinical concerns: None today.        Leana Lloyd MA            "

## 2025-01-31 ENCOUNTER — HOME INFUSION BILLING (OUTPATIENT)
Dept: HOME HEALTH SERVICES | Facility: HOME HEALTH | Age: 65
End: 2025-01-31
Payer: COMMERCIAL

## 2025-01-31 ENCOUNTER — HOME INFUSION (OUTPATIENT)
Dept: HOME HEALTH SERVICES | Facility: HOME HEALTH | Age: 65
End: 2025-01-31
Payer: COMMERCIAL

## 2025-01-31 PROCEDURE — A4450 NON-WATERPROOF TAPE: HCPCS

## 2025-01-31 PROCEDURE — S9341 HIT ENTERAL GRAV DIEM: HCPCS

## 2025-01-31 PROCEDURE — B4150 EF COMPLET W/INTACT NUTRIENT: HCPCS

## 2025-01-31 RX ORDER — AMINO AC/PROTEIN HYDR/WHEY PRO 10G-100/30
45 LIQUID (ML) ORAL DAILY
Qty: 1350 ML | Refills: 11 | Status: ACTIVE | OUTPATIENT
Start: 2025-01-31 | End: 2025-01-31

## 2025-01-31 RX ORDER — NUTRITIONAL SUPPLEMENT/FIBER 0.06 G-1.4
1138 LIQUID (ML) ORAL DAILY
Qty: 34140 ML | Refills: 11 | Status: DISPENSED | OUTPATIENT
Start: 2025-01-31 | End: 2026-01-31

## 2025-01-31 NOTE — PROGRESS NOTES
"[facility] Nutrition Assessment     Type of therapy: Enteral  Information obtained from: Phone call with Duane, patient's spouse.    Anthropometrics/Weight Goals  Height: 1.626 m (5' 4\")  Weight: 40.6 kg (89 lb 9.6 oz)  IBW Female (kg): 54.2  BMI (kg): 15.38  FHI Dosing Weight: 39 kg (85 lb 15.7 oz)    Nutrition and Medical History  Reason for Nutrition Support: NPO  Physical findings from chart: SCC of the ventral tongue, mandible, with invasion of the skin. Underwent segmental mandibulectomy, partial glossectomy, resection of the skin of the mentum, bilateral neck dissections levels 1-4. Reconstruction of the mandible with left fibular free flap, pedicle for this flap on the right. Reconstruction of the chin defect with left ALT free flap, pedicle on the left. She has struggled with continued breakdown postoperatively. Now s/p take back OR 8/24 with repair, with persistent external wound breakdown and return to the OR for debridement 8/30. Patient appears to be healing well at this time.  Feeding tube type: PEG  Date Placed: 08/23/23 (Last exchanged 1/21/25.)    Current Nutrition Orders  Oral Diet: NPO    Enteral Nutrition Orders  Enteral Formula: UP Online Standard 1.4 via gravity feeds.  Rate/Frequency: Start with 3 cartons UP Online Standard 1.4. Goal 3.5 cartons for weight gain.  Water Flushes: 60 mL before and after each feed plus additional 200 mL BID  Enteral Nutrition Provides: 3 cartons UP Online Standard 1.4 provides 1365 kcal, 60 gm protein, and 702 mL free water.    Assessed Nutritional Needs  Kcal Needs: 2109-9139+ kcal (30-35+ kcal/kg); repletion  Protein Needs: 45-55+ gm pro (1.2-1.5+ gm/kg); repletion  Fluid Needs: 1 mL/kcal  Nutrition Support is meeting what percent of needs: 100%    Implementation  Intervention: Phone call with Duane. Reports that Adrianna has been getting 3 cartons of TwoCal HN daily but doesn't feel the best with it. Reports that she previously used UP Online " Standard 1.4 and thought she tolerated it better, discussed that patient will require more volume of tube feeds with [employer] Standard 1.4. Not using Prosource TF consistently currently.  Education: Initial goal of 3 cartons of [employer] Standard 1.4 daily. Encouraged giving each feed over at least 30 minutes - 1 hour to help with tolerance.  Goals: Weight stable vs gain. Monitor for need to increase to 3.5 cartons of formula daily for weight gain.    Plan  Follow up/Recommendations: Monitor tolerance of [employer] Standard 1.4.    Thanks,  Ashley Cao RD, [facility]  Clinical Dietitian  [facility]   [phone]  [facility] Main Line: [phone]

## 2025-02-01 PROCEDURE — S9341 HIT ENTERAL GRAV DIEM: HCPCS

## 2025-02-02 PROCEDURE — S9341 HIT ENTERAL GRAV DIEM: HCPCS

## 2025-02-03 ENCOUNTER — MYC REFILL (OUTPATIENT)
Dept: ONCOLOGY | Facility: CLINIC | Age: 65
End: 2025-02-03
Payer: COMMERCIAL

## 2025-02-03 DIAGNOSIS — Z48.89 ENCOUNTER FOR POSTOPERATIVE CARE: ICD-10-CM

## 2025-02-03 PROCEDURE — S9341 HIT ENTERAL GRAV DIEM: HCPCS

## 2025-02-04 RX ORDER — CHLORHEXIDINE GLUCONATE ORAL RINSE 1.2 MG/ML
15 SOLUTION DENTAL 3 TIMES DAILY
Qty: 473 ML | Refills: 1 | Status: SHIPPED | OUTPATIENT
Start: 2025-02-04

## 2025-02-10 ENCOUNTER — HOME INFUSION BILLING (OUTPATIENT)
Dept: HOME HEALTH SERVICES | Facility: HOME HEALTH | Age: 65
End: 2025-02-10
Payer: COMMERCIAL

## 2025-02-10 PROCEDURE — B4150 EF COMPLET W/INTACT NUTRIENT: HCPCS

## 2025-02-11 ENCOUNTER — MYC REFILL (OUTPATIENT)
Dept: ONCOLOGY | Facility: CLINIC | Age: 65
End: 2025-02-11
Payer: COMMERCIAL

## 2025-02-11 DIAGNOSIS — G89.3 CANCER RELATED PAIN: ICD-10-CM

## 2025-02-11 RX ORDER — OXYCODONE HCL 5 MG/5 ML
5 SOLUTION, ORAL ORAL EVERY 6 HOURS PRN
Qty: 500 ML | Refills: 0 | Status: SHIPPED | OUTPATIENT
Start: 2025-02-11

## 2025-02-14 PROCEDURE — S9341 HIT ENTERAL GRAV DIEM: HCPCS

## 2025-02-15 PROCEDURE — S9341 HIT ENTERAL GRAV DIEM: HCPCS

## 2025-02-16 PROCEDURE — S9341 HIT ENTERAL GRAV DIEM: HCPCS

## 2025-02-17 ENCOUNTER — MYC REFILL (OUTPATIENT)
Dept: FAMILY MEDICINE | Facility: CLINIC | Age: 65
End: 2025-02-17
Payer: COMMERCIAL

## 2025-02-17 DIAGNOSIS — C06.9 SQUAMOUS CELL CARCINOMA OF ORAL CAVITY (H): ICD-10-CM

## 2025-02-17 PROCEDURE — S9341 HIT ENTERAL GRAV DIEM: HCPCS

## 2025-02-18 ENCOUNTER — MYC REFILL (OUTPATIENT)
Dept: ONCOLOGY | Facility: CLINIC | Age: 65
End: 2025-02-18
Payer: COMMERCIAL

## 2025-02-18 DIAGNOSIS — C06.9 SQUAMOUS CELL CARCINOMA OF ORAL CAVITY (H): ICD-10-CM

## 2025-02-18 DIAGNOSIS — M27.2 INFECTION OF MANDIBLE: ICD-10-CM

## 2025-02-18 PROCEDURE — S9341 HIT ENTERAL GRAV DIEM: HCPCS

## 2025-02-19 PROCEDURE — S9341 HIT ENTERAL GRAV DIEM: HCPCS

## 2025-02-19 RX ORDER — AMOXICILLIN AND CLAVULANATE POTASSIUM 400; 57 MG/5ML; MG/5ML
875 POWDER, FOR SUSPENSION ORAL 2 TIMES DAILY
Qty: 218.8 ML | Refills: 1 | Status: SHIPPED | OUTPATIENT
Start: 2025-02-19

## 2025-02-19 RX ORDER — IBUPROFEN 600 MG/1
600 TABLET, FILM COATED ORAL EVERY 6 HOURS PRN
Qty: 90 TABLET | Refills: 0 | Status: SHIPPED | OUTPATIENT
Start: 2025-02-19

## 2025-02-20 PROCEDURE — S9341 HIT ENTERAL GRAV DIEM: HCPCS

## 2025-02-21 PROCEDURE — S9341 HIT ENTERAL GRAV DIEM: HCPCS

## 2025-02-22 PROCEDURE — S9341 HIT ENTERAL GRAV DIEM: HCPCS

## 2025-02-23 PROCEDURE — S9341 HIT ENTERAL GRAV DIEM: HCPCS

## 2025-02-24 PROCEDURE — S9341 HIT ENTERAL GRAV DIEM: HCPCS

## 2025-02-25 PROCEDURE — S9341 HIT ENTERAL GRAV DIEM: HCPCS

## 2025-02-26 PROCEDURE — S9341 HIT ENTERAL GRAV DIEM: HCPCS

## 2025-02-27 PROCEDURE — S9341 HIT ENTERAL GRAV DIEM: HCPCS

## 2025-02-28 PROCEDURE — S9341 HIT ENTERAL GRAV DIEM: HCPCS

## 2025-03-01 PROCEDURE — S9341 HIT ENTERAL GRAV DIEM: HCPCS

## 2025-03-02 PROCEDURE — S9341 HIT ENTERAL GRAV DIEM: HCPCS

## 2025-03-03 PROCEDURE — S9341 HIT ENTERAL GRAV DIEM: HCPCS

## 2025-03-04 PROCEDURE — S9341 HIT ENTERAL GRAV DIEM: HCPCS

## 2025-03-05 ENCOUNTER — ONCOLOGY VISIT (OUTPATIENT)
Dept: ONCOLOGY | Facility: CLINIC | Age: 65
End: 2025-03-05
Attending: INTERNAL MEDICINE
Payer: COMMERCIAL

## 2025-03-05 ENCOUNTER — INFUSION THERAPY VISIT (OUTPATIENT)
Dept: INFUSION THERAPY | Facility: CLINIC | Age: 65
End: 2025-03-05
Attending: INTERNAL MEDICINE
Payer: COMMERCIAL

## 2025-03-05 VITALS
HEART RATE: 97 BPM | SYSTOLIC BLOOD PRESSURE: 99 MMHG | DIASTOLIC BLOOD PRESSURE: 65 MMHG | TEMPERATURE: 97.6 F | WEIGHT: 90 LBS | BODY MASS INDEX: 15.36 KG/M2 | OXYGEN SATURATION: 97 % | HEIGHT: 64 IN | RESPIRATION RATE: 16 BRPM

## 2025-03-05 VITALS — HEART RATE: 70 BPM | DIASTOLIC BLOOD PRESSURE: 59 MMHG | SYSTOLIC BLOOD PRESSURE: 92 MMHG

## 2025-03-05 DIAGNOSIS — C06.9 SQUAMOUS CELL CARCINOMA OF ORAL CAVITY (H): Primary | ICD-10-CM

## 2025-03-05 DIAGNOSIS — C78.00 MALIGNANT NEOPLASM METASTATIC TO LUNG, UNSPECIFIED LATERALITY (H): ICD-10-CM

## 2025-03-05 DIAGNOSIS — C06.9 SQUAMOUS CELL CARCINOMA OF ORAL CAVITY (H): ICD-10-CM

## 2025-03-05 DIAGNOSIS — G89.3 CANCER RELATED PAIN: ICD-10-CM

## 2025-03-05 DIAGNOSIS — C79.51 MALIGNANT NEOPLASM METASTATIC TO BONE (H): Primary | ICD-10-CM

## 2025-03-05 DIAGNOSIS — C79.51 MALIGNANT NEOPLASM METASTATIC TO BONE (H): ICD-10-CM

## 2025-03-05 LAB
ALBUMIN SERPL BCG-MCNC: 3.7 G/DL (ref 3.5–5.2)
ALP SERPL-CCNC: 63 U/L (ref 40–150)
ALT SERPL W P-5'-P-CCNC: 18 U/L (ref 0–50)
ANION GAP SERPL CALCULATED.3IONS-SCNC: 12 MMOL/L (ref 7–15)
AST SERPL W P-5'-P-CCNC: 19 U/L (ref 0–45)
BASOPHILS # BLD AUTO: 0 10E3/UL (ref 0–0.2)
BASOPHILS NFR BLD AUTO: 0 %
BILIRUB SERPL-MCNC: <0.2 MG/DL
BUN SERPL-MCNC: 39.3 MG/DL (ref 8–23)
CALCIUM SERPL-MCNC: 9.3 MG/DL (ref 8.8–10.4)
CHLORIDE SERPL-SCNC: 107 MMOL/L (ref 98–107)
CREAT SERPL-MCNC: 0.83 MG/DL (ref 0.51–0.95)
EGFRCR SERPLBLD CKD-EPI 2021: 78 ML/MIN/1.73M2
EOSINOPHIL # BLD AUTO: 0.4 10E3/UL (ref 0–0.7)
EOSINOPHIL NFR BLD AUTO: 4 %
ERYTHROCYTE [DISTWIDTH] IN BLOOD BY AUTOMATED COUNT: 12.3 % (ref 10–15)
GLUCOSE SERPL-MCNC: 102 MG/DL (ref 70–99)
HCO3 SERPL-SCNC: 22 MMOL/L (ref 22–29)
HCT VFR BLD AUTO: 31.8 % (ref 35–47)
HGB BLD-MCNC: 10.5 G/DL (ref 11.7–15.7)
IMM GRANULOCYTES # BLD: 0 10E3/UL
IMM GRANULOCYTES NFR BLD: 0 %
LYMPHOCYTES # BLD AUTO: 1.3 10E3/UL (ref 0.8–5.3)
LYMPHOCYTES NFR BLD AUTO: 14 %
MCH RBC QN AUTO: 34.1 PG (ref 26.5–33)
MCHC RBC AUTO-ENTMCNC: 33 G/DL (ref 31.5–36.5)
MCV RBC AUTO: 103 FL (ref 78–100)
MONOCYTES # BLD AUTO: 0.7 10E3/UL (ref 0–1.3)
MONOCYTES NFR BLD AUTO: 7 %
NEUTROPHILS # BLD AUTO: 7.3 10E3/UL (ref 1.6–8.3)
NEUTROPHILS NFR BLD AUTO: 75 %
NRBC # BLD AUTO: 0 10E3/UL
NRBC BLD AUTO-RTO: 0 /100
PLATELET # BLD AUTO: 283 10E3/UL (ref 150–450)
POTASSIUM SERPL-SCNC: 4.6 MMOL/L (ref 3.4–5.3)
PROT SERPL-MCNC: 6.1 G/DL (ref 6.4–8.3)
RBC # BLD AUTO: 3.08 10E6/UL (ref 3.8–5.2)
SODIUM SERPL-SCNC: 141 MMOL/L (ref 135–145)
TSH SERPL DL<=0.005 MIU/L-ACNC: 1.65 UIU/ML (ref 0.3–4.2)
WBC # BLD AUTO: 9.8 10E3/UL (ref 4–11)

## 2025-03-05 PROCEDURE — 258N000003 HC RX IP 258 OP 636: Performed by: NURSE PRACTITIONER

## 2025-03-05 PROCEDURE — 96413 CHEMO IV INFUSION 1 HR: CPT

## 2025-03-05 PROCEDURE — 36415 COLL VENOUS BLD VENIPUNCTURE: CPT | Performed by: INTERNAL MEDICINE

## 2025-03-05 PROCEDURE — S9341 HIT ENTERAL GRAV DIEM: HCPCS

## 2025-03-05 PROCEDURE — 250N000011 HC RX IP 250 OP 636: Mod: JZ | Performed by: NURSE PRACTITIONER

## 2025-03-05 PROCEDURE — G2211 COMPLEX E/M VISIT ADD ON: HCPCS | Performed by: NURSE PRACTITIONER

## 2025-03-05 PROCEDURE — 85025 COMPLETE CBC W/AUTO DIFF WBC: CPT | Performed by: INTERNAL MEDICINE

## 2025-03-05 PROCEDURE — 99214 OFFICE O/P EST MOD 30 MIN: CPT | Performed by: NURSE PRACTITIONER

## 2025-03-05 PROCEDURE — G0463 HOSPITAL OUTPT CLINIC VISIT: HCPCS | Mod: 25 | Performed by: NURSE PRACTITIONER

## 2025-03-05 PROCEDURE — 84443 ASSAY THYROID STIM HORMONE: CPT | Performed by: INTERNAL MEDICINE

## 2025-03-05 PROCEDURE — 80053 COMPREHEN METABOLIC PANEL: CPT | Performed by: INTERNAL MEDICINE

## 2025-03-05 RX ORDER — ALBUTEROL SULFATE 0.83 MG/ML
2.5 SOLUTION RESPIRATORY (INHALATION)
Status: CANCELLED | OUTPATIENT
Start: 2025-03-05

## 2025-03-05 RX ORDER — ALBUTEROL SULFATE 90 UG/1
1-2 INHALANT RESPIRATORY (INHALATION)
Status: CANCELLED
Start: 2025-03-05

## 2025-03-05 RX ORDER — HEPARIN SODIUM (PORCINE) LOCK FLUSH IV SOLN 100 UNIT/ML 100 UNIT/ML
5 SOLUTION INTRAVENOUS
Status: DISCONTINUED | OUTPATIENT
Start: 2025-03-05 | End: 2025-03-05 | Stop reason: HOSPADM

## 2025-03-05 RX ORDER — HEPARIN SODIUM (PORCINE) LOCK FLUSH IV SOLN 100 UNIT/ML 100 UNIT/ML
5 SOLUTION INTRAVENOUS
Status: CANCELLED | OUTPATIENT
Start: 2025-03-05

## 2025-03-05 RX ORDER — DIPHENHYDRAMINE HYDROCHLORIDE 50 MG/ML
25 INJECTION, SOLUTION INTRAMUSCULAR; INTRAVENOUS
Status: CANCELLED
Start: 2025-03-05

## 2025-03-05 RX ORDER — HEPARIN SODIUM,PORCINE 10 UNIT/ML
5-20 VIAL (ML) INTRAVENOUS DAILY PRN
Status: CANCELLED | OUTPATIENT
Start: 2025-03-05

## 2025-03-05 RX ORDER — OXYCODONE HCL 5 MG/5 ML
5 SOLUTION, ORAL ORAL EVERY 6 HOURS PRN
Qty: 500 ML | Refills: 0 | Status: SHIPPED | OUTPATIENT
Start: 2025-03-05

## 2025-03-05 RX ORDER — EPINEPHRINE 1 MG/ML
0.3 INJECTION, SOLUTION, CONCENTRATE INTRAVENOUS EVERY 5 MIN PRN
Status: CANCELLED | OUTPATIENT
Start: 2025-03-05

## 2025-03-05 RX ORDER — LORAZEPAM 2 MG/ML
0.5 INJECTION INTRAMUSCULAR EVERY 4 HOURS PRN
Status: CANCELLED | OUTPATIENT
Start: 2025-03-05

## 2025-03-05 RX ORDER — MEPERIDINE HYDROCHLORIDE 25 MG/ML
25 INJECTION INTRAMUSCULAR; INTRAVENOUS; SUBCUTANEOUS
Status: CANCELLED | OUTPATIENT
Start: 2025-03-05

## 2025-03-05 RX ORDER — METHYLPREDNISOLONE SODIUM SUCCINATE 40 MG/ML
40 INJECTION INTRAMUSCULAR; INTRAVENOUS
Status: CANCELLED
Start: 2025-03-05

## 2025-03-05 RX ORDER — DIPHENHYDRAMINE HYDROCHLORIDE 50 MG/ML
50 INJECTION, SOLUTION INTRAMUSCULAR; INTRAVENOUS
Status: CANCELLED
Start: 2025-03-05

## 2025-03-05 RX ADMIN — SODIUM CHLORIDE 250 ML: 0.9 INJECTION, SOLUTION INTRAVENOUS at 11:17

## 2025-03-05 RX ADMIN — Medication 5 ML: at 11:50

## 2025-03-05 RX ADMIN — SODIUM CHLORIDE 400 MG: 9 INJECTION, SOLUTION INTRAVENOUS at 11:11

## 2025-03-05 ASSESSMENT — PAIN SCALES - GENERAL: PAINLEVEL_OUTOF10: NO PAIN (0)

## 2025-03-05 NOTE — PROGRESS NOTES
Infusion Nursing Note:  Adrianna Pereira presents today for D1C17 Keytruda.    Patient seen by provider today: Yes: Marina Cronin NP   present during visit today: Not Applicable.    Note: N/A.      Intravenous Access:  Implanted Port.    Treatment Conditions:  Lab Results   Component Value Date    HGB 10.5 (L) 03/05/2025    WBC 9.8 03/05/2025    ANEU 2.2 12/04/2023    ANEUTAUTO 7.3 03/05/2025     03/05/2025        Lab Results   Component Value Date     03/05/2025    POTASSIUM 4.6 03/05/2025    MAG 1.9 12/15/2023    CR 0.83 03/05/2025    RACHAEL 9.3 03/05/2025    BILITOTAL <0.2 03/05/2025    ALBUMIN 3.7 03/05/2025    ALT 18 03/05/2025    AST 19 03/05/2025       Results reviewed, labs MET treatment parameters, ok to proceed with treatment.      Post Infusion Assessment:  Patient tolerated infusion without incident.  Site patent and intact, free from redness, edema or discomfort.  No evidence of extravasations.  Access discontinued per protocol.       Discharge Plan:   Discharge instructions reviewed with: Patient.  AVS to patient via [employer].  Patient will return 4/16/25 for next appointment.   Patient discharged in stable condition accompanied by: self and .  Departure Mode: Ambulatory.      Yakelin Gilman RN

## 2025-03-05 NOTE — PROGRESS NOTES
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 Unfortunately, within just a few months of completing on her initial restaging, was found to have metastatic recurrence.     She had palliative RT to painful thoracic bone mets initially, then started  palliative pembrolizumab 1 yr ago. Tolerating well.     12/2024 PET/CT showed continued ID. No sites of progression.    No clinical progression noted today.     Labwork:  CMP:  WNL. CBC: hgb 10.5 (stable). , rest CBC WNL. TSH WNL.    Plan:  -Continue with C17 pembrolizumab 400 mg every 6 weeks   -Return in 6 weeks with updated PET scan ahead of C18. If still responding, plan would be to continue for up to minimum 2 yrs with scans every 4 mths.  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    Open oral/jaw wound  Recurrent local infections  Plate exposure + open wound over lower lip secondary to residual/recurrent tumor at prior ENT resection + adjuvant RT site. Draining lessening. No fevers. Reconstruction would require additional free flap, so not being pursued at this time, in setting of widely metastatic disease, after discussion with ENT surgeon Dr. Thorne. Dr. Egan did not recommend additional radiation.    Met with Wound Care MD.   Supportive management interventions to help with drainage, odor and decreasing bacterial growth were provided.     She has recurrent local infections along jaw, responsive  to Augmentin (every 2-3 mths or so). She is just completing an Augmentin course now for recurrent symptoms of warmth, increased pain and drainage; these symptoms are resolving.    Plan:  -Pt to follow Wound Clinic recs for mgmt and return PRN.  -Follow-up with Dr. Thorne in ENT every 3-6 months/PRN  -No further RT recommended at this time  -Repeat Augmentin as needed for recurrent local jaw/wound infection (has been requiring about every 2-3 mths)    Cancer pain  Completed palliative RT to T-spine 3/2024. Back pain is resolved. No longer requiring pain meds for this.     Mouth/lower lip pain related to progressive reconstruction failure/open wound. Primarily managing well with Tylenol 500 mg 3/day and oxycodone 10 mg twice/day.     Plan:  -Continue Tylenol, can take up to 3000 mg/day. Continue oxycodone use through day as well. Oxycodone refilled today.  -Consider long-acting pain med (likley in patch form since NPO) if oxycodone use increases  -Referral to Palliative Care if not well-controlled in future, pt wants to minimize number of appts for now.     Nutrition/hydration  Dependant on feeding tube.   PEG last exchanged 1/2025.  She's tolerating feedings and weight stable .    Plan:  -Working with Dietician as needed.   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   On observation.   ______________________________________________________________________________    History of Present Illness/ Interval History    Ms. Adrianna Pereira is a 64 year old who completed resection of floor of mouth cancer, followed by adjuvant chemoRT 12/2023. Unfortunately, shortly after this, she was found to have local and metastatic recurrence involving mandible, lung, bone (T3-T4) and soft tissue that is PDL1 70%.     She started palliative treatments with pembrolizumab in February, 2024 (1  yr ago). She's had a good response.     Tolerating pembrolizumab generally well. No diarrhea. No new dyspnea/cough.     Jaw/mouth pain due to plate exposure of lip/open wound at prior ENT resection site due to local recurrence persists. Managing with Tylenol and oxycodone 10 mg BID.  Assessed by ENT surgeon and reconstruction would require additional free flap, so not being pursued at this time. Has seen Wound Clinic for mgmt recs. She has been treated with Augmentin periodically for possible secondary infection (more pain and more drainage) with improvement. Just completed a recent course, symptoms respond for a few months.     No new sites of pain.    All nutrition/hydration via PEG.   Stable weight.      ECOG Performance    0-1    Oncology History/Treatment  Diagnosis/Stage:   7/2023: [id number] Floor of mouth squamous cell cancer  -hx alcoholism (stopped all use) and smoking (quit 8/2023)  -hx premalignant oral cavity lesions s/p CO2 laser ablation with ENT  -presented with lower lip/chin swelling  -7/3/2023 left mandibular alveolus biopsy: invasive keratinizing moderately differential squamous cell carcinoma.   -PET: 4.4 x 3.7 x 3.2 cm anterior oral cavity mass invading into the mandible with a separate gluco-avid nodule on the right mandibular buccal mucosa and bilateral level 1B, level 2 and L3 metastatic lymphadenopathy without evidence of metastatic disease.   -8/17/2023 surgical path: pT4a, N3b tumor with positive left anterior lateral soft tissue margin, 11/98+ lymph nodes including STEFFEN (largest 4 cm) and several bilateral lymph nodes consistent with involvement by low-grade B-cell neoplasm suggestive of CLL/SLL.     2/2024: Progression to stage IV mouth squamous cell cancer to bone and lung  -Within weeks of completing definitive treatment for her primary disease, noted severe/progressive back pain  -CT C/T spine: new T3 compression fracture with hypodensity in vertebral body extending into L posterior  "elements suggestive of met. T4 posterior vertebral body lesion also suspicious. New lung nodules also noted  -MRI T spine: T3 compression fracture with extraosseous tumor involving ventral epidural space with mod - severe central spinal canal stenosis at T3  -PET: recurrent avid foci L maikel-mandible, numerous bilateral pulmonary/pleural mets, met mediastinal/hilar adenopathy, T3, T4, L adductor musculature and R upper thigh skin thickening  -PDL1 TPS and CPS 70% from 8/2023 original tumor biopsy    Treatment:  Locally advanced disease  -8/17/2023: segmental mandibulectomy, floor of mouth resection, anterior glossectomy, left fibular free flap, skin grafting, and tracheostomy.   -Post-op course complicated by skin necrosis requiring OR debridment and resuturing of neck incision/intraoral flap    -10/20/2023 - 12/5/2023: completed adjuvant concurrent RT (6600 cGy/33) + weekly cisplatin (x 7 cycles)    Metastatic recurrence  -2/21/2024 - present: pembrolizumab. Changed to 400mg dosing 1 6 weeks 12/11/2024  -Zometa started x 1 dose for bone mets, later held for jaw osteonecrosis risk    -2/26 - 3/12/2024: palliative RT to thoracic spine, 2500 cGy/10. Dex taper      Physical Exam  BP 99/65 (BP Location: Left arm, Patient Position: Sitting, Cuff Size: Adult Small)   Pulse 97   Temp 97.6  F (36.4  C) (Tympanic)   Resp 16   Ht 1.626 m (5' 4\")   Wt 40.8 kg (90 lb)   SpO2 97%   BMI 15.45 kg/m      GENERAL: Alert and oriented to time place and person. Seated comfortably. In no distress.  Dysarthria.   accompanied.  HEENT: Open wound below lower lip with exposed plate. Scant white/yellow appearing odorous drainage. Persistent lymphedema of mandible area, no redness.  SHAE, EOMI. No erythema. No icterus.  NECK: Post-RT fibrosis. No evident adenopathy/masses.  HEART: RRR  CHEST: regular respiratory effort. Lungs normal to percussion and auscultation  ABD: PEG in place. Non-distended  NEURO: No gross deficit noted. "     Imaging    12/5/2024  C neck + pet chest abdomen pelvis : stable disease + post-treatment changes in head/neck. Some resolution no areas of progression.  Stable bone mets. No new mets.    Lab Results    Recent Results (from the past week)   Comprehensive metabolic panel   Result Value Ref Range    Sodium 141 135 - 145 mmol/L    Potassium 4.6 3.4 - 5.3 mmol/L    Carbon Dioxide (CO2) 22 22 - 29 mmol/L    Anion Gap 12 7 - 15 mmol/L    Urea Nitrogen 39.3 (H) 8.0 - 23.0 mg/dL    Creatinine 0.83 0.51 - 0.95 mg/dL    GFR Estimate 78 >60 mL/min/1.73m2    Calcium 9.3 8.8 - 10.4 mg/dL    Chloride 107 98 - 107 mmol/L    Glucose 102 (H) 70 - 99 mg/dL    Alkaline Phosphatase 63 40 - 150 U/L    AST 19 0 - 45 U/L    ALT 18 0 - 50 U/L    Protein Total 6.1 (L) 6.4 - 8.3 g/dL    Albumin 3.7 3.5 - 5.2 g/dL    Bilirubin Total <0.2 <=1.2 mg/dL   TSH with free T4 reflex   Result Value Ref Range    TSH 1.65 0.30 - 4.20 uIU/mL   CBC with platelets and differential   Result Value Ref Range    WBC Count 9.8 4.0 - 11.0 10e3/uL    RBC Count 3.08 (L) 3.80 - 5.20 10e6/uL    Hemoglobin 10.5 (L) 11.7 - 15.7 g/dL    Hematocrit 31.8 (L) 35.0 - 47.0 %     (H) 78 - 100 fL    MCH 34.1 (H) 26.5 - 33.0 pg    MCHC 33.0 31.5 - 36.5 g/dL    RDW 12.3 10.0 - 15.0 %    Platelet Count 283 150 - 450 10e3/uL    % Neutrophils 75 %    % Lymphocytes 14 %    % Monocytes 7 %    % Eosinophils 4 %    % Basophils 0 %    % Immature Granulocytes 0 %    NRBCs per 100 WBC 0 <1 /100    Absolute Neutrophils 7.3 1.6 - 8.3 10e3/uL    Absolute Lymphocytes 1.3 0.8 - 5.3 10e3/uL    Absolute Monocytes 0.7 0.0 - 1.3 10e3/uL    Absolute Eosinophils 0.4 0.0 - 0.7 10e3/uL    Absolute Basophils 0.0 0.0 - 0.2 10e3/uL    Absolute Immature Granulocytes 0.0 <=0.4 10e3/uL    Absolute NRBCs 0.0 10e3/uL         Total time 30 minutes, to include face to face visit, review of EMR, ordering, documentation and coordination of care on date of service.    complexity modifier for  longitudinal care.     Signed by: Marina Cronin, NP

## 2025-03-05 NOTE — PROGRESS NOTES
"Oncology Rooming Note    March 5, 2025 10:13 AM   Adrianna Pereira is a 64 year old female who presents for:    Chief Complaint   Patient presents with    Oncology Clinic Visit     Malignant neoplasm metastatic to lung, unspecified laterality - Labs provider and infusion     Initial Vitals: BP 99/65 (BP Location: Left arm, Patient Position: Sitting, Cuff Size: Adult Small)   Pulse 97   Temp 97.6  F (36.4  C) (Tympanic)   Resp 16   Ht 1.626 m (5' 4\")   Wt 40.8 kg (90 lb)   SpO2 97%   BMI 15.45 kg/m   Estimated body mass index is 15.45 kg/m  as calculated from the following:    Height as of this encounter: 1.626 m (5' 4\").    Weight as of this encounter: 40.8 kg (90 lb). Body surface area is 1.36 meters squared.  No Pain (0) Comment: Data Unavailable   No LMP recorded. Patient is postmenopausal.  Allergies reviewed: Yes  Medications reviewed: Yes    Medications: Medication refills not needed today.  Pharmacy name entered into [facility]: [address]     Frailty Screening:   Is the patient here for a new oncology consult visit in cancer care? 2. No    PHQ9:  Did this patient require a PHQ9?: No      Clinical concerns:  None today.      Tanya Philippe CMA              "

## 2025-03-05 NOTE — LETTER
3/5/2025      Adrianna Pereira  [address] [address] [place] [id number]      Dear Colleague,    Thank you for referring your patient, Adrinana Pereira, to the [facility] [place]. Please see a copy of my visit note below.    [facility] Hematology and Oncology Outpatient Progress Note    Patient: Adrianna Pereira  MRN: [id number]  Date of Service: Mar 5, 2025          Reason for Visit    Recurrent, metastatic floor of mouth cancer to bone, lung, soft tissue    Primary Oncologist: Dr. 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 Unfortunately, within just a few months of completing on her initial restaging, was found to have metastatic recurrence.     She had palliative RT to painful thoracic bone mets initially, then started  palliative pembrolizumab 1 yr ago. Tolerating well.     12/2024 PET/CT showed continued MA. No sites of progression.    No clinical progression noted today.     Labwork:  CMP:  WNL. CBC: hgb 10.5 (stable). , rest CBC WNL. TSH WNL.    Plan:  -Continue with C17 pembrolizumab 400 mg every 6 weeks   -Return in 6 weeks with updated PET scan ahead of C18. If still responding, plan would be to continue for up to minimum 2 yrs with scans every 4 mths.  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    Open oral/jaw wound  Recurrent local infections  Plate exposure + open wound over lower lip secondary to residual/recurrent tumor at prior ENT resection + adjuvant RT site. Draining lessening. No fevers. Reconstruction would require additional free flap, so not being pursued at this time, in setting of widely metastatic disease, after discussion with ENT surgeon Dr. Thorne. Dr. Egan did not  recommend additional radiation.    Met with Wound Care MD.   Supportive management interventions to help with drainage, odor and decreasing bacterial growth were provided.     She has recurrent local infections along jaw, responsive to Augmentin (every 2-3 mths or so). She is just completing an Augmentin course now for recurrent symptoms of warmth, increased pain and drainage; these symptoms are resolving.    Plan:  -Pt to follow Wound Clinic recs for mgmt and return PRN.  -Follow-up with Dr. Thorne in ENT every 3-6 months/PRN  -No further RT recommended at this time  -Repeat Augmentin as needed for recurrent local jaw/wound infection (has been requiring about every 2-3 mths)    Cancer pain  Completed palliative RT to T-spine 3/2024. Back pain is resolved. No longer requiring pain meds for this.     Mouth/lower lip pain related to progressive reconstruction failure/open wound. Primarily managing well with Tylenol 500 mg 3/day and oxycodone 10 mg twice/day.     Plan:  -Continue Tylenol, can take up to 3000 mg/day. Continue oxycodone use through day as well. Oxycodone refilled today.  -Consider long-acting pain med (likley in patch form since NPO) if oxycodone use increases  -Referral to Palliative Care if not well-controlled in future, pt wants to minimize number of appts for now.     Nutrition/hydration  Dependant on feeding tube.   PEG last exchanged 1/2025.  She's tolerating feedings and weight stable .    Plan:  -Working with Dietician as needed.   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   On observation.   ______________________________________________________________________________    History of Present Illness/ Interval History    Ms. Adrianna Pereira is a 64 year old who completed resection of floor of mouth cancer, followed by adjuvant chemoRT 12/2023.  Unfortunately, shortly after this, she was found to have local and metastatic recurrence involving mandible, lung, bone (T3-T4) and soft tissue that is PDL1 70%.     She started palliative treatments with pembrolizumab in February, 2024 (1 yr ago). She's had a good response.     Tolerating pembrolizumab generally well. No diarrhea. No new dyspnea/cough.     Jaw/mouth pain due to plate exposure of lip/open wound at prior ENT resection site due to local recurrence persists. Managing with Tylenol and oxycodone 10 mg BID.  Assessed by ENT surgeon and reconstruction would require additional free flap, so not being pursued at this time. Has seen Wound Clinic for mgmt recs. She has been treated with Augmentin periodically for possible secondary infection (more pain and more drainage) with improvement. Just completed a recent course, symptoms respond for a few months.     No new sites of pain.    All nutrition/hydration via PEG.   Stable weight.      ECOG Performance    0-1    Oncology History/Treatment  Diagnosis/Stage:   7/2023: [id number] Floor of mouth squamous cell cancer  -hx alcoholism (stopped all use) and smoking (quit 8/2023)  -hx premalignant oral cavity lesions s/p CO2 laser ablation with ENT  -presented with lower lip/chin swelling  -7/3/2023 left mandibular alveolus biopsy: invasive keratinizing moderately differential squamous cell carcinoma.   -PET: 4.4 x 3.7 x 3.2 cm anterior oral cavity mass invading into the mandible with a separate gluco-avid nodule on the right mandibular buccal mucosa and bilateral level 1B, level 2 and L3 metastatic lymphadenopathy without evidence of metastatic disease.   -8/17/2023 surgical path: pT4a, N3b tumor with positive left anterior lateral soft tissue margin, 11/98+ lymph nodes including STEFFEN (largest 4 cm) and several bilateral lymph nodes consistent with involvement by low-grade B-cell neoplasm suggestive of CLL/SLL.     2/2024: Progression to stage IV mouth squamous cell  "cancer to bone and lung  -Within weeks of completing definitive treatment for her primary disease, noted severe/progressive back pain  -CT C/T spine: new T3 compression fracture with hypodensity in vertebral body extending into L posterior elements suggestive of met. T4 posterior vertebral body lesion also suspicious. New lung nodules also noted  -MRI T spine: T3 compression fracture with extraosseous tumor involving ventral epidural space with mod - severe central spinal canal stenosis at T3  -PET: recurrent avid foci L maikel-mandible, numerous bilateral pulmonary/pleural mets, met mediastinal/hilar adenopathy, T3, T4, L adductor musculature and R upper thigh skin thickening  -PDL1 TPS and CPS 70% from 8/2023 original tumor biopsy    Treatment:  Locally advanced disease  -8/17/2023: segmental mandibulectomy, floor of mouth resection, anterior glossectomy, left fibular free flap, skin grafting, and tracheostomy.   -Post-op course complicated by skin necrosis requiring OR debridment and resuturing of neck incision/intraoral flap    -10/20/2023 - 12/5/2023: completed adjuvant concurrent RT (6600 cGy/33) + weekly cisplatin (x 7 cycles)    Metastatic recurrence  -2/21/2024 - present: pembrolizumab. Changed to 400mg dosing 1 6 weeks 12/11/2024  -Zometa started x 1 dose for bone mets, later held for jaw osteonecrosis risk    -2/26 - 3/12/2024: palliative RT to thoracic spine, 2500 cGy/10. Dex taper      Physical Exam  BP 99/65 (BP Location: Left arm, Patient Position: Sitting, Cuff Size: Adult Small)   Pulse 97   Temp 97.6  F (36.4  C) (Tympanic)   Resp 16   Ht 1.626 m (5' 4\")   Wt 40.8 kg (90 lb)   SpO2 97%   BMI 15.45 kg/m      GENERAL: Alert and oriented to time place and person. Seated comfortably. In no distress.  Dysarthria.   accompanied.  HEENT: Open wound below lower lip with exposed plate. Scant white/yellow appearing odorous drainage. Persistent lymphedema of mandible area, no redness.  SHAE, " EOMI. No erythema. No icterus.  NECK: Post-RT fibrosis. No evident adenopathy/masses.  HEART: RRR  CHEST: regular respiratory effort. Lungs normal to percussion and auscultation  ABD: PEG in place. Non-distended  NEURO: No gross deficit noted.     Imaging    12/5/2024  C neck + pet chest abdomen pelvis : stable disease + post-treatment changes in head/neck. Some resolution no areas of progression.  Stable bone mets. No new mets.    Lab Results    Recent Results (from the past week)   Comprehensive metabolic panel   Result Value Ref Range    Sodium 141 135 - 145 mmol/L    Potassium 4.6 3.4 - 5.3 mmol/L    Carbon Dioxide (CO2) 22 22 - 29 mmol/L    Anion Gap 12 7 - 15 mmol/L    Urea Nitrogen 39.3 (H) 8.0 - 23.0 mg/dL    Creatinine 0.83 0.51 - 0.95 mg/dL    GFR Estimate 78 >60 mL/min/1.73m2    Calcium 9.3 8.8 - 10.4 mg/dL    Chloride 107 98 - 107 mmol/L    Glucose 102 (H) 70 - 99 mg/dL    Alkaline Phosphatase 63 40 - 150 U/L    AST 19 0 - 45 U/L    ALT 18 0 - 50 U/L    Protein Total 6.1 (L) 6.4 - 8.3 g/dL    Albumin 3.7 3.5 - 5.2 g/dL    Bilirubin Total <0.2 <=1.2 mg/dL   TSH with free T4 reflex   Result Value Ref Range    TSH 1.65 0.30 - 4.20 uIU/mL   CBC with platelets and differential   Result Value Ref Range    WBC Count 9.8 4.0 - 11.0 10e3/uL    RBC Count 3.08 (L) 3.80 - 5.20 10e6/uL    Hemoglobin 10.5 (L) 11.7 - 15.7 g/dL    Hematocrit 31.8 (L) 35.0 - 47.0 %     (H) 78 - 100 fL    MCH 34.1 (H) 26.5 - 33.0 pg    MCHC 33.0 31.5 - 36.5 g/dL    RDW 12.3 10.0 - 15.0 %    Platelet Count 283 150 - 450 10e3/uL    % Neutrophils 75 %    % Lymphocytes 14 %    % Monocytes 7 %    % Eosinophils 4 %    % Basophils 0 %    % Immature Granulocytes 0 %    NRBCs per 100 WBC 0 <1 /100    Absolute Neutrophils 7.3 1.6 - 8.3 10e3/uL    Absolute Lymphocytes 1.3 0.8 - 5.3 10e3/uL    Absolute Monocytes 0.7 0.0 - 1.3 10e3/uL    Absolute Eosinophils 0.4 0.0 - 0.7 10e3/uL    Absolute Basophils 0.0 0.0 - 0.2 10e3/uL    Absolute Immature  "Granulocytes 0.0 <=0.4 10e3/uL    Absolute NRBCs 0.0 10e3/uL         Total time 30 minutes, to include face to face visit, review of EMR, ordering, documentation and coordination of care on date of service.    complexity modifier for longitudinal care.     Signed by: Marina Cronin NP      Oncology Rooming Note    March 5, 2025 10:13 AM   Adrianna Pereira is a 64 year old female who presents for:    Chief Complaint   Patient presents with     Oncology Clinic Visit     Malignant neoplasm metastatic to lung, unspecified laterality - Labs provider and infusion     Initial Vitals: BP 99/65 (BP Location: Left arm, Patient Position: Sitting, Cuff Size: Adult Small)   Pulse 97   Temp 97.6  F (36.4  C) (Tympanic)   Resp 16   Ht 1.626 m (5' 4\")   Wt 40.8 kg (90 lb)   SpO2 97%   BMI 15.45 kg/m   Estimated body mass index is 15.45 kg/m  as calculated from the following:    Height as of this encounter: 1.626 m (5' 4\").    Weight as of this encounter: 40.8 kg (90 lb). Body surface area is 1.36 meters squared.  No Pain (0) Comment: Data Unavailable   No LMP recorded. Patient is postmenopausal.  Allergies reviewed: Yes  Medications reviewed: Yes    Medications: Medication refills not needed today.  Pharmacy name entered into [facility]: [facility] [address]     Frailty Screening:   Is the patient here for a new oncology consult visit in cancer care? 2. No    PHQ9:  Did this patient require a PHQ9?: No      Clinical concerns:  None today.      Tanya Philippe [facility]                Again, thank you for allowing me to participate in the care of your patient.        Sincerely,        Marina Cronin NP    Electronically signed"

## 2025-03-06 PROCEDURE — S9341 HIT ENTERAL GRAV DIEM: HCPCS

## 2025-03-07 ENCOUNTER — HOME INFUSION (OUTPATIENT)
Dept: HOME HEALTH SERVICES | Facility: HOME HEALTH | Age: 65
End: 2025-03-07
Payer: COMMERCIAL

## 2025-03-07 PROCEDURE — S9341 HIT ENTERAL GRAV DIEM: HCPCS

## 2025-03-07 NOTE — PROGRESS NOTES
"[facility] Nutrition Assessment     Type of therapy: Enteral  Information obtained from: Phone call with Duane, patient's spouse.    Anthropometrics/Weight Goals  Height: 1.626 m (5' 4\")  Weight: 40.8 kg (90 lb)  IBW Female (kg): 54.2  BMI (kg): 15.45  [facility] Dosing Weight: 39 kg (85 lb 15.7 oz)  Weight history / comments: Weight stable/trending up.   Wt Readings from Last 5 Encounters:   03/05/25 40.8 kg (90 lb)   01/21/25 40.6 kg (89 lb 9.6 oz)   01/21/25 40.7 kg (89 lb 12.8 oz)   12/11/24 39.5 kg (87 lb)   11/20/24 38.6 kg (85 lb)     Nutrition and Medical History  Reason for Nutrition Support: NPO  Feeding tube type: PEG  Date Placed:  (Exchanged 1/21/25)    Current Nutrition Orders  Oral Diet: NPO    Enteral Nutrition Orders  Enteral Formula: [employer] Standard 1.4 via gravity feeds.  Rate/Frequency: 3 cartons [employer] Standard 1.4.   Water Flushes: 60 mL before and after each feed plus additional 200 mL BID  Enteral Nutrition Provides: 3 cartons [employer] Standard 1.4 provides 1365 kcal, 60 gm protein, and 702 mL free water.    Assessed Nutritional Needs  Kcal Needs: 6898-7985+ kcal (30-35+ kcal/kg); repletion  Protein Needs: 45-55+ gm pro (1.2-1.5+ gm/kg); repletion  Fluid Needs: 1 mL/kcal  Nutrition Support is meeting what percent of needs: 100%    Implementation  Intervention: Reports that Adrianna is continuing to get 3 cartons of [employer] Standard 1.4 daily. Continues to tolerate.  Education: Continue 3 cartons of [employer] standard 1.4 daily.  Goals: Weight stable vs gain.     Plan  Follow up/Recommendations: Monitor tolerance of [employer] Standard 1.4.    Ashley Cao RD, [facility]  Clinical Dietitian  [facility]   [phone]  [facility] Main Line: [phone]     "

## 2025-03-08 PROCEDURE — S9341 HIT ENTERAL GRAV DIEM: HCPCS

## 2025-03-09 PROCEDURE — S9341 HIT ENTERAL GRAV DIEM: HCPCS

## 2025-03-10 ENCOUNTER — MYC REFILL (OUTPATIENT)
Dept: ONCOLOGY | Facility: CLINIC | Age: 65
End: 2025-03-10
Payer: COMMERCIAL

## 2025-03-10 DIAGNOSIS — M27.2 INFECTION OF MANDIBLE: ICD-10-CM

## 2025-03-10 DIAGNOSIS — C06.9 SQUAMOUS CELL CARCINOMA OF ORAL CAVITY (H): ICD-10-CM

## 2025-03-10 PROCEDURE — S9341 HIT ENTERAL GRAV DIEM: HCPCS

## 2025-03-10 RX ORDER — AMOXICILLIN AND CLAVULANATE POTASSIUM 400; 57 MG/5ML; MG/5ML
875 POWDER, FOR SUSPENSION ORAL 2 TIMES DAILY
Qty: 218.8 ML | Refills: 1 | Status: SHIPPED | OUTPATIENT
Start: 2025-03-10

## 2025-03-11 PROCEDURE — S9341 HIT ENTERAL GRAV DIEM: HCPCS

## 2025-03-12 PROCEDURE — S9341 HIT ENTERAL GRAV DIEM: HCPCS

## 2025-03-13 PROCEDURE — S9341 HIT ENTERAL GRAV DIEM: HCPCS

## 2025-03-14 PROCEDURE — S9341 HIT ENTERAL GRAV DIEM: HCPCS

## 2025-03-15 PROCEDURE — S9341 HIT ENTERAL GRAV DIEM: HCPCS

## 2025-03-18 ENCOUNTER — HOME INFUSION BILLING (OUTPATIENT)
Dept: HOME HEALTH SERVICES | Facility: HOME HEALTH | Age: 65
End: 2025-03-18
Payer: COMMERCIAL

## 2025-03-18 PROCEDURE — B4150 EF COMPLET W/INTACT NUTRIENT: HCPCS

## 2025-03-18 PROCEDURE — S9341 HIT ENTERAL GRAV DIEM: HCPCS

## 2025-03-19 PROCEDURE — A4450 NON-WATERPROOF TAPE: HCPCS

## 2025-03-19 PROCEDURE — A6403 STERILE GAUZE>16 <= 48 SQ IN: HCPCS

## 2025-03-19 PROCEDURE — S9341 HIT ENTERAL GRAV DIEM: HCPCS

## 2025-03-20 PROCEDURE — S9341 HIT ENTERAL GRAV DIEM: HCPCS

## 2025-03-21 PROCEDURE — S9341 HIT ENTERAL GRAV DIEM: HCPCS

## 2025-03-22 PROCEDURE — S9341 HIT ENTERAL GRAV DIEM: HCPCS

## 2025-03-23 PROCEDURE — S9341 HIT ENTERAL GRAV DIEM: HCPCS

## 2025-03-24 ENCOUNTER — HOME INFUSION (OUTPATIENT)
Dept: HOME HEALTH SERVICES | Facility: HOME HEALTH | Age: 65
End: 2025-03-24
Payer: COMMERCIAL

## 2025-03-24 PROCEDURE — S9341 HIT ENTERAL GRAV DIEM: HCPCS

## 2025-03-25 PROCEDURE — S9341 HIT ENTERAL GRAV DIEM: HCPCS

## 2025-03-26 PROCEDURE — S9341 HIT ENTERAL GRAV DIEM: HCPCS

## 2025-03-27 PROCEDURE — S9341 HIT ENTERAL GRAV DIEM: HCPCS

## 2025-03-28 PROCEDURE — S9341 HIT ENTERAL GRAV DIEM: HCPCS

## 2025-03-29 PROCEDURE — S9341 HIT ENTERAL GRAV DIEM: HCPCS

## 2025-03-30 PROCEDURE — S9341 HIT ENTERAL GRAV DIEM: HCPCS

## 2025-03-31 PROCEDURE — S9341 HIT ENTERAL GRAV DIEM: HCPCS

## 2025-04-01 PROCEDURE — S9341 HIT ENTERAL GRAV DIEM: HCPCS

## 2025-04-02 ENCOUNTER — MYC REFILL (OUTPATIENT)
Dept: ONCOLOGY | Facility: CLINIC | Age: 65
End: 2025-04-02
Payer: COMMERCIAL

## 2025-04-02 DIAGNOSIS — G89.3 CANCER RELATED PAIN: ICD-10-CM

## 2025-04-02 PROCEDURE — S9341 HIT ENTERAL GRAV DIEM: HCPCS

## 2025-04-02 RX ORDER — OXYCODONE HCL 5 MG/5 ML
5 SOLUTION, ORAL ORAL EVERY 6 HOURS PRN
Qty: 500 ML | Refills: 0 | Status: SHIPPED | OUTPATIENT
Start: 2025-04-02

## 2025-04-03 PROCEDURE — S9341 HIT ENTERAL GRAV DIEM: HCPCS

## 2025-04-04 ENCOUNTER — MYC REFILL (OUTPATIENT)
Dept: ONCOLOGY | Facility: CLINIC | Age: 65
End: 2025-04-04
Payer: COMMERCIAL

## 2025-04-04 DIAGNOSIS — C06.9 SQUAMOUS CELL CARCINOMA OF ORAL CAVITY (H): ICD-10-CM

## 2025-04-04 DIAGNOSIS — M27.2 INFECTION OF MANDIBLE: ICD-10-CM

## 2025-04-04 PROCEDURE — S9341 HIT ENTERAL GRAV DIEM: HCPCS

## 2025-04-05 PROCEDURE — S9341 HIT ENTERAL GRAV DIEM: HCPCS

## 2025-04-06 PROCEDURE — S9341 HIT ENTERAL GRAV DIEM: HCPCS

## 2025-04-07 PROCEDURE — S9341 HIT ENTERAL GRAV DIEM: HCPCS

## 2025-04-07 RX ORDER — AMOXICILLIN AND CLAVULANATE POTASSIUM 400; 57 MG/5ML; MG/5ML
875 POWDER, FOR SUSPENSION ORAL 2 TIMES DAILY
Qty: 218.8 ML | Refills: 1 | Status: SHIPPED | OUTPATIENT
Start: 2025-04-07

## 2025-04-08 PROCEDURE — S9341 HIT ENTERAL GRAV DIEM: HCPCS

## 2025-04-09 PROCEDURE — S9341 HIT ENTERAL GRAV DIEM: HCPCS

## 2025-04-10 ENCOUNTER — HOSPITAL ENCOUNTER (OUTPATIENT)
Dept: PET IMAGING | Facility: CLINIC | Age: 65
Discharge: HOME | End: 2025-04-10
Attending: NURSE PRACTITIONER
Payer: COMMERCIAL

## 2025-04-10 DIAGNOSIS — C06.9 SQUAMOUS CELL CARCINOMA OF ORAL CAVITY (H): ICD-10-CM

## 2025-04-10 DIAGNOSIS — C78.00 MALIGNANT NEOPLASM METASTATIC TO LUNG, UNSPECIFIED LATERALITY (H): ICD-10-CM

## 2025-04-10 DIAGNOSIS — C79.51 MALIGNANT NEOPLASM METASTATIC TO BONE (H): ICD-10-CM

## 2025-04-10 PROCEDURE — 343N000001 HC RX 343 MED OP 636: Performed by: NURSE PRACTITIONER

## 2025-04-10 PROCEDURE — S9341 HIT ENTERAL GRAV DIEM: HCPCS

## 2025-04-10 PROCEDURE — 71260 CT THORAX DX C+: CPT

## 2025-04-10 PROCEDURE — 70491 CT SOFT TISSUE NECK W/DYE: CPT

## 2025-04-10 PROCEDURE — A9552 F18 FDG: HCPCS | Performed by: NURSE PRACTITIONER

## 2025-04-10 PROCEDURE — 250N000011 HC RX IP 250 OP 636: Performed by: NURSE PRACTITIONER

## 2025-04-10 PROCEDURE — 78815 PET IMAGE W/CT SKULL-THIGH: CPT | Mod: PS

## 2025-04-10 RX ORDER — HEPARIN SODIUM (PORCINE) LOCK FLUSH IV SOLN 100 UNIT/ML 100 UNIT/ML
500 SOLUTION INTRAVENOUS ONCE
Status: COMPLETED | OUTPATIENT
Start: 2025-04-10 | End: 2025-04-10

## 2025-04-10 RX ORDER — FLUDEOXYGLUCOSE F 18 200 MCI/ML
10-18 INJECTION, SOLUTION INTRAVENOUS ONCE
Status: COMPLETED | OUTPATIENT
Start: 2025-04-10 | End: 2025-04-10

## 2025-04-10 RX ORDER — IOPAMIDOL 755 MG/ML
10-135 INJECTION, SOLUTION INTRAVASCULAR ONCE
Status: COMPLETED | OUTPATIENT
Start: 2025-04-10 | End: 2025-04-10

## 2025-04-10 RX ADMIN — IOPAMIDOL 55 ML: 755 INJECTION, SOLUTION INTRAVENOUS at 10:22

## 2025-04-10 RX ADMIN — FLUDEOXYGLUCOSE F 18 13.36 MILLICURIE: 200 INJECTION, SOLUTION INTRAVENOUS at 10:22

## 2025-04-10 RX ADMIN — HEPARIN SODIUM (PORCINE) LOCK FLUSH IV SOLN 100 UNIT/ML 500 UNITS: 100 SOLUTION at 10:23

## 2025-04-11 PROCEDURE — S9341 HIT ENTERAL GRAV DIEM: HCPCS

## 2025-04-12 PROCEDURE — S9341 HIT ENTERAL GRAV DIEM: HCPCS

## 2025-04-13 PROCEDURE — S9341 HIT ENTERAL GRAV DIEM: HCPCS

## 2025-04-14 PROCEDURE — S9341 HIT ENTERAL GRAV DIEM: HCPCS

## 2025-04-15 ENCOUNTER — HOME INFUSION BILLING (OUTPATIENT)
Dept: HOME HEALTH SERVICES | Facility: HOME HEALTH | Age: 65
End: 2025-04-15
Payer: COMMERCIAL

## 2025-04-15 PROCEDURE — B4150 EF COMPLET W/INTACT NUTRIENT: HCPCS

## 2025-04-15 PROCEDURE — S9341 HIT ENTERAL GRAV DIEM: HCPCS

## 2025-04-16 ENCOUNTER — INFUSION THERAPY VISIT (OUTPATIENT)
Dept: INFUSION THERAPY | Facility: CLINIC | Age: 65
End: 2025-04-16
Attending: INTERNAL MEDICINE
Payer: COMMERCIAL

## 2025-04-16 ENCOUNTER — ONCOLOGY VISIT (OUTPATIENT)
Dept: ONCOLOGY | Facility: CLINIC | Age: 65
End: 2025-04-16
Attending: INTERNAL MEDICINE
Payer: COMMERCIAL

## 2025-04-16 VITALS
BODY MASS INDEX: 14.34 KG/M2 | OXYGEN SATURATION: 97 % | DIASTOLIC BLOOD PRESSURE: 59 MMHG | RESPIRATION RATE: 12 BRPM | HEIGHT: 64 IN | SYSTOLIC BLOOD PRESSURE: 91 MMHG | WEIGHT: 84 LBS | TEMPERATURE: 97.9 F | HEART RATE: 100 BPM

## 2025-04-16 VITALS — DIASTOLIC BLOOD PRESSURE: 68 MMHG | SYSTOLIC BLOOD PRESSURE: 95 MMHG | HEART RATE: 101 BPM

## 2025-04-16 DIAGNOSIS — C78.00 MALIGNANT NEOPLASM METASTATIC TO LUNG, UNSPECIFIED LATERALITY (H): ICD-10-CM

## 2025-04-16 DIAGNOSIS — C06.9 SQUAMOUS CELL CARCINOMA OF ORAL CAVITY (H): ICD-10-CM

## 2025-04-16 DIAGNOSIS — Z79.899 ENCOUNTER FOR LONG-TERM CURRENT USE OF HIGH RISK MEDICATION: Primary | ICD-10-CM

## 2025-04-16 DIAGNOSIS — C79.51 MALIGNANT NEOPLASM METASTATIC TO BONE (H): ICD-10-CM

## 2025-04-16 DIAGNOSIS — C79.51 MALIGNANT NEOPLASM METASTATIC TO BONE (H): Primary | ICD-10-CM

## 2025-04-16 DIAGNOSIS — C06.9 SQUAMOUS CELL CARCINOMA OF ORAL CAVITY (H): Primary | ICD-10-CM

## 2025-04-16 LAB
ALBUMIN SERPL BCG-MCNC: 2.9 G/DL (ref 3.5–5.2)
ALP SERPL-CCNC: 101 U/L (ref 40–150)
ALT SERPL W P-5'-P-CCNC: 17 U/L (ref 0–50)
ANION GAP SERPL CALCULATED.3IONS-SCNC: 9 MMOL/L (ref 7–15)
AST SERPL W P-5'-P-CCNC: 19 U/L (ref 0–45)
BASOPHILS # BLD AUTO: 0.1 10E3/UL (ref 0–0.2)
BASOPHILS NFR BLD AUTO: 1 %
BILIRUB SERPL-MCNC: <0.2 MG/DL
BUN SERPL-MCNC: 29.4 MG/DL (ref 8–23)
CALCIUM SERPL-MCNC: 8.6 MG/DL (ref 8.8–10.4)
CHLORIDE SERPL-SCNC: 106 MMOL/L (ref 98–107)
CREAT SERPL-MCNC: 0.7 MG/DL (ref 0.51–0.95)
EGFRCR SERPLBLD CKD-EPI 2021: >90 ML/MIN/1.73M2
EOSINOPHIL # BLD AUTO: 0.4 10E3/UL (ref 0–0.7)
EOSINOPHIL NFR BLD AUTO: 4 %
ERYTHROCYTE [DISTWIDTH] IN BLOOD BY AUTOMATED COUNT: 12.1 % (ref 10–15)
GLUCOSE SERPL-MCNC: 114 MG/DL (ref 70–99)
HCO3 SERPL-SCNC: 23 MMOL/L (ref 22–29)
HCT VFR BLD AUTO: 37.1 % (ref 35–47)
HGB BLD-MCNC: 12 G/DL (ref 11.7–15.7)
IMM GRANULOCYTES # BLD: 0 10E3/UL
IMM GRANULOCYTES NFR BLD: 0 %
LYMPHOCYTES # BLD AUTO: 1.4 10E3/UL (ref 0.8–5.3)
LYMPHOCYTES NFR BLD AUTO: 13 %
MCH RBC QN AUTO: 32.9 PG (ref 26.5–33)
MCHC RBC AUTO-ENTMCNC: 32.3 G/DL (ref 31.5–36.5)
MCV RBC AUTO: 102 FL (ref 78–100)
MONOCYTES # BLD AUTO: 0.9 10E3/UL (ref 0–1.3)
MONOCYTES NFR BLD AUTO: 9 %
NEUTROPHILS # BLD AUTO: 7.7 10E3/UL (ref 1.6–8.3)
NEUTROPHILS NFR BLD AUTO: 73 %
NRBC # BLD AUTO: 0 10E3/UL
NRBC BLD AUTO-RTO: 0 /100
PLATELET # BLD AUTO: 334 10E3/UL (ref 150–450)
POTASSIUM SERPL-SCNC: 4.4 MMOL/L (ref 3.4–5.3)
PROT SERPL-MCNC: 5.2 G/DL (ref 6.4–8.3)
RBC # BLD AUTO: 3.65 10E6/UL (ref 3.8–5.2)
SODIUM SERPL-SCNC: 138 MMOL/L (ref 135–145)
TSH SERPL DL<=0.005 MIU/L-ACNC: 2.12 UIU/ML (ref 0.3–4.2)
WBC # BLD AUTO: 10.5 10E3/UL (ref 4–11)

## 2025-04-16 PROCEDURE — 85004 AUTOMATED DIFF WBC COUNT: CPT | Performed by: INTERNAL MEDICINE

## 2025-04-16 PROCEDURE — 80053 COMPREHEN METABOLIC PANEL: CPT | Performed by: INTERNAL MEDICINE

## 2025-04-16 PROCEDURE — 36415 COLL VENOUS BLD VENIPUNCTURE: CPT | Performed by: INTERNAL MEDICINE

## 2025-04-16 PROCEDURE — 258N000003 HC RX IP 258 OP 636: Performed by: INTERNAL MEDICINE

## 2025-04-16 PROCEDURE — 250N000011 HC RX IP 250 OP 636: Performed by: INTERNAL MEDICINE

## 2025-04-16 PROCEDURE — 84443 ASSAY THYROID STIM HORMONE: CPT | Performed by: INTERNAL MEDICINE

## 2025-04-16 PROCEDURE — 99215 OFFICE O/P EST HI 40 MIN: CPT | Performed by: INTERNAL MEDICINE

## 2025-04-16 PROCEDURE — S9341 HIT ENTERAL GRAV DIEM: HCPCS

## 2025-04-16 PROCEDURE — 96413 CHEMO IV INFUSION 1 HR: CPT

## 2025-04-16 PROCEDURE — G0463 HOSPITAL OUTPT CLINIC VISIT: HCPCS | Performed by: INTERNAL MEDICINE

## 2025-04-16 RX ORDER — DIPHENHYDRAMINE HYDROCHLORIDE 50 MG/ML
50 INJECTION, SOLUTION INTRAMUSCULAR; INTRAVENOUS
Status: CANCELLED
Start: 2025-04-16

## 2025-04-16 RX ORDER — EPINEPHRINE 1 MG/ML
0.3 INJECTION, SOLUTION, CONCENTRATE INTRAVENOUS EVERY 5 MIN PRN
Status: CANCELLED | OUTPATIENT
Start: 2025-04-16

## 2025-04-16 RX ORDER — DIPHENHYDRAMINE HYDROCHLORIDE 50 MG/ML
25 INJECTION, SOLUTION INTRAMUSCULAR; INTRAVENOUS
Status: CANCELLED
Start: 2025-04-16

## 2025-04-16 RX ORDER — ALBUTEROL SULFATE 90 UG/1
1-2 INHALANT RESPIRATORY (INHALATION)
Status: CANCELLED
Start: 2025-04-16

## 2025-04-16 RX ORDER — HEPARIN SODIUM,PORCINE 10 UNIT/ML
5-20 VIAL (ML) INTRAVENOUS DAILY PRN
Status: CANCELLED | OUTPATIENT
Start: 2025-04-16

## 2025-04-16 RX ORDER — HEPARIN SODIUM (PORCINE) LOCK FLUSH IV SOLN 100 UNIT/ML 100 UNIT/ML
5 SOLUTION INTRAVENOUS
Status: DISCONTINUED | OUTPATIENT
Start: 2025-04-16 | End: 2025-04-16 | Stop reason: HOSPADM

## 2025-04-16 RX ORDER — MEPERIDINE HYDROCHLORIDE 25 MG/ML
25 INJECTION INTRAMUSCULAR; INTRAVENOUS; SUBCUTANEOUS
Status: CANCELLED | OUTPATIENT
Start: 2025-04-16

## 2025-04-16 RX ORDER — METHYLPREDNISOLONE SODIUM SUCCINATE 40 MG/ML
40 INJECTION INTRAMUSCULAR; INTRAVENOUS
Status: CANCELLED
Start: 2025-04-16

## 2025-04-16 RX ORDER — LORAZEPAM 2 MG/ML
0.5 INJECTION INTRAMUSCULAR EVERY 4 HOURS PRN
Status: CANCELLED | OUTPATIENT
Start: 2025-04-16

## 2025-04-16 RX ORDER — HEPARIN SODIUM (PORCINE) LOCK FLUSH IV SOLN 100 UNIT/ML 100 UNIT/ML
5 SOLUTION INTRAVENOUS
Status: CANCELLED | OUTPATIENT
Start: 2025-04-16

## 2025-04-16 RX ORDER — ALBUTEROL SULFATE 0.83 MG/ML
2.5 SOLUTION RESPIRATORY (INHALATION)
Status: CANCELLED | OUTPATIENT
Start: 2025-04-16

## 2025-04-16 RX ADMIN — HEPARIN 5 ML: 100 SYRINGE at 11:56

## 2025-04-16 RX ADMIN — SODIUM CHLORIDE 250 ML: 0.9 INJECTION, SOLUTION INTRAVENOUS at 11:22

## 2025-04-16 RX ADMIN — SODIUM CHLORIDE 400 MG: 9 INJECTION, SOLUTION INTRAVENOUS at 11:23

## 2025-04-16 ASSESSMENT — PAIN SCALES - GENERAL: PAINLEVEL_OUTOF10: MILD PAIN (3)

## 2025-04-16 NOTE — PROGRESS NOTES
[facility] Hematology and Oncology Outpatient Progress Note    Patient: Adrianna Pereira  MRN: [id number]  Date of Service: Apr 16, 2025          Reason for Visit    Recurrent, metastatic floor of mouth cancer to bone, lung, soft tissue    Primary Oncologist: Dr. 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 Unfortunately, within just a few months of completing on her initial restaging, was found to have metastatic recurrence.     She had palliative RT to painful thoracic bone mets initially, then started  palliative pembrolizumab 1 yr ago. Tolerating well.     12/2024 PET/CT showed continued CT. No sites of progression.    No clinical progression noted today.     Labwork:  CMP:  WNL. CBC: hgb 12.0 (improved). , rest CBC WNL. TSH WNL.    Plan:  -Continue with C17 pembrolizumab 400 mg every 6 weeks   -Return in 6 weeks with updated PET scan ahead of C18. If still responding, plan would be to continue for up to minimum 2 yrs with scans every 4 mths.  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    Open oral/jaw wound  Recurrent local infections  Plate exposure + open wound over lower lip secondary to residual/recurrent tumor at prior ENT resection + adjuvant RT site. Draining lessening. No fevers. Reconstruction would require additional free flap, so not being pursued at this time, in setting of widely metastatic disease, after discussion with ENT surgeon Dr. Thorne. Dr. Egan did not recommend additional radiation.    Met with Wound Care MD.   Supportive management interventions to help with drainage, odor and decreasing bacterial growth were provided.     She has recurrent local infections along jaw,  responsive to Augmentin (every 2-3 mths or so). She is just completing an Augmentin course now for recurrent symptoms of warmth, increased pain and drainage; these symptoms are resolving.    Plan:  -Pt to follow Wound Clinic recs for mgmt and return PRN.  -Follow-up with Dr. Dao  in ENT every 6 months  -No further RT recommended at this time  -Repeat Augmentin as needed for recurrent local jaw/wound infection (has been requiring about every 2-3 mths)    Cancer pain  Completed palliative RT to T-spine 3/2024. Back pain is resolved. No longer requiring pain meds for this.     Mouth/lower lip pain related to progressive reconstruction failure/open wound. Primarily managing well with Tylenol 500 mg 3/day and oxycodone 10 mg twice/day.     Plan:  -Continue Tylenol, can take up to 3000 mg/day. Continue oxycodone use through day as well. Oxycodone refilled today.  -Consider long-acting pain med (likley in patch form since NPO) if oxycodone use increases  -Referral to Palliative Care if not well-controlled in future, pt wants to minimize number of appts for now.     Nutrition/hydration  Dependant on feeding tube.   PEG last exchanged 1/2025.  She's tolerating feedings and weight stable .    Plan:  -Working with Dietician as needed.   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   On observation.   ______________________________________________________________________________    History of Present Illness/ Interval History    Ms. Adrianna Pereira is a 64 year old who completed resection of floor of mouth cancer, followed by adjuvant chemoRT 12/2023. Unfortunately, shortly after this, she was found to have local and metastatic recurrence involving mandible, lung, bone (T3-T4) and soft tissue that is PDL1 70%.     She started palliative treatments with pembrolizumab in February,  2024 (1 yr ago). She's had a good response.     Tolerating pembrolizumab generally well. No diarrhea. No new dyspnea/cough.     Jaw/mouth pain due to plate exposure of lip/open wound at prior ENT resection site due to local recurrence persists. Managing with Tylenol and oxycodone 10 mg BID.  Assessed by ENT surgeon and reconstruction would require additional free flap, so not being pursued at this time. Has seen Wound Clinic for mgmt recs. She has been treated with Augmentin periodically for possible secondary infection (more pain and more drainage) with improvement. Just completed a recent course, symptoms respond for a few months.     No new sites of pain.    All nutrition/hydration via PEG.   Stable weight.      ECOG Performance    0-1    Oncology History/Treatment  Diagnosis/Stage:   7/2023: [id number] Floor of mouth squamous cell cancer  -hx alcoholism (stopped all use) and smoking (quit 8/2023)  -hx premalignant oral cavity lesions s/p CO2 laser ablation with ENT  -presented with lower lip/chin swelling  -7/3/2023 left mandibular alveolus biopsy: invasive keratinizing moderately differential squamous cell carcinoma.   -PET: 4.4 x 3.7 x 3.2 cm anterior oral cavity mass invading into the mandible with a separate gluco-avid nodule on the right mandibular buccal mucosa and bilateral level 1B, level 2 and L3 metastatic lymphadenopathy without evidence of metastatic disease.   -8/17/2023 surgical path: pT4a, N3b tumor with positive left anterior lateral soft tissue margin, 11/98+ lymph nodes including STEFFEN (largest 4 cm) and several bilateral lymph nodes consistent with involvement by low-grade B-cell neoplasm suggestive of CLL/SLL.     2/2024: Progression to stage IV mouth squamous cell cancer to bone and lung  -Within weeks of completing definitive treatment for her primary disease, noted severe/progressive back pain  -CT C/T spine: new T3 compression fracture with hypodensity in vertebral body extending into L  "posterior elements suggestive of met. T4 posterior vertebral body lesion also suspicious. New lung nodules also noted  -MRI T spine: T3 compression fracture with extraosseous tumor involving ventral epidural space with mod - severe central spinal canal stenosis at T3  -PET: recurrent avid foci L maikel-mandible, numerous bilateral pulmonary/pleural mets, met mediastinal/hilar adenopathy, T3, T4, L adductor musculature and R upper thigh skin thickening  -PDL1 TPS and CPS 70% from 8/2023 original tumor biopsy    Treatment:  Locally advanced disease  -8/17/2023: segmental mandibulectomy, floor of mouth resection, anterior glossectomy, left fibular free flap, skin grafting, and tracheostomy.   -Post-op course complicated by skin necrosis requiring OR debridment and resuturing of neck incision/intraoral flap    -10/20/2023 - 12/5/2023: completed adjuvant concurrent RT (6600 cGy/33) + weekly cisplatin (x 7 cycles)    Metastatic recurrence  -2/21/2024 - present: pembrolizumab. Changed to 400mg dosing 1 6 weeks 12/11/2024  -Zometa started x 1 dose for bone mets, later held for jaw osteonecrosis risk    -2/26 - 3/12/2024: palliative RT to thoracic spine, 2500 cGy/10. Dex taper      Physical Exam  BP 91/59 (BP Location: Left arm, Patient Position: Sitting, Cuff Size: Adult Small)   Pulse 100   Temp 97.9  F (36.6  C) (Tympanic)   Resp 12   Ht 1.626 m (5' 4\")   Wt 38.1 kg (84 lb)   SpO2 97%   BMI 14.42 kg/m      GENERAL: Alert and oriented to time place and person. Seated comfortably. In no distress.  Dysarthria.   accompanied.  HEENT: Open wound below lower lip with exposed plate. Scant white/yellow appearing odorous drainage. Persistent lymphedema of mandible area, no redness.  SHAE, EOMI. No erythema. No icterus.  NECK: Post-RT fibrosis. No evident adenopathy/masses.  HEART: RRR  CHEST: regular respiratory effort. Lungs normal to percussion and auscultation  ABD: PEG in place. Non-distended  NEURO: No gross " deficit noted.     Imaging    4/10/2025  C neck + pet chest abdomen pelvis : stable disease + post-treatment changes in head/neck. Some resolution no areas of progression.  Stable bone mets. No new mets.    Lab Results    Recent Results (from the past week)   Comprehensive metabolic panel   Result Value Ref Range    Sodium 138 135 - 145 mmol/L    Potassium 4.4 3.4 - 5.3 mmol/L    Carbon Dioxide (CO2) 23 22 - 29 mmol/L    Anion Gap 9 7 - 15 mmol/L    Urea Nitrogen 29.4 (H) 8.0 - 23.0 mg/dL    Creatinine 0.70 0.51 - 0.95 mg/dL    GFR Estimate >90 >60 mL/min/1.73m2    Calcium 8.6 (L) 8.8 - 10.4 mg/dL    Chloride 106 98 - 107 mmol/L    Glucose 114 (H) 70 - 99 mg/dL    Alkaline Phosphatase 101 40 - 150 U/L    AST 19 0 - 45 U/L    ALT 17 0 - 50 U/L    Protein Total 5.2 (L) 6.4 - 8.3 g/dL    Albumin 2.9 (L) 3.5 - 5.2 g/dL    Bilirubin Total <0.2 <=1.2 mg/dL   TSH with free T4 reflex   Result Value Ref Range    TSH 2.12 0.30 - 4.20 uIU/mL   CBC with platelets and differential   Result Value Ref Range    WBC Count 10.5 4.0 - 11.0 10e3/uL    RBC Count 3.65 (L) 3.80 - 5.20 10e6/uL    Hemoglobin 12.0 11.7 - 15.7 g/dL    Hematocrit 37.1 35.0 - 47.0 %     (H) 78 - 100 fL    MCH 32.9 26.5 - 33.0 pg    MCHC 32.3 31.5 - 36.5 g/dL    RDW 12.1 10.0 - 15.0 %    Platelet Count 334 150 - 450 10e3/uL    % Neutrophils 73 %    % Lymphocytes 13 %    % Monocytes 9 %    % Eosinophils 4 %    % Basophils 1 %    % Immature Granulocytes 0 %    NRBCs per 100 WBC 0 <1 /100    Absolute Neutrophils 7.7 1.6 - 8.3 10e3/uL    Absolute Lymphocytes 1.4 0.8 - 5.3 10e3/uL    Absolute Monocytes 0.9 0.0 - 1.3 10e3/uL    Absolute Eosinophils 0.4 0.0 - 0.7 10e3/uL    Absolute Basophils 0.1 0.0 - 0.2 10e3/uL    Absolute Immature Granulocytes 0.0 <=0.4 10e3/uL    Absolute NRBCs 0.0 10e3/uL           I spent 46 minutes on the patient's visit today.  This included preparation for the visit, face-to-face time with the patient and documentation following the  visit.  It did not include teaching or procedure time.    Signed by: Peter E. Friedell, MD

## 2025-04-16 NOTE — PROGRESS NOTES
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    Weight as of this encounter: 38.1 kg (84 lb). Body surface area is 1.31 meters squared.  Mild Pain (3) Comment: Data Unavailable   No LMP recorded. Patient is postmenopausal.  Allergies reviewed: Yes  Medications reviewed: Yes    Medications: Medication refills not needed today.  Pharmacy name entered into [facility]: [facility] [address]     Frailty Screening:   Is the patient here for a new oncology consult visit in cancer care? 2. No    PHQ9:  Did this patient require a PHQ9?: No      Clinical concerns: Patient states she has thick mucus in her throat making it difficult to keep food down because she gags from it.       Tanya Philippe, [facility]            "

## 2025-04-16 NOTE — LETTER
"4/16/2025      Adrianna Pereira  [address] [address] [place] [id number]      Dear Colleague,    Thank you for referring your patient, Adrianna Pereira, to the [facility] CANCER [facility]. Please see a copy of my visit note below.    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    Weight as of this encounter: 38.1 kg (84 lb). Body surface area is 1.31 meters squared.  Mild Pain (3) Comment: Data Unavailable   No LMP recorded. Patient is postmenopausal.  Allergies reviewed: Yes  Medications reviewed: Yes    Medications: Medication refills not needed today.  Pharmacy name entered into [facility]: [facility] [address]     Frailty Screening:   Is the patient here for a new oncology consult visit in cancer care? 2. No    PHQ9:  Did this patient require a PHQ9?: No      Clinical concerns: Patient states she has thick mucus in her throat making it difficult to keep food down because she gags from it.       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 Unfortunately, within just a few months of completing on her initial restaging, was found to have metastatic recurrence.     She had palliative RT to painful thoracic bone mets initially, then started  palliative pembrolizumab 1 yr ago. Tolerating well.     12/2024 PET/CT showed continued CA. No sites of progression.    No clinical progression noted today.     Labwork:  CMP:  WNL. CBC: hgb 12.0 (improved). , rest CBC WNL. TSH WNL.    Plan:  -Continue with C17 pembrolizumab 400 mg every 6 weeks   -Return in 6 weeks with updated PET scan ahead of C18. If still responding, plan would be to continue for up to minimum 2 yrs with scans every 4 mths.  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    Open oral/jaw wound  Recurrent local infections  Plate exposure + open wound over lower lip secondary to residual/recurrent tumor at prior ENT resection + adjuvant RT site. Draining lessening. No fevers. Reconstruction would require additional free flap, so not being pursued at this time, in setting of widely metastatic disease, after discussion with ENT surgeon Dr. Thorne. Dr. Egan did not recommend additional radiation.    Met with Wound Care MD.   Supportive management interventions to help with drainage, odor and decreasing bacterial growth were provided.     She has recurrent local infections along jaw, responsive to Augmentin (every 2-3 mths or so). She is just completing an Augmentin course now for recurrent symptoms of warmth, increased pain and drainage; these symptoms are resolving.    Plan:  -Pt to follow Wound Clinic recs for mgmt and return PRN.  -Follow-up with Dr. Dao  in ENT every 6 months  -No further RT recommended at this time  -Repeat Augmentin as needed for recurrent local jaw/wound  infection (has been requiring about every 2-3 mths)    Cancer pain  Completed palliative RT to T-spine 3/2024. Back pain is resolved. No longer requiring pain meds for this.     Mouth/lower lip pain related to progressive reconstruction failure/open wound. Primarily managing well with Tylenol 500 mg 3/day and oxycodone 10 mg twice/day.     Plan:  -Continue Tylenol, can take up to 3000 mg/day. Continue oxycodone use through day as well. Oxycodone refilled today.  -Consider long-acting pain med (likley in patch form since NPO) if oxycodone use increases  -Referral to Palliative Care if not well-controlled in future, pt wants to minimize number of appts for now.     Nutrition/hydration  Dependant on feeding tube.   PEG last exchanged 1/2025.  She's tolerating feedings and weight stable .    Plan:  -Working with Dietician as needed.   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   On observation.   ______________________________________________________________________________    History of Present Illness/ Interval History    Ms. Adrianna Pereira is a 64 year old who completed resection of floor of mouth cancer, followed by adjuvant chemoRT 12/2023. Unfortunately, shortly after this, she was found to have local and metastatic recurrence involving mandible, lung, bone (T3-T4) and soft tissue that is PDL1 70%.     She started palliative treatments with pembrolizumab in February, 2024 (1 yr ago). She's had a good response.     Tolerating pembrolizumab generally well. No diarrhea. No new dyspnea/cough.     Jaw/mouth pain due to plate exposure of lip/open wound at prior ENT resection site due to local recurrence persists. Managing with Tylenol and oxycodone 10 mg BID.  Assessed by ENT surgeon and reconstruction would require additional free flap, so not being pursued at this time.  Has seen Wound Clinic for mgmt recs. She has been treated with Augmentin periodically for possible secondary infection (more pain and more drainage) with improvement. Just completed a recent course, symptoms respond for a few months.     No new sites of pain.    All nutrition/hydration via PEG.   Stable weight.      ECOG Performance    0-1    Oncology History/Treatment  Diagnosis/Stage:   7/2023: [id number] Floor of mouth squamous cell cancer  -hx alcoholism (stopped all use) and smoking (quit 8/2023)  -hx premalignant oral cavity lesions s/p CO2 laser ablation with ENT  -presented with lower lip/chin swelling  -7/3/2023 left mandibular alveolus biopsy: invasive keratinizing moderately differential squamous cell carcinoma.   -PET: 4.4 x 3.7 x 3.2 cm anterior oral cavity mass invading into the mandible with a separate gluco-avid nodule on the right mandibular buccal mucosa and bilateral level 1B, level 2 and L3 metastatic lymphadenopathy without evidence of metastatic disease.   -8/17/2023 surgical path: pT4a, N3b tumor with positive left anterior lateral soft tissue margin, 11/98+ lymph nodes including STEFFEN (largest 4 cm) and several bilateral lymph nodes consistent with involvement by low-grade B-cell neoplasm suggestive of CLL/SLL.     2/2024: Progression to stage IV mouth squamous cell cancer to bone and lung  -Within weeks of completing definitive treatment for her primary disease, noted severe/progressive back pain  -CT C/T spine: new T3 compression fracture with hypodensity in vertebral body extending into L posterior elements suggestive of met. T4 posterior vertebral body lesion also suspicious. New lung nodules also noted  -MRI T spine: T3 compression fracture with extraosseous tumor involving ventral epidural space with mod - severe central spinal canal stenosis at T3  -PET: recurrent avid foci L maikel-mandible, numerous bilateral pulmonary/pleural mets, met mediastinal/hilar adenopathy, T3, T4, L adductor  "musculature and R upper thigh skin thickening  -PDL1 TPS and CPS 70% from 8/2023 original tumor biopsy    Treatment:  Locally advanced disease  -8/17/2023: segmental mandibulectomy, floor of mouth resection, anterior glossectomy, left fibular free flap, skin grafting, and tracheostomy.   -Post-op course complicated by skin necrosis requiring OR debridment and resuturing of neck incision/intraoral flap    -10/20/2023 - 12/5/2023: completed adjuvant concurrent RT (6600 cGy/33) + weekly cisplatin (x 7 cycles)    Metastatic recurrence  -2/21/2024 - present: pembrolizumab. Changed to 400mg dosing 1 6 weeks 12/11/2024  -Zometa started x 1 dose for bone mets, later held for jaw osteonecrosis risk    -2/26 - 3/12/2024: palliative RT to thoracic spine, 2500 cGy/10. Dex taper      Physical Exam  BP 91/59 (BP Location: Left arm, Patient Position: Sitting, Cuff Size: Adult Small)   Pulse 100   Temp 97.9  F (36.6  C) (Tympanic)   Resp 12   Ht 1.626 m (5' 4\")   Wt 38.1 kg (84 lb)   SpO2 97%   BMI 14.42 kg/m      GENERAL: Alert and oriented to time place and person. Seated comfortably. In no distress.  Dysarthria.   accompanied.  HEENT: Open wound below lower lip with exposed plate. Scant white/yellow appearing odorous drainage. Persistent lymphedema of mandible area, no redness.  SHAE, EOMI. No erythema. No icterus.  NECK: Post-RT fibrosis. No evident adenopathy/masses.  HEART: RRR  CHEST: regular respiratory effort. Lungs normal to percussion and auscultation  ABD: PEG in place. Non-distended  NEURO: No gross deficit noted.     Imaging    4/10/2025  C neck + pet chest abdomen pelvis : stable disease + post-treatment changes in head/neck. Some resolution no areas of progression.  Stable bone mets. No new mets.    Lab Results    Recent Results (from the past week)   Comprehensive metabolic panel   Result Value Ref Range    Sodium 138 135 - 145 mmol/L    Potassium 4.4 3.4 - 5.3 mmol/L    Carbon Dioxide (CO2) 23 22 - " 29 mmol/L    Anion Gap 9 7 - 15 mmol/L    Urea Nitrogen 29.4 (H) 8.0 - 23.0 mg/dL    Creatinine 0.70 0.51 - 0.95 mg/dL    GFR Estimate >90 >60 mL/min/1.73m2    Calcium 8.6 (L) 8.8 - 10.4 mg/dL    Chloride 106 98 - 107 mmol/L    Glucose 114 (H) 70 - 99 mg/dL    Alkaline Phosphatase 101 40 - 150 U/L    AST 19 0 - 45 U/L    ALT 17 0 - 50 U/L    Protein Total 5.2 (L) 6.4 - 8.3 g/dL    Albumin 2.9 (L) 3.5 - 5.2 g/dL    Bilirubin Total <0.2 <=1.2 mg/dL   TSH with free T4 reflex   Result Value Ref Range    TSH 2.12 0.30 - 4.20 uIU/mL   CBC with platelets and differential   Result Value Ref Range    WBC Count 10.5 4.0 - 11.0 10e3/uL    RBC Count 3.65 (L) 3.80 - 5.20 10e6/uL    Hemoglobin 12.0 11.7 - 15.7 g/dL    Hematocrit 37.1 35.0 - 47.0 %     (H) 78 - 100 fL    MCH 32.9 26.5 - 33.0 pg    MCHC 32.3 31.5 - 36.5 g/dL    RDW 12.1 10.0 - 15.0 %    Platelet Count 334 150 - 450 10e3/uL    % Neutrophils 73 %    % Lymphocytes 13 %    % Monocytes 9 %    % Eosinophils 4 %    % Basophils 1 %    % Immature Granulocytes 0 %    NRBCs per 100 WBC 0 <1 /100    Absolute Neutrophils 7.7 1.6 - 8.3 10e3/uL    Absolute Lymphocytes 1.4 0.8 - 5.3 10e3/uL    Absolute Monocytes 0.9 0.0 - 1.3 10e3/uL    Absolute Eosinophils 0.4 0.0 - 0.7 10e3/uL    Absolute Basophils 0.1 0.0 - 0.2 10e3/uL    Absolute Immature Granulocytes 0.0 <=0.4 10e3/uL    Absolute NRBCs 0.0 10e3/uL           I spent 46 minutes on the patient's visit today.  This included preparation for the visit, face-to-face time with the patient and documentation following the visit.  It did not include teaching or procedure time.    Signed by: Peter E. Friedell, MD        Again, thank you for allowing me to participate in the care of your patient.        Sincerely,        Peter E. Friedell, MD    Electronically signed

## 2025-04-16 NOTE — PROGRESS NOTES
Infusion Nursing Note:  Adrianna Pereira presents today for C18D1 Keytruda.    Patient seen by provider today: Yes: Dr. Friedell   present during visit today: Not Applicable.    Note: N/A.      Intravenous Access:  Labs drawn without difficulty.  Implanted Port.    Treatment Conditions:  Lab Results   Component Value Date     04/16/2025    POTASSIUM 4.4 04/16/2025    MAG 1.9 12/15/2023    CR 0.70 04/16/2025    RACHAEL 8.6 (L) 04/16/2025    BILITOTAL <0.2 04/16/2025    ALBUMIN 2.9 (L) 04/16/2025    ALT 17 04/16/2025    AST 19 04/16/2025       Results reviewed, labs MET treatment parameters, ok to proceed with treatment.      Post Infusion Assessment:  Patient tolerated infusion without incident.  Blood return noted pre and post infusion.  Site patent and intact, free from redness, edema or discomfort.  No evidence of extravasations.  Access discontinued per protocol.       Discharge Plan:   Patient discharged in stable condition accompanied by: .  Departure Mode: Ambulatory.  Pt to return on 5/28/25 at 10:15 am for pac labs followed by clinic visit with Marina Cronin NP and then Maxi.     Leandra Farris RN

## 2025-04-17 PROCEDURE — A6403 STERILE GAUZE>16 <= 48 SQ IN: HCPCS

## 2025-04-17 PROCEDURE — S9341 HIT ENTERAL GRAV DIEM: HCPCS

## 2025-04-17 PROCEDURE — A4450 NON-WATERPROOF TAPE: HCPCS

## 2025-04-18 PROCEDURE — S9341 HIT ENTERAL GRAV DIEM: HCPCS

## 2025-04-19 PROCEDURE — S9341 HIT ENTERAL GRAV DIEM: HCPCS

## 2025-04-20 PROCEDURE — S9341 HIT ENTERAL GRAV DIEM: HCPCS

## 2025-04-21 ENCOUNTER — HOME INFUSION BILLING (OUTPATIENT)
Dept: HOME HEALTH SERVICES | Facility: HOME HEALTH | Age: 65
End: 2025-04-21
Payer: COMMERCIAL

## 2025-04-21 ENCOUNTER — HOME INFUSION (OUTPATIENT)
Dept: HOME HEALTH SERVICES | Facility: HOME HEALTH | Age: 65
End: 2025-04-21
Payer: COMMERCIAL

## 2025-04-21 PROCEDURE — B4153 EF HYDROLYZED/AMINO ACIDS: HCPCS

## 2025-04-21 PROCEDURE — S9341 HIT ENTERAL GRAV DIEM: HCPCS

## 2025-04-21 NOTE — PROGRESS NOTES
"[facility] Nutrition Assessment     Type of therapy: Enteral  Information obtained from: Phone call with Duane, patient's spouse.    Anthropometrics/Weight Goals  Height: 1.626 m (5' 4\")  Weight: 38.1 kg (84 lb)  Weight history / comments: Weight trending down.    Nutrition and Medical History  Reason for Nutrition Support: NPO  Feeding tube type: PEG  Date Placed: Placed 2023. (Exchanged 1/21/25)    Enteral Nutrition Orders  Enteral Formula: Peptamen 1.5 via gravity feed  Rate/Frequency: 1 carton QID  Water Flushes: 60 mL before and after each feed plus additional 200 mL  Enteral Nutrition Provides: 4 cartons Peptamen 1.5 provides 1500 kcal, 68 gm protein, and 768 mL free water.    Assessed Nutritional Needs  Kcal Needs: 2539-5270+ kcal (30-35+ kcal/kg); repletion  Protein Needs: 45-55+ gm pro (1.2-1.5+ gm/kg); repletion  Fluid Needs: 1 mL/kcal  Nutrition Support is meeting what percent of needs: 100%    Implementation  Intervention: Reports that Adrianna is still having trouble tolerating her tube feeds, currently only getting 2 cartons of [employer] Standard 1.4 per day. Reports that she has also tried [employer] Peptide 1.5 but didn't tolerate that well either. Discussed trial of different peptide based formula.  Education: Discussed trial of Peptamen 1.5, start iwth 1 carton TID, and increase to 1 carton QID as tolerated.  Goals: Weight stable vs gain.    Plan  Follow up/Recommendations: Monitor tolerance of Peptamen 1.5.    Ashley Cao RD, [facility]  Clinical Dietitian  [facility]   [phone]  [facility] Main Line: [phone]     "

## 2025-04-22 PROCEDURE — S9341 HIT ENTERAL GRAV DIEM: HCPCS

## 2025-04-23 PROCEDURE — S9341 HIT ENTERAL GRAV DIEM: HCPCS

## 2025-04-23 PROCEDURE — S9341 HIT ENTERAL GRAV DIEM: HCPCS | Mod: SH

## 2025-04-24 ENCOUNTER — MYC REFILL (OUTPATIENT)
Dept: ONCOLOGY | Facility: CLINIC | Age: 65
End: 2025-04-24
Payer: COMMERCIAL

## 2025-04-24 DIAGNOSIS — G89.3 CANCER RELATED PAIN: ICD-10-CM

## 2025-04-24 PROCEDURE — S9341 HIT ENTERAL GRAV DIEM: HCPCS

## 2025-04-24 PROCEDURE — S9341 HIT ENTERAL GRAV DIEM: HCPCS | Mod: SH

## 2025-04-24 RX ORDER — OXYCODONE HCL 5 MG/5 ML
5 SOLUTION, ORAL ORAL EVERY 6 HOURS PRN
Qty: 500 ML | Refills: 0 | Status: SHIPPED | OUTPATIENT
Start: 2025-04-24

## 2025-04-25 PROCEDURE — S9341 HIT ENTERAL GRAV DIEM: HCPCS

## 2025-04-25 PROCEDURE — S9341 HIT ENTERAL GRAV DIEM: HCPCS | Mod: SH

## 2025-04-26 PROCEDURE — S9341 HIT ENTERAL GRAV DIEM: HCPCS | Mod: SH

## 2025-04-26 PROCEDURE — S9341 HIT ENTERAL GRAV DIEM: HCPCS

## 2025-04-27 PROCEDURE — S9341 HIT ENTERAL GRAV DIEM: HCPCS

## 2025-04-27 PROCEDURE — S9341 HIT ENTERAL GRAV DIEM: HCPCS | Mod: SH

## 2025-04-28 ENCOUNTER — HOME INFUSION (OUTPATIENT)
Dept: HOME HEALTH SERVICES | Facility: HOME HEALTH | Age: 65
End: 2025-04-28
Payer: COMMERCIAL

## 2025-04-28 DIAGNOSIS — E43 UNSPECIFIED SEVERE PROTEIN-CALORIE MALNUTRITION: Primary | ICD-10-CM

## 2025-04-28 DIAGNOSIS — C04.9 MALIGNANT NEOPLASM OF FLOOR OF MOUTH, UNSPECIFIED (H): ICD-10-CM

## 2025-04-28 PROCEDURE — S9341 HIT ENTERAL GRAV DIEM: HCPCS

## 2025-04-28 PROCEDURE — S9341 HIT ENTERAL GRAV DIEM: HCPCS | Mod: SH

## 2025-04-28 RX ORDER — NUTRITIONAL SUPPLEMENT/FIBER 0.06 G-1.4
1138 LIQUID (ML) ORAL DAILY
Qty: 34140 ML | Refills: 11 | Status: DISPENSED | OUTPATIENT
Start: 2025-04-28 | End: 2026-04-28

## 2025-04-28 NOTE — PROGRESS NOTES
"[facility] Nutrition Assessment     Type of therapy: Enteral  Information obtained from: Phone call with Duane, patient's spouse.    Anthropometrics/Weight Goals  Height: 1.626 m (5' 4\")  Weight: 38.1 kg (84 lb)  IBW Female (kg): 54.2  BMI (kg): 14.42  \[facility]\"" Dosing Weight: 39 kg (85 lb 15.7 oz)  Weight history / comments: Weight trending down.    Nutrition and Medical History  Reason for Nutrition Support: NPO  Feeding tube type: PEG  Date Placed:  (Exchanged 1/21/25)    Current Nutrition Orders  Oral Diet: NPO    Enteral Nutrition Orders  Enteral Formula: [employer] Standard 1.4 via gravity feed  Rate/Frequency: 1 carton TID  Water Flushes: 60 mL before and after each feed plus additional 200 mL  Enteral Nutrition Provides: 3 cartons [employer] Standard 1.4 provides 1365 kcal, 60 gm protein, and 702 mL free water.    Assessed Nutritional Needs  Kcal Needs: 8202-0632+ kcal (30-35+ kcal/kg); repletion  Protein Needs: 45-55+ gm pro (1.2-1.5+ gm/kg); repletion  Fluid Needs: 1 mL/kcal  Nutrition Support is meeting what percent of needs: 100%    Implementation  Intervention: Phone call with Duane. Reports that Adrianna tried the Peptamen formula for ~3 days but then wanted to go back to [employer] Standard 1.4. Tolerating a little better now. Thinks that she might have had a \"little bug\" that was causing her to not tolerate her formula as well.  Education: Change back to [employer] Standard 1.4, goal 3-3.5 cartons per day.  Goals: Weight stable vs gain.    Plan  Follow up/Recommendations: Monitor tolerance and weight trends.    Ashley Cao RD, [facility]  Clinical Dietitian  [facility]   [phone]  \[facility]\"" Main Line: [phone]     "

## 2025-04-29 PROCEDURE — S9341 HIT ENTERAL GRAV DIEM: HCPCS | Mod: SH

## 2025-04-29 PROCEDURE — S9341 HIT ENTERAL GRAV DIEM: HCPCS

## 2025-04-30 ENCOUNTER — TELEPHONE (OUTPATIENT)
Dept: OTOLARYNGOLOGY | Facility: CLINIC | Age: 65
End: 2025-04-30
Payer: COMMERCIAL

## 2025-04-30 DIAGNOSIS — C04.9 SCC (SQUAMOUS CELL CARCINOMA OF FLOOR OF MOUTH) (H): Primary | ICD-10-CM

## 2025-04-30 PROCEDURE — S9341 HIT ENTERAL GRAV DIEM: HCPCS

## 2025-04-30 PROCEDURE — S9341 HIT ENTERAL GRAV DIEM: HCPCS | Mod: SH

## 2025-05-01 PROCEDURE — S9341 HIT ENTERAL GRAV DIEM: HCPCS

## 2025-05-02 PROCEDURE — S9341 HIT ENTERAL GRAV DIEM: HCPCS

## 2025-05-03 PROCEDURE — S9341 HIT ENTERAL GRAV DIEM: HCPCS

## 2025-05-04 PROCEDURE — S9341 HIT ENTERAL GRAV DIEM: HCPCS

## 2025-05-05 PROCEDURE — S9341 HIT ENTERAL GRAV DIEM: HCPCS

## 2025-05-06 PROCEDURE — S9341 HIT ENTERAL GRAV DIEM: HCPCS

## 2025-05-07 ENCOUNTER — OFFICE VISIT (OUTPATIENT)
Dept: OTOLARYNGOLOGY | Facility: CLINIC | Age: 65
End: 2025-05-07
Payer: COMMERCIAL

## 2025-05-07 VITALS
SYSTOLIC BLOOD PRESSURE: 85 MMHG | HEART RATE: 96 BPM | HEIGHT: 64 IN | DIASTOLIC BLOOD PRESSURE: 61 MMHG | WEIGHT: 81 LBS | BODY MASS INDEX: 13.83 KG/M2

## 2025-05-07 DIAGNOSIS — Z93.0 TRACHEOSTOMY STATUS (H): ICD-10-CM

## 2025-05-07 DIAGNOSIS — C04.9 SCC (SQUAMOUS CELL CARCINOMA OF FLOOR OF MOUTH) (H): ICD-10-CM

## 2025-05-07 DIAGNOSIS — C77.9 METASTATIC SQUAMOUS CELL CARCINOMA TO LYMPH NODE (H): ICD-10-CM

## 2025-05-07 DIAGNOSIS — F10.21 ALCOHOL DEPENDENCE, IN REMISSION (H): Primary | ICD-10-CM

## 2025-05-07 PROCEDURE — S9341 HIT ENTERAL GRAV DIEM: HCPCS

## 2025-05-07 NOTE — LETTER
5/7/2025       RE: Adrianna Pereira  [address] [place] [id number]     Dear Colleague,    Thank you for referring your patient, Adrianna Pereira, to the [facility] [place] [facility]. Please see a copy of my visit note below.    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 The patient presented with disease involving the mental skin. Dr. Harrington did a fibula and anterolateral thigh free tissue reconstruction. The patient did have some hardware exposed prior to radiation and then completed her chemoradiation therapy with Dr. Egan on December 5, 2023.     The patient was diagnosed with a spine met and compression fracture.  She has since been on long-term pembrolizumab and her most recent PET scan does not reveal any obvious sites of disease.    Interval history: The patient had a recent PET scan in April that shows persistent compression fractures but no FDG uptake of significance and what appears to be essentially no clear evidence of disease.  The patient is now approaching her 17th cycle of pembrolizumab and wears a mask because of the exposed bone and fibula.  She notes she has pain in the lower cheek on both sides but does not have any drainage or redness.  She is entirely PEG dependent and her weight is semistable.  She does note that she still has a fair amount of pain and that is the thing that is bothering her the most.    Physical examination: The patient is well-appearing and in no distress.  She has an obvious exposed nonviable fibula still attached to the plate which is also exposed essentially from angle to angle.  The skin paddle portion has retreated into her oral cavity and  into her submentum and appears to have healed although she essentially has an antigum deformity with the plate and fibula still being in place.    Impression:  1.  Long-term maintenance pembrolizumab resulting in no clear evidence of malignancy  2.  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      Plan:  1.We will get them a pain management consult  2.  Once the pain is better controlled, I again offered another reconstruction and it seems a little bit more reasonable given that she does not have any evidence of disease currently.  This would of course be a big surgery and she understands she would still need a trach and swallowing would be very difficult afterwards but it would aim to replace the plate and bone with a new free flap.  She would like to think about this and will let us know.            Again, thank you for allowing me to participate in the care of your patient.      Sincerely,    Tyshawn Dao MD

## 2025-05-07 NOTE — PROGRESS NOTES
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 The patient presented with disease involving the mental skin. Dr. Harrington did a fibula and anterolateral thigh free tissue reconstruction. The patient did have some hardware exposed prior to radiation and then completed her chemoradiation therapy with Dr. Egan on December 5, 2023.     The patient was diagnosed with a spine met and compression fracture.  She has since been on long-term pembrolizumab and her most recent PET scan does not reveal any obvious sites of disease.    Interval history: The patient had a recent PET scan in April that shows persistent compression fractures but no FDG uptake of significance and what appears to be essentially no clear evidence of disease.  The patient is now approaching her 17th cycle of pembrolizumab and wears a mask because of the exposed bone and fibula.  She notes she has pain in the lower cheek on both sides but does not have any drainage or redness.  She is entirely PEG dependent and her weight is semistable.  She does note that she still has a fair amount of pain and that is the thing that is bothering her the most.    Physical examination: The patient is well-appearing and in no distress.  She has an obvious exposed nonviable fibula still attached to the plate which is also exposed essentially from angle to angle.  The skin paddle portion has retreated into her oral cavity and into her submentum and appears to have healed although she essentially has an antigum deformity with the plate and fibula still being in place.    Impression:  1.  Long-term maintenance pembrolizumab resulting in no clear evidence of malignancy  2.  Significant wound issue resulting in an essential and Gump  deformity with exposed plate and nonviable fibula.      Plan:  1.We will get them a pain management consult  2.  Once the pain is better controlled, I again offered another reconstruction and it seems a little bit more reasonable given that she does not have any evidence of disease currently.  This would of course be a big surgery and she understands she would still need a trach and swallowing would be very difficult afterwards but it would aim to replace the plate and bone with a new free flap.  She would like to think about this and will let us know.

## 2025-05-07 NOTE — PATIENT INSTRUCTIONS
"You were seen in the clinic today by Dr. Hernandez. If you have any questions or concerns after your appointment, please call the clinic at [phone]. Press \"1\" for scheduling, press \"3\" for nurse advice.    2.   The following has been recommended for you based upon your appointment today:   -pain management referral   -please reach out if you have further questions       Alondra TRISTAN, RN  [facility]  Department of Otolaryngology       "

## 2025-05-08 PROCEDURE — S9341 HIT ENTERAL GRAV DIEM: HCPCS

## 2025-05-09 PROCEDURE — S9341 HIT ENTERAL GRAV DIEM: HCPCS

## 2025-05-10 PROCEDURE — S9341 HIT ENTERAL GRAV DIEM: HCPCS

## 2025-05-11 ENCOUNTER — MYC REFILL (OUTPATIENT)
Dept: FAMILY MEDICINE | Facility: CLINIC | Age: 65
End: 2025-05-11
Payer: COMMERCIAL

## 2025-05-11 DIAGNOSIS — C06.9 SQUAMOUS CELL CARCINOMA OF ORAL CAVITY (H): ICD-10-CM

## 2025-05-11 PROCEDURE — S9341 HIT ENTERAL GRAV DIEM: HCPCS

## 2025-05-12 PROCEDURE — S9341 HIT ENTERAL GRAV DIEM: HCPCS

## 2025-05-13 PROCEDURE — S9341 HIT ENTERAL GRAV DIEM: HCPCS

## 2025-05-14 PROCEDURE — S9341 HIT ENTERAL GRAV DIEM: HCPCS

## 2025-05-15 PROCEDURE — S9341 HIT ENTERAL GRAV DIEM: HCPCS

## 2025-05-15 RX ORDER — IBUPROFEN 600 MG/1
600 TABLET, FILM COATED ORAL EVERY 6 HOURS PRN
Qty: 90 TABLET | Refills: 0 | Status: SHIPPED | OUTPATIENT
Start: 2025-05-15

## 2025-05-16 ENCOUNTER — HOME INFUSION BILLING (OUTPATIENT)
Dept: HOME HEALTH SERVICES | Facility: HOME HEALTH | Age: 65
End: 2025-05-16
Payer: COMMERCIAL

## 2025-05-16 PROCEDURE — S9341 HIT ENTERAL GRAV DIEM: HCPCS

## 2025-05-16 PROCEDURE — B4150 EF COMPLET W/INTACT NUTRIENT: HCPCS

## 2025-05-18 ENCOUNTER — MYC REFILL (OUTPATIENT)
Dept: ONCOLOGY | Facility: CLINIC | Age: 65
End: 2025-05-18
Payer: COMMERCIAL

## 2025-05-18 DIAGNOSIS — G89.3 CANCER RELATED PAIN: ICD-10-CM

## 2025-05-19 PROCEDURE — A6403 STERILE GAUZE>16 <= 48 SQ IN: HCPCS

## 2025-05-19 PROCEDURE — A4450 NON-WATERPROOF TAPE: HCPCS

## 2025-05-19 PROCEDURE — S9341 HIT ENTERAL GRAV DIEM: HCPCS

## 2025-05-19 RX ORDER — OXYCODONE HCL 5 MG/5 ML
5 SOLUTION, ORAL ORAL EVERY 6 HOURS PRN
Qty: 500 ML | Refills: 0 | Status: SHIPPED | OUTPATIENT
Start: 2025-05-19

## 2025-05-20 PROCEDURE — S9341 HIT ENTERAL GRAV DIEM: HCPCS

## 2025-05-21 PROCEDURE — S9341 HIT ENTERAL GRAV DIEM: HCPCS

## 2025-05-22 PROCEDURE — S9341 HIT ENTERAL GRAV DIEM: HCPCS

## 2025-05-23 PROCEDURE — S9341 HIT ENTERAL GRAV DIEM: HCPCS

## 2025-05-24 PROCEDURE — S9341 HIT ENTERAL GRAV DIEM: HCPCS

## 2025-05-25 PROCEDURE — S9341 HIT ENTERAL GRAV DIEM: HCPCS

## 2025-05-26 PROCEDURE — S9341 HIT ENTERAL GRAV DIEM: HCPCS

## 2025-05-27 PROCEDURE — S9341 HIT ENTERAL GRAV DIEM: HCPCS

## 2025-05-28 ENCOUNTER — ONCOLOGY VISIT (OUTPATIENT)
Dept: ONCOLOGY | Facility: CLINIC | Age: 65
End: 2025-05-28
Attending: NURSE PRACTITIONER
Payer: COMMERCIAL

## 2025-05-28 ENCOUNTER — INFUSION THERAPY VISIT (OUTPATIENT)
Dept: INFUSION THERAPY | Facility: CLINIC | Age: 65
End: 2025-05-28
Attending: NURSE PRACTITIONER
Payer: COMMERCIAL

## 2025-05-28 ENCOUNTER — TELEPHONE (OUTPATIENT)
Dept: ONCOLOGY | Facility: CLINIC | Age: 65
End: 2025-05-28

## 2025-05-28 VITALS — SYSTOLIC BLOOD PRESSURE: 85 MMHG | HEART RATE: 105 BPM | DIASTOLIC BLOOD PRESSURE: 57 MMHG

## 2025-05-28 VITALS
HEART RATE: 123 BPM | BODY MASS INDEX: 14.17 KG/M2 | SYSTOLIC BLOOD PRESSURE: 81 MMHG | HEIGHT: 64 IN | RESPIRATION RATE: 16 BRPM | WEIGHT: 83 LBS | TEMPERATURE: 97.8 F | OXYGEN SATURATION: 97 % | DIASTOLIC BLOOD PRESSURE: 63 MMHG

## 2025-05-28 DIAGNOSIS — C06.9 SQUAMOUS CELL CARCINOMA OF ORAL CAVITY (H): Primary | ICD-10-CM

## 2025-05-28 DIAGNOSIS — G89.3 CANCER RELATED PAIN: ICD-10-CM

## 2025-05-28 DIAGNOSIS — C79.51 MALIGNANT NEOPLASM METASTATIC TO BONE (H): Primary | ICD-10-CM

## 2025-05-28 DIAGNOSIS — C06.9 SQUAMOUS CELL CARCINOMA OF ORAL CAVITY (H): ICD-10-CM

## 2025-05-28 DIAGNOSIS — M27.2 INFECTION OF MANDIBLE: ICD-10-CM

## 2025-05-28 DIAGNOSIS — C78.00 MALIGNANT NEOPLASM METASTATIC TO LUNG, UNSPECIFIED LATERALITY (H): ICD-10-CM

## 2025-05-28 DIAGNOSIS — R11.2 NAUSEA AND VOMITING, UNSPECIFIED VOMITING TYPE: ICD-10-CM

## 2025-05-28 DIAGNOSIS — C79.51 MALIGNANT NEOPLASM METASTATIC TO BONE (H): ICD-10-CM

## 2025-05-28 LAB
ALBUMIN SERPL BCG-MCNC: 2.1 G/DL (ref 3.5–5.2)
ALP SERPL-CCNC: 132 U/L (ref 40–150)
ALT SERPL W P-5'-P-CCNC: 32 U/L (ref 0–50)
ANION GAP SERPL CALCULATED.3IONS-SCNC: 11 MMOL/L (ref 7–15)
AST SERPL W P-5'-P-CCNC: 31 U/L (ref 0–45)
BASOPHILS # BLD AUTO: 0 10E3/UL (ref 0–0.2)
BASOPHILS NFR BLD AUTO: 0 %
BILIRUB SERPL-MCNC: <0.2 MG/DL
BUN SERPL-MCNC: 25.5 MG/DL (ref 8–23)
CALCIUM SERPL-MCNC: 7.3 MG/DL (ref 8.8–10.4)
CHLORIDE SERPL-SCNC: 105 MMOL/L (ref 98–107)
CREAT SERPL-MCNC: 0.65 MG/DL (ref 0.51–0.95)
EGFRCR SERPLBLD CKD-EPI 2021: >90 ML/MIN/1.73M2
EOSINOPHIL # BLD AUTO: 0.1 10E3/UL (ref 0–0.7)
EOSINOPHIL NFR BLD AUTO: 1 %
ERYTHROCYTE [DISTWIDTH] IN BLOOD BY AUTOMATED COUNT: 13.8 % (ref 10–15)
GLUCOSE SERPL-MCNC: 115 MG/DL (ref 70–99)
HCO3 SERPL-SCNC: 21 MMOL/L (ref 22–29)
HCT VFR BLD AUTO: 36.8 % (ref 35–47)
HGB BLD-MCNC: 12.1 G/DL (ref 11.7–15.7)
IMM GRANULOCYTES # BLD: 0.1 10E3/UL
IMM GRANULOCYTES NFR BLD: 1 %
LYMPHOCYTES # BLD AUTO: 1.6 10E3/UL (ref 0.8–5.3)
LYMPHOCYTES NFR BLD AUTO: 16 %
MCH RBC QN AUTO: 32.4 PG (ref 26.5–33)
MCHC RBC AUTO-ENTMCNC: 32.9 G/DL (ref 31.5–36.5)
MCV RBC AUTO: 98 FL (ref 78–100)
MONOCYTES # BLD AUTO: 0.7 10E3/UL (ref 0–1.3)
MONOCYTES NFR BLD AUTO: 7 %
NEUTROPHILS # BLD AUTO: 7.5 10E3/UL (ref 1.6–8.3)
NEUTROPHILS NFR BLD AUTO: 76 %
NRBC # BLD AUTO: 0 10E3/UL
NRBC BLD AUTO-RTO: 0 /100
PLATELET # BLD AUTO: 532 10E3/UL (ref 150–450)
POTASSIUM SERPL-SCNC: 4.3 MMOL/L (ref 3.4–5.3)
PROT SERPL-MCNC: 4.4 G/DL (ref 6.4–8.3)
RBC # BLD AUTO: 3.74 10E6/UL (ref 3.8–5.2)
SODIUM SERPL-SCNC: 137 MMOL/L (ref 135–145)
TSH SERPL DL<=0.005 MIU/L-ACNC: 2.53 UIU/ML (ref 0.3–4.2)
WBC # BLD AUTO: 9.9 10E3/UL (ref 4–11)

## 2025-05-28 PROCEDURE — 99214 OFFICE O/P EST MOD 30 MIN: CPT | Performed by: NURSE PRACTITIONER

## 2025-05-28 PROCEDURE — 82247 BILIRUBIN TOTAL: CPT | Performed by: INTERNAL MEDICINE

## 2025-05-28 PROCEDURE — 258N000003 HC RX IP 258 OP 636: Performed by: NURSE PRACTITIONER

## 2025-05-28 PROCEDURE — 96413 CHEMO IV INFUSION 1 HR: CPT

## 2025-05-28 PROCEDURE — 250N000011 HC RX IP 250 OP 636: Mod: JZ | Performed by: NURSE PRACTITIONER

## 2025-05-28 PROCEDURE — 36591 DRAW BLOOD OFF VENOUS DEVICE: CPT

## 2025-05-28 PROCEDURE — 85025 COMPLETE CBC W/AUTO DIFF WBC: CPT | Performed by: INTERNAL MEDICINE

## 2025-05-28 PROCEDURE — S9341 HIT ENTERAL GRAV DIEM: HCPCS

## 2025-05-28 PROCEDURE — G0463 HOSPITAL OUTPT CLINIC VISIT: HCPCS | Performed by: NURSE PRACTITIONER

## 2025-05-28 PROCEDURE — G2211 COMPLEX E/M VISIT ADD ON: HCPCS | Performed by: NURSE PRACTITIONER

## 2025-05-28 PROCEDURE — 84443 ASSAY THYROID STIM HORMONE: CPT | Performed by: INTERNAL MEDICINE

## 2025-05-28 RX ORDER — METHYLPREDNISOLONE SODIUM SUCCINATE 40 MG/ML
40 INJECTION INTRAMUSCULAR; INTRAVENOUS
Start: 2025-05-28

## 2025-05-28 RX ORDER — LORAZEPAM 2 MG/ML
0.5 INJECTION INTRAMUSCULAR EVERY 4 HOURS PRN
OUTPATIENT
Start: 2025-05-28

## 2025-05-28 RX ORDER — ALBUTEROL SULFATE 0.83 MG/ML
2.5 SOLUTION RESPIRATORY (INHALATION)
OUTPATIENT
Start: 2025-05-28

## 2025-05-28 RX ORDER — DIPHENHYDRAMINE HYDROCHLORIDE 50 MG/ML
25 INJECTION, SOLUTION INTRAMUSCULAR; INTRAVENOUS
Start: 2025-05-28

## 2025-05-28 RX ORDER — OXYCODONE HCL 5 MG/5 ML
5-10 SOLUTION, ORAL ORAL EVERY 6 HOURS PRN
Qty: 500 ML | Refills: 0 | Status: SHIPPED | OUTPATIENT
Start: 2025-05-28

## 2025-05-28 RX ORDER — HEPARIN SODIUM,PORCINE 10 UNIT/ML
5-20 VIAL (ML) INTRAVENOUS DAILY PRN
OUTPATIENT
Start: 2025-05-28

## 2025-05-28 RX ORDER — AMOXICILLIN AND CLAVULANATE POTASSIUM 400; 57 MG/5ML; MG/5ML
875 POWDER, FOR SUSPENSION ORAL 2 TIMES DAILY
Qty: 218.8 ML | Refills: 5 | Status: SHIPPED | OUTPATIENT
Start: 2025-05-28

## 2025-05-28 RX ORDER — METOCLOPRAMIDE HYDROCHLORIDE 5 MG/5ML
5 SOLUTION ORAL 4 TIMES DAILY PRN
Qty: 300 ML | Refills: 1 | Status: SHIPPED | OUTPATIENT
Start: 2025-05-28

## 2025-05-28 RX ORDER — HEPARIN SODIUM (PORCINE) LOCK FLUSH IV SOLN 100 UNIT/ML 100 UNIT/ML
5 SOLUTION INTRAVENOUS
Status: CANCELLED | OUTPATIENT
Start: 2025-05-28

## 2025-05-28 RX ORDER — ALBUTEROL SULFATE 90 UG/1
1-2 INHALANT RESPIRATORY (INHALATION)
Start: 2025-05-28

## 2025-05-28 RX ORDER — EPINEPHRINE 1 MG/ML
0.3 INJECTION, SOLUTION, CONCENTRATE INTRAVENOUS EVERY 5 MIN PRN
OUTPATIENT
Start: 2025-05-28

## 2025-05-28 RX ORDER — MEPERIDINE HYDROCHLORIDE 25 MG/ML
25 INJECTION INTRAMUSCULAR; INTRAVENOUS; SUBCUTANEOUS
OUTPATIENT
Start: 2025-05-28

## 2025-05-28 RX ORDER — DIPHENHYDRAMINE HYDROCHLORIDE 50 MG/ML
50 INJECTION, SOLUTION INTRAMUSCULAR; INTRAVENOUS
Start: 2025-05-28

## 2025-05-28 RX ORDER — HEPARIN SODIUM (PORCINE) LOCK FLUSH IV SOLN 100 UNIT/ML 100 UNIT/ML
5 SOLUTION INTRAVENOUS
Status: DISCONTINUED | OUTPATIENT
Start: 2025-05-28 | End: 2025-05-28 | Stop reason: HOSPADM

## 2025-05-28 RX ADMIN — SODIUM CHLORIDE 400 MG: 9 INJECTION, SOLUTION INTRAVENOUS at 11:54

## 2025-05-28 RX ADMIN — HEPARIN 5 ML: 100 SYRINGE at 13:55

## 2025-05-28 RX ADMIN — SODIUM CHLORIDE 1000 ML: 0.9 INJECTION, SOLUTION INTRAVENOUS at 12:48

## 2025-05-28 RX ADMIN — SODIUM CHLORIDE 250 ML: 0.9 INJECTION, SOLUTION INTRAVENOUS at 11:54

## 2025-05-28 ASSESSMENT — PAIN SCALES - GENERAL: PAINLEVEL_OUTOF10: NO PAIN (0)

## 2025-05-28 NOTE — TELEPHONE ENCOUNTER
----- Message from Marina Cronin sent at 5/28/2025  1:05 PM CDT -----  Regarding: FW: jaw wound  Please update Adrianna/her  on Dr. Dao (ENT) recs.   He agrees with continued use of Augmentin PRN and thinks only way to avoid is surgery which she is unsure of right now.     I will send standing Augmentin orders with refills to her pharmacy so she can fill/take as needed (she knows what symptoms she starts it for)    Thanks  ----- Message -----  From: Tyshawn Dao MD  Sent: 5/28/2025  11:54 AM CDT  To: Marina Cronin NP  Subject: RE: jaw wound                                    I think there is some smoldering infection that will only get better with a large surgery and removal of nonviable bone.  It's fine to keep her on them intermittently as we wait to see what she decides.  Until now, she has not wanted surgery.   S  ----- Message -----  From: Marina Cronin NP  Sent: 5/28/2025  11:21 AM CDT  To: Tyshawn Dao MD  Subject: jaw wound                                        Tammy Rodas has taken Augmentin recurrently for symptoms of increased swelling, pain and drainage at open jaw wound over the year. She had been taking ~2-3 mths with improved symptoms.   More recently, she has taken monthly.   It's hard to say if she has true infections or just inflammation.  I'm not sure it's best to keep her on routine Augmentin.   Do you have recs on this?    I know she's thinking about reconstruction surgery to manage the issues regarding this. I think she's pretty nervous about a big surgery at this time so undecided.     Thanks for your input on the antibiotic use    Marina Cronin (Oncology)

## 2025-05-28 NOTE — LETTER
"5/28/2025      Adrianna Pereira  [id number] [address] [place] [id number]      Dear Colleague,    Thank you for referring your patient, Adrianna Pereira, to the [facility]. Please see a copy of my visit note below.    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    Weight as of this encounter: 37.6 kg (83 lb). Body surface area is 1.3 meters squared.  No Pain (0) Comment: Data Unavailable   No LMP recorded. Patient is postmenopausal.  Allergies reviewed: Yes  Medications reviewed: Yes    Medications: Medication refills not needed today.  Pharmacy name entered into [facility]: [facility] [address]     Frailty Screening:   Is the patient here for a new oncology consult visit in cancer care? 2. 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 Unfortunately, within just a few months of completing on her initial restaging, was found to have metastatic recurrence.     She had palliative RT to painful thoracic bone mets initially, then started  palliative pembrolizumab 1 yr ago. Tolerating well.     12/2024 PET/CT showed continued TN. No sites of progression.    No clinical progression noted today.     Labwork:  CMP:  WNL. CBC: hgb 10.5 (stable). , rest CBC WNL. TSH WNL.    Plan:  -Continue with C17 pembrolizumab 400 mg every 6 weeks   -Return in 6 weeks with updated PET scan ahead of C18. If still responding, plan would be to continue for up to minimum 2 yrs with scans every 4 mths.  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    Open oral/jaw wound  Smoldering local infections  Plate exposure + open wound over lower lip secondary to residual/recurrent tumor at prior ENT resection + adjuvant RT site. Draining lessening. No fevers. Reconstruction would require additional free flap, so not being pursued at this time, in setting of widely metastatic disease, after discussion with ENT surgeon Dr. Thorne. Dr. Egan did not recommend additional radiation.    Met with Wound Care MD.   Supportive management interventions to help with drainage, odor and decreasing bacterial growth were provided.     She has recurrent smoldering infections along jaw, responsive to Augmentin. Usually, will have more swelling/pain that responds to Augmentin. Has been needing it monthly most recently.   Discussed with ENT, who feels she will need this chronically due to smoldering infection and only way to try to clear it would be through large surgery and removal of non-viable bone.     Plan:  -Pt to follow Wound Clinic recs for mgmt and return PRN.  -Follow-up with ENT every 3-6 months/PRN  -Repeat Augmentin as needed for recurrent local jaw/wound  infection (has been requiring about every 1 mth). Standing refills sent to pharmacy.  -Pt has been offered reconstruction surgery to help address her local symptoms of pain and infection, but she's undecided about whether she wants to proceed.     Cancer pain  Completed palliative RT to T-spine 3/2024. Back pain is resolved. No longer requiring pain meds for this.     Mouth/lower jaw pain related to progressive reconstruction failure/open wound. Pain has gradually progressed over time. Primarily managing well with Tylenol 500 mg 3/day and oxycodone 10 mg twice/day (occasionally takes 6-8 ml mid-day for TID).     Plan:  -Continue Tylenol, can take up to 3000 mg/day.   -Continue oxycodone 5-10 mg every 6 hrs PRN. Oxycodone refilled today.  -Consider long-acting pain med (likley in patch form since NPO) if oxycodone use increases  -Referral to Palliative Care if not well-controlled in future    Nutrition/hydration  Post-prandial emesis/early satiety  Low albumin, low calcium  Dependant on feeding tube.   PEG last exchanged 1/2025 and scheduled next month.    Over last few months, she has more mucus secretions she swallows. She believes this causes her gastric upset and early satiety so she is tolerating less feeding volume. She has sporadic emesis after feedings (2/week) and often stops feedings early for satiety.     Although her weight is stable, she has had progressive decline in albumin. Calcium low (7.3) but 8.4 when corrected for albumin.     LFTs and creatinine WNL. NO edema. No signs of nephrotic or hepatic issues.Chronic/recurrent infection related to jaw wound could contribute to lower albumin.     Plan:  -Trial Reglan 5 mg prior to feedings/PRN  -Could consider robitussin via PEG to help thin secretions, deferred this to re-evaluation with Reglan.  -Working with Dietician as needed. 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   On observation.   ______________________________________________________________________________    History of Present Illness/ Interval History    Ms. Adrianna Pereira is a 64 year old who completed resection of floor of mouth cancer, followed by adjuvant chemoRT 12/2023. Unfortunately, shortly after this, she was found to have local and metastatic recurrence involving mandible, lung, bone (T3-T4) and soft tissue that is PDL1 70%.     She started palliative treatments with pembrolizumab in February, 2024 (15 months ago). She's had a good response - near CR.     Tolerating pembrolizumab generally well. No diarrhea. No new dyspnea/cough.     Jaw/mouth pain due to plate exposure of lip/open wound at prior ENT resection site due to local recurrence persists. Managing with Tylenol and oxycodone 10 mg BID most days, occasionally adds 6-8 ml mid-day.  Pain has progressed over time. Assessed by ENT surgeon and reconstruction would require additional free flap, so was deferred in context of met cancer. Now that she's gotten a good response in her cancer, ENT has offered to reconsider reconstruction flap but patient is unsure if she wants to undergo a big surgery. Has seen Wound Clinic for mgmt recs. She has been treated with Augmentin periodically for possible secondary infection (presents as more pain, swelling and  drainage) with transient improvement. Over last few months, she has been taking the Augmentin monthly.     No new sites of pain.    All nutrition/hydration via PEG. Has had early satiety and intermittent emesis (twice/week) after feedings the last few months. She thinks it's due to swallowing upper airway mucus causing nausea.   Stable weight.      ECOG Performance    0-1    Oncology History/Treatment  Diagnosis/Stage:   7/2023: [id number] Floor of mouth squamous cell cancer  -hx alcoholism (stopped all use) and smoking  (quit 8/2023)  -hx premalignant oral cavity lesions s/p CO2 laser ablation with ENT  -presented with lower lip/chin swelling  -7/3/2023 left mandibular alveolus biopsy: invasive keratinizing moderately differential squamous cell carcinoma.   -PET: 4.4 x 3.7 x 3.2 cm anterior oral cavity mass invading into the mandible with a separate gluco-avid nodule on the right mandibular buccal mucosa and bilateral level 1B, level 2 and L3 metastatic lymphadenopathy without evidence of metastatic disease.   -8/17/2023 surgical path: pT4a, N3b tumor with positive left anterior lateral soft tissue margin, 11/98+ lymph nodes including STEFFEN (largest 4 cm) and several bilateral lymph nodes consistent with involvement by low-grade B-cell neoplasm suggestive of CLL/SLL.     2/2024: Progression to stage IV mouth squamous cell cancer to bone and lung  -Within weeks of completing definitive treatment for her primary disease, noted severe/progressive back pain  -CT C/T spine: new T3 compression fracture with hypodensity in vertebral body extending into L posterior elements suggestive of met. T4 posterior vertebral body lesion also suspicious. New lung nodules also noted  -MRI T spine: T3 compression fracture with extraosseous tumor involving ventral epidural space with mod - severe central spinal canal stenosis at T3  -PET: recurrent avid foci L maikel-mandible, numerous bilateral pulmonary/pleural mets, met mediastinal/hilar adenopathy, T3, T4, L adductor musculature and R upper thigh skin thickening  -PDL1 TPS and CPS 70% from 8/2023 original tumor biopsy    Treatment:  Locally advanced disease  -8/17/2023: segmental mandibulectomy, floor of mouth resection, anterior glossectomy, left fibular free flap, skin grafting, and tracheostomy.   -Post-op course complicated by skin necrosis requiring OR debridment and resuturing of neck incision/intraoral flap    -10/20/2023 - 12/5/2023: completed adjuvant concurrent RT (6600 cGy/33) + weekly  "cisplatin (x 7 cycles)    Metastatic recurrence  -2/21/2024 - present: pembrolizumab. Changed to 400mg dosing 1 6 weeks 12/11/2024  -Zometa started x 1 dose for bone mets, later held for jaw osteonecrosis risk    -2/26 - 3/12/2024: palliative RT to thoracic spine, 2500 cGy/10. Dex taper      Physical Exam  Ht 1.626 m (5' 4\")   Wt 37.6 kg (83 lb)   BMI 14.25 kg/m      GENERAL: Alert and oriented to time place and person. Seated comfortably. In no distress.  Dysarthria.   accompanied.  HEENT: Open wound below lower lip with exposed plate. Scant white/yellow appearing odorous drainage. Persistent lymphedema of mandible area, no redness.  SHAE, EOMI. No erythema. No icterus.  NECK: Post-RT fibrosis. No evident adenopathy/masses.  HEART: RRR  CHEST: regular respiratory effort. Lungs normal to percussion and auscultation  ABD: PEG in place. Non-distended  EXTREMITIES: No edema  NEURO: No gross deficit noted.     Imaging    4/10/2025  PET : stable/near CR    Lab Results    Recent Results (from the past week)   Comprehensive metabolic panel   Result Value Ref Range    Sodium 137 135 - 145 mmol/L    Potassium 4.3 3.4 - 5.3 mmol/L    Carbon Dioxide (CO2) 21 (L) 22 - 29 mmol/L    Anion Gap 11 7 - 15 mmol/L    Urea Nitrogen 25.5 (H) 8.0 - 23.0 mg/dL    Creatinine 0.65 0.51 - 0.95 mg/dL    GFR Estimate >90 >60 mL/min/1.73m2    Calcium 7.3 (L) 8.8 - 10.4 mg/dL    Chloride 105 98 - 107 mmol/L    Glucose 115 (H) 70 - 99 mg/dL    Alkaline Phosphatase 132 40 - 150 U/L    AST 31 0 - 45 U/L    ALT 32 0 - 50 U/L    Protein Total 4.4 (L) 6.4 - 8.3 g/dL    Albumin 2.1 (L) 3.5 - 5.2 g/dL    Bilirubin Total <0.2 <=1.2 mg/dL   CBC with platelets and differential   Result Value Ref Range    WBC Count 9.9 4.0 - 11.0 10e3/uL    RBC Count 3.74 (L) 3.80 - 5.20 10e6/uL    Hemoglobin 12.1 11.7 - 15.7 g/dL    Hematocrit 36.8 35.0 - 47.0 %    MCV 98 78 - 100 fL    MCH 32.4 26.5 - 33.0 pg    MCHC 32.9 31.5 - 36.5 g/dL    RDW 13.8 10.0 - 15.0 " %    Platelet Count 532 (H) 150 - 450 10e3/uL    % Neutrophils 76 %    % Lymphocytes 16 %    % Monocytes 7 %    % Eosinophils 1 %    % Basophils 0 %    % Immature Granulocytes 1 %    NRBCs per 100 WBC 0 <1 /100    Absolute Neutrophils 7.5 1.6 - 8.3 10e3/uL    Absolute Lymphocytes 1.6 0.8 - 5.3 10e3/uL    Absolute Monocytes 0.7 0.0 - 1.3 10e3/uL    Absolute Eosinophils 0.1 0.0 - 0.7 10e3/uL    Absolute Basophils 0.0 0.0 - 0.2 10e3/uL    Absolute Immature Granulocytes 0.1 <=0.4 10e3/uL    Absolute NRBCs 0.0 10e3/uL         Total time 35 minutes, to include face to face visit, review of EMR, ordering, documentation and coordination of care on date of service.    complexity modifier for longitudinal care.     Signed by: Marina Cronin NP      Again, thank you for allowing me to participate in the care of your patient.        Sincerely,        Marina Cronin NP    Electronically signed

## 2025-05-28 NOTE — PROGRESS NOTES
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 Unfortunately, within just a few months of completing on her initial restaging, was found to have metastatic recurrence.     She had palliative RT to painful thoracic bone mets initially, then started  palliative pembrolizumab 1 yr ago. Tolerating well.     12/2024 PET/CT showed continued AK. No sites of progression.    No clinical progression noted today.     Labwork:  CMP:  WNL. CBC: hgb 10.5 (stable). , rest CBC WNL. TSH WNL.    Plan:  -Continue with C17 pembrolizumab 400 mg every 6 weeks   -Return in 6 weeks with updated PET scan ahead of C18. If still responding, plan would be to continue for up to minimum 2 yrs with scans every 4 mths.  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    Open oral/jaw wound  Smoldering local infections  Plate exposure + open wound over lower lip secondary to residual/recurrent tumor at prior ENT resection + adjuvant RT site. Draining lessening. No fevers. Reconstruction would require additional free flap, so not being pursued at this time, in setting of widely metastatic disease, after discussion with ENT surgeon Dr. Thorne. Dr. Egan did not recommend additional radiation.    Met with Wound Care MD.   Supportive management interventions to help with drainage, odor and decreasing bacterial growth were provided.     She has recurrent smoldering infections along jaw,  responsive to Augmentin. Usually, will have more swelling/pain that responds to Augmentin. Has been needing it monthly most recently.   Discussed with ENT, who feels she will need this chronically due to smoldering infection and only way to try to clear it would be through large surgery and removal of non-viable bone.     Plan:  -Pt to follow [facility]ound Clinic recs for mgmt and return PRN.  -Follow-up with ENT every 3-6 months/PRN  -Repeat Augmentin as needed for recurrent local jaw/wound infection (has been requiring about every 1 mth). Standing refills sent to pharmacy.  -Pt has been offered reconstruction surgery to help address her local symptoms of pain and infection, but she's undecided about whether she wants to proceed.     Cancer pain  Completed palliative RT to T-spine 3/2024. Back pain is resolved. No longer requiring pain meds for this.     Mouth/lower jaw pain related to progressive reconstruction failure/open wound. Pain has gradually progressed over time. Primarily managing well with Tylenol 500 mg 3/day and oxycodone 10 mg twice/day (occasionally takes 6-8 ml mid-day for TID).     Plan:  -Continue Tylenol, can take up to 3000 mg/day.   -Continue oxycodone 5-10 mg every 6 hrs PRN. Oxycodone refilled today.  -Consider long-acting pain med (likley in patch form since NPO) if oxycodone use increases  -Referral to Palliative Care if not well-controlled in future    Nutrition/hydration  Post-prandial emesis/early satiety  Low albumin, low calcium  Dependant on feeding tube.   PEG last exchanged 1/2025 and scheduled next month.    Over last few months, she has more mucus secretions she swallows. She believes this causes her gastric upset and early satiety so she is tolerating less feeding volume. She has sporadic emesis after feedings (2/week) and often stops feedings early for satiety.     Although her weight is stable, she has had progressive decline in albumin. Calcium low (7.3) but 8.4 when corrected for  albumin.     LFTs and creatinine WNL. NO edema. No signs of nephrotic or hepatic issues.Chronic/recurrent infection related to jaw wound could contribute to lower albumin.     Plan:  -Trial Reglan 5 mg prior to feedings/PRN  -Could consider robitussin via PEG to help thin secretions, deferred this to re-evaluation with Reglan.  -Working with Dietician as needed. 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   On observation.   ______________________________________________________________________________    History of Present Illness/ Interval History    Ms. Adrianna Pereira is a 64 year old who completed resection of floor of mouth cancer, followed by adjuvant chemoRT 12/2023. Unfortunately, shortly after this, she was found to have local and metastatic recurrence involving mandible, lung, bone (T3-T4) and soft tissue that is PDL1 70%.     She started palliative treatments with pembrolizumab in February, 2024 (15 months ago). She's had a good response - near CR.     Tolerating pembrolizumab generally well. No diarrhea. No new dyspnea/cough.     Jaw/mouth pain due to plate exposure of lip/open wound at prior ENT resection site due to local recurrence persists. Managing with Tylenol and oxycodone 10 mg BID most days, occasionally adds 6-8 ml mid-day.  Pain has progressed over time. Assessed by ENT surgeon and reconstruction would require additional free flap, so was deferred in context of met cancer. Now that she's gotten a good response in her cancer, ENT has offered to reconsider reconstruction flap but patient is unsure if she wants to undergo a big surgery. Has seen Wound Clinic for mgmt recs. She has been treated with Augmentin periodically for possible secondary infection (presents as more pain, swelling and  drainage) with transient  improvement. Over last few months, she has been taking the Augmentin monthly.     No new sites of pain.    All nutrition/hydration via PEG. Has had early satiety and intermittent emesis (twice/week) after feedings the last few months. She thinks it's due to swallowing upper airway mucus causing nausea.   Stable weight.      ECOG Performance    0-1    Oncology History/Treatment  Diagnosis/Stage:   7/2023: [id number] Floor of mouth squamous cell cancer  -hx alcoholism (stopped all use) and smoking (quit 8/2023)  -hx premalignant oral cavity lesions s/p CO2 laser ablation with ENT  -presented with lower lip/chin swelling  -7/3/2023 left mandibular alveolus biopsy: invasive keratinizing moderately differential squamous cell carcinoma.   -PET: 4.4 x 3.7 x 3.2 cm anterior oral cavity mass invading into the mandible with a separate gluco-avid nodule on the right mandibular buccal mucosa and bilateral level 1B, level 2 and L3 metastatic lymphadenopathy without evidence of metastatic disease.   -8/17/2023 surgical path: pT4a, N3b tumor with positive left anterior lateral soft tissue margin, 11/98+ lymph nodes including STEFFEN (largest 4 cm) and several bilateral lymph nodes consistent with involvement by low-grade B-cell neoplasm suggestive of CLL/SLL.     2/2024: Progression to stage IV mouth squamous cell cancer to bone and lung  -Within weeks of completing definitive treatment for her primary disease, noted severe/progressive back pain  -CT C/T spine: new T3 compression fracture with hypodensity in vertebral body extending into L posterior elements suggestive of met. T4 posterior vertebral body lesion also suspicious. New lung nodules also noted  -MRI T spine: T3 compression fracture with extraosseous tumor involving ventral epidural space with mod - severe central spinal canal stenosis at T3  -PET: recurrent avid foci L maikel-mandible, numerous bilateral pulmonary/pleural mets, met mediastinal/hilar adenopathy, T3, T4, L  "adductor musculature and R upper thigh skin thickening  -PDL1 TPS and CPS 70% from 8/2023 original tumor biopsy    Treatment:  Locally advanced disease  -8/17/2023: segmental mandibulectomy, floor of mouth resection, anterior glossectomy, left fibular free flap, skin grafting, and tracheostomy.   -Post-op course complicated by skin necrosis requiring OR debridment and resuturing of neck incision/intraoral flap    -10/20/2023 - 12/5/2023: completed adjuvant concurrent RT (6600 cGy/33) + weekly cisplatin (x 7 cycles)    Metastatic recurrence  -2/21/2024 - present: pembrolizumab. Changed to 400mg dosing 1 6 weeks 12/11/2024  -Zometa started x 1 dose for bone mets, later held for jaw osteonecrosis risk    -2/26 - 3/12/2024: palliative RT to thoracic spine, 2500 cGy/10. Dex taper      Physical Exam  Ht 1.626 m (5' 4\")   Wt 37.6 kg (83 lb)   BMI 14.25 kg/m      GENERAL: Alert and oriented to time place and person. Seated comfortably. In no distress.  Dysarthria.   accompanied.  HEENT: Open wound below lower lip with exposed plate. Scant white/yellow appearing odorous drainage. Persistent lymphedema of mandible area, no redness.  SHAE, EOMI. No erythema. No icterus.  NECK: Post-RT fibrosis. No evident adenopathy/masses.  HEART: RRR  CHEST: regular respiratory effort. Lungs normal to percussion and auscultation  ABD: PEG in place. Non-distended  EXTREMITIES: No edema  NEURO: No gross deficit noted.     Imaging    4/10/2025  PET : stable/near CR    Lab Results    Recent Results (from the past week)   Comprehensive metabolic panel   Result Value Ref Range    Sodium 137 135 - 145 mmol/L    Potassium 4.3 3.4 - 5.3 mmol/L    Carbon Dioxide (CO2) 21 (L) 22 - 29 mmol/L    Anion Gap 11 7 - 15 mmol/L    Urea Nitrogen 25.5 (H) 8.0 - 23.0 mg/dL    Creatinine 0.65 0.51 - 0.95 mg/dL    GFR Estimate >90 >60 mL/min/1.73m2    Calcium 7.3 (L) 8.8 - 10.4 mg/dL    Chloride 105 98 - 107 mmol/L    Glucose 115 (H) 70 - 99 mg/dL    " Alkaline Phosphatase 132 40 - 150 U/L    AST 31 0 - 45 U/L    ALT 32 0 - 50 U/L    Protein Total 4.4 (L) 6.4 - 8.3 g/dL    Albumin 2.1 (L) 3.5 - 5.2 g/dL    Bilirubin Total <0.2 <=1.2 mg/dL   CBC with platelets and differential   Result Value Ref Range    WBC Count 9.9 4.0 - 11.0 10e3/uL    RBC Count 3.74 (L) 3.80 - 5.20 10e6/uL    Hemoglobin 12.1 11.7 - 15.7 g/dL    Hematocrit 36.8 35.0 - 47.0 %    MCV 98 78 - 100 fL    MCH 32.4 26.5 - 33.0 pg    MCHC 32.9 31.5 - 36.5 g/dL    RDW 13.8 10.0 - 15.0 %    Platelet Count 532 (H) 150 - 450 10e3/uL    % Neutrophils 76 %    % Lymphocytes 16 %    % Monocytes 7 %    % Eosinophils 1 %    % Basophils 0 %    % Immature Granulocytes 1 %    NRBCs per 100 WBC 0 <1 /100    Absolute Neutrophils 7.5 1.6 - 8.3 10e3/uL    Absolute Lymphocytes 1.6 0.8 - 5.3 10e3/uL    Absolute Monocytes 0.7 0.0 - 1.3 10e3/uL    Absolute Eosinophils 0.1 0.0 - 0.7 10e3/uL    Absolute Basophils 0.0 0.0 - 0.2 10e3/uL    Absolute Immature Granulocytes 0.1 <=0.4 10e3/uL    Absolute NRBCs 0.0 10e3/uL         Total time 35 minutes, to include face to face visit, review of EMR, ordering, documentation and coordination of care on date of service.    complexity modifier for longitudinal care.     Signed by: Marina Cronin NP

## 2025-05-28 NOTE — PROGRESS NOTES
Infusion Nursing Note:  Adrianna Pereira presents today for C19D1 Keytruda.   Patient seen by provider today: Yes: Marina Cronin    present during visit today: Not Applicable.    Note: Pt tolerated infusion.  BP low post infusion.  MD notified and 1 liter of NS bolus given over an hour.  BP rechecked post bolus 85/57.  Asymptomatic      Intravenous Access:  Labs drawn without difficulty.  Implanted Port.    Treatment Conditions:  Lab Results   Component Value Date    HGB 12.1 05/28/2025    WBC 9.9 05/28/2025    ANEU 7.5 05/28/2025     (H) 05/28/2025        Lab Results   Component Value Date     05/28/2025    POTASSIUM 4.3 05/28/2025    MAG 1.9 12/15/2023    CR 0.65 05/28/2025    RACHAEL 7.3 (L) 05/28/2025    BILITOTAL <0.2 05/28/2025    ALBUMIN 2.1 (L) 05/28/2025    ALT 32 05/28/2025    AST 31 05/28/2025           Post Infusion Assessment:  Patient tolerated infusion without incident.  Blood return noted pre and post infusion.  Site patent and intact, free from redness, edema or discomfort.  No evidence of extravasations.  Access discontinued per protocol.       Discharge Plan:   Discharge instructions reviewed with: Patient.  AVS to patient via interclick.  Patient will return 7/9/25 for next appointment.   Patient discharged in stable condition accompanied by: self and .  Departure Mode: Ambulatory.      Lupis Lockwood RN

## 2025-05-28 NOTE — CONFIDENTIAL NOTE
"[facility]: Cancer Care                                                                                        Called and updated patients  Duane recommendations per Marina Cronin, NP:    \"Please update Adrianna/her  on Dr. Dao (ENT) recs.   He agrees with continued use of Augmentin PRN and thinks only way to avoid is surgery which she is unsure of right now.      I will send standing Augmentin orders with refills to her pharmacy so she can fill/take as needed (she knows what symptoms she starts it for)\"    Duane verbalized understanding and agreement to plan and will relay to patient.         Signature:  Maya Mao, RN   "

## 2025-05-28 NOTE — PROGRESS NOTES
"Oncology Rooming Note    May 28, 2025 10:54 AM   Adrianna Pereira is a 64 year old female who presents for:    Chief Complaint   Patient presents with    Oncology Clinic Visit     Squamous cell carcinoma of oral cavity - Labs provider and infusion     Initial Vitals: BP (!) 81/63 (BP Location: Right arm, Patient Position: Sitting, Cuff Size: Adult Small)   Pulse (!) 123   Temp 97.8  F (36.6  C) (Tympanic)   Resp 16   Ht 1.626 m (5' 4\")   Wt 37.6 kg (83 lb)   SpO2 97%   BMI 14.25 kg/m   Estimated body mass index is 14.25 kg/m  as calculated from the following:    Height as of this encounter: 1.626 m (5' 4\").    Weight as of this encounter: 37.6 kg (83 lb). Body surface area is 1.3 meters squared.  No Pain (0) Comment: Data Unavailable   No LMP recorded. Patient is postmenopausal.  Allergies reviewed: Yes  Medications reviewed: Yes    Medications: Medication refills not needed today.  Pharmacy name entered into [facility]: [facility] [address]     Frailty Screening:   Is the patient here for a new oncology consult visit in cancer care? 2. No    PHQ9:  Did this patient require a PHQ9?: No      Clinical concerns:  6 week follow up      Tanya Philippe CMA            "

## 2025-05-29 PROCEDURE — S9341 HIT ENTERAL GRAV DIEM: HCPCS

## 2025-05-30 ENCOUNTER — HOME INFUSION (OUTPATIENT)
Dept: HOME HEALTH SERVICES | Facility: HOME HEALTH | Age: 65
End: 2025-05-30
Payer: COMMERCIAL

## 2025-05-30 PROCEDURE — S9341 HIT ENTERAL GRAV DIEM: HCPCS

## 2025-05-30 NOTE — PROGRESS NOTES
"[facility] Nutrition Assessment     Type of therapy: Enteral  Information obtained from: Phone call with Duane, patient's spouse.    Anthropometrics/Weight Goals  Height: 1.626 m (5' 4\")  Weight: 37.6 kg (83 lb)  Weight history / comments: Low weight.    Nutrition and Medical History  Reason for Nutrition Support: NPO  Feeding tube type: PEG  Date Placed: -- (Exchanged 1/21/25)    Enteral Nutrition Orders  Enteral Formula: [employer] Standard 1.4 via gravity feed  Rate/Frequency: 1 carton TID  Water Flushes: 60 mL before and after each feed plus additional 200 mL  Enteral Nutrition Provides: 3 cartons [employer] Standard 1.4 provides 1365 kcal, 60 gm protein, and 702 mL free water.    Assessed Nutritional Needs  Kcal Needs: 3124-7132+ kcal (30-35+ kcal/kg); repletion  Protein Needs: 45-55+ gm pro (1.2-1.5+ gm/kg); repletion  Fluid Needs: 1 mL/kcal  Nutrition Support is meeting what percent of needs: 100%    Implementation  Intervention: Phone call with Duane. Reports that the [employer] Standard 1.4 formula continues to go the best compared to other formulas. Adrianna is getting ~2 cartons per day, each carton over ~1 hour. Also getting a little bone broth and pedialyte. Discussed potentially using a pump for overnight feeds as well, Duane did not think Adrianna would be open to that as she doesn't like being hooked up the feeds for as long as she currently is.  Education: Continue [employer] Standard 1.4, goal 3 cartons per day.  Goals: Weight stable vs gain.    Plan  Follow up/Recommendations: Monitor tolerance and weight trends.    Ashley Cao RD, [facility]  Clinical Dietitian  [facility]   [phone]  [facility] Main Line: [phone]     "

## 2025-05-31 PROCEDURE — S9341 HIT ENTERAL GRAV DIEM: HCPCS

## 2025-06-01 PROCEDURE — S9341 HIT ENTERAL GRAV DIEM: HCPCS

## 2025-06-02 PROCEDURE — S9341 HIT ENTERAL GRAV DIEM: HCPCS

## 2025-06-03 PROCEDURE — S9341 HIT ENTERAL GRAV DIEM: HCPCS

## 2025-06-04 PROCEDURE — S9341 HIT ENTERAL GRAV DIEM: HCPCS

## 2025-06-05 PROCEDURE — S9341 HIT ENTERAL GRAV DIEM: HCPCS

## 2025-06-06 PROCEDURE — S9341 HIT ENTERAL GRAV DIEM: HCPCS

## 2025-06-07 PROCEDURE — S9341 HIT ENTERAL GRAV DIEM: HCPCS

## 2025-06-08 PROCEDURE — S9341 HIT ENTERAL GRAV DIEM: HCPCS

## 2025-06-09 PROCEDURE — S9341 HIT ENTERAL GRAV DIEM: HCPCS

## 2025-06-10 PROCEDURE — S9341 HIT ENTERAL GRAV DIEM: HCPCS

## 2025-06-11 PROCEDURE — S9341 HIT ENTERAL GRAV DIEM: HCPCS

## 2025-06-12 ENCOUNTER — OFFICE VISIT (OUTPATIENT)
Dept: SURGERY | Facility: CLINIC | Age: 65
End: 2025-06-12
Payer: COMMERCIAL

## 2025-06-12 VITALS
HEART RATE: 126 BPM | BODY MASS INDEX: 14.17 KG/M2 | OXYGEN SATURATION: 98 % | WEIGHT: 83 LBS | DIASTOLIC BLOOD PRESSURE: 63 MMHG | SYSTOLIC BLOOD PRESSURE: 83 MMHG | HEIGHT: 64 IN

## 2025-06-12 DIAGNOSIS — Z93.1 GASTROSTOMY IN PLACE (H): Primary | ICD-10-CM

## 2025-06-12 PROCEDURE — S9341 HIT ENTERAL GRAV DIEM: HCPCS

## 2025-06-12 ASSESSMENT — PAIN SCALES - GENERAL: PAINLEVEL_OUTOF10: NO PAIN (0)

## 2025-06-12 NOTE — PROGRESS NOTES
Some leaking of G tube.  Still dependent for all nutritional needs.    After verbal consent, new gastrostomy provided by patient assessed, balloon intact.  Minimal saline in prior balloon.  The old tube was removed intact without difficulty and new tube passed without resistance.  Gastric contents immediately visible in gastrostomy.  Balloon inflated.  Bolster approximated to skin at 2.5 cm, spins freely.  Dressing applied.     Discussed converting to PEDRO-Key if desired next time.  Patient is interested if available; 2.5 cm 18 Fr would be appropriate.    Lg Doherty, DO on 6/12/2025 at 10:49 AM

## 2025-06-12 NOTE — LETTER
6/12/2025      Adrianna Pereira  [address] [address] [place] [id number]      Dear Colleague,    Thank you for referring your patient, Adrianna Pereira, to the [facility]. Please see a copy of my visit note below.    Some leaking of G tube.  Still dependent for all nutritional needs.    After verbal consent, new gastrostomy provided by patient assessed, balloon intact.  Minimal saline in prior balloon.  The old tube was removed intact without difficulty and new tube passed without resistance.  Gastric contents immediately visible in gastrostomy.  Balloon inflated.  Bolster approximated to skin at 2.5 cm, spins freely.  Dressing applied.     Discussed converting to PEDRO-Key if desired next time.  Patient is interested if available; 2.5 cm 18 Fr would be appropriate.    Lg Doherty DO on 6/12/2025 at 10:49 AM         Again, thank you for allowing me to participate in the care of your patient.        Sincerely,        Lg Dohrety DO    Electronically signed

## 2025-06-13 ENCOUNTER — HOME INFUSION BILLING (OUTPATIENT)
Dept: HOME HEALTH SERVICES | Facility: HOME HEALTH | Age: 65
End: 2025-06-13
Payer: COMMERCIAL

## 2025-06-13 PROCEDURE — B4150 EF COMPLET W/INTACT NUTRIENT: HCPCS

## 2025-06-13 PROCEDURE — S9341 HIT ENTERAL GRAV DIEM: HCPCS

## 2025-06-14 PROCEDURE — S9341 HIT ENTERAL GRAV DIEM: HCPCS

## 2025-06-15 PROCEDURE — S9341 HIT ENTERAL GRAV DIEM: HCPCS

## 2025-06-16 PROCEDURE — S9341 HIT ENTERAL GRAV DIEM: HCPCS

## 2025-06-17 PROCEDURE — S9341 HIT ENTERAL GRAV DIEM: HCPCS

## 2025-06-18 PROCEDURE — A4450 NON-WATERPROOF TAPE: HCPCS

## 2025-06-18 PROCEDURE — A6402 STERILE GAUZE <= 16 SQ IN: HCPCS

## 2025-06-18 PROCEDURE — S9341 HIT ENTERAL GRAV DIEM: HCPCS

## 2025-06-19 PROCEDURE — S9341 HIT ENTERAL GRAV DIEM: HCPCS

## 2025-06-20 PROCEDURE — S9341 HIT ENTERAL GRAV DIEM: HCPCS

## 2025-06-21 PROCEDURE — S9341 HIT ENTERAL GRAV DIEM: HCPCS

## 2025-06-22 PROCEDURE — S9341 HIT ENTERAL GRAV DIEM: HCPCS

## 2025-06-23 ENCOUNTER — MYC REFILL (OUTPATIENT)
Dept: ONCOLOGY | Facility: CLINIC | Age: 65
End: 2025-06-23
Payer: COMMERCIAL

## 2025-06-23 DIAGNOSIS — G89.3 CANCER RELATED PAIN: ICD-10-CM

## 2025-06-23 PROCEDURE — S9341 HIT ENTERAL GRAV DIEM: HCPCS

## 2025-06-23 RX ORDER — OXYCODONE HCL 5 MG/5 ML
5-10 SOLUTION, ORAL ORAL EVERY 6 HOURS PRN
Qty: 500 ML | Refills: 0 | Status: SHIPPED | OUTPATIENT
Start: 2025-06-23

## 2025-06-24 PROCEDURE — S9341 HIT ENTERAL GRAV DIEM: HCPCS

## 2025-06-25 PROCEDURE — S9341 HIT ENTERAL GRAV DIEM: HCPCS

## 2025-06-26 PROCEDURE — S9341 HIT ENTERAL GRAV DIEM: HCPCS

## 2025-06-27 PROCEDURE — S9341 HIT ENTERAL GRAV DIEM: HCPCS

## 2025-06-28 PROCEDURE — S9341 HIT ENTERAL GRAV DIEM: HCPCS

## 2025-06-29 PROCEDURE — S9341 HIT ENTERAL GRAV DIEM: HCPCS

## 2025-06-30 PROCEDURE — S9341 HIT ENTERAL GRAV DIEM: HCPCS

## 2025-07-01 PROCEDURE — S9341 HIT ENTERAL GRAV DIEM: HCPCS

## 2025-07-02 PROCEDURE — S9341 HIT ENTERAL GRAV DIEM: HCPCS

## 2025-07-03 PROCEDURE — S9341 HIT ENTERAL GRAV DIEM: HCPCS

## 2025-07-04 PROCEDURE — S9341 HIT ENTERAL GRAV DIEM: HCPCS

## 2025-07-05 PROCEDURE — S9341 HIT ENTERAL GRAV DIEM: HCPCS

## 2025-07-06 PROCEDURE — S9341 HIT ENTERAL GRAV DIEM: HCPCS

## 2025-07-07 ENCOUNTER — MYC REFILL (OUTPATIENT)
Dept: ONCOLOGY | Facility: CLINIC | Age: 65
End: 2025-07-07
Payer: COMMERCIAL

## 2025-07-07 DIAGNOSIS — G89.3 CANCER RELATED PAIN: ICD-10-CM

## 2025-07-07 PROCEDURE — S9341 HIT ENTERAL GRAV DIEM: HCPCS

## 2025-07-08 PROCEDURE — S9341 HIT ENTERAL GRAV DIEM: HCPCS

## 2025-07-08 RX ORDER — OXYCODONE HCL 5 MG/5 ML
5-10 SOLUTION, ORAL ORAL EVERY 6 HOURS PRN
Qty: 500 ML | Refills: 0 | Status: SHIPPED | OUTPATIENT
Start: 2025-07-08

## 2025-07-09 ENCOUNTER — PATIENT OUTREACH (OUTPATIENT)
Dept: ONCOLOGY | Facility: CLINIC | Age: 65
End: 2025-07-09

## 2025-07-09 ENCOUNTER — HOME INFUSION BILLING (OUTPATIENT)
Dept: HOME HEALTH SERVICES | Facility: HOME HEALTH | Age: 65
End: 2025-07-09
Payer: COMMERCIAL

## 2025-07-09 ENCOUNTER — INFUSION THERAPY VISIT (OUTPATIENT)
Dept: INFUSION THERAPY | Facility: CLINIC | Age: 65
End: 2025-07-09
Attending: INTERNAL MEDICINE
Payer: COMMERCIAL

## 2025-07-09 ENCOUNTER — ONCOLOGY VISIT (OUTPATIENT)
Dept: ONCOLOGY | Facility: CLINIC | Age: 65
End: 2025-07-09
Attending: INTERNAL MEDICINE
Payer: COMMERCIAL

## 2025-07-09 ENCOUNTER — HOSPITAL ENCOUNTER (OUTPATIENT)
Dept: ULTRASOUND IMAGING | Facility: CLINIC | Age: 65
Discharge: HOME | End: 2025-07-09
Attending: NURSE PRACTITIONER
Payer: COMMERCIAL

## 2025-07-09 ENCOUNTER — HOME INFUSION (OUTPATIENT)
Dept: HOME HEALTH SERVICES | Facility: HOME HEALTH | Age: 65
End: 2025-07-09
Payer: COMMERCIAL

## 2025-07-09 VITALS
BODY MASS INDEX: 15.19 KG/M2 | WEIGHT: 89 LBS | DIASTOLIC BLOOD PRESSURE: 61 MMHG | HEIGHT: 64 IN | SYSTOLIC BLOOD PRESSURE: 95 MMHG | HEART RATE: 125 BPM | OXYGEN SATURATION: 96 % | TEMPERATURE: 97.7 F | RESPIRATION RATE: 16 BRPM

## 2025-07-09 DIAGNOSIS — C79.51 MALIGNANT NEOPLASM METASTATIC TO BONE (H): ICD-10-CM

## 2025-07-09 DIAGNOSIS — R60.0 EDEMA OF LEFT UPPER ARM: ICD-10-CM

## 2025-07-09 DIAGNOSIS — C04.9 MALIGNANT NEOPLASM OF FLOOR OF MOUTH, UNSPECIFIED (H): ICD-10-CM

## 2025-07-09 DIAGNOSIS — C06.9 SQUAMOUS CELL CARCINOMA OF ORAL CAVITY (H): ICD-10-CM

## 2025-07-09 DIAGNOSIS — E43 UNSPECIFIED SEVERE PROTEIN-CALORIE MALNUTRITION: Primary | ICD-10-CM

## 2025-07-09 DIAGNOSIS — I82.C11 INTERNAL JUGULAR (IJ) VEIN THROMBOEMBOLISM, ACUTE, RIGHT (H): Primary | ICD-10-CM

## 2025-07-09 DIAGNOSIS — R77.0 LOW SERUM ALBUMIN: ICD-10-CM

## 2025-07-09 DIAGNOSIS — C79.51 MALIGNANT NEOPLASM METASTATIC TO BONE (H): Primary | ICD-10-CM

## 2025-07-09 DIAGNOSIS — C78.00 MALIGNANT NEOPLASM METASTATIC TO LUNG, UNSPECIFIED LATERALITY (H): ICD-10-CM

## 2025-07-09 DIAGNOSIS — R11.2 NAUSEA AND VOMITING, UNSPECIFIED VOMITING TYPE: ICD-10-CM

## 2025-07-09 LAB
ALBUMIN SERPL BCG-MCNC: 1.7 G/DL (ref 3.5–5.2)
ALBUMIN UR-MCNC: 20 MG/DL
ALP SERPL-CCNC: 237 U/L (ref 40–150)
ALT SERPL W P-5'-P-CCNC: 48 U/L (ref 0–50)
ANION GAP SERPL CALCULATED.3IONS-SCNC: 8 MMOL/L (ref 7–15)
APPEARANCE UR: CLEAR
AST SERPL W P-5'-P-CCNC: 34 U/L (ref 0–45)
BASOPHILS # BLD AUTO: 0 10E3/UL (ref 0–0.2)
BASOPHILS NFR BLD AUTO: 0 %
BILIRUB SERPL-MCNC: 0.2 MG/DL
BILIRUB UR QL STRIP: NEGATIVE
BUN SERPL-MCNC: 19.3 MG/DL (ref 8–23)
CALCIUM SERPL-MCNC: 7.4 MG/DL (ref 8.8–10.4)
CHLORIDE SERPL-SCNC: 107 MMOL/L (ref 98–107)
COLOR UR AUTO: YELLOW
CREAT SERPL-MCNC: 0.61 MG/DL (ref 0.51–0.95)
EGFRCR SERPLBLD CKD-EPI 2021: >90 ML/MIN/1.73M2
EOSINOPHIL # BLD AUTO: 0 10E3/UL (ref 0–0.7)
EOSINOPHIL NFR BLD AUTO: 0 %
ERYTHROCYTE [DISTWIDTH] IN BLOOD BY AUTOMATED COUNT: 15.8 % (ref 10–15)
GLUCOSE SERPL-MCNC: 100 MG/DL (ref 70–99)
GLUCOSE UR STRIP-MCNC: NEGATIVE MG/DL
HCO3 SERPL-SCNC: 22 MMOL/L (ref 22–29)
HCT VFR BLD AUTO: 36.6 % (ref 35–47)
HGB BLD-MCNC: 12.1 G/DL (ref 11.7–15.7)
HGB UR QL STRIP: NEGATIVE
HYALINE CASTS: 4 /LPF
IMM GRANULOCYTES # BLD: 0 10E3/UL
IMM GRANULOCYTES NFR BLD: 0 %
KETONES UR STRIP-MCNC: NEGATIVE MG/DL
LEUKOCYTE ESTERASE UR QL STRIP: NEGATIVE
LYMPHOCYTES # BLD AUTO: 1.1 10E3/UL (ref 0.8–5.3)
LYMPHOCYTES NFR BLD AUTO: 11 %
MCH RBC QN AUTO: 33.9 PG (ref 26.5–33)
MCHC RBC AUTO-ENTMCNC: 33.1 G/DL (ref 31.5–36.5)
MCV RBC AUTO: 103 FL (ref 78–100)
MONOCYTES # BLD AUTO: 0.5 10E3/UL (ref 0–1.3)
MONOCYTES NFR BLD AUTO: 5 %
MUCOUS THREADS #/AREA URNS LPF: PRESENT /LPF
NEUTROPHILS # BLD AUTO: 8.4 10E3/UL (ref 1.6–8.3)
NEUTROPHILS NFR BLD AUTO: 83 %
NITRATE UR QL: NEGATIVE
NRBC # BLD AUTO: 0 10E3/UL
NRBC BLD AUTO-RTO: 0 /100
PH UR STRIP: 6 [PH] (ref 5–7)
PLATELET # BLD AUTO: 654 10E3/UL (ref 150–450)
POTASSIUM SERPL-SCNC: 4.3 MMOL/L (ref 3.4–5.3)
PROT SERPL-MCNC: 4.4 G/DL (ref 6.4–8.3)
RADIOLOGIST FLAGS: ABNORMAL
RBC # BLD AUTO: 3.57 10E6/UL (ref 3.8–5.2)
RBC URINE: 1 /HPF
SODIUM SERPL-SCNC: 137 MMOL/L (ref 135–145)
SP GR UR STRIP: 1.03 (ref 1–1.03)
SQUAMOUS EPITHELIAL: 1 /HPF
TSH SERPL DL<=0.005 MIU/L-ACNC: 2.48 UIU/ML (ref 0.3–4.2)
UROBILINOGEN UR STRIP-MCNC: NORMAL MG/DL
WBC # BLD AUTO: 10.1 10E3/UL (ref 4–11)
WBC URINE: 1 /HPF

## 2025-07-09 PROCEDURE — 93971 EXTREMITY STUDY: CPT | Mod: LT

## 2025-07-09 PROCEDURE — 84443 ASSAY THYROID STIM HORMONE: CPT | Performed by: INTERNAL MEDICINE

## 2025-07-09 PROCEDURE — 80053 COMPREHEN METABOLIC PANEL: CPT | Performed by: INTERNAL MEDICINE

## 2025-07-09 PROCEDURE — 81001 URINALYSIS AUTO W/SCOPE: CPT | Performed by: NURSE PRACTITIONER

## 2025-07-09 PROCEDURE — 36415 COLL VENOUS BLD VENIPUNCTURE: CPT | Performed by: INTERNAL MEDICINE

## 2025-07-09 PROCEDURE — 250N000011 HC RX IP 250 OP 636: Mod: JZ | Performed by: INTERNAL MEDICINE

## 2025-07-09 PROCEDURE — 99215 OFFICE O/P EST HI 40 MIN: CPT | Performed by: NURSE PRACTITIONER

## 2025-07-09 PROCEDURE — 85025 COMPLETE CBC W/AUTO DIFF WBC: CPT | Performed by: INTERNAL MEDICINE

## 2025-07-09 PROCEDURE — 250N000011 HC RX IP 250 OP 636: Performed by: INTERNAL MEDICINE

## 2025-07-09 PROCEDURE — G2211 COMPLEX E/M VISIT ADD ON: HCPCS | Performed by: NURSE PRACTITIONER

## 2025-07-09 PROCEDURE — G0463 HOSPITAL OUTPT CLINIC VISIT: HCPCS | Performed by: NURSE PRACTITIONER

## 2025-07-09 PROCEDURE — S9341 HIT ENTERAL GRAV DIEM: HCPCS

## 2025-07-09 RX ORDER — APIXABAN 5 MG (74)
KIT ORAL
Qty: 1 EACH | Refills: 0 | Status: SHIPPED | OUTPATIENT
Start: 2025-07-09 | End: 2025-08-08

## 2025-07-09 RX ORDER — AMINO AC/PROTEIN HYDR/WHEY PRO 10G-100/30
45 LIQUID (ML) ORAL DAILY
Qty: 1350 ML | Refills: 11 | Status: DISPENSED | OUTPATIENT
Start: 2025-07-09 | End: 2026-07-09

## 2025-07-09 RX ORDER — HEPARIN SODIUM (PORCINE) LOCK FLUSH IV SOLN 100 UNIT/ML 100 UNIT/ML
SOLUTION INTRAVENOUS
Status: COMPLETED
Start: 2025-07-09 | End: 2025-07-09

## 2025-07-09 RX ORDER — NUTRITIONAL SUPPLEMENT 0.04G-1/ML
250 LIQUID (ML) ORAL 3 TIMES DAILY
Qty: 22500 ML | Refills: 11 | Status: DISPENSED | OUTPATIENT
Start: 2025-07-09 | End: 2026-07-09

## 2025-07-09 RX ORDER — ONDANSETRON 8 MG/1
8 TABLET, FILM COATED ORAL EVERY 8 HOURS PRN
Qty: 30 TABLET | Refills: 1 | Status: SHIPPED | OUTPATIENT
Start: 2025-07-09

## 2025-07-09 RX ADMIN — HEPARIN SODIUM (PORCINE) LOCK FLUSH IV SOLN 100 UNIT/ML: 100 SOLUTION at 11:45

## 2025-07-09 RX ADMIN — ALTEPLASE 2 MG: 2.2 INJECTION, POWDER, LYOPHILIZED, FOR SOLUTION INTRAVENOUS at 10:27

## 2025-07-09 ASSESSMENT — PAIN SCALES - GENERAL: PAINLEVEL_OUTOF10: MODERATE PAIN (5)

## 2025-07-09 NOTE — PROGRESS NOTES
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 Unfortunately, within just a few months of completing on her initial restaging, was found to have metastatic recurrence.     She had palliative RT to painful thoracic bone mets initially, then started palliative pembrolizumab 16 mths ago. Tolerating well.     4/2025 PET/CT showed continued HI. No sites of progression.    Labwork:  CMP: Alk phos 237, rest LFTs WNL. Albumin 1.7. Creatinine WNL. CBC: Hgb WNL, , plat 654. TSH WNL.    She's had overall decline over last 2-3 months. She is generally fatigued and weaker. Her nutrition status is suboptimal. She has more pain and symptoms related to the jaw wound area related to smoldering infection.     Plan:  -Defer pembrolizumab today to address other issues.   -Return in 2-3 weeks with updated PET scan and resume pembro if no progressive cancer and pt stable to do so.   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    Open oral/jaw wound  Smoldering local infections  Plate exposure + open wound over lower lip secondary to residual/recurrent tumor at prior ENT resection + adjuvant RT site. Draining lessening. No fevers. Reconstruction would require additional free flap, so not being pursued at this time, in setting of widely metastatic disease, after discussion with  ENT surgeon Dr. Thorne. Dr. Egan did not recommend additional radiation.    Met with Wound Care MD.   Supportive management interventions to help with drainage, odor and decreasing bacterial growth were provided.     She has recurrent smoldering infections along jaw, responsive to Augmentin. Usually, will have more swelling/pain that responds to Augmentin. Has been needing it monthly.  Discussed with ENT, who feels she will need this chronically due to smoldering infection and only way to try to clear it would be through large surgery and removal of non-viable bone.     Plan:  -Pt to follow [facility] recs for mgmt and return PRN.  -Follow-up with ENT every 3-6 months/PRN  -Augmentin as needed for recurrent local jaw/wound infection (has been requiring about every 1 mth). Standing refills sent to pharmacy.  -Pt has been offered reconstruction surgery to help address her local symptoms of pain and infection, but she's undecided about whether she wants to proceed.     Nutrition/hydration  Post-prandial emesis/early satiety  Low albumin, low calcium  Dependant on feeding tube.   PEG last exchanged 1/2025 and scheduled next month.    Over last few months, she has more mucus secretions she swallows. She believes this causes her gastric upset and early satiety so she is tolerating less feeding volume. She was having sporadic emesis after feedings (2/week) so is getting less than her goal/day. Trialed Reglan prior to feedings but had significant diarrhea, so stopped. Nausea continues to be a barrier.     Although her weight is stable, she has had progressive and significant decline in albumin. Calcium low (7.3) but 8.4 when corrected for albumin.     LFTs and creatinine WNL. No edema. Nephrotic syndrome cannot be ruled out. Chronic/recurrent infection related to jaw wound + malnutrition/inadequate protein likely contributing largely to the lower albumin.     Plan:  -UA micro to assess for proteinuria/possible nephrotic  syndrome. If this demonstrates protein, will refer to Nephrology.   -Rx for Zofran pre-feedings sent  -Robitussin via PEG to help thin secretions, deferred this to re-evaluation with Reglan.  -Message sent to Dietician to ensure optimal protein/nutrition through her current feedings/feeding site. May need to consider different delivery, such as PEJ  -Exchange PEG every 3-6 mths with [facility] [place] (next due June)    ADDENDUM:  No sign of nephrotic syndrome on UA. Albumin 20 mg/dL    Cancer pain  Completed palliative RT to T-spine 3/2024. Back pain is resolved. No longer requiring pain meds for this.     Mouth/lower jaw pain related to progressive reconstruction failure/open wound. Pain has gradually progressed over time. Primarily managing well with Tylenol 500 mg 3/day and oxycodone 10 mg twice/day (occasionally takes 6-8 ml mid-day for TID).     Plan:  -Continue Tylenol, can take up to 3000 mg/day.   -Continue oxycodone 5-10 mg every 6 hrs PRN. Oxycodone refilled today.  -Consider long-acting pain med (likley in patch form since NPO) if oxycodone use increases  -Referral to Palliative Care if not well-controlled in future    Acute occlusive R internal jugular thrombus, r/t port  LUE edema, NOS  Acute LUE edema this week.   Doppler was done to rule out DVT which is negative on left side; however, incidentally occlusive right jugular thrombus noted (on side of port).     Plan:  -Initiate Eliquis 10 mg BID x 7 days, then 5 mg BID indefinitely as long as port remains in place. Appears this can be crushed and used in PEG  -Etiology of LUE edema unclear, possibly related to low albumin but would expect more generalized edema. Elevate LUE. Call with progressive symptoms.     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   On observation.   ______________________________________________________________________________    History of Present Illness/  Interval History    Ms. Adrianna Pereira is a 64 year old who completed resection of floor of mouth cancer, followed by adjuvant chemoRT 12/2023. Unfortunately, shortly after this, she was found to have local and metastatic recurrence involving mandible, lung, bone (T3-T4) and soft tissue that is PDL1 70%.     She started palliative treatments with pembrolizumab in February, 2024 (16 months ago). She's had a good response - near CR.     Tolerating pembrolizumab generally well. No diarrhea. No new dyspnea/cough.     Over last few months, has had general overall decline in stamina and feels very weak. Is less active and feels unsteady on her feet.     All nutrition/hydration via PEG. Had been tolerating bolus feedings well and keeping stable weight. Over last 3 mths, is having early satiety and intermittent nausea/emesis after feedings. She thinks it's due to swallowing upper airway mucus causing nausea. Started Reglan prior to feedings but stopped for diarrhea. She's getting about 2/3 of her feeding goal each day.     Jaw/mouth pain due to plate exposure of lip/open wound at prior ENT resection site due to local recurrence persists. Managing with Tylenol and oxycodone 10 mg BID most days, occasionally adds 6-8 ml mid-day.  Pain has progressed over time. 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 Assessed by ENT surgeon and reconstruction would require additional free flap, so was deferred in context of met cancer. Now that she's gotten a good response in her cancer, ENT has offered to reconsider reconstruction flap for attempt at palliation but patient is unsure if she wants to undergo a big surgery.     No new sites of pain.    1-week history LUE swelling from elbow to hand. No pain. No redness.   No other edema.       ECOG Performance    2    Oncology History/Treatment  Diagnosis/Stage:   7/2023: [id number] Floor of mouth squamous cell cancer  -hx alcoholism (stopped all use) and smoking  (quit 8/2023)  -hx premalignant oral cavity lesions s/p CO2 laser ablation with ENT  -presented with lower lip/chin swelling  -7/3/2023 left mandibular alveolus biopsy: invasive keratinizing moderately differential squamous cell carcinoma.   -PET: 4.4 x 3.7 x 3.2 cm anterior oral cavity mass invading into the mandible with a separate gluco-avid nodule on the right mandibular buccal mucosa and bilateral level 1B, level 2 and L3 metastatic lymphadenopathy without evidence of metastatic disease.   -8/17/2023 surgical path: pT4a, N3b tumor with positive left anterior lateral soft tissue margin, 11/98+ lymph nodes including STEFFEN (largest 4 cm) and several bilateral lymph nodes consistent with involvement by low-grade B-cell neoplasm suggestive of CLL/SLL.     2/2024: Progression to stage IV mouth squamous cell cancer to bone and lung  -Within weeks of completing definitive treatment for her primary disease, noted severe/progressive back pain  -CT C/T spine: new T3 compression fracture with hypodensity in vertebral body extending into L posterior elements suggestive of met. T4 posterior vertebral body lesion also suspicious. New lung nodules also noted  -MRI T spine: T3 compression fracture with extraosseous tumor involving ventral epidural space with mod - severe central spinal canal stenosis at T3  -PET: recurrent avid foci L maikel-mandible, numerous bilateral pulmonary/pleural mets, met mediastinal/hilar adenopathy, T3, T4, L adductor musculature and R upper thigh skin thickening  -PDL1 TPS and CPS 70% from 8/2023 original tumor biopsy    Treatment:  Locally advanced disease  -8/17/2023: segmental mandibulectomy, floor of mouth resection, anterior glossectomy, left fibular free flap, skin grafting, and tracheostomy.   -Post-op course complicated by skin necrosis requiring OR debridment and resuturing of neck incision/intraoral flap    -10/20/2023 - 12/5/2023: completed adjuvant concurrent RT (6600 cGy/33) + weekly  "cisplatin (x 7 cycles)    Metastatic recurrence  -2/21/2024 - present: pembrolizumab. Changed to 400mg dosing 1 6 weeks 12/11/2024  -Zometa started x 1 dose for bone mets, later held for jaw osteonecrosis risk    -2/26 - 3/12/2024: palliative RT to thoracic spine, 2500 cGy/10. Dex taper      Physical Exam  BP 95/61 (BP Location: Left arm, Patient Position: Sitting, Cuff Size: Adult Small)   Pulse (!) 125   Temp 97.7  F (36.5  C) (Tympanic)   Resp 16   Ht 1.626 m (5' 4\")   Wt 40.4 kg (89 lb)   SpO2 96%   BMI 15.28 kg/m      GENERAL: Alert and oriented to time place and person. Seated comfortably. In no distress.  Dysarthria.   accompanied.  HEENT: Open wound below lower lip with exposed plate. Scant white/yellow appearing odorous drainage. Persistent lymphedema of mandible area, no redness.  SHAE, EOMI. No erythema. No icterus.  NECK: Post-RT fibrosis. No evident adenopathy/masses.  HEART: RRR  CHEST: regular respiratory effort. Lungs normal to percussion and auscultation. Right port site without edema, redness.  ABD: PEG in place. Non-distended  EXTREMITIES: LUE edema from elbow through hand, no redness nor pain. No axillary mass/edema. No RUE nor LE edema.   NEURO: No gross deficit noted.     Imaging    4/10/2025  PET : stable/near CR    Lab Results    Recent Results (from the past week)   Comprehensive metabolic panel   Result Value Ref Range    Sodium 137 135 - 145 mmol/L    Potassium 4.3 3.4 - 5.3 mmol/L    Carbon Dioxide (CO2) 22 22 - 29 mmol/L    Anion Gap 8 7 - 15 mmol/L    Urea Nitrogen 19.3 8.0 - 23.0 mg/dL    Creatinine 0.61 0.51 - 0.95 mg/dL    GFR Estimate >90 >60 mL/min/1.73m2    Calcium 7.4 (L) 8.8 - 10.4 mg/dL    Chloride 107 98 - 107 mmol/L    Glucose 100 (H) 70 - 99 mg/dL    Alkaline Phosphatase 237 (H) 40 - 150 U/L    AST 34 0 - 45 U/L    ALT 48 0 - 50 U/L    Protein Total 4.4 (L) 6.4 - 8.3 g/dL    Albumin 1.7 (L) 3.5 - 5.2 g/dL    Bilirubin Total 0.2 <=1.2 mg/dL   TSH with free T4 " reflex   Result Value Ref Range    TSH 2.48 0.30 - 4.20 uIU/mL   CBC with platelets and differential   Result Value Ref Range    WBC Count 10.1 4.0 - 11.0 10e3/uL    RBC Count 3.57 (L) 3.80 - 5.20 10e6/uL    Hemoglobin 12.1 11.7 - 15.7 g/dL    Hematocrit 36.6 35.0 - 47.0 %     (H) 78 - 100 fL    MCH 33.9 (H) 26.5 - 33.0 pg    MCHC 33.1 31.5 - 36.5 g/dL    RDW 15.8 (H) 10.0 - 15.0 %    Platelet Count 654 (H) 150 - 450 10e3/uL    % Neutrophils 83 %    % Lymphocytes 11 %    % Monocytes 5 %    % Eosinophils 0 %    % Basophils 0 %    % Immature Granulocytes 0 %    NRBCs per 100 WBC 0 <1 /100    Absolute Neutrophils 8.4 (H) 1.6 - 8.3 10e3/uL    Absolute Lymphocytes 1.1 0.8 - 5.3 10e3/uL    Absolute Monocytes 0.5 0.0 - 1.3 10e3/uL    Absolute Eosinophils 0.0 0.0 - 0.7 10e3/uL    Absolute Basophils 0.0 0.0 - 0.2 10e3/uL    Absolute Immature Granulocytes 0.0 <=0.4 10e3/uL    Absolute NRBCs 0.0 10e3/uL   UA with Microscopic - lab collect   Result Value Ref Range    Color Urine Yellow Colorless, Straw, Light Yellow, Yellow    Appearance Urine Clear Clear    Glucose Urine Negative Negative mg/dL    Bilirubin Urine Negative Negative    Ketones Urine Negative Negative mg/dL    Specific Gravity Urine 1.028 1.003 - 1.035    Blood Urine Negative Negative    pH Urine 6.0 5.0 - 7.0    Protein Albumin Urine 20 (A) Negative mg/dL    Urobilinogen Urine Normal Normal mg/dL    Nitrite Urine Negative Negative    Leukocyte Esterase Urine Negative Negative    Mucus Urine Present (A) None Seen /LPF    RBC Urine 1 <=2 /HPF    WBC Urine 1 <=5 /HPF    Squamous Epithelials Urine 1 <=1 /HPF    Hyaline Casts Urine 4 (H) <=2 /LPF   US Upper Extremity Venous Duplex Left   Result Value Ref Range    Radiologist flags Right internal jugular vein occlusive DVT (Urgent)          Total time 50 minutes, to include face to face visit, review of EMR, ordering, documentation and coordination of care on date of service.    complexity modifier for  longitudinal care.     Signed by: Marina Cronin, NP

## 2025-07-09 NOTE — PROGRESS NOTES
"[facility] Nutrition Assessment     Type of therapy: Enteral  Information obtained from: Phone call with Duane, patient's spouse. Message from Marina Cronin NP.    Anthropometrics/Weight Goals  Height: 1.626 m (5' 4\")  Weight: 40.4 kg (89 lb)    Nutrition and Medical History  Reason for Nutrition Support: NPO  Feeding tube type: PEG  Date Placed: -- (Exchanged 1/21/25)    Enteral Nutrition Orders  Enteral Formula: Nutren 2.0 via gravity feed. + 1 pkt Prosource TF.  Rate/Frequency: 1 carton Nutren 2.0 TID + 1 pkt Prosource TF.  Water Flushes: 60 mL before and after each feed, 30 mL before and after each prosource TF packet, plus additional 200 mL  Enteral Nutrition Provides: 3 cartons Nutren 2.0 provides 1500 kcal, 63 gm protein, and 519 mL free water. + 1 pkt Prosource TF provides 40 kcal and 11 gm protein. Total provisions: 1540 kcal and 74 gm protein.    Assessed Nutritional Needs  Kcal Needs: 1521-3108+ kcal (30-35+ kcal/kg); repletion  Protein Needs: 45-55+ gm pro (1.2-1.5+ gm/kg); repletion  Fluid Needs: 1 mL/kcal  Nutrition Support is meeting what percent of needs: 100%    Lab Information  Labs: Albumin 1.7L    Implementation  Intervention: Phone call with Duane. Reports that Adrianna is getting ~2 cartons of [employer] [place] Standard 1.4 daily. Reports that Adrianna has nausea and early satiety with feeds. Discussed using a pump for feeds, reports that Adrianna doesn't like being hooked up to the tube feeds and would prefer not to try that at this time. Discussed a trial of a lower volume tube feed instead. Also discussed restarting Prosource TF packet, 1 per day. Sent message to Marina Cronin NP regarding update.  Education: Trial Nutren 2.0, initiate with 1 carton [place] Standard 1.4 + 1 carton Nutren 2.0. Then change to Nutren 2.0, goal 1 carton 3x per day. + 1 pkt Prosource TF daily. Encouraged using Zofran PRN.  Goals: Weight stable vs gain.    Plan  Follow up/Recommendations: Monitor tolerance and weight " trends.    Ashley Cao RD, [facility]  Clinical Dietitian  [facility]   [phone]  [facility] Main Line: [phone]

## 2025-07-09 NOTE — PROGRESS NOTES
Infusion Nursing Note:  Adrianna Pereira presents today for Hold Keytruda today.    Patient seen by provider today: Yes: Marina Cronin NP   present during visit today: Not Applicable.    Note: See provider note.      Intravenous Access:  Implanted Port. NS flush and Hep lock.    Pt only in infusion clinic for port access and de access.        Yakelin Gilman RN

## 2025-07-09 NOTE — PROGRESS NOTES
PAC labs to be drawn but there was no blood returned. Peripheral labs drawn by lab. TPA was placed in port access at 1027. 1130 Blood return noted, NS flushed and hep locked.     Yakelin Gilman RN

## 2025-07-09 NOTE — LETTER
7/9/2025      Adrianna Pereira  [address] [address] [place] [id number]      Dear Colleague,    Thank you for referring your patient, Adrianna Pereira, to the [facility] [place]. Please see a copy of my visit note below.    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 Unfortunately, within just a few months of completing on her initial restaging, was found to have metastatic recurrence.     She had palliative RT to painful thoracic bone mets initially, then started palliative pembrolizumab 16 mths ago. Tolerating well.     4/2025 PET/CT showed continued DC. No sites of progression.    Labwork:  CMP: Alk phos 237, rest LFTs WNL. Albumin 1.7. Creatinine WNL. CBC: Hgb WNL, , plat 654. TSH WNL.    She's had overall decline over last 2-3 months. She is generally fatigued and weaker. Her nutrition status is suboptimal. She has more pain and symptoms related to the jaw wound area related to smoldering infection.     Plan:  -Defer pembrolizumab today to address other issues.   -Return in 2-3 weeks with updated PET scan and resume pembro if no progressive cancer and pt stable to do so.   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    Open oral/jaw wound  Smoldering local infections  Plate exposure + open wound over lower lip secondary to residual/recurrent  tumor at prior ENT resection + adjuvant RT site. Draining lessening. No fevers. Reconstruction would require additional free flap, so not being pursued at this time, in setting of widely metastatic disease, after discussion with ENT surgeon Dr. Thorne. Dr. Egan did not recommend additional radiation.    Met with Wound Care MD.   Supportive management interventions to help with drainage, odor and decreasing bacterial growth were provided.     She has recurrent smoldering infections along jaw, responsive to Augmentin. Usually, will have more swelling/pain that responds to Augmentin. Has been needing it monthly.  Discussed with ENT, who feels she will need this chronically due to smoldering infection and only way to try to clear it would be through large surgery and removal of non-viable bone.     Plan:  -Pt to follow [facility]ound Clinic recs for mgmt and return PRN.  -Follow-up with ENT every 3-6 months/PRN  -Augmentin as needed for recurrent local jaw/wound infection (has been requiring about every 1 mth). Standing refills sent to pharmacy.  -Pt has been offered reconstruction surgery to help address her local symptoms of pain and infection, but she's undecided about whether she wants to proceed.     Nutrition/hydration  Post-prandial emesis/early satiety  Low albumin, low calcium  Dependant on feeding tube.   PEG last exchanged 1/2025 and scheduled next month.    Over last few months, she has more mucus secretions she swallows. She believes this causes her gastric upset and early satiety so she is tolerating less feeding volume. She was having sporadic emesis after feedings (2/week) so is getting less than her goal/day. Trialed Reglan prior to feedings but had significant diarrhea, so stopped. Nausea continues to be a barrier.     Although her weight is stable, she has had progressive and significant decline in albumin. Calcium low (7.3) but 8.4 when corrected for albumin.     LFTs and creatinine WNL. No edema. Nephrotic  syndrome cannot be ruled out. Chronic/recurrent infection related to jaw wound + malnutrition/inadequate protein likely contributing largely to the lower albumin.     Plan:  -UA micro to assess for proteinuria/possible nephrotic syndrome. If this demonstrates protein, will refer to Nephrology.   -Rx for Zofran pre-feedings sent  -Robitussin via PEG to help thin secretions, deferred this to re-evaluation with Reglan.  -Message sent to Dietician to ensure optimal protein/nutrition through her current feedings/feeding site. May need to consider different delivery, such as PEJ  -Exchange PEG every 3-6 mths with [facility] [place] (next due June)    Cancer pain  Completed palliative RT to T-spine 3/2024. Back pain is resolved. No longer requiring pain meds for this.     Mouth/lower jaw pain related to progressive reconstruction failure/open wound. Pain has gradually progressed over time. Primarily managing well with Tylenol 500 mg 3/day and oxycodone 10 mg twice/day (occasionally takes 6-8 ml mid-day for TID).     Plan:  -Continue Tylenol, can take up to 3000 mg/day.   -Continue oxycodone 5-10 mg every 6 hrs PRN. Oxycodone refilled today.  -Consider long-acting pain med (likley in patch form since NPO) if oxycodone use increases  -Referral to Palliative Care if not well-controlled in future    Acute occlusive R internal jugular thrombus, r/t port  LUE edema, NOS  Acute LUE edema this week.   Doppler was done to rule out DVT which is negative on left side; however, incidentally occlusive right jugular thrombus noted (on side of port).     Plan:  -Initiate Eliquis 10 mg BID x 7 days, then 5 mg BID indefinitely as long as port remains in place. Appears this can be crushed and used in PEG  -Etiology of LUE edema unclear, possibly related to low albumin but would expect more generalized edema. Elevate LUE. Call with progressive symptoms.     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   On observation.   ______________________________________________________________________________    History of Present Illness/ Interval History    Ms. Adrianna Pereira is a 64 year old who completed resection of floor of mouth cancer, followed by adjuvant chemoRT 12/2023. Unfortunately, shortly after this, she was found to have local and metastatic recurrence involving mandible, lung, bone (T3-T4) and soft tissue that is PDL1 70%.     She started palliative treatments with pembrolizumab in February, 2024 (16 months ago). She's had a good response - near CR.     Tolerating pembrolizumab generally well. No diarrhea. No new dyspnea/cough.     Over last few months, has had general overall decline in stamina and feels very weak. Is less active and feels unsteady on her feet.     All nutrition/hydration via PEG. Had been tolerating bolus feedings well and keeping stable weight. Over last 3 mths, is having early satiety and intermittent nausea/emesis after feedings. She thinks it's due to swallowing upper airway mucus causing nausea. Started Reglan prior to feedings but stopped for diarrhea. She's getting about 2/3 of her feeding goal each day.     Jaw/mouth pain due to plate exposure of lip/open wound at prior ENT resection site due to local recurrence persists. Managing with Tylenol and oxycodone 10 mg BID most days, occasionally adds 6-8 ml mid-day.  Pain has progressed over time. 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 Assessed by ENT surgeon and reconstruction would require additional free flap, so was deferred in context of met cancer. Now that she's gotten a good response in her cancer, ENT has offered to reconsider reconstruction flap for attempt at palliation but patient is unsure if she wants to undergo a big surgery.     No new sites of pain.    1-week history LUE swelling from elbow to hand. No pain. No redness.   No other edema.       ECOG  Performance    2    Oncology History/Treatment  Diagnosis/Stage:   7/2023: [id number] Floor of mouth squamous cell cancer  -hx alcoholism (stopped all use) and smoking (quit 8/2023)  -hx premalignant oral cavity lesions s/p CO2 laser ablation with ENT  -presented with lower lip/chin swelling  -7/3/2023 left mandibular alveolus biopsy: invasive keratinizing moderately differential squamous cell carcinoma.   -PET: 4.4 x 3.7 x 3.2 cm anterior oral cavity mass invading into the mandible with a separate gluco-avid nodule on the right mandibular buccal mucosa and bilateral level 1B, level 2 and L3 metastatic lymphadenopathy without evidence of metastatic disease.   -8/17/2023 surgical path: pT4a, N3b tumor with positive left anterior lateral soft tissue margin, 11/98+ lymph nodes including STEFFEN (largest 4 cm) and several bilateral lymph nodes consistent with involvement by low-grade B-cell neoplasm suggestive of CLL/SLL.     2/2024: Progression to stage IV mouth squamous cell cancer to bone and lung  -Within weeks of completing definitive treatment for her primary disease, noted severe/progressive back pain  -CT C/T spine: new T3 compression fracture with hypodensity in vertebral body extending into L posterior elements suggestive of met. T4 posterior vertebral body lesion also suspicious. New lung nodules also noted  -MRI T spine: T3 compression fracture with extraosseous tumor involving ventral epidural space with mod - severe central spinal canal stenosis at T3  -PET: recurrent avid foci L maikel-mandible, numerous bilateral pulmonary/pleural mets, met mediastinal/hilar adenopathy, T3, T4, L adductor musculature and R upper thigh skin thickening  -PDL1 TPS and CPS 70% from 8/2023 original tumor biopsy    Treatment:  Locally advanced disease  -8/17/2023: segmental mandibulectomy, floor of mouth resection, anterior glossectomy, left fibular free flap, skin grafting, and tracheostomy.   -Post-op course complicated by skin  "necrosis requiring OR debridment and resuturing of neck incision/intraoral flap    -10/20/2023 - 12/5/2023: completed adjuvant concurrent RT (6600 cGy/33) + weekly cisplatin (x 7 cycles)    Metastatic recurrence  -2/21/2024 - present: pembrolizumab. Changed to 400mg dosing 1 6 weeks 12/11/2024  -Zometa started x 1 dose for bone mets, later held for jaw osteonecrosis risk    -2/26 - 3/12/2024: palliative RT to thoracic spine, 2500 cGy/10. Dex taper      Physical Exam  BP 95/61 (BP Location: Left arm, Patient Position: Sitting, Cuff Size: Adult Small)   Pulse (!) 125   Temp 97.7  F (36.5  C) (Tympanic)   Resp 16   Ht 1.626 m (5' 4\")   Wt 40.4 kg (89 lb)   SpO2 96%   BMI 15.28 kg/m      GENERAL: Alert and oriented to time place and person. Seated comfortably. In no distress.  Dysarthria.   accompanied.  HEENT: Open wound below lower lip with exposed plate. Scant white/yellow appearing odorous drainage. Persistent lymphedema of mandible area, no redness.  SHAE, EOMI. No erythema. No icterus.  NECK: Post-RT fibrosis. No evident adenopathy/masses.  HEART: RRR  CHEST: regular respiratory effort. Lungs normal to percussion and auscultation. Right port site without edema, redness.  ABD: PEG in place. Non-distended  EXTREMITIES: LUE edema from elbow through hand, no redness nor pain. No axillary mass/edema. No RUE nor LE edema.   NEURO: No gross deficit noted.     Imaging    4/10/2025  PET : stable/near CR    Lab Results    Recent Results (from the past week)   Comprehensive metabolic panel   Result Value Ref Range    Sodium 137 135 - 145 mmol/L    Potassium 4.3 3.4 - 5.3 mmol/L    Carbon Dioxide (CO2) 22 22 - 29 mmol/L    Anion Gap 8 7 - 15 mmol/L    Urea Nitrogen 19.3 8.0 - 23.0 mg/dL    Creatinine 0.61 0.51 - 0.95 mg/dL    GFR Estimate >90 >60 mL/min/1.73m2    Calcium 7.4 (L) 8.8 - 10.4 mg/dL    Chloride 107 98 - 107 mmol/L    Glucose 100 (H) 70 - 99 mg/dL    Alkaline Phosphatase 237 (H) 40 - 150 U/L    AST " 34 0 - 45 U/L    ALT 48 0 - 50 U/L    Protein Total 4.4 (L) 6.4 - 8.3 g/dL    Albumin 1.7 (L) 3.5 - 5.2 g/dL    Bilirubin Total 0.2 <=1.2 mg/dL   TSH with free T4 reflex   Result Value Ref Range    TSH 2.48 0.30 - 4.20 uIU/mL   CBC with platelets and differential   Result Value Ref Range    WBC Count 10.1 4.0 - 11.0 10e3/uL    RBC Count 3.57 (L) 3.80 - 5.20 10e6/uL    Hemoglobin 12.1 11.7 - 15.7 g/dL    Hematocrit 36.6 35.0 - 47.0 %     (H) 78 - 100 fL    MCH 33.9 (H) 26.5 - 33.0 pg    MCHC 33.1 31.5 - 36.5 g/dL    RDW 15.8 (H) 10.0 - 15.0 %    Platelet Count 654 (H) 150 - 450 10e3/uL    % Neutrophils 83 %    % Lymphocytes 11 %    % Monocytes 5 %    % Eosinophils 0 %    % Basophils 0 %    % Immature Granulocytes 0 %    NRBCs per 100 WBC 0 <1 /100    Absolute Neutrophils 8.4 (H) 1.6 - 8.3 10e3/uL    Absolute Lymphocytes 1.1 0.8 - 5.3 10e3/uL    Absolute Monocytes 0.5 0.0 - 1.3 10e3/uL    Absolute Eosinophils 0.0 0.0 - 0.7 10e3/uL    Absolute Basophils 0.0 0.0 - 0.2 10e3/uL    Absolute Immature Granulocytes 0.0 <=0.4 10e3/uL    Absolute NRBCs 0.0 10e3/uL   UA with Microscopic - lab collect   Result Value Ref Range    Color Urine Yellow Colorless, Straw, Light Yellow, Yellow    Appearance Urine Clear Clear    Glucose Urine Negative Negative mg/dL    Bilirubin Urine Negative Negative    Ketones Urine Negative Negative mg/dL    Specific Gravity Urine 1.028 1.003 - 1.035    Blood Urine Negative Negative    pH Urine 6.0 5.0 - 7.0    Protein Albumin Urine 20 (A) Negative mg/dL    Urobilinogen Urine Normal Normal mg/dL    Nitrite Urine Negative Negative    Leukocyte Esterase Urine Negative Negative    Mucus Urine Present (A) None Seen /LPF    RBC Urine 1 <=2 /HPF    WBC Urine 1 <=5 /HPF    Squamous Epithelials Urine 1 <=1 /HPF    Hyaline Casts Urine 4 (H) <=2 /LPF   US Upper Extremity Venous Duplex Left   Result Value Ref Range    Radiologist flags Right internal jugular vein occlusive DVT (Urgent)          Total time  "50 minutes, to include face to face visit, review of EMR, ordering, documentation and coordination of care on date of service.    complexity modifier for longitudinal care.     Signed by: Marina Cronin NP      Oncology Rooming Note    July 9, 2025 10:52 AM   Adrianna Pereira is a 64 year old female who presents for:    Chief Complaint   Patient presents with     Oncology Clinic Visit     Squamous cell carcinoma of oral cavity - Labs provider and infusion     Initial Vitals: BP 95/61 (BP Location: Left arm, Patient Position: Sitting, Cuff Size: Adult Small)   Pulse (!) 125   Temp 97.7  F (36.5  C) (Tympanic)   Resp 16   Ht 1.626 m (5' 4\")   Wt 40.4 kg (89 lb)   SpO2 96%   BMI 15.28 kg/m   Estimated body mass index is 15.28 kg/m  as calculated from the following:    Height as of this encounter: 1.626 m (5' 4\").    Weight as of this encounter: 40.4 kg (89 lb). Body surface area is 1.35 meters squared.  Moderate Pain (5) Comment: Data Unavailable   No LMP recorded. Patient is postmenopausal.  Allergies reviewed: Yes  Medications reviewed: Yes    Medications: Medication refills not needed today.  Pharmacy name entered into [facility]: [facility] [address]     Frailty Screening:   Is the patient here for a new oncology consult visit in cancer care? 2. No    PHQ9:  Did this patient require a PHQ9?: No      Clinical concerns: follow up      Tanya Philippe CMA                Again, thank you for allowing me to participate in the care of your patient.        Sincerely,        Marina Cronin NP    Electronically signed"

## 2025-07-09 NOTE — PROGRESS NOTES
"Oncology Rooming Note    July 9, 2025 10:52 AM   Adrianna Pereira is a 64 year old female who presents for:    Chief Complaint   Patient presents with    Oncology Clinic Visit     Squamous cell carcinoma of oral cavity - Labs provider and infusion     Initial Vitals: BP 95/61 (BP Location: Left arm, Patient Position: Sitting, Cuff Size: Adult Small)   Pulse (!) 125   Temp 97.7  F (36.5  C) (Tympanic)   Resp 16   Ht 1.626 m (5' 4\")   Wt 40.4 kg (89 lb)   SpO2 96%   BMI 15.28 kg/m   Estimated body mass index is 15.28 kg/m  as calculated from the following:    Height as of this encounter: 1.626 m (5' 4\").    Weight as of this encounter: 40.4 kg (89 lb). Body surface area is 1.35 meters squared.  Moderate Pain (5) Comment: Data Unavailable   No LMP recorded. Patient is postmenopausal.  Allergies reviewed: Yes  Medications reviewed: Yes    Medications: Medication refills not needed today.  Pharmacy name entered into [facility]: [address]     Frailty Screening:   Is the patient here for a new oncology consult visit in cancer care? 2. No    PHQ9:  Did this patient require a PHQ9?: No      Clinical concerns: follow up      Tanya Philippe CMA              "

## 2025-07-09 NOTE — PROGRESS NOTES
[facility]: Cancer Care                                                                                          Writer called and spoke with pt regarding US results per provider. Please see note below:      Her Doppler US was negative for LUE dvt (she has LUE swelling) but incidentally notes thrombus in R internal jugular vein associated with her port. I sent Rx for Eliquis to the [place] pharmacy - she will take 10 mg bid x 7 days then 5 mg bid thereafter and continue indefinitely. Appears this can be crushed for use in her PEG.     Pt was updated and verbalized understanding. Pt did not have any questions or concerns at this time.     La Hartley, RN, BSN  Oncology/Hematology   Breast cancer Navigator  [facility]  [phone]

## 2025-07-10 PROCEDURE — S9341 HIT ENTERAL GRAV DIEM: HCPCS

## 2025-07-11 PROCEDURE — S9341 HIT ENTERAL GRAV DIEM: HCPCS

## 2025-07-12 PROCEDURE — S9341 HIT ENTERAL GRAV DIEM: HCPCS

## 2025-07-13 PROCEDURE — S9341 HIT ENTERAL GRAV DIEM: HCPCS

## 2025-07-14 PROCEDURE — S9341 HIT ENTERAL GRAV DIEM: HCPCS

## 2025-07-15 ENCOUNTER — HOME INFUSION (OUTPATIENT)
Dept: HOME HEALTH SERVICES | Facility: HOME HEALTH | Age: 65
End: 2025-07-15
Payer: COMMERCIAL

## 2025-07-15 ENCOUNTER — OFFICE VISIT (OUTPATIENT)
Dept: SURGERY | Facility: CLINIC | Age: 65
End: 2025-07-15
Payer: COMMERCIAL

## 2025-07-15 VITALS
HEART RATE: 134 BPM | DIASTOLIC BLOOD PRESSURE: 59 MMHG | SYSTOLIC BLOOD PRESSURE: 82 MMHG | WEIGHT: 89 LBS | TEMPERATURE: 97.2 F | BODY MASS INDEX: 15.19 KG/M2 | HEIGHT: 64 IN

## 2025-07-15 DIAGNOSIS — Z93.1 GASTROSTOMY IN PLACE (H): Primary | ICD-10-CM

## 2025-07-15 PROCEDURE — 3074F SYST BP LT 130 MM HG: CPT | Performed by: SURGERY

## 2025-07-15 PROCEDURE — 3078F DIAST BP <80 MM HG: CPT | Performed by: SURGERY

## 2025-07-15 PROCEDURE — 99212 OFFICE O/P EST SF 10 MIN: CPT | Performed by: SURGERY

## 2025-07-15 PROCEDURE — S9341 HIT ENTERAL GRAV DIEM: HCPCS

## 2025-07-15 NOTE — NURSING NOTE
"Initial BP (!) 82/59 (BP Location: Left arm, Patient Position: Sitting, Cuff Size: Child)   Pulse (!) 134   Temp 97.2  F (36.2  C) (Tympanic)   Ht 1.626 m (5' 4.02\")   Wt 40.4 kg (89 lb)   BMI 15.27 kg/m   Estimated body mass index is 15.27 kg/m  as calculated from the following:    Height as of this encounter: 1.626 m (5' 4.02\").    Weight as of this encounter: 40.4 kg (89 lb). .  Patricia Aguilar MA    "

## 2025-07-15 NOTE — PROGRESS NOTES
Patient having leaking at gastrostomy site.  Replaced last month.  Admits irritation around area.  No fevers or chills.    On exam bolster is pulled back to 4.5 cm.  Loose.  Surrounding erythema at the site.  No cracks in tubing.  Barrier cream applied with antifungal.  Bolster approximated to skin loosely, I expect this to improve the drainage.    RTC 1 week if needed with no improvement.    Lg Doherty, DO on 7/15/2025 at 11:57 AM

## 2025-07-15 NOTE — LETTER
7/15/2025      Adrianan Pereira  [address] [address] [place] [id number]      Dear Colleague,    Thank you for referring your patient, Adrianna Pereira, to the [facility]. Please see a copy of my visit note below.    Patient having leaking at gastrostomy site.  Replaced last month.  Admits irritation around area.  No fevers or chills.    On exam bolster is pulled back to 4.5 cm.  Loose.  Surrounding erythema at the site.  No cracks in tubing.  Barrier cream applied with antifungal.  Bolster approximated to skin loosely, I expect this to improve the drainage.    RTC 1 week if needed with no improvement.    Lg Doherty, DO on 7/15/2025 at 11:57 AM      Again, thank you for allowing me to participate in the care of your patient.        Sincerely,        Lg Doherty DO    Electronically signed

## 2025-07-16 PROCEDURE — S9341 HIT ENTERAL GRAV DIEM: HCPCS

## 2025-07-16 NOTE — PROGRESS NOTES
"[facility] Nutrition Assessment     Type of therapy: Enteral  Information obtained from: Phone call with Duane, patient's spouse.    Anthropometrics/Weight Goals  Height: 1.626 m (5' 4.02\")  Weight: 40.4 kg (89 lb)  Weight history / comments:: Low weight.    Current Nutrition Orders  Oral Diet: NPO    Implementation  Intervention: Phone call with Duane. Reports that currently Adrianna is getting 2 cartons of Nutren 2.0 per day via gravity + 1 pkt Prosource TF. Reports that she has been having less nausea and seems to be tolerating this volume better.  Education: Discussed increasing to 2.5 cartons of Nutren 2.0 + 1 pkt Prosource TF as tolerated in the next week. Eventual goal of 1 carton 3x per day + 1 pkt Prosource TF.  Goals: Weight stable vs gain.    Plan  Follow up/Recommendations: Monitor tolerance and weight trends.    Ashley Cao RD, [facility]  Clinical Dietitian  [facility]   [phone]  [facility] Main Line: [phone]     "

## 2025-07-17 PROCEDURE — S9341 HIT ENTERAL GRAV DIEM: HCPCS

## 2025-07-21 ENCOUNTER — HOME INFUSION BILLING (OUTPATIENT)
Dept: HOME HEALTH SERVICES | Facility: HOME HEALTH | Age: 65
End: 2025-07-21
Payer: COMMERCIAL

## 2025-07-21 PROCEDURE — B4152 EF CALORIE DENSE>/=1.5KCAL: HCPCS

## 2025-07-21 PROCEDURE — B4155 EF INCOMPLETE/MODULAR: HCPCS

## 2025-07-22 PROCEDURE — A6403 STERILE GAUZE>16 <= 48 SQ IN: HCPCS

## 2025-07-22 PROCEDURE — S9341 HIT ENTERAL GRAV DIEM: HCPCS | Mod: SH

## 2025-07-22 PROCEDURE — A4450 NON-WATERPROOF TAPE: HCPCS

## 2025-07-23 ENCOUNTER — MYC REFILL (OUTPATIENT)
Dept: ONCOLOGY | Facility: CLINIC | Age: 65
End: 2025-07-23
Payer: COMMERCIAL

## 2025-07-23 DIAGNOSIS — G89.3 CANCER RELATED PAIN: ICD-10-CM

## 2025-07-23 PROCEDURE — S9341 HIT ENTERAL GRAV DIEM: HCPCS | Mod: SH

## 2025-07-23 RX ORDER — OXYCODONE HCL 5 MG/5 ML
5-10 SOLUTION, ORAL ORAL EVERY 6 HOURS PRN
Qty: 500 ML | Refills: 0 | Status: SHIPPED | OUTPATIENT
Start: 2025-07-23

## 2025-07-24 PROCEDURE — S9341 HIT ENTERAL GRAV DIEM: HCPCS | Mod: SH

## 2025-07-25 PROCEDURE — S9341 HIT ENTERAL GRAV DIEM: HCPCS | Mod: SH

## 2025-07-26 ENCOUNTER — HOSPITAL ENCOUNTER (OUTPATIENT)
Dept: PET IMAGING | Facility: CLINIC | Age: 65
Discharge: HOME | End: 2025-07-26
Attending: NURSE PRACTITIONER | Admitting: NURSE PRACTITIONER
Payer: COMMERCIAL

## 2025-07-26 DIAGNOSIS — C79.51 MALIGNANT NEOPLASM METASTATIC TO BONE (H): ICD-10-CM

## 2025-07-26 DIAGNOSIS — C78.00 MALIGNANT NEOPLASM METASTATIC TO LUNG, UNSPECIFIED LATERALITY (H): ICD-10-CM

## 2025-07-26 DIAGNOSIS — C06.9 SQUAMOUS CELL CARCINOMA OF ORAL CAVITY (H): ICD-10-CM

## 2025-07-26 PROCEDURE — A9552 F18 FDG: HCPCS | Performed by: NURSE PRACTITIONER

## 2025-07-26 PROCEDURE — S9341 HIT ENTERAL GRAV DIEM: HCPCS | Mod: SH

## 2025-07-26 PROCEDURE — 78815 PET IMAGE W/CT SKULL-THIGH: CPT | Mod: PS

## 2025-07-26 PROCEDURE — 343N000001 HC RX 343 MED OP 636: Performed by: NURSE PRACTITIONER

## 2025-07-26 RX ORDER — FLUDEOXYGLUCOSE F 18 200 MCI/ML
10-18 INJECTION, SOLUTION INTRAVENOUS ONCE
Status: COMPLETED | OUTPATIENT
Start: 2025-07-26 | End: 2025-07-26

## 2025-07-26 RX ORDER — HEPARIN SODIUM (PORCINE) LOCK FLUSH IV SOLN 100 UNIT/ML 100 UNIT/ML
500 SOLUTION INTRAVENOUS ONCE
Status: COMPLETED | OUTPATIENT
Start: 2025-07-26 | End: 2025-07-26

## 2025-07-26 RX ADMIN — HEPARIN SODIUM (PORCINE) LOCK FLUSH IV SOLN 100 UNIT/ML 500 UNITS: 100 SOLUTION at 13:54

## 2025-07-26 RX ADMIN — FLUDEOXYGLUCOSE F 18 13.44 MILLICURIE: 200 INJECTION, SOLUTION INTRAVENOUS at 13:54

## 2025-07-27 PROCEDURE — S9341 HIT ENTERAL GRAV DIEM: HCPCS | Mod: SH

## 2025-07-28 PROCEDURE — S9341 HIT ENTERAL GRAV DIEM: HCPCS | Mod: SH

## 2025-07-29 PROCEDURE — S9341 HIT ENTERAL GRAV DIEM: HCPCS | Mod: SH

## 2025-07-30 ENCOUNTER — INFUSION THERAPY VISIT (OUTPATIENT)
Dept: INFUSION THERAPY | Facility: CLINIC | Age: 65
End: 2025-07-30
Attending: NURSE PRACTITIONER
Payer: COMMERCIAL

## 2025-07-30 ENCOUNTER — INFUSION THERAPY VISIT (OUTPATIENT)
Dept: INFUSION THERAPY | Facility: CLINIC | Age: 65
End: 2025-07-30
Attending: INTERNAL MEDICINE
Payer: COMMERCIAL

## 2025-07-30 ENCOUNTER — ONCOLOGY VISIT (OUTPATIENT)
Dept: ONCOLOGY | Facility: CLINIC | Age: 65
End: 2025-07-30
Attending: INTERNAL MEDICINE
Payer: COMMERCIAL

## 2025-07-30 VITALS
HEIGHT: 64 IN | BODY MASS INDEX: 14.34 KG/M2 | TEMPERATURE: 98.1 F | SYSTOLIC BLOOD PRESSURE: 105 MMHG | OXYGEN SATURATION: 97 % | HEART RATE: 100 BPM | RESPIRATION RATE: 16 BRPM | WEIGHT: 84 LBS | DIASTOLIC BLOOD PRESSURE: 67 MMHG

## 2025-07-30 VITALS — HEART RATE: 94 BPM | DIASTOLIC BLOOD PRESSURE: 78 MMHG | SYSTOLIC BLOOD PRESSURE: 110 MMHG

## 2025-07-30 DIAGNOSIS — C79.51 MALIGNANT NEOPLASM METASTATIC TO BONE (H): Primary | ICD-10-CM

## 2025-07-30 DIAGNOSIS — C78.00 MALIGNANT NEOPLASM METASTATIC TO LUNG, UNSPECIFIED LATERALITY (H): ICD-10-CM

## 2025-07-30 DIAGNOSIS — C06.9 SQUAMOUS CELL CARCINOMA OF ORAL CAVITY (H): ICD-10-CM

## 2025-07-30 DIAGNOSIS — C79.51 MALIGNANT NEOPLASM METASTATIC TO BONE (H): ICD-10-CM

## 2025-07-30 DIAGNOSIS — C06.9 SQUAMOUS CELL CARCINOMA OF ORAL CAVITY (H): Primary | ICD-10-CM

## 2025-07-30 LAB
ALBUMIN SERPL BCG-MCNC: 1.9 G/DL (ref 3.5–5.2)
ALP SERPL-CCNC: 111 U/L (ref 40–150)
ALT SERPL W P-5'-P-CCNC: 31 U/L (ref 0–50)
ANION GAP SERPL CALCULATED.3IONS-SCNC: 6 MMOL/L (ref 7–15)
AST SERPL W P-5'-P-CCNC: 25 U/L (ref 0–45)
BASOPHILS # BLD AUTO: 0 10E3/UL (ref 0–0.2)
BASOPHILS NFR BLD AUTO: 0 %
BILIRUB SERPL-MCNC: <0.2 MG/DL
BUN SERPL-MCNC: 22.1 MG/DL (ref 8–23)
CALCIUM SERPL-MCNC: 7.7 MG/DL (ref 8.8–10.4)
CHLORIDE SERPL-SCNC: 109 MMOL/L (ref 98–107)
CREAT SERPL-MCNC: 0.52 MG/DL (ref 0.51–0.95)
EGFRCR SERPLBLD CKD-EPI 2021: >90 ML/MIN/1.73M2
EOSINOPHIL # BLD AUTO: 0.3 10E3/UL (ref 0–0.7)
EOSINOPHIL NFR BLD AUTO: 3 %
ERYTHROCYTE [DISTWIDTH] IN BLOOD BY AUTOMATED COUNT: 14.3 % (ref 10–15)
GLUCOSE SERPL-MCNC: 133 MG/DL (ref 70–99)
HCO3 SERPL-SCNC: 24 MMOL/L (ref 22–29)
HCT VFR BLD AUTO: 32.4 % (ref 35–47)
HGB BLD-MCNC: 10.7 G/DL (ref 11.7–15.7)
IMM GRANULOCYTES # BLD: 0 10E3/UL
IMM GRANULOCYTES NFR BLD: 0 %
LYMPHOCYTES # BLD AUTO: 1.4 10E3/UL (ref 0.8–5.3)
LYMPHOCYTES NFR BLD AUTO: 17 %
MCH RBC QN AUTO: 34.5 PG (ref 26.5–33)
MCHC RBC AUTO-ENTMCNC: 33 G/DL (ref 31.5–36.5)
MCV RBC AUTO: 105 FL (ref 78–100)
MONOCYTES # BLD AUTO: 0.6 10E3/UL (ref 0–1.3)
MONOCYTES NFR BLD AUTO: 7 %
NEUTROPHILS # BLD AUTO: 6.2 10E3/UL (ref 1.6–8.3)
NEUTROPHILS NFR BLD AUTO: 73 %
NRBC # BLD AUTO: 0 10E3/UL
NRBC BLD AUTO-RTO: 0 /100
PLATELET # BLD AUTO: 543 10E3/UL (ref 150–450)
POTASSIUM SERPL-SCNC: 4.5 MMOL/L (ref 3.4–5.3)
PROT SERPL-MCNC: 4.5 G/DL (ref 6.4–8.3)
RBC # BLD AUTO: 3.1 10E6/UL (ref 3.8–5.2)
SODIUM SERPL-SCNC: 139 MMOL/L (ref 135–145)
TSH SERPL DL<=0.005 MIU/L-ACNC: 3.99 UIU/ML (ref 0.3–4.2)
WBC # BLD AUTO: 8.6 10E3/UL (ref 4–11)

## 2025-07-30 PROCEDURE — 250N000011 HC RX IP 250 OP 636: Performed by: INTERNAL MEDICINE

## 2025-07-30 PROCEDURE — 82247 BILIRUBIN TOTAL: CPT | Performed by: INTERNAL MEDICINE

## 2025-07-30 PROCEDURE — 96413 CHEMO IV INFUSION 1 HR: CPT

## 2025-07-30 PROCEDURE — G0463 HOSPITAL OUTPT CLINIC VISIT: HCPCS | Performed by: INTERNAL MEDICINE

## 2025-07-30 PROCEDURE — G2211 COMPLEX E/M VISIT ADD ON: HCPCS | Performed by: INTERNAL MEDICINE

## 2025-07-30 PROCEDURE — S9341 HIT ENTERAL GRAV DIEM: HCPCS | Mod: SH

## 2025-07-30 PROCEDURE — 258N000003 HC RX IP 258 OP 636: Performed by: INTERNAL MEDICINE

## 2025-07-30 PROCEDURE — 85025 COMPLETE CBC W/AUTO DIFF WBC: CPT | Performed by: INTERNAL MEDICINE

## 2025-07-30 PROCEDURE — 99215 OFFICE O/P EST HI 40 MIN: CPT | Performed by: INTERNAL MEDICINE

## 2025-07-30 PROCEDURE — 82040 ASSAY OF SERUM ALBUMIN: CPT | Performed by: INTERNAL MEDICINE

## 2025-07-30 PROCEDURE — 84443 ASSAY THYROID STIM HORMONE: CPT | Performed by: INTERNAL MEDICINE

## 2025-07-30 PROCEDURE — 36591 DRAW BLOOD OFF VENOUS DEVICE: CPT

## 2025-07-30 RX ORDER — METHYLPREDNISOLONE SODIUM SUCCINATE 40 MG/ML
40 INJECTION INTRAMUSCULAR; INTRAVENOUS
Status: CANCELLED
Start: 2025-07-30

## 2025-07-30 RX ORDER — MEPERIDINE HYDROCHLORIDE 25 MG/ML
25 INJECTION INTRAMUSCULAR; INTRAVENOUS; SUBCUTANEOUS
Status: CANCELLED | OUTPATIENT
Start: 2025-07-30

## 2025-07-30 RX ORDER — ALBUTEROL SULFATE 0.83 MG/ML
2.5 SOLUTION RESPIRATORY (INHALATION)
Status: CANCELLED | OUTPATIENT
Start: 2025-07-30

## 2025-07-30 RX ORDER — DIPHENHYDRAMINE HYDROCHLORIDE 50 MG/ML
25 INJECTION, SOLUTION INTRAMUSCULAR; INTRAVENOUS
Status: CANCELLED
Start: 2025-07-30

## 2025-07-30 RX ORDER — DIPHENHYDRAMINE HYDROCHLORIDE 50 MG/ML
50 INJECTION, SOLUTION INTRAMUSCULAR; INTRAVENOUS
Status: CANCELLED
Start: 2025-07-30

## 2025-07-30 RX ORDER — ALBUTEROL SULFATE 90 UG/1
1-2 INHALANT RESPIRATORY (INHALATION)
Status: CANCELLED
Start: 2025-07-30

## 2025-07-30 RX ORDER — LORAZEPAM 2 MG/ML
0.5 INJECTION INTRAMUSCULAR EVERY 4 HOURS PRN
Status: CANCELLED | OUTPATIENT
Start: 2025-07-30

## 2025-07-30 RX ORDER — HEPARIN SODIUM,PORCINE 10 UNIT/ML
5-20 VIAL (ML) INTRAVENOUS DAILY PRN
Status: CANCELLED | OUTPATIENT
Start: 2025-07-30

## 2025-07-30 RX ORDER — HEPARIN SODIUM (PORCINE) LOCK FLUSH IV SOLN 100 UNIT/ML 100 UNIT/ML
5 SOLUTION INTRAVENOUS
Status: DISCONTINUED | OUTPATIENT
Start: 2025-07-30 | End: 2025-07-30 | Stop reason: HOSPADM

## 2025-07-30 RX ORDER — HEPARIN SODIUM (PORCINE) LOCK FLUSH IV SOLN 100 UNIT/ML 100 UNIT/ML
5 SOLUTION INTRAVENOUS
Status: CANCELLED | OUTPATIENT
Start: 2025-07-30

## 2025-07-30 RX ORDER — EPINEPHRINE 1 MG/ML
0.3 INJECTION, SOLUTION, CONCENTRATE INTRAVENOUS EVERY 5 MIN PRN
Status: CANCELLED | OUTPATIENT
Start: 2025-07-30

## 2025-07-30 RX ADMIN — SODIUM CHLORIDE 250 ML: 0.9 INJECTION, SOLUTION INTRAVENOUS at 15:36

## 2025-07-30 RX ADMIN — SODIUM CHLORIDE 400 MG: 9 INJECTION, SOLUTION INTRAVENOUS at 15:36

## 2025-07-30 RX ADMIN — HEPARIN 5 ML: 100 SYRINGE at 16:08

## 2025-07-30 ASSESSMENT — PAIN SCALES - GENERAL: PAINLEVEL_OUTOF10: SEVERE PAIN (7)

## 2025-07-30 NOTE — PROGRESS NOTES
Infusion Nursing Note:  Adrianna ROBBIE Pereira presents today for Keytruda.    Patient seen by provider today: Yes: Dr. Friedell   present during visit today: Not Applicable.    Note: N/A.    Intravenous Access:  Implanted Port.    Treatment Conditions:  Lab Results   Component Value Date     07/30/2025    POTASSIUM 4.5 07/30/2025    MAG 1.9 12/15/2023    CR 0.52 07/30/2025    RACHAEL 7.7 (L) 07/30/2025    BILITOTAL <0.2 07/30/2025    ALBUMIN 1.9 (L) 07/30/2025    ALT 31 07/30/2025    AST 25 07/30/2025       Results reviewed, labs MET treatment parameters, ok to proceed with treatment.    Post Infusion Assessment:  Patient tolerated infusion without incident.  Blood return noted pre and post infusion.  Site patent and intact, free from redness, edema or discomfort.  No evidence of extravasations.  Access discontinued per protocol.     Discharge Plan:   Patient and/or family verbalized understanding of discharge instructions and all questions answered.  Patient discharged in stable condition accompanied by: self.  Departure Mode: Ambulatory.    Reynaldo Ibrahim RN
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
## 2025-07-30 NOTE — PROGRESS NOTES
"Oncology Rooming Note    July 30, 2025 2:33 PM   Adrianna Pereira is a 64 year old female who presents for:    Chief Complaint   Patient presents with    Oncology Clinic Visit     Malignant neoplasm metastatic to bone - Labs provider and infusion     Initial Vitals: /67 (BP Location: Left arm, Patient Position: Sitting, Cuff Size: Adult Small)   Pulse 100   Temp 98.1  F (36.7  C) (Tympanic)   Resp 16   Ht 1.626 m (5' 4\")   Wt 38.1 kg (84 lb)   SpO2 97%   BMI 14.42 kg/m   Estimated body mass index is 14.42 kg/m  as calculated from the following:    Height as of this encounter: 1.626 m (5' 4\").    Weight as of this encounter: 38.1 kg (84 lb). Body surface area is 1.31 meters squared.  Severe Pain (7) Comment: Data Unavailable   No LMP recorded. Patient is postmenopausal.  Allergies reviewed: Yes  Medications reviewed: Yes    Medications: Medication refills not needed today.  Pharmacy name entered into [facility]: [facility] [place] [address] [facility] [place]     PHQ9:  Did this patient require a PHQ9?: No      Clinical concerns:  None today      Tanya Philippe CMA            "

## 2025-07-31 PROCEDURE — S9341 HIT ENTERAL GRAV DIEM: HCPCS

## 2025-08-01 PROCEDURE — S9341 HIT ENTERAL GRAV DIEM: HCPCS

## 2025-08-02 PROCEDURE — S9341 HIT ENTERAL GRAV DIEM: HCPCS

## 2025-08-03 PROCEDURE — S9341 HIT ENTERAL GRAV DIEM: HCPCS

## 2025-08-04 ENCOUNTER — HOME INFUSION (OUTPATIENT)
Dept: HOME HEALTH SERVICES | Facility: HOME HEALTH | Age: 65
End: 2025-08-04
Payer: COMMERCIAL

## 2025-08-04 PROCEDURE — S9341 HIT ENTERAL GRAV DIEM: HCPCS

## 2025-08-05 ENCOUNTER — MYC REFILL (OUTPATIENT)
Dept: ONCOLOGY | Facility: CLINIC | Age: 65
End: 2025-08-05
Payer: COMMERCIAL

## 2025-08-05 DIAGNOSIS — I82.C11 INTERNAL JUGULAR (IJ) VEIN THROMBOEMBOLISM, ACUTE, RIGHT (H): ICD-10-CM

## 2025-08-05 PROCEDURE — S9341 HIT ENTERAL GRAV DIEM: HCPCS

## 2025-08-06 PROCEDURE — S9341 HIT ENTERAL GRAV DIEM: HCPCS

## 2025-08-06 RX ORDER — APIXABAN 5 MG (74)
KIT ORAL
Qty: 1 EACH | Refills: 0 | Status: SHIPPED | OUTPATIENT
Start: 2025-08-06 | End: 2025-09-05

## 2025-08-07 ENCOUNTER — MYC REFILL (OUTPATIENT)
Dept: ONCOLOGY | Facility: CLINIC | Age: 65
End: 2025-08-07
Payer: COMMERCIAL

## 2025-08-07 DIAGNOSIS — G89.3 CANCER RELATED PAIN: ICD-10-CM

## 2025-08-07 PROCEDURE — S9341 HIT ENTERAL GRAV DIEM: HCPCS

## 2025-08-07 RX ORDER — OXYCODONE HCL 5 MG/5 ML
5-10 SOLUTION, ORAL ORAL EVERY 6 HOURS PRN
Qty: 500 ML | Refills: 0 | Status: SHIPPED | OUTPATIENT
Start: 2025-08-07

## 2025-08-08 PROCEDURE — S9341 HIT ENTERAL GRAV DIEM: HCPCS

## 2025-08-09 PROCEDURE — S9341 HIT ENTERAL GRAV DIEM: HCPCS

## 2025-08-10 PROCEDURE — S9341 HIT ENTERAL GRAV DIEM: HCPCS

## 2025-08-11 PROCEDURE — S9341 HIT ENTERAL GRAV DIEM: HCPCS

## 2025-08-12 PROCEDURE — S9341 HIT ENTERAL GRAV DIEM: HCPCS

## 2025-08-13 PROCEDURE — S9341 HIT ENTERAL GRAV DIEM: HCPCS

## 2025-08-14 PROCEDURE — S9341 HIT ENTERAL GRAV DIEM: HCPCS

## 2025-08-15 PROCEDURE — S9341 HIT ENTERAL GRAV DIEM: HCPCS

## 2025-08-16 PROCEDURE — S9341 HIT ENTERAL GRAV DIEM: HCPCS

## 2025-08-17 PROCEDURE — S9341 HIT ENTERAL GRAV DIEM: HCPCS

## 2025-08-18 ENCOUNTER — HOME INFUSION BILLING (OUTPATIENT)
Dept: HOME HEALTH SERVICES | Facility: HOME HEALTH | Age: 65
End: 2025-08-18
Payer: COMMERCIAL

## 2025-08-18 PROCEDURE — B4155 EF INCOMPLETE/MODULAR: HCPCS

## 2025-08-18 PROCEDURE — B4152 EF CALORIE DENSE>/=1.5KCAL: HCPCS

## 2025-08-18 PROCEDURE — S9341 HIT ENTERAL GRAV DIEM: HCPCS

## 2025-08-19 PROCEDURE — A4450 NON-WATERPROOF TAPE: HCPCS

## 2025-08-19 PROCEDURE — A6403 STERILE GAUZE>16 <= 48 SQ IN: HCPCS

## 2025-08-19 PROCEDURE — S9341 HIT ENTERAL GRAV DIEM: HCPCS

## 2025-08-20 PROCEDURE — S9341 HIT ENTERAL GRAV DIEM: HCPCS

## 2025-08-21 ENCOUNTER — MYC REFILL (OUTPATIENT)
Dept: ONCOLOGY | Facility: CLINIC | Age: 65
End: 2025-08-21
Payer: COMMERCIAL

## 2025-08-21 DIAGNOSIS — G89.3 CANCER RELATED PAIN: ICD-10-CM

## 2025-08-21 PROCEDURE — S9341 HIT ENTERAL GRAV DIEM: HCPCS

## 2025-08-21 RX ORDER — OXYCODONE HCL 5 MG/5 ML
5-10 SOLUTION, ORAL ORAL EVERY 6 HOURS PRN
Qty: 500 ML | Refills: 0 | Status: SHIPPED | OUTPATIENT
Start: 2025-08-21

## 2025-08-22 PROCEDURE — S9341 HIT ENTERAL GRAV DIEM: HCPCS

## 2025-08-23 PROCEDURE — S9341 HIT ENTERAL GRAV DIEM: HCPCS

## 2025-08-24 PROCEDURE — S9341 HIT ENTERAL GRAV DIEM: HCPCS

## 2025-08-25 PROCEDURE — S9341 HIT ENTERAL GRAV DIEM: HCPCS

## 2025-08-26 PROCEDURE — S9341 HIT ENTERAL GRAV DIEM: HCPCS

## 2025-08-27 PROCEDURE — S9341 HIT ENTERAL GRAV DIEM: HCPCS

## 2025-08-28 PROCEDURE — S9341 HIT ENTERAL GRAV DIEM: HCPCS

## 2025-08-29 PROCEDURE — S9341 HIT ENTERAL GRAV DIEM: HCPCS

## 2025-08-30 PROCEDURE — S9341 HIT ENTERAL GRAV DIEM: HCPCS

## 2025-08-31 PROCEDURE — S9341 HIT ENTERAL GRAV DIEM: HCPCS

## 2025-09-01 ENCOUNTER — EPISODE UPDATE (OUTPATIENT)
Dept: HOME HEALTH SERVICES | Facility: HOME HEALTH | Age: 65
End: 2025-09-01
Payer: COMMERCIAL

## 2025-09-01 PROCEDURE — S9341 HIT ENTERAL GRAV DIEM: HCPCS

## 2025-09-02 PROCEDURE — S9341 HIT ENTERAL GRAV DIEM: HCPCS

## 2025-09-03 PROCEDURE — S9341 HIT ENTERAL GRAV DIEM: HCPCS

## 2025-09-04 ENCOUNTER — MYC REFILL (OUTPATIENT)
Dept: ONCOLOGY | Facility: CLINIC | Age: 65
End: 2025-09-04

## 2025-09-04 ENCOUNTER — PATIENT OUTREACH (OUTPATIENT)
Dept: CARE COORDINATION | Facility: CLINIC | Age: 65
End: 2025-09-04

## 2025-09-04 DIAGNOSIS — G89.3 CANCER RELATED PAIN: ICD-10-CM

## 2025-09-04 DIAGNOSIS — C06.9 SQUAMOUS CELL CARCINOMA OF ORAL CAVITY (H): ICD-10-CM

## 2025-09-04 DIAGNOSIS — M27.2 INFECTION OF MANDIBLE: ICD-10-CM

## 2025-09-04 DIAGNOSIS — R11.2 NAUSEA AND VOMITING, UNSPECIFIED VOMITING TYPE: ICD-10-CM

## 2025-09-04 DIAGNOSIS — I82.C11 INTERNAL JUGULAR (IJ) VEIN THROMBOEMBOLISM, ACUTE, RIGHT (H): ICD-10-CM

## 2025-09-04 PROCEDURE — S9341 HIT ENTERAL GRAV DIEM: HCPCS

## 2025-09-04 RX ORDER — ONDANSETRON 8 MG/1
8 TABLET, FILM COATED ORAL EVERY 8 HOURS PRN
Qty: 30 TABLET | Refills: 1 | Status: SHIPPED | OUTPATIENT
Start: 2025-09-04

## 2025-09-04 RX ORDER — OXYCODONE HCL 5 MG/5 ML
5-10 SOLUTION, ORAL ORAL EVERY 6 HOURS PRN
Qty: 500 ML | Refills: 0 | OUTPATIENT
Start: 2025-09-04

## 2025-09-04 RX ORDER — AMOXICILLIN AND CLAVULANATE POTASSIUM 400; 57 MG/5ML; MG/5ML
875 POWDER, FOR SUSPENSION ORAL 2 TIMES DAILY
Qty: 218.8 ML | Refills: 5 | Status: SHIPPED | OUTPATIENT
Start: 2025-09-04

## 2025-09-04 RX ORDER — OXYCODONE HCL 5 MG/5 ML
5-10 SOLUTION, ORAL ORAL EVERY 6 HOURS PRN
Qty: 500 ML | Refills: 0 | Status: SHIPPED | OUTPATIENT
Start: 2025-09-04

## 2025-09-04 RX ORDER — APIXABAN 5 MG (74)
KIT ORAL
Qty: 1 EACH | Refills: 0 | Status: SHIPPED | OUTPATIENT
Start: 2025-09-04 | End: 2025-10-04

## 2025-09-05 PROCEDURE — S9341 HIT ENTERAL GRAV DIEM: HCPCS

## 2025-09-06 PROCEDURE — S9341 HIT ENTERAL GRAV DIEM: HCPCS

## 2025-09-07 PROCEDURE — S9341 HIT ENTERAL GRAV DIEM: HCPCS

## 2025-09-08 PROCEDURE — S9341 HIT ENTERAL GRAV DIEM: HCPCS

## 2025-09-09 PROCEDURE — S9341 HIT ENTERAL GRAV DIEM: HCPCS

## 2025-09-10 PROCEDURE — S9341 HIT ENTERAL GRAV DIEM: HCPCS

## 2025-09-11 PROCEDURE — S9341 HIT ENTERAL GRAV DIEM: HCPCS

## 2025-09-12 PROCEDURE — S9341 HIT ENTERAL GRAV DIEM: HCPCS

## 2025-09-13 PROCEDURE — S9341 HIT ENTERAL GRAV DIEM: HCPCS

## 2025-09-14 PROCEDURE — S9341 HIT ENTERAL GRAV DIEM: HCPCS

## 2025-09-15 PROCEDURE — S9341 HIT ENTERAL GRAV DIEM: HCPCS

## 2025-09-16 PROCEDURE — S9341 HIT ENTERAL GRAV DIEM: HCPCS

## 2025-09-17 PROCEDURE — S9341 HIT ENTERAL GRAV DIEM: HCPCS

## 2025-09-18 PROCEDURE — S9341 HIT ENTERAL GRAV DIEM: HCPCS

## 2025-09-19 PROCEDURE — S9341 HIT ENTERAL GRAV DIEM: HCPCS

## (undated) DEVICE — SU SILK 2-0 SH CR 5X18" C0125

## (undated) DEVICE — Device

## (undated) DEVICE — IMP SCR KLS LOCKING MAXDRIVE 2.0X7 MM TI-6AL-4V TI
Type: IMPLANTABLE DEVICE | Site: MANDIBLE | Status: NON-FUNCTIONAL
Removed: 2023-08-17

## (undated) DEVICE — PROTECTOR ARM ONE-STEP TRENDELENBURG 40418

## (undated) DEVICE — NDL 25GA 1.5" 305127

## (undated) DEVICE — PACK LAP TRANSVERSE STD

## (undated) DEVICE — SOL WATER IRRIG 1000ML BOTTLE 07139-09

## (undated) DEVICE — SU PROLENE 2-0 SHDA 36" 8523H

## (undated) DEVICE — SOL NACL 0.9% IRRIG 1000ML BOTTLE 07138-09

## (undated) DEVICE — BLADE SAW RECIP STRK PRECISION THIN 27X0.38MM 5100-137-233

## (undated) DEVICE — PACK NEURO MINOR UMMC SNE32MNMU4

## (undated) DEVICE — RETR ELASTIC STAYS LONE STAR BLUNT DUAL LEAD 3550-1G

## (undated) DEVICE — DRSG XEROFORM 5X9" CUR253590W

## (undated) DEVICE — SU MONOCRYL 4-0 PS-2 18" UND Y496G

## (undated) DEVICE — DRAPE SHEET MED 44X70" 9355

## (undated) DEVICE — SYR BULB IRRIG DOVER 60 ML LATEX FREE 67000

## (undated) DEVICE — SU SILK 4-0 TIE 12X30" A303H

## (undated) DEVICE — DRSG GAUZE FLUFTEX RADIOPAQUE 4.5"X4.1YD 11-020

## (undated) DEVICE — CLIP GEM MICRO GEM2431

## (undated) DEVICE — SPONGE LAP 18X18" X8435

## (undated) DEVICE — IMP SCR KLS DRILL-FREE MAXDRIVE MINI 2.0X7MM TI-6AL-4V
Type: IMPLANTABLE DEVICE | Site: MANDIBLE | Status: NON-FUNCTIONAL
Removed: 2023-08-17

## (undated) DEVICE — EYE SPONGE SPEAR WECK CEL 0008685

## (undated) DEVICE — CANISTER WOUND VAC W/GEL 1000ML M8275093/5

## (undated) DEVICE — BLADE KNIFE SURG 15 371115

## (undated) DEVICE — SUPPLEMENTAL MODEL VERIFICATION

## (undated) DEVICE — BNDG ESMARK 4" STERILE LF 820-3412

## (undated) DEVICE — SPONGE COTTONOID 1X3" 20-10S

## (undated) DEVICE — SYR 10ML LL W/O NDL

## (undated) DEVICE — GLOVE BIOGEL PI MICRO SZ 7.0 48570

## (undated) DEVICE — STIMULATOR NERVE NEURO-PULSE SURGICAL LOCATOR 30968-220

## (undated) DEVICE — SU SILK 3-0 TIE 12X30" A304H

## (undated) DEVICE — TOOTHBRUSH ADULT NON STERILE MDS136850

## (undated) DEVICE — LINEN TOWEL PACK X5 5464

## (undated) DEVICE — NDL COUNTER 40CT  31142311

## (undated) DEVICE — SOL NACL 0.9% IRRIG 1000ML BOTTLE 2F7124

## (undated) DEVICE — SPONGE RAY-TEC 4X8" 7318

## (undated) DEVICE — IMP SCR KLS LOCKING MAXDRIVE 2.0X11 MM TI-6AL-4V TI
Type: IMPLANTABLE DEVICE | Site: MANDIBLE | Status: NON-FUNCTIONAL
Removed: 2023-08-17

## (undated) DEVICE — GLOVE BIOGEL PI MICRO INDICATOR UNDERGLOVE SZ 7.5 48975

## (undated) DEVICE — BNDG ELASTIC 6"X5YDS STERILE 6611-6S

## (undated) DEVICE — STRAP UNIVERSAL POSITIONING 2-PIECE 4X47X76" 91-287

## (undated) DEVICE — PACK GOWN 3/PK DISP XL SBA32GPFCB

## (undated) DEVICE — SUCTION MANIFOLD NEPTUNE 2 SYS 4 PORT 0702-020-000

## (undated) DEVICE — COVER CAMERA VIDEO LASER 9X96" 04-CC227

## (undated) DEVICE — CLIP HORIZON SM RED WIDE SLOT 001201

## (undated) DEVICE — DRSG TEGADERM 4X10" 1627

## (undated) DEVICE — SU VICRYL 3-0 SH 8X18" UND J864D

## (undated) DEVICE — ESU PENCIL SMOKE EVAC W/ROCKER SWITCH 0703-047-000

## (undated) DEVICE — NDL BX TRU CUT 14GA 4.5" 2N2702X

## (undated) DEVICE — SYR 05ML LL W/O NDL

## (undated) DEVICE — DRAIN JACKSON PRATT 15FR ROUND SU130-1323

## (undated) DEVICE — GEL ULTRASOUND AQUASONIC 20GM 01-01

## (undated) DEVICE — CATH TRAY FOLEY SURESTEP 16FR W/TMP PRB STLK LATEX A319416AM

## (undated) DEVICE — DRAPE SHEET REV FOLD 3/4 9349

## (undated) DEVICE — ESU PENCIL W/COATED BLADE E2450H

## (undated) DEVICE — LIGHT HANDLE X2

## (undated) DEVICE — DRSG TEGADERM 8X12" 1629

## (undated) DEVICE — SUPPLEMENTAL MODEL, VERIFICATION

## (undated) DEVICE — SUCTION SLEEVE NEPTUNE 2 165MM 0703-005-165

## (undated) DEVICE — SYR 10ML FINGER CONTROL W/O NDL 309695

## (undated) DEVICE — DRAPE C-ARM 60X42" 1013

## (undated) DEVICE — PREP CHLORAPREP 26ML TINTED ORANGE  260815

## (undated) DEVICE — BLADE KNIFE SURG 11 371111

## (undated) DEVICE — SPONGE KITTNER 30-101

## (undated) DEVICE — STOCKING SLEEVE COMPRESSION CALF LG

## (undated) DEVICE — SU SILK 0 TIE 6X30" A306H

## (undated) DEVICE — LABEL MEDICATION SYSTEM 3303-P

## (undated) DEVICE — COVER NEOPROBE SOFTFLEX 5X96" W/BANDS 20-PC596

## (undated) DEVICE — NDL COUNTER 20CT 31142493

## (undated) DEVICE — SU SILK 2-0 SH CR 8X18" C012D

## (undated) DEVICE — DRSG TEGADERM 4X4 3/4" 1626W

## (undated) DEVICE — DRAIN JACKSON PRATT 19FR ROUND SU130-1325

## (undated) DEVICE — LINEN TOWEL PACK X6 WHITE 5487

## (undated) DEVICE — LIGHT HANDLE X1 31140133

## (undated) DEVICE — BLADE DERMATOME ZIMMER  00-8800-000-10

## (undated) DEVICE — DRAPE MAYO STAND 23X54 8337

## (undated) DEVICE — ESU NDL COLORADO MICRO E1651

## (undated) DEVICE — BLADE SAW SAGITTAL STRK MICRO 9.0X25X0.38MM 2296-003-511

## (undated) DEVICE — GLOVE BIOGEL PI MICRO SZ 7.5 48575

## (undated) DEVICE — PREP POVIDONE-IODINE 10% SOLUTION 4OZ BOTTLE MDS093944

## (undated) DEVICE — SU CHROMIC 4-0 RB-1 27" U203H

## (undated) DEVICE — DRILL TWIST KLS 1.5X50MM 7MM STOP W/NOTCH 25-449-07-91

## (undated) DEVICE — WIPE INSTRUMENT MEROCEL 400200

## (undated) DEVICE — ESU CORD BIPOLAR AND IRR TUBING AESCULAP US355

## (undated) DEVICE — ESU ELEC BLADE 2.75" COATED/INSULATED E1455

## (undated) DEVICE — VESSEL LOOPS YELLOW MAXI 31145694

## (undated) DEVICE — PREP POVIDONE-IODINE 7.5% SCRUB 4OZ BOTTLE MDS093945

## (undated) DEVICE — PITCHER STERILE 1000ML  SSK9004A

## (undated) DEVICE — TUBING SMOKE EVAC 3/8"X10' 0702-045-023

## (undated) DEVICE — SYR 03ML LL W/O NDL 309657

## (undated) DEVICE — SYR 01ML 27GA 0.5" NDL TBC 309623

## (undated) DEVICE — LINEN TOWEL PACK X30 5481

## (undated) DEVICE — CUP AND LID 2PK 2OZ STERILE  SSK9006A

## (undated) DEVICE — DRAIN JACKSON PRATT RESERVOIR 100ML SU130-1305

## (undated) DEVICE — DRSG BIATAIN ALGINATE 4X4" 3710

## (undated) DEVICE — DRSG TELFA 3X8" 1238

## (undated) DEVICE — NDL 25GA 2"  8881200441

## (undated) DEVICE — SU ETHILON 8-0 BV130-5 5" 2808G

## (undated) DEVICE — BLADE CLIPPER SGL USE 9680

## (undated) DEVICE — ESU GROUND PAD ADULT REM W/15' CORD E7507DB

## (undated) DEVICE — SPONGE SURGIFOAM 100 1974

## (undated) DEVICE — SU ETHILON 3-0 PS-1 18" 1663H

## (undated) DEVICE — CABLE DOPPLER FLOW EXTENSION  DP-CAB01

## (undated) DEVICE — DRSG MEDIPORE 3 1/2X13 3/4" 3573

## (undated) DEVICE — DRAIN JACKSON PRATT 10MM FLAT 4/4 PERF SU130-1311

## (undated) DEVICE — PACK SET-UP STD 9102

## (undated) DEVICE — SU DERMABOND ADVANCED .7ML DNX12

## (undated) DEVICE — EYE INSTRUMENT WIPE MEROCEL W/ADHESIVE 223625

## (undated) DEVICE — DECANTER BAG 2002S

## (undated) DEVICE — CAST PADDING 6" STERILE 9046S

## (undated) DEVICE — DECANTER VIAL 2006S

## (undated) DEVICE — SU VICRYL 3-0 SH 27" UND J416H

## (undated) DEVICE — NDL ANGIOCATH 20GA 1.75" 4059

## (undated) DEVICE — BNDG ELASTIC 4"X5YDS STERILE 6611-4S

## (undated) DEVICE — ESU ELEC NDL 1" COATED/INSULATED E1465

## (undated) DEVICE — DRILL TWIST KLS 1.5X50MM W/NOTCH 25-449-16-91

## (undated) DEVICE — PREP SKIN SCRUB TRAY 4461A

## (undated) DEVICE — NDL ANGIOCATH 22GA 1" 4050

## (undated) DEVICE — SU ETHILON 4-0 PC-3 18" 1864G

## (undated) DEVICE — DRAPE MICROSCOPE LEICA 54X120" 09-MK653

## (undated) DEVICE — ESU HARMONIC FOCUS SHEARS 9CM HAR9F

## (undated) DEVICE — LABEL MEDICATION SYSTEM  3304

## (undated) DEVICE — ESU CORD BIPOLAR GREEN 10-4000

## (undated) DEVICE — LINEN GOWN LG 5406

## (undated) DEVICE — SOL WATER IRRIG 1000ML BOTTLE 2F7114

## (undated) DEVICE — ESU GROUND PAD ADULT W/CORD E7507

## (undated) DEVICE — STPL SKIN 35W ROTATING HEAD PRW35

## (undated) DEVICE — GOWN XLG DISP 9545

## (undated) DEVICE — BASIN SET MINOR DISP

## (undated) DEVICE — BLADE 10MM 11-1753

## (undated) DEVICE — TOURNIQUET CUFF 24" REPRO GREEN 60-7070-104

## (undated) DEVICE — DRAPE WARMER 66X44" ORS-300

## (undated) DEVICE — SUPPLEMENTAL MODEL HALF

## (undated) DEVICE — SU PROLENE 4-0 SHDA 36" 8521H

## (undated) DEVICE — COVER CAMERA IN-LIGHT LENS LT-C02-P

## (undated) DEVICE — SUCTION MANIFOLD NEPTUNE 2 SYS 1 PORT 702-025-000

## (undated) DEVICE — TUBE NASOGASTRIC FEEDING ENTRIFLEX ENFIT 12FR 43 8884721252E

## (undated) DEVICE — SU PROLENE 5-0 P-3 18" 8698G

## (undated) DEVICE — CLIP HORIZON MED BLUE 002200

## (undated) RX ORDER — MINERAL OIL
OIL (ML) MISCELLANEOUS
Status: DISPENSED
Start: 2023-08-17

## (undated) RX ORDER — AMPICILLIN AND SULBACTAM 2; 1 G/1; G/1
INJECTION, POWDER, FOR SOLUTION INTRAMUSCULAR; INTRAVENOUS
Status: DISPENSED
Start: 2023-08-17

## (undated) RX ORDER — PAPAVERINE HYDROCHLORIDE 30 MG/ML
INJECTION INTRAMUSCULAR; INTRAVENOUS
Status: DISPENSED
Start: 2023-08-17

## (undated) RX ORDER — FENTANYL CITRATE 50 UG/ML
INJECTION, SOLUTION INTRAMUSCULAR; INTRAVENOUS
Status: DISPENSED
Start: 2023-08-17

## (undated) RX ORDER — GLYCOPYRROLATE 0.2 MG/ML
INJECTION, SOLUTION INTRAMUSCULAR; INTRAVENOUS
Status: DISPENSED
Start: 2023-08-17

## (undated) RX ORDER — PROPOFOL 10 MG/ML
INJECTION, EMULSION INTRAVENOUS
Status: DISPENSED
Start: 2023-10-18

## (undated) RX ORDER — HEPARIN SODIUM (PORCINE) LOCK FLUSH IV SOLN 100 UNIT/ML 100 UNIT/ML
SOLUTION INTRAVENOUS
Status: DISPENSED
Start: 2024-02-07

## (undated) RX ORDER — DEXAMETHASONE SODIUM PHOSPHATE 4 MG/ML
INJECTION, SOLUTION INTRA-ARTICULAR; INTRALESIONAL; INTRAMUSCULAR; INTRAVENOUS; SOFT TISSUE
Status: DISPENSED
Start: 2023-08-17

## (undated) RX ORDER — FENTANYL CITRATE 50 UG/ML
INJECTION, SOLUTION INTRAMUSCULAR; INTRAVENOUS
Status: DISPENSED
Start: 2023-08-29

## (undated) RX ORDER — ACETAMINOPHEN 325 MG/1
TABLET ORAL
Status: DISPENSED
Start: 2023-10-18

## (undated) RX ORDER — ENOXAPARIN SODIUM 100 MG/ML
INJECTION SUBCUTANEOUS
Status: DISPENSED
Start: 2023-08-17

## (undated) RX ORDER — ONDANSETRON 2 MG/ML
INJECTION INTRAMUSCULAR; INTRAVENOUS
Status: DISPENSED
Start: 2023-08-24

## (undated) RX ORDER — ONDANSETRON 2 MG/ML
INJECTION INTRAMUSCULAR; INTRAVENOUS
Status: DISPENSED
Start: 2023-08-17

## (undated) RX ORDER — LIDOCAINE HYDROCHLORIDE 20 MG/ML
JELLY TOPICAL
Status: DISPENSED
Start: 2024-03-21

## (undated) RX ORDER — LIDOCAINE HYDROCHLORIDE 10 MG/ML
INJECTION, SOLUTION EPIDURAL; INFILTRATION; INTRACAUDAL; PERINEURAL
Status: DISPENSED
Start: 2023-08-23

## (undated) RX ORDER — LIDOCAINE HYDROCHLORIDE AND EPINEPHRINE 10; 10 MG/ML; UG/ML
INJECTION, SOLUTION INFILTRATION; PERINEURAL
Status: DISPENSED
Start: 2023-08-24

## (undated) RX ORDER — LIDOCAINE HYDROCHLORIDE AND EPINEPHRINE 10; 10 MG/ML; UG/ML
INJECTION, SOLUTION INFILTRATION; PERINEURAL
Status: DISPENSED
Start: 2023-10-18

## (undated) RX ORDER — CHLORHEXIDINE GLUCONATE ORAL RINSE 1.2 MG/ML
SOLUTION DENTAL
Status: DISPENSED
Start: 2023-08-24

## (undated) RX ORDER — EPHEDRINE SULFATE 50 MG/ML
INJECTION, SOLUTION INTRAMUSCULAR; INTRAVENOUS; SUBCUTANEOUS
Status: DISPENSED
Start: 2023-08-17

## (undated) RX ORDER — HYDROMORPHONE HYDROCHLORIDE 1 MG/ML
INJECTION, SOLUTION INTRAMUSCULAR; INTRAVENOUS; SUBCUTANEOUS
Status: DISPENSED
Start: 2023-08-17

## (undated) RX ORDER — HEPARIN SODIUM (PORCINE) LOCK FLUSH IV SOLN 100 UNIT/ML 100 UNIT/ML
SOLUTION INTRAVENOUS
Status: DISPENSED
Start: 2024-04-17

## (undated) RX ORDER — LIDOCAINE HYDROCHLORIDE AND EPINEPHRINE 10; 10 MG/ML; UG/ML
INJECTION, SOLUTION INFILTRATION; PERINEURAL
Status: DISPENSED
Start: 2023-08-29

## (undated) RX ORDER — CHLORHEXIDINE GLUCONATE ORAL RINSE 1.2 MG/ML
SOLUTION DENTAL
Status: DISPENSED
Start: 2023-08-17

## (undated) RX ORDER — GABAPENTIN 300 MG/1
CAPSULE ORAL
Status: DISPENSED
Start: 2023-10-18

## (undated) RX ORDER — ONDANSETRON 2 MG/ML
INJECTION INTRAMUSCULAR; INTRAVENOUS
Status: DISPENSED
Start: 2023-10-18

## (undated) RX ORDER — CHLORHEXIDINE GLUCONATE ORAL RINSE 1.2 MG/ML
SOLUTION DENTAL
Status: DISPENSED
Start: 2023-08-29

## (undated) RX ORDER — CALCIUM CHLORIDE 100 MG/ML
INJECTION INTRAVENOUS; INTRAVENTRICULAR
Status: DISPENSED
Start: 2023-08-17

## (undated) RX ORDER — EPHEDRINE SULFATE 50 MG/ML
INJECTION, SOLUTION INTRAMUSCULAR; INTRAVENOUS; SUBCUTANEOUS
Status: DISPENSED
Start: 2023-08-24

## (undated) RX ORDER — PROPOFOL 10 MG/ML
INJECTION, EMULSION INTRAVENOUS
Status: DISPENSED
Start: 2023-08-17

## (undated) RX ORDER — GLYCOPYRROLATE 0.2 MG/ML
INJECTION, SOLUTION INTRAMUSCULAR; INTRAVENOUS
Status: DISPENSED
Start: 2023-08-24

## (undated) RX ORDER — BUPIVACAINE HYDROCHLORIDE 5 MG/ML
INJECTION, SOLUTION EPIDURAL; INTRACAUDAL
Status: DISPENSED
Start: 2023-10-18

## (undated) RX ORDER — FENTANYL CITRATE 50 UG/ML
INJECTION, SOLUTION INTRAMUSCULAR; INTRAVENOUS
Status: DISPENSED
Start: 2023-08-24

## (undated) RX ORDER — DEXAMETHASONE SODIUM PHOSPHATE 4 MG/ML
INJECTION, SOLUTION INTRA-ARTICULAR; INTRALESIONAL; INTRAMUSCULAR; INTRAVENOUS; SOFT TISSUE
Status: DISPENSED
Start: 2023-08-24

## (undated) RX ORDER — CEFAZOLIN SODIUM 2 G/100ML
INJECTION, SOLUTION INTRAVENOUS
Status: DISPENSED
Start: 2023-08-23

## (undated) RX ORDER — EPINEPHRINE NASAL SOLUTION 1 MG/ML
SOLUTION NASAL
Status: DISPENSED
Start: 2023-08-17

## (undated) RX ORDER — FENTANYL CITRATE 50 UG/ML
INJECTION, SOLUTION INTRAMUSCULAR; INTRAVENOUS
Status: DISPENSED
Start: 2023-10-18

## (undated) RX ORDER — FENTANYL CITRATE 50 UG/ML
INJECTION, SOLUTION INTRAMUSCULAR; INTRAVENOUS
Status: DISPENSED
Start: 2023-08-23

## (undated) RX ORDER — LABETALOL HYDROCHLORIDE 5 MG/ML
INJECTION, SOLUTION INTRAVENOUS
Status: DISPENSED
Start: 2023-08-17

## (undated) RX ORDER — LIDOCAINE HYDROCHLORIDE 20 MG/ML
JELLY TOPICAL
Status: DISPENSED
Start: 2024-03-04